# Patient Record
Sex: FEMALE | Race: BLACK OR AFRICAN AMERICAN | Employment: OTHER | URBAN - METROPOLITAN AREA
[De-identification: names, ages, dates, MRNs, and addresses within clinical notes are randomized per-mention and may not be internally consistent; named-entity substitution may affect disease eponyms.]

---

## 2019-01-14 ENCOUNTER — HOSPITAL ENCOUNTER (EMERGENCY)
Age: 39
Discharge: HOME | End: 2019-01-14
Attending: EMERGENCY MEDICINE
Payer: COMMERCIAL

## 2019-01-14 ENCOUNTER — APPOINTMENT (OUTPATIENT)
Dept: GENERAL RADIOLOGY | Age: 39
End: 2019-01-14
Payer: COMMERCIAL

## 2019-01-14 VITALS
DIASTOLIC BLOOD PRESSURE: 96 MMHG | SYSTOLIC BLOOD PRESSURE: 150 MMHG | RESPIRATION RATE: 17 BRPM | WEIGHT: 230 LBS | OXYGEN SATURATION: 100 % | HEART RATE: 64 BPM | TEMPERATURE: 97.7 F | BODY MASS INDEX: 37.12 KG/M2

## 2019-01-14 DIAGNOSIS — R07.9 CHEST PAIN WITH LOW RISK FOR CARDIAC ETIOLOGY: Primary | ICD-10-CM

## 2019-01-14 DIAGNOSIS — I10 ESSENTIAL HYPERTENSION: ICD-10-CM

## 2019-01-14 LAB
A/G RATIO: 1.6 (ref 1.1–2.2)
ALBUMIN SERPL-MCNC: 4.3 G/DL (ref 3.4–5)
ALP BLD-CCNC: 65 U/L (ref 40–129)
ALT SERPL-CCNC: 9 U/L (ref 10–40)
ANION GAP SERPL CALCULATED.3IONS-SCNC: 8 MMOL/L (ref 3–16)
AST SERPL-CCNC: 16 U/L (ref 15–37)
BASOPHILS ABSOLUTE: 0 K/UL (ref 0–0.2)
BASOPHILS RELATIVE PERCENT: 0.6 %
BILIRUB SERPL-MCNC: 0.4 MG/DL (ref 0–1)
BUN BLDV-MCNC: 7 MG/DL (ref 7–20)
CALCIUM SERPL-MCNC: 9.2 MG/DL (ref 8.3–10.6)
CHLORIDE BLD-SCNC: 105 MMOL/L (ref 99–110)
CO2: 27 MMOL/L (ref 21–32)
CREAT SERPL-MCNC: 0.6 MG/DL (ref 0.6–1.1)
EKG ATRIAL RATE: 69 BPM
EKG DIAGNOSIS: NORMAL
EKG P AXIS: 29 DEGREES
EKG P-R INTERVAL: 146 MS
EKG Q-T INTERVAL: 424 MS
EKG QRS DURATION: 80 MS
EKG QTC CALCULATION (BAZETT): 454 MS
EKG R AXIS: 48 DEGREES
EKG T AXIS: 18 DEGREES
EKG VENTRICULAR RATE: 69 BPM
EOSINOPHILS ABSOLUTE: 0.1 K/UL (ref 0–0.6)
EOSINOPHILS RELATIVE PERCENT: 2.9 %
GFR AFRICAN AMERICAN: >60
GFR NON-AFRICAN AMERICAN: >60
GLOBULIN: 2.7 G/DL
GLUCOSE BLD-MCNC: 97 MG/DL (ref 70–99)
HCT VFR BLD CALC: 36.7 % (ref 36–48)
HEMOGLOBIN: 11.9 G/DL (ref 12–16)
LIPASE: 13 U/L (ref 13–60)
LYMPHOCYTES ABSOLUTE: 1.5 K/UL (ref 1–5.1)
LYMPHOCYTES RELATIVE PERCENT: 30.6 %
MCH RBC QN AUTO: 27.3 PG (ref 26–34)
MCHC RBC AUTO-ENTMCNC: 32.5 G/DL (ref 31–36)
MCV RBC AUTO: 84.2 FL (ref 80–100)
MONOCYTES ABSOLUTE: 0.2 K/UL (ref 0–1.3)
MONOCYTES RELATIVE PERCENT: 5 %
NEUTROPHILS ABSOLUTE: 2.9 K/UL (ref 1.7–7.7)
NEUTROPHILS RELATIVE PERCENT: 60.9 %
PDW BLD-RTO: 13.7 % (ref 12.4–15.4)
PLATELET # BLD: 154 K/UL (ref 135–450)
PMV BLD AUTO: 9.4 FL (ref 5–10.5)
POTASSIUM SERPL-SCNC: 3.9 MMOL/L (ref 3.5–5.1)
RBC # BLD: 4.36 M/UL (ref 4–5.2)
SODIUM BLD-SCNC: 140 MMOL/L (ref 136–145)
TOTAL PROTEIN: 7 G/DL (ref 6.4–8.2)
TROPONIN: <0.01 NG/ML
TROPONIN: <0.01 NG/ML
WBC # BLD: 4.8 K/UL (ref 4–11)

## 2019-01-14 PROCEDURE — 93010 ELECTROCARDIOGRAM REPORT: CPT | Performed by: INTERNAL MEDICINE

## 2019-01-14 PROCEDURE — 99285 EMERGENCY DEPT VISIT HI MDM: CPT

## 2019-01-14 PROCEDURE — 6370000000 HC RX 637 (ALT 250 FOR IP): Performed by: EMERGENCY MEDICINE

## 2019-01-14 PROCEDURE — 93005 ELECTROCARDIOGRAM TRACING: CPT | Performed by: PHYSICIAN ASSISTANT

## 2019-01-14 PROCEDURE — 96375 TX/PRO/DX INJ NEW DRUG ADDON: CPT

## 2019-01-14 PROCEDURE — 85025 COMPLETE CBC W/AUTO DIFF WBC: CPT

## 2019-01-14 PROCEDURE — 80053 COMPREHEN METABOLIC PANEL: CPT

## 2019-01-14 PROCEDURE — 84484 ASSAY OF TROPONIN QUANT: CPT

## 2019-01-14 PROCEDURE — 96374 THER/PROPH/DIAG INJ IV PUSH: CPT

## 2019-01-14 PROCEDURE — 71046 X-RAY EXAM CHEST 2 VIEWS: CPT

## 2019-01-14 PROCEDURE — 6360000002 HC RX W HCPCS: Performed by: EMERGENCY MEDICINE

## 2019-01-14 PROCEDURE — 83690 ASSAY OF LIPASE: CPT

## 2019-01-14 RX ORDER — ONDANSETRON 2 MG/ML
4 INJECTION INTRAMUSCULAR; INTRAVENOUS ONCE
Status: DISCONTINUED | OUTPATIENT
Start: 2019-01-14 | End: 2019-01-14 | Stop reason: HOSPADM

## 2019-01-14 RX ORDER — KETOROLAC TROMETHAMINE 30 MG/ML
15 INJECTION, SOLUTION INTRAMUSCULAR; INTRAVENOUS ONCE
Status: COMPLETED | OUTPATIENT
Start: 2019-01-14 | End: 2019-01-14

## 2019-01-14 RX ORDER — ASPIRIN 81 MG/1
324 TABLET, CHEWABLE ORAL ONCE
Status: COMPLETED | OUTPATIENT
Start: 2019-01-14 | End: 2019-01-14

## 2019-01-14 RX ORDER — MORPHINE SULFATE 2 MG/ML
2 INJECTION, SOLUTION INTRAMUSCULAR; INTRAVENOUS ONCE
Status: COMPLETED | OUTPATIENT
Start: 2019-01-14 | End: 2019-01-14

## 2019-01-14 RX ORDER — ONDANSETRON 2 MG/ML
4 INJECTION INTRAMUSCULAR; INTRAVENOUS ONCE
Status: COMPLETED | OUTPATIENT
Start: 2019-01-14 | End: 2019-01-14

## 2019-01-14 RX ORDER — IBUPROFEN 400 MG/1
400 TABLET ORAL EVERY 6 HOURS PRN
Qty: 30 TABLET | Refills: 0 | Status: SHIPPED | OUTPATIENT
Start: 2019-01-14 | End: 2020-03-09

## 2019-01-14 RX ADMIN — ONDANSETRON 4 MG: 2 INJECTION INTRAMUSCULAR; INTRAVENOUS at 11:00

## 2019-01-14 RX ADMIN — HYDROMORPHONE HYDROCHLORIDE 0.5 MG: 1 INJECTION, SOLUTION INTRAMUSCULAR; INTRAVENOUS; SUBCUTANEOUS at 09:44

## 2019-01-14 RX ADMIN — KETOROLAC TROMETHAMINE 15 MG: 30 INJECTION, SOLUTION INTRAMUSCULAR at 09:00

## 2019-01-14 RX ADMIN — ASPIRIN 81 MG 324 MG: 81 TABLET ORAL at 08:59

## 2019-01-14 RX ADMIN — MORPHINE SULFATE 2 MG: 2 INJECTION, SOLUTION INTRAMUSCULAR; INTRAVENOUS at 08:59

## 2019-01-14 ASSESSMENT — PAIN DESCRIPTION - PROGRESSION
CLINICAL_PROGRESSION: GRADUALLY IMPROVING
CLINICAL_PROGRESSION: NOT CHANGED

## 2019-01-14 ASSESSMENT — PAIN SCALES - GENERAL
PAINLEVEL_OUTOF10: 6
PAINLEVEL_OUTOF10: 3
PAINLEVEL_OUTOF10: 6

## 2019-01-14 ASSESSMENT — PAIN DESCRIPTION - PAIN TYPE: TYPE: ACUTE PAIN

## 2019-01-14 ASSESSMENT — HEART SCORE: ECG: 0

## 2019-01-22 ENCOUNTER — HOSPITAL ENCOUNTER (EMERGENCY)
Age: 39
Discharge: HOME | End: 2019-01-22
Attending: EMERGENCY MEDICINE
Payer: COMMERCIAL

## 2019-01-22 ENCOUNTER — APPOINTMENT (OUTPATIENT)
Dept: GENERAL RADIOLOGY | Age: 39
End: 2019-01-22
Payer: COMMERCIAL

## 2019-01-22 VITALS
TEMPERATURE: 98.8 F | WEIGHT: 220 LBS | OXYGEN SATURATION: 100 % | HEART RATE: 79 BPM | RESPIRATION RATE: 17 BRPM | HEIGHT: 65 IN | DIASTOLIC BLOOD PRESSURE: 115 MMHG | SYSTOLIC BLOOD PRESSURE: 160 MMHG | BODY MASS INDEX: 36.65 KG/M2

## 2019-01-22 DIAGNOSIS — R07.9 CHEST PAIN, UNSPECIFIED TYPE: Primary | ICD-10-CM

## 2019-01-22 LAB
A/G RATIO: 1.7 (ref 1.1–2.2)
ALBUMIN SERPL-MCNC: 4.3 G/DL (ref 3.4–5)
ALP BLD-CCNC: 64 U/L (ref 40–129)
ALT SERPL-CCNC: 10 U/L (ref 10–40)
ANION GAP SERPL CALCULATED.3IONS-SCNC: 10 MMOL/L (ref 3–16)
APTT: 33.6 SEC (ref 26–36)
AST SERPL-CCNC: 14 U/L (ref 15–37)
BASOPHILS ABSOLUTE: 0 K/UL (ref 0–0.2)
BASOPHILS RELATIVE PERCENT: 0.4 %
BILIRUB SERPL-MCNC: 0.3 MG/DL (ref 0–1)
BUN BLDV-MCNC: 8 MG/DL (ref 7–20)
CALCIUM SERPL-MCNC: 9.4 MG/DL (ref 8.3–10.6)
CHLORIDE BLD-SCNC: 106 MMOL/L (ref 99–110)
CO2: 24 MMOL/L (ref 21–32)
CREAT SERPL-MCNC: 0.7 MG/DL (ref 0.6–1.1)
D DIMER: 218 NG/ML DDU (ref 0–229)
EKG ATRIAL RATE: 70 BPM
EKG DIAGNOSIS: NORMAL
EKG P AXIS: 37 DEGREES
EKG P-R INTERVAL: 140 MS
EKG Q-T INTERVAL: 388 MS
EKG QRS DURATION: 70 MS
EKG QTC CALCULATION (BAZETT): 419 MS
EKG R AXIS: 53 DEGREES
EKG T AXIS: 3 DEGREES
EKG VENTRICULAR RATE: 70 BPM
EOSINOPHILS ABSOLUTE: 0.2 K/UL (ref 0–0.6)
EOSINOPHILS RELATIVE PERCENT: 2.8 %
GFR AFRICAN AMERICAN: >60
GFR NON-AFRICAN AMERICAN: >60
GLOBULIN: 2.6 G/DL
GLUCOSE BLD-MCNC: 96 MG/DL (ref 70–99)
HCT VFR BLD CALC: 37.2 % (ref 36–48)
HEMOGLOBIN: 12.2 G/DL (ref 12–16)
INR BLD: 1.05 (ref 0.86–1.14)
LIPASE: 19 U/L (ref 13–60)
LYMPHOCYTES ABSOLUTE: 2 K/UL (ref 1–5.1)
LYMPHOCYTES RELATIVE PERCENT: 34.3 %
MCH RBC QN AUTO: 27.8 PG (ref 26–34)
MCHC RBC AUTO-ENTMCNC: 32.9 G/DL (ref 31–36)
MCV RBC AUTO: 84.5 FL (ref 80–100)
MONOCYTES ABSOLUTE: 0.3 K/UL (ref 0–1.3)
MONOCYTES RELATIVE PERCENT: 5.5 %
NEUTROPHILS ABSOLUTE: 3.4 K/UL (ref 1.7–7.7)
NEUTROPHILS RELATIVE PERCENT: 57 %
PDW BLD-RTO: 13.8 % (ref 12.4–15.4)
PLATELET # BLD: 177 K/UL (ref 135–450)
PMV BLD AUTO: 9.8 FL (ref 5–10.5)
POTASSIUM SERPL-SCNC: 3.6 MMOL/L (ref 3.5–5.1)
PROTHROMBIN TIME: 12 SEC (ref 9.8–13)
RBC # BLD: 4.4 M/UL (ref 4–5.2)
SODIUM BLD-SCNC: 140 MMOL/L (ref 136–145)
TOTAL PROTEIN: 6.9 G/DL (ref 6.4–8.2)
TROPONIN: <0.01 NG/ML
WBC # BLD: 5.9 K/UL (ref 4–11)

## 2019-01-22 PROCEDURE — 93005 ELECTROCARDIOGRAM TRACING: CPT | Performed by: EMERGENCY MEDICINE

## 2019-01-22 PROCEDURE — 71045 X-RAY EXAM CHEST 1 VIEW: CPT

## 2019-01-22 PROCEDURE — 80053 COMPREHEN METABOLIC PANEL: CPT

## 2019-01-22 PROCEDURE — 93010 ELECTROCARDIOGRAM REPORT: CPT | Performed by: INTERNAL MEDICINE

## 2019-01-22 PROCEDURE — 85379 FIBRIN DEGRADATION QUANT: CPT

## 2019-01-22 PROCEDURE — 84484 ASSAY OF TROPONIN QUANT: CPT

## 2019-01-22 PROCEDURE — 99285 EMERGENCY DEPT VISIT HI MDM: CPT

## 2019-01-22 PROCEDURE — 85610 PROTHROMBIN TIME: CPT

## 2019-01-22 PROCEDURE — 83690 ASSAY OF LIPASE: CPT

## 2019-01-22 PROCEDURE — 85730 THROMBOPLASTIN TIME PARTIAL: CPT

## 2019-01-22 PROCEDURE — 85025 COMPLETE CBC W/AUTO DIFF WBC: CPT

## 2019-01-22 ASSESSMENT — PAIN SCALES - GENERAL: PAINLEVEL_OUTOF10: 7

## 2019-01-22 ASSESSMENT — ENCOUNTER SYMPTOMS
DIARRHEA: 0
NAUSEA: 0
RHINORRHEA: 0
SHORTNESS OF BREATH: 0
CONSTIPATION: 0
VOMITING: 0
ABDOMINAL PAIN: 0
BLOOD IN STOOL: 0
SORE THROAT: 0

## 2019-02-12 ENCOUNTER — HOSPITAL ENCOUNTER (EMERGENCY)
Age: 39
Discharge: HOME | End: 2019-02-12
Payer: COMMERCIAL

## 2019-02-12 ENCOUNTER — APPOINTMENT (OUTPATIENT)
Dept: GENERAL RADIOLOGY | Age: 39
End: 2019-02-12
Payer: COMMERCIAL

## 2019-02-12 VITALS
DIASTOLIC BLOOD PRESSURE: 73 MMHG | HEIGHT: 66 IN | WEIGHT: 232.81 LBS | RESPIRATION RATE: 17 BRPM | HEART RATE: 73 BPM | BODY MASS INDEX: 37.41 KG/M2 | OXYGEN SATURATION: 100 % | SYSTOLIC BLOOD PRESSURE: 108 MMHG | TEMPERATURE: 97.5 F

## 2019-02-12 DIAGNOSIS — R07.9 CHEST PAIN, UNSPECIFIED TYPE: Primary | ICD-10-CM

## 2019-02-12 LAB
A/G RATIO: 1.4 (ref 1.1–2.2)
ALBUMIN SERPL-MCNC: 4.1 G/DL (ref 3.4–5)
ALP BLD-CCNC: 70 U/L (ref 40–129)
ALT SERPL-CCNC: 15 U/L (ref 10–40)
ANION GAP SERPL CALCULATED.3IONS-SCNC: 10 MMOL/L (ref 3–16)
AST SERPL-CCNC: 22 U/L (ref 15–37)
BASOPHILS ABSOLUTE: 0 K/UL (ref 0–0.2)
BASOPHILS RELATIVE PERCENT: 0.7 %
BILIRUB SERPL-MCNC: <0.2 MG/DL (ref 0–1)
BUN BLDV-MCNC: 9 MG/DL (ref 7–20)
CALCIUM SERPL-MCNC: 9.4 MG/DL (ref 8.3–10.6)
CHLORIDE BLD-SCNC: 106 MMOL/L (ref 99–110)
CO2: 24 MMOL/L (ref 21–32)
CREAT SERPL-MCNC: 0.7 MG/DL (ref 0.6–1.1)
EKG ATRIAL RATE: 76 BPM
EKG DIAGNOSIS: NORMAL
EKG P AXIS: 58 DEGREES
EKG P-R INTERVAL: 138 MS
EKG Q-T INTERVAL: 388 MS
EKG QRS DURATION: 66 MS
EKG QTC CALCULATION (BAZETT): 436 MS
EKG R AXIS: 46 DEGREES
EKG T AXIS: 4 DEGREES
EKG VENTRICULAR RATE: 76 BPM
EOSINOPHILS ABSOLUTE: 0.2 K/UL (ref 0–0.6)
EOSINOPHILS RELATIVE PERCENT: 4.4 %
GFR AFRICAN AMERICAN: >60
GFR NON-AFRICAN AMERICAN: >60
GLOBULIN: 2.9 G/DL
GLUCOSE BLD-MCNC: 100 MG/DL (ref 70–99)
HCT VFR BLD CALC: 36.3 % (ref 36–48)
HEMOGLOBIN: 12 G/DL (ref 12–16)
LYMPHOCYTES ABSOLUTE: 1.8 K/UL (ref 1–5.1)
LYMPHOCYTES RELATIVE PERCENT: 36.2 %
MCH RBC QN AUTO: 27.9 PG (ref 26–34)
MCHC RBC AUTO-ENTMCNC: 33.1 G/DL (ref 31–36)
MCV RBC AUTO: 84.5 FL (ref 80–100)
MONOCYTES ABSOLUTE: 0.3 K/UL (ref 0–1.3)
MONOCYTES RELATIVE PERCENT: 6.9 %
NEUTROPHILS ABSOLUTE: 2.6 K/UL (ref 1.7–7.7)
NEUTROPHILS RELATIVE PERCENT: 51.8 %
PDW BLD-RTO: 13.5 % (ref 12.4–15.4)
PLATELET # BLD: 181 K/UL (ref 135–450)
PMV BLD AUTO: 9.3 FL (ref 5–10.5)
POTASSIUM SERPL-SCNC: 4.1 MMOL/L (ref 3.5–5.1)
RBC # BLD: 4.29 M/UL (ref 4–5.2)
SODIUM BLD-SCNC: 140 MMOL/L (ref 136–145)
TOTAL PROTEIN: 7 G/DL (ref 6.4–8.2)
TROPONIN: <0.01 NG/ML
TROPONIN: <0.01 NG/ML
WBC # BLD: 5 K/UL (ref 4–11)

## 2019-02-12 PROCEDURE — 71046 X-RAY EXAM CHEST 2 VIEWS: CPT

## 2019-02-12 PROCEDURE — 6360000002 HC RX W HCPCS: Performed by: PHYSICIAN ASSISTANT

## 2019-02-12 PROCEDURE — 6370000000 HC RX 637 (ALT 250 FOR IP): Performed by: PHYSICIAN ASSISTANT

## 2019-02-12 PROCEDURE — 93010 ELECTROCARDIOGRAM REPORT: CPT | Performed by: INTERNAL MEDICINE

## 2019-02-12 PROCEDURE — 80053 COMPREHEN METABOLIC PANEL: CPT

## 2019-02-12 PROCEDURE — 84484 ASSAY OF TROPONIN QUANT: CPT

## 2019-02-12 PROCEDURE — 85025 COMPLETE CBC W/AUTO DIFF WBC: CPT

## 2019-02-12 PROCEDURE — 99285 EMERGENCY DEPT VISIT HI MDM: CPT

## 2019-02-12 PROCEDURE — 96374 THER/PROPH/DIAG INJ IV PUSH: CPT

## 2019-02-12 PROCEDURE — 93005 ELECTROCARDIOGRAM TRACING: CPT | Performed by: PHYSICIAN ASSISTANT

## 2019-02-12 RX ORDER — ASPIRIN 81 MG/1
324 TABLET, CHEWABLE ORAL ONCE
Status: DISCONTINUED | OUTPATIENT
Start: 2019-02-12 | End: 2019-02-12

## 2019-02-12 RX ORDER — NAPROXEN 500 MG/1
500 TABLET ORAL 2 TIMES DAILY WITH MEALS
Qty: 20 TABLET | Refills: 0 | Status: SHIPPED | OUTPATIENT
Start: 2019-02-12 | End: 2020-01-01

## 2019-02-12 RX ORDER — ASPIRIN 81 MG/1
243 TABLET, CHEWABLE ORAL ONCE
Status: COMPLETED | OUTPATIENT
Start: 2019-02-12 | End: 2019-02-12

## 2019-02-12 RX ORDER — BUSPIRONE HYDROCHLORIDE 15 MG/1
15 TABLET ORAL
COMMUNITY
Start: 2014-02-25 | End: 2022-02-09 | Stop reason: ALTCHOICE

## 2019-02-12 RX ORDER — HYDROCODONE BITARTRATE AND ACETAMINOPHEN 5; 325 MG/1; MG/1
1 TABLET ORAL ONCE
Status: COMPLETED | OUTPATIENT
Start: 2019-02-12 | End: 2019-02-12

## 2019-02-12 RX ORDER — KETOROLAC TROMETHAMINE 30 MG/ML
30 INJECTION, SOLUTION INTRAMUSCULAR; INTRAVENOUS ONCE
Status: COMPLETED | OUTPATIENT
Start: 2019-02-12 | End: 2019-02-12

## 2019-02-12 RX ADMIN — ASPIRIN 81 MG 243 MG: 81 TABLET ORAL at 10:23

## 2019-02-12 RX ADMIN — HYDROCODONE BITARTRATE AND ACETAMINOPHEN 1 TABLET: 5; 325 TABLET ORAL at 12:13

## 2019-02-12 RX ADMIN — KETOROLAC TROMETHAMINE 30 MG: 30 INJECTION, SOLUTION INTRAMUSCULAR at 12:09

## 2019-02-12 ASSESSMENT — PAIN SCALES - GENERAL
PAINLEVEL_OUTOF10: 4
PAINLEVEL_OUTOF10: 7
PAINLEVEL_OUTOF10: 7
PAINLEVEL_OUTOF10: 4
PAINLEVEL_OUTOF10: 8
PAINLEVEL_OUTOF10: 7

## 2019-02-12 ASSESSMENT — PAIN DESCRIPTION - FREQUENCY: FREQUENCY: CONTINUOUS

## 2019-02-12 ASSESSMENT — PAIN DESCRIPTION - DESCRIPTORS: DESCRIPTORS: SHARP;SQUEEZING

## 2019-02-12 ASSESSMENT — ENCOUNTER SYMPTOMS
ABDOMINAL PAIN: 0
BACK PAIN: 0
SORE THROAT: 0
APNEA: 0
SHORTNESS OF BREATH: 0
CHOKING: 0
EYE REDNESS: 0
VOMITING: 0
NAUSEA: 0
FACIAL SWELLING: 0
EYE DISCHARGE: 0

## 2019-02-12 ASSESSMENT — PAIN DESCRIPTION - PAIN TYPE
TYPE: ACUTE PAIN
TYPE: ACUTE PAIN

## 2019-02-12 ASSESSMENT — PAIN DESCRIPTION - ORIENTATION: ORIENTATION: LEFT

## 2019-02-12 ASSESSMENT — PAIN DESCRIPTION - DIRECTION: RADIATING_TOWARDS: LEFT ARM

## 2019-02-12 ASSESSMENT — PAIN DESCRIPTION - LOCATION
LOCATION: CHEST
LOCATION: CHEST

## 2019-02-12 ASSESSMENT — PAIN DESCRIPTION - PROGRESSION: CLINICAL_PROGRESSION: NOT CHANGED

## 2019-02-12 ASSESSMENT — PAIN DESCRIPTION - ONSET: ONSET: ON-GOING

## 2019-06-02 ENCOUNTER — HOSPITAL ENCOUNTER (EMERGENCY)
Age: 39
Discharge: HOME | End: 2019-06-02
Attending: EMERGENCY MEDICINE
Payer: COMMERCIAL

## 2019-06-02 ENCOUNTER — APPOINTMENT (OUTPATIENT)
Dept: GENERAL RADIOLOGY | Age: 39
End: 2019-06-02
Payer: COMMERCIAL

## 2019-06-02 ENCOUNTER — APPOINTMENT (OUTPATIENT)
Dept: CT IMAGING | Age: 39
End: 2019-06-02
Payer: COMMERCIAL

## 2019-06-02 VITALS
OXYGEN SATURATION: 100 % | BODY MASS INDEX: 36.87 KG/M2 | TEMPERATURE: 98.2 F | HEART RATE: 62 BPM | RESPIRATION RATE: 12 BRPM | WEIGHT: 225 LBS | SYSTOLIC BLOOD PRESSURE: 169 MMHG | DIASTOLIC BLOOD PRESSURE: 101 MMHG

## 2019-06-02 DIAGNOSIS — R55 SYNCOPE AND COLLAPSE: Primary | ICD-10-CM

## 2019-06-02 DIAGNOSIS — S16.1XXA STRAIN OF NECK MUSCLE, INITIAL ENCOUNTER: ICD-10-CM

## 2019-06-02 LAB
A/G RATIO: 1.7 (ref 1.1–2.2)
ALBUMIN SERPL-MCNC: 4.2 G/DL (ref 3.4–5)
ALP BLD-CCNC: 63 U/L (ref 40–129)
ALT SERPL-CCNC: 8 U/L (ref 10–40)
ANION GAP SERPL CALCULATED.3IONS-SCNC: 9 MMOL/L (ref 3–16)
AST SERPL-CCNC: 11 U/L (ref 15–37)
BILIRUB SERPL-MCNC: <0.2 MG/DL (ref 0–1)
BUN BLDV-MCNC: 10 MG/DL (ref 7–20)
CALCIUM SERPL-MCNC: 9.5 MG/DL (ref 8.3–10.6)
CHLORIDE BLD-SCNC: 107 MMOL/L (ref 99–110)
CO2: 23 MMOL/L (ref 21–32)
CREAT SERPL-MCNC: 0.8 MG/DL (ref 0.6–1.1)
GFR AFRICAN AMERICAN: >60
GFR NON-AFRICAN AMERICAN: >60
GLOBULIN: 2.5 G/DL
GLUCOSE BLD-MCNC: 97 MG/DL (ref 70–99)
HCT VFR BLD CALC: 38.7 % (ref 36–48)
HEMOGLOBIN: 12.6 G/DL (ref 12–16)
MCH RBC QN AUTO: 27.7 PG (ref 26–34)
MCHC RBC AUTO-ENTMCNC: 32.6 G/DL (ref 31–36)
MCV RBC AUTO: 84.7 FL (ref 80–100)
PDW BLD-RTO: 13.9 % (ref 12.4–15.4)
PLATELET # BLD: 164 K/UL (ref 135–450)
PMV BLD AUTO: 9.9 FL (ref 5–10.5)
POTASSIUM SERPL-SCNC: 4.1 MMOL/L (ref 3.5–5.1)
RBC # BLD: 4.57 M/UL (ref 4–5.2)
SODIUM BLD-SCNC: 139 MMOL/L (ref 136–145)
TOTAL PROTEIN: 6.7 G/DL (ref 6.4–8.2)
TROPONIN: <0.01 NG/ML
WBC # BLD: 4.1 K/UL (ref 4–11)

## 2019-06-02 PROCEDURE — 6370000000 HC RX 637 (ALT 250 FOR IP): Performed by: PHYSICIAN ASSISTANT

## 2019-06-02 PROCEDURE — 80053 COMPREHEN METABOLIC PANEL: CPT

## 2019-06-02 PROCEDURE — 93005 ELECTROCARDIOGRAM TRACING: CPT | Performed by: EMERGENCY MEDICINE

## 2019-06-02 PROCEDURE — 6360000004 HC RX CONTRAST MEDICATION: Performed by: EMERGENCY MEDICINE

## 2019-06-02 PROCEDURE — 96374 THER/PROPH/DIAG INJ IV PUSH: CPT

## 2019-06-02 PROCEDURE — 84484 ASSAY OF TROPONIN QUANT: CPT

## 2019-06-02 PROCEDURE — 6360000002 HC RX W HCPCS: Performed by: PHYSICIAN ASSISTANT

## 2019-06-02 PROCEDURE — 85027 COMPLETE CBC AUTOMATED: CPT

## 2019-06-02 PROCEDURE — 70450 CT HEAD/BRAIN W/O DYE: CPT

## 2019-06-02 PROCEDURE — 71275 CT ANGIOGRAPHY CHEST: CPT

## 2019-06-02 PROCEDURE — 96375 TX/PRO/DX INJ NEW DRUG ADDON: CPT

## 2019-06-02 PROCEDURE — 72125 CT NECK SPINE W/O DYE: CPT

## 2019-06-02 PROCEDURE — 99284 EMERGENCY DEPT VISIT MOD MDM: CPT

## 2019-06-02 PROCEDURE — 71046 X-RAY EXAM CHEST 2 VIEWS: CPT

## 2019-06-02 RX ORDER — LIDOCAINE 4 G/G
1 PATCH TOPICAL DAILY
Status: DISCONTINUED | OUTPATIENT
Start: 2019-06-02 | End: 2019-06-02 | Stop reason: HOSPADM

## 2019-06-02 RX ORDER — ORPHENADRINE CITRATE 30 MG/ML
30 INJECTION INTRAMUSCULAR; INTRAVENOUS ONCE
Status: COMPLETED | OUTPATIENT
Start: 2019-06-02 | End: 2019-06-02

## 2019-06-02 RX ORDER — LOSARTAN POTASSIUM 25 MG/1
100 TABLET ORAL ONCE
Status: COMPLETED | OUTPATIENT
Start: 2019-06-02 | End: 2019-06-02

## 2019-06-02 RX ORDER — CYCLOBENZAPRINE HCL 10 MG
10 TABLET ORAL 3 TIMES DAILY PRN
Qty: 15 TABLET | Refills: 0 | Status: SHIPPED | OUTPATIENT
Start: 2019-06-02 | End: 2019-06-12

## 2019-06-02 RX ORDER — KETOROLAC TROMETHAMINE 30 MG/ML
30 INJECTION, SOLUTION INTRAMUSCULAR; INTRAVENOUS ONCE
Status: COMPLETED | OUTPATIENT
Start: 2019-06-02 | End: 2019-06-02

## 2019-06-02 RX ORDER — LIDOCAINE 50 MG/G
1 PATCH TOPICAL DAILY
Qty: 30 PATCH | Refills: 0 | Status: SHIPPED | OUTPATIENT
Start: 2019-06-02 | End: 2022-02-09 | Stop reason: ALTCHOICE

## 2019-06-02 RX ORDER — HYDROCODONE BITARTRATE AND ACETAMINOPHEN 5; 325 MG/1; MG/1
2 TABLET ORAL ONCE
Status: COMPLETED | OUTPATIENT
Start: 2019-06-02 | End: 2019-06-02

## 2019-06-02 RX ADMIN — IOPAMIDOL 100 ML: 755 INJECTION, SOLUTION INTRAVENOUS at 11:24

## 2019-06-02 RX ADMIN — KETOROLAC TROMETHAMINE 30 MG: 30 INJECTION, SOLUTION INTRAMUSCULAR at 10:40

## 2019-06-02 RX ADMIN — ORPHENADRINE CITRATE 30 MG: 60 INJECTION INTRAMUSCULAR; INTRAVENOUS at 10:40

## 2019-06-02 RX ADMIN — LOSARTAN POTASSIUM 100 MG: 25 TABLET ORAL at 10:40

## 2019-06-02 RX ADMIN — HYDROCODONE BITARTRATE AND ACETAMINOPHEN 2 TABLET: 5; 325 TABLET ORAL at 10:40

## 2019-06-02 ASSESSMENT — ENCOUNTER SYMPTOMS
ABDOMINAL PAIN: 0
VOMITING: 0
DIARRHEA: 0
RHINORRHEA: 0
NAUSEA: 0
BACK PAIN: 0
SHORTNESS OF BREATH: 0
COUGH: 0

## 2019-06-02 ASSESSMENT — PAIN SCALES - GENERAL
PAINLEVEL_OUTOF10: 8
PAINLEVEL_OUTOF10: 8

## 2019-06-02 ASSESSMENT — PAIN DESCRIPTION - PAIN TYPE: TYPE: ACUTE PAIN

## 2019-06-02 ASSESSMENT — PAIN DESCRIPTION - LOCATION: LOCATION: NECK

## 2019-06-02 NOTE — LETTER
[facility]  [address] [facility], [address]             June 2, 2019    Patient: Stanley Brower   YOB: 1980   Date of Visit: 6/2/2019       To Whom It May Concern:    Vesna Barros was seen and treated in our emergency department on 6/2/2019. She may return to work on 6/4/2019.       Sincerely,         [facility]

## 2019-06-02 NOTE — ED NOTES
Patient into ER from home with , where yesterday she reportedly had chest pain and sharp shooting pains down neck and then passed out. Today continues with dull chest pain, and very severe neck pain from where she fell yesterday. Patient has hx of high blood pressure, patients bp elevated and reports she did not take morning medications today. IV placed, EKG completed, and blood collected and sent to lab. Will continue to monitor.       Deny Greene RN  06/02/19 2054

## 2019-06-02 NOTE — ED PROVIDER NOTES
I independently examined and evaluated Steve Paniagua. In brief their history revealed patient with neck pain, chest pain after syncope yesterday. Patient states that she was walking in her house when she started feeling dizziness. She states she she passed out at that time. She laid down in the bed after she woke up and states that when she got up out of bed she passed out again. She's been having worsening neck pain since that time. He is also complaining of some back pain that seems to radiate to her chest today. She has a history of fibromyalgia and chronic neck pain. She denies numbness tingling weakness down her arms or legs. She denies shortness of breath, abdominal pain, nausea, vomiting. She is hypertensive here but she forgot to take her blood pressure medicine this morning. . Their exam revealed pulses equal bilaterally in both upper extremities. No tenderness to palpation of the chest wall. Full range of motion of the neck. 5 out of 5 strength bilateral upper extremities. Heart regular rate and rhythm. . All diagnostic, treatment, and disposition decisions were made by myself in conjunction with the mid-level provider. Before disposition, questions were sought and answered with the significant other. They have voiced understanding and agree with the plan. Patient's CBC, chemistry and troponin are unremarkable. We did obtain a CT of the cervical spine as well as a CTA of the chest. This showed no acute evidence of abnormalities, specifically no evidence of dissection or aneurysm or abnormalities of the cervical spine. Patient's chest pain has been constant since 3 AM. She is more complaining of acute on chronic neck pain. She did not take her blood pressure medicines this morning. Counseled on taking her medicines as directed. 12 lead EKG:  Normal Sinus Rhythm 73  Axis is   Normal  QTc is  normal  There is no specific ST-T wave changes appreciated.   There is no clear evidence of acute ischemia or infarction. It was compared against an EKG from 2/12/19.'    For all further details of the patient's emergency department visit, please see the mid-level practitioner's documentation.         Berry Tom MD  06/02/19 4783

## 2019-06-02 NOTE — ED PROVIDER NOTES
leg pain or swelling. No estrogen therapies. She denies any fever or chills. No vomiting or diarrhea. No abdominal pain. No numbness, tingling, or weakness. No other complaints at this time     Nursing Notes were all reviewed and agreed with or any disagreements were addressed  in the HPI. REVIEW OF SYSTEMS    (2-9 systems for level 4, 10 or more for level 5)     Review of Systems   Constitutional: Negative for activity change, appetite change, chills and fever. HENT: Negative for congestion and rhinorrhea. Respiratory: Negative for cough and shortness of breath. Cardiovascular: Positive for chest pain. Gastrointestinal: Negative for abdominal pain, diarrhea, nausea and vomiting. Genitourinary: Negative for difficulty urinating, dysuria and hematuria. Musculoskeletal: Positive for neck pain. Negative for back pain. Neurological: Positive for syncope. Negative for weakness and numbness. Positives and Pertinent negatives as per HPI. Except as noted abovein the ROS, all other systems were reviewed and negative.        PAST MEDICAL HISTORY     Past Medical History:   Diagnosis Date    Anemia     during pregnancy    HLD (hyperlipidemia)     Hypertension          SURGICAL HISTORY     Past Surgical History:   Procedure Laterality Date    FOOT SURGERY      LEFT    HYSTERECTOMY      MOUTH SURGERY      WRIST SURGERY  4/14/11    ORIF LEFT  WRIST         CURRENTMEDICATIONS       Discharge Medication List as of 6/2/2019 12:24 PM      CONTINUE these medications which have NOT CHANGED    Details   busPIRone (BUSPAR) 15 MG tablet Take 15 mg by mouthHistorical Med      naproxen (NAPROSYN) 500 MG tablet Take 1 tablet by mouth 2 times daily (with meals), Disp-20 tablet, R-0Print      ibuprofen (IBU) 400 MG tablet Take 1 tablet by mouth every 6 hours as needed for Pain, Disp-30 tablet, R-0Print      losartan (COZAAR) 100 MG tablet Take 100 mg by mouth dailyHistorical Med      METOPROLOL SUCCINATE ER normal.   Left Ear: External ear normal.   Nose: Nose normal.   Eyes: Right eye exhibits no discharge. Left eye exhibits no discharge. Neck: Normal range of motion. Neck supple. No tracheal deviation present. Cardiovascular: Normal rate, regular rhythm, normal heart sounds and intact distal pulses. No murmur heard. Pulmonary/Chest: Effort normal and breath sounds normal. No stridor. No respiratory distress. She has no wheezes. Abdominal: Soft. Bowel sounds are normal. She exhibits no distension. There is no tenderness. There is no guarding. Musculoskeletal: Normal range of motion. There is tenderness to palpation to the paraspinous muscles of the right cervical spine, there is minimal midline tenderness. Full range of motion of neck. There is no other midline spinal tenderness   Neurological: She is alert and oriented to person, place, and time. Motor strength of bilateral upper extremities is 5/5 throughout. Normal sensation light touch. Radial pulses are 2+. Neurovascularly intact   Skin: Skin is warm and dry. She is not diaphoretic. Psychiatric: She has a normal mood and affect. Her behavior is normal.   Nursing note and vitals reviewed. DIAGNOSTIC RESULTS   LABS:    Labs Reviewed   COMPREHENSIVE METABOLIC PANEL - Abnormal; Notable for the following components:       Result Value    ALT 8 (*)     AST 11 (*)     All other components within normal limits    Narrative:     Performed at:  [facility] [address], [place], [address]   Phone [phone]   CBC    Narrative:     Performed at:  [facility] [address], [place], [address]   Phone [phone]   TROPONIN    Narrative:     Performed at:  [facility] [address], [place], [address]   Phone [phone]       All other labs were within normal range or not returned as of this dictation. EKG:  All EKG's are interpreted by the Emergency Department Physician who either signs orCo-signs this chart in the absence of a cardiologist.  Please see their note for interpretation of EKG. RADIOLOGY:   Non-plain film images such as CT, Ultrasound and MRI are read by the radiologist. Plain radiographic images are visualized andpreliminarily interpreted by the  ED Provider with the below findings:        Interpretation [facility] Radiologist below, if available at the time of this note:    XR CHEST STANDARD (2 VW)   Final Result   No acute process. CTA CHEST W WO CONTRAST   Final Result   Negative CT of the chest.         CT CERVICAL SPINE WO CONTRAST   Final Result   No acute abnormality of the cervical spine. CT HEAD WO CONTRAST   Final Result   No acute intracranial abnormality. No results found. PROCEDURES   Unless otherwise noted below, none     Procedures    CRITICAL CARE TIME   N/A    CONSULTS:  None      EMERGENCY DEPARTMENT COURSE and DIFFERENTIALDIAGNOSIS/MDM:   Vitals:    Vitals:    06/02/19 1045 06/02/19 1145 06/02/19 1200 06/02/19 1215   BP: (!) 186/117 (!) 179/107 (!) 158/106 (!) 169/101   Pulse: 76 58 59 62   Resp: 13 11 15 12   Temp:       TempSrc:       SpO2: 99% 100% 100% 100%   Weight:           Patient was given thefollowing medications:  Medications   lidocaine 4 % external patch 1 patch (1 patch Transdermal Patch Applied 6/2/19 1040)   ketorolac (TORADOL) injection 30 mg (30 mg Intravenous Given 6/2/19 1040)   orphenadrine (NORFLEX) injection 30 mg (30 mg Intravenous Given 6/2/19 1040)   HYDROcodone-acetaminophen (NORCO) 5-325 MG per tablet 2 tablet (2 tablets Oral Given 6/2/19 1040)   losartan (COZAAR) tablet 100 mg (100 mg Oral Given 6/2/19 1040)   iopamidol (ISOVUE-370) 76 % injection 100 mL (100 mLs Intravenous Given 6/2/19 1124)     The patient presents to the emergency department today for evaluation for neck pain.   The patient states that she has a history of chronic neck scheduled for Wednesday, he is to have this obtained, she states that this is for her chronic neck pain. She is to return to the ED for any new or worsening symptoms. Patient was understanding and is agreeable plan. Stable for discharge    she has had constant chest pain since yesterday, she is relatively low risk, I do not feel that she needs any further emergent cardiac workup at this time and she has had symptoms greater than 12 hours. My suspicion is low at this time for ACS, pneumonia, pleural effusion, pneumothorax, myocarditis, pericarditis, cardiac tamponade, life-threatening arrhythmia, TAA, acute failure, respiratory distress, acute dissection or other emergent etiology at this time. FINAL IMPRESSION      1. Syncope and collapse    2.  Strain of neck muscle, initial encounter          DISPOSITION/PLAN   DISPOSITION Decision To Discharge 06/02/2019 12:33:05 PM      PATIENT REFERREDTO:  Your primary care physician    Schedule an appointment as soon as possible for a visit in 2 days      [facility]  [address] [facility]  [phone]    As needed, If symptoms worsen      DISCHARGE MEDICATIONS:  Discharge Medication List as of 6/2/2019 12:24 PM      START taking these medications    Details   lidocaine (LIDODERM) 5 % Place 1 patch onto the skin daily 12 hours on, 12 hours off., Disp-30 patch, R-0Print      cyclobenzaprine (FLEXERIL) 10 MG tablet Take 1 tablet by mouth 3 times daily as needed for Muscle spasms, Disp-15 tablet, R-0Print             DISCONTINUED MEDICATIONS:  Discharge Medication List as of 6/2/2019 12:24 PM                 (Please note that portions ofthis note were completed with a voice recognition program.  Efforts were made to edit the dictations but occasionally words are mis-transcribed.)    Dea Olivarez PA-C (electronically signed)            Dea Olivarez PA-C  06/02/19 [address] [place] [address]

## 2019-06-03 LAB
EKG ATRIAL RATE: 73 BPM
EKG DIAGNOSIS: NORMAL
EKG P AXIS: 34 DEGREES
EKG P-R INTERVAL: 140 MS
EKG Q-T INTERVAL: 374 MS
EKG QRS DURATION: 68 MS
EKG QTC CALCULATION (BAZETT): 412 MS
EKG R AXIS: 35 DEGREES
EKG T AXIS: -6 DEGREES
EKG VENTRICULAR RATE: 73 BPM

## 2019-06-03 PROCEDURE — 93010 ELECTROCARDIOGRAM REPORT: CPT | Performed by: INTERNAL MEDICINE

## 2020-01-01 ENCOUNTER — APPOINTMENT (OUTPATIENT)
Dept: GENERAL RADIOLOGY | Age: 40
End: 2020-01-01
Payer: COMMERCIAL

## 2020-01-01 ENCOUNTER — HOSPITAL ENCOUNTER (EMERGENCY)
Age: 40
Discharge: HOME | End: 2020-01-01
Attending: EMERGENCY MEDICINE
Payer: COMMERCIAL

## 2020-01-01 VITALS
BODY MASS INDEX: 36.16 KG/M2 | HEIGHT: 66 IN | OXYGEN SATURATION: 98 % | TEMPERATURE: 98.2 F | WEIGHT: 225 LBS | HEART RATE: 76 BPM | RESPIRATION RATE: 8 BRPM | SYSTOLIC BLOOD PRESSURE: 139 MMHG | DIASTOLIC BLOOD PRESSURE: 84 MMHG

## 2020-01-01 LAB
ANION GAP SERPL CALCULATED.3IONS-SCNC: 9 MMOL/L (ref 3–16)
BASOPHILS ABSOLUTE: 0 K/UL (ref 0–0.2)
BASOPHILS RELATIVE PERCENT: 0.5 %
BUN BLDV-MCNC: 7 MG/DL (ref 7–20)
CALCIUM SERPL-MCNC: 9.1 MG/DL (ref 8.3–10.6)
CHLORIDE BLD-SCNC: 105 MMOL/L (ref 99–110)
CO2: 25 MMOL/L (ref 21–32)
CREAT SERPL-MCNC: 0.7 MG/DL (ref 0.6–1.1)
D DIMER: 211 NG/ML DDU (ref 0–229)
EOSINOPHILS ABSOLUTE: 0.2 K/UL (ref 0–0.6)
EOSINOPHILS RELATIVE PERCENT: 4.2 %
GFR AFRICAN AMERICAN: >60
GFR NON-AFRICAN AMERICAN: >60
GLUCOSE BLD-MCNC: 97 MG/DL (ref 70–99)
HCG QUALITATIVE: NEGATIVE
HCT VFR BLD CALC: 38.7 % (ref 36–48)
HEMOGLOBIN: 12.5 G/DL (ref 12–16)
LYMPHOCYTES ABSOLUTE: 1.1 K/UL (ref 1–5.1)
LYMPHOCYTES RELATIVE PERCENT: 24.9 %
MCH RBC QN AUTO: 27.5 PG (ref 26–34)
MCHC RBC AUTO-ENTMCNC: 32.3 G/DL (ref 31–36)
MCV RBC AUTO: 85.3 FL (ref 80–100)
MONOCYTES ABSOLUTE: 0.3 K/UL (ref 0–1.3)
MONOCYTES RELATIVE PERCENT: 6.6 %
NEUTROPHILS ABSOLUTE: 2.8 K/UL (ref 1.7–7.7)
NEUTROPHILS RELATIVE PERCENT: 63.8 %
PDW BLD-RTO: 13.8 % (ref 12.4–15.4)
PLATELET # BLD: 159 K/UL (ref 135–450)
PMV BLD AUTO: 9.8 FL (ref 5–10.5)
POTASSIUM SERPL-SCNC: 4 MMOL/L (ref 3.5–5.1)
RBC # BLD: 4.54 M/UL (ref 4–5.2)
SODIUM BLD-SCNC: 139 MMOL/L (ref 136–145)
TROPONIN: <0.01 NG/ML
TROPONIN: <0.01 NG/ML
WBC # BLD: 4.4 K/UL (ref 4–11)

## 2020-01-01 PROCEDURE — 80048 BASIC METABOLIC PNL TOTAL CA: CPT

## 2020-01-01 PROCEDURE — 99285 EMERGENCY DEPT VISIT HI MDM: CPT

## 2020-01-01 PROCEDURE — 85025 COMPLETE CBC W/AUTO DIFF WBC: CPT

## 2020-01-01 PROCEDURE — 93005 ELECTROCARDIOGRAM TRACING: CPT | Performed by: EMERGENCY MEDICINE

## 2020-01-01 PROCEDURE — 6360000002 HC RX W HCPCS: Performed by: EMERGENCY MEDICINE

## 2020-01-01 PROCEDURE — 84484 ASSAY OF TROPONIN QUANT: CPT

## 2020-01-01 PROCEDURE — 85379 FIBRIN DEGRADATION QUANT: CPT

## 2020-01-01 PROCEDURE — 96375 TX/PRO/DX INJ NEW DRUG ADDON: CPT

## 2020-01-01 PROCEDURE — 2500000003 HC RX 250 WO HCPCS: Performed by: EMERGENCY MEDICINE

## 2020-01-01 PROCEDURE — 84703 CHORIONIC GONADOTROPIN ASSAY: CPT

## 2020-01-01 PROCEDURE — 71046 X-RAY EXAM CHEST 2 VIEWS: CPT

## 2020-01-01 PROCEDURE — 96374 THER/PROPH/DIAG INJ IV PUSH: CPT

## 2020-01-01 RX ORDER — KETOROLAC TROMETHAMINE 30 MG/ML
15 INJECTION, SOLUTION INTRAMUSCULAR; INTRAVENOUS ONCE
Status: COMPLETED | OUTPATIENT
Start: 2020-01-01 | End: 2020-01-01

## 2020-01-01 RX ORDER — LABETALOL HYDROCHLORIDE 5 MG/ML
10 INJECTION, SOLUTION INTRAVENOUS ONCE
Status: COMPLETED | OUTPATIENT
Start: 2020-01-01 | End: 2020-01-01

## 2020-01-01 RX ORDER — DULOXETIN HYDROCHLORIDE 30 MG/1
30 CAPSULE, DELAYED RELEASE ORAL 2 TIMES DAILY
COMMUNITY
End: 2022-02-09 | Stop reason: ALTCHOICE

## 2020-01-01 RX ORDER — NAPROXEN 500 MG/1
500 TABLET ORAL 2 TIMES DAILY WITH MEALS
Qty: 20 TABLET | Refills: 0 | Status: SHIPPED | OUTPATIENT
Start: 2020-01-01 | End: 2020-03-09

## 2020-01-01 RX ORDER — AMLODIPINE BESYLATE 10 MG/1
10 TABLET ORAL DAILY
COMMUNITY
Start: 2019-01-25 | End: 2022-06-23 | Stop reason: SDUPTHER

## 2020-01-01 RX ORDER — QUETIAPINE FUMARATE 100 MG/1
100 TABLET, FILM COATED ORAL DAILY
COMMUNITY
Start: 2019-12-11 | End: 2022-02-09 | Stop reason: ALTCHOICE

## 2020-01-01 RX ORDER — LOSARTAN POTASSIUM AND HYDROCHLOROTHIAZIDE 12.5; 1 MG/1; MG/1
100 TABLET ORAL DAILY
COMMUNITY
Start: 2019-12-11 | End: 2022-02-09 | Stop reason: ALTCHOICE

## 2020-01-01 RX ADMIN — KETOROLAC TROMETHAMINE 15 MG: 30 INJECTION, SOLUTION INTRAMUSCULAR at 16:26

## 2020-01-01 RX ADMIN — LABETALOL HYDROCHLORIDE 10 MG: 5 INJECTION INTRAVENOUS at 14:21

## 2020-01-01 ASSESSMENT — PAIN SCALES - GENERAL
PAINLEVEL_OUTOF10: 4
PAINLEVEL_OUTOF10: 6
PAINLEVEL_OUTOF10: 8

## 2020-01-01 ASSESSMENT — PAIN DESCRIPTION - LOCATION
LOCATION: CHEST

## 2020-01-01 ASSESSMENT — PAIN DESCRIPTION - DESCRIPTORS
DESCRIPTORS: ACHING

## 2020-01-01 ASSESSMENT — PAIN DESCRIPTION - PAIN TYPE
TYPE: ACUTE PAIN

## 2020-01-01 NOTE — ED PROVIDER NOTES
[address] ENCOUNTER      Pt Name: Giovanni Pope  MRN: [id number]  [place] 1980  Date of evaluation: 1/1/2020  Provider: Yevgeniy Roca MD    CHIEF COMPLAINT       Chief Complaint   Patient presents with    Chest Pain     c/o chest pain worsening since monday, swelling to right shoulder that comes and goes x a few months, producutive cough started last night. HISTORY OF PRESENT ILLNESS   (Location/Symptom, Timing/Onset, Context/Setting, Quality, Duration, Modifying Factors, Severity)  Note limiting factors. Giovanni Pope is a 46 y.o. female with past medical history of hypertension here today with chest pain. Patient states that for the last 24 to 48 hours she has had a substernal chest pain. Notes it is somewhat pressure-like and occasionally sharp. Occasionally worse with taking a deep breath. Denies cough. Mild shortness of breath. No headache. No lower extremity swelling, redness or pain. No fevers or chills. She states she is wearing her clonidine patch and usually takes her other blood pressure medication at night and has been compliant with that. She notes she had a negative stress test at [facility] in late February    HPI    Nursing Notes were reviewed. REVIEW OF SYSTEMS    (2-9 systems for level 4, 10 or more for level 5)     Review of Systems    Please see HPI for pertinent positive and negative review of system findings. A full 10 system ROS was performed and otherwise negative.         PAST MEDICAL HISTORY     Past Medical History:   Diagnosis Date    Anemia     during pregnancy    HLD (hyperlipidemia)     Hypertension          SURGICAL HISTORY       Past Surgical History:   Procedure Laterality Date    FOOT SURGERY      LEFT    HYSTERECTOMY      MOUTH SURGERY      WRIST SURGERY  4/14/11    ORIF LEFT  WRIST         CURRENT MEDICATIONS       Previous Medications    AMLODIPINE (NORVASC) 10 MG

## 2020-01-01 NOTE — ED NOTES
Chest pain started Monday. Working with PCP trying to decrease BP with medication. Pt is on a continuous pulse oximetry and telemetry monitoring. Pt continued on cycling blood pressure. Fall risk precautions in place, call light in reach, bed side table within reach, will continue to monitor.            Sinan Hernandez RN  01/01/20 2796

## 2020-01-01 NOTE — ED NOTES
Pt requesting pain medication for her chest pain. Dr gonzalez made aware and will evaulate pt.       Ghulam Caballero RN  01/01/20 9931

## 2020-01-02 LAB
EKG ATRIAL RATE: 79 BPM
EKG ATRIAL RATE: 81 BPM
EKG DIAGNOSIS: NORMAL
EKG DIAGNOSIS: NORMAL
EKG P AXIS: 25 DEGREES
EKG P AXIS: 39 DEGREES
EKG P-R INTERVAL: 136 MS
EKG P-R INTERVAL: 158 MS
EKG Q-T INTERVAL: 382 MS
EKG Q-T INTERVAL: 404 MS
EKG QRS DURATION: 68 MS
EKG QRS DURATION: 78 MS
EKG QTC CALCULATION (BAZETT): 443 MS
EKG QTC CALCULATION (BAZETT): 463 MS
EKG R AXIS: 30 DEGREES
EKG R AXIS: 58 DEGREES
EKG T AXIS: 1 DEGREES
EKG T AXIS: 14 DEGREES
EKG VENTRICULAR RATE: 79 BPM
EKG VENTRICULAR RATE: 81 BPM

## 2020-01-02 PROCEDURE — 93010 ELECTROCARDIOGRAM REPORT: CPT | Performed by: INTERNAL MEDICINE

## 2020-03-09 ENCOUNTER — APPOINTMENT (OUTPATIENT)
Dept: GENERAL RADIOLOGY | Age: 40
End: 2020-03-09
Payer: COMMERCIAL

## 2020-03-09 ENCOUNTER — APPOINTMENT (OUTPATIENT)
Dept: CT IMAGING | Age: 40
End: 2020-03-09
Payer: COMMERCIAL

## 2020-03-09 ENCOUNTER — HOSPITAL ENCOUNTER (EMERGENCY)
Age: 40
Discharge: HOME | End: 2020-03-09
Payer: COMMERCIAL

## 2020-03-09 VITALS
RESPIRATION RATE: 16 BRPM | SYSTOLIC BLOOD PRESSURE: 167 MMHG | HEART RATE: 84 BPM | OXYGEN SATURATION: 100 % | DIASTOLIC BLOOD PRESSURE: 109 MMHG | TEMPERATURE: 97.9 F

## 2020-03-09 PROCEDURE — 72100 X-RAY EXAM L-S SPINE 2/3 VWS: CPT

## 2020-03-09 PROCEDURE — 99284 EMERGENCY DEPT VISIT MOD MDM: CPT

## 2020-03-09 PROCEDURE — 73110 X-RAY EXAM OF WRIST: CPT

## 2020-03-09 PROCEDURE — 73610 X-RAY EXAM OF ANKLE: CPT

## 2020-03-09 PROCEDURE — 72125 CT NECK SPINE W/O DYE: CPT

## 2020-03-09 PROCEDURE — 73630 X-RAY EXAM OF FOOT: CPT

## 2020-03-09 PROCEDURE — 73030 X-RAY EXAM OF SHOULDER: CPT

## 2020-03-09 PROCEDURE — 70450 CT HEAD/BRAIN W/O DYE: CPT

## 2020-03-09 RX ORDER — CYCLOBENZAPRINE HCL 10 MG
10 TABLET ORAL 3 TIMES DAILY PRN
Qty: 21 TABLET | Refills: 0 | Status: SHIPPED | OUTPATIENT
Start: 2020-03-09 | End: 2020-03-16

## 2020-03-09 RX ORDER — IBUPROFEN 800 MG/1
800 TABLET ORAL EVERY 8 HOURS PRN
Qty: 30 TABLET | Refills: 0 | Status: SHIPPED | OUTPATIENT
Start: 2020-03-09 | End: 2022-02-09

## 2020-03-09 ASSESSMENT — PAIN DESCRIPTION - PAIN TYPE: TYPE: ACUTE PAIN

## 2020-03-09 ASSESSMENT — ENCOUNTER SYMPTOMS
FACIAL SWELLING: 0
VOMITING: 0
COLOR CHANGE: 0
SHORTNESS OF BREATH: 0
NAUSEA: 0
BACK PAIN: 1

## 2020-03-09 ASSESSMENT — PAIN SCALES - GENERAL
PAINLEVEL_OUTOF10: 7
PAINLEVEL_OUTOF10: 5

## 2020-03-09 ASSESSMENT — PAIN DESCRIPTION - DESCRIPTORS: DESCRIPTORS: DISCOMFORT

## 2020-03-09 ASSESSMENT — PAIN DESCRIPTION - LOCATION: LOCATION: GENERALIZED

## 2020-03-09 ASSESSMENT — PAIN DESCRIPTION - ORIENTATION: ORIENTATION: RIGHT

## 2020-03-09 NOTE — ED PROVIDER NOTES
breath sounds. Abdominal:      Palpations: Abdomen is not rigid. Musculoskeletal: Normal range of motion. Comments: Pain on right side of body. No deformities. No skin changes. No midline spine tenderness. Skin:     General: Skin is warm and dry. Capillary Refill: Capillary refill takes less than 2 seconds. Findings: No abrasion, bruising or laceration. Neurological:      General: No focal deficit present. Mental Status: She is alert and oriented to person, place, and time. Cranial Nerves: Cranial nerves are intact. Sensory: Sensation is intact. Motor: Motor function is intact. Psychiatric:         Mood and Affect: Mood normal.         MEDICAL DECISION MAKING    Vitals:    Vitals:    03/09/20 1733   BP: (!) 167/109   Pulse: 84   Resp: 16   Temp: 97.9 °F (36.6 °C)   TempSrc: Temporal   SpO2: 100%       LABS:Labs Reviewed - No data to display     Remainder of labs reviewed and werenegative at this time or not returned at the time of this note. RADIOLOGY:   Non-plain film images such as CT, Ultrasound and MRI are read by the radiologist. HUSSEIN Prater CNP have directly visualized the radiologic plain film image(s) with the below findings:        Interpretation per the Radiologist below, if available at the time of this note:    XR WRIST RIGHT (MIN 3 VIEWS)   Final Result   No acute osseous abnormality. XR SHOULDER RIGHT (MIN 2 VIEWS)   Preliminary Result   No acute osseous abnormality of the right shoulder         XR ANKLE RIGHT (MIN 3 VIEWS)   Preliminary Result   1. No acute osseous abnormality of the right ankle   2. No acute osseous abnormality of the right foot         XR FOOT RIGHT (MIN 3 VIEWS)   Preliminary Result   1. No acute osseous abnormality of the right ankle   2. No acute osseous abnormality of the right foot         XR LUMBAR SPINE (2-3 VIEWS)   Final Result   Unremarkable examination of the lumbar spine.          CT HEAD WO June 2, 2019 HISTORY: ORDERING SYSTEM PROVIDED HISTORY: fall down steps TECHNOLOGIST PROVIDED HISTORY: Reason for exam:->fall down steps Has a \"code stroke\" or \"stroke alert\" been called? ->No Is the patient pregnant?->No Reason for Exam: Fall (fell down stairs 2 hours ago. c/o neck, head, right shoulder, right wrist pain. ) Acuity: Acute Type of Exam: Initial Relevant Medical/Surgical History: pt unable to remove tongue ring FINDINGS: BRAIN/VENTRICLES: There is no acute intracranial hemorrhage, mass effect or midline shift. No abnormal extra-axial fluid collection. The gray-white differentiation is maintained without evidence of an acute infarct. There is no evidence of hydrocephalus. Mild cortical atrophy is present, frontal lobe predominant. ORBITS: The visualized portion of the orbits demonstrate no acute abnormality. SINUSES: The visualized paranasal sinuses and mastoid air cells demonstrate no acute abnormality. SOFT TISSUES/SKULL:  No acute abnormality of the visualized skull or soft tissues. No acute intracranial abnormality. Ct Cervical Spine Wo Contrast    Result Date: 3/9/2020  EXAMINATION: CT OF THE CERVICAL SPINE WITHOUT CONTRAST 3/9/2020 5:47 pm TECHNIQUE: CT of the cervical spine was performed without the administration of intravenous contrast. Multiplanar reformatted images are provided for review. Dose modulation, iterative reconstruction, and/or weight based adjustment of the mA/kV was utilized to reduce the radiation dose to as low as reasonably achievable. COMPARISON: 06/02/2019 HISTORY: ORDERING SYSTEM PROVIDED HISTORY: fall down steps TECHNOLOGIST PROVIDED HISTORY: Reason for exam:->fall down steps Is the patient pregnant?->No Reason for Exam: Fall (fell down stairs 2 hours ago. c/o neck, head, right shoulder, right wrist pain. ) Acuity: Acute Type of Exam: Initial FINDINGS: BONES/ALIGNMENT: There is no acute fracture or traumatic malalignment.  DEGENERATIVE CHANGES: No significant [id number]  [phone]  Go to   As needed      DISCHARGE MEDICATIONS:  New Prescriptions    CYCLOBENZAPRINE (FLEXERIL) 10 MG TABLET    Take 1 tablet by mouth 3 times daily as needed for Muscle spasms    IBUPROFEN (ADVIL;MOTRIN) 800 MG TABLET    Take 1 tablet by mouth every 8 hours as needed for Pain       DISCONTINUED MEDICATIONS:  Discontinued Medications    IBUPROFEN (IBU) 400 MG TABLET    Take 1 tablet by mouth every 6 hours as needed for Pain    NAPROXEN (NAPROSYN) 500 MG TABLET    Take 1 tablet by mouth 2 times daily (with meals) for 10 days              (Please note the MDM and HPI sections of this note were completed with a voice recognition program.  Efforts were made to edit the dictations but occasionally words are mis-transcribed.)    Electronically signed, HUSSEIN Baca CNP,           HUSSEIN Baca CNP  03/09/20 3217

## 2020-12-02 ENCOUNTER — HOSPITAL ENCOUNTER (EMERGENCY)
Age: 40
Discharge: LEFT BEFORE DISCHARGE | End: 2020-12-02
Payer: COMMERCIAL

## 2020-12-02 ENCOUNTER — APPOINTMENT (OUTPATIENT)
Dept: GENERAL RADIOLOGY | Age: 40
End: 2020-12-02
Payer: COMMERCIAL

## 2020-12-02 VITALS
TEMPERATURE: 98.1 F | BODY MASS INDEX: 38.32 KG/M2 | RESPIRATION RATE: 20 BRPM | DIASTOLIC BLOOD PRESSURE: 118 MMHG | HEIGHT: 65 IN | OXYGEN SATURATION: 98 % | WEIGHT: 230 LBS | HEART RATE: 90 BPM | SYSTOLIC BLOOD PRESSURE: 187 MMHG

## 2020-12-02 LAB
ANION GAP SERPL CALCULATED.3IONS-SCNC: 8 MMOL/L (ref 3–16)
BASOPHILS ABSOLUTE: 0 K/UL (ref 0–0.2)
BASOPHILS RELATIVE PERCENT: 0.5 %
BUN BLDV-MCNC: 9 MG/DL (ref 7–20)
CALCIUM SERPL-MCNC: 9.5 MG/DL (ref 8.3–10.6)
CHLORIDE BLD-SCNC: 106 MMOL/L (ref 99–110)
CO2: 25 MMOL/L (ref 21–32)
CREAT SERPL-MCNC: 0.7 MG/DL (ref 0.6–1.1)
EKG ATRIAL RATE: 78 BPM
EKG DIAGNOSIS: NORMAL
EKG P AXIS: 22 DEGREES
EKG P-R INTERVAL: 146 MS
EKG Q-T INTERVAL: 386 MS
EKG QRS DURATION: 68 MS
EKG QTC CALCULATION (BAZETT): 440 MS
EKG R AXIS: 18 DEGREES
EKG T AXIS: 0 DEGREES
EKG VENTRICULAR RATE: 78 BPM
EOSINOPHILS ABSOLUTE: 0.1 K/UL (ref 0–0.6)
EOSINOPHILS RELATIVE PERCENT: 2.6 %
GFR AFRICAN AMERICAN: >60
GFR NON-AFRICAN AMERICAN: >60
GLUCOSE BLD-MCNC: 94 MG/DL (ref 70–99)
HCG QUALITATIVE: NEGATIVE
HCT VFR BLD CALC: 38.2 % (ref 36–48)
HEMOGLOBIN: 12.4 G/DL (ref 12–16)
LYMPHOCYTES ABSOLUTE: 1.8 K/UL (ref 1–5.1)
LYMPHOCYTES RELATIVE PERCENT: 35.8 %
MCH RBC QN AUTO: 27.4 PG (ref 26–34)
MCHC RBC AUTO-ENTMCNC: 32.4 G/DL (ref 31–36)
MCV RBC AUTO: 84.6 FL (ref 80–100)
MONOCYTES ABSOLUTE: 0.3 K/UL (ref 0–1.3)
MONOCYTES RELATIVE PERCENT: 5.7 %
NEUTROPHILS ABSOLUTE: 2.8 K/UL (ref 1.7–7.7)
NEUTROPHILS RELATIVE PERCENT: 55.4 %
PDW BLD-RTO: 14.2 % (ref 12.4–15.4)
PLATELET # BLD: 207 K/UL (ref 135–450)
PMV BLD AUTO: 8.4 FL (ref 5–10.5)
POTASSIUM SERPL-SCNC: 4.4 MMOL/L (ref 3.5–5.1)
RBC # BLD: 4.51 M/UL (ref 4–5.2)
SODIUM BLD-SCNC: 139 MMOL/L (ref 136–145)
TROPONIN: <0.01 NG/ML
WBC # BLD: 5.1 K/UL (ref 4–11)

## 2020-12-02 PROCEDURE — 4500000002 HC ER NO CHARGE

## 2020-12-02 PROCEDURE — 71046 X-RAY EXAM CHEST 2 VIEWS: CPT

## 2020-12-02 PROCEDURE — 84484 ASSAY OF TROPONIN QUANT: CPT

## 2020-12-02 PROCEDURE — 80048 BASIC METABOLIC PNL TOTAL CA: CPT

## 2020-12-02 PROCEDURE — 85025 COMPLETE CBC W/AUTO DIFF WBC: CPT

## 2020-12-02 PROCEDURE — 84703 CHORIONIC GONADOTROPIN ASSAY: CPT

## 2020-12-02 PROCEDURE — 93010 ELECTROCARDIOGRAM REPORT: CPT | Performed by: INTERNAL MEDICINE

## 2020-12-02 PROCEDURE — 93005 ELECTROCARDIOGRAM TRACING: CPT

## 2020-12-02 ASSESSMENT — PAIN SCALES - GENERAL: PAINLEVEL_OUTOF10: 8

## 2020-12-02 ASSESSMENT — PAIN DESCRIPTION - PAIN TYPE: TYPE: ACUTE PAIN

## 2020-12-02 ASSESSMENT — PAIN DESCRIPTION - DESCRIPTORS: DESCRIPTORS: SHARP

## 2020-12-02 ASSESSMENT — PAIN DESCRIPTION - LOCATION: LOCATION: CHEST

## 2021-09-21 ENCOUNTER — HOSPITAL ENCOUNTER (OUTPATIENT)
Dept: PHYSICAL THERAPY | Age: 41
Setting detail: THERAPIES SERIES
Discharge: HOME | End: 2021-09-21
Payer: COMMERCIAL

## 2021-09-21 PROCEDURE — 97530 THERAPEUTIC ACTIVITIES: CPT

## 2021-09-21 PROCEDURE — 97162 PT EVAL MOD COMPLEX 30 MIN: CPT

## 2021-09-21 NOTE — FLOWSHEET NOTE
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  · Treatment Diagnosis: functional limitations secondary to brain bleed  Insurance/Certification information:  PT Insurance Information: [employer] - 30 visits per year; preauth required  Physician Information:  Referring Practitioner: Dimas Mcburney, MD  Plan of care signed (Y/N): faxed 21    Date of Patient follow up with Physician:     Functional scale[de-identified] Optimal:  raw score = 81; dysfunction = 80%    Progress Report: [x]  Yes  []  No     Date Range for reporting period:  Beginning   Ending    Progress report due (10 Rx/or 30 days whichever is less): visit #10 or 72/10/35    Recertification due (POC duration/ or 90 days whichever is less): visit #12    Visit # Insurance Allowable Auth required?  Date Range    Awaiting [employer] approva [x]  Yes  []  No         Units approved Units used Date Range          Latex Allergy:  [x]NO      []YES  Preferred Language for Healthcare:   [x]English       []other:      Pain level:  5/10 low back and L LE     SUBJECTIVE:  See eval    OBJECTIVE: See eval      RESTRICTIONS/PRECAUTIONS: h/o aneurysm    Exercises/Interventions: ** CALLED MD OFFICE ON  REQUESTING SCRIPT FOR OT AND SPEECH    Therapeutic Exercises [phone]) Resistance / level Sets/sec Reps Notes   LE strengthening as tolerated                                                               Therapeutic Activities (61404)       Transfer training              Gait training with maria alejandra-walker or platform on RW                     Neuromuscular Re-ed (71441)                                                 Manual Intervention (80817)       Stretching as needed                                              Modalities: none    Pt. Education: Evaluation completed this date. Discussed plan of care with pt including diagnosis, course of treatment, risks and benefits of treatment. Pt agreed with POC. -patient educated on diagnosis, prognosis and expectations for rehab  -all patient questions were answered    HEP instruction: Will provide as treatment begins    Therapeutic Exercise and NMR EXR  [] (50093) Provided verbal/tactile cueing for activities related to strengthening, flexibility, endurance, ROM for improvements in  [] LE / Lumbar: LE, proximal hip, and core control with self care, mobility, lifting, ambulation. [] UE / Cervical: cervical, postural, scapular, scapulothoracic and UE control with self care, reaching, carrying, lifting, house/yardwork, driving, computer work.  [] (32264) Provided verbal/tactile cueing for activities related to improving balance, coordination, kinesthetic sense, posture, motor skill, proprioception to assist with   [] LE / lumbar: LE, proximal hip, and core control in self care, mobility, lifting, ambulation and eccentric single leg control. [] UE / cervical: cervical, scapular, scapulothoracic and UE control with self care, reaching, carrying, lifting, house/yardwork, driving, computer work.   [] (39840) Therapist is in constant attendance of 2 or more patients providing skilled therapy interventions, but not providing any significant amount of measurable one-on-one time to either patient, for improvements in  [] LE / lumbar: LE, proximal hip, and core control in self care, mobility, lifting, ambulation and eccentric single leg control. [] UE / cervical: cervical, scapular, scapulothoracic and UE control with self care, reaching, carrying, lifting, house/yardwork, driving, computer work.      NMR and Therapeutic Activities:    [x] (21422 or 91837) Provided verbal/tactile cueing for activities Post.)  [] (08295) Provided manual therapy to mobilize LE, proximal hip and/or LS spine soft tissue/joints for the purpose of modulating pain, promoting relaxation,  increasing ROM, reducing/eliminating soft tissue swelling/inflammation/restriction, improving soft tissue extensibility and allowing for proper ROM for normal function with   [] LE / lumbar: self care, mobility, lifting and ambulation. [] UE / Cervical: self care, reaching, carrying, lifting, house/yardwork, driving, computer work. Modalities:  [] (45303) Vasopneumatic compression: Utilized vasopneumatic compression to decrease edema / swelling for the purpose of improving mobility and quad tone / recruitment which will allow for increased overall function including but not limited to self-care, transfers, ambulation, and ascending / descending stairs. Charges:  Timed Code Treatment Minutes: 30   Total Treatment Minutes: 45     [] EVAL - LOW (16533)   [x] EVAL - MOD (87305)  [] EVAL - HIGH (99998)  [] RE-EVAL (16412)  [] OW(32655) x       [] Ionto  [] NMR (77529) x       [] Vaso  [] Manual (62992) x       [] Ultrasound  [x] TA x  2      [] Mech Traction (38053)  [] Aquatic Therapy x     [] ES (un) (19895):   [] Home Management Training x  [] ES(attended) (40608)   [] Dry Needling 1-2 muscles (89100):  [] Dry Needling 3+ muscles (685036  [] Group:      [] Other:     GOALS:   Patient stated goal: To return to as normal activity as possible.       Therapist goals for Patient:   Short Term Goals: To be achieved in: 2 weeks  1. Independent in HEP and progression per patient tolerance, in order to prevent re-injury. 2. Patient will have a decrease in pain to facilitate improvement in movement, function, and ADLs as indicated by Functional Deficits.     Long Term Goals: To be achieved in: 6 weeks  1. Pt will demonstrate the ability to perform sit to/from stand with supervision. 2. Pt will ambulate 50 ft with maria alejandra-walker and min assist  3. Patient will demonstrate an increase in postural awareness and control to allow for proper functional mobility as indicated by patients Functional Deficits. 4. Patient will return to functional activities including transfers without increased symptoms or restriction. Overall Progression Towards Functional goals/ Treatment Progress Update:  [] Patient is progressing as expected towards functional goals listed. [] Progression is slowed due to complexities/Impairments listed. [] Progression has been slowed due to co-morbidities. [x] Plan just implemented, too soon to assess goals progression <30days   [] Goals require adjustment due to lack of progress  [] Patient is not progressing as expected and requires additional follow up with physician  [] Other    Persisting Functional Limitations/Impairments:  []Sleeping []Sitting               [x]Standing []Transfers        [x]Walking []Kneeling               []Stairs [x]Squatting / bending   [x]ADLs [x]Reaching  []Lifting  [x]Housework  []Driving [x]Job related tasks  [x]Sports/Recreation []Other:        ASSESSMENT:  See eval  Treatment/Activity Tolerance:  [x] Patient able to complete tx [] Patient limited by fatigue  [] Patient limited by pain  [] Patient limited by other medical complications  [] Other:     Prognosis: [x] Good [] Fair  [] Poor    Patient Requires Follow-up: [x] Yes  [] No    Plan for next treatment session: Begin per training diary and progress as tolerated. PLAN: See eval. PT 2x / week for 6 weeks. [] Continue per plan of care [] Alter current plan (see comments)  [x] Plan of care initiated [] Hold pending MD visit [] Discharge    Electronically signed by: Lucy Fernandez, PT, DPT    Note: If patient does not return for scheduled/ recommended follow up visits, this note will serve as a discharge from care along with most recent update on progress.

## 2021-09-21 NOTE — PROGRESS NOTES
[facility]  [address], [address]  Phone: [phone]   Fax:     [phone]                                                       Physical Therapy Certification    Dear Referring Practitioner: Erica Stein MD,    We had the pleasure of evaluating the following patient for physical therapy services at [address] [place] [address]. A summary of our findings can be found in the initial assessment below. This includes our plan of care. If you have any questions or concerns regarding these findings, please do not hesitate to contact me at the office phone number checked above. Thank you for the referral. PT WILL BENEFIT FROM OCCUPATIONAL AND SPEECH EVALUATIONS AS WELL AS PT; PLEASE SEND SCRIPT IF OKAY TO PROCEED WITH THOSE THERAPIES. THANK YOU! Physician Signature:_______________________________Date:__________________  By signing above (or electronic signature), therapists plan is approved by physician      Patient: Abhishek Swartz   : 1980   MRN: [id number]  Referring Physician: Referring Practitioner: Erica Stein MD      Evaluation Date: 2021      Medical Diagnosis Information:  Diagnosis: I60.7 (ICD-10-CM) - Nontraumatic subarachnoid hemorrhage from unspecified intracranial artery   Treatment Diagnosis: functional limitations secondary to brain bleed                                         Insurance information: PT Insurance Information: [facility] - 30 visits per year; preauth required    Precautions/ Contra-indications:h/o brain bleed  Latex Allergy:  [x]NO      []YES  Preferred Language for Healthcare:   [x]English       []Other:    C-SSRS Triggered by Intake questionnaire (Past 2 wk assessment ):   [x] No, Questionnaire did not trigger screening.   [] Yes, Patient intake triggered C-SSRS Screening     [] Completed, no further action required.    [] Completed, PCP notified via Epic    SUBJECTIVE: Patient reports in Feb 2021 she was at work and passed out; had a nontraumatic subarachnoid hemorrhage from unspecified intracranial artery - Pt had surgery and aneurysm was clipped. L sided weakness in both arm and leg. Reports she spent about 3 weeks in inpatient rehab at [facility] and had home health PT until 2 weeks ago. Pt states she has some pain in her left leg and low back. States she can get up from chair and transfer independently (however required assist in clinic).  has been assisting with gait; pt states she has not been using any type of assistive device. Pt reports she has difficulty with speech (slurring at times) and her L eye is impaired - double visit at times and difficult to see out of. Pt wearing soft AFO on R foot. 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  assisting with cooking, cleaning.      OBJECTIVE:     Palpation: No significant tenderness to palpation    Functional Mobility/Transfers: Pt requires min assist for sit to/from stand; mod assist with SPT    Inspection: significant flexor tone in LUE, flaccidity LLE    Posture: fair    Bandages/Dressings/Incisions: N/A    Gait: (include devices/WB status): Pt able to take approx 4 steps during evaluation with platform walker for LUE and with mod assist.     Reflexes Normal Abnormal Comments   C5-6 Biceps      C5-6 Brachioradialis      C7-8 Triceps []Constipation (N31.56)   Metabolic conditions   []Morbid obesity (E66.01)  []Diabetes type 1(E10.65) or 2 (E11.65)   []Neuropathy (G60.9)     Cardio/Pulmonary conditions   []Asthma (J45)  []Coughing   []COPD (J44.9)  []CHF  []A-fib   Psychological Disorders  []Anxiety (F41.9)  []Depression (F32.9)   []Other:   Developmental Disorders  []Autism (F84.0)  []CP (G80)  []Down Syndrome (Q90.9)  []Developmental delay     Neurological conditions  []Prior Stroke (I69.30)  []Parkinson's (G20)  []Encephalopathy (G93.40)  []MS (G35)  []Post-polio (G14)  []SCI  []TBI  []ALS Other conditions  []Fibromyalgia (M79.7)  []Vertigo  []Syncope  []Kidney Failure  []Cancer      []currently undergoing                treatment  []Pregnancy  []Incontinence   Prior surgeries  []involved limb  []previous spinal surgery  [] section birth  []hysterectomy  []bowel / bladder surgery  []other relevant surgeries   []Other:              Barriers to/and or personal factors that will affect rehab potential:              [x]Age  []Sex   []Smoker              [x]Motivation/Lack of Motivation                        []Co-Morbidities              []Cognitive Function, education/learning barriers              []Environmental, home barriers              []profession/work barriers  []past PT/medical experience  []other:  Justification: Pt young and motivated to return to as normal activity as possible. Falls Risk Assessment (30 days):   [x] Falls Risk assessed and no intervention required.   [] Falls Risk assessed and Patient requires intervention due to being higher risk   TUG score (>12s at risk):     [] Falls education provided, including         ASSESSMENT: Pt s/p aneurysm with L sided involvment; inability to transfer and walk independently   Functional Impairments:     [x]Noted L shoulder joint hypomobility   []Noted  joint hypermobility   [x]Decreased functional ROM   [x]Abnormal reflexes/sensation/myotomal/dermatomal deficits   [x]Decreased strength and neuromuscular control    [x]Decreased functional strength   []other:      Functional Activity Limitations (from functional questionnaire and intake)   [x]Reduced ability to tolerate prolonged functional positions   [x]Reduced ability or difficulty with changes of positions or transfers between positions   [x]Reduced ability to maintain good posture and demonstrate good body mechanics with sitting, bending, and lifting   [] Reduced ability or tolerance with driving and/or computer work   [x]Reduced ability to perform lifting, reaching, carrying tasks   []Reduced ability to concentrate   []Reduced ability to sleep    [x]Reduced ability to tolerate any impact through LUE   [x]Reduced ability to ambulate prolonged functional periods/distances   []other:    Participation Restrictions   [x]Reduced participation in self care activities   [x]Reduced participation in home management activities   [x]Reduced participation in work activities   [x]Reduced participation in social activities. [x]Reduced participation in sport/recreational activities.     Classification/Subgrouping:   [x]signs/symptoms consistent with CVA/aneurysm with L sided involvement    Prognosis/Rehab Potential:      []Excellent   [x]Good    []Fair   []Poor    Tolerance of evaluation/treatment:    []Excellent   [x]Good    []Fair   []Poor    Physical Therapy Evaluation Complexity Justification  [x] A history of present problem with:  [] no personal factors and/or comorbidities that impact the plan of care;  [x]1-2 personal factors and/or comorbidities that impact the plan of care  []3 personal factors and/or comorbidities that impact the plan of care  [x] An examination of body systems using standardized tests and measures addressing any of the following: body structures and functions (impairments), activity limitations, and/or participation restrictions;:  [x] a total of 1-2 or more elements   [] a total of 3 or more elements   [] a total of 4 or more elements   [x] A clinical presentation with:  [] stable and/or uncomplicated characteristics   [x] evolving clinical presentation with changing characteristics  [] unstable and unpredictable characteristics;   [x] Clinical decision making of [] low, [x] moderate, [] high complexity using standardized patient assessment instrument and/or measurable assessment of functional outcome. [] EVAL (LOW) 26322 (typically 20 minutes face-to-face)  [x] EVAL (MOD) 24290 (typically 30 minutes face-to-face)  [] EVAL (HIGH) 87927 (typically 45 minutes face-to-face)  [] RE-EVAL     PLAN:  Will see pt 2 times a week for strengthening, transfer and gait training. Pt will benefit from both OT and Speech therapy as well as PT - will request script to initiate both. Frequency/Duration:  2 days per week for 12 Weeks:  Interventions:  [x]  Therapeutic exercise including: strength training, ROM, including postural re-education. [x]  NMR activation and proprioception, including postural re-education. [x]  Manual therapy as indicated to include: Dry Needling/IASTM, STM, PROM, Gr I-IV mobilizations, manipulation. [x] Modalities as needed that may include: thermal agents, E-stim, Biofeedback, US, iontophoresis as indicated  [x] Patient education on joint protection, postural re-education, activity modification, progression of HEP. HEP instruction: Will provide on next visit as therapy is initiated; pt to continue with home health exercises currently. GOALS:  Patient stated goal: To return to as normal activity as possible. Therapist goals for Patient:   Short Term Goals: To be achieved in: 2 weeks  1. Independent in HEP and progression per patient tolerance, in order to prevent re-injury. 2. Patient will have a decrease in pain to facilitate improvement in movement, function, and ADLs as indicated by Functional Deficits. Long Term Goals: To be achieved in: 6 weeks  1.  Pt will

## 2021-10-05 ENCOUNTER — HOSPITAL ENCOUNTER (OUTPATIENT)
Dept: PHYSICAL THERAPY | Age: 41
Setting detail: THERAPIES SERIES
Discharge: HOME | End: 2021-10-05
Payer: COMMERCIAL

## 2021-10-05 PROCEDURE — 97116 GAIT TRAINING THERAPY: CPT

## 2021-10-05 PROCEDURE — 97112 NEUROMUSCULAR REEDUCATION: CPT

## 2021-10-05 PROCEDURE — 97110 THERAPEUTIC EXERCISES: CPT

## 2021-10-05 NOTE — FLOWSHEET NOTE
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  · Treatment Diagnosis: functional limitations secondary to brain bleed  Insurance/Certification information:  PT Insurance Information: [employer] - 30 visits per year; preauth required  Physician Information:  Referring Practitioner: Teagan Alegre MD  Plan of care signed (Y/N): faxed 21    Date of Patient follow up with Physician:     Functional scale[de-identified] Optimal:  raw score = 81; dysfunction = 80%    Progress Report: [x]  Yes  []  No     Date Range for reporting period:  Beginning   Ending    Progress report due (10 Rx/or 30 days whichever is less): visit #10 or     Recertification due (POC duration/ or 90 days whichever is less): visit #12    Visit # Insurance Allowable Auth required? Date Range    96 units [x]  Yes  []  No         Units approved Units used Date Range   96       Latex Allergy:  [x]NO      []YES  Preferred Language for Healthcare:   [x]English       []other:      Pain level:  3/10 L LE     SUBJECTIVE:  10/5:  Pt reports some soreness in L knee this date. Overall feeling okay    OBJECTIVE: 10/5:  To clinic in wheelchair with soft AFO on LLE;       RESTRICTIONS/PRECAUTIONS: h/o aneurysm    Exercises/Interventions: ** CALLED MD OFFICE ON  and 10/5  REQUESTING SCRIPT FOR OT AND SPEECH    Therapeutic Exercises ([id number]) Resistance / level Sets/sec Reps Notes   Nu step  3  X 6 min velcro strap used to secure L hand                                                           Therapeutic Activities (67137)       Transfer training:  SPT from wheelchair to nu step and from nu step back to wheelchair with min assist      SPT from wheelchair to mat table (toward weak side) with min assist, from mat table to wheelchair (toward strong side) with CGA to min assist.   X 30 min             II bars:  Sit to stand with min assist; ambulated length of bars x 1 with mod assist to advance LLE and block L knee. Sit to/from supine with min assist for LLE              Neuromuscular Re-ed (30416)       Seated without AFO:  Hip flexion  LAQ  Hip abd  bridging      Ball squeeze   X 10 max A  X 10 max A  X 10 min A  X 10 min A                                       Manual Intervention (80518)       Stretching as needed                                              Modalities: none    Pt. Education: 10/5:  Verbal cues for proper transfer technique and safety  -patient educated on diagnosis, prognosis and expectations for rehab  -all patient questions were answered    HEP instruction: Discussed safety on transfers. Therapeutic Exercise and NMR EXR  [x] (89777) Provided verbal/tactile cueing for activities related to strengthening, flexibility, endurance, ROM for improvements in  [x] LE / Lumbar: LE, proximal hip, and core control with self care, mobility, lifting, ambulation. [] UE / Cervical: cervical, postural, scapular, scapulothoracic and UE control with self care, reaching, carrying, lifting, house/yardwork, driving, computer work.  [] (28086) Provided verbal/tactile cueing for activities related to improving balance, coordination, kinesthetic sense, posture, motor skill, proprioception to assist with   [] LE / lumbar: LE, proximal hip, and core control in self care, mobility, lifting, ambulation and eccentric single leg control.    [] UE / cervical: cervical, scapular, scapulothoracic and UE control with self care, reaching, carrying, lifting, house/yardwork, driving, computer work.   [] (48635) Therapist is in constant attendance of 2 or more patients providing skilled therapy interventions, but not providing any significant amount of measurable one-on-one time to either patient, for improvements in  [] LE / lumbar: LE, proximal hip, and core control in self care, mobility, lifting, ambulation and eccentric single leg control. [] UE / cervical: cervical, scapular, scapulothoracic and UE control with self care, reaching, carrying, lifting, house/yardwork, driving, computer work. NMR and Therapeutic Activities:    [x] (26569 or 20186) Provided verbal/tactile cueing for activities related to improving balance, coordination, kinesthetic sense, posture, motor skill, proprioception and motor activation to allow for proper function of   [x] LE: / Lumbar core, proximal hip and LE with self care and ADLs  [x] UE / Cervical: cervical, postural, scapular, scapulothoracic and UE control with self care, carrying, lifting, driving, computer work.   [] (25155) Gait Re-education- Provided training and instruction to the patient for proper LE, core and proximal hip recruitment and positioning and eccentric body weight control with ambulation re-education including up and down stairs     Home Management Training / Self Care:  [x] (64178) Provided self-care/home management training related to activities of daily living and compensatory training, and/or use of adaptive equipment for improvement with: ADLs and compensatory training, meal preparation, safety procedures and instruction in use of adaptive equipment, including bathing, grooming, dressing, personal hygiene, basic household cleaning and chores.      Home Exercise Program:    [x] (61343) Reviewed/Progressed HEP activities related to strengthening, flexibility, endurance, ROM of   [] LE / Lumbar: core, proximal hip and LE for functional self-care, mobility, lifting and ambulation/stair navigation   [] UE / Cervical: cervical, postural, scapular, scapulothoracic and UE control with self care, reaching, carrying, lifting, house/yardwork, driving, computer work  [] (15460)Reviewed/Progressed HEP activities related to improving balance, coordination, kinesthetic sense, posture, motor skill, proprioception of   [] LE: core, proximal hip and LE for self care, mobility, lifting, and ambulation/stair navigation    [] UE / Cervical: cervical, postural,  scapular, scapulothoracic and UE control with self care, reaching, carrying, lifting, house/yardwork, driving, computer work    Manual Treatments:  PROM / STM / Oscillations-Mobs:  G-I, II, III, IV (PA's, Inf., Post.)  [] (69556) Provided manual therapy to mobilize LE, proximal hip and/or LS spine soft tissue/joints for the purpose of modulating pain, promoting relaxation,  increasing ROM, reducing/eliminating soft tissue swelling/inflammation/restriction, improving soft tissue extensibility and allowing for proper ROM for normal function with   [] LE / lumbar: self care, mobility, lifting and ambulation. [] UE / Cervical: self care, reaching, carrying, lifting, house/yardwork, driving, computer work. Modalities:  [] (94900) Vasopneumatic compression: Utilized vasopneumatic compression to decrease edema / swelling for the purpose of improving mobility and quad tone / recruitment which will allow for increased overall function including but not limited to self-care, transfers, ambulation, and ascending / descending stairs. Charges:  Timed Code Treatment Minutes: 46   Total Treatment Minutes: 46     [] EVAL - LOW (31537)   [] EVAL - MOD (48354)  [] EVAL - HIGH (16291)  [] RE-EVAL (88303)  [x] PS(01176) x       [] Ionto  [x] NMR (44701) x       [] Vaso  [] Manual (95990) x       [] Ultrasound  [] TA x  1      [] Mech Traction (42236)  [] Aquatic Therapy x     [] ES (un) (34928):   [] Home Management Training x  [] ES(attended) (30742)   [] Dry Needling 1-2 muscles (85132):  [] Dry Needling 3+ muscles (202610  [] Group:      [x] Other: gait    GOALS:   Patient stated goal: To return to as normal activity as possible.    Therapist goals for Patient:   Short Term Goals: To be achieved in: 2 weeks  1. Independent in HEP and progression per patient tolerance, in order to prevent re-injury. 2. Patient will have a decrease in pain to facilitate improvement in movement, function, and ADLs as indicated by Functional Deficits.     Long Term Goals: To be achieved in: 6 weeks  1. Pt will demonstrate the ability to perform sit to/from stand with supervision. 2. Pt will ambulate 50 ft with maria alejandra-walker and min assist  3. Patient will demonstrate an increase in postural awareness and control to allow for proper functional mobility as indicated by patients Functional Deficits. 4. Patient will return to functional activities including transfers without increased symptoms or restriction. Overall Progression Towards Functional goals/ Treatment Progress Update:  [] Patient is progressing as expected towards functional goals listed. [] Progression is slowed due to complexities/Impairments listed. [] Progression has been slowed due to co-morbidities. [x] Plan just implemented, too soon to assess goals progression <30days   [] Goals require adjustment due to lack of progress  [] Patient is not progressing as expected and requires additional follow up with physician  [] Other    Persisting Functional Limitations/Impairments:  []Sleeping []Sitting               [x]Standing []Transfers        [x]Walking []Kneeling               []Stairs [x]Squatting / bending   [x]ADLs [x]Reaching  []Lifting  [x]Housework  []Driving [x]Job related tasks  [x]Sports/Recreation []Other:        ASSESSMENT:  Pt tolerated session well this date; fatigued after short gait in II bars.     Treatment/Activity Tolerance:  [x] Patient able to complete tx [] Patient limited by fatigue  [] Patient limited by pain  [] Patient limited by other medical complications  [] Other:     Prognosis: [x] Good [] Fair  [] Poor    Patient Requires Follow-up: [x] Yes  [] No    Plan for next treatment session:  per training diary and progress as tolerated. Still awaiting OT and Speech referrals - called Dr office again this date (10/5)    PLAN: See eval. PT 2x / week for 6 weeks. [x] Continue per plan of care [] Alter current plan (see comments)  [] Plan of care initiated [] Hold pending MD visit [] Discharge    Electronically signed by: Doretha Cardenas, PT, DPT    Note: If patient does not return for scheduled/ recommended follow up visits, this note will serve as a discharge from care along with most recent update on progress.

## 2021-10-07 ENCOUNTER — HOSPITAL ENCOUNTER (OUTPATIENT)
Dept: PHYSICAL THERAPY | Age: 41
Setting detail: THERAPIES SERIES
Discharge: HOME | End: 2021-10-07
Payer: COMMERCIAL

## 2021-10-07 PROCEDURE — 97110 THERAPEUTIC EXERCISES: CPT

## 2021-10-07 PROCEDURE — 97530 THERAPEUTIC ACTIVITIES: CPT

## 2021-10-07 NOTE — FLOWSHEET NOTE
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  · Treatment Diagnosis: functional limitations secondary to brain bleed  Insurance/Certification information:  [facility] Information[facility] visits per year; preauth required  Physician Information:  Referring Practitioner: Teagan Alegre MD  Plan of care signed (Y/N): faxed 21    Date of Patient follow up with Physician:     Functional scale[de-identified] Optimal:  raw score = 81; dysfunction = 80%    Progress Report: [x]  Yes  []  No     Date Range for reporting period:  Beginning   Ending    Progress report due (10 Rx/or 30 days whichever is less): visit #10 or 05    Recertification due (POC duration/ or 90 days whichever is less): visit #12    Visit # Insurance Allowable Auth required? Date Range   3/24 96 units [x]  Yes  []  No         Units approved Units used Date Range   96 8      Latex Allergy:  [x]NO      []YES  Preferred Language for Healthcare:   [x]English       []other:      Pain level:  3/10 L LE     SUBJECTIVE:  Pt reports that she is doing well, unable to get shoe and AFO on the L LE this morning so wearing a crock on that foot today. OBJECTIVE: 10/5:  To clinic in wheelchair with soft AFO on LLE;       RESTRICTIONS/PRECAUTIONS: h/o aneurysm    Exercises/Interventions: ** CALLED MD OFFICE ON  and 10/5  REQUESTING SCRIPT FOR OT AND SPEECH    Therapeutic Exercises (80825) Resistance / level Sets/sec Reps Notes   Nu step  3  X 6 min velcro strap used to secure L hand, blue t-band around knees to avoid abduction and ER of the L LE Therapeutic Activities (78290)       Transfer training:  SPT from wheelchair to nu step and from nu step back to wheelchair with min assist      SPT from wheelchair to mat table (toward weak side) with min assist, from mat table to wheelchair (toward strong side) with CGA to min assist.   X 25 min             II bars:  Sit to stand with min assist; ambulated length of bars x 1 with mod assist to advance LLE and block L knee. Sit to/from supine with min assist for LLE              STS mod mat table with maria alejandra-walker    Once standing added weight shifting M/L    One trial added L LE stepping (minimal success without AFO) Min A x4 reps    2 x 10      x3 reps                          Neuromuscular Re-ed (03124)       Seated without AFO:  Hip flexion  LAQ  Hip abd  bridging      Ball squeeze   X 10 max A  X 10 max A  X 10 min A                                         Manual Intervention (33143)       Stretching as needed                                              Modalities: none    Pt. Education: 10/5:  Verbal cues for proper transfer technique and safety  -patient educated on diagnosis, prognosis and expectations for rehab  -all patient questions were answered    HEP instruction: Discussed safety on transfers. Therapeutic Exercise and NMR EXR  [x] (83693) Provided verbal/tactile cueing for activities related to strengthening, flexibility, endurance, ROM for improvements in  [x] LE / Lumbar: LE, proximal hip, and core control with self care, mobility, lifting, ambulation.   [] UE / Cervical: cervical, postural, scapular, scapulothoracic and UE control with self care, reaching, carrying, lifting, house/yardwork, driving, computer work.  [] (29590) Provided verbal/tactile cueing for activities related to improving balance, coordination, kinesthetic sense, posture, motor skill, proprioception to assist with   [] LE / lumbar: LE, proximal hip, and core control in self care, mobility, lifting, ambulation and eccentric single leg control. [] UE / cervical: cervical, scapular, scapulothoracic and UE control with self care, reaching, carrying, lifting, house/yardwork, driving, computer work.   [] (02564) Therapist is in constant attendance of 2 or more patients providing skilled therapy interventions, but not providing any significant amount of measurable one-on-one time to either patient, for improvements in  [] LE / lumbar: LE, proximal hip, and core control in self care, mobility, lifting, ambulation and eccentric single leg control. [] UE / cervical: cervical, scapular, scapulothoracic and UE control with self care, reaching, carrying, lifting, house/yardwork, driving, computer work. NMR and Therapeutic Activities:    [x] (32454 or 53537) Provided verbal/tactile cueing for activities related to improving balance, coordination, kinesthetic sense, posture, motor skill, proprioception and motor activation to allow for proper function of   [x] LE: / Lumbar core, proximal hip and LE with self care and ADLs  [x] UE / Cervical: cervical, postural, scapular, scapulothoracic and UE control with self care, carrying, lifting, driving, computer work.   [] (11289) Gait Re-education- Provided training and instruction to the patient for proper LE, core and proximal hip recruitment and positioning and eccentric body weight control with ambulation re-education including up and down stairs     Home Management Training / Self Care:  [x] (90672) Provided self-care/home management training related to activities of daily living and compensatory training, and/or use of adaptive equipment for improvement with: ADLs and compensatory training, meal preparation, safety procedures and instruction in use of adaptive equipment, including bathing, grooming, dressing, personal hygiene, basic household cleaning and chores.      Home Exercise Program:    [x] (86511) Reviewed/Progressed HEP activities related to strengthening, flexibility, Traction (21710)  [] Aquatic Therapy x     [] ES (un) (47445):   [] Home Management Training x  [] ES(attended) (82789)   [] Dry Needling 1-2 muscles (85978):  [] Dry Needling 3+ muscles (911529  [] Group:      [x] Other: gait    GOALS:   Patient stated goal: To return to as normal activity as possible.       Therapist goals for Patient:   Short Term Goals: To be achieved in: 2 weeks  1. Independent in HEP and progression per patient tolerance, in order to prevent re-injury. 2. Patient will have a decrease in pain to facilitate improvement in movement, function, and ADLs as indicated by Functional Deficits.     Long Term Goals: To be achieved in: 6 weeks  1. Pt will demonstrate the ability to perform sit to/from stand with supervision. 2. Pt will ambulate 50 ft with maria alejandra-walker and min assist  3. Patient will demonstrate an increase in postural awareness and control to allow for proper functional mobility as indicated by patients Functional Deficits. 4. Patient will return to functional activities including transfers without increased symptoms or restriction. Overall Progression Towards Functional goals/ Treatment Progress Update:  [] Patient is progressing as expected towards functional goals listed. [] Progression is slowed due to complexities/Impairments listed. [] Progression has been slowed due to co-morbidities. [x] Plan just implemented, too soon to assess goals progression <30days   [] Goals require adjustment due to lack of progress  [] Patient is not progressing as expected and requires additional follow up with physician  [] Other    Persisting Functional Limitations/Impairments:  []Sleeping []Sitting               [x]Standing []Transfers        [x]Walking []Kneeling               []Stairs [x]Squatting / bending   [x]ADLs [x]Reaching  []Lifting  [x]Housework  []Driving [x]Job related tasks  [x]Sports/Recreation []Other:        ASSESSMENT:  Session limited d/t pt not having AFO this date.

## 2021-10-14 ENCOUNTER — HOSPITAL ENCOUNTER (OUTPATIENT)
Dept: PHYSICAL THERAPY | Age: 41
Setting detail: THERAPIES SERIES
Discharge: HOME | End: 2021-10-14
Payer: COMMERCIAL

## 2021-10-14 PROCEDURE — 97112 NEUROMUSCULAR REEDUCATION: CPT

## 2021-10-14 PROCEDURE — 97110 THERAPEUTIC EXERCISES: CPT

## 2021-10-14 NOTE — FLOWSHEET NOTE
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  · Treatment Diagnosis: functional limitations secondary to brain bleed  Insurance/Certification information:  PT Insurance Information: [employer] - 30 visits per year; preauth required  Physician Information:  Referring Practitioner: Sofía Mejia MD  Plan of care signed (Y/N): faxed 21    Date of Patient follow up with Physician:     Functional scale[de-identified] Optimal:  raw score = 81; dysfunction = 80%    Progress Report: [x]  Yes  []  No     Date Range for reporting period:  Beginning   Ending    Progress report due (10 Rx/or 30 days whichever is less): visit #10 or     Recertification due (POC duration/ or 90 days whichever is less): visit #12    Visit # Insurance Allowable Auth required? Date Range    96 units [x]  Yes  []  No         Units approved Units used Date Range   96 11      Latex Allergy:  [x]NO      []YES  Preferred Language for Healthcare:   [x]English       []other:      Pain level:  3/10 L LE     SUBJECTIVE:  10/14L:  Pt reports that she is doing well, unable to get shoe and AFO on the L LE this morning so wearing a crock on that foot today. OBJECTIVE: 10/14:  To clinic in wheelchair with soft AFO on LLE; received script for OT and speech this date and pt is now scheduled for all 3 therapies      RESTRICTIONS/PRECAUTIONS: h/o aneurysm    Exercises/Interventions: \    Therapeutic Exercises (01804) Resistance / level Sets/sec Reps Notes   Nu step  3  X 4 min velcro strap used to secure L hand, blue t-band around knees to avoid abduction and ER of the L LE Therapeutic Activities (10541)       Transfer training:  SPT from wheelchair to nu step and from nu step back to wheelchair with min assist         X 1 each direction             II bars:    Standing lateral weight shifts x 20 with min assist    Sit to/from  stand with min assist x 4; ambulated length of bars x 3 with mod assist to advance LLE and block L knee. STS mod mat table with maria alejandra-walker    Once standing added weight shifting M/L    One trial added L LE stepping (minimal success without AFO)                          Neuromuscular Re-ed (66680)       Seated without AFO:  Hip flexion  LAQ  Hip abd  bridging         X 10 max A  X 10 max A                                           Manual Intervention (32979)       Stretching as needed                                              Modalities: none    Pt. Education: 10/14:  Verbal cues for proper transfer technique and safety  -patient educated on diagnosis, prognosis and expectations for rehab  -all patient questions were answered    HEP instruction: 10/14:  Discussed safety on transfers. Therapeutic Exercise and NMR EXR  [x] (01627) Provided verbal/tactile cueing for activities related to strengthening, flexibility, endurance, ROM for improvements in  [x] LE / Lumbar: LE, proximal hip, and core control with self care, mobility, lifting, ambulation. [] UE / Cervical: cervical, postural, scapular, scapulothoracic and UE control with self care, reaching, carrying, lifting, house/yardwork, driving, computer work.  [] (30812) Provided verbal/tactile cueing for activities related to improving balance, coordination, kinesthetic sense, posture, motor skill, proprioception to assist with   [] LE / lumbar: LE, proximal hip, and core control in self care, mobility, lifting, ambulation and eccentric single leg control.    [] UE / cervical: cervical, scapular, scapulothoracic and UE control with self care, reaching, carrying, lifting, house/yardwork, driving, computer work.   [] (02968) Therapist is in constant attendance of 2 or more patients providing skilled therapy interventions, but not providing any significant amount of measurable one-on-one time to either patient, for improvements in  [] LE / lumbar: LE, proximal hip, and core control in self care, mobility, lifting, ambulation and eccentric single leg control. [] UE / cervical: cervical, scapular, scapulothoracic and UE control with self care, reaching, carrying, lifting, house/yardwork, driving, computer work. NMR and Therapeutic Activities:    [x] (82010 or 01579) Provided verbal/tactile cueing for activities related to improving balance, coordination, kinesthetic sense, posture, motor skill, proprioception and motor activation to allow for proper function of   [x] LE: / Lumbar core, proximal hip and LE with self care and ADLs  [x] UE / Cervical: cervical, postural, scapular, scapulothoracic and UE control with self care, carrying, lifting, driving, computer work.   [] (28154) Gait Re-education- Provided training and instruction to the patient for proper LE, core and proximal hip recruitment and positioning and eccentric body weight control with ambulation re-education including up and down stairs     Home Management Training / Self Care:  [x] (68060) Provided self-care/home management training related to activities of daily living and compensatory training, and/or use of adaptive equipment for improvement with: ADLs and compensatory training, meal preparation, safety procedures and instruction in use of adaptive equipment, including bathing, grooming, dressing, personal hygiene, basic household cleaning and chores.      Home Exercise Program:    [x] (91218) Reviewed/Progressed HEP activities related to strengthening, flexibility, endurance, ROM of   [] LE / Lumbar: core, proximal hip and LE for functional self-care, mobility, lifting and ambulation/stair navigation   [] muscles (26735):  [] Dry Needling 3+ muscles (715763  [] Group:      [] Other: gait    GOALS:   Patient stated goal: To return to as normal activity as possible.       Therapist goals for Patient:   Short Term Goals: To be achieved in: 2 weeks  1. Independent in HEP and progression per patient tolerance, in order to prevent re-injury. 2. Patient will have a decrease in pain to facilitate improvement in movement, function, and ADLs as indicated by Functional Deficits.     Long Term Goals: To be achieved in: 6 weeks  1. Pt will demonstrate the ability to perform sit to/from stand with supervision. 2. Pt will ambulate 50 ft with maria alejandra-walker and min assist  3. Patient will demonstrate an increase in postural awareness and control to allow for proper functional mobility as indicated by patients Functional Deficits. 4. Patient will return to functional activities including transfers without increased symptoms or restriction. Overall Progression Towards Functional goals/ Treatment Progress Update:  [] Patient is progressing as expected towards functional goals listed. [] Progression is slowed due to complexities/Impairments listed. [] Progression has been slowed due to co-morbidities. [x] Plan just implemented, too soon to assess goals progression <30days   [] Goals require adjustment due to lack of progress  [] Patient is not progressing as expected and requires additional follow up with physician  [] Other    Persisting Functional Limitations/Impairments:  []Sleeping []Sitting               [x]Standing []Transfers        [x]Walking []Kneeling               []Stairs [x]Squatting / bending   [x]ADLs [x]Reaching  []Lifting  [x]Housework  []Driving [x]Job related tasks  [x]Sports/Recreation []Other:        ASSESSMENT:  Pt tolerated gait well this date; improved performance in II bars.    Treatment/Activity Tolerance:  [x] Patient able to complete tx [] Patient limited by fatigue  [] Patient limited by pain  [] Patient limited by other medical complications  [] Other:     Prognosis: [x] Good [] Fair  [] Poor    Patient Requires Follow-up: [x] Yes  [] No    Plan for next treatment session:  per training diary and progress as tolerated. PLAN: See eval. PT 2x / week for 6 weeks. [x] Continue per plan of care [] Alter current plan (see comments)  [] Plan of care initiated [] Hold pending MD visit [] Discharge    Electronically signed by: Tejas Nava PT, DPT    Note: If patient does not return for scheduled/ recommended follow up visits, this note will serve as a discharge from care along with most recent update on progress.

## 2021-10-18 ENCOUNTER — HOSPITAL ENCOUNTER (OUTPATIENT)
Dept: PHYSICAL THERAPY | Age: 41
Setting detail: THERAPIES SERIES
Discharge: HOME | End: 2021-10-18
Payer: COMMERCIAL

## 2021-10-18 ENCOUNTER — HOSPITAL ENCOUNTER (OUTPATIENT)
Dept: OCCUPATIONAL THERAPY | Age: 41
Setting detail: THERAPIES SERIES
Discharge: HOME | End: 2021-10-18
Payer: COMMERCIAL

## 2021-10-18 ENCOUNTER — HOSPITAL ENCOUNTER (OUTPATIENT)
Dept: SPEECH THERAPY | Age: 41
Setting detail: THERAPIES SERIES
Discharge: HOME | End: 2021-10-18
Payer: COMMERCIAL

## 2021-10-18 PROCEDURE — 97110 THERAPEUTIC EXERCISES: CPT

## 2021-10-18 PROCEDURE — 97166 OT EVAL MOD COMPLEX 45 MIN: CPT

## 2021-10-18 PROCEDURE — 92610 EVALUATE SWALLOWING FUNCTION: CPT

## 2021-10-18 PROCEDURE — 97112 NEUROMUSCULAR REEDUCATION: CPT

## 2021-10-18 PROCEDURE — 92523 SPEECH SOUND LANG COMPREHEN: CPT

## 2021-10-18 NOTE — PLAN OF CARE
[address], [address] [facility] [address]  Phone: [phone]   Fax: [phone]                                                     Occupational Therapy Certification    Dear Dr. Lizzie Emerson MD,    We had the pleasure of evaluating the following patient for occupational therapy services at [facility]. A summary of our findings can be found in the initial assessment below. This includes our plan of care. If you have any questions or concerns regarding these findings, please do not hesitate to contact me at the office phone number checked above. Thank you for the referral.       Referring Practitioner Signature:_______________________________Date:__________________  By signing above (or electronic signature), therapists plan is approved by the referring practicioner      Patient: Latia Griffin   : 1980   MRN: [id number]  Referring Practitioner:  Dr. Lizzie Emerson MD    Evaluation Date: 10/18/2021      Medical Diagnosis Information: Nontraumatic subarachnoid hemorrhage from unspecified intracranial artery (I60.7)                                               Insurance information:  [facility], [place] after IE, DN not billable     Precautions/ Contra-indications:  hx brain bleed, fall risk  Latex Allergy:  [x]NO      []YES  Preferred Language for Healthcare:   [x]English       []Other:    C-SSRS Triggered by Intake questionnaire (Past 2 wk assessment ):   [x] No, Questionnaire did not trigger screening.   [] Yes, Patient intake triggered C-SSRS Screening     [] Completed, no further action required.    [] Completed, PCP notified via [place]  Reason for Seeking OT Services and Patient Goals: 21 PT note:   \"Patient reports in 2021 she was at work and passed out; had a nontraumatic subarachnoid hemorrhage from unspecified intracranial artery - Pt had surgery and aneurysm was clipped. L sided weakness in both arm and leg. Reports she spent about 3 weeks in inpatient rehab at [facility] and had home health PT until 2 weeks ago. Pt states she has some pain in her left leg and low back. States she can get up from chair and transfer independently (however required assist in clinic).  has been assisting with gait; pt states she has not been using any type of assistive device. Pt reports she has difficulty with speech (slurring at times) and her L eye is impaired - double vision at times and difficult to see out of. Pt wearing soft AFO on R foot. \"    Personal Interests and Values:  Shopping, spending time with family, cooking/baking      Occupational History: Pt was previously independent with ADLs and IADLs, including work and driving. Pt states she is currently having  assist with bathing and dressing, and all IADLs. 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 Pt on short-term disability. Home Environment: Pt lives in a hotel with her . Pt has children ages 24, 23, 12 y.o. twins, 13, 15, and 6. Pt reports hotel is accessible and on ground level with no ABDULAZIZ.        SUBJECTIVE: Patient stated complaint: L side hemiplegia, weakness, decreased balance    Pain Scale: 0/10- no current pain, but frequent pain in low back and L leg when sitting too long in w/c  Easing factors: movement  Provocative factors: sitting    Type: []Constant   [x]Intermittent  []Radiating []Localized []other:       OBJECTIVE:   Hand dominance: right     Palpation: no tenderness to palpation    Posture: rounded shoulders    Edema:     Date Right Left   10/18/21 MCP circumference- 19.6 cm  Wrist circumference- 17.3 cm MCP circumference- 22.4 cm  Wrist circumference- 18.7 cm            ROM WFL: []Yes [x]No (if checked, see below)    RUE WFL, no AROM LUE     PROM AROM    L R L R   Shoulder Flexion  ~85*      Shoulder Abduction        Shoulder External Rotation        Shoulder Internal Rotation        Elbow Flexion  WFL      Elbow Extension  WFL      Pronation        Supination  WFL      Wrist Flexion  WFL      Wrist Extension  WFL      Radial Deviation        Ulnar Deviation       CMC Abduction       5th Digit MCP Extension-Flexion       4th Digit MCP Extension-Flexion       3rd Digit MCP Extension-Flexion       2nd Digit MCP Extension-Flexion       1st Digit MCP Extension-Flexion       5th Digit PIP Extension- Flexion       4th Digit PIP Extension-Flexion       3rd Digit PIP Extension-Flexion       2nd Digit PIP Extension-Flexion       1st Digit IP Extension-Flexion       5th Digit DIP Extension-Flexion       4th Digit DIP Extension-Flexion       3rd Digit DIP Extension-Flexion       2nd Digit DIP Extension-Flexion       Composite Flexion (Tip of 3rd Digit to Distal Palmar Crease (cm)) Held in flexion; able to stretch into extension to 2 cm lag        Joint mobility:    [x]Normal    []Hypo   []Hyper    Splinting Needs: pt reports resting hand splint from previous OT; will bring to session     Strength WFL: []Yes [x]No (if checked, see below)     Grossly 0-1/5 LUE    Strength (0-5) Left Right    Shoulder Flex     Shoulder Abd     Shoulder ER     Shoulder IR     Biceps     Triceps     Pronation      Supination      Wrist Flex     Wrist Ext     Wrist Radial Deviation         Orthopaedic Special Tests Positive  Negative  NT Comments    Shoulder        Empty Can              Elbow        Cozen's       Tinel's               Hand/Wrist       Finkelstein's       Tinel's       Phalen's       Froment's       [place] Sign       [place] Deformity       [place] Deformity       Heber[place]'s Nodes (DIP)       Gallo's Nodes (PIP)       Mallet Finger       Grind Test Peggy Gruber)                      Hand Assessment Left  Right  Comments    9 Hole Peg Test (s)       Strength (lbs) Not able to assess (G35)  []Post-polio (G14)  []SCI  []TBI  []ALS Other conditions  [x]Fibromyalgia (M79.7)  []Vertigo  []Syncope  []Kidney Failure  []Cancer      []currently undergoing                treatment  []Pregnancy  []Incontinence   Prior surgeries  []involved limb  []previous spinal surgery  [] section birth  []hysterectomy  []bowel / bladder surgery  []other relevant surgeries   []Other:              Barriers to/and or personal factors that will affect rehab potential:              []Age  []Sex    []Smoker              []Motivation/Lack of Motivation                        [x]Co-Morbidities              []Cognitive Function, education/learning barriers              [x]Environmental, home barriers- pt living in hotel currently              []profession/work barriers  [x]past PT/medical experience  []other:  Justification: Pt would benefit from skilled outpatient OT services to address L side hemiplegia, weakness, decreased balance. Falls Risk Assessment (30 days):   [] Falls risk assessed and no intervention required. [x] Falls risk assessed (via clinical reasoning) and patient requires intervention due to being higher risk for falls  [x] Falls education provided, including safety awareness, assist from , use of AD      ASSESSMENT: The pt has chief complaints of L side hemiplegia, weakness, decreased balance. These symptoms as well as client factor and performance skill deficits create barriers to the patient engaging in desired occupational pursuits. Therefore, the patient is in need of skilled occupational therapy services to mitigate functional deficits in order to allow the patient to return to baseline, prevent further decline, and/or compensate for decreased functional abilities.       Functional Impairments and Activity Limitations:    [x]Reduced participation in functional mobility   [x]Reduced cognition   [x]Reduced participation in high level IADLs   [x]Reduced participation ADLs   [x]Reduced endurance  [x]Reduced fine motor control   [x]Reduced ROM   [x]Reduced sensation   [x]Reduced coordination  [x]Reduced strength   [x]Reduced balance   [x]Reduced posture   [x]Reduced safety awareness   [x]Reduced functional vision      Participation Restrictions   Fall risk   Prognosis/Rehab Potential:      []Excellent   [x]Good    [x]Fair   []Poor    Tolerance of evaluation/treatment:    []Excellent   [x]Good    []Fair   []Poor    Occupational Therapy Evaluation Complexity Justification   A Occupational Profile/Medical and Therapy History  [] no personal factors and/or comorbidities that impact the plan of care; brief history relating to presenting problem  [x]1-2 personal factors and/or comorbidities that impact the plan of care; additional review of physical, cognitive, or psychosocial performance  []3 personal factors and/or comorbidities that impact the plan of care; extensive review of physical cognitive, psychosocial performance    Patient Assessment:  [] a total of 1-3 performance deficits relating to physical, cognitive, psychosocial limitations/restrictions  [x] a total of 3-5 performance deficits relating to physical, cognitive, psychosocial limitations/restrictions  [] a total of 5 or more performance deficits relating to physical, cognitive, psychosocial limitations/restrictions    A clinical presentation with:  [] low complexity, limited amount of treatment options no assessment modification, no co-morbidities  [x] moderate analytical complexity, detailed assessments, minimal to moderate modification of assessments, may have co-morbidities  [] high analytic complexity, comprehensive assessments, multiple treatment options, significant modifications of assessment, co-morbidities affecting performance    Clinical decision making of [] low, [x] moderate, [] high complexity using standardized patient assessment instrument and/or measurable assessment of functional outcome.     [] EVAL (LOW) [id number] (typically 15 minutes face-to-face)  [x] EVAL (MOD) 10450 (typically 30 minutes face-to-face)  [] EVAL (HIGH) 36211 (typically 45 minutes face-to-face)  [] RE-EVAL 37057    PLAN:  Frequency/Duration:  2 days per week for 12 Weeks:  INTERVENTIONS:  [x] Strength training, ROM, balance training, functional mobility training  [x] Neuromuscular re-education and positioning  [x] Modalities as needed that may include: E-stim, Ultrasound, Fluidotherapy, Iontophoresis as indicated, Paraffin, Hot Packs, Cold Packs  [x] Patient/caregiver education on joint protection, postural re-education, activity modification, progression of HEP. [x] Patient/caregiver education regarding home management and safety as well as ADL and IADL performance  [x] Cognitive re-orientation and training  [x] Manual therapy including soft tissue mobilization, manual lymph drainage, and joint manipululations  [x] Adaptive Equipment Education and Training  [x] Splinting including custom or pre-fabricated    HEP instruction: Written and verbal HEP instructions provided and reviewed:   Scapular retraction    Use of resting hand splint   Will expand next session    GOALS:  Patient stated goal: L hand use  [] Progressing: [] Met: [] Not Met: [] Adjusted     Therapist goals for Patient:   Short Term Goals: To be achieved in: 30 days  1. Pt will improve functional ROM to use LUE as ANDREAS during transfers for increased safety and independence by 11/19/21. [] Progressing: [] Met: [] Not Met: [] Adjusted  2. Pt will improve functional LUE use to complete UB dressing mod I for increased independence in ADLs by 11/19/21. [] Progressing: [] Met: [] Not Met: [] Adjusted  3. Pt will improve functional LUE use to complete LB dressing min A for increased independence in ADLs by 11/19/21. [] Progressing: [] Met: [] Not Met: [] Adjusted  4.  Pt will improve oculomotor and cognition skills to complete Trailmaking Part B assessment within 180 seconds with no errors by 11/19/21. [] Progressing: [] Met: [] Not Met: [] Adjusted     Long Term Goals: To be achieved by discharge  1. Pt will report a QuickDASH Symptom Severity Scale score of 65% or less indicating increased safety and functional independence in desired occupational pursuits by discharge. [] Progressing: [] Met: [] Not Met: [] Adjusted  2.  Plan to assess STREAM at next session and add goal    Electronically signed by:  Lidia Slade, OTR/L [id number]

## 2021-10-18 NOTE — PLAN OF CARE
[facility], [address] [place] [address], [address] [place] [address] Drive  Phone: [phone]   Fax: [phone]                                                       Speech Therapy Certification    Speech-Language and Dysphagia Evaluation    Dear Dr. Jacklyn Josue MD,    We had the pleasure of evaluating the following patient for speech therapy services at [facility]. A summary of our findings can be found in the initial assessment below. This includes our plan of care. If you have any questions or concerns regarding these findings, please do not hesitate to contact me at the office phone number checked above. Thank you for the referral.       Physician Signature:_______________________________Date:__________________  By signing above (or electronic signature), therapist's plan is approved by physician      Patient: Loraine Chan   : 1980   MRN: [id number]  Referring Physician: Dr. Jacklyn Josue MD      Evaluation Date: 10/18/2021      Medical Diagnosis Information:  I60.7 (ICD-10-CM) - Nontraumatic subarachnoid hemorrhage from unspecified intracranial artery    Treatment Diagnosis: Mild dysarthria; Mild cognitive-linguistic deficits; Mild oropharyngeal dysphagia                                   Insurance information: [facility]; prior authorization required    Precautions/ Contra-indications: hx brain bleed  Latex Allergy:  [x]NO      []YES  Preferred Language for Healthcare:   [x]English       []other:    SUBJECTIVE:  ONSET: 2021    Recent Chest xray/Chest CT: OSH    Recent MRI Brain/Heat CT: OSH    Fiberoptic Endoscopic Evaluation of Swallow (2021): \"FEES indicated mild pharyngeal dysphagia characterized by slightly decreased UES opening, resulting in mild residue at the esophageal inlet.  Pt did not demonstrate penetration or aspiration across the exam, with sequential swallows of thin liquid via straw pt did have residue spill from UES into interarytenoid space, however a swallow was initiated before the residue could drop into the open laryngeal vestibule. Swallow consistently triggers with liquids and solids at the laryngeal surface of the epiglottis. Moderate oral dysphagia characterized by bolus holding and impaired anterior posterior transit, efficiency of swallow impaired. Given reduced bolus manipulation recommend IDDSI minced and moist diet. Recommend thin liquids via straw to circumvent difficulty with A-P transit and improve efficiency of swallow of liquids. Moderate interarytenoid edema present, vocal fold closure grossly within functional limits. Recommend the patient is upright in bed for meals, single swallows, and allow patient ample time for solid food. Alternate liquids and solids as needed. Pt will benefit from chin tuck against resistance exercises to address reduced UES opening and tongue press against resistance exercises to address lingual strength and AP transit. Pt has made excellent progress from a dysphagia perspective and will benefit from continued speech intervention to address the deficits listed above. 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 Pt seen at [facility] and then at [facility] rehab from 3/28-4/17/2021. Per [facility] speech pathology discharge note 4/17/2021: \"Pt has made strong progress during stay. Pt progressed from NPO to PO diet of IDDSI 5 (minced and moist) with thin liquids. At time of discharge, patient scored 19/24 on the SLUMS, with visual portions omitted. Pt demonstrates overall improvement in immediate and delayed recall, sustained attention, and working memory. Pt continues to demonstrate intrusions during recall, difficulty switching cognitive sets, and left neglect.  Pt would benefit from home health speech therapy to address dysphagia and cognitive-linguistic skills. \" Pt arrived with , alert and receptive to intervention. Current Level of Function: Pt reports ongoing slurring and \"jumbled words\", some difficulty with auditory processing. Pt also with fatigue during meals, continued left sided weakness. Occasional coughing and choking with liquids. Pt previously independent and now receives assistance for ADLs from . She has not returned to work or driving since [facility]. Living Status: Pt lives in at home with  and six children  Occupation/School: Pt previously worked full time at [employer]  Medication Management: :  []Primary   [x]Secondary []No  Finance Management: []Primary   [x]Secondary []No  [facility]  Vision: Wears glasses at all time     Relevant Medical History:  [x] Patient history, allergies, meds reviewed. Medical chart reviewed. See intake form. Review Of Systems (ROS):  [x]Performed Review of systems (Integumentary, CardioPulmonary, Neurological) by intake and observation. Intake form has been scanned into medical record. Patient has been instructed to contact their primary care physician regarding ROS issues if not already being addressed at this time. Co-morbidities/Complexities (which will affect course of rehabilitation):  []None           Cardiovascular conditions   []Hypertension (I10)  []Hyperlipidemia (E78.5)  []Angina pectoris (I20)  []Atherosclerosis (I70)   Gastrointestinal conditions   []GERD (K21.9)   Pulmonary conditions   []Asthma (J45)  []Coughing   []COPD (J44.9)     Psychological Disorders  []Anxiety (F41.9)  []Depression (F32.9)   []Other: Neurological Disorders  []Dementia (F03.90)  []Parkinson's Disease (G20)  [x]Other: hx brain bleed []Other:             Functional Outcome: The Communicative Participation Item Bank is a self-report instrument completed by the pt and caregiver to assess communication in real-life situations. Participants rate on a scale of 3 (not at all) to 0 (very much). The pt/caregiver reported the followin. Does your condition interfere with talking with people you know? 0  2. Does your condition interfere with communicating when you need to say something quickly? 0  3. Does your condition interfere with talking with people you do NOT know? 3  4. Does your condition interfere with communicating when you are out in your community? 2  5. Does your condition interfere with asking questions in a conversation? 2  6. Does your condition interfere with communicating in a small group of people? 0  7. Does your condition interfere with having along conversation with someone you know about a book, movie, show or sports event? 0  8. Does your condition interfere with giving someone DETAILED information? 0  9. Does your condition interfere with getting your turn in a fast-moving conversation? 2  10. Does your condition interfere with trying to persuade a friend or family member to see a different point of view? 0    TOTAL:       PAIN:  Pain Scale: 0/10  Numbness/Tingling: N/A  Type: []Constant   []Intermittent  []Radiating []Localized:       OBJECTIVE:        Barriers to/and or personal factors that will affect rehab potential:              []Age  []Sex    []Smoker              []Motivation/Lack of Motivation                        []Co-Morbidities              [x]Cognitive Function, education/learning barriers              []Environmental, home barriers              []profession/work barriers  []past ST/medical experience  []other:  Justification:     Falls Risk Assessment (30 days): See PT eval   [] Falls Risk assessed and no intervention required.   [] Falls Risk assessed and Patient requires intervention due to being higher risk   TUG score (>12s at risk):     [] Falls education provided, including         ASSESSMENT:   The pt presents with mild dysarthria, mild cognitive-linguistic deficits, and mild problem. Pt rated swallowing difficulties as follows:    1. My swallowing problem has caused me to lose weight: 0  2. My swallowing problem interferes with my ability to go out for meals: 1  3. Swallowing liquids takes extra effort: 4  4. Swallowing solids takes extra effort: 3  5. Swallowing pills takes extra effort: 3  6. Swallowing is painful: 0  7. The pleasure of eating is affected by my swallowin  8. When I swallow, food sticks to my throat: 0  9. I cough when I eat: 2  10.  Swallowing is stressful: 2  TOTAL SCORE: 19    Patient Positioning: Upright in chair    Respiratory Status:   [x]Room Air   []O2 via nasal cannula   []Other:    Current Diet:   Current Liquid:     [x]Regular    []Dysphagia III/Soft & Bite-Sized     []Dysphagia II/Minced & Moist    []Dysphagia I/Pureed  []NPO   [x]Thin  []Nectar thick/Mildly thick   []Honey thick/Mildly thick   []NPO  []Mendoza Water  []Other:         PO trials:   IDDSI 0 (thin):   - Cup: suspect premature bolus loss into pharynx and suspect delayed swallow onset, no overt clinical s/s aspiration  - Straw: suspect premature bolus loss into pharynx and suspect delayed swallow onset, no overt clinical s/s aspiration  IDDSI 2 (mildly thick): Not clinically indicated   IDDSI 3 (moderately thick): Not clinically indicated   IDDSI 4 (puree): adequate prep and transit, suspect delayed swallow onset but no overt clinical s/s aspiration  IDDSI 5 (minced and moist): mildly prolonged oral mastication and delayed transit, suspect delayed swallow onset but no overt clinical s/s aspiration   IDDSI 6 (soft and bite sized):  prolonged oral mastication and delayed transit, suspect delayed swallow onset but no overt clinical s/s aspiration; pt independently took breaks d/t fatigue   IDDSI 7 (regular): prolonged oral mastication and delayed transit, suspect delayed swallow onset but no overt clinical s/s aspiration; pt independently took breaks d/t fatigue     Oral Phase: []WFL [x]Mild [] Moderate  []Severe  []To be assessed   [x]Impaired Mastication:   []Spillage Left:   []Spillage Right:  []Pocketing Left:   []Pocketing Right:   [x]Decreased Anterior to Posterior Transit:   [x]Suspected Premature Bolus Loss:   []Lingual/Palatal Residue:    Pharyngeal Phase: []WFL [x]Mild   [] Moderate  []Severe  []To be assessed   [x]Suspected Delayed Swallow:   []Decreased Laryngeal Elevation:   []Absent Swallow:  []Wet Vocal Quality:   []Throat Clearing-Immediate:   []Throat Clearing-Delayed:   []Cough-Immediate:   []Cough-Delayed:  []Change in Vital Signs       Recommended Diet and Intervention:  Diet Solids Recommendation:   Liquid Consistency Recommendation:   Recommended form of Meds:   [x]Regular    []Dysphagia III/Soft & Bite-Sized     []Dysphagia II/Minced & Moist    []Dysphagia I/Pureed  []NPO   [x]Thin  []Nectar thick/Mildly thick   []Honey thick/Mildly thick   []NPO  []Mendoza Water  []Other:   [x]Whole in water  []Meds in puree  []Meds crushed in puree  []Via alternative means of nutrition  []Other:       Dysphagia Therapeutic Intervention:  []  Bolus control Exercises  [x]  Oral Motor Exercises  []  Allen Ignacio Water Protocol  []  Thermal Stimulation  []  Oral Care    []  Vital Stim/NMES  []  Laryngeal Exercises  [x]  Patient/Family Education  []  Pharyngeal Exercises  [x]  Therapeutic PO trials with SLP  [x]  Diet tolerance monitoring  [x]  Other: Repeat Instrumental Swallow Study as clinically indicated       Compensatory Swallowing Strategies:  [x]  Alternate Solids/Liquids  []  No straws   [] Lingual sweeps  []  Check for pocketing of food L []  Assist feed   [x] Eat/feed Slowly  []  Check for pocketing of food R []  Chin tuck   [x] Remain upright 30-45 min  []  Effortful Swallows   []  External pacing  [] Total feed  []  Liquids by spoon only  [x]  Small bites/sips  [x]  Upright as possible with all PO []  Swallow 2 times per bite/sip      COMPREHENSION  Auditory Comprehension: []WFL [x]Mild   [] Moderate  []Severe  []To be assessed   []Yes/No Questions   []Basic questions   []Complex Questions   []One Step Commands   []Two Step Commands   []Multi-Step Commands   [x]Complex/Abstract Commands    []Common Pictures/Objects   []L/R Discrimination  []Conversation  [x]Other: numerical recognition    Reading Comprehension: []WFL [x]Mild   [x] Moderate  []Severe  []To be assessed   [x]Scanning/Tracking Impairment: impacted by visual deficits and neglect   []Words Impairment:  []Phrase Impairment:   []Paragraph Impairment:       EXPRESSION  Primary Mode of Expression: Verbal  Verbal Expression: [x]WFL []Mild   [] Moderate  []Severe  []To be assessed   []Initiation   []Repetition   []Automatic Speech   []Confrontation   []Convergent   []Divergent   []Responsive   []Conversation   []Other:      Written Expression: []WFL []Mild   [] Moderate  []Severe  [x]To be assessed  Dominant Hand: []Left   [x] Right   []Legibility Impairment:    []Dictation Impairment:    []Trace Impairment:         []Copy Impairment:        []Self-formulation Impairment:         Pragmatics/Social Functioning: []WFL [x]Mild   [] Moderate  []Severe  []To be assessed   [x]Eye Contact    [x]Topic Maintenance    []Turn Taking   []Affect      []Monotone  []Emotionally labile   []Other:        MOTOR SPEECH  Motor Speech: []WFL [x]Mild   [] Moderate  []Severe  []To be assessed   []Apraxia   [x]Dysarthria: reduced articulatory precision for plosives and during SMR   [x]Intelligibility: 90% at conversational level       VOICE  Voice: [x]WFL []Mild   [] Moderate  []Severe  []To be assessed      COGNITION  Overall Orientation : []WFL [x]Mild   [] Moderate  []Severe  []To be assessed   []Oriented x4   []Disoriented to time    []Disoriented to situation   []Disoriented to place     []Disoriented to self [x]Other: disoriented to FROILAN only    Attention: []WFL [x]Mild   [x] Moderate  []Severe  []To be assessed   [x]Selective Attention   [x]Sustained Attention   [x]Alternating Attention   []Divided Attention  []Other:    Memory: []WFL []Mild   [] Moderate  []Severe  [x]To be assessed   []Daily Routines   []Long-term Memory    []People Encountered   []Prospective Memory    []Immediate/Working Memory   []Short-term Memory  []Other:    Problem Solving: []WFL []Mild   [] Moderate  []Severe  [x]To be assessed   []Complex Functional Tasks   []Managing Finances    []Managing Medications   []Simple Functional Tasks   []Verbal Reasoning Skills  []Other:    Numeric Reasoning: []WFL []Mild   [] Moderate  []Severe  [x]To be assessed   []Calculations   []Money Management   []Time  []Other    Abstract Reasoning: []WFL []Mild   [] Moderate  []Severe  [x]To be assessed   []Convergent Thinking   []Divergent Thinking    Safety/Judgement: []WFL [x]Mild   [] Moderate  []Severe  []To be assessed   []Complex Functional Tasks   [x]Novel Situations   []Routine Tasks   [x]Unable to self-monitor and self-correct Consistently    [x]Insight   []Impulsive   []Flexibility of Thought  []Other:       ADDITIONAL ASSESSMENT:  Western Aphasia Battery Revised:  Subtest: Score:   Spontaneous Speech: Content 9/10   Spontaneous Speech: Fluency 10/10   Auditory Verbal Comprehension: Y/N Questions 60/60   Auditory Word Recognition 57/60   Sequential Commands 80/80   Repetition 100/100   Object Naming 60/60   Word Fluency 16/20   Sentence Completion 10/10   Responsive Speech 10/10   Reading TBD   Writing TBD   Apraxia (Optional)  DNT         Treatment Diagnosis:  Speech-Language  []Expressive Aphasia  []Mild   [] Moderate  []Severe    [x]Receptive Aphasia  [x]Mild   [] Moderate  []Severe    []Word Finding Difficulty []Mild   [] Moderate  []Severe    [x]Dysarthria   [x]Mild   [] Moderate  []Severe    []Apraxia of Speech  []Mild   [] Moderate  []Severe   []Dysfluency   []Mild   [] Moderate  []Severe   []Other:   []Mild   [] Moderate  []Severe    Voice  []Dysphonia   []Mild   [] Moderate  []Severe []Aphonia   []Mild   [] Moderate  []Severe    []Hypophonia   []Mild   [] Moderate  []Severe    []Other:   []Mild   [] Moderate  []Severe    Cognitive-Linguistic  [x]Cognitive-Linguistic Deficits [x]Mild   [] Moderate  []Severe  Dysphagia  [x]Oral Dysphagia  [x]Mild   [] Moderate  []Severe    [x]Pharyngeal Dysphagia [x]Mild   [] Moderate  []Severe    []Other:   []Mild   [] Moderate  []Severe        Prognosis/Rehab Potential:      []Excellent   [x]Good    []Fair   []Poor    Tolerance of evaluation/treatment:    []Excellent   [x]Good    []Fair   []Poor       PLAN:    Frequency/Duration: 2 days per week for 6 Weeks:  Therapeutic Interventions:  [x]  Speech-Language Evaluation/Treatment  [x]  Cognitive-Linguistic Skills Development  []  Voice Evaluation and Treatment  []  Sakshi Christianson Voice Treatment (LSVT)  [x]  Dysphagia Evaluation/Treatment  [x]  Modified Barium Swallow Study  []  Dysphagia Treatment via Neuromuscular Electrical Stimulation (NMES)  []  Evaluation, Modifications, and Training in Voice Prosthetic  []  Evaluation for Speech-Generating Augmentative and Alternative Communication Device   []  Therapeutic Services for the use of Speech-Generating Device  []  Other:      HEP instruction: Written HEP instructions provided and reviewed. Pt and  verbalized understanding and agreement. GOALS:  Patient stated goal: \"Stop slurring\"  [] Progressing: [] Met: [] Not Met: [] Adjusted    Therapist goals for Patient:   Short Term Goals: To be achieved in: 5 weeks  1. Patient will use speech strategies to facilitate increased intelligibility at detailed conversational level in >90% as judged by SLP and pt/family. [] Progressing: [] Met: [] Not Met: [] Adjusted  2. Patient will complete abstract reasoning related to functional tasks with 80% accuracy to target self-monitoring. [] Progressing: [] Met: [] Not Met: [] Adjusted  3.  Patient will complete complex attention tasks (sustained, selective, alternating) with 80% accuracy given minimal cues to improve functional task completion. [] Progressing: [] Met: [] Not Met: [] Adjusted  4. Patient will participate in further cognitive-linguistic evaluation with additional goals as indicated. [] Progressing: [] Met: [] Not Met: [] Adjusted  5. Patient will tolerate recommended diet with no overt s/s of aspiration or noticeable fatigue. [] Progressing: [] Met: [] Not Met: [] Adjusted  6. Patient will complete oral motor and bolus control exercises with 80% accuracy independently to improve oral phase of swallow. [] Progressing: [] Met: [] Not Met: [] Adjusted       Long Term Goals: To be achieved in: 6 weeks  1. Patient will demonstrate improved cognitive-linguistic function to improve safety and independence in ADLs. [] Progressing: [] Met: [] Not Met: [] Adjusted  2. Patient will utilize speech intelligibility strategies to increase intelligibility to greater than 90% in conversation to a familiar/unfamiliar listener. [] Progressing: [] Met: [] Not Met: [] Adjusted  3. Patient will tolerate least restrictive diet with no overt clinical s/s of aspiration/penetration. [] Progressing: [] Met: [] Not Met: [] Adjusted       Electronically signed by:   Rochelle Shen MA CCC-SLP  Speech-Language Pathologist  SP. [id number]    Note: If patient does not return for scheduled/ recommended follow up visits, this note will serve as a discharge from care along with most recent update on progress.

## 2021-10-18 NOTE — FLOWSHEET NOTE
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  · Treatment Diagnosis: functional limitations secondary to brain bleed  Insurance/Certification information:  PT Insurance Information: [employer] - 30 visits per year; preauth required  Physician Information:  Referring Practitioner: Patricia Llamas MD  Plan of care signed (Y/N): faxed 21    Date of Patient follow up with Physician:     Functional scale[de-identified] Optimal:  raw score = 81; dysfunction = 80%    Progress Report: []  Yes  [x]  No     Date Range for reporting period:  Beginning   Ending    Progress report due (10 Rx/or 30 days whichever is less): visit #10 or     Recertification due (POC duration/ or 90 days whichever is less): visit #12    Visit # Insurance Allowable Auth required? Date Range    96 units [x]  Yes  []  No           Latex Allergy:  [x]NO      []YES  Preferred Language for Healthcare:   [x]English       []other:      Pain level:  0/10 L LE     SUBJECTIVE:    Doing well today, denies pain. Pt and  report one of the most difficult things for them still is getting in and out of bathtub. Do have a transfer tub bench. Pt reports walking in home, about 15-25 feet at a time, uses a QC, states that she has someone with her but are only as needed and walks frequently throughout the day. Didn't bring shoe in today, must have taken it out of the car over the weekend. Only using w/c in community. 10/14L:  Pt reports that she is doing well, unable to get shoe and AFO on the L LE this morning so wearing a crock on that foot today. OBJECTIVE: 10/14:  To clinic in wheelchair with soft AFO on LLE; received script for OT and speech this date and pt is now scheduled for all 3 therapies      RESTRICTIONS/PRECAUTIONS: h/o aneurysm    Exercises/Interventions:   Therapeutic Exercises (30235) Resistance / level Sets/sec Reps Notes   Nu step  3   velcro strap used to secure L hand, blue t-band around knees to avoid abduction and ER of the L LE   Seated without AFO:  Hip flexion  LAQ  Hip abd  bridging                                                              Therapeutic Activities (76419)       Transfer training:  SPT from wheelchair to nu step and from nu step back to wheelchair with min assist               Stand pivot w/SBQC w/c to mat table #6   x1 ea way Max A   II bars:    Standing lateral weight shifts x 20 with min assist    Sit to/from  stand with min assist x 4; ambulated length of bars x 3 with mod assist to advance LLE and block L knee.                        STS mod mat table with maria alejandra-walker    Once standing added weight shifting M/L    One trial added L LE stepping (minimal success without AFO)                          Neuromuscular Re-ed (61478)              L biceps tendon pressure to improve elbow extension and hand/wrist placment  3 mins     Sit to stand transitions mat table #6  -Man & VC's to improve LLE weight-bearing, midline pelvis and trunk ext through glute max activation   x5 -max manual cues required to place LLE correctly and maintain   EOM:  Scooting:  -Out to edge  -L laterally  -R laterally       x3  3 ft  3 ft     -able to scoot out w/pelvis in midline p instruction  -max cues for correct technique and encouragement  -good form, required mod A for LLE positioning                                      Gait Training:  Foot in front then behind w/QC        Amb w/QC     x2 R        4', 2'   -pt placed R foot in front but unable to shift weight onto it despite cues; max encouragement and instruct required    10/18: significant difficulty w/poor pattern, not beneficial so stopped                 Manual Intervention ([phone])       Stretching as needed                                              Modalities: none    Pt. Education: 10/14:  Verbal cues for proper transfer technique and safety  -patient educated on diagnosis, prognosis and expectations for rehab  -all patient questions were answered    HEP instruction: 10/14:  Discussed safety on transfers. 10/18: Instructed pt to perform standing lateral weight-shifts hourly in front of chair, with a family member on L side as needed. Therapeutic Exercise and NMR EXR  [x] (46200) Provided verbal/tactile cueing for activities related to strengthening, flexibility, endurance, ROM for improvements in  [x] LE / Lumbar: LE, proximal hip, and core control with self care, mobility, lifting, ambulation. [] UE / Cervical: cervical, postural, scapular, scapulothoracic and UE control with self care, reaching, carrying, lifting, house/yardwork, driving, computer work.  [] (88234) Provided verbal/tactile cueing for activities related to improving balance, coordination, kinesthetic sense, posture, motor skill, proprioception to assist with   [] LE / lumbar: LE, proximal hip, and core control in self care, mobility, lifting, ambulation and eccentric single leg control. [] UE / cervical: cervical, scapular, scapulothoracic and UE control with self care, reaching, carrying, lifting, house/yardwork, driving, computer work.   [] (91446) Therapist is in constant attendance of 2 or more patients providing skilled therapy interventions, but not providing any significant amount of measurable one-on-one time to either patient, for improvements in  [] LE / lumbar: LE, proximal hip, and core control in self care, mobility, lifting, ambulation and eccentric single leg control. [] UE / cervical: cervical, scapular, scapulothoracic and UE control with self care, reaching, carrying, lifting, house/yardwork, driving, computer work.      NMR and Therapeutic Activities:    [x] (57323 or 12338) Provided verbal/tactile cueing for activities related to improving balance, coordination, kinesthetic sense, posture, motor skill, proprioception and motor activation to allow for proper function of   [x] LE: / Lumbar core, proximal hip and LE with self care and ADLs  [x] UE / Cervical: cervical, postural, scapular, scapulothoracic and UE control with self care, carrying, lifting, driving, computer work.   [] (06121) Gait Re-education- Provided training and instruction to the patient for proper LE, core and proximal hip recruitment and positioning and eccentric body weight control with ambulation re-education including up and down stairs     Home Management Training / Self Care:  [x] (35733) Provided self-care/home management training related to activities of daily living and compensatory training, and/or use of adaptive equipment for improvement with: ADLs and compensatory training, meal preparation, safety procedures and instruction in use of adaptive equipment, including bathing, grooming, dressing, personal hygiene, basic household cleaning and chores.      Home Exercise Program:    [x] (26981) Reviewed/Progressed HEP activities related to strengthening, flexibility, endurance, ROM of   [] LE / Lumbar: core, proximal hip and LE for functional self-care, mobility, lifting and ambulation/stair navigation   [] UE / Cervical: cervical, postural, scapular, scapulothoracic and UE control with self care, reaching, carrying, lifting, house/yardwork, driving, computer work  [] (34169)Reviewed/Progressed HEP activities related to improving balance, coordination, kinesthetic sense, posture, motor skill, proprioception of   [] LE: core, proximal hip and LE for self care, mobility, lifting, and ambulation/stair navigation    [] UE / Cervical: cervical, postural,  scapular, scapulothoracic and UE control with self care, reaching, carrying, lifting, house/yardwork, driving, computer work    Manual Treatments:  PROM / STM / Oscillations-Mobs:  G-I, II, III, IV (PA's, Inf., Post.)  [] (05225) Provided manual therapy to mobilize LE, proximal hip and/or LS spine soft tissue/joints for the purpose of modulating pain, promoting relaxation,  increasing ROM, reducing/eliminating soft tissue swelling/inflammation/restriction, improving soft tissue extensibility and allowing for proper ROM for normal function with   [] LE / lumbar: self care, mobility, lifting and ambulation. [] UE / Cervical: self care, reaching, carrying, lifting, house/yardwork, driving, computer work. Modalities:  [] (67437) Vasopneumatic compression: Utilized vasopneumatic compression to decrease edema / swelling for the purpose of improving mobility and quad tone / recruitment which will allow for increased overall function including but not limited to self-care, transfers, ambulation, and ascending / descending stairs. Charges:  Timed Code Treatment Minutes: 45   Total Treatment Minutes: 45     Units approved Units used Date Range   96 11        [] EVAL - LOW (45589)   [] EVAL - MOD (18340)  [] EVAL - HIGH (57353)  [] RE-EVAL (58606)  [] WG(29083) x       [] Ionto  [x] NMR (40549) x 3      [] Vaso  [] Manual (58219) x       [] Ultrasound  [] TA x       [] Mech Traction (85700)  [] Aquatic Therapy x     [] ES (un) (72476):   [] Home Management Training x  [] ES(attended) (63893)   [] Dry Needling 1-2 muscles (93681):  [] Dry Needling 3+ muscles (773875  [] Group:      [] Other: gait    GOALS:   Patient stated goal: To return to as normal activity as possible.       Therapist goals for Patient:   Short Term Goals: To be achieved in: 2 weeks  1. Independent in HEP and progression per patient tolerance, in order to prevent re-injury. 2. Patient will have a decrease in pain to facilitate improvement in movement, function, and ADLs as indicated by Functional Deficits.     Long Term Goals: To be achieved in: 6 weeks  1.  Pt educated to do so, primarily led with R hip but reduced rotation to scoot pelvis out evenly after instruction. Progress weight-shifts onto LLE to facilitate correct technique with transfers and RLE swing phase of gait cycle. Treatment/Activity Tolerance:  [x] Patient able to complete tx  [] Patient limited by fatigue  [] Patient limited by pain  [] Patient limited by other medical complications  [] Other:     Prognosis: [x] Good [] Fair  [] Poor    Patient Requires Follow-up: [x] Yes  [] No    Plan for next treatment session:  per training diary and progress as tolerated. PLAN: See eval. PT 2x / week for 6 weeks. [x] Continue per plan of care [] Alter current plan (see comments)  [] Plan of care initiated [] Hold pending MD visit [] Discharge    Electronically signed by: Jasmin Anthony, PT, DPT    Note: If patient does not return for scheduled/ recommended follow up visits, this note will serve as a discharge from care along with most recent update on progress.

## 2021-10-19 NOTE — FLOWSHEET NOTE
Resistance / level Sets/sec Reps Notes                                                                                Neuromuscular Re-ed / Therapeutic Activities                                                 Manual Intervention                                                     Modalities:     Patient education:  Eval, POC, HEP- pt verbalized understanding       Home Exercise Program:  Written and verbal HEP instructions provided and reviewed:  · Scapular retraction   · Use of resting hand splint  · Will expand next session    Therapeutic Exercise and NMR:  [x] (51523) Provided verbal/tactile cueing for activities related to strengthening, flexibility, endurance, ROM  for improvements in scapular, scapulothoracic and UE control with self care, reaching, carrying, lifting, house/yardwork, driving/computer work.    [] (10630) Provided verbal/tactile cueing for activities related to improving balance, coordination, kinesthetic sense, posture, motor skill, proprioception  to assist with  scapular, scapulothoracic and UE control with self care, reaching, carrying, lifting, house/yardwork, driving/computer work.   [] Comments:    Therapeutic Activities:    [x] (92766 or 62931) Provided verbal/tactile cueing for activities related to improving balance, coordination, kinesthetic sense, posture, motor skill, proprioception and motor activation to allow for proper function of scapular, scapulothoracic and UE control with self care, carrying, lifting, driving/computer work  [] Comments:    Home Exercise Program:    [x] (13624) Reviewed/Progressed HEP activities related to strengthening, flexibility, endurance, ROM of scapular, scapulothoracic and UE control with self care, reaching, carrying, lifting, house/yardwork, driving/computer work  [] (92126) Reviewed/Progressed HEP activities related to improving balance, coordination, kinesthetic sense, posture, motor skill, proprioception of scapular, scapulothoracic and UE control with self care, reaching, carrying, lifting, house/yardwork, driving/computer work    [] Comments:    Manual Treatments:  PROM / STM / Oscillations-Mobs:  G-I, II, III, IV (PA's, Inf., Post.)  [] (52826) Provided manual therapy to mobilize soft tissue/joints of cervical/CT, scapular GHJ and UE for the purpose of modulating pain, promoting relaxation,  increasing ROM, reducing/eliminating soft tissue swelling/inflammation/restriction, improving soft tissue extensibility and allowing for proper ROM for normal function with self care, reaching, carrying, lifting, house/yardwork, driving/computer work  [] Comments:    ADL Training:  [] (38201) Provided self-care/home management training related to activities of daily living and compensatory training, and/or use of adaptive equipment   [] Comments:     Splinting:  [] Fabrication of:   [] (22671) Orthotic/Prosthetic Management, subsequent encounter  [] (09689) Orthotic management and training (fitting and assessment)  [] Comments:      Charges:  Timed Code Treatment Minutes: 50   Total Treatment Minutes: 50     [] EVAL (LOW) 25547   [] OT Re-eval (58556)  [x] EVAL (MOD) 09654   [] EVAL (HIGH) 33694       [x] Dolly (08072) x  1   [] TLSVT(88772)  [x] NMR (83560) x   1  [] Estim (attended) (05603)   [] Manual ([address] [facility]) x     [] US (61536)  [] TA (56119) x     [] Paraffin (59657)  [] ADL  ([phone] [id number]) x    [] Splint/L code:    [] Estim (unattended) (47483)  [] Fluidotherapy (97254)  [] Other:    GOALS:   Patient stated goal: L hand use  []? ? Progressing: []?? Met: []?? Not Met: []?? Adjusted     Therapist goals for Patient:   Short Term Goals: To be achieved in: 30 days  1. Pt will improve functional ROM to use LUE as ANDREAS during transfers for increased safety and independence by 11/19/21. []?? Progressing: []?? Met: []?? Not Met: []?? Adjusted  2. Pt will improve functional LUE use to complete UB dressing mod I for increased independence in ADLs by 11/19/21. []?? Progressing: []?? Met: []?? Not Met: []?? Adjusted  3. Pt will improve functional LUE use to complete LB dressing min A for increased independence in ADLs by 11/19/21. []?? Progressing: []?? Met: []?? Not Met: []?? Adjusted  4. Pt will improve oculomotor and cognition skills to complete Trailmaking Part B assessment within 180 seconds with no errors by 11/19/21. []?? Progressing: []?? Met: []?? Not Met: []?? Adjusted     Long Term Goals: To be achieved by discharge  1. Pt will report a QuickDASH Symptom Severity Scale score of 65% or less indicating increased safety and functional independence in desired occupational pursuits by discharge.  []?? Progressing: []?? Met: []?? Not Met: []?? Adjusted  2. Plan to assess STREAM at next session and add goal    Progression Towards Functional goals:  [] Patient is progressing as expected towards functional goals listed. [] Progression is slowed due to complexities listed. [] Progression has been slowed due to co-morbidities. [x] Plan just implemented, too soon to assess goals progression  [] All goals are met  [] Other:     ASSESSMENT:  See eval    Treatment/Activity Tolerance:  [x] Patient tolerated treatment well [] Patient limited by fatique  [] Patient limited by pain  [] Patient limited by other medical complications  [] Other:     Prognosis: [x] Good [] Fair  [] Poor    Patient Requires Follow-up: [x] Yes  [] No    PLAN: See eval  [] Continue per plan of care [] Alter current plan (see comments)  [x] Plan of care initiated [] Hold pending MD visit [] Discharge    Electronically signed by: Ilsa Particia OTR/L [id number]      Note: If patient does not return for scheduled/ recommended follow up visits, this note will serve as a discharge from care along with most recent update on progress.

## 2021-10-21 ENCOUNTER — HOSPITAL ENCOUNTER (OUTPATIENT)
Dept: OCCUPATIONAL THERAPY | Age: 41
Setting detail: THERAPIES SERIES
Discharge: HOME | End: 2021-10-21
Payer: COMMERCIAL

## 2021-10-21 ENCOUNTER — HOSPITAL ENCOUNTER (OUTPATIENT)
Dept: PHYSICAL THERAPY | Age: 41
Setting detail: THERAPIES SERIES
Discharge: HOME | End: 2021-10-21
Payer: COMMERCIAL

## 2021-10-21 PROCEDURE — 97112 NEUROMUSCULAR REEDUCATION: CPT

## 2021-10-21 PROCEDURE — 97110 THERAPEUTIC EXERCISES: CPT

## 2021-10-21 PROCEDURE — 97116 GAIT TRAINING THERAPY: CPT

## 2021-10-21 NOTE — FLOWSHEET NOTE
[facility] Occupational Therapy  [address] [place] [address], Raj Martin Grid20/20  Phone: [phone]   Fax: [phone]      Occupational Therapy Daily Treatment Note  Date:  10/21/2021    Patient: Geri Wharton   : 1980   MRN: [id number]  Referring Physician:    Dr. 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 Date Range    [x]  Yes  []  No pending     Latex Allergy:  [x]No      []Yes  Pacemaker:  [x] No       [] Yes     Preferred Language for Healthcare:   [x]English       []other:    Pain level:   2/10 left arm patient reports some tightness in arm    SUBJECTIVE:  See eval    Functional Disability Index:     Quick DASH: total score- 46, disability score- 79.55%     Will assess STREAM at next session- unable to assess on this date due to equipment not available    OBJECTIVE: See eval      RESTRICTIONS/PRECAUTIONS:  hx brain bleed, fall risk    Exercises/Interventions:  Patient treatment focusing on left ue function with decreasing sensitivity to use as base of support and functional assist with ADL along with increasing midline awareness. Patient transferred wheelchair to mat with mod / min assist of one and use of quad cane with increased time due to impaired proprioception left lower extremity.   Patient then worked on initiating antispasticity positioning and increasing forward weight shifts to floor with both hands in short sit. Patient is reluctant to reach forward with both hands due to impaired proprioception and fear of falling. With contact guard patient able to reach toward floor with both hands with minimal decrease in tone. Patient then worked on use of left ue as base of support holding on to vertical dowel and pulling theraband away from pole with right hand needing min assist posterior humerus. Patient then worked on changing base of support with sit to stand on pole needing mod to min assist at ue and at knee for safety. Patient then completed reaches to right and back to midline holding pole in left hand and pulling band with right hand with improved balance reactions and hip and knee stability on right patient needing min assist to maintain base of support with left hand. Patient then worked on holding a horizontal dowel mahesh with both hands and reaching forward to target with therapist providing min cues posterior humerus on left to facilitate shoulder flexion and elbow extension. Patient able to reach for target and transition to stand moving over base of support with improved hip and knee extension times three and decreased pain. Patient then worked with therapist on aarom reaching to target with min assist posterior humerus to decrease sensitivity in left ue and improve midline with good reasults. Patient ended treatment with functional activity of reaching to target of table and working on placing left hand on table top without trunk compensatory movement patterns with improved rom and decreased spasticity.   Patient will be a good candidate for mirror therapy to decrease sensitivity and improve volitional movement       Therapeutic Exercises  Resistance / level Sets/sec Reps Notes                                                                                Neuromuscular Re-ed / Therapeutic Activities Manual Intervention                                                     Modalities:     Patient education:  Eval, POC, HEP- pt verbalized understanding       Home Exercise Program:  Written and verbal HEP instructions provided and reviewed:  · Scapular retraction   · Use of resting hand splint  · Will expand next session    Therapeutic Exercise and NMR:  [x] (50263) Provided verbal/tactile cueing for activities related to strengthening, flexibility, endurance, ROM  for improvements in scapular, scapulothoracic and UE control with self care, reaching, carrying, lifting, house/yardwork, driving/computer work.    [] (27822) Provided verbal/tactile cueing for activities related to improving balance, coordination, kinesthetic sense, posture, motor skill, proprioception  to assist with  scapular, scapulothoracic and UE control with self care, reaching, carrying, lifting, house/yardwork, driving/computer work.   [] Comments:    Therapeutic Activities:    [x] (95383 or 39520) Provided verbal/tactile cueing for activities related to improving balance, coordination, kinesthetic sense, posture, motor skill, proprioception and motor activation to allow for proper function of scapular, scapulothoracic and UE control with self care, carrying, lifting, driving/computer work  [] Comments:    Home Exercise Program:    [x] (01208) Reviewed/Progressed HEP activities related to strengthening, flexibility, endurance, ROM of scapular, scapulothoracic and UE control with self care, reaching, carrying, lifting, house/yardwork, driving/computer work  [] (08216) Reviewed/Progressed HEP activities related to improving balance, coordination, kinesthetic sense, posture, motor skill, proprioception of scapular, scapulothoracic and UE control with self care, reaching, carrying, lifting, house/yardwork, driving/computer work    [] Comments:    Manual Treatments:  PROM / STM / Oscillations-Mobs:  G-I, II, III, IV (Nestor, Inf., Post.)  [] (18473) Provided manual therapy to mobilize soft tissue/joints of cervical/CT, scapular GHJ and UE for the purpose of modulating pain, promoting relaxation,  increasing ROM, reducing/eliminating soft tissue swelling/inflammation/restriction, improving soft tissue extensibility and allowing for proper ROM for normal function with self care, reaching, carrying, lifting, house/yardwork, driving/computer work  [] Comments:    ADL Training:  [] (46897) Provided self-care/home management training related to activities of daily living and compensatory training, and/or use of adaptive equipment   [] Comments:     Splinting:  [] Fabrication of:   [] (12760) Orthotic/Prosthetic Management, subsequent encounter  [] (42216) Orthotic management and training (fitting and assessment)  [] Comments:      Charges:  Timed Code Treatment Minutes: 50   Total Treatment Minutes: 50     [] EVAL (LOW) 22110   [] OT Re-eval (59004)  [] EVAL (MOD) 23632   [] EVAL (HIGH) 51583       [x] Dolly (40675) x  1   [] VBESF(23632)  [x] NMR (59387) x  2 [] Estim (attended) (14143)   [] Manual ([id number]) x     [] US (91265)  [] TA (44471) x     [] Paraffin (46087)  [] ADL  ([phone]) x    [] Splint/L code:    [] Estim (unattended) ([id number])  [] Fluidotherapy (56230)  [] Other:    GOALS:   Patient stated goal: L hand use  []? ? Progressing: []?? Met: []?? Not Met: []?? Adjusted     Therapist goals for Patient:   Short Term Goals: To be achieved in: 30 days  1. Pt will improve functional ROM to use LUE as ANDREAS during transfers for increased safety and independence by 11/19/21. []?? Progressing: []?? Met: []?? Not Met: []?? Adjusted  2. Pt will improve functional LUE use to complete UB dressing mod I for increased independence in ADLs by 11/19/21. []?? Progressing: []?? Met: []?? Not Met: []?? Adjusted  3. Pt will improve functional LUE use to complete LB dressing min A for increased independence in ADLs by 11/19/21. []?? Progressing: []?? Met: []?? Not Met: []?? Adjusted  4. Pt will improve oculomotor and cognition skills to complete Trailmaking Part B assessment within 180 seconds with no errors by 11/19/21. []?? Progressing: []?? Met: []?? Not Met: []?? Adjusted     Long Term Goals: To be achieved by discharge  1. Pt will report a QuickDASH Symptom Severity Scale score of 65% or less indicating increased safety and functional independence in desired occupational pursuits by discharge.  []?? Progressing: []?? Met: []?? Not Met: []?? Adjusted  2. Plan to assess STREAM at next session and add goal    Progression Towards Functional goals:  [] Patient is progressing as expected towards functional goals listed. [] Progression is slowed due to complexities listed. [] Progression has been slowed due to co-morbidities. [x] Plan just implemented, too soon to assess goals progression  [] All goals are met  [] Other:     ASSESSMENT:  See eval    Treatment/Activity Tolerance:  [x] Patient tolerated treatment well [] Patient limited by fatique  [] Patient limited by pain  [] Patient limited by other medical complications  [] Other:     Prognosis: [x] Good [] Fair  [] Poor    Patient Requires Follow-up: [x] Yes  [] No    PLAN: See eval  [x] Continue per plan of care [] Alter current plan (see comments)  [] Plan of care initiated [] Hold pending MD visit [] Discharge    Electronically signed by:          Note: If patient does not return for scheduled/ recommended follow up visits, this note will serve as a discharge from care along with most recent update on progress.

## 2021-10-21 NOTE — FLOWSHEET NOTE
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  · Treatment Diagnosis: functional limitations secondary to brain bleed  Insurance/Certification information:  PT Insurance Information: [employer] - 30 visits per year; preauth required  Physician Information:  Referring Practitioner: Lawrence Jennings MD  Plan of care signed (Y/N): faxed 21    Date of Patient follow up with Physician:     Functional scale[de-identified] Optimal:  raw score = 81; dysfunction = 80%    Progress Report: []  Yes  [x]  No     Date Range for reporting period:  Beginning   Ending    Progress report due (10 Rx/or 30 days whichever is less): visit #10 or     Recertification due (POC duration/ or 90 days whichever is less): visit #12    Visit # Insurance Allowable Auth required? Date Range    96 units w/OT  48 units each discipline [x]  Yes  []  No   12/10/21          Latex Allergy:  [x]NO      []YES  Preferred Language for Healthcare:   [x]English       []other:      Pain level:  2/10 L elbow to shoulder    SUBJECTIVE:    Doing good this am, having some L arm pain. Beginning OT & speech was good. Was a little tired after last session but wasn't too bad. Denies LOB or falls since last session. Things are about the same, scooting into the bathtub was pretty good. 10/14L:  Pt reports that she is doing well, unable to get shoe and AFO on the L LE this morning so wearing a crock on that foot today. OBJECTIVE: 10/14:  To clinic in wheelchair with soft AFO on LLE; received script for OT and speech this date and pt is now scheduled for all 3 therapies      RESTRICTIONS/PRECAUTIONS: h/o aneurysm    Exercises/Interventions:   Therapeutic Exercises (37909) Resistance / level Sets/sec Reps Notes   Nu step  3   velcro strap used to secure L hand, blue t-band around knees to avoid abduction and ER of the L LE   Seated without AFO:  Hip flexion  LAQ  Hip abd  bridging             Seated in w/c w/B feet placed on mat #6  -hip ext       x10     -frequent VCs to encourage L hip extensor activation                                             Therapeutic Activities (95556)       Sit to stand transitions mat table #6  -Man & VC's to improve LLE weight-bearing, midline pelvis and trunk ext through glute max activation   x3 -instructed pt to use B outstretched UEs to improve 'nose over toes'   Stand pivot w/SBQC w/c to mat table #6   x1 ea way Max A   Donned L shoe, shoe horn required Total A 4 mins                   STS mod mat table with maria alejandra-walker    Once standing added weight shifting M/L    One trial added L LE stepping (minimal success without AFO)                          Neuromuscular Re-ed (44688)              L biceps tendon pressure to improve elbow extension and hand/wrist placment              EOM:  Scooting:  -Out to edge  -L laterally  -R laterally            -able to scoot out w/pelvis in midline p instruction  -max cues for correct technique and encouragement  -good form, required mod A for LLE positioning                                      Gait Training:  Foot in front then behind w/QC      Amb w/QC     x5 R        15'   -good step length, transitioned into gait    -man facilitation to post L knee for swing phase; to R lateral pelvis for appropriate weight shifting; to L patella for safe knee ext during stance phase   Weight shifts  -lateral   -foot onto box, lean onto foot, then return     4 B  10 R   -max man cues for correct posture and shifting  -max cues for improved weight-shifting & VCs to reduce speed & improve control          Manual Intervention (94459)       Stretches:  Seated hamstring stretch    3x30\" L                                            Modalities: none    Pt. Education: 10/14:  Verbal cues for proper transfer technique and safety  -patient educated on diagnosis, prognosis and expectations for rehab  -all patient questions were answered    HEP instruction: 10/14:  Discussed safety on transfers. 10/18: Instructed pt to perform standing lateral weight-shifts hourly in front of chair, with a family member on L side as needed. Therapeutic Exercise and NMR EXR  [x] (10538) Provided verbal/tactile cueing for activities related to strengthening, flexibility, endurance, ROM for improvements in  [x] LE / Lumbar: LE, proximal hip, and core control with self care, mobility, lifting, ambulation. [] UE / Cervical: cervical, postural, scapular, scapulothoracic and UE control with self care, reaching, carrying, lifting, house/yardwork, driving, computer work.  [] (89832) Provided verbal/tactile cueing for activities related to improving balance, coordination, kinesthetic sense, posture, motor skill, proprioception to assist with   [] LE / lumbar: LE, proximal hip, and core control in self care, mobility, lifting, ambulation and eccentric single leg control. [] UE / cervical: cervical, scapular, scapulothoracic and UE control with self care, reaching, carrying, lifting, house/yardwork, driving, computer work.   [] (21990) Therapist is in constant attendance of 2 or more patients providing skilled therapy interventions, but not providing any significant amount of measurable one-on-one time to either patient, for improvements in  [] LE / lumbar: LE, proximal hip, and core control in self care, mobility, lifting, ambulation and eccentric single leg control. [] UE / cervical: cervical, scapular, scapulothoracic and UE control with self care, reaching, carrying, lifting, house/yardwork, driving, computer work.      NMR and Therapeutic Activities:    [x] (93827 or 18090) Provided verbal/tactile cueing for activities related to improving balance, coordination, kinesthetic sense, posture, motor skill, proprioception and motor activation to allow for proper function of   [x] LE: / Lumbar core, proximal hip and LE with self care and ADLs  [x] UE / Cervical: cervical, postural, scapular, scapulothoracic and UE control with self care, carrying, lifting, driving, computer work.   [] (05025) Gait Re-education- Provided training and instruction to the patient for proper LE, core and proximal hip recruitment and positioning and eccentric body weight control with ambulation re-education including up and down stairs     Home Management Training / Self Care:  [x] (68396) Provided self-care/home management training related to activities of daily living and compensatory training, and/or use of adaptive equipment for improvement with: ADLs and compensatory training, meal preparation, safety procedures and instruction in use of adaptive equipment, including bathing, grooming, dressing, personal hygiene, basic household cleaning and chores.      Home Exercise Program:    [x] (04604) Reviewed/Progressed HEP activities related to strengthening, flexibility, endurance, ROM of   [] LE / Lumbar: core, proximal hip and LE for functional self-care, mobility, lifting and ambulation/stair navigation   [] UE / Cervical: cervical, postural, scapular, scapulothoracic and UE control with self care, reaching, carrying, lifting, house/yardwork, driving, computer work  [] (34889)Reviewed/Progressed HEP activities related to improving balance, coordination, kinesthetic sense, posture, motor skill, proprioception of   [] LE: core, proximal hip and LE for self care, mobility, lifting, and ambulation/stair navigation    [] UE / Cervical: cervical, postural,  scapular, scapulothoracic and UE control with self care, reaching, carrying, lifting, house/yardwork, driving, computer work    Manual Treatments:  PROM / STM / Oscillations-Mobs:  G-I, II, III, IV (PA's, Inf., Post.)  [] (94679) Provided manual therapy to mobilize LE, proximal hip and/or LS spine soft tissue/joints for the purpose of modulating pain, promoting relaxation,  increasing ROM, reducing/eliminating soft tissue swelling/inflammation/restriction, improving soft tissue extensibility and allowing for proper ROM for normal function with   [] LE / lumbar: self care, mobility, lifting and ambulation. [] UE / Cervical: self care, reaching, carrying, lifting, house/yardwork, driving, computer work. Modalities:  [] (56913) Vasopneumatic compression: Utilized vasopneumatic compression to decrease edema / swelling for the purpose of improving mobility and quad tone / recruitment which will allow for increased overall function including but not limited to self-care, transfers, ambulation, and ascending / descending stairs. Charges:  Timed Code Treatment Minutes: 45   Total Treatment Minutes: 45     Units approved Units used Date Range    48 14 12/10/21   **updated 10/21**    [] EVAL - LOW (41169)   [] EVAL - MOD (38635)  [] EVAL - HIGH (36191)  [] RE-EVAL (63339)  [] QQ(27378) x       [] Ionto  [x] NMR (84011) x 2      [] Vaso  [] Manual (39612) x       [] Ultrasound  [] TA x       [] Mech Traction (73397)  [] Aquatic Therapy x      [] ES (un) (58829):   [] Home Management Training x  [] ES(attended) (96137)   [] Dry Needling 1-2 muscles (10304):  [] Dry Needling 3+ muscles (728093  [] Group:      [x] Other: gait x 1    GOALS:   Patient stated goal: To return to as normal activity as possible.       Therapist goals for Patient:   Short Term Goals: To be achieved in: 2 weeks  1. Independent in HEP and progression per patient tolerance, in order to prevent re-injury. 2. Patient will have a decrease in pain to facilitate improvement in movement, function, and ADLs as indicated by Functional Deficits.     Long Term Goals: To be achieved in: 6 weeks  1.  Pt will demonstrate the ability to perform sit to/from stand with supervision. 2. Pt will ambulate 50 ft with maria alejandra-walker and min assist  3. Patient will demonstrate an increase in postural awareness and control to allow for proper functional mobility as indicated by patients Functional Deficits. 4. Patient will return to functional activities including transfers without increased symptoms or restriction. Overall Progression Towards Functional goals/ Treatment Progress Update:  [] Patient is progressing as expected towards functional goals listed. [] Progression is slowed due to complexities/Impairments listed. [] Progression has been slowed due to co-morbidities. [x] Plan just implemented, too soon to assess goals progression <30days   [] Goals require adjustment due to lack of progress  [] Patient is not progressing as expected and requires additional follow up with physician  [] Other    Persisting Functional Limitations/Impairments:  []Sleeping []Sitting              [x]Standing []Transfers        [x]Walking []Kneeling              []Stairs [x]Squatting / bending   [x]ADLs [x]Reaching  []Lifting  [x]Housework  []Driving [x]Job related tasks  [x]Sports/Recreation []Other:        ASSESSMENT:      Patient demo'd improved weight shifting and WB'ing of LLE while standing, improving activation of L quad and glute max. Pt able to activate L hip flexion during stance phase but was insufficient and unable to couple with knee flexion to initiate swing phase, light man cues to posterior knee was sufficient. L toe drag was present but not overly limiting, at TB to L ankle to improve foot drop next session. Continue to facilitate LLE muscle activation with improved weight shifting and acceptance of LLE.     Treatment/Activity Tolerance:  [x] Patient able to complete tx  [] Patient limited by fatigue  [] Patient limited by pain  [] Patient limited by other medical complications  [] Other:     Prognosis: [x] Good [] Fair  [] Poor    Patient Requires Follow-up: [x] Yes  [] No    Plan for next treatment session:  per training diary and progress as tolerated. PLAN: See eval. PT 2x / week for 6 weeks. [x] Continue per plan of care [] Alter current plan (see comments)  [] Plan of care initiated [] Hold pending MD visit [] Discharge    Electronically signed by: Felizardo Felty, PT, DPT    Note: If patient does not return for scheduled/ recommended follow up visits, this note will serve as a discharge from care along with most recent update on progress.

## 2021-10-25 ENCOUNTER — HOSPITAL ENCOUNTER (OUTPATIENT)
Dept: PHYSICAL THERAPY | Age: 41
Setting detail: THERAPIES SERIES
Discharge: HOME | End: 2021-10-25
Payer: COMMERCIAL

## 2021-10-25 NOTE — FLOWSHEET NOTE
Physical Therapy  Cancellation/No-show Note  Patient Name:  Geri Wharton  :  1980   Date:  10/25/2021  Cancelled visits to date: 1  No-shows to date: 0    Patient status for today's appointment patient:  [x]  Cancelled 10/25  []  Rescheduled appointment  []  No-show     Reason given by patient:  [x]  Patient ill  []  Conflicting appointment  []  No transportation    []  Conflict with work  []  No reason given  []  Other:     Comments:      Phone call information:   []  Phone call made today to patient at _ time at number provided:      []  Patient answered, conversation as follows:    []  Patient did not answer, message left as follows:  []  Phone call not made today  [x]  Phone call not needed - pt contacted us to cancel and provided reason for cancellation.      Electronically signed by:  Fortunato Harrell, PT, DPT

## 2021-10-28 ENCOUNTER — HOSPITAL ENCOUNTER (OUTPATIENT)
Dept: PHYSICAL THERAPY | Age: 41
Setting detail: THERAPIES SERIES
Discharge: HOME | End: 2021-10-28
Payer: COMMERCIAL

## 2021-10-28 NOTE — FLOWSHEET NOTE
Physical Therapy  Cancellation/No-show Note  Patient Name:  Lilliana Barney  :  1980   Date:  10/28/2021  Cancelled visits to date: 2  No-shows to date: 0    Patient status for today's appointment patient:  [x]  Cancelled 10/25, 10/28  []  Rescheduled appointment  []  No-show     Reason given by patient:  []  Patient ill  []  Conflicting appointment  []  No transportation    []  Conflict with work  []  No reason given  [x]  Other:  has to work at [employer]    Comments:      Phone call information:   []  Phone call made today to patient at _ time at number provided:      []  Patient answered, conversation as follows:    []  Patient did not answer, message left as follows:  []  Phone call not made today  [x]  Phone call not needed - pt contacted us to cancel and provided reason for cancellation.      Electronically signed by:  Wilner Negron, PT, DPT

## 2021-11-01 ENCOUNTER — HOSPITAL ENCOUNTER (OUTPATIENT)
Dept: PHYSICAL THERAPY | Age: 41
Setting detail: THERAPIES SERIES
Discharge: HOME | End: 2021-11-01
Payer: COMMERCIAL

## 2021-11-01 PROCEDURE — 97116 GAIT TRAINING THERAPY: CPT

## 2021-11-01 PROCEDURE — 97112 NEUROMUSCULAR REEDUCATION: CPT

## 2021-11-01 NOTE — FLOWSHEET NOTE
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  · Treatment Diagnosis: functional limitations secondary to brain bleed  Insurance/Certification information:  PT Insurance Information: [employer] - 30 visits per year; preauth required  Physician Information:  Referring Practitioner: Lamont Lester MD  Plan of care signed (Y/N): faxed 21    Date of Patient follow up with Physician:     Functional scale[de-identified] Optimal:  raw score = 81; dysfunction = 80%    Progress Report: []  Yes  [x]  No     Date Range for reporting period:  Beginning   Ending    Progress report due (10 Rx/or 30 days whichever is less): visit #10 or     Recertification due (POC duration/ or 90 days whichever is less): visit #12    Visit # Insurance Allowable Auth required? Date Range    96 units w/OT  48 units each discipline [x]  Yes  []  No   12/10/21          Latex Allergy:  [x]NO      []YES  Preferred Language for Healthcare:   [x]English       []other:      Pain level:  4/10 soreness L side    SUBJECTIVE:      Had a fall earlier today, was transferring from w/c to eye doctor chair and fell onto her L side. Is having more soreness but is otherwise okay. Nothing else new going on.        10/14L:  Pt reports that she is doing well, unable to get shoe and AFO on the L LE this morning so wearing a crock on that foot today. OBJECTIVE: 10/14:  To clinic in wheelchair with soft AFO on LLE; received script for OT and speech this date and pt is now scheduled for all 3 therapies      RESTRICTIONS/PRECAUTIONS: h/o aneurysm    Exercises/Interventions:   Therapeutic Exercises (06744) Resistance / level Sets/sec Reps Notes   Nu step  3   velcro strap used to secure L hand, blue t-band around knees to avoid abduction and ER of the L LE   Seated without AFO:  Hip flexion  LAQ  Hip abd  bridging             Seated in w/c w/B feet placed on mat #6  -hip ext            -frequent VCs to encourage L hip extensor activation                                             Therapeutic Activities (06887)       Sit to stand transitions from stnd seat  -Man & VC's to reach forward w/BUEs   x3 -instructed pt to use B outstretched UEs to improve 'nose over toes'   Stand pivot w/SBQC w/c to mat table #6    Max A   Donned L shoe, shoe horn required Total A                    STS mod mat table with maria alejandra-walker    Once standing added weight shifting M/L    One trial added L LE stepping (minimal success without AFO)                          Neuromuscular Re-ed (69217)              L biceps tendon pressure to improve elbow extension and hand/wrist placment              EOM:  Scooting:  -Out to edge  -L laterally  -R laterally            -able to scoot out w/pelvis in midline p instruction  -max cues for correct technique and encouragement  -good form, required mod A for LLE positioning                                      Gait Training:  Foot in front then behind w/maria alejandra walker      Amb w/QC        Amb pushing weight lymph cart;  Blue TB for L ankle DF assist   Mod A              Min/mod A    x4 R      50'x2   -man facilitation to L knee for impr mechanics;   Difficulty w/RLE proprioception & coordination    -man facilitation to post L knee for swing phase; to R lateral pelvis for appropriate weight shifting; to L patella for safe knee ext during stance phase    -OT assisted L hand and UE positioning on cart, PT: man facilitation to  L innominate to counter balance R Trendelenburg and for L knee & hip mechanics   Weight shifts  -lateral   -foot onto box, lean onto foot, then return        -max man cues for correct posture and shifting  -max cues for improved weight-shifting & VCs to reduce speed & improve control   Amb bwds w/cart Min/mod A  5'                                Manual Intervention (06112)       Stretches:  Seated hamstring stretch                                                Modalities: none    Pt. Education: 10/14:  Verbal cues for proper transfer technique and safety  -patient educated on diagnosis, prognosis and expectations for rehab  -all patient questions were answered    HEP instruction: 10/14:  Discussed safety on transfers. 10/18: Instructed pt to perform standing lateral weight-shifts hourly in front of chair, with a family member on L side as needed. Therapeutic Exercise and NMR EXR  [x] (44617) Provided verbal/tactile cueing for activities related to strengthening, flexibility, endurance, ROM for improvements in  [x] LE / Lumbar: LE, proximal hip, and core control with self care, mobility, lifting, ambulation. [] UE / Cervical: cervical, postural, scapular, scapulothoracic and UE control with self care, reaching, carrying, lifting, house/yardwork, driving, computer work.  [] (28828) Provided verbal/tactile cueing for activities related to improving balance, coordination, kinesthetic sense, posture, motor skill, proprioception to assist with   [] LE / lumbar: LE, proximal hip, and core control in self care, mobility, lifting, ambulation and eccentric single leg control. [] UE / cervical: cervical, scapular, scapulothoracic and UE control with self care, reaching, carrying, lifting, house/yardwork, driving, computer work.   [] (54332) Therapist is in constant attendance of 2 or more patients providing skilled therapy interventions, but not providing any significant amount of measurable one-on-one time to either patient, for improvements in  [] LE / lumbar: LE, proximal hip, and core control in self care, mobility, lifting, ambulation and eccentric single leg control.    [] UE / cervical: cervical, scapular, scapulothoracic and UE control with self care, reaching, carrying, lifting, house/yardwork, driving, computer work. NMR and Therapeutic Activities:    [x] (54092 or 65109) Provided verbal/tactile cueing for activities related to improving balance, coordination, kinesthetic sense, posture, motor skill, proprioception and motor activation to allow for proper function of   [x] LE: / Lumbar core, proximal hip and LE with self care and ADLs  [x] UE / Cervical: cervical, postural, scapular, scapulothoracic and UE control with self care, carrying, lifting, driving, computer work.   [] (95270) Gait Re-education- Provided training and instruction to the patient for proper LE, core and proximal hip recruitment and positioning and eccentric body weight control with ambulation re-education including up and down stairs     Home Management Training / Self Care:  [x] (59194) Provided self-care/home management training related to activities of daily living and compensatory training, and/or use of adaptive equipment for improvement with: ADLs and compensatory training, meal preparation, safety procedures and instruction in use of adaptive equipment, including bathing, grooming, dressing, personal hygiene, basic household cleaning and chores.      Home Exercise Program:    [x] (22652) Reviewed/Progressed HEP activities related to strengthening, flexibility, endurance, ROM of   [] LE / Lumbar: core, proximal hip and LE for functional self-care, mobility, lifting and ambulation/stair navigation   [] UE / Cervical: cervical, postural, scapular, scapulothoracic and UE control with self care, reaching, carrying, lifting, house/yardwork, driving, computer work  [] (13330)Reviewed/Progressed HEP activities related to improving balance, coordination, kinesthetic sense, posture, motor skill, proprioception of   [] LE: core, proximal hip and LE for self care, mobility, lifting, and ambulation/stair navigation    [] UE / Cervical: cervical, postural,  scapular, scapulothoracic and UE control with self care, reaching, carrying, lifting, house/yardwork, driving, computer work    Manual Treatments:  PROM / STM / Oscillations-Mobs:  G-I, II, III, IV (PA's, Inf., Post.)  [] (52011) Provided manual therapy to mobilize LE, proximal hip and/or LS spine soft tissue/joints for the purpose of modulating pain, promoting relaxation,  increasing ROM, reducing/eliminating soft tissue swelling/inflammation/restriction, improving soft tissue extensibility and allowing for proper ROM for normal function with   [] LE / lumbar: self care, mobility, lifting and ambulation. [] UE / Cervical: self care, reaching, carrying, lifting, house/yardwork, driving, computer work. Modalities:  [] (45474) Vasopneumatic compression: Utilized vasopneumatic compression to decrease edema / swelling for the purpose of improving mobility and quad tone / recruitment which will allow for increased overall function including but not limited to self-care, transfers, ambulation, and ascending / descending stairs. Charges:  Timed Code Treatment Minutes: 45   Total Treatment Minutes: 45     Units approved Units used Date Range    48 17 12/10/21   **updated 10/21**    [] EVAL - LOW (48061)   [] EVAL - MOD (20683)  [] EVAL - HIGH (64414)  [] RE-EVAL (13995)  [] XE(59126) x       [] Ionto  [x] NMR (52553) x 1      [] Vaso  [] Manual (42429) x       [] Ultrasound  [] TA x       [] Mech Traction (45963)  [] Aquatic Therapy x      [] ES (un) (06155):   [] Home Management Training x  [] ES(attended) (48921)   [] Dry Needling 1-2 muscles (91376):  [] Dry Needling 3+ muscles (170239  [] Group:      [x] Other: gait x 2    GOALS:   Patient stated goal: To return to as normal activity as possible.       Therapist goals for Patient:   Short Term Goals: To be achieved in: 2 weeks  1. Independent in HEP and progression per patient tolerance, in order to prevent re-injury.    2. Patient will have a decrease in pain to facilitate improvement in movement, function, and ADLs as indicated by Functional Deficits.     Long Term Goals: To be achieved in: 6 weeks  1. Pt will demonstrate the ability to perform sit to/from stand with supervision. 2. Pt will ambulate 50 ft with maria alejandra-walker and min assist  3. Patient will demonstrate an increase in postural awareness and control to allow for proper functional mobility as indicated by patients Functional Deficits. 4. Patient will return to functional activities including transfers without increased symptoms or restriction. Overall Progression Towards Functional goals/ Treatment Progress Update:  [] Patient is progressing as expected towards functional goals listed. [] Progression is slowed due to complexities/Impairments listed. [] Progression has been slowed due to co-morbidities. [x] Plan just implemented, too soon to assess goals progression <30days   [] Goals require adjustment due to lack of progress  [] Patient is not progressing as expected and requires additional follow up with physician  [] Other    Persisting Functional Limitations/Impairments:  []Sleeping []Sitting              [x]Standing []Transfers        [x]Walking []Kneeling              []Stairs [x]Squatting / bending   [x]ADLs [x]Reaching  []Lifting  [x]Housework  []Driving [x]Job related tasks  [x]Sports/Recreation []Other:        ASSESSMENT:      Patient demo'd good, but inconsistent, L swing phase & step length during gait training w/cart. Assist from OT was important to maintain good L hand and UE positioning and trunk posture while PT assisted LLE movements. Man facilitation to L innominate to counter R Trendelenburg further improved L swing phase and mechanics, blue TB for ankle DF assist minimized toe drag but was still present.   Pt will benefit from future treatment sessions with OT co-tx or session overlap to improve neuromuscular function with transfers and gait.  Reassessment next session. Treatment/Activity Tolerance:  [x] Patient able to complete tx  [] Patient limited by fatigue  [] Patient limited by pain  [] Patient limited by other medical complications  [] Other:     Prognosis: [x] Good [] Fair  [] Poor    Patient Requires Follow-up: [x] Yes  [] No    Plan for next treatment session:  per training diary and progress as tolerated. PLAN: See eval. PT 2x / week for 6 weeks. [x] Continue per plan of care [] Alter current plan (see comments)  [] Plan of care initiated [] Hold pending MD visit [] Discharge    Electronically signed by: Merle Moy, PT, DPT    Note: If patient does not return for scheduled/ recommended follow up visits, this note will serve as a discharge from care along with most recent update on progress.

## 2021-11-04 ENCOUNTER — HOSPITAL ENCOUNTER (OUTPATIENT)
Dept: PHYSICAL THERAPY | Age: 41
Setting detail: THERAPIES SERIES
Discharge: HOME | End: 2021-11-04
Payer: COMMERCIAL

## 2021-11-04 PROCEDURE — 97112 NEUROMUSCULAR REEDUCATION: CPT

## 2021-11-04 PROCEDURE — 97116 GAIT TRAINING THERAPY: CPT

## 2021-11-04 PROCEDURE — 97530 THERAPEUTIC ACTIVITIES: CPT

## 2021-11-04 NOTE — FLOWSHEET NOTE
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  · Treatment Diagnosis: functional limitations secondary to brain bleed  Insurance/Certification information:  PT Insurance Information: [employer] - 30 visits per year; preauth required  Physician Information:  Referring Practitioner: Meli Torrez MD  Plan of care signed (Y/N): faxed 21    Date of Patient follow up with Physician:     Functional scale: Optimal:  raw score = 81; dysfunction = 80%    Progress Report: []  Yes  [x]  No     Date Range for reporting period:  Beginning   Ending    Progress report due (10 Rx/or 30 days whichever is less): visit #10 or /    Recertification due (POC duration/ or 90 days whichever is less): visit #12    Visit # Insurance Allowable Auth required? Date Range    96 units w/OT  48 units each discipline [x]  Yes  []  No   12/10/21          Latex Allergy:  [x]NO      []YES  Preferred Language for Healthcare:   [x]English       []other:      Pain level:  1/10 soreness L side    SUBJECTIVE:    Pt reports she is still sore from [facility]. L arm is hurting, but if she doesn't use it it stays at about a 1/10. Does get up and goes to the bathroom at home, other wise spending most of the day in the wheelchair or bed. Does have trouble rolling and getting in and out of bed. OBJECTIVE:   : pt not wearing AFO today     10/14:  To clinic in wheelchair with soft AFO on LLE; received script for OT and speech this date and pt is now scheduled for all 3 therapies      RESTRICTIONS/PRECAUTIONS: h/o aneurysm    Exercises/Interventions:   Therapeutic Exercises (80164) Resistance / level Sets/sec Reps Notes   Nu step / step one  3 / 1  X 4 min No L UE today due to soreness, VCs for pulling L knee into midline    Seated without AFO:  Hip flexion  LAQ  Hip abd  bridging             Seated in w/c w/B feet placed on mat #6  -hip ext            -frequent VCs to encourage L hip extensor activation                                             Therapeutic Activities (86211)       Sit to stand transitions from stnd seat  -Man & VC's to reach forward w/BUEs    -instructed pt to use B outstretched UEs to improve 'nose over toes'   Stand pivot w/SBQC w/c to mat table #6    Max A   Donned L shoe, shoe horn required Total A                    STS mod mat table with maria alejandra-walker    Once standing added weight shifting M/L    One trial added L LE stepping (minimal success without AFO)     Transfer onto step one    Transfer off step one to Hemicane    Min A x 1    CGA x 1   Pt has difficulty with problem solving and motor planning   VCs for watching where L foot is positioned    Transfer from WC to mat  Transfer from mat to WC    min A x 1  Min A x 1  Stand pivot to R  Sit pivot to R - attempted L but too scared                                       Neuromuscular Re-ed (73534)              L biceps tendon pressure to improve elbow extension and hand/wrist placment              EOM:  Scooting:  -Out to edge  -L laterally  -R laterally            -able to scoot out w/pelvis in midline p instruction  -max cues for correct technique and encouragement  -good form, required mod A for LLE positioning   Rolling in bed R and L    X 1 each Min A for placement and sequencing    bridge   x10 CGA, VCs for hip adduction    Hip add in hooklying   x10 L TCs for target   Heel slides AAROM to AROM   x10 L AAROM to begin, but slowly gained more motion and power, responds well to cues for power          Gait Training:  Foot in front then behind w/maria alejandra walker      Amb w/QC        Amb pushing weight lymph cart;  purple TB for DF and knee flexion assist Max x 2 10 feet  -man facilitation to L knee for impr mechanics; Difficulty w/RLE proprioception & coordination    -man facilitation to post L knee for swing phase; to R lateral pelvis for appropriate weight shifting; to L patella for safe knee ext during stance phase    facilitation with tband at L LE, Max A for placing L foot as pt puling forward, VCs for sequencing, block to L knee and to cart to avoid pt pulling back    Weight shifts  -lateral   -foot onto box, lean onto foot, then return        -max man cues for correct posture and shifting  -max cues for improved weight-shifting & VCs to reduce speed & improve control   Amb bwds w/cart                                Manual Intervention (83083)       Stretches:  Seated hamstring stretch                                                Modalities: none    Pt. Education: 10/14:  Verbal cues for proper transfer technique and safety  -patient educated on diagnosis, prognosis and expectations for rehab  -all patient questions were answered    HEP instruction: 10/14:  Discussed safety on transfers. 10/18: Instructed pt to perform standing lateral weight-shifts hourly in front of chair, with a family member on L side as needed. Therapeutic Exercise and NMR EXR  [] (14100) Provided verbal/tactile cueing for activities related to strengthening, flexibility, endurance, ROM for improvements in  [] LE / Lumbar: LE, proximal hip, and core control with self care, mobility, lifting, ambulation. [] UE / Cervical: cervical, postural, scapular, scapulothoracic and UE control with self care, reaching, carrying, lifting, house/yardwork, driving, computer work.   [x] (18657) Provided verbal/tactile cueing for activities related to improving balance, coordination, kinesthetic sense, posture, motor skill, proprioception to assist with   [x] LE / lumbar: LE, proximal hip, and core control in self care, mobility, lifting, ambulation and eccentric single leg control. [x] UE / cervical: cervical, scapular, scapulothoracic and UE control with self care, reaching, carrying, lifting, house/yardwork, driving, computer work.   [] (67229) Therapist is in constant attendance of 2 or more patients providing skilled therapy interventions, but not providing any significant amount of measurable one-on-one time to either patient, for improvements in  [] LE / lumbar: LE, proximal hip, and core control in self care, mobility, lifting, ambulation and eccentric single leg control. [] UE / cervical: cervical, scapular, scapulothoracic and UE control with self care, reaching, carrying, lifting, house/yardwork, driving, computer work. NMR and Therapeutic Activities:    [x] (52292 or 50823) Provided verbal/tactile cueing for activities related to improving balance, coordination, kinesthetic sense, posture, motor skill, proprioception and motor activation to allow for proper function of   [x] LE: / Lumbar core, proximal hip and LE with self care and ADLs  [x] UE / Cervical: cervical, postural, scapular, scapulothoracic and UE control with self care, carrying, lifting, driving, computer work. [x] (90528) Gait Re-education- Provided training and instruction to the patient for proper LE, core and proximal hip recruitment and positioning and eccentric body weight control with ambulation re-education including up and down stairs     Home Management Training / Self Care:  [] (91247) Provided self-care/home management training related to activities of daily living and compensatory training, and/or use of adaptive equipment for improvement with: ADLs and compensatory training, meal preparation, safety procedures and instruction in use of adaptive equipment, including bathing, grooming, dressing, personal hygiene, basic household cleaning and chores.      Home Exercise Program:    [] (27160) Reviewed/Progressed HEP activities related to strengthening, flexibility, endurance, ROM of   [] LE / Lumbar: core, proximal hip and LE for functional self-care, mobility, lifting and ambulation/stair navigation   [] UE / Cervical: cervical, postural, scapular, scapulothoracic and UE control with self care, reaching, carrying, lifting, house/yardwork, driving, computer work  [] (59850)Reviewed/Progressed HEP activities related to improving balance, coordination, kinesthetic sense, posture, motor skill, proprioception of   [] LE: core, proximal hip and LE for self care, mobility, lifting, and ambulation/stair navigation    [] UE / Cervical: cervical, postural,  scapular, scapulothoracic and UE control with self care, reaching, carrying, lifting, house/yardwork, driving, computer work    Manual Treatments:  PROM / STM / Oscillations-Mobs:  G-I, II, III, IV (PA's, Inf., Post.)  [] (13023) Provided manual therapy to mobilize LE, proximal hip and/or LS spine soft tissue/joints for the purpose of modulating pain, promoting relaxation,  increasing ROM, reducing/eliminating soft tissue swelling/inflammation/restriction, improving soft tissue extensibility and allowing for proper ROM for normal function with   [] LE / lumbar: self care, mobility, lifting and ambulation. [] UE / Cervical: self care, reaching, carrying, lifting, house/yardwork, driving, computer work. Modalities:  [] (46180) Vasopneumatic compression: Utilized vasopneumatic compression to decrease edema / swelling for the purpose of improving mobility and quad tone / recruitment which will allow for increased overall function including but not limited to self-care, transfers, ambulation, and ascending / descending stairs.        Charges:  Timed Code Treatment Minutes: 42   Total Treatment Minutes: 42     Units approved Units used Date Range    48 20 12/10/21   **updated 10/21**    [] EVAL - LOW (01900)   [] EVAL - MOD (37766)  [] EVAL - HIGH (98316)  [] RE-EVAL (85393)  [] BX(05016) x       [] Ionto  [x] NMR (14331) x 1      [] Vaso  [] Manual (27555) x       [] Ultrasound  [x] TA x  1     [] Mech Traction (61872)  [] Aquatic Therapy x      [] ES (un) (05861):   [] Home Management Training x  [] ES(attended) (20944)   [] Dry Needling 1-2 muscles (68631):  [] Dry Needling 3+ muscles (683852  [] Group:      [x] Other: gait x 1    GOALS:   Patient stated goal: To return to as normal activity as possible.       Therapist goals for Patient:   Short Term Goals: To be achieved in: 2 weeks  1. Independent in HEP and progression per patient tolerance, in order to prevent re-injury. 2. Patient will have a decrease in pain to facilitate improvement in movement, function, and ADLs as indicated by Functional Deficits.     Long Term Goals: To be achieved in: 6 weeks  1. Pt will demonstrate the ability to perform sit to/from stand with supervision. 2. Pt will ambulate 50 ft with maria alejandra-walker and min assist  3. Patient will demonstrate an increase in postural awareness and control to allow for proper functional mobility as indicated by patients Functional Deficits. 4. Patient will return to functional activities including transfers without increased symptoms or restriction. Overall Progression Towards Functional goals/ Treatment Progress Update:  [] Patient is progressing as expected towards functional goals listed. [] Progression is slowed due to complexities/Impairments listed. [] Progression has been slowed due to co-morbidities.   [x] Plan just implemented, too soon to assess goals progression <30days   [] Goals require adjustment due to lack of progress  [] Patient is not progressing as expected and requires additional follow up with physician  [] Other    Persisting Functional Limitations/Impairments:  []Sleeping []Sitting              [x]Standing []Transfers        [x]Walking []Kneeling              []Stairs [x]Squatting / bending   [x]ADLs [x]Reaching  []Lifting  [x]Housework  []Driving [x]Job related tasks  [x]Sports/Recreation []Other:        ASSESSMENT:  Pt demoing fear with all transfers, joe towards the L. Reviewed bed mobility and transfer to improve independence. Limited gait today due to fear and poor motor planning. Utilized theraband at L LE to facilitate L DF and L knee flexion. Did respond well to cues for power when moving L LE. Pt will benefit from future treatment sessions with OT co-tx or session overlap to improve neuromuscular function with transfers and gait. Reassessment next session. Treatment/Activity Tolerance:  [x] Patient able to complete tx  [] Patient limited by fatigue  [] Patient limited by pain  [] Patient limited by other medical complications  [] Other:     Prognosis: [x] Good [] Fair  [] Poor    Patient Requires Follow-up: [x] Yes  [] No    Plan for next treatment session:  per training diary and progress as tolerated. PLAN: See eval. PT 2x / week for 6 weeks. [x] Continue per plan of care [] Alter current plan (see comments)  [] Plan of care initiated [] Hold pending MD visit [] Discharge    Electronically signed by: Chip Rascon PT, DPT    Note: If patient does not return for scheduled/ recommended follow up visits, this note will serve as a discharge from care along with most recent update on progress.

## 2021-11-08 ENCOUNTER — HOSPITAL ENCOUNTER (OUTPATIENT)
Dept: OCCUPATIONAL THERAPY | Age: 41
Setting detail: THERAPIES SERIES
Discharge: HOME | End: 2021-11-08
Payer: COMMERCIAL

## 2021-11-08 ENCOUNTER — HOSPITAL ENCOUNTER (OUTPATIENT)
Dept: SPEECH THERAPY | Age: 41
Setting detail: THERAPIES SERIES
Discharge: HOME | End: 2021-11-08
Payer: COMMERCIAL

## 2021-11-08 ENCOUNTER — HOSPITAL ENCOUNTER (OUTPATIENT)
Dept: PHYSICAL THERAPY | Age: 41
Setting detail: THERAPIES SERIES
Discharge: HOME | End: 2021-11-08
Payer: COMMERCIAL

## 2021-11-08 PROCEDURE — 97112 NEUROMUSCULAR REEDUCATION: CPT

## 2021-11-08 PROCEDURE — 97110 THERAPEUTIC EXERCISES: CPT

## 2021-11-08 PROCEDURE — 97130 THER IVNTJ EA ADDL 15 MIN: CPT

## 2021-11-08 PROCEDURE — 97530 THERAPEUTIC ACTIVITIES: CPT

## 2021-11-08 PROCEDURE — 92507 TX SP LANG VOICE COMM INDIV: CPT

## 2021-11-08 PROCEDURE — 97116 GAIT TRAINING THERAPY: CPT

## 2021-11-08 NOTE — FLOWSHEET NOTE
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  · Treatment Diagnosis: functional limitations secondary to brain bleed  Insurance/Certification information:  PT Insurance Information: [employer] - 30 visits per year; preauth required  Physician Information:  Referring Practitioner: Dominick Morin MD  Plan of care signed (Y/N): faxed 21    Date of Patient follow up with Physician:     Functional scale: Optimal:  raw score = 81; dysfunction = 80%    Progress Report: []  Yes  [x]  No     Date Range for reporting period:  Beginning   Ending    Progress report due (10 Rx/or 30 days whichever is less): visit #10 or     Recertification due (POC duration/ or 90 days whichever is less): visit #12    Visit # Insurance Allowable Auth required? Date Range    96 units w/OT  48 units each discipline [x]  Yes  []  No   12/10/21          Latex Allergy:  [x]NO      []YES  Preferred Language for Healthcare:   [x]English       []other:      Pain level:  1/10 soreness L side    SUBJECTIVE:    Only having a bit of pain in L shoulder and L knee. Denies any falls or new difficulties since last session. OBJECTIVE:   : pt not wearing AFO today     10/14:  To clinic in wheelchair with soft AFO on LLE; received script for OT and speech this date and pt is now scheduled for all 3 therapies      RESTRICTIONS/PRECAUTIONS: h/o aneurysm    Exercises/Interventions:   Therapeutic Exercises (94141) Resistance / level Sets/sec Reps Notes   Nu step / step one  3 / 1  X 4 min No L UE today due to soreness, VCs for pulling L knee into midline    Seated without AFO:  Hip flexion  LAQ  Hip abd  bridging             Seated in w/c w/B feet placed on mat #6  -hip ext            -frequent VCs to encourage L hip extensor activation                                             Therapeutic Activities (96684)       Sit to stand transitions from mat #6  -Man & VC's to reach forward w/BUEs   x5 -instructed pt to use B outstretched UEs to improve 'nose over toes'   Stand pivot w/SBQC w/c to mat table #6    Max A   Donned L shoe, shoe horn required Total A                    STS mod mat table with maria alejandra-walker    Once standing added weight shifting M/L    One trial added L LE stepping (minimal success without AFO)     Transfer onto step one    Transfer off step one to [place]    Pt has difficulty with problem solving and motor planning   VCs for watching where L foot is positioned    Transfer from [facility] to [facility]  Transfer from [facility] to [facility]   Stand pivot to R  Sit pivot to R - attempted L but too scared                                       Neuromuscular Re-ed (08134)              L biceps tendon pressure to improve elbow extension and hand/wrist placment              EOM:  Scooting:  -Out to edge  -L laterally  -R laterally            -able to scoot out w/pelvis in midline p instruction  -max cues for correct technique and encouragement  -good form, required mod A for LLE positioning   Rolling in bed R and L    Min A for placement and sequencing    bridge   CGA, VCs for hip adduction    Hip add in hooklying   TCs for target   Heel slides AAROM to AROM   AAROM to begin, but slowly gained more motion and power, responds well to cues for power          Gait Training:  Foot in front then behind w/maria alejandra walker      Amb w/QC        Amb pushing weight lymph cart;  purple TB for DF and knee flexion assist    Amb w/hemiwalker Min AMod AMax x 2         Mod Ax10 R50'x2        25'        -purple TB for L ankle DF & knee flex assist        facilitation with tband at L LE, Max A for placing L foot as pt puling forward, VCs for sequencing, block to L knee and to cart to avoid pt pulling back     -purple TB for L ankle DF & knee flex assist   Weight shifts  -lateral   -foot onto box, lean onto foot, then return        -max man cues for correct posture and shifting  -max cues for improved weight-shifting & VCs to reduce speed & improve control   Amb bwds w/cart                                Manual Intervention (93262)       Stretches:  Seated hamstring stretch                                                Modalities: none    Pt. Education:   11/9:  Pt requested order for hemiwalker, feels like it would improve her fear and ability to walk. Discussed increased support it provides and less LE & core muscle activation that is required to use hemiwalker, potentially becoming a step backwards and increasing her dependency on AD. Did discuss importance of continued walking and activity at home and if pt is not currently walking at home due to amount of work/effort required to amb w/SBQC, to obtain a hemiwalker so pt is more likely to return to typical home activities. Discussed nearby medical supply stores and to contact PCP and have order sent to them.  and pt reported understanding. 10/14:  Verbal cues for proper transfer technique and safety  -patient educated on diagnosis, prognosis and expectations for rehab  -all patient questions were answered    HEP instruction: 10/14:  Discussed safety on transfers. 10/18: Instructed pt to perform standing lateral weight-shifts hourly in front of chair, with a family member on L side as needed. Therapeutic Exercise and NMR EXR  [] (83170) Provided verbal/tactile cueing for activities related to strengthening, flexibility, endurance, ROM for improvements in  [] LE / Lumbar: LE, proximal hip, and core control with self care, mobility, lifting, ambulation.   [] UE / Cervical: cervical, postural, scapular, scapulothoracic and UE control with self care, reaching, carrying, lifting, house/yardwork, driving, computer work. [x] (17496) Provided verbal/tactile cueing for activities related to improving balance, coordination, kinesthetic sense, posture, motor skill, proprioception to assist with   [x] LE / lumbar: LE, proximal hip, and core control in self care, mobility, lifting, ambulation and eccentric single leg control. [x] UE / cervical: cervical, scapular, scapulothoracic and UE control with self care, reaching, carrying, lifting, house/yardwork, driving, computer work.   [] (96638) Therapist is in constant attendance of 2 or more patients providing skilled therapy interventions, but not providing any significant amount of measurable one-on-one time to either patient, for improvements in  [] LE / lumbar: LE, proximal hip, and core control in self care, mobility, lifting, ambulation and eccentric single leg control. [] UE / cervical: cervical, scapular, scapulothoracic and UE control with self care, reaching, carrying, lifting, house/yardwork, driving, computer work. NMR and Therapeutic Activities:    [x] (76331 or 38294) Provided verbal/tactile cueing for activities related to improving balance, coordination, kinesthetic sense, posture, motor skill, proprioception and motor activation to allow for proper function of   [x] LE: / Lumbar core, proximal hip and LE with self care and ADLs  [x] UE / Cervical: cervical, postural, scapular, scapulothoracic and UE control with self care, carrying, lifting, driving, computer work.    [x] (48607) Gait Re-education- Provided training and instruction to the patient for proper LE, core and proximal hip recruitment and positioning and eccentric body weight control with ambulation re-education including up and down stairs     Home Management Training / Self Care:  [] (76602) Provided self-care/home management training related to activities of daily living and compensatory training, and/or use of adaptive equipment for improvement with: ADLs and compensatory training, meal preparation, safety procedures and instruction in use of adaptive equipment, including bathing, grooming, dressing, personal hygiene, basic household cleaning and chores. Home Exercise Program:    [] (34206) Reviewed/Progressed HEP activities related to strengthening, flexibility, endurance, ROM of   [] LE / Lumbar: core, proximal hip and LE for functional self-care, mobility, lifting and ambulation/stair navigation   [] UE / Cervical: cervical, postural, scapular, scapulothoracic and UE control with self care, reaching, carrying, lifting, house/yardwork, driving, computer work  [] (15424)Reviewed/Progressed HEP activities related to improving balance, coordination, kinesthetic sense, posture, motor skill, proprioception of   [] LE: core, proximal hip and LE for self care, mobility, lifting, and ambulation/stair navigation    [] UE / Cervical: cervical, postural,  scapular, scapulothoracic and UE control with self care, reaching, carrying, lifting, house/yardwork, driving, computer work    Manual Treatments:  PROM / STM / Oscillations-Mobs:  G-I, II, III, IV (PA's, Inf., Post.)  [] (40798) Provided manual therapy to mobilize LE, proximal hip and/or LS spine soft tissue/joints for the purpose of modulating pain, promoting relaxation,  increasing ROM, reducing/eliminating soft tissue swelling/inflammation/restriction, improving soft tissue extensibility and allowing for proper ROM for normal function with   [] LE / lumbar: self care, mobility, lifting and ambulation. [] UE / Cervical: self care, reaching, carrying, lifting, house/yardwork, driving, computer work.      Modalities:  [] (68528) Vasopneumatic compression: Utilized vasopneumatic compression to decrease edema / swelling for the purpose of improving mobility and quad tone / recruitment which will allow for increased overall function including but not limited to self-care, transfers, ambulation, and ascending / descending stairs. Charges:  Timed Code Treatment Minutes: 42   Total Treatment Minutes: 42     Units approved Units used Date Range    48 20 12/10/21   **updated 10/21**    [] EVAL - LOW (73552)   [] EVAL - MOD (58975)  [] EVAL - HIGH (93172)  [] RE-EVAL (95486)  [] OI(25949) x       [] Ionto  [x] NMR (19916) x 1      [] Vaso  [] Manual (36420) x       [] Ultrasound  [x] TA x  1     [] Mech Traction (93019)  [] Aquatic Therapy x      [] ES (un) (59365):   [] Home Management Training x  [] ES(attended) (21039)   [] Dry Needling 1-2 muscles (02000):  [] Dry Needling 3+ muscles (602306  [] Group:      [x] Other: gait x 1    GOALS:   Patient stated goal: To return to as normal activity as possible.       Therapist goals for Patient:   Short Term Goals: To be achieved in: 2 weeks  1. Independent in HEP and progression per patient tolerance, in order to prevent re-injury. 2. Patient will have a decrease in pain to facilitate improvement in movement, function, and ADLs as indicated by Functional Deficits.     Long Term Goals: To be achieved in: 6 weeks  1. Pt will demonstrate the ability to perform sit to/from stand with supervision. 2. Pt will ambulate 50 ft with maria alejandra-walker and min assist  3. Patient will demonstrate an increase in postural awareness and control to allow for proper functional mobility as indicated by patients Functional Deficits. 4. Patient will return to functional activities including transfers without increased symptoms or restriction. Overall Progression Towards Functional goals/ Treatment Progress Update:  [] Patient is progressing as expected towards functional goals listed. [] Progression is slowed due to complexities/Impairments listed. [] Progression has been slowed due to co-morbidities.   [x] Plan just implemented, too soon to assess goals progression <30days   [] Goals require adjustment due to lack of progress  [] Patient is not progressing as expected and requires additional

## 2021-11-08 NOTE — FLOWSHEET NOTE
[facility]  [address] [place] [address]  Phone: [phone]   Fax: [phone]      Occupational Therapy Daily Treatment Note  Date:  2021    Patient: Nishi Antonio   : 1980   MRN: [id number]  Referring Physician:    Dr. Estrella Wade MD              Medical Diagnosis Information: Nontraumatic subarachnoid hemorrhage from unspecified intracranial artery (I60.7)                                                   Insurance information:   [facility]- 30 visits/yr, PA after IE, DN not billable   Date of Injury: 2021- passed out at work  Date of Surgery: n/a    Progress Report: []  Yes  [x]  No     Date Range for reporting period:  Beginning: 10/18/21  Ending: end of POC    Progress report due (10 Rx/or 30 days whichever is less): visit #10 or     Recertification due (POC duration/ or 90 days whichever is less): visit #12 or 21    Visit # Insurance Allowable Auth required? Date Range   3/12 2/12 [x]  Yes  []  No pending     Latex Allergy:  [x]No      []Yes  Pacemaker:  [x] No       [] Yes     Preferred Language for Healthcare:   [x]English       []other:    Pain level:   2/10 left arm patient reports some tightness in arm    SUBJECTIVE:  Patient expressed difficulty with getting in/out of bed. Functional Disability Index:     Quick DASH: total score- 46, disability score- 79.55%     Will assess STREAM at next session- unable to assess on this date due to equipment not available    OBJECTIVE: See eval      RESTRICTIONS/PRECAUTIONS:  hx brain bleed, fall risk    Exercises/Interventions: Treatment focus on increase use of LUE during functional tasks increasing ROM and strength. Session initiated with patient transferring from w/c to EOM with Min A required and moderate verbal cues for set up.  Patient then transferred into supine positioning on mat table with Mod A, cueing/education provided to patient re: weight shifting onto L elbow and bringing BLE's up together. Patient then performed rotation of hips to R side while reaching for various targets with RUE on left side to decrease spasticity and improve posture, patient tolerated well requiring moderate verbal cues throughout for good understanding. Patient also performed 4 hip bridges with proprioceptive input applied at hips holding for average of ~4 seconds. Patient then transitioned to short sit holding small beach ball with both hands and reaching forward to target with therapist providing min cues posterior humerus on left to facilitate shoulder flexion and elbow extension. Patient able to reach for target and transition to stand moving over base of support. Patient then worked on holding red container with both hands due to slipping of LUE from beach ball, patient with increased ability to extend/push red container forward and follow through with standing, activity progressed to patient taking ~4 steps with LE's with OT providing stability at L knee/hip and Min A for maintaining balance. Patient does present with anxiety/fear when reaching forward and taking steps, encouragement cues provided and patient with decreased anxiety noted. Session ended with patient working on supination<>pronation of LUE with vision occluded and having RUE perform same activity, patient with slight improvement noted as activity progressed.        Therapeutic Exercises  Resistance / level Sets/sec Reps Notes                                                                                Neuromuscular Re-ed / Therapeutic Activities                                                 Manual Intervention                                                     Modalities:     Patient education:  Eval, POC, HEP- pt verbalized understanding       Home Exercise Program:  Written and verbal HEP instructions provided and reviewed:  · Scapular retraction   · Use of resting hand splint  · Will expand next session    Therapeutic Exercise and NMR:  [x] (51572) Provided verbal/tactile cueing for activities related to strengthening, flexibility, endurance, ROM  for improvements in scapular, scapulothoracic and UE control with self care, reaching, carrying, lifting, house/yardwork, driving/computer work.    [] (71663) Provided verbal/tactile cueing for activities related to improving balance, coordination, kinesthetic sense, posture, motor skill, proprioception  to assist with  scapular, scapulothoracic and UE control with self care, reaching, carrying, lifting, house/yardwork, driving/computer work.   [] Comments:    Therapeutic Activities:    [x] (47342 or 22171) Provided verbal/tactile cueing for activities related to improving balance, coordination, kinesthetic sense, posture, motor skill, proprioception and motor activation to allow for proper function of scapular, scapulothoracic and UE control with self care, carrying, lifting, driving/computer work  [] Comments:    Home Exercise Program:    [x] (01136) Reviewed/Progressed HEP activities related to strengthening, flexibility, endurance, ROM of scapular, scapulothoracic and UE control with self care, reaching, carrying, lifting, house/yardwork, driving/computer work  [] (78675) Reviewed/Progressed HEP activities related to improving balance, coordination, kinesthetic sense, posture, motor skill, proprioception of scapular, scapulothoracic and UE control with self care, reaching, carrying, lifting, house/yardwork, driving/computer work    [] Comments:    Manual Treatments:  PROM / STM / Oscillations-Mobs:  G-I, II, III, IV (PA's, Inf., Post.)  [] (83485) Provided manual therapy to mobilize soft tissue/joints of cervical/CT, scapular GHJ and UE for the purpose of modulating pain, promoting relaxation,  increasing ROM, reducing/eliminating soft tissue swelling/inflammation/restriction, improving soft tissue extensibility and allowing for proper ROM for normal function with self care, reaching, carrying, lifting, house/yardwork, driving/computer work  [] Comments:    ADL Training:  [] (16667) Provided self-care/home management training related to activities of daily living and compensatory training, and/or use of adaptive equipment   [] Comments:     Splinting:  [] Fabrication of:   [] (16260) Orthotic/Prosthetic Management, subsequent encounter  [] (76795) Orthotic management and training (fitting and assessment)  [] Comments:      Charges:  Timed Code Treatment Minutes: 55   Total Treatment Minutes: 55     [] EVAL (LOW) 44529   [] OT Re-eval (92196)  [] EVAL (MOD) 34688   [] EVAL (HIGH) 72791       [x] Dolly (97312) x  1   [] FCGBE(89955)  [x] NMR (92553) x  2 [] Estim (attended) (82289)   [] Manual ([id number]) x     [] US (20108)  [x] TA ([id number]) x  1  [] Paraffin (39549)  [] ADL  ([phone]) x    [] Splint/L code:    [] Estim (unattended) ([id number])  [] Fluidotherapy (32070)  [] Other:    GOALS:   Patient stated goal: L hand use  []? ? Progressing: []?? Met: []?? Not Met: []?? Adjusted     Therapist goals for Patient:   Short Term Goals: To be achieved in: 30 days  1. Pt will improve functional ROM to use LUE as ANDREAS during transfers for increased safety and independence by 11/19/21. [x]? ? Progressing: []?? Met: []?? Not Met: []?? Adjusted  2. Pt will improve functional LUE use to complete UB dressing mod I for increased independence in ADLs by 11/19/21. [x]? ? Progressing: []?? Met: []?? Not Met: []?? Adjusted  3. Pt will improve functional LUE use to complete LB dressing min A for increased independence in ADLs by 11/19/21. [x]? ? Progressing: []?? Met: []?? Not Met: []?? Adjusted  4. Pt will improve oculomotor and cognition skills to complete [employer] assessment within 180 seconds with no errors by 11/19/21. [x]? ? Progressing: []?? Met: []?? Not Met: []?? Adjusted     Long Term Goals: To be achieved by discharge  1.  Pt will report a QuickDASH Symptom Severity

## 2021-11-08 NOTE — FLOWSHEET NOTE
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 Dmoinick Morin MD   Plan of care signed (Y/N): No     Date of Patient follow up with Physician: DEWEY    Functional outcome measure: The Communicative Participation Item Bank was completed on 10/18/2021 Total:      Progress Note: []  Yes  [x]  No  Next due by: Visit #10     RESTRICTIONS/PRECAUTIONS: hx of brain bleed   Latex Allergy:  [x]NO      []YES    Preferred Language for Healthcare:   [x]English       []other    Visit # Insurance Allowable Date Range (if applicable)    48 units  10/       Pain level:  0/10     SUBJECTIVE:  Pt was alert and receptive to tx. She arrived to session with . Reported that she been becoming easily frustrated by her memory and her speech. 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      Exercises/Interventions:     Speech Therapy (74237) Accuracy Cues Notes Comprehension        Yes/No questions      Complex questions      1-2 step directions      Multi-step directions      Common objects/pictures      L/R discrimination      Conversation      Other:      Expression       Initiation      Repetition      Automatic speech      Confrontational      Convergent      Divergent      Responsive      Conversation      Other:              Motor Speech Therapy (74867) Accuracy Cues Notes   Apraxia       Dysarthria Pt was approx 70% intelligible to unfamiliar listener at conversational level  Min/mod cues for use of SLOB strategies to improve intelligibility  Reviewed SLOB strategy(slow, loud, over-articulate, breath support)    Intelligibility      Other:          Cognitive Function (52735 & 92310) Accuracy Cues Notes   Orientation        Attention           Sustained         Selective         Alternating         Divided      Memory          Immediate/Working         Short term         Long term         Prospective      Problem Solving       Visuospatial       Executive Function       Other:              Dysphagia Therapy (91172) Strategies Tolerance Notes   Diet texture:      Regular      Dysphagia III/Soft & Bite-Sized      Dysphagia II/Minced & Moist      Dysphagia I/Puree      Liquid Consistency: Thin      Nectar thick/Mildly thick      Honey thick/Moderately thick      Ice chips       Reps Cue level    Oral Phase Exercises:             Exercises: Pt instructed and trained in laryngeal/pharyngeal strengthening exercises including:      []Shari maneuver      []Effortful swallows      []Supraglottic swallows      []Mendelsohn maneuver      []Modified Shaker (sitting up chin to chest)            Bolus Control Exercises:       []Lateralizations      []A-P propulsions       []Diagonal A-P propulsions      []L/R A-P propulsion      []Neuromuscular electrical stimulation (VitalStim)     Pt.  Education:  -Pt educated on diagnosis, prognosis and expectations for rehab  -All pt alternating) with 80% accuracy given minimal cues to improve functional task completion. []? Progressing: []? Met: []? Not Met: []? Adjusted  4. Patient will participate in further cognitive-linguistic evaluation with additional goals as indicated. []? Progressing: [x]? Met: []? Not Met: []? Adjusted  5. Patient will tolerate recommended diet with no overt s/s of aspiration or noticeable fatigue. []? Progressing: []? Met: []? Not Met: []? Adjusted  6. Patient will complete oral motor and bolus control exercises with 80% accuracy independently to improve oral phase of swallow. []? Progressing: []? Met: []? Not Met: []? Adjusted     Long Term Goals: To be achieved in: 6 weeks  1. Patient will demonstrate improved cognitive-linguistic function to improve safety and independence in ADLs.  []? Progressing: []? Met: []? Not Met: []? Adjusted  2. Patient will utilize speech intelligibility strategies to increase intelligibility to greater than 90% in conversation to a familiar/unfamiliar listener. []? Progressing: []? Met: []? Not Met: []? Adjusted  3. Patient will tolerate least restrictive diet with no overt clinical s/s of aspiration/penetration. []? Progressing: []? Met: []? Not Met: []? Adjusted         Progression Towards Functional goals:  [] Patient is progressing as expected towards functional goals listed. [] Progression is slowed due to complexities listed. [] Progression has been slowed due to co-morbidities.   [x] Plan just implemented, too soon to assess goals progression  [] Other:     Persisting Functional Limitations/Impairments:  []Attention []Word Finding       []Memory [x]Speech Intelligibility      [x]Executive Function []Diet Tolerance     []Problem Solving []Coughing/Choking with PO  []Expressive Language []Medication/Finance Management  []Receptive Language []Other:      ASSESSMENT:  See eval  Treatment/Activity Tolerance:  [x] Patient able to complete tx [] Patient limited by fatigue  [] Patient limited by pain  [] Patient limited by other medical complications  [] Other:     Prognosis: [x] Good [] Fair  [] Poor    Patient Requires Follow-up: [x] Yes  [] No    PLAN: See eval. ST 2x / week for 6 weeks. [] Continue per plan of care [] Alter current plan (see comments)  [x] Plan of care initiated [] Hold pending MD visit [] Discharge    Electronically signed by:   Naseem Wharton MA [facility]  Speech Language Pathologist      Note: If patient does not return for scheduled/ recommended follow up visits, this note will serve as a discharge from care along with most recent update on progress.

## 2021-11-11 ENCOUNTER — HOSPITAL ENCOUNTER (OUTPATIENT)
Dept: SPEECH THERAPY | Age: 41
Setting detail: THERAPIES SERIES
Discharge: HOME | End: 2021-11-11
Payer: COMMERCIAL

## 2021-11-11 ENCOUNTER — HOSPITAL ENCOUNTER (OUTPATIENT)
Dept: PHYSICAL THERAPY | Age: 41
Setting detail: THERAPIES SERIES
Discharge: HOME | End: 2021-11-11
Payer: COMMERCIAL

## 2021-11-11 PROCEDURE — 97112 NEUROMUSCULAR REEDUCATION: CPT

## 2021-11-11 PROCEDURE — 97116 GAIT TRAINING THERAPY: CPT

## 2021-11-11 PROCEDURE — 97530 THERAPEUTIC ACTIVITIES: CPT

## 2021-11-11 PROCEDURE — 92526 ORAL FUNCTION THERAPY: CPT

## 2021-11-11 PROCEDURE — 92507 TX SP LANG VOICE COMM INDIV: CPT

## 2021-11-11 NOTE — FLOWSHEET NOTE
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  · Treatment Diagnosis: functional limitations secondary to brain bleed  Insurance/Certification information:  PT Insurance Information: [employer] - 30 visits per year; preauth required  Physician Information:  Referring Practitioner: Izabel Langston MD  Plan of care signed (Y/N): faxed 21    Date of Patient follow up with Physician:     Functional scale: Optimal:  raw score = 81; dysfunction = 80%    Progress Report: []  Yes  [x]  No     Date Range for reporting period:  Beginning   Ending    Progress report due (10 Rx/or 30 days whichever is less): visit #10 or     Recertification due (POC duration/ or 90 days whichever is less): visit #12    Visit # Insurance Allowable Auth required? Date Range   10/24 96 units w/OT  48 units each discipline [x]  Yes  []  No   12/10/21          Latex Allergy:  [x]NO      []YES  Preferred Language for Healthcare:   [x]English       []other:      Pain level:  10 soreness L side    SUBJECTIVE:    More frustrated today that she's not progressing quickly. --Discussed improvements in gait pattern and transfer status since beginning PT and to continue to work at home on balance transfers and gait. OBJECTIVE:   : pt not wearing AFO today     10/14:  To clinic in wheelchair with soft AFO on LLE; received script for OT and speech this date and pt is now scheduled for all 3 therapies      RESTRICTIONS/PRECAUTIONS: h/o aneurysm    Exercises/Interventions:   Therapeutic Exercises (76551) Resistance / level Sets/sec Reps Notes   Nu step / step one  3 / 1   No L UE today due to soreness, VCs for pulling L knee into midline    Seated without AFO:  Hip flexion  LAQ  Hip abd  bridging             Seated in w/c w/B feet placed on mat #6  -hip ext            -frequent VCs to encourage L hip extensor activation                                             Therapeutic Activities (05540)       Sit to stand transitions from mat #6  -Man & VC's to reach forward w/BUEs   x5 -instructed pt to use B outstretched UEs to improve 'nose over toes'   Stand pivot w/SBQC w/c to mat table #6    Max A   Donned L shoe, shoe horn required Total A                    STS mod mat table with maria alejandra-walker    Once standing added weight shifting M/L    One trial added L LE stepping (minimal success without AFO)     Transfer onto step one    [facility]    Pt has difficulty with problem solving and motor planning   VCs for watching where L foot is positioned    Transfer from [facility] to [facility]  Transfer from [facility] to [facility]   Stand pivot to R  Sit pivot to R - attempted L but too scared                                       Neuromuscular Re-ed (68341)              L biceps tendon pressure to improve elbow extension and hand/wrist placment              EOM:  Scooting:  -Out to edge  -L laterally  -R laterally            -able to scoot out w/pelvis in midline p instruction  -max cues for correct technique and encouragement  -good form, required mod A for LLE positioning   Rolling in bed R and L    Min A for placement and sequencing    bridge   CGA, VCs for hip adduction    Hip add in hooklying   TCs for target   Heel slides AAROM to AROM   AAROM to begin, but slowly gained more motion and power, responds well to cues for power          Gait Training:  Foot in front then behind w/maria alejandra walker      Amb w/QC        Amb pushing weight lymph cart;  purple TB for DF and knee flexion assist    Amb w/hemiwalker Mod AMod AMax x 2         Mod Ax2 R60', 20'          11/11:  Onto blue wedge      -purple TB for L ankle DF & knee flex assist; was able to increase B step length w/2nd gait        facilitation with tband at L LE, Max A for placing L foot as pt puling forward, VCs for sequencing, block to L knee and to cart to avoid pt pulling back     -purple TB for L ankle DF & knee flex assist   Weight shifts  -lateral   -foot onto box, lean onto foot, then return        -max man cues for correct posture and shifting  -max cues for improved weight-shifting & VCs to reduce speed & improve control   Amb bwds w/cart                                Manual Intervention (18891)       Stretches:  Seated hamstring stretch                                                Modalities: none    Pt. Education:   11/9:  Pt requested order for hemiwalker, feels like it would improve her fear and ability to walk. Discussed increased support it provides and less LE & core muscle activation that is required to use hemiwalker, potentially becoming a step backwards and increasing her dependency on AD. Did discuss importance of continued walking and activity at home and if pt is not currently walking at home due to amount of work/effort required to amb w/SBQC, to obtain a hemiwalker so pt is more likely to return to typical home activities. Discussed nearby medical supply stores and to contact PCP and have order sent to them.  and pt reported understanding. 10/14:  Verbal cues for proper transfer technique and safety  -patient educated on diagnosis, prognosis and expectations for rehab  -all patient questions were answered    HEP instruction: 10/14:  Discussed safety on transfers. 10/18: Instructed pt to perform standing lateral weight-shifts hourly in front of chair, with a family member on L side as needed.     Therapeutic Exercise and NMR EXR  [] (90944) Provided verbal/tactile cueing for activities related to strengthening, flexibility, endurance, ROM for improvements in  [] LE / Lumbar: LE, proximal hip, and core control with self care, mobility, lifting, ambulation. [] UE / Cervical: cervical, postural, scapular, scapulothoracic and UE control with self care, reaching, carrying, lifting, house/yardwork, driving, computer work. [x] (00262) Provided verbal/tactile cueing for activities related to improving balance, coordination, kinesthetic sense, posture, motor skill, proprioception to assist with   [x] LE / lumbar: LE, proximal hip, and core control in self care, mobility, lifting, ambulation and eccentric single leg control. [x] UE / cervical: cervical, scapular, scapulothoracic and UE control with self care, reaching, carrying, lifting, house/yardwork, driving, computer work.   [] (68634) Therapist is in constant attendance of 2 or more patients providing skilled therapy interventions, but not providing any significant amount of measurable one-on-one time to either patient, for improvements in  [] LE / lumbar: LE, proximal hip, and core control in self care, mobility, lifting, ambulation and eccentric single leg control. [] UE / cervical: cervical, scapular, scapulothoracic and UE control with self care, reaching, carrying, lifting, house/yardwork, driving, computer work. NMR and Therapeutic Activities:    [x] (25272 or 62581) Provided verbal/tactile cueing for activities related to improving balance, coordination, kinesthetic sense, posture, motor skill, proprioception and motor activation to allow for proper function of   [x] LE: / Lumbar core, proximal hip and LE with self care and ADLs  [x] UE / Cervical: cervical, postural, scapular, scapulothoracic and UE control with self care, carrying, lifting, driving, computer work.    [x] (52413) Gait Re-education- Provided training and instruction to the patient for proper LE, core and proximal hip recruitment and positioning and eccentric body weight control with ambulation re-education including up and down stairs     Home Management Training / Self Care:  [] (30154) Provided self-care/home management training related to activities of daily living and compensatory training, and/or use of adaptive equipment for improvement with: ADLs and compensatory training, meal preparation, safety procedures and instruction in use of adaptive equipment, including bathing, grooming, dressing, personal hygiene, basic household cleaning and chores. Home Exercise Program:    [] (95897) Reviewed/Progressed HEP activities related to strengthening, flexibility, endurance, ROM of   [] LE / Lumbar: core, proximal hip and LE for functional self-care, mobility, lifting and ambulation/stair navigation   [] UE / Cervical: cervical, postural, scapular, scapulothoracic and UE control with self care, reaching, carrying, lifting, house/yardwork, driving, computer work  [] (63970)Reviewed/Progressed HEP activities related to improving balance, coordination, kinesthetic sense, posture, motor skill, proprioception of   [] LE: core, proximal hip and LE for self care, mobility, lifting, and ambulation/stair navigation    [] UE / Cervical: cervical, postural,  scapular, scapulothoracic and UE control with self care, reaching, carrying, lifting, house/yardwork, driving, computer work    Manual Treatments:  PROM / STM / Oscillations-Mobs:  G-I, II, III, IV (PA's, Inf., Post.)  [] (66501) Provided manual therapy to mobilize LE, proximal hip and/or LS spine soft tissue/joints for the purpose of modulating pain, promoting relaxation,  increasing ROM, reducing/eliminating soft tissue swelling/inflammation/restriction, improving soft tissue extensibility and allowing for proper ROM for normal function with   [] LE / lumbar: self care, mobility, lifting and ambulation. [] UE / Cervical: self care, reaching, carrying, lifting, house/yardwork, driving, computer work.      Modalities:  [] (21135) Vasopneumatic compression: Utilized vasopneumatic compression to decrease edema / swelling for the purpose of improving mobility and quad tone / recruitment which will allow for increased overall function including but not limited to self-care, transfers, ambulation, and ascending / descending stairs. Charges:  Timed Code Treatment Minutes: 45   Total Treatment Minutes: 45     Units approved Units used Date Range    48 23 12/10/21   **updated 11/11**    [] EVAL - LOW (11488)   [] EVAL - MOD (63737)  [] EVAL - HIGH (38294)  [] RE-EVAL (37993)  [] LI(50645) x       [] Ionto  [x] NMR (60708) x 1      [] Vaso  [] Manual (79609) x       [] Ultrasound  [x] TA x  1     [] Mech Traction (42566)  [] Aquatic Therapy x      [] ES (un) (49807):   [] Home Management Training x  [] ES(attended) (99572)   [] Dry Needling 1-2 muscles (06676):  [] Dry Needling 3+ muscles (554942  [] Group:      [x] Other: gait x 1    GOALS:   Patient stated goal: To return to as normal activity as possible.       Therapist goals for Patient:   Short Term Goals: To be achieved in: 2 weeks  1. Independent in HEP and progression per patient tolerance, in order to prevent re-injury. 2. Patient will have a decrease in pain to facilitate improvement in movement, function, and ADLs as indicated by Functional Deficits.     Long Term Goals: To be achieved in: 6 weeks  1. Pt will demonstrate the ability to perform sit to/from stand with supervision. 2. Pt will ambulate 50 ft with maria alejandra-walker and min assist  3. Patient will demonstrate an increase in postural awareness and control to allow for proper functional mobility as indicated by patients Functional Deficits. 4. Patient will return to functional activities including transfers without increased symptoms or restriction. Overall Progression Towards Functional goals/ Treatment Progress Update:  [] Patient is progressing as expected towards functional goals listed. [] Progression is slowed due to complexities/Impairments listed. [] Progression has been slowed due to co-morbidities.   [x] Plan just implemented, too soon to assess goals progression <30days   [] Goals require adjustment due to lack of progress  [] Patient is not progressing as expected and requires additional follow up with physician  [] Other    Persisting Functional Limitations/Impairments:  []Sleeping []Sitting              [x]Standing []Transfers        [x]Walking []Kneeling              []Stairs [x]Squatting / bending   [x]ADLs [x]Reaching  []Lifting  [x]Housework  []Driving [x]Job related tasks  [x]Sports/Recreation []Other:        ASSESSMENT:     Initiation of L swing phase continues to improve, but anterior translation of hips & weight during R stance phase is limited, increasing difficulty during gait. Pt does become distracted from task easily and regress back to old habits, however when pt is focused and understands task at hand, is able to activate B core and hip musculature. **Perform functional tests next session**    Treatment/Activity Tolerance:  [x] Patient able to complete tx  [] Patient limited by fatigue  [] Patient limited by pain  [] Patient limited by other medical complications  [] Other:     Prognosis: [x] Good [] Fair  [] Poor    Patient Requires Follow-up: [x] Yes  [] No    Plan for next treatment session:  per training diary and progress as tolerated. PLAN: See eval. PT 2x / week for 6 weeks. [x] Continue per plan of care [] Alter current plan (see comments)  [] Plan of care initiated [] Hold pending MD visit [] Discharge    Electronically signed by: Daniella Bustamante PT, DPT    Note: If patient does not return for scheduled/ recommended follow up visits, this note will serve as a discharge from care along with most recent update on progress.

## 2021-11-11 NOTE — FLOWSHEET NOTE
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 Julio C Lipscomb MD   Plan of care signed (Y/N): N (sent 10/18)    Date of Patient follow up with Physician: DEWEY    Functional outcome measure: The Communicative Participation Item Bank Total:      Progress Note: []  Yes  [x]  No  Next due by: Visit #10 or 2021    RESTRICTIONS/PRECAUTIONS: hx of brain bleed   Latex Allergy:  [x]NO      []YES    Preferred Language for Healthcare:   [x]English       []other    Visit # Insurance Allowable Date Range (if applicable)    48 units  10/   Correct from previous session    Pain level:  0/10     SUBJECTIVE:  Pt was alert and receptive,  present during session. Pt tearful at beginning of session, reports feeling frustrated but motivated for therapy.     OBJECTIVE: See POC    Exercises/Interventions:   Speech Therapy (62320) Accuracy Cues Notes   Comprehension        Yes/No questions      Complex questions      1-2 step directions      Multi-step directions      Common objects/pictures      L/R discrimination      Conversation      Other:      Expression       Initiation      Repetition      Automatic speech      Confrontational      Convergent      Divergent      Responsive      Conversation      Other:          Motor Speech Therapy (89228) Accuracy Cues Notes   Apraxia       Dysarthria SLOB training with one, two, and three-syllable words with 85% accuracy Increased to 100% given min verbal cues for overarticulation  Written handout provided   Intelligibility      Other:          Cognitive Function (45935 & 58282) Accuracy Cues Notes   Orientation        Attention           Sustained Education and training for visual attention strategies (visual and tactile cues for finding borders/edges)        Selective Visual selective attention task with 75% accuracy (12/16) Mod-max visual cues for scanning Left-sided neglect, extended time to complete      Alternating         Divided      Memory  Education and training for WRAP memory strategies        Immediate/Working Pt generated simple associations for 3 related words with 100% accuracy  Min verbal cues Discussion of functional implementation of memory strategies in home environment      Short term         Long term         Prospective      Problem Solving       Visuospatial       Executive Function       Other:          Dysphagia Therapy (35808) Strategies Tolerance Notes   Diet texture:      Regular      Dysphagia III/Soft & Bite-Sized      Dysphagia II/Minced & Moist      Dysphagia I/Puree      Liquid Consistency:       Thin      Nectar thick/Mildly thick      Honey thick/Moderately thick      Ice chips       Reps Cue level    Oral Phase Exercises:       Bolus Control Exercises      [x]Lateralizations 10 reps Mod fading cues Mildly reduced coordination   [x]A-P propulsions 10 reps Mod fading cues    []Diagonal A-P propulsions      []L/R A-P propulsion      Buccal and Labial Exercises Labial closure with resistance (straw and pudding) x 20 Min fading cues    Lingual Resistance Exercises Elevation x 10  Lateral x 10 each side Mod cues Reduced resistance from pt   Exercises: Pt instructed and trained in laryngeal/pharyngeal strengthening exercises including:      []Shari maneuver      []Effortful swallows      []Supraglottic swallows      []Mendelsohn maneuver      []Modified Shaker (sitting up chin to chest)      []Neuromuscular electrical stimulation (VitalStim) to target self-monitoring. []? Progressing: []? Met: []? Not Met: []? Adjusted  3. Patient will complete complex attention tasks (sustained, selective, alternating) with 80% accuracy given minimal cues to improve functional task completion. []? Progressing: []? Met: []? Not Met: []? Adjusted  4. Patient will participate in further cognitive-linguistic evaluation with additional goals as indicated. []? Progressing: [x]? Met: []? Not Met: []? Adjusted  5. Patient will tolerate recommended diet with no overt s/s of aspiration or noticeable fatigue. []? Progressing: []? Met: []? Not Met: []? Adjusted  6. Patient will complete oral motor and bolus control exercises with 80% accuracy independently to improve oral phase of swallow. []? Progressing: []? Met: []? Not Met: []? Adjusted     Long Term Goals: To be achieved in: 6 weeks  1. Patient will demonstrate improved cognitive-linguistic function to improve safety and independence in ADLs.  []? Progressing: []? Met: []? Not Met: []? Adjusted  2. Patient will utilize speech intelligibility strategies to increase intelligibility to greater than 90% in conversation to a familiar/unfamiliar listener. []? Progressing: []? Met: []? Not Met: []? Adjusted  3. Patient will tolerate least restrictive diet with no overt clinical s/s of aspiration/penetration. []? Progressing: []? Met: []? Not Met: []? Adjusted         Progression Towards Functional goals:  [] Patient is progressing as expected towards functional goals listed. [] Progression is slowed due to complexities listed. [] Progression has been slowed due to co-morbidities.   [x] Plan just implemented, too soon to assess goals progression  [] Other:     Persisting Functional Limitations/Impairments:  [x]Attention []Word Finding       [x]Memory [x]Speech Intelligibility      [x]Executive Function [x]Diet Tolerance     []Problem Solving []Coughing/Choking with PO  []Expressive Language []Medication/Finance Management  [x]Receptive Language []Other:      ASSESSMENT:  See eval  Treatment/Activity Tolerance:  [x] Patient able to complete tx  [] Patient limited by fatigue  [] Patient limited by pain  [] Patient limited by other medical complications  [] Other:     Prognosis: [x] Good [] Fair  [] Poor    Patient Requires Follow-up: [x] Yes  [] No    PLAN: See eval. ST 2x / week for 6 weeks. [x] Continue per plan of care [] Alter current plan (see comments)  [] Plan of care initiated [] Hold pending MD visit [] Discharge    Electronically signed by:   Christopher Mustafa MA CCC-SLP  Speech-Language Pathologist  SP. [id number]       Note: If patient does not return for scheduled/ recommended follow up visits, this note will serve as a discharge from care along with most recent update on progress.

## 2021-11-15 ENCOUNTER — HOSPITAL ENCOUNTER (OUTPATIENT)
Dept: PHYSICAL THERAPY | Age: 41
Setting detail: THERAPIES SERIES
Discharge: HOME | End: 2021-11-15
Payer: COMMERCIAL

## 2021-11-15 ENCOUNTER — HOSPITAL ENCOUNTER (OUTPATIENT)
Dept: SPEECH THERAPY | Age: 41
Setting detail: THERAPIES SERIES
Discharge: HOME | End: 2021-11-15
Payer: COMMERCIAL

## 2021-11-15 ENCOUNTER — HOSPITAL ENCOUNTER (OUTPATIENT)
Dept: OCCUPATIONAL THERAPY | Age: 41
Setting detail: THERAPIES SERIES
Discharge: HOME | End: 2021-11-15
Payer: COMMERCIAL

## 2021-11-15 PROCEDURE — 97116 GAIT TRAINING THERAPY: CPT

## 2021-11-15 PROCEDURE — 97530 THERAPEUTIC ACTIVITIES: CPT

## 2021-11-15 PROCEDURE — 97112 NEUROMUSCULAR REEDUCATION: CPT

## 2021-11-15 PROCEDURE — 97140 MANUAL THERAPY 1/> REGIONS: CPT

## 2021-11-15 NOTE — FLOWSHEET NOTE
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  · Treatment Diagnosis: functional limitations secondary to brain bleed  Insurance/Certification information:  PT Insurance Information: [employer] - 30 visits per year; preauth required  Physician Information:  Referring Practitioner: Issa Gonsalez MD  Plan of care signed (Y/N): faxed 21    Date of Patient follow up with Physician:     Functional scale: Optimal:  raw score = 81; dysfunction = 80%    Progress Report: []  Yes  [x]  No     Date Range for reporting period:  Beginning   Ending 11/15    Progress report due (10 Rx/or 30 days whichever is less): visit #10 or //43    Recertification due (POC duration/ or 90 days whichever is less): visit #12    Visit # Insurance Allowable Auth required? Date Range    96 units w/OT  48 units each discipline [x]  Yes  []  No   12/10/21          Latex Allergy:  [x]NO      []YES  Preferred Language for Healthcare:   [x]English       []other:      Pain level:  2-3/10 L wrist    SUBJECTIVE:     Is only having some pain in L wrist today, had OT an hour ago and they helped to improve her pain. OBJECTIVE:    11/15:    30 sec sit to stand transitions: 5  Gait Pattern:  W/SBQC, L toe drag, flexed L knee throughout stance phase, weight shifted onto RLE, frequently hops over LLE;  Able to initiate L hip flex, toe drag minimizes swing phase, DF assist does help but not consistently alleviate toe drag. Forward pitched posture;  Frequently lifts and moves R foot and cane. : pt not wearing AFO today     10/14:  To clinic in wheelchair with soft AFO on LLE; received script for OT and speech this date and pt is now scheduled for all 3 therapies      RESTRICTIONS/PRECAUTIONS: h/o aneurysm    Exercises/Interventions:   Therapeutic Exercises (52666) Resistance / level Sets/sec Reps Notes   Nu step / step one  3 / 1.0  X5 min LEs only w/man facilitation to lateral L knee joint for improved hip alignment    Seated without AFO:  Hip flexion  LAQ  Hip abd  bridging             Seated in w/c w/B feet placed on mat #6  -hip ext            -frequent VCs to encourage L hip extensor activation                                             Therapeutic Activities (74283)       Sit to stand transitions from mat #6  -Man & VC's to reach forward w/BUEs    -instructed pt to use B outstretched UEs to improve 'nose over toes'   Stand pivot w/SBQC w/c to mat table #6    Max A   Donned L shoe, shoe horn required Total A                    STS mod mat table with maria alejandra-walker    Once standing added weight shifting M/L    One trial added L LE stepping (minimal success without AFO)     Transfer onto step one    Transfer off step one to [place]    Pt has difficulty with problem solving and motor planning   VCs for watching where L foot is positioned    Transfer from [facility] to [facility]  Transfer from [facility] to [facility]   Stand pivot to R  Sit pivot to R - attempted L but too scared                                       Neuromuscular Re-ed (37472)              L biceps tendon pressure to improve elbow extension and hand/wrist placment              EOM:  Scooting:  -Out to edge  -L laterally  -R laterally            -able to scoot out w/pelvis in midline p instruction  -max cues for correct technique and encouragement  -good form, required mod A for LLE positioning   Rolling in bed R and L    Min A for placement and sequencing    bridge   CGA, VCs for hip adduction    Hip add in hooklying   TCs for target   Heel slides AAROM to AROM   AAROM to begin, but slowly gained more motion and power, responds well to cues for power Gait Training:  Foot in front then behind w/maria alejandra walker      Amb w/QC  Amb w/QC        Amb pushing weight lymph cart;  purple TB for DF and knee flexion assist    Amb w/hemiwalker Mod AMod/max AMod AMax x 2         Mod A30'  50'          11/11:  Onto blue wedge      -LLE frequently crossed midline  -purple TB for L ankle DF & knee flex assist; was able to increase B step length w/2nd gait        facilitation with tband at L LE, Max A for placing L foot as pt puling forward, VCs for sequencing, block to L knee and to cart to avoid pt pulling back     -purple TB for L ankle DF & knee flex assist   Weight shifts  -lateral   -foot onto box, lean onto foot, then return        -max man cues for correct posture and shifting  -max cues for improved weight-shifting & VCs to reduce speed & improve control   Amb bwds w/cart                                Manual Intervention (17406)       Stretches:  Seated hamstring stretch                                                Modalities: none    Pt. Education:   11/9:  Pt requested order for hemiwalker, feels like it would improve her fear and ability to walk. Discussed increased support it provides and less LE & core muscle activation that is required to use hemiwalker, potentially becoming a step backwards and increasing her dependency on AD. Did discuss importance of continued walking and activity at home and if pt is not currently walking at home due to amount of work/effort required to amb w/SBQC, to obtain a hemiwalker so pt is more likely to return to typical home activities. Discussed nearby medical supply stores and to contact PCP and have order sent to them.  and pt reported understanding. 10/14:  Verbal cues for proper transfer technique and safety  -patient educated on diagnosis, prognosis and expectations for rehab  -all patient questions were answered    HEP instruction: 10/14:  Discussed safety on transfers. 10/18:   Instructed pt to perform standing lateral weight-shifts hourly in front of chair, with a family member on L side as needed. Therapeutic Exercise and NMR EXR  [] (44550) Provided verbal/tactile cueing for activities related to strengthening, flexibility, endurance, ROM for improvements in  [] LE / Lumbar: LE, proximal hip, and core control with self care, mobility, lifting, ambulation. [] UE / Cervical: cervical, postural, scapular, scapulothoracic and UE control with self care, reaching, carrying, lifting, house/yardwork, driving, computer work. [x] (43312) Provided verbal/tactile cueing for activities related to improving balance, coordination, kinesthetic sense, posture, motor skill, proprioception to assist with   [x] LE / lumbar: LE, proximal hip, and core control in self care, mobility, lifting, ambulation and eccentric single leg control. [x] UE / cervical: cervical, scapular, scapulothoracic and UE control with self care, reaching, carrying, lifting, house/yardwork, driving, computer work.   [] (70468) Therapist is in constant attendance of 2 or more patients providing skilled therapy interventions, but not providing any significant amount of measurable one-on-one time to either patient, for improvements in  [] LE / lumbar: LE, proximal hip, and core control in self care, mobility, lifting, ambulation and eccentric single leg control. [] UE / cervical: cervical, scapular, scapulothoracic and UE control with self care, reaching, carrying, lifting, house/yardwork, driving, computer work.      NMR and Therapeutic Activities:    [x] (46865 or 83498) Provided verbal/tactile cueing for activities related to improving balance, coordination, kinesthetic sense, posture, motor skill, proprioception and motor activation to allow for proper function of   [x] LE: / Lumbar core, proximal hip and LE with self care and ADLs  [x] UE / Cervical: cervical, postural, scapular, scapulothoracic and UE control with self care, carrying, lifting, driving, computer work. [x] (93923) Gait Re-education- Provided training and instruction to the patient for proper LE, core and proximal hip recruitment and positioning and eccentric body weight control with ambulation re-education including up and down stairs     Home Management Training / Self Care:  [] (68319) Provided self-care/home management training related to activities of daily living and compensatory training, and/or use of adaptive equipment for improvement with: ADLs and compensatory training, meal preparation, safety procedures and instruction in use of adaptive equipment, including bathing, grooming, dressing, personal hygiene, basic household cleaning and chores.      Home Exercise Program:    [] (83218) Reviewed/Progressed HEP activities related to strengthening, flexibility, endurance, ROM of   [] LE / Lumbar: core, proximal hip and LE for functional self-care, mobility, lifting and ambulation/stair navigation   [] UE / Cervical: cervical, postural, scapular, scapulothoracic and UE control with self care, reaching, carrying, lifting, house/yardwork, driving, computer work  [] (87374)Reviewed/Progressed HEP activities related to improving balance, coordination, kinesthetic sense, posture, motor skill, proprioception of   [] LE: core, proximal hip and LE for self care, mobility, lifting, and ambulation/stair navigation    [] UE / Cervical: cervical, postural,  scapular, scapulothoracic and UE control with self care, reaching, carrying, lifting, house/yardwork, driving, computer work    Manual Treatments:  PROM / STM / Oscillations-Mobs:  G-I, II, III, IV (PA's, Inf., Post.)  [] (23198) Provided manual therapy to mobilize LE, proximal hip and/or LS spine soft tissue/joints for the purpose of modulating pain, promoting relaxation,  increasing ROM, reducing/eliminating soft tissue swelling/inflammation/restriction, improving soft tissue extensibility and allowing for proper ROM for normal function with onto RLE  4. Patient will return to functional activities including transfers without increased symptoms or restriction.  - Not Met    Overall Progression Towards Functional goals/ Treatment Progress Update:  [] Patient is progressing as expected towards functional goals listed. [] Progression is slowed due to complexities/Impairments listed. [x] Progression has been slowed due to co-morbidities. [] Plan just implemented, too soon to assess goals progression <30days   [] Goals require adjustment due to lack of progress  [] Patient is not progressing as expected and requires additional follow up with physician  [] Other    Persisting Functional Limitations/Impairments:  []Sleeping []Sitting              [x]Standing []Transfers        [x]Walking []Kneeling              []Stairs [x]Squatting / bending   [x]ADLs [x]Reaching  []Lifting  [x]Housework  []Driving [x]Job related tasks  [x]Sports/Recreation []Other:        ASSESSMENT:    Patient is progressing functionality, however imbalance & impaired proprioception & coordination of LLE remain. Pt is fearfu and apprehensive to shifting weight onto LLE during gait and functional tasks, limiting additional gains at this time. Pt may benefit from AFO that would fit into her shoe as she is consistently demonstrating L hip flex to initiate swing phase and  plans to look into current AFO for flexibility, maybe exterior can be removed to fit into shoe. Pt can continue to benefit from PT services to improve LLE proprioception & coordination, L kinetic chain to return to PLOF. Treatment/Activity Tolerance:  [x] Patient able to complete tx  [] Patient limited by fatigue  [] Patient limited by pain  [] Patient limited by other medical complications  [] Other:     Prognosis: [x] Good [] Fair  [] Poor    Patient Requires Follow-up: [x] Yes  [] No    Plan for next treatment session:  per training diary and progress as tolerated.       PLAN: See eval. PT 2x / week for 6 weeks. [x] Continue per plan of care [] Alter current plan (see comments)  [] Plan of care initiated [] Hold pending MD visit [] Discharge    Electronically signed by: Krissy Newman PT, DPT    Note: If patient does not return for scheduled/ recommended follow up visits, this note will serve as a discharge from care along with most recent update on progress.

## 2021-11-15 NOTE — FLOWSHEET NOTE
[address] [facility]  [address] [place] [address], [address] [place] [address] Drive  Phone: [phone]   Fax: [phone]      Occupational Therapy Daily Treatment Note  Date:  11/15/2021    Patient: Kris Doshi   : 1980   MRN: [id number]  Referring Physician:    Dr. 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 Date Range    [x]  Yes  []  No pending     Latex Allergy:  [x]No      []Yes  Pacemaker:  [x] No       [] Yes     Preferred Language for Healthcare:   [x]English       []other:    Pain level:   2/10 left arm patient reports some tightness in arm    SUBJECTIVE:  Patient expressed difficulty with getting in/out of bed, on/off commode, and in/out of car. Functional Disability Index:     Quick DASH: total score- 46, disability score- 79.55%     Will assess STREAM at next session- unable to assess on this date due to equipment not available    OBJECTIVE: See eval      RESTRICTIONS/PRECAUTIONS:  hx brain bleed, fall risk    Exercises/Interventions: Treatment focused on increase use of LUE during functional tasks increasing ROM and strength. Session initiated with pt completing stand pivot tansfer from w/c>edge of mat with CGA.  Pt then received MLD on LUE to decrease swelling followed by manual stretch on LUE to decrease spacticity and Exercises  Resistance / level Sets/sec Reps Notes                                                                                Neuromuscular Re-ed / Therapeutic Activities                                                 Manual Intervention                                                     Modalities:     Patient education:  Eval, POC, HEP- pt verbalized understanding       Home Exercise Program:  Written and verbal HEP instructions provided and reviewed:  · Scapular retraction   · Use of resting hand splint  · Will expand next session  · Mirror therapy at home    Therapeutic Exercise and NMR:  [x] (94269) Provided verbal/tactile cueing for activities related to strengthening, flexibility, endurance, ROM  for improvements in scapular, scapulothoracic and UE control with self care, reaching, carrying, lifting, house/yardwork, driving/computer work.    [] (08766) Provided verbal/tactile cueing for activities related to improving balance, coordination, kinesthetic sense, posture, motor skill, proprioception  to assist with  scapular, scapulothoracic and UE control with self care, reaching, carrying, lifting, house/yardwork, driving/computer work.   [] Comments:    Therapeutic Activities:    [x] (92827 or 72828) Provided verbal/tactile cueing for activities related to improving balance, coordination, kinesthetic sense, posture, motor skill, proprioception and motor activation to allow for proper function of scapular, scapulothoracic and UE control with self care, carrying, lifting, driving/computer work  [] Comments:    Home Exercise Program:    [x] (20423) Reviewed/Progressed HEP activities related to strengthening, flexibility, endurance, ROM of scapular, scapulothoracic and UE control with self care, reaching, carrying, lifting, house/yardwork, driving/computer work  [] (18369) Reviewed/Progressed HEP activities related to improving balance, coordination, kinesthetic sense, posture, motor skill, proprioception of scapular, scapulothoracic and UE control with self care, reaching, carrying, lifting, house/yardwork, driving/computer work    [] Comments:    Manual Treatments:  PROM / STM / Oscillations-Mobs:  G-I, II, III, IV (PA's, Inf., Post.)  [x] (11466) Provided manual therapy to mobilize soft tissue/joints of cervical/CT, scapular GHJ and UE for the purpose of modulating pain, promoting relaxation,  increasing ROM, reducing/eliminating soft tissue swelling/inflammation/restriction, improving soft tissue extensibility and allowing for proper ROM for normal function with self care, reaching, carrying, lifting, house/yardwork, driving/computer work  [] Comments:    ADL Training:  [] (41982) Provided self-care/home management training related to activities of daily living and compensatory training, and/or use of adaptive equipment   [] Comments:     Splinting:  [] Fabrication of:   [] (02427) Orthotic/Prosthetic Management, subsequent encounter  [] (28935) Orthotic management and training (fitting and assessment)  [] Comments:      Charges:  Timed Code Treatment Minutes: 45   Total Treatment Minutes: 45     [] EVAL (LOW) 00592   [] OT Re-eval (70388)  [] EVAL (MOD) 81610   [] EVAL (HIGH) 67359       [] Dolly (86725) x     [] EDGHW(55428)  [x] NMR (61303) x  2 [] Estim (attended) (66234)   [x] Manual ([id number]) x1     [] US (23801)  [] TA (52554) x    [] Paraffin (19679)  [] ADL  ([phone]) x    [] Splint/L code:    [] Estim (unattended) ([id number])  [] Fluidotherapy (66866)  [] Other:    GOALS:   Patient stated goal: L hand use  [] Progressing: [] Met: [] Not Met: [] Adjusted     Therapist goals for Patient:   Short Term Goals: To be achieved in: 30 days  1. Pt will improve functional ROM to use LUE as ANDREAS during transfers for increased safety and independence by 11/19/21. [x] Progressing: [] Met: [] Not Met: [] Adjusted  2.   Pt will improve functional LUE use to complete UB dressing mod I for increased independence in ADLs by 11/19/21. [x] Progressing: [] Met: [] Not Met: [] Adjusted  3. Pt will improve functional LUE use to complete LB dressing min A for increased independence in ADLs by 11/19/21. [x] Progressing: [] Met: [] Not Met: [] Adjusted  4. Pt will improve oculomotor and cognition skills to complete Trailmaking Part B assessment within 180 seconds with no errors by 11/19/21. [x] Progressing: [] Met: [] Not Met: [] Adjusted     Long Term Goals: To be achieved by discharge  1. Pt will report a QuickDASH Symptom Severity Scale score of 65% or less indicating increased safety and functional independence in desired occupational pursuits by discharge. [x] Progressing: [] Met: [] Not Met: [] Adjusted  2. Plan to assess STREAM at next session and add goal    Progression Towards Functional goals:  [] Patient is progressing as expected towards functional goals listed. [x] Progression is slowed due to complexities listed. [] Progression has been slowed due to co-morbidities. [] Plan just implemented, too soon to assess goals progression  [] All goals are met  [] Other:     ASSESSMENT:  See eval    Treatment/Activity Tolerance:  [x] Patient tolerated treatment well [] Patient limited by fatique  [] Patient limited by pain  [] Patient limited by other medical complications  [] Other:     Prognosis: [x] Good [] Fair  [] Poor    Patient Requires Follow-up: [x] Yes  [] No    PLAN: See eval  [x] Continue per plan of care [] Alter current plan (see comments)  [] Plan of care initiated [] Hold pending MD visit [] Discharge    Electronically signed by: Teresa Jiang, OTR/L [id number]        Occupational Therapist was present, directed the patient's care, made skilled judgement, and was responsible for assessment and treatment of the patient. Note: If patient does not return for scheduled/ recommended follow up visits, this note will serve as a discharge from care along with most recent update on progress.

## 2021-11-18 ENCOUNTER — HOSPITAL ENCOUNTER (OUTPATIENT)
Dept: PHYSICAL THERAPY | Age: 41
Setting detail: THERAPIES SERIES
Discharge: HOME | End: 2021-11-18
Payer: COMMERCIAL

## 2021-11-18 PROCEDURE — 97112 NEUROMUSCULAR REEDUCATION: CPT

## 2021-11-18 PROCEDURE — 97530 THERAPEUTIC ACTIVITIES: CPT

## 2021-11-18 PROCEDURE — 97116 GAIT TRAINING THERAPY: CPT

## 2021-11-18 NOTE — FLOWSHEET NOTE
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  · Treatment Diagnosis: functional limitations secondary to brain bleed  Insurance/Certification information:  PT Insurance Information: [employer] - 30 visits per year; preauth required  Physician Information:  Referring Practitioner: Emre Rojas MD  Plan of care signed (Y/N): faxed 21    Date of Patient follow up with Physician:     Functional scale: Optimal:  raw score = 81; dysfunction = 80%    Progress Report: []  Yes  [x]  No     Date Range for reporting period:  Beginning   Ending 11/15    Progress report due (10 Rx/or 30 days whichever is less): visit #10 or     Recertification due (POC duration/ or 90 days whichever is less): visit #12    Visit # Insurance Allowable Auth required? Date Range    96 units w/OT  48 units each discipline [x]  Yes  []  No   12/10/21          Latex Allergy:  [x]NO      []YES  Preferred Language for Healthcare:   [x]English       []other:      Pain level:  4/10 L knee    SUBJECTIVE:   Doing okay, has been noticing L knee pain more. Brought in her L AFO, was unable to remove padding if so, wouldn't fit inside of shoe. Reports the AFO is meant for in bed use only to reduce ankle PF contractures. **Discussed obtaining an AFO from [address], would require order from PCP, which they are okay with. Plan to request order and request that orthotist be present for a treatment session to observe patient's gait and recommend different styles of AFO. Pt and  agreed.       OBJECTIVE:    11/15:    30 sec sit to stand transitions: 5  Gait Pattern:  W/SBQC, L toe drag, flexed L knee throughout stance phase, weight shifted onto RLE, frequently hops over LLE;  Able to initiate L hip flex, toe drag minimizes swing phase, DF assist does help but not consistently alleviate toe drag. Forward pitched posture;  Frequently lifts and moves R foot and cane. : pt not wearing AFO today     10/14:  To clinic in wheelchair with soft AFO on LLE; received script for OT and speech this date and pt is now scheduled for all 3 therapies      RESTRICTIONS/PRECAUTIONS: h/o aneurysm    Exercises/Interventions:   Therapeutic Exercises (21294) Resistance / level Sets/sec Reps Notes   Nu step / step one  3 / 1.0  X5 min LEs only w/man facilitation to lateral L knee joint for improved hip alignment    Seated without AFO:  Hip flexion  LAQ  Hip abd  bridging             Seated in w/c w/B feet placed on mat #6  -hip ext            -frequent VCs to encourage L hip extensor activation                                             Therapeutic Activities (81540)       Sit to stand transitions from mat #6  -Man & VC's to reach forward w/BUEs    -instructed pt to use B outstretched UEs to improve 'nose over toes'   Stand pivot w/SBQC w/c to mat table #6    Max A   Donned L shoe, shoe horn required Total A                    STS mod mat table with maria alejandra-walker    Once standing added weight shifting M/L    One trial added L LE stepping (minimal success without AFO)     Transfer onto step one    Transfer off step one to [place]    Pt has difficulty with problem solving and motor planning   VCs for watching where L foot is positioned    Transfer from [facility] to [facility]  Transfer from [facility] to [facility]   Stand pivot to R  Sit pivot to R - attempted L but too scared           Tall kneeling EOM  Tall kneelin steps L, 2 steps R  3 mins   x2 ea -man cues to sacrum & sternum to improve posture  -man facilitation to sacrum & medial lower LLE to advance                        Neuromuscular Re-ed (57284)              L biceps tendon pressure to improve elbow extension and hand/wrist placment              EOM:  Scooting:  -Out to edge  -L laterally  -R laterally            -able to scoot out w/pelvis in midline p instruction  -max cues for correct technique and encouragement  -good form, required mod A for LLE positioning   Rolling in bed R and L    Min A for placement and sequencing    bridge   CGA, VCs for hip adduction    Hip add in hooklying   TCs for target   Heel slides AAROM to AROM   AAROM to begin, but slowly gained more motion and power, responds well to cues for power          Gait Training:  Foot in front then behind at elevated mat table          Amb w/QC  Amb w/QC        Amb pushing weight lymph cart;  purple TB for DF and knee flexion assist    Amb w/hemiwalker Min AMod/max AMod AMax x 2         Mod Ax15 R15', 85'            11/18:  performed x8 safely w/pt and was added to HEP;  PT (then ) inhibited L knee recurvatum w/man cues to posterior knee. -LLE frequently crossed midline;  Occasional LOB          facilitation with tband at L LE, Max A for placing L foot as pt puling forward, VCs for sequencing, block to L knee and to cart to avoid pt pulling back     -purple TB for L ankle DF & knee flex assist   Weight shifts  -lateral   -foot onto box, lean onto foot, then return        -max man cues for correct posture and shifting  -max cues for improved weight-shifting & VCs to reduce speed & improve control   Amb bwds w/cart                                Manual Intervention (73593)       Stretches:  Seated hamstring stretch                                                Modalities: none    Pt. Education:   11/9:  Pt requested order for hemiwalker, feels like it would improve her fear and ability to walk. Discussed increased support it provides and less LE & core muscle activation that is required to use hemiwalker, potentially becoming a step backwards and increasing her dependency on AD.   Did discuss importance leg control. [] UE / cervical: cervical, scapular, scapulothoracic and UE control with self care, reaching, carrying, lifting, house/yardwork, driving, computer work. NMR and Therapeutic Activities:    [x] (03890 or 55077) Provided verbal/tactile cueing for activities related to improving balance, coordination, kinesthetic sense, posture, motor skill, proprioception and motor activation to allow for proper function of   [x] LE: / Lumbar core, proximal hip and LE with self care and ADLs  [x] UE / Cervical: cervical, postural, scapular, scapulothoracic and UE control with self care, carrying, lifting, driving, computer work. [x] (11845) Gait Re-education- Provided training and instruction to the patient for proper LE, core and proximal hip recruitment and positioning and eccentric body weight control with ambulation re-education including up and down stairs     Home Management Training / Self Care:  [] (84133) Provided self-care/home management training related to activities of daily living and compensatory training, and/or use of adaptive equipment for improvement with: ADLs and compensatory training, meal preparation, safety procedures and instruction in use of adaptive equipment, including bathing, grooming, dressing, personal hygiene, basic household cleaning and chores.      Home Exercise Program:    [] (53926) Reviewed/Progressed HEP activities related to strengthening, flexibility, endurance, ROM of   [] LE / Lumbar: core, proximal hip and LE for functional self-care, mobility, lifting and ambulation/stair navigation   [] UE / Cervical: cervical, postural, scapular, scapulothoracic and UE control with self care, reaching, carrying, lifting, house/yardwork, driving, computer work  [] (26152)Reviewed/Progressed HEP activities related to improving balance, coordination, kinesthetic sense, posture, motor skill, proprioception of   [] LE: core, proximal hip and LE for self care, mobility, lifting, and in order to prevent re-injury.  - Goal Met  2. Patient will have a decrease in pain to facilitate improvement in movement, function, and ADLs as indicated by Functional Deficits. - Goal Met     Long Term Goals: To be achieved in: 6 weeks  1. Pt will demonstrate the ability to perform sit to/from stand with supervision.  -30 sec STS: 5 reps  2.  -Met 11/15/21  **11/15/21**: 2a.  Pt will amb 150' w/SBQC & min A.   - Not Met, amb   3. Patient will demonstrate an increase in postural awareness and control to allow for proper functional mobility as indicated by patients Functional Deficits. - Weight remains shifted onto RLE  4. Patient will return to functional activities including transfers without increased symptoms or restriction.  - Not Met    Overall Progression Towards Functional goals/ Treatment Progress Update:  [] Patient is progressing as expected towards functional goals listed. [] Progression is slowed due to complexities/Impairments listed. [x] Progression has been slowed due to co-morbidities. [] Plan just implemented, too soon to assess goals progression <30days   [] Goals require adjustment due to lack of progress  [] Patient is not progressing as expected and requires additional follow up with physician  [] Other    Persisting Functional Limitations/Impairments:  []Sleeping []Sitting              [x]Standing []Transfers        [x]Walking []Kneeling              []Stairs [x]Squatting / bending   [x]ADLs [x]Reaching  []Lifting  [x]Housework  []Driving [x]Job related tasks  [x]Sports/Recreation []Other:        ASSESSMENT:    Patient's L hip flex continues to progress, allowing for increased step height and length, foot is flat at initial contact and knee is flexed which rapidly assumes recurvatum position. Confidence in QC is improving as R step length is consistently increasing. Continue to challenge patient's LLE deficits to progress proprioception and coordination while improving gait.   Plan to contact PCP's office for order for L AFO be sent to [facility] in [place]. Treatment/Activity Tolerance:  [x] Patient able to complete tx  [] Patient limited by fatigue  [] Patient limited by pain  [] Patient limited by other medical complications  [] Other:     Prognosis: [x] Good [] Fair  [] Poor    Patient Requires Follow-up: [x] Yes  [] No    Plan for next treatment session:  per training diary and progress as tolerated. PLAN: See eval. PT 2x / week for 6 weeks. [x] Continue per plan of care [] Alter current plan (see comments)  [] Plan of care initiated [] Hold pending MD visit [] Discharge    Electronically signed by: Beata Garza, PT, DPT    Note: If patient does not return for scheduled/ recommended follow up visits, this note will serve as a discharge from care along with most recent update on progress.

## 2021-11-22 ENCOUNTER — APPOINTMENT (OUTPATIENT)
Dept: PHYSICAL THERAPY | Age: 41
End: 2021-11-22
Payer: COMMERCIAL

## 2021-11-23 ENCOUNTER — HOSPITAL ENCOUNTER (OUTPATIENT)
Dept: OCCUPATIONAL THERAPY | Age: 41
Setting detail: THERAPIES SERIES
Discharge: HOME | End: 2021-11-23
Payer: COMMERCIAL

## 2021-11-23 ENCOUNTER — HOSPITAL ENCOUNTER (OUTPATIENT)
Dept: SPEECH THERAPY | Age: 41
Setting detail: THERAPIES SERIES
Discharge: HOME | End: 2021-11-23
Payer: COMMERCIAL

## 2021-11-23 ENCOUNTER — HOSPITAL ENCOUNTER (OUTPATIENT)
Dept: PHYSICAL THERAPY | Age: 41
Setting detail: THERAPIES SERIES
Discharge: HOME | End: 2021-11-23
Payer: COMMERCIAL

## 2021-11-23 PROCEDURE — 97110 THERAPEUTIC EXERCISES: CPT

## 2021-11-23 PROCEDURE — 92526 ORAL FUNCTION THERAPY: CPT

## 2021-11-23 PROCEDURE — 97112 NEUROMUSCULAR REEDUCATION: CPT

## 2021-11-23 PROCEDURE — 92507 TX SP LANG VOICE COMM INDIV: CPT

## 2021-11-23 PROCEDURE — 97116 GAIT TRAINING THERAPY: CPT

## 2021-11-23 PROCEDURE — 97168 OT RE-EVAL EST PLAN CARE: CPT

## 2021-11-23 NOTE — FLOWSHEET NOTE
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 Jacklyn Josue MD   Plan of care signed (Y/N): N (sent 10/18, )    Date of Patient follow up with Physician: DEWEY    Functional outcome measure: The Communicative Participation Item Bank Total:      Progress Note: []  Yes  [x]  No  Next due by: Visit #10 or 2021    RESTRICTIONS/PRECAUTIONS: hx of brain bleed   Latex Allergy:  [x]NO      []YES    Preferred Language for Healthcare:   [x]English       []other    Visit # Insurance Allowable Date Range (if applicable)    48 units  10/     Pain level:  0/10     SUBJECTIVE: Pt was alert and receptive, reports ongoing difficulty with attending to left side during functional tasks.      OBJECTIVE: See POC    Exercises/Interventions:   Speech Therapy (93676) Accuracy Cues Notes   Comprehension        Yes/No questions      Complex questions Abstract reasoning for inclusion and exclusion; given words verbally, pt answered temporal and positional questions:  - 4 words with 80% accuracy  - 5 words with 80% accuracy Min verbal cues to reinforce comprehension strategies Reinforced strategies of self-talk and repetition   1-2 step directions      Multi-step directions      Common objects/pictures      L/R discrimination      Conversation      Other:      Expression       Initiation      Repetition      Automatic speech      Confrontational      Convergent      Divergent      Responsive      Conversation Other:          Motor Speech Therapy (30713) Accuracy Cues Notes   Apraxia       Dysarthria SLOB training at sentence level with 100% accuracy given min cues Cues for slow speech/regular rate and overarticulation Additional velar sound practice provided   Intelligibility      Other:          Cognitive Function (92275 & 65003) Accuracy Cues Notes   Orientation        Attention           Sustained         Selective Visual selective attention task involving large visual field with 92% accuracy (22/24) Min cues for reinforcement of visual strategies Visual strategies of highlighting edge and scanning across      Alternating Visual alternating attention task (letter and number sequence) with 88% accuracy (21/24) Min cues for reinforcement of visual strategies Visual and attention strategies of scanning across and self-talk for working memory in alternating      Divided      Memory          Immediate/Working         Short term         Long term         Prospective      Problem Solving       Visuospatial       Executive Function       Other:          Dysphagia Therapy (55011) Strategies Tolerance Notes   Diet texture:      Regular      Dysphagia III/Soft & Bite-Sized      Dysphagia II/Minced & Moist      Dysphagia I/Puree      Liquid Consistency:       Thin Therapeutic bolus size modification and effortful swallows x 10 No overt s/s aspiration Pt reports completing 3-4 per day, encouraged to complete 10-20 per day   Nectar thick/Mildly thick      Honey thick/Moderately thick      Ice chips       Reps Cue level    Oral Phase Exercises:       Bolus Control Exercises      []Lateralizations      []A-P propulsions      [x]Diagonal A-P propulsions Reps x 10 Mod cues Mildly delayed transit   [x]L/R A-P propulsion Reps x 10 Mod cues Mildly reduced coordination   Buccal and Labial Exercises      Lingual Resistance Exercises Elevation x 20   Min cues Mildly improved superior resistance   Exercises: Pt instructed and trained in laryngeal/pharyngeal strengthening exercises including:      []Shari maneuver      []Effortful swallows      []Supraglottic swallows      []Mendelsohn maneuver      []Modified Shaker (sitting up chin to chest)      []Neuromuscular electrical stimulation (VitalStim)     Pt. Education:  -Pt educated on diagnosis, prognosis and expectations for rehab  -All pt questions were answered  -Pt verbalized understanding and agreement    HEP instruction:  -Pt provided with written and illustrated instructions for HEP: intelligibility strategies, oral motor exercises, safe swallowing strategies, memory strategies    Speech/Voice Therapy:    [x] (02268) Treatment of speech, language, voice, communication, and/or auditory processing disorder; individual    Cognitive Function:  [] (15054) Therapeutic interventions that focus on cognitive function (eg, attention, memory, reasoning, executive function, problem solving, and/or pragmatic functioning) and compensatory strategies to manage the performance of an activity (eg, managing time or schedules, initiating, organizing, and sequencing tasks), direct (one-on-one) patient contact; initial 15 minutes (Report 61918 only once per day)  [] ([phone]) each additional 15 minutes     Dysphagia Therapy:    [x] (86893) Treatment of swallowing dysfunction and/or oral function for feeding         Charges:  Timed Code Treatment Minutes: 0   Total Treatment Minutes: 45     [x] Speech-Language Treatment (48297)        [] Voice Treatment (92544)                                         [] Cognitive-Linguistic Skills Development Initial 15 minutes (24677)  [] Cognitive-Linguistic Skills Development Additional 15 minutes ([phone])   [x] Dysphagia Treatment/NMES (VitalStim) (47393)  [] Other:     GOALS: GOALS:  Patient stated goal: \"Stop slurring\"  []? Progressing: []? Met: []? Not Met: []? Adjusted     Therapist goals for Patient:   Short Term Goals: To be achieved in: 5 weeks  1.  Patient will use speech strategies to facilitate increased intelligibility at detailed conversational level in >90% as judged by SLP and pt/family. []? Progressing: []? Met: []? Not Met: []? Adjusted  2. Patient will complete abstract reasoning related to functional tasks with 80% accuracy to target self-monitoring. []? Progressing: []? Met: []? Not Met: []? Adjusted  3. Patient will complete complex attention tasks (sustained, selective, alternating) with 80% accuracy given minimal cues to improve functional task completion. []? Progressing: []? Met: []? Not Met: []? Adjusted  4. Patient will participate in further cognitive-linguistic evaluation with additional goals as indicated. []? Progressing: [x]? Met: []? Not Met: []? Adjusted  5. Patient will tolerate recommended diet with no overt s/s of aspiration or noticeable fatigue. []? Progressing: []? Met: []? Not Met: []? Adjusted  6. Patient will complete oral motor and bolus control exercises with 80% accuracy independently to improve oral phase of swallow. []? Progressing: []? Met: []? Not Met: []? Adjusted     Long Term Goals: To be achieved in: 6 weeks  1. Patient will demonstrate improved cognitive-linguistic function to improve safety and independence in ADLs.  []? Progressing: []? Met: []? Not Met: []? Adjusted  2. Patient will utilize speech intelligibility strategies to increase intelligibility to greater than 90% in conversation to a familiar/unfamiliar listener. []? Progressing: []? Met: []? Not Met: []? Adjusted  3. Patient will tolerate least restrictive diet with no overt clinical s/s of aspiration/penetration. []? Progressing: []? Met: []? Not Met: []? Adjusted         Progression Towards Functional goals:  [] Patient is progressing as expected towards functional goals listed. [] Progression is slowed due to complexities listed. [] Progression has been slowed due to co-morbidities.   [x] Plan just implemented, too soon to assess goals progression  [] Other:     Persisting Functional Limitations/Impairments:  [x]Attention []Word Finding       [x]Memory [x]Speech Intelligibility      [x]Executive Function [x]Diet Tolerance     []Problem Solving []Coughing/Choking with PO  []Expressive Language []Medication/Finance Management  [x]Receptive Language []Other:      ASSESSMENT:  See eval  Treatment/Activity Tolerance:  [x] Patient able to complete tx  [] Patient limited by fatigue  [] Patient limited by pain  [] Patient limited by other medical complications  [] Other:     Prognosis: [x] Good [] Fair  [] Poor    Patient Requires Follow-up: [x] Yes  [] No    PLAN: See eval. ST 2x / week for 6 weeks. [x] Continue per plan of care [] Alter current plan (see comments)  [] Plan of care initiated [] Hold pending MD visit [] Discharge    Electronically signed by:   Keith Valle MA CCC-SLP  Speech-Language Pathologist  SP. [id number]       Note: If patient does not return for scheduled/ recommended follow up visits, this note will serve as a discharge from care along with most recent update on progress.

## 2021-11-23 NOTE — FLOWSHEET NOTE
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  · Treatment Diagnosis: functional limitations secondary to brain bleed  Insurance/Certification information:  PT Insurance Information: [employer] - 30 visits per year; preauth required  Physician Information:  Referring Practitioner: Elina Hernandez MD  Plan of care signed (Y/N): faxed 21    Date of Patient follow up with Physician:     Functional scale: Optimal:  raw score = 81; dysfunction = 80%    Progress Report: []  Yes  [x]  No     Date Range for reporting period:  Beginning   Ending 11/15    Progress report due (10 Rx/or 30 days whichever is less): visit #10 or     Recertification due (POC duration/ or 90 days whichever is less): visit #12    Visit # Insurance Allowable Auth required? Date Range    96 units w/OT  48 units each discipline [x]  Yes  []  No   12/10/21          Latex Allergy:  [x]NO      []YES  Preferred Language for Healthcare:   [x]English       []other:      Pain level:  4/10 L knee    SUBJECTIVE:          OBJECTIVE:    11/15:    30 sec sit to stand transitions: 5  Gait Pattern:  W/SBQC, L toe drag, flexed L knee throughout stance phase, weight shifted onto RLE, frequently hops over LLE;  Able to initiate L hip flex, toe drag minimizes swing phase, DF assist does help but not consistently alleviate toe drag. Forward pitched posture;  Frequently lifts and moves R foot and cane. : pt not wearing AFO today     10/14:  To clinic in wheelchair with soft AFO on LLE; received script for OT and speech this date and pt is now scheduled for all 3 therapies      RESTRICTIONS/PRECAUTIONS: h/o aneurysm    Exercises/Interventions:   Therapeutic Exercises (48169) Resistance / level Sets/sec Reps Notes   Nu step / step one  LEs only w/man facilitation to lateral L knee joint for improved hip alignment    Seated without AFO:  Hip flexion  LAQ  Hip abd  bridging             Seated in w/c w/B feet placed on mat #6  -hip ext            -frequent VCs to encourage L hip extensor activation   Standing R hip w/QC Max A  x10                                       Therapeutic Activities (47220)       Sit to stand transitions from mat #6  -Man & VC's to reach forward w/BUEs    -instructed pt to use B outstretched UEs to improve 'nose over toes'   Stand pivot w/SBQC w/c to mat table #6    Max A   Donned L shoe, shoe horn required Total A                    STS mod mat table with maria alejandra-walker    Once standing added weight shifting M/L    One trial added L LE stepping (minimal success without AFO)     Transfer onto step one    Transfer off step one to [place]    Pt has difficulty with problem solving and motor planning   VCs for watching where L foot is positioned    Transfer from [facility] to [facility]  Transfer from [facility] to [facility]   Stand pivot to R  Sit pivot to R - attempted L but too scared           Tall kneeling EOM  Tall kneeling: , 2 steps R  3 mins   x1  -man cues to sacrum & sternum to improve posture  -man facilitation to sacrum & medial lower LLE to advance                        Neuromuscular Re-ed (38566)       Static stand w/R foot on 4\" box w/mirror for visual feedback of correct posture & weight-bearing through LLE  5 mins x2    L biceps tendon pressure to improve elbow extension and hand/wrist placment              EOM:  Scooting:  -Out to edge  -L laterally  -R laterally            -able to scoot out w/pelvis in midline p instruction  -max cues for correct technique and encouragement  -good form, required mod A for LLE positioning   Rolling in bed R and L    Min A for placement and sequencing    bridge   CGA, VCs for hip adduction    Hip add in hooklying   TCs for target   Heel slides AAROM to AROM   AAROM to begin, but slowly gained more motion and power, responds well to cues for power          Gait Training:  Foot in front then behind at elevated mat table          Amb w/QC  Amb w/QC        Amb pushing weight lymph cart;  purple TB for DF and knee flexion assist    Amb w/hemiwalker Min AMod AMod AMax x 2         Mod A35'            11/18:  performed x8 safely w/pt and was added to HEP;  PT (then ) inhibited L knee recurvatum w/man cues to posterior knee. -LLE frequently crossed midline;  Occasional LOB          facilitation with tband at L LE, Max A for placing L foot as pt puling forward, VCs for sequencing, block to L knee and to cart to avoid pt pulling back     -purple TB for L ankle DF & knee flex assist   Weight shifts  -lateral   -foot onto box, lean onto foot, then return        -max man cues for correct posture and shifting  -max cues for improved weight-shifting & VCs to reduce speed & improve control   Amb bwds w/cart                                Manual Intervention (16279)       Stretches:  Seated hamstring stretch                                                Modalities: none    Pt. Education:   11/9:  Pt requested order for hemiwalker, feels like it would improve her fear and ability to walk. Discussed increased support it provides and less LE & core muscle activation that is required to use hemiwalker, potentially becoming a step backwards and increasing her dependency on AD. Did discuss importance of continued walking and activity at home and if pt is not currently walking at home due to amount of work/effort required to amb w/SBQC, to obtain a hemiwalker so pt is more likely to return to typical home activities. Discussed nearby medical supply stores and to contact PCP and have order sent to them.  and pt reported understanding.       10/14:  Verbal cues for proper transfer technique and safety  -patient educated on diagnosis, prognosis and expectations for rehab  -all patient questions were answered    HEP instruction: 10/14:  Discussed safety on transfers. 10/18: Instructed pt to perform standing lateral weight-shifts hourly in front of chair, with a family member on L side as needed. Therapeutic Exercise and NMR EXR  [] (41018) Provided verbal/tactile cueing for activities related to strengthening, flexibility, endurance, ROM for improvements in  [] LE / Lumbar: LE, proximal hip, and core control with self care, mobility, lifting, ambulation. [] UE / Cervical: cervical, postural, scapular, scapulothoracic and UE control with self care, reaching, carrying, lifting, house/yardwork, driving, computer work. [x] (72315) Provided verbal/tactile cueing for activities related to improving balance, coordination, kinesthetic sense, posture, motor skill, proprioception to assist with   [x] LE / lumbar: LE, proximal hip, and core control in self care, mobility, lifting, ambulation and eccentric single leg control. [x] UE / cervical: cervical, scapular, scapulothoracic and UE control with self care, reaching, carrying, lifting, house/yardwork, driving, computer work.   [] (27173) Therapist is in constant attendance of 2 or more patients providing skilled therapy interventions, but not providing any significant amount of measurable one-on-one time to either patient, for improvements in  [] LE / lumbar: LE, proximal hip, and core control in self care, mobility, lifting, ambulation and eccentric single leg control. [] UE / cervical: cervical, scapular, scapulothoracic and UE control with self care, reaching, carrying, lifting, house/yardwork, driving, computer work.      NMR and Therapeutic Activities:    [x] (51554 or 47439) Provided verbal/tactile cueing for activities related to improving balance, coordination, kinesthetic sense, posture, motor skill, proprioception and motor activation to allow for proper function of   [x] LE: / Lumbar core, proximal hip and LE with self care and ADLs  [x] UE / Cervical: cervical, postural, scapular, scapulothoracic and UE control with self care, carrying, lifting, driving, computer work. [x] (36249) Gait Re-education- Provided training and instruction to the patient for proper LE, core and proximal hip recruitment and positioning and eccentric body weight control with ambulation re-education including up and down stairs     Home Management Training / Self Care:  [] (10274) Provided self-care/home management training related to activities of daily living and compensatory training, and/or use of adaptive equipment for improvement with: ADLs and compensatory training, meal preparation, safety procedures and instruction in use of adaptive equipment, including bathing, grooming, dressing, personal hygiene, basic household cleaning and chores.      Home Exercise Program:    [] (95238) Reviewed/Progressed HEP activities related to strengthening, flexibility, endurance, ROM of   [] LE / Lumbar: core, proximal hip and LE for functional self-care, mobility, lifting and ambulation/stair navigation   [] UE / Cervical: cervical, postural, scapular, scapulothoracic and UE control with self care, reaching, carrying, lifting, house/yardwork, driving, computer work  [] (15374)Reviewed/Progressed HEP activities related to improving balance, coordination, kinesthetic sense, posture, motor skill, proprioception of   [] LE: core, proximal hip and LE for self care, mobility, lifting, and ambulation/stair navigation    [] UE / Cervical: cervical, postural,  scapular, scapulothoracic and UE control with self care, reaching, carrying, lifting, house/yardwork, driving, computer work    Manual Treatments:  PROM / STM / Oscillations-Mobs:  G-I, II, III, IV (PA's, Inf., Post.)  [] (11303) Provided manual therapy to mobilize LE, proximal hip and/or LS spine soft tissue/joints for the purpose of modulating pain, promoting relaxation,  increasing ROM, reducing/eliminating soft tissue swelling/inflammation/restriction, improving soft tissue extensibility and allowing for proper ROM for normal function with   [] LE / lumbar: self care, mobility, lifting and ambulation. [] UE / Cervical: self care, reaching, carrying, lifting, house/yardwork, driving, computer work. Modalities:  [] (50276) Vasopneumatic compression: Utilized vasopneumatic compression to decrease edema / swelling for the purpose of improving mobility and quad tone / recruitment which will allow for increased overall function including but not limited to self-care, transfers, ambulation, and ascending / descending stairs. Charges:  Timed Code Treatment Minutes: 45   Total Treatment Minutes: 45     Units approved Units used Date Range    48 32 12/10/21   **updated 11/23**    [] EVAL - LOW (09646)   [] EVAL - MOD (84312)  [] EVAL - HIGH (65398)  [] RE-EVAL (20716)  [] BV(45613) x       [] Ionto  [x] NMR (24441) x  2    [] Vaso  [] Manual (70693) x       [] Ultrasound  [] TA x       [] Mech Traction (34067)  [] Aquatic Therapy x      [] ES (un) (08749):   [] Home Management Training x  [] ES(attended) (88950)   [] Dry Needling 1-2 muscles (03116):  [] Dry Needling 3+ muscles (970200  [] Group:      [x] Other: gait x 1    GOALS:   Patient stated goal: To return to as normal activity as possible.       Therapist goals for Patient:   Short Term Goals: To be achieved in: 2 weeks  1. Independent in HEP and progression per patient tolerance, in order to prevent re-injury.  - Goal Met  2. Patient will have a decrease in pain to facilitate improvement in movement, function, and ADLs as indicated by Functional Deficits. - Goal Met     Long Term Goals: To be achieved in: 6 weeks  1.  Pt will demonstrate the ability to perform sit to/from stand with supervision.  -30 sec STS: 5 reps  2.  -Met 11/15/21  **11/15/21**: 2a.  Pt will amb 150' w/SBQC & min A.   - Not Met, amb   3. Patient will demonstrate an increase in postural awareness and control to allow for proper functional mobility as indicated by patients Functional Deficits. - Weight remains shifted onto RLE  4. Patient will return to functional activities including transfers without increased symptoms or restriction.  - Not Met    Overall Progression Towards Functional goals/ Treatment Progress Update:  [] Patient is progressing as expected towards functional goals listed. [] Progression is slowed due to complexities/Impairments listed. [x] Progression has been slowed due to co-morbidities. [] Plan just implemented, too soon to assess goals progression <30days   [] Goals require adjustment due to lack of progress  [] Patient is not progressing as expected and requires additional follow up with physician  [] Other    Persisting Functional Limitations/Impairments:  []Sleeping []Sitting              [x]Standing []Transfers        [x]Walking []Kneeling              []Stairs [x]Squatting / bending   [x]ADLs [x]Reaching  []Lifting  [x]Housework  []Driving [x]Job related tasks  [x]Sports/Recreation []Other:        ASSESSMENT:    Patient's gait pattern continues to progress, is able to demo L foot clearance consistently for about 15' before fatigue/anxiety increases and L knee mansi. Improved weight bearing through LLE w/good joint mechanics noted as well while R foot was on box. Continue to progress LLE function and stability. Treatment/Activity Tolerance:  [x] Patient able to complete tx  [] Patient limited by fatigue  [] Patient limited by pain  [] Patient limited by other medical complications  [] Other:     Prognosis: [x] Good [] Fair  [] Poor    Patient Requires Follow-up: [x] Yes  [] No    Plan for next treatment session:  per training diary and progress as tolerated. PLAN: See eval. PT 2x / week for 6 weeks.    [x] Continue per plan of care [] Alter current plan (see comments)  [] Plan of care initiated [] Hold pending MD visit [] Discharge    Electronically signed by: Lidia Salvador PT, DPT    Note: If patient does not return for scheduled/ recommended follow up visits, this note will serve as a discharge from care along with most recent update on progress.

## 2021-11-23 NOTE — PROGRESS NOTES
[address] [facility] Occupational Therapy  [address] [facility], [address]  Phone: [phone]   Fax: [phone]      Occupational Therapy Daily Treatment Note  Date:  2021    Patient: Smita Huerta   : 1980   MRN: [id number]  Referring Physician:    Dr. 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 Date Range    [x]  Yes  []  No pending     Latex Allergy:  [x]No      []Yes  Pacemaker:  [x] No       [] Yes     Preferred Language for Healthcare:   [x]English       []other:    Pain level:   2/10 left arm patient reports some tightness in arm    SUBJECTIVE:  Patient expressed difficulty with getting in/out of bed, on/off commode, and in/out of car. Patient reported one fall needing a trip to ED but no new injury reported    Functional Disability Index:     Quick DASH: total score- 46, disability score- 79.55%     Will assess STREAM at next session- unable to assess on this date due to equipment not available    OBJECTIVE: See eval      RESTRICTIONS/PRECAUTIONS:  hx brain bleed, fall risk    Exercises/Interventions: Treatment focused on increase use of LUE during functional tasks increasing ROM and strength.  Session initiated with pt completing stand pivot tansfer from w/c>edge of mat with CGA  Patient initiated treatment using left hand as base of support on table at side while reaching with right hand crossing midline to reach for squig and then placing squig on mirror. Patient needed mod to min assist and rests every 2-3 weight shifts and reaches needing to sit down to rest.  Patient completed 3 sets receiving stretch to hand in between during rests. Patient then worked on holding yoga block with both hands and carrying to chair with mod assist of therapist for two steps and then needed to sit down due to dizziness. BP taken and wnl. Therapeutic Exercises  Resistance / level Sets/sec Reps Notes                                                                                Neuromuscular Re-ed / Therapeutic Activities                                                 Manual Intervention                                                     Modalities:     Patient education:  Eval, POC, HEP- pt verbalized understanding       Home Exercise Program:  Written and verbal HEP instructions provided and reviewed:  · Scapular retraction   · Use of resting hand splint  · Will expand next session  · Mirror therapy at home    Therapeutic Exercise and NMR:  [x] (70456) Provided verbal/tactile cueing for activities related to strengthening, flexibility, endurance, ROM  for improvements in scapular, scapulothoracic and UE control with self care, reaching, carrying, lifting, house/yardwork, driving/computer work.    [] (53831) Provided verbal/tactile cueing for activities related to improving balance, coordination, kinesthetic sense, posture, motor skill, proprioception  to assist with  scapular, scapulothoracic and UE control with self care, reaching, carrying, lifting, house/yardwork, driving/computer work.   [] Comments:    Therapeutic Activities:    [x] (73578 or 76663) Provided verbal/tactile cueing for activities related to improving balance, coordination, kinesthetic sense, posture, motor skill, proprioception and motor activation to allow for proper function of scapular, scapulothoracic and UE control with self care, carrying, lifting, driving/computer work  [] Comments:    Home Exercise Program:    [x] (81967) Reviewed/Progressed HEP activities related to strengthening, flexibility, endurance, ROM of scapular, scapulothoracic and UE control with self care, reaching, carrying, lifting, house/yardwork, driving/computer work  [] (66279) Reviewed/Progressed HEP activities related to improving balance, coordination, kinesthetic sense, posture, motor skill, proprioception of scapular, scapulothoracic and UE control with self care, reaching, carrying, lifting, house/yardwork, driving/computer work    [] Comments:    Manual Treatments:  PROM / STM / Oscillations-Mobs:  G-I, II, III, IV (PA's, Inf., Post.)  [x] (85491) Provided manual therapy to mobilize soft tissue/joints of cervical/CT, scapular GHJ and UE for the purpose of modulating pain, promoting relaxation,  increasing ROM, reducing/eliminating soft tissue swelling/inflammation/restriction, improving soft tissue extensibility and allowing for proper ROM for normal function with self care, reaching, carrying, lifting, house/yardwork, driving/computer work  [] Comments:    ADL Training:  [] (12318) Provided self-care/home management training related to activities of daily living and compensatory training, and/or use of adaptive equipment   [] Comments:     Splinting:  [] Fabrication of:   [] (65453) Orthotic/Prosthetic Management, subsequent encounter  [] (90014) Orthotic management and training (fitting and assessment)  [] Comments:      Charges:  Timed Code Treatment Minutes: 40   Total Treatment Minutes: 40     [] EVAL (LOW) 45748   [] OT Re-eval (61565)  [] EVAL (MOD) 60370   [] EVAL (HIGH) 03026       [] Dolly (45790) x     [] ERDWT(52340)  [x] NMR (22133) x  2 [] Estim (attended) (83059)   [] Manual ([id number]) x  [] US (63914)  [x] TA (45204) x  1  [] Paraffin (86518)  [] ADL  (70547) x    [] Splint/L code:    [] Estim (unattended) 33 [phone] 31)  [] Kahlil Hernandez (63844)  [] Other:    GOALS:   Patient stated goal: L hand use  [] Progressing: [] Met: [] Not Met: [] Adjusted     Therapist goals for Patient:   Short Term Goals: To be achieved in: 30 days  1. Pt will improve functional ROM to use LUE as ANDREAS during transfers for increased safety and independence by 11/19/21. [x] Progressing: [] Met: [] Not Met: [] Adjusted  2. Pt will improve functional LUE use to complete UB dressing mod I for increased independence in ADLs by 11/19/21. [x] Progressing: [] Met: [] Not Met: [] Adjusted  3. Pt will improve functional LUE use to complete LB dressing min A for increased independence in ADLs by 11/19/21. [x] Progressing: [] Met: [] Not Met: [] Adjusted  4. Pt will improve oculomotor and cognition skills to complete Trailmaking Part B assessment within 180 seconds with no errors by 11/19/21. [x] Progressing: [] Met: [] Not Met: [] Adjusted     Long Term Goals: To be achieved by discharge  1. Pt will report a QuickDASH Symptom Severity Scale score of 65% or less indicating increased safety and functional independence in desired occupational pursuits by discharge. [x] Progressing: [] Met: [] Not Met: [] Adjusted  2. Plan to assess STREAM at next session and add goal    Progression Towards Functional goals:  [] Patient is progressing as expected towards functional goals listed. [x] Progression is slowed due to complexities listed. [] Progression has been slowed due to co-morbidities. [] Plan just implemented, too soon to assess goals progression  [] All goals are met  [] Other:     ASSESSMENT:  See eval    Treatment/Activity Tolerance:  [] Patient tolerated treatment well [] Patient limited by fatique  [x] Patient limited by pain  [x] Patient limited by other medical complications falls and fear of falling, significant proprioceptive impairment.  [] Other:     Prognosis: [x] Good [] Fair  [] Poor    Patient Requires Follow-up: [x] Yes  [] No    PLAN: See eval  [x] Continue per plan of care [] Alter current plan (see comments)  [] Plan of care initiated [] Hold pending MD visit [] Discharge    Electronically signed by:

## 2021-11-26 ENCOUNTER — HOSPITAL ENCOUNTER (OUTPATIENT)
Dept: PHYSICAL THERAPY | Age: 41
Setting detail: THERAPIES SERIES
Discharge: HOME | End: 2021-11-26
Payer: COMMERCIAL

## 2021-11-26 ENCOUNTER — HOSPITAL ENCOUNTER (OUTPATIENT)
Dept: SPEECH THERAPY | Age: 41
Setting detail: THERAPIES SERIES
Discharge: HOME | End: 2021-11-26
Payer: COMMERCIAL

## 2021-11-26 PROCEDURE — 97116 GAIT TRAINING THERAPY: CPT

## 2021-11-26 PROCEDURE — 92526 ORAL FUNCTION THERAPY: CPT

## 2021-11-26 PROCEDURE — 92507 TX SP LANG VOICE COMM INDIV: CPT

## 2021-11-26 PROCEDURE — 97112 NEUROMUSCULAR REEDUCATION: CPT

## 2021-11-26 NOTE — PROGRESS NOTES
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 Amada Hung MD   Plan of care signed (Y/N): N (sent 10/18, )    Date of Patient follow up with Physician: DEWEY    Functional outcome measure: The Communicative Participation Item Bank Total:  (2021)     Progress Note: [x]  Yes  []  No  Next due by: Visit #10 or 2021    RESTRICTIONS/PRECAUTIONS: hx of brain bleed   Latex Allergy:  [x]NO      []YES    Preferred Language for Healthcare:   [x]English       []other    Visit # Insurance Allowable Date Range (if applicable)    48 units  10/     Pain level:  0/10     SUBJECTIVE: Pt was alert and receptive, good participation as usual. Pt became teary during pt-rated outcomes, reports feeling more frustrated with her difficulties when compared to her functioning pre-stroke.     OBJECTIVE:   - The Communicative Participation Item Bank Total:   - EAT-10: 15/30    Exercises/Interventions:   Speech Therapy (54239) Accuracy Cues Notes   Comprehension        Yes/No questions      Complex questions      1-2 step directions      Multi-step directions      Common objects/pictures      L/R discrimination      Conversation      Other:      Expression       Initiation      Repetition      Automatic speech      Confrontational      Convergent      Divergent      Responsive      Conversation      Other:          Motor Speech Therapy (48511) Accuracy Cues Notes   Apraxia Dysarthria SLOB training at spontaneous complex sentence level with 100% accuracy given min cues Cues for slow speech/regular rate and overarticulation Pt independently selected strategy for slowing down, rated speech as a 4/10   Intelligibility      Other:          Cognitive Function (88441 & 33610) Accuracy Cues Notes   Orientation        Attention           Sustained         Selective           Alternating Visual alternating attention task involving cognitive integration with 78% accuracy given min cues Min cues for reinforcement of visual strategies Visual strategies of highlighting edge and scanning across        Divided      Memory          Immediate/Working         Short term         Long term         Prospective      Problem Solving       Visuospatial       Executive Function       Other:          Dysphagia Therapy (21193) Strategies Tolerance Notes   Diet texture:      Regular Therapeutic bolus size modification and intake pacing, effortful swallows x 10 No overt s/s aspiration Cued lingual sweep to left for oral and labial clearance   Dysphagia III/Soft & Bite-Sized      Dysphagia II/Minced & Moist      Dysphagia I/Puree      Liquid Consistency:       Thin Therapeutic bolus size modification and effortful swallows x 10 No overt s/s aspiration Mod cues for safe swallowing strategies   Nectar thick/Mildly thick      Honey thick/Moderately thick      Ice chips       Reps Cue level    Oral Phase Exercises:       Bolus Control Exercises      []Lateralizations      []A-P propulsions      []Diagonal A-P propulsions      []L/R A-P propulsion      Buccal and Labial Exercises      Lingual Resistance Exercises Elevation x 10  Lateralization x 10 Min cues  reports improving resistance during home practice   Exercises: Pt instructed and trained in laryngeal/pharyngeal strengthening exercises including:      []Shari maneuver      [x]Effortful swallows Completed with varying textures x 20, see above  Ongoing encouragement of 10-20 reps per day   []Supraglottic swallows      []Mendelsohn maneuver      []Modified Shaker (sitting up chin to chest)      []Neuromuscular electrical stimulation (VitalStim)     Pt. Education:  -Pt educated on diagnosis, prognosis and expectations for rehab  -All pt questions were answered  -Pt verbalized understanding and agreement  -Progress since initiation of POC    HEP instruction:  -Pt provided with written and illustrated instructions for HEP: intelligibility strategies, oral motor exercises, safe swallowing strategies, memory strategies    Speech/Voice Therapy:    [x] (34047) Treatment of speech, language, voice, communication, and/or auditory processing disorder; individual    Cognitive Function:  [] (47794) Therapeutic interventions that focus on cognitive function (eg, attention, memory, reasoning, executive function, problem solving, and/or pragmatic functioning) and compensatory strategies to manage the performance of an activity (eg, managing time or schedules, initiating, organizing, and sequencing tasks), direct (one-on-one) patient contact; initial 15 minutes (Report 47710 only once per day)  [] ([phone]) each additional 15 minutes     Dysphagia Therapy:    [x] (09372) Treatment of swallowing dysfunction and/or oral function for feeding         Charges:  Timed Code Treatment Minutes: 0   Total Treatment Minutes: 45     [x] Speech-Language Treatment (70751)        [] Voice Treatment (83980)                                         [] Cognitive-Linguistic Skills Development Initial 15 minutes (77419)  [] Cognitive-Linguistic Skills Development Additional 15 minutes ([phone])   [x] Dysphagia Treatment/NMES (VitalStim) (95118)  [] Other:     GOALS: GOALS:  Patient stated goal: \"Stop slurring\"  [x]? Progressing: []? Met: []? Not Met: []? Adjusted     Therapist goals for Patient:   Short Term Goals: To be achieved in: 5 weeks  1.  Patient will use speech strategies to facilitate increased intelligibility at detailed conversational level in >90% as judged by SLP and pt/family. [x]? Progressing: []? Met: []? Not Met: []? Adjusted  2. Patient will complete abstract reasoning related to functional tasks with 80% accuracy to target self-monitoring. [x]? Progressing: []? Met: []? Not Met: []? Adjusted  3. Patient will complete complex attention tasks (sustained, selective, alternating) with 80% accuracy given minimal cues to improve functional task completion. [x]? Progressing: []? Met: []? Not Met: []? Adjusted  4. Patient will participate in further cognitive-linguistic evaluation with additional goals as indicated. []? Progressing: [x]? Met: []? Not Met: []? Adjusted  5. Patient will tolerate recommended diet with no overt s/s of aspiration or noticeable fatigue. []? Progressing: [x]? Met: []? Not Met: []? Adjusted  6. Patient will complete oral motor and bolus control exercises with 80% accuracy independently to improve oral phase of swallow. [x]? Progressing: []? Met: []? Not Met: []? Adjusted     Long Term Goals: To be achieved in: 6 weeks  1. Patient will demonstrate improved cognitive-linguistic function to improve safety and independence in ADLs. [x]? Progressing: []? Met: []? Not Met: []? Adjusted  2. Patient will utilize speech intelligibility strategies to increase intelligibility to greater than 90% in conversation to a familiar/unfamiliar listener. [x]? Progressing: []? Met: []? Not Met: []? Adjusted  3. Patient will tolerate least restrictive diet with no overt clinical s/s of aspiration/penetration. [x]? Progressing: []? Met: []? Not Met: []? Adjusted         Progression Towards Functional goals:  [x] Patient is progressing as expected towards functional goals listed. [] Progression is slowed due to complexities listed. [] Progression has been slowed due to co-morbidities.   [] Plan just implemented, too soon to assess goals progression  [] Other:     Persisting Functional Limitations/Impairments:  [x]Attention []Word Finding       [x]Memory [x]Speech Intelligibility      [x]Executive Function [x]Diet Tolerance     []Problem Solving []Coughing/Choking with PO  []Expressive Language []Medication/Finance Management  [x]Receptive Language []Other:      ASSESSMENT: The patient is making good progress towards all speech, language, cognitive, and dysphagia goals. Pt has met swallowing goal for tolerance of least-restrictive diet, no overt clinical s/s aspiration across a variety of textures over several session. Pt continues to report globus sensation during swallow and increased effort with swallowing via EAT-10, recommend continuation of dysphagia exercises. Pt also reports via CPIB difficulty with speaking quickly and with several communication partners. Discussed change in CPIB score from initial evaluation, pt reports noticing her speech difficulty more frequently as she communicates more in her community. Pt accurately implements intelligibility strategies at the sentence level and benefits from support to extend strategies to conversational level. Pt also continues to benefit from skilled intervention for complex reasoning for functional ADL completion and for attention for left visual neglect. Continue POC as above. Treatment/Activity Tolerance:  [x] Patient able to complete tx  [] Patient limited by fatigue  [] Patient limited by pain  [] Patient limited by other medical complications  [] Other:     Prognosis: [x] Good [] Fair  [] Poor    Patient Requires Follow-up: [x] Yes  [] No    PLAN: See josey. ST 2x / week for 6 weeks.    [x] Continue per plan of care [] Alter current plan (see comments)  [] Plan of care initiated [] Hold pending MD visit [] Discharge    Electronically signed by:   Tonye Paget, MA CCC-SLP  Speech-Language Pathologist  SP. [id number]       Note: If patient does not return for scheduled/ recommended follow up visits, this note will serve as a discharge from care along with most recent update on progress.

## 2021-11-26 NOTE — FLOWSHEET NOTE
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  · Treatment Diagnosis: functional limitations secondary to brain bleed  Insurance/Certification information:  PT Insurance Information: [employer] - 30 visits per year; preauth required  Physician Information:  Referring Practitioner: Elina Hernandez MD  Plan of care signed (Y/N): faxed 21    Date of Patient follow up with Physician:     Functional scale: Optimal:  raw score = 81; dysfunction = 80%    Progress Report: []  Yes  [x]  No     Date Range for reporting period:  Beginning   Ending 11/15    Progress report due (10 Rx/or 30 days whichever is less): visit #10 or     Recertification due (POC duration/ or 90 days whichever is less): visit #12    Visit # Insurance Allowable Auth required? Date Range    96 units w/OT  48 units each discipline [x]  Yes  []  No   12/10/21          Latex Allergy:  [x]NO      []YES  Preferred Language for Healthcare:   [x]English       []other:      Pain level:  3/10 L knee    SUBJECTIVE:     L knee continues to give her fits. Takes tylenol that helps, denies a headache or any new difficulties since last session. **suggested pt obtain light, soft knee brace, sleeve style to add some support. L knee joint frequently hyperextends with weight-bearing most likely causing pain.         OBJECTIVE:    11/15:    30 sec sit to stand transitions: 5  Gait Pattern:  W/SBQC, L toe drag, flexed L knee throughout stance phase, weight shifted onto RLE, frequently hops over LLE;  Able to initiate L hip flex, toe drag minimizes swing phase, DF assist does help but not consistently alleviate toe drag. Forward pitched posture;  Frequently lifts and moves R foot and cane. 11/4: pt not wearing AFO today     10/14: To clinic in wheelchair with soft AFO on LLE; received script for OT and speech this date and pt is now scheduled for all 3 therapies      RESTRICTIONS/PRECAUTIONS: h/o aneurysm    Exercises/Interventions:   Therapeutic Exercises (23892) Resistance / level Sets/sec Reps Notes   Nu step / step one  LEs only w/man facilitation to lateral L knee joint for improved hip alignment    Seated without AFO:  Hip flexion  LAQ  Hip abd  bridging             Seated in w/c w/B feet placed on mat #6  -hip ext            -frequent VCs to encourage L hip extensor activation   Standing R hip w/QC  Standing stool pushes Max A  Max A  x10  x10 -man cues to facilitate improved LLE mechanics  -man cues to facilitate improved LLE mechanics                                      Therapeutic Activities (77532)       Sit to stand transitions from mat #6  -Man & VC's to reach forward w/BUEs    -instructed pt to use B outstretched UEs to improve 'nose over toes'   Stand pivot w/SBQC w/c to mat table #6    Max A   Donned L shoe, shoe horn required Total A                    STS mod mat table with maria alejandra-walker    Once standing added weight shifting M/L    One trial added L LE stepping (minimal success without AFO)     Transfer onto step one    Transfer off step one to [place]    Pt has difficulty with problem solving and motor planning   VCs for watching where L foot is positioned    Transfer from [facility] to [facility]  Transfer from [facility] to [facility]   Stand pivot to R  Sit pivot to R - attempted L but too scared           Tall kneeling EOM  Tall kneeling: , 2 steps R  -man cues to sacrum & sternum to improve posture  -man facilitation to sacrum & medial lower LLE to advance                        Neuromuscular Re-ed (02049)       Static stand w/R foot on AurEx   5 mins x2 -man cues to facilitate improved LLE mechanics.    L biceps tendon pressure to improve elbow extension and hand/wrist placment              EOM:  Scooting:  -Out to edge  -L laterally  -R laterally            -able to scoot out w/pelvis in midline p instruction  -max cues for correct technique and encouragement  -good form, required mod A for LLE positioning   Rolling in bed R and L    Min A for placement and sequencing    bridge   CGA, VCs for hip adduction    Hip add in hooklying   TCs for target   Heel slides AAROM to AROM   AAROM to begin, but slowly gained more motion and power, responds well to cues for power          Gait Training:  Foot in front then behind at elevated mat table          Amb w/QC  Amb w/QC        Amb pushing weight lymph cart;  purple TB for DF and knee flexion assist    Amb w/hemiwalker Min AMod AMod AMax x 2         Mod A50'x2            11/18:  performed x8 safely w/pt and was added to HEP;  PT (then ) inhibited L knee recurvatum w/man cues to posterior knee.        -man blocking to minimize L genu recurvatum          facilitation with tband at L LE, Max A for placing L foot as pt puling forward, VCs for sequencing, block to L knee and to cart to avoid pt pulling back     -purple TB for L ankle DF & knee flex assist   Weight shifts  -lateral   -foot onto box, lean onto foot, then return        -max man cues for correct posture and shifting  -max cues for improved weight-shifting & VCs to reduce speed & improve control   Amb bwds w/cart                                Manual Intervention (97172)       Stretches:  Seated hamstring stretch                                                Modalities: none    Pt. Education:   11/9:  Pt requested order for hemiwalker, feels like it would improve her fear and ability to walk. Discussed increased support it provides and less LE & core muscle activation that is required to use hemiwalker, potentially becoming a step backwards and increasing her dependency on AD.   Did discuss importance of continued walking and activity at home and if pt is not currently walking at home due to amount of work/effort required to amb w/SBQC, to obtain a hemiwalker so pt is more likely to return to typical home activities. Discussed nearby medical supply stores and to contact PCP and have order sent to them.  and pt reported understanding. 10/14:  Verbal cues for proper transfer technique and safety  -patient educated on diagnosis, prognosis and expectations for rehab  -all patient questions were answered    HEP instruction: 10/14:  Discussed safety on transfers. 10/18: Instructed pt to perform standing lateral weight-shifts hourly in front of chair, with a family member on L side as needed. Therapeutic Exercise and NMR EXR  [] (79561) Provided verbal/tactile cueing for activities related to strengthening, flexibility, endurance, ROM for improvements in  [] LE / Lumbar: LE, proximal hip, and core control with self care, mobility, lifting, ambulation. [] UE / Cervical: cervical, postural, scapular, scapulothoracic and UE control with self care, reaching, carrying, lifting, house/yardwork, driving, computer work. [x] (82947) Provided verbal/tactile cueing for activities related to improving balance, coordination, kinesthetic sense, posture, motor skill, proprioception to assist with   [x] LE / lumbar: LE, proximal hip, and core control in self care, mobility, lifting, ambulation and eccentric single leg control. [x] UE / cervical: cervical, scapular, scapulothoracic and UE control with self care, reaching, carrying, lifting, house/yardwork, driving, computer work.   [] (56711) Therapist is in constant attendance of 2 or more patients providing skilled therapy interventions, but not providing any significant amount of measurable one-on-one time to either patient, for improvements in  [] LE / lumbar: LE, proximal hip, and core control in self care, mobility, lifting, ambulation and eccentric single leg control. [] UE / cervical: cervical, scapular, scapulothoracic and UE control with self care, reaching, carrying, lifting, house/yardwork, driving, computer work. NMR and Therapeutic Activities:    [x] (86222 or 42317) Provided verbal/tactile cueing for activities related to improving balance, coordination, kinesthetic sense, posture, motor skill, proprioception and motor activation to allow for proper function of   [x] LE: / Lumbar core, proximal hip and LE with self care and ADLs  [x] UE / Cervical: cervical, postural, scapular, scapulothoracic and UE control with self care, carrying, lifting, driving, computer work. [x] (24916) Gait Re-education- Provided training and instruction to the patient for proper LE, core and proximal hip recruitment and positioning and eccentric body weight control with ambulation re-education including up and down stairs     Home Management Training / Self Care:  [] (72800) Provided self-care/home management training related to activities of daily living and compensatory training, and/or use of adaptive equipment for improvement with: ADLs and compensatory training, meal preparation, safety procedures and instruction in use of adaptive equipment, including bathing, grooming, dressing, personal hygiene, basic household cleaning and chores.      Home Exercise Program:    [] (59573) Reviewed/Progressed HEP activities related to strengthening, flexibility, endurance, ROM of   [] LE / Lumbar: core, proximal hip and LE for functional self-care, mobility, lifting and ambulation/stair navigation   [] UE / Cervical: cervical, postural, scapular, scapulothoracic and UE control with self care, reaching, carrying, lifting, house/yardwork, driving, computer work  [] (23447)Reviewed/Progressed HEP activities related to improving balance, coordination, kinesthetic sense, posture, motor skill, proprioception of   [] LE: core, proximal hip and LE for self care, mobility, lifting, and ambulation/stair navigation    [] UE / Cervical: cervical, postural,  scapular, scapulothoracic and UE control with self care, reaching, carrying, lifting, house/yardwork, driving, computer work    Manual Treatments:  PROM / STM / Oscillations-Mobs:  G-I, II, III, IV (PA's, Inf., Post.)  [] (98368) Provided manual therapy to mobilize LE, proximal hip and/or LS spine soft tissue/joints for the purpose of modulating pain, promoting relaxation,  increasing ROM, reducing/eliminating soft tissue swelling/inflammation/restriction, improving soft tissue extensibility and allowing for proper ROM for normal function with   [] LE / lumbar: self care, mobility, lifting and ambulation. [] UE / Cervical: self care, reaching, carrying, lifting, house/yardwork, driving, computer work. Modalities:  [] (21540) Vasopneumatic compression: Utilized vasopneumatic compression to decrease edema / swelling for the purpose of improving mobility and quad tone / recruitment which will allow for increased overall function including but not limited to self-care, transfers, ambulation, and ascending / descending stairs. Charges:  Timed Code Treatment Minutes: 42   Total Treatment Minutes: 42     Units approved Units used Date Range    48 35 12/10/21   **updated 11/23**    [] EVAL - LOW (66049)   [] EVAL - MOD (72015)  [] EVAL - HIGH (31051)  [] RE-EVAL (44263)  [] NS(95332) x       [] Ionto  [x] NMR (87637) x  2    [] Vaso  [] Manual (76788) x       [] Ultrasound  [] TA x       [] Mech Traction (39759)  [] Aquatic Therapy x      [] ES (un) (64997):   [] Home Management Training x  [] ES(attended) (40597)   [] Dry Needling 1-2 muscles (81392):  [] Dry Needling 3+ muscles (240464  [] Group:      [x] Other: gait x 1    GOALS:   Patient stated goal: To return to as normal activity as possible.       Therapist goals for Patient:   Short Term Goals: To be achieved in: 2 weeks  1.  Independent in HEP and progression per patient tolerance, in order to prevent re-injury.  - Goal Met  2. Patient will have a decrease in pain to facilitate improvement in movement, function, and ADLs as indicated by Functional Deficits. - Goal Met     Long Term Goals: To be achieved in: 6 weeks  1. Pt will demonstrate the ability to perform sit to/from stand with supervision.  -30 sec STS: 5 reps  2.  -Met 11/15/21  **11/15/21**: 2a.  Pt will amb 150' w/SBQC & min A.   - Not Met, amb   3. Patient will demonstrate an increase in postural awareness and control to allow for proper functional mobility as indicated by patients Functional Deficits. - Weight remains shifted onto RLE  4. Patient will return to functional activities including transfers without increased symptoms or restriction.  - Not Met    Overall Progression Towards Functional goals/ Treatment Progress Update:  [] Patient is progressing as expected towards functional goals listed. [] Progression is slowed due to complexities/Impairments listed. [x] Progression has been slowed due to co-morbidities. [] Plan just implemented, too soon to assess goals progression <30days   [] Goals require adjustment due to lack of progress  [] Patient is not progressing as expected and requires additional follow up with physician  [] Other    Persisting Functional Limitations/Impairments:  []Sleeping []Sitting              [x]Standing []Transfers        [x]Walking []Kneeling              []Stairs [x]Squatting / bending   [x]ADLs [x]Reaching  []Lifting  [x]Housework  []Driving [x]Job related tasks  [x]Sports/Recreation []Other:        ASSESSMENT:    L genu recurvatum continues to easily occur with weight-bearing, persistent knee pain is occurring. Pt and family to consider light-weight, sleeve style knee brace to assist in managing pain. Continue to facilitate LLE weight acceptance and improved mechanics during gait cycle.        Treatment/Activity Tolerance:  [x] Patient able to complete tx  [] Patient limited by fatigue  [] Patient limited by pain  [] Patient limited by other medical complications  [] Other:     Prognosis: [x] Good [] Fair  [] Poor    Patient Requires Follow-up: [x] Yes  [] No    Plan for next treatment session:  per training diary and progress as tolerated. PLAN: See eval. PT 2x / week for 6 weeks. [x] Continue per plan of care [] Alter current plan (see comments)  [] Plan of care initiated [] Hold pending MD visit [] Discharge    Electronically signed by: Elizabeth Suero PT, DPT    Note: If patient does not return for scheduled/ recommended follow up visits, this note will serve as a discharge from care along with most recent update on progress.

## 2021-11-29 ENCOUNTER — HOSPITAL ENCOUNTER (OUTPATIENT)
Dept: PHYSICAL THERAPY | Age: 41
Setting detail: THERAPIES SERIES
Discharge: HOME | End: 2021-11-29
Payer: COMMERCIAL

## 2021-11-29 ENCOUNTER — HOSPITAL ENCOUNTER (OUTPATIENT)
Dept: SPEECH THERAPY | Age: 41
Setting detail: THERAPIES SERIES
Discharge: HOME | End: 2021-11-29
Payer: COMMERCIAL

## 2021-11-29 ENCOUNTER — HOSPITAL ENCOUNTER (OUTPATIENT)
Dept: OCCUPATIONAL THERAPY | Age: 41
Setting detail: THERAPIES SERIES
Discharge: HOME | End: 2021-11-29
Payer: COMMERCIAL

## 2021-11-29 PROCEDURE — 97112 NEUROMUSCULAR REEDUCATION: CPT

## 2021-11-29 PROCEDURE — 92526 ORAL FUNCTION THERAPY: CPT

## 2021-11-29 PROCEDURE — 97530 THERAPEUTIC ACTIVITIES: CPT

## 2021-11-29 PROCEDURE — 97116 GAIT TRAINING THERAPY: CPT

## 2021-11-29 PROCEDURE — 92507 TX SP LANG VOICE COMM INDIV: CPT

## 2021-11-29 NOTE — PROGRESS NOTES
Distal Palmar Crease (cm)) Held in flexion; able to stretch into extension to 2 cm lag    11/29: Held in flexion; able to stretch into extension to 4 cm lag           RESTRICTIONS/PRECAUTIONS:  hx brain bleed, fall risk    Exercises/Interventions: Treatment focused on increase use of LUE during functional tasks increasing ROM and strength. Session initiated with assessment of pt progress with subjective and objective measures noted above, and progress with goals noted below. Functional assessment completed involving bed mobility and movement in supine, seated, and standing. Stand pivot tansfer from w/c>edge of mat with CGA. Supine><sit transfers min A. Mild dizziness following transition to seated after supine; resolved within 2 minutes seated rest break. Focused on use of LUE as ANDREAS throughout session at mat and walker, as well as trunk lengthening during seated side reaches and forward/back step progression with CGA-min A for balance. Emphasis also on weight bearing through LLE to improve R step length. Therapeutic Exercises  Resistance / level Sets/sec Reps Notes                                                                                Neuromuscular Re-ed / Therapeutic Activities                                                 Manual Intervention                                                     Modalities:     Patient education:  Eval, POC, HEP- pt verbalized understanding       Home Exercise Program:  Written and verbal HEP instructions provided and reviewed:  · Scapular retraction   · Use of resting hand splint  · Mirror therapy at home  · 11/29/21: use of L hand as ANDREAS when seated and during transfers    Therapeutic Exercise and NMR:  [x] (31016) Provided verbal/tactile cueing for activities related to strengthening, flexibility, endurance, ROM  for improvements in scapular, scapulothoracic and UE control with self care, reaching, carrying, lifting, house/yardwork, driving/computer work.     [] (68321) Provided verbal/tactile cueing for activities related to improving balance, coordination, kinesthetic sense, posture, motor skill, proprioception  to assist with  scapular, scapulothoracic and UE control with self care, reaching, carrying, lifting, house/yardwork, driving/computer work.   [] Comments:    Therapeutic Activities:    [x] (78242 or 39961) Provided verbal/tactile cueing for activities related to improving balance, coordination, kinesthetic sense, posture, motor skill, proprioception and motor activation to allow for proper function of scapular, scapulothoracic and UE control with self care, carrying, lifting, driving/computer work  [] Comments:    Home Exercise Program:    [x] (29259) Reviewed/Progressed HEP activities related to strengthening, flexibility, endurance, ROM of scapular, scapulothoracic and UE control with self care, reaching, carrying, lifting, house/yardwork, driving/computer work  [] (66177) Reviewed/Progressed HEP activities related to improving balance, coordination, kinesthetic sense, posture, motor skill, proprioception of scapular, scapulothoracic and UE control with self care, reaching, carrying, lifting, house/yardwork, driving/computer work    [] Comments:    Manual Treatments:  PROM / STM / Oscillations-Mobs:  G-I, II, III, IV (PA's, Inf., Post.)  [x] (66498) Provided manual therapy to mobilize soft tissue/joints of cervical/CT, scapular GHJ and UE for the purpose of modulating pain, promoting relaxation,  increasing ROM, reducing/eliminating soft tissue swelling/inflammation/restriction, improving soft tissue extensibility and allowing for proper ROM for normal function with self care, reaching, carrying, lifting, house/yardwork, driving/computer work  [] Comments:    ADL Training:  [] (65143) Provided self-care/home management training related to activities of daily living and compensatory training, and/or use of adaptive equipment   [] Comments:     Splinting:  [] Fabrication of:   [] (41639) Orthotic/Prosthetic Management, subsequent encounter  [] (17963) Orthotic management and training (fitting and assessment)  [] Comments:      Charges:  Timed Code Treatment Minutes: 47   Total Treatment Minutes: 47     [] EVAL (LOW) 96906   [] OT Re-eval (27410)  [] EVAL (MOD) 46223   [] EVAL (HIGH) 16318       [] Dolly (73888) x     [] CUMQT(80781)  [x] NMR (83889) x  2 [] Estim (attended) (61574)   [] Manual ([id number]) x  [] US (74651)  [x] TA (15231) x  1  [] Paraffin (09151)  [] ADL  (72907) x    [] Splint/L code:    [] Estim (unattended) ([id number])  [] Fluidotherapy (30430)  [] Other:    GOALS:   Patient stated goal: L hand use  [] Progressing: [] Met: [] Not Met: [] Adjusted     Therapist goals for Patient:   Short Term Goals: To be achieved in: 30 days  1. Pt will improve functional ROM to use LUE as ANDREAS during transfers for increased safety and independence by 11/19/21. [x] Progressing: [] Met: [] Not Met: [] Adjusted  2. Pt will improve functional LUE use to complete UB dressing mod I for increased independence in ADLs by 11/19/21. [x] Progressing: [] Met: [] Not Met: [] Adjusted  3. Pt will improve functional LUE use to complete LB dressing min A for increased independence in ADLs by 11/19/21. [x] Progressing: [] Met: [] Not Met: [] Adjusted  4. Pt will improve oculomotor and cognition skills to complete Trailmaking Part B assessment within 180 seconds with no errors by 11/19/21. [x] Progressing: [] Met: [] Not Met: [] Adjusted     Long Term Goals: To be achieved by discharge  1. Pt will report a QuickDASH Symptom Severity Scale score of 65% or less indicating increased safety and functional independence in desired occupational pursuits by discharge. [x] Progressing: [] Met: [] Not Met: [] Adjusted  2.  Pt will increase STREAM total score from 21/70 to 31/70 indicating increased safety and functional independence in desired occupational pursuits by discharge. (added 11/29/21)  [x] Progressing: [] Met: [] Not Met: [] Adjusted    Progression Towards Functional goals:  [] Patient is progressing as expected towards functional goals listed. [x] Progression is slowed due to complexities listed. -falls and fear of falling, significant proprioceptive impairment  [] Progression has been slowed due to co-morbidities. [] Plan just implemented, too soon to assess goals progression  [] All goals are met  [] Other:     ASSESSMENT:  Pt has made improvements in PROM of LUE with decreased tone, but continues to have limited participation in functional mobility due to falls/fear of falling and proprioceptive impairment. Treatment/Activity Tolerance:  [] Patient tolerated treatment well [] Patient limited by fatique  [x] Patient limited by pain  [x] Patient limited by other medical complications- falls and fear of falling, significant proprioceptive impairment.  [] Other:     Prognosis: [x] Good [] Fair  [] Poor    Patient Requires Follow-up: [x] Yes  [] No    PLAN: See eval  [x] Continue per plan of care [] Alter current plan (see comments)  [] Plan of care initiated [] Hold pending MD visit [] Discharge    Electronically signed by:       TERRELL Garcia/MONIQUE [id number]

## 2021-11-29 NOTE — FLOWSHEET NOTE
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 Issa Gonsalez MD   Plan of care signed (Y/N): N (sent 10/18, )    Date of Patient follow up with Physician: DEWEY    Functional outcome measure: The Communicative Participation Item Bank Total:  (2021)     Progress Note: []  Yes  [x]  No  Next due by: 2021    RESTRICTIONS/PRECAUTIONS: hx of brain bleed   Latex Allergy:  [x]NO      []YES    Preferred Language for Healthcare:   [x]English       []other    Visit # Insurance Allowable Date Range (if applicable)    48 units  10/     Pain level: 0/10     SUBJECTIVE: Pt alert and receptive,  present during session. Continued to encourage consistent home practice of oral motor and swallowing exercises. OBJECTIVE: See eval/Progress Note    Exercises/Interventions:      Motor Speech Therapy (27137) Accuracy Cues Notes   Apraxia       Dysarthria SLOB training during short (2 min) conversational level:  - SLP rated intelligibility: 70%  - pt-rated intelligibility: 45% Fading visual and verbal cues for SLOB strategy Pt independently selected strategy for slowing rate   Intelligibility      Other:          Cognitive Function (90369 & 64567) Accuracy Cues Notes   Orientation        Attention           Sustained         Selective           Alternating Alternating attention involving sorting into four categories (even, odd, black, red) with 75% accuracy (39/52) Increased to 100% given min verbal cues to attend to visual stimuli (category labels) Visual cues placed on left side    Reduced recall of face card values despite repetition      Divided      Memory          Immediate/Working         Short term         Long term         Prospective      Problem Solving       Visuospatial       Executive Function  Deductive reasoning using calendar comprehension with 70% accuracy (25/36) Increased to 100% given min verbal cues to attend to visual left Visual strategies reinforced: highlight border, scanning across page, self-talk   Other:          Dysphagia Therapy (45676) Strategies Tolerance Notes   Diet texture:      Regular      Dysphagia III/Soft & Bite-Sized      Dysphagia II/Minced & Moist      Dysphagia I/Puree      Liquid Consistency: Thin      Nectar thick/Mildly thick      Honey thick/Moderately thick      Ice chips       Reps Cue level    Oral Phase Exercises:       Bolus Control Exercises      []Lateralizations      []A-P propulsions      [x]Diagonal A-P propulsions Reps x 10 Min visual cues    [x]L/R A-P propulsion Reps x 10 Min visual cues    Buccal and Labial Exercises      Lingual Resistance Exercises Education provided to  re: completion at home Min cues    Exercises: Pt instructed and trained in laryngeal/pharyngeal strengthening exercises including:      []Shari maneuver      [x]Effortful swallows Completed with thin liquid boluses x 10 and x 10 puree boluses Verbal and visual reinforcement for effort and endurance    []Supraglottic swallows      []Mendelsohn maneuver      []Modified Shaker (sitting up chin to chest)      []Neuromuscular electrical stimulation (VitalStim)     Pt.  Education:  -Pt educated on diagnosis, prognosis and expectations for rehab  -All pt questions were answered  -Pt verbalized understanding and agreement    HEP instruction:  -Pt provided with written and illustrated instructions for HEP: intelligibility strategies, oral motor exercises, safe swallowing complete oral motor and bolus control exercises with 80% accuracy independently to improve oral phase of swallow. []? Progressing: []? Met: []? Not Met: []? Adjusted     Long Term Goals: To be achieved in: 6 weeks  1. Patient will demonstrate improved cognitive-linguistic function to improve safety and independence in ADLs.  []? Progressing: []? Met: []? Not Met: []? Adjusted  2. Patient will utilize speech intelligibility strategies to increase intelligibility to greater than 90% in conversation to a familiar/unfamiliar listener. []? Progressing: []? Met: []? Not Met: []? Adjusted  3. Patient will tolerate least restrictive diet with no overt clinical s/s of aspiration/penetration. []? Progressing: []? Met: []? Not Met: []? Adjusted         MET GOALS:  Patient will participate in further cognitive-linguistic evaluation with additional goals as indicated. (MET 11/8)  Patient will tolerate recommended diet with no overt s/s of aspiration or noticeable fatigue. (MET 11/26)    Progression Towards Functional goals:  [x] Patient is progressing as expected towards functional goals listed. [] Progression is slowed due to complexities listed. [] Progression has been slowed due to co-morbidities. [] Plan just implemented, too soon to assess goals progression  [] Other:     Persisting Functional Limitations/Impairments:  [x]Attention []Word Finding       [x]Memory [x]Speech Intelligibility      [x]Executive Function [x]Diet Tolerance     []Problem Solving []Coughing/Choking with PO  []Expressive Language []Medication/Finance Management  [x]Receptive Language []Other:      ASSESSMENT: See eval    Treatment/Activity Tolerance:  [x] Patient able to complete tx  [] Patient limited by fatigue  [] Patient limited by pain  [] Patient limited by other medical complications  [] Other:     Prognosis: [x] Good [] Fair  [] Poor    Patient Requires Follow-up: [x] Yes  [] No    PLAN: See eval. ST 2x / week for 6 weeks.    [x] Continue per plan of care [] Alter current plan (see comments)  [] Plan of care initiated [] Hold pending MD visit [] Discharge    Electronically signed by:   Mariaelena Duran MA CCC-SLP  Speech-Language Pathologist  SP. [id number]       Note: If patient does not return for scheduled/ recommended follow up visits, this note will serve as a discharge from care along with most recent update on progress.

## 2021-11-29 NOTE — FLOWSHEET NOTE
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  · Treatment Diagnosis: functional limitations secondary to brain bleed  Insurance/Certification information:  PT Insurance Information: [employer] - 30 visits per year; preauth required  Physician Information:  Referring Practitioner: Felicia Regalado MD  Plan of care signed (Y/N): faxed 21    Date of Patient follow up with Physician:     Functional scale: Optimal:  raw score = 81; dysfunction = 80%    Progress Report: []  Yes  [x]  No     Date Range for reporting period:  Beginning   Ending 11/15    Progress report due (10 Rx/or 30 days whichever is less): visit #10 or     Recertification due (POC duration/ or 90 days whichever is less): visit #12    Visit # Insurance Allowable Auth required? Date Range   15/24 96 units w/OT  48 units each discipline [x]  Yes  []  No   12/10/21          Latex Allergy:  [x]NO      []YES  Preferred Language for Healthcare:   [x]English       []other:      Pain level:  1-2/10 L knee, 3/10 L wrist    SUBJECTIVE:     Having a little more L knee and wrist pain. Just finished with OT.        OBJECTIVE:    11/15:    30 sec sit to stand transitions: 5  Gait Pattern:  W/SBQC, L toe drag, flexed L knee throughout stance phase, weight shifted onto RLE, frequently hops over LLE;  Able to initiate L hip flex, toe drag minimizes swing phase, DF assist does help but not consistently alleviate toe drag. Forward pitched posture;  Frequently lifts and moves R foot and cane. : pt not wearing AFO today     10/14:  To clinic in wheelchair with soft AFO on LLE; received script for OT and speech this date and pt is now scheduled for all 3 therapies      RESTRICTIONS/PRECAUTIONS: h/o aneurysm    Exercises/Interventions:   Therapeutic Exercises (02322) Resistance / level Sets/sec Reps Notes   Nu step / step one  LEs only w/man facilitation to lateral L knee joint for improved hip alignment    Seated without AFO:  Hip flexion  LAQ  Hip abd  bridging             Seated in w/c w/B feet placed on mat #6  -hip ext            -frequent VCs to encourage L hip extensor activation   Standing R hip w/QC  Standing stool pushes Max A  Max A  x10  x10 -man cues to facilitate improved LLE mechanics  -man cues to facilitate improved LLE mechanics                                      Therapeutic Activities (67838)       Sit to stand transitions from mat #6  -Man & VC's to reach forward w/BUEs    -instructed pt to use B outstretched UEs to improve 'nose over toes'   Stand pivot w/SBQC w/c to mat table #6    Max A   Donned L shoe, shoe horn required Total A      Fwd step ups w/HR & QC 4\" 1 5 B Mod A & assist to maintain L knee stability   Navigated 4\" steps step to pattern w/HR & QC 4\" x1  Max A & man assist to maintain LLE stability with each step   STS mod mat table with maria alejandra-walker    Once standing added weight shifting M/L    One trial added L LE stepping (minimal success without AFO)     Transfer onto step one    Transfer off step one to [place]    Pt has difficulty with problem solving and motor planning   VCs for watching where L foot is positioned    Transfer from [facility] to [facility]  Transfer from [facility] to [facility]   Stand pivot to R  Sit pivot to R - attempted L but too scared           Tall kneeling EOM  Tall kneeling: , 2 steps R  -man cues to sacrum & sternum to improve posture  -man facilitation to sacrum & medial lower LLE to advance                        Neuromuscular Re-ed (19574)       Static stand w/R foot on AurEx   5 mins x2 -man cues to facilitate improved LLE mechanics.    L biceps tendon pressure to improve elbow extension and hand/wrist placment              EOM:  Scooting:  -Out to edge  -L laterally  -R laterally            -able to scoot out w/pelvis in midline p instruction  -max cues for correct technique and encouragement  -good form, required mod A for LLE positioning   Rolling in bed R and L    Min A for placement and sequencing    bridge   CGA, VCs for hip adduction    Hip add in hooklying   TCs for target   Heel slides AAROM to AROM   AAROM to begin, but slowly gained more motion and power, responds well to cues for power          Gait Training:  Foot in front then behind at elevated mat table          Amb w/QC  Amb w/QC        Amb pushing weight lymph cart;  purple TB for DF and knee flexion assist    Amb w/hemiwalker Min AMod AMax A, min AMax x 2         Mod A50'x2  30'x2          11/18:  performed x8 safely w/pt and was added to HEP;  PT (then ) inhibited L knee recurvatum w/man cues to posterior knee.        -man blocking to minimize L genu recurvatum  - providing primary assist        facilitation with tband at L LE, Max A for placing L foot as pt puling forward, VCs for sequencing, block to L knee and to cart to avoid pt pulling back     -purple TB for L ankle DF & knee flex assist   Weight shifts  -lateral   -foot onto box, lean onto foot, then return        -max man cues for correct posture and shifting  -max cues for improved weight-shifting & VCs to reduce speed & improve control   Amb bwds w/cart                                Manual Intervention (00160)       Stretches:  Seated hamstring stretch                                                Modalities: none    Pt. Education:   11/9:  Pt requested order for hemiwalker, feels like it would improve her fear and ability to walk. Discussed increased support it provides and less LE & core muscle activation that is required to use hemiwalker, potentially becoming a step backwards and increasing her dependency on AD.   Did discuss importance of continued walking and activity at home and if pt is not currently walking at home due to amount of work/effort required to amb w/SBQC, to obtain a hemiwalker so pt is more likely to return to typical home activities. Discussed nearby medical supply stores and to contact PCP and have order sent to them.  and pt reported understanding. 10/14:  Verbal cues for proper transfer technique and safety  -patient educated on diagnosis, prognosis and expectations for rehab  -all patient questions were answered    HEP instruction: 10/14:  Discussed safety on transfers. 10/18: Instructed pt to perform standing lateral weight-shifts hourly in front of chair, with a family member on L side as needed. Therapeutic Exercise and NMR EXR  [] (09128) Provided verbal/tactile cueing for activities related to strengthening, flexibility, endurance, ROM for improvements in  [] LE / Lumbar: LE, proximal hip, and core control with self care, mobility, lifting, ambulation. [] UE / Cervical: cervical, postural, scapular, scapulothoracic and UE control with self care, reaching, carrying, lifting, house/yardwork, driving, computer work. [x] (50015) Provided verbal/tactile cueing for activities related to improving balance, coordination, kinesthetic sense, posture, motor skill, proprioception to assist with   [x] LE / lumbar: LE, proximal hip, and core control in self care, mobility, lifting, ambulation and eccentric single leg control.    [x] UE / cervical: cervical, scapular, scapulothoracic and UE control with self care, reaching, carrying, lifting, house/yardwork, driving, computer work.   [] (41638) Therapist is in constant attendance of 2 or more patients providing skilled therapy interventions, but not providing any significant amount of measurable one-on-one time to either patient, for improvements in  [] LE / lumbar: LE, proximal hip, and core control in self care, mobility, lifting, ambulation and eccentric single leg control. [] UE / cervical: cervical, scapular, scapulothoracic and UE control with self care, reaching, carrying, lifting, house/yardwork, driving, computer work. NMR and Therapeutic Activities:    [x] (80600 or 23443) Provided verbal/tactile cueing for activities related to improving balance, coordination, kinesthetic sense, posture, motor skill, proprioception and motor activation to allow for proper function of   [x] LE: / Lumbar core, proximal hip and LE with self care and ADLs  [x] UE / Cervical: cervical, postural, scapular, scapulothoracic and UE control with self care, carrying, lifting, driving, computer work. [x] (94335) Gait Re-education- Provided training and instruction to the patient for proper LE, core and proximal hip recruitment and positioning and eccentric body weight control with ambulation re-education including up and down stairs     Home Management Training / Self Care:  [] (75849) Provided self-care/home management training related to activities of daily living and compensatory training, and/or use of adaptive equipment for improvement with: ADLs and compensatory training, meal preparation, safety procedures and instruction in use of adaptive equipment, including bathing, grooming, dressing, personal hygiene, basic household cleaning and chores.      Home Exercise Program:    [] (94936) Reviewed/Progressed HEP activities related to strengthening, flexibility, endurance, ROM of   [] LE / Lumbar: core, proximal hip and LE for functional self-care, mobility, lifting and ambulation/stair navigation   [] UE / Cervical: cervical, postural, scapular, scapulothoracic and UE control with self care, reaching, carrying, lifting, house/yardwork, driving, computer work  [] (69306)Reviewed/Progressed HEP activities related to improving balance, coordination, kinesthetic sense, posture, motor skill, proprioception of   [] LE: core, proximal hip and LE for self care, mobility, lifting, and ambulation/stair navigation    [] UE / Cervical: cervical, postural,  scapular, scapulothoracic and UE control with self care, reaching, carrying, lifting, house/yardwork, driving, computer work    Manual Treatments:  PROM / STM / Oscillations-Mobs:  G-I, II, III, IV (PA's, Inf., Post.)  [] (49949) Provided manual therapy to mobilize LE, proximal hip and/or LS spine soft tissue/joints for the purpose of modulating pain, promoting relaxation,  increasing ROM, reducing/eliminating soft tissue swelling/inflammation/restriction, improving soft tissue extensibility and allowing for proper ROM for normal function with   [] LE / lumbar: self care, mobility, lifting and ambulation. [] UE / Cervical: self care, reaching, carrying, lifting, house/yardwork, driving, computer work. Modalities:  [] (24771) Vasopneumatic compression: Utilized vasopneumatic compression to decrease edema / swelling for the purpose of improving mobility and quad tone / recruitment which will allow for increased overall function including but not limited to self-care, transfers, ambulation, and ascending / descending stairs. Charges:  Timed Code Treatment Minutes: 42   Total Treatment Minutes: 42     Units approved Units used Date Range    48 38 12/10/21   **updated 11/29**    [] EVAL - LOW (72402)   [] EVAL - MOD (11459)  [] EVAL - HIGH (88871)  [] RE-EVAL (21196)  [] WI(32485) x       [] Ionto  [x] NMR (22493) x  1    [] Vaso  [] Manual (86728) x       [] Ultrasound  [] TA x       [] Mech Traction (38113)  [] Aquatic Therapy x      [] ES (un) (12433):   [] Home Management Training x  [] ES(attended) (66719)   [] Dry Needling 1-2 muscles (29562):  [] Dry Needling 3+ muscles (339017  [] Group:      [x] Other: gait x 2    GOALS:   Patient stated goal: To return to as normal activity as possible.       Therapist goals for Patient:   Short Term Goals: To be achieved in: 2 weeks  1.  Independent in HEP and progression per patient tolerance, in order to prevent re-injury.  - Goal Met  2. Patient will have a decrease in pain to facilitate improvement in movement, function, and ADLs as indicated by Functional Deficits. - Goal Met     Long Term Goals: To be achieved in: 6 weeks  1. Pt will demonstrate the ability to perform sit to/from stand with supervision.  -30 sec STS: 5 reps  2.  -Met 11/15/21  **11/15/21**: 2a.  Pt will amb 150' w/SBQC & min A.   - Not Met, amb   3. Patient will demonstrate an increase in postural awareness and control to allow for proper functional mobility as indicated by patients Functional Deficits. - Weight remains shifted onto RLE  4. Patient will return to functional activities including transfers without increased symptoms or restriction.  - Not Met    Overall Progression Towards Functional goals/ Treatment Progress Update:  [] Patient is progressing as expected towards functional goals listed. [] Progression is slowed due to complexities/Impairments listed. [x] Progression has been slowed due to co-morbidities. [] Plan just implemented, too soon to assess goals progression <30days   [] Goals require adjustment due to lack of progress  [] Patient is not progressing as expected and requires additional follow up with physician  [] Other    Persisting Functional Limitations/Impairments:  []Sleeping []Sitting              [x]Standing []Transfers        [x]Walking []Kneeling              []Stairs [x]Squatting / bending   [x]ADLs [x]Reaching  []Lifting  [x]Housework  []Driving [x]Job related tasks  [x]Sports/Recreation []Other:        ASSESSMENT:    Patient demo'd increased difficulty with ambulation this date, possibly due to increased anxiety of amb with  and performing stairs, first time she has done that since her aneurysm burst.  Pt is demo'ing increased use of LLE despite flexed knee and PF ankle during static stance.   Continue to improve confidence with family assistance to increase activity and ambulation at home. Treatment/Activity Tolerance:  [x] Patient able to complete tx  [] Patient limited by fatigue  [] Patient limited by pain  [] Patient limited by other medical complications  [] Other:     Prognosis: [x] Good [] Fair  [] Poor    Patient Requires Follow-up: [x] Yes  [] No    Plan for next treatment session:  per training diary and progress as tolerated. PLAN: See eval. PT 2x / week for 6 weeks. [x] Continue per plan of care [] Alter current plan (see comments)  [] Plan of care initiated [] Hold pending MD visit [] Discharge    Electronically signed by: Daniella Bustamante, PT, DPT    Note: If patient does not return for scheduled/ recommended follow up visits, this note will serve as a discharge from care along with most recent update on progress.

## 2021-12-02 ENCOUNTER — HOSPITAL ENCOUNTER (OUTPATIENT)
Dept: PHYSICAL THERAPY | Age: 41
Setting detail: THERAPIES SERIES
Discharge: HOME | End: 2021-12-02
Payer: COMMERCIAL

## 2021-12-02 PROCEDURE — 97112 NEUROMUSCULAR REEDUCATION: CPT

## 2021-12-02 PROCEDURE — 97116 GAIT TRAINING THERAPY: CPT

## 2021-12-02 PROCEDURE — 97530 THERAPEUTIC ACTIVITIES: CPT

## 2021-12-02 NOTE — FLOWSHEET NOTE
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  · Treatment Diagnosis: functional limitations secondary to brain bleed  Insurance/Certification information:  PT Insurance Information: [employer] - 30 visits per year; preauth required  Physician Information:  Referring Practitioner: Dominick Morin MD  Plan of care signed (Y/N): faxed 21     Date of Patient follow up with Physician:     Functional scale: Optimal:  raw score = 81; dysfunction = 80%    Progress Report: []  Yes  [x]  No     Date Range for reporting period:  Beginning   Ending 11/15    Progress report due (10 Rx/or 30 days whichever is less): visit #10 or //    Recertification due (POC duration/ or 90 days whichever is less): visit #12    Visit # Insurance Allowable Auth required? Date Range    96 units w/OT  48 units each discipline [x]  Yes  []  No   12/10/21          Latex Allergy:  [x]NO      []YES  Preferred Language for Healthcare:   [x]English       []other:      Pain level:  1-2/10 L knee, 3/10 L wrist    SUBJECTIVE:     States she feels ok since last visit, going to get fitted for an AFO next week at [facility]. Most problems in left knee and wrist in terms of pain       OBJECTIVE:    11/15:    30 sec sit to stand transitions: 5  Gait Pattern:  W/SBQC, L toe drag, flexed L knee throughout stance phase, weight shifted onto RLE, frequently hops over LLE;  Able to initiate L hip flex, toe drag minimizes swing phase, DF assist does help but not consistently alleviate toe drag. Forward pitched posture;  Frequently lifts and moves R foot and cane. : pt not wearing AFO today     10/14:  To clinic in wheelchair with soft AFO on LLE; received script for OT and speech this date and pt is now scheduled for all 3 therapies      RESTRICTIONS/PRECAUTIONS: h/o aneurysm    Exercises/Interventions:   Therapeutic Exercises (68750) Resistance / level Sets/sec Reps Notes   Nu step / step one  LEs only w/man facilitation to lateral L knee joint for improved hip alignment    Seated without AFO:  Hip flexion  LAQ  Hip abd  bridging             Seated in w/c w/B feet placed on mat #6  -hip ext            -frequent VCs to encourage L hip extensor activation   Standing R hip w/QC  Standing stool pushes   Max A    X 10 -man cues to facilitate improved LLE mechanics  -man cues to facilitate improved LLE mechanics                                      Therapeutic Activities (34814)       Sit to stand transitions from mat #6  -Man & VC's to reach forward w/BUEs   2 X 5 -instructed pt to use B outstretched UEs to improve 'nose over toes'   Stand pivot w/SBQC w/c to mat table #6    Max A   Donned L shoe, shoe horn required Total A      Fwd step ups w/HR & QC 4\" 1 5 B Mod/max A & assist to maintain L knee stability   Navigated 4\" steps step to pattern w/HR & QC 4\" X 1  Max A & man assist to maintain LLE stability with each step   STS mod mat table with maria alejandra-walker    Once standing added weight shifting M/L    One trial added L LE stepping (minimal success without AFO)     Transfer onto step one    Transfer off step one to [facility]    Pt has difficulty with problem solving and motor planning   VCs for watching where L foot is positioned    Transfer from [facility] to [facility]  Transfer from [facility] to [facility]   Stand pivot to R  Sit pivot to R - attempted L but too scared           Tall kneeling EOM  Tall kneeling: , 2 steps R  -man cues to sacrum & sternum to improve posture  -man facilitation to sacrum & medial lower LLE to advance                        Neuromuscular Re-ed (09385)       Static stand w/R foot on AurEx   5 mins X 1 -man cues to facilitate improved LLE mechanics. L biceps tendon pressure to improve elbow extension and hand/wrist placment       Forward/backward stepping with R foot   X 15  // bars, left hand on bars, cues to assist with left knee stability    EOM:  Scooting:  -Out to edge  -L laterally  -R laterally            -able to scoot out w/pelvis in midline p instruction  -max cues for correct technique and encouragement  -good form, required mod A for LLE positioning   Rolling in bed R and L    Min A for placement and sequencing    bridge   CGA, VCs for hip adduction    Hip add in hooklying   TCs for target   Heel slides AAROM to AROM   AAROM to begin, but slowly gained more motion and power, responds well to cues for power          Gait Training:  Foot in front then behind at elevated mat table          Amb w/QC  Amb w/QC        Amb pushing weight lymph cart;  purple TB for DF and knee flexion assist    Amb w/hemiwalker Min AMod AMax A, min AMax x 2         Mod A50'x2  30'x2          11/18:  performed x8 safely w/pt and was added to HEP;  PT (then ) inhibited L knee recurvatum w/man cues to posterior knee.        -man blocking to minimize L genu recurvatum  - providing primary assist        facilitation with tband at L LE, Max A for placing L foot as pt puling forward, VCs for sequencing, block to L knee and to cart to avoid pt pulling back     -purple TB for L ankle DF & knee flex assist   Weight shifts  -lateral   -foot onto box, lean onto foot, then return        -max man cues for correct posture and shifting  -max cues for improved weight-shifting & VCs to reduce speed & improve control   Amb bwds w/cart                                Manual Intervention (18351)       Stretches:  Seated hamstring stretch                                                Modalities: none    Pt. Education:   11/9:  Pt requested order for hemiwalker, feels like it would improve her fear and ability to walk.   Discussed increased support it provides and less LE & core muscle activation that is required to use hemiwalker, potentially becoming a step backwards and increasing her dependency on AD. Did discuss importance of continued walking and activity at home and if pt is not currently walking at home due to amount of work/effort required to amb w/SBQC, to obtain a hemiwalker so pt is more likely to return to typical home activities. Discussed nearby medical supply stores and to contact PCP and have order sent to them.  and pt reported understanding. 10/14:  Verbal cues for proper transfer technique and safety  -patient educated on diagnosis, prognosis and expectations for rehab  -all patient questions were answered    HEP instruction: 10/14:  Discussed safety on transfers. 10/18: Instructed pt to perform standing lateral weight-shifts hourly in front of chair, with a family member on L side as needed. Therapeutic Exercise and NMR EXR  [] (12380) Provided verbal/tactile cueing for activities related to strengthening, flexibility, endurance, ROM for improvements in  [] LE / Lumbar: LE, proximal hip, and core control with self care, mobility, lifting, ambulation. [] UE / Cervical: cervical, postural, scapular, scapulothoracic and UE control with self care, reaching, carrying, lifting, house/yardwork, driving, computer work. [x] (26181) Provided verbal/tactile cueing for activities related to improving balance, coordination, kinesthetic sense, posture, motor skill, proprioception to assist with   [x] LE / lumbar: LE, proximal hip, and core control in self care, mobility, lifting, ambulation and eccentric single leg control.    [x] UE / cervical: cervical, scapular, scapulothoracic and UE control with self care, reaching, carrying, lifting, house/yardwork, driving, computer work.   [] (18792) Therapist is in constant attendance of 2 or more patients providing skilled therapy interventions, but not providing any significant amount of measurable one-on-one time to either patient, for improvements in  [] LE / lumbar: LE, proximal hip, and core control in self care, mobility, lifting, ambulation and eccentric single leg control. [] UE / cervical: cervical, scapular, scapulothoracic and UE control with self care, reaching, carrying, lifting, house/yardwork, driving, computer work. NMR and Therapeutic Activities:    [x] (20103 or 97746) Provided verbal/tactile cueing for activities related to improving balance, coordination, kinesthetic sense, posture, motor skill, proprioception and motor activation to allow for proper function of   [x] LE: / Lumbar core, proximal hip and LE with self care and ADLs  [x] UE / Cervical: cervical, postural, scapular, scapulothoracic and UE control with self care, carrying, lifting, driving, computer work. [x] (29482) Gait Re-education- Provided training and instruction to the patient for proper LE, core and proximal hip recruitment and positioning and eccentric body weight control with ambulation re-education including up and down stairs     Home Management Training / Self Care:  [] (39190) Provided self-care/home management training related to activities of daily living and compensatory training, and/or use of adaptive equipment for improvement with: ADLs and compensatory training, meal preparation, safety procedures and instruction in use of adaptive equipment, including bathing, grooming, dressing, personal hygiene, basic household cleaning and chores.      Home Exercise Program:    [] (13913) Reviewed/Progressed HEP activities related to strengthening, flexibility, endurance, ROM of   [] LE / Lumbar: core, proximal hip and LE for functional self-care, mobility, lifting and ambulation/stair navigation   [] UE / Cervical: cervical, postural, scapular, scapulothoracic and UE control with self care, reaching, carrying, lifting, house/yardwork, driving, computer work  [] (23972)Reviewed/Progressed HEP activities related to improving balance, coordination, kinesthetic sense, posture, motor skill, proprioception of   [] LE: core, proximal hip and LE for self care, mobility, lifting, and ambulation/stair navigation    [] UE / Cervical: cervical, postural,  scapular, scapulothoracic and UE control with self care, reaching, carrying, lifting, house/yardwork, driving, computer work    Manual Treatments:  PROM / STM / Oscillations-Mobs:  G-I, II, III, IV (PA's, Inf., Post.)  [] (10288) Provided manual therapy to mobilize LE, proximal hip and/or LS spine soft tissue/joints for the purpose of modulating pain, promoting relaxation,  increasing ROM, reducing/eliminating soft tissue swelling/inflammation/restriction, improving soft tissue extensibility and allowing for proper ROM for normal function with   [] LE / lumbar: self care, mobility, lifting and ambulation. [] UE / Cervical: self care, reaching, carrying, lifting, house/yardwork, driving, computer work. Modalities:  [] (86524) Vasopneumatic compression: Utilized vasopneumatic compression to decrease edema / swelling for the purpose of improving mobility and quad tone / recruitment which will allow for increased overall function including but not limited to self-care, transfers, ambulation, and ascending / descending stairs.        Charges:  Timed Code Treatment Minutes: 49   Total Treatment Minutes: 49     Units approved Units used Date Range    48 41 12/10/21   **updated 12/2**    [] EVAL - LOW (70021)   [] EVAL - MOD (01050)  [] EVAL - HIGH (15834)  [] RE-EVAL (48798)  [] BM(20025) x       [] Ionto  [x] NMR (41934) x  1    [] Vaso  [] Manual (60326) x       [] Ultrasound  [x] TA x 1       [] Mech Traction (07349)  [] Aquatic Therapy x      [] ES (un) (73109):   [] Home Management Training x  [] ES(attended) (62539)   [] Dry Needling 1-2 muscles (96716):  [] Dry Needling 3+ muscles (895445  [] Group:      [x] Other: gait x 1    GOALS:   Patient stated goal: To return to as normal activity as possible.       Therapist goals for Patient:   Short Term Goals: To be achieved in: 2 weeks  1. Independent in HEP and progression per patient tolerance, in order to prevent re-injury.  - Goal Met  2. Patient will have a decrease in pain to facilitate improvement in movement, function, and ADLs as indicated by Functional Deficits. - Goal Met     Long Term Goals: To be achieved in: 6 weeks  1. Pt will demonstrate the ability to perform sit to/from stand with supervision.  -30 sec STS: 5 reps  2.  -Met 11/15/21  **11/15/21**: 2a.  Pt will amb 150' w/SBQC & min A.   - Not Met, amb   3. Patient will demonstrate an increase in postural awareness and control to allow for proper functional mobility as indicated by patients Functional Deficits. - Weight remains shifted onto RLE  4. Patient will return to functional activities including transfers without increased symptoms or restriction.  - Not Met    Overall Progression Towards Functional goals/ Treatment Progress Update:  [] Patient is progressing as expected towards functional goals listed. [] Progression is slowed due to complexities/Impairments listed. [x] Progression has been slowed due to co-morbidities. [] Plan just implemented, too soon to assess goals progression <30days   [] Goals require adjustment due to lack of progress  [] Patient is not progressing as expected and requires additional follow up with physician  [] Other    Persisting Functional Limitations/Impairments:  []Sleeping []Sitting              [x]Standing []Transfers        [x]Walking []Kneeling              []Stairs [x]Squatting / bending   [x]ADLs [x]Reaching  []Lifting  [x]Housework  []Driving [x]Job related tasks  [x]Sports/Recreation []Other:        ASSESSMENT:    Patient lucio'd increased difficulty with ambulation this date, was apprehensive with steps, but still motivated and able to perform with mod/max A.   She appears to have improved use of her LLE with WB activities with less right sided lateral lean noted in static standing and reduced recurvatum. Demonstrates deficiencies in swing-through and DF strength, which will be improved once AFO is obtained. Discussed static standing at counter at home with  and appropriate weight-bearing  If fearful of ambulation, but encouraged continuing to improve her confidence to increase ambulation at home as well    Treatment/Activity Tolerance:  [x] Patient able to complete tx  [] Patient limited by fatigue  [] Patient limited by pain  [] Patient limited by other medical complications  [] Other:     Prognosis: [x] Good [] Fair  [] Poor    Patient Requires Follow-up: [x] Yes  [] No    Plan for next treatment session:  per training diary and progress as tolerated. PLAN: See eval. PT 2x / week for 6 weeks. [x] Continue per plan of care [] Alter current plan (see comments)  [] Plan of care initiated [] Hold pending MD visit [] Discharge    Electronically signed by: Aquiles Leung PT, PT    Note: If patient does not return for scheduled/ recommended follow up visits, this note will serve as a discharge from care along with most recent update on progress.

## 2021-12-06 ENCOUNTER — HOSPITAL ENCOUNTER (OUTPATIENT)
Dept: SPEECH THERAPY | Age: 41
Setting detail: THERAPIES SERIES
Discharge: HOME | End: 2021-12-06
Payer: COMMERCIAL

## 2021-12-06 ENCOUNTER — HOSPITAL ENCOUNTER (OUTPATIENT)
Dept: PHYSICAL THERAPY | Age: 41
Setting detail: THERAPIES SERIES
Discharge: HOME | End: 2021-12-06
Payer: COMMERCIAL

## 2021-12-06 ENCOUNTER — HOSPITAL ENCOUNTER (OUTPATIENT)
Age: 41
Discharge: HOME | End: 2021-12-06
Payer: COMMERCIAL

## 2021-12-06 ENCOUNTER — HOSPITAL ENCOUNTER (OUTPATIENT)
Dept: OCCUPATIONAL THERAPY | Age: 41
Setting detail: THERAPIES SERIES
Discharge: HOME | End: 2021-12-06
Payer: COMMERCIAL

## 2021-12-06 LAB — RHEUMATOID FACTOR: <10 IU/ML

## 2021-12-06 PROCEDURE — 86038 ANTINUCLEAR ANTIBODIES: CPT

## 2021-12-06 PROCEDURE — 97110 THERAPEUTIC EXERCISES: CPT

## 2021-12-06 PROCEDURE — 86777 TOXOPLASMA ANTIBODY: CPT

## 2021-12-06 PROCEDURE — 86255 FLUORESCENT ANTIBODY SCREEN: CPT

## 2021-12-06 PROCEDURE — 97530 THERAPEUTIC ACTIVITIES: CPT

## 2021-12-06 PROCEDURE — 92507 TX SP LANG VOICE COMM INDIV: CPT

## 2021-12-06 PROCEDURE — 86812 HLA TYPING A B OR C: CPT

## 2021-12-06 PROCEDURE — 86780 TREPONEMA PALLIDUM: CPT

## 2021-12-06 PROCEDURE — 97116 GAIT TRAINING THERAPY: CPT

## 2021-12-06 PROCEDURE — 85549 MURAMIDASE: CPT

## 2021-12-06 PROCEDURE — 36415 COLL VENOUS BLD VENIPUNCTURE: CPT

## 2021-12-06 PROCEDURE — 86431 RHEUMATOID FACTOR QUANT: CPT

## 2021-12-06 PROCEDURE — 82164 ANGIOTENSIN I ENZYME TEST: CPT

## 2021-12-06 NOTE — PLAN OF CARE
[facility]  Phone: [phone]   Fax: [phone]    Physical Therapy Re-Certification Plan of Care    Dear Hunter Anna MD,    We had the pleasure of treating the following patient for physical therapy services at [facility]. A summary of our findings can be found in the updated assessment below. This includes our plan of care. If you have any questions or concerns regarding these findings, please do not hesitate to contact me at the office phone number checked above. Thank you for the referral.     Physician Signature:________________________________Date:__________________  By signing above (or electronic signature), therapist's plan is approved by physician      Functional Outcome:  30 sec STS w/use of RUE and 22\" mat table    Gait Pattern w/SBQC & mod A w/several LOB:  Limited use and WB'ing through LLE, L genu recurvatum during L stance phase, L knee frequently mansi just following initial contact or when not appropriately shifting weight onto LLE during stance phase;  R knee frequently flexes and pt moves QC around rapidly. Pt able to utilize steppage gait w/upright trunk posture for L swing phase, however has difficulty transitioning weight anteriorly over LLE, frequently reducing R step length. Overall Response to Treatment:   []Patient is responding well to treatment and improvement is noted with regards  to goals   []Patient should continue to improve in reasonable time if they continue HEP   []Patient has plateaued and is no longer responding to skilled PT intervention    []Patient is getting worse and would benefit from return to referring MD   []Patient unable to adhere to initial POC   [x]Other:  Patient initially unable to demonstrate carry over from last session with improved gait pattern and had several LOB within initial 10 ft of gait distance.   However, after sit to stand transfers, anterior & posterior weight shifts and education, pt was able to improve gait pattern to similar to previous sessions, did fatigue quicker and required increased man cues to pelvis for stability. Patient is scheduled for L AFO assessment at [facility]  which would provide L ankle & knee stability to improve confidence during stance phase of gait cycle. Patient can continue to benefit from PT services to further progress LLE functionality to improve transfer status and gait cycle. Date range of Visits:  through 21  Total Visits: 17    Recommendation:    [x]Request additional PT visits at 2x / wk for 8 weeks. 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  · Treatment Diagnosis: functional limitations secondary to brain bleed  Insurance/Certification information:  PT Insurance Information: [facility] - 30 visits per year; preauth required  Physician Information:  Referring Practitioner: Shaka Leblanc MD  Plan of care signed (Y/N): faxed 21     Date of Patient follow up with Physician:     Functional scale: Optimal:  raw score = 81; dysfunction = 80%    Progress Report: [x]  Yes  []  No     Date Range for reporting period:  Beginning   PN: 11/15  Ending:      Progress report due (10 Rx/or 30 days whichever is less): visit #10 or     Recertification due (POC duration/ or 90 days whichever is less): visit #12    Visit # Insurance Allowable Auth required?  Date Range    96 units w/OT  48 units each discipline [x]  Yes  []  No   12/10/21          Latex Allergy:  [x]NO      []YES  Preferred Language for Healthcare:   [x]English       []other:      Pain level:  3/10 L knee, 4/10 L shoulder    SUBJECTIVE:     L shoulder is more bothersome today, has OT after session today. L knee pain is beginning to increase more as well. Has been walking more with  at home and is going okay. OBJECTIVE:    12/6:  30 sec STS w/use of RUE and 22\" mat table. Gait Pattern w/SBQC & mod A w/several LOB:  Limited use and WB'ing through LLE, L genu recurvatum during L stance phase, L knee frequently mansi just following initial contact or when not appropriately shifting weight onto LLE during stance phase;  R knee frequently flexes and pt moves QC around rapidly. Pt able to utilize steppage gait w/upright trunk posture for L swing phase, however has difficulty transitioning weight anteriorly over LLE, frequently reducing R step length. 11/15:    30 sec sit to stand transitions: 5  Gait Pattern:  W/SBQC, L toe drag, flexed L knee throughout stance phase, weight shifted onto RLE, frequently hops over LLE;  Able to initiate L hip flex, toe drag minimizes swing phase, DF assist does help but not consistently alleviate toe drag. Forward pitched posture;  Frequently lifts and moves R foot and cane. 11/4: pt not wearing AFO today     10/14:  To clinic in wheelchair with soft AFO on LLE; received script for OT and speech this date and pt is now scheduled for all 3 therapies      RESTRICTIONS/PRECAUTIONS: h/o aneurysm    Exercises/Interventions:   Therapeutic Exercises (21206) Resistance / level Sets/sec Reps Notes   Nu step / step one  LEs only w/man facilitation to lateral L knee joint for improved hip alignment    Seated without AFO:  Hip flexion  LAQ  Hip abd  bridging             Seated in w/c w/B feet placed on mat #6  -hip ext            -frequent VCs to encourage L hip extensor activation   Standing R hip w/QC  Standing stool pushes   Max A     -man cues to facilitate improved LLE mechanics  -man cues to facilitate improved LLE mechanics Therapeutic Activities (50864)       Sit to stand transitions from mat #6  -Man & VC's to reach forward w/BUEs   7 -30\" STS   Stand pivot w/SBQC w/c to mat table #6    Max A   Donned L shoe, shoe horn required Total A      Fwd step ups w/HR & QC 4\" Mod/max A & assist to maintain L knee stability   Navigated 4\" steps step to pattern w/HR & QC 4\" Max A & man assist to maintain LLE stability with each step   STS mod mat table with maria alejandra-walker    Once standing added weight shifting M/L    One trial added L LE stepping (minimal success without AFO)     Transfer onto step one    Transfer off step one to [place]    Pt has difficulty with problem solving and motor planning   VCs for watching where L foot is positioned    Transfer from [facility] to [facility]  Transfer from [facility] to [facility]   Stand pivot to R  Sit pivot to R - attempted L but too scared           Tall kneeling EOM  Tall kneeling: , 2 steps R  -man cues to sacrum & sternum to improve posture  -man facilitation to sacrum & medial lower LLE to advance                        Neuromuscular Re-ed (50699)       Static stand w/R foot on AirEx   5 mins X 1 -man cues to facilitate improved LLE mechanics.    L biceps tendon pressure to improve elbow extension and hand/wrist placment       Forward/backward stepping with R foot    // bars, left hand on bars, cues to assist with left knee stability    EOM:  Scooting:  -Out to edge  -L laterally  -R laterally            -able to scoot out w/pelvis in midline p instruction  -max cues for correct technique and encouragement  -good form, required mod A for LLE positioning   Rolling in bed R and L    Min A for placement and sequencing    bridge   CGA, VCs for hip adduction    Hip add in hooklying   TCs for target   Heel slides AAROM to AROM   AAROM to begin, but slowly gained more motion and power, responds well to cues for power          Gait Training:  Foot in front then behind at elevated mat table          Amb w/QC  Amb w/QC        Amb pushing weight lymph cart;  purple TB for DF and knee flexion assist    Amb w/hemiwalker Min AMod AMax A, min AMax x 2         Mod A50', 70'            11/18:  performed x8 safely w/pt and was added to HEP;  PT (then ) inhibited L knee recurvatum w/man cues to posterior knee.        -man blocking to minimize L genu recurvatum  - providing primary assist        facilitation with tband at L LE, Max A for placing L foot as pt puling forward, VCs for sequencing, block to L knee and to cart to avoid pt pulling back     -purple TB for L ankle DF & knee flex assist   Weight shifts  -lateral   -foot onto box, lean onto foot, then return  -staggered w/R foot on AirEx         Mod A         4 mins      -max man cues for correct posture and shifting  -max cues for improved weight-shifting & VCs to reduce speed & improve control  -man cues to facilitate shifting weight onto LLE   Amb bwds w/cart                                Manual Intervention (56795)       Stretches:  Seated hamstring stretch                                                Modalities: none    Pt. Education:   11/9:  Pt requested order for hemiwalker, feels like it would improve her fear and ability to walk. Discussed increased support it provides and less LE & core muscle activation that is required to use hemiwalker, potentially becoming a step backwards and increasing her dependency on AD. Did discuss importance of continued walking and activity at home and if pt is not currently walking at home due to amount of work/effort required to amb w/SBQC, to obtain a hemiwalker so pt is more likely to return to typical home activities. Discussed nearby medical supply stores and to contact PCP and have order sent to them.  and pt reported understanding.       10/14:  Verbal cues for proper transfer technique and safety  -patient educated on diagnosis, prognosis and expectations for rehab  -all patient questions were answered    HEP instruction: 10/14:  Discussed safety on transfers. 10/18: Instructed pt to perform standing lateral weight-shifts hourly in front of chair, with a family member on L side as needed. Therapeutic Exercise and NMR EXR  [] (87667) Provided verbal/tactile cueing for activities related to strengthening, flexibility, endurance, ROM for improvements in  [] LE / Lumbar: LE, proximal hip, and core control with self care, mobility, lifting, ambulation. [] UE / Cervical: cervical, postural, scapular, scapulothoracic and UE control with self care, reaching, carrying, lifting, house/yardwork, driving, computer work. [x] (49693) Provided verbal/tactile cueing for activities related to improving balance, coordination, kinesthetic sense, posture, motor skill, proprioception to assist with   [x] LE / lumbar: LE, proximal hip, and core control in self care, mobility, lifting, ambulation and eccentric single leg control. [x] UE / cervical: cervical, scapular, scapulothoracic and UE control with self care, reaching, carrying, lifting, house/yardwork, driving, computer work.   [] (36588) Therapist is in constant attendance of 2 or more patients providing skilled therapy interventions, but not providing any significant amount of measurable one-on-one time to either patient, for improvements in  [] LE / lumbar: LE, proximal hip, and core control in self care, mobility, lifting, ambulation and eccentric single leg control. [] UE / cervical: cervical, scapular, scapulothoracic and UE control with self care, reaching, carrying, lifting, house/yardwork, driving, computer work.      NMR and Therapeutic Activities:    [x] (13817 or 44186) Provided verbal/tactile cueing for activities related to improving balance, coordination, kinesthetic sense, posture, motor skill, proprioception and motor activation to allow for proper function of   [x] LE: / Lumbar core, proximal hip and LE with self care and ADLs  [x] UE / Cervical: cervical, postural, scapular, scapulothoracic and UE control with self care, carrying, lifting, driving, computer work. [x] (47745) Gait Re-education- Provided training and instruction to the patient for proper LE, core and proximal hip recruitment and positioning and eccentric body weight control with ambulation re-education including up and down stairs     Home Management Training / Self Care:  [] (13163) Provided self-care/home management training related to activities of daily living and compensatory training, and/or use of adaptive equipment for improvement with: ADLs and compensatory training, meal preparation, safety procedures and instruction in use of adaptive equipment, including bathing, grooming, dressing, personal hygiene, basic household cleaning and chores.      Home Exercise Program:    [] (36038) Reviewed/Progressed HEP activities related to strengthening, flexibility, endurance, ROM of   [] LE / Lumbar: core, proximal hip and LE for functional self-care, mobility, lifting and ambulation/stair navigation   [] UE / Cervical: cervical, postural, scapular, scapulothoracic and UE control with self care, reaching, carrying, lifting, house/yardwork, driving, computer work  [] (82493)Reviewed/Progressed HEP activities related to improving balance, coordination, kinesthetic sense, posture, motor skill, proprioception of   [] LE: core, proximal hip and LE for self care, mobility, lifting, and ambulation/stair navigation    [] UE / Cervical: cervical, postural,  scapular, scapulothoracic and UE control with self care, reaching, carrying, lifting, house/yardwork, driving, computer work    Manual Treatments:  PROM / STM / Oscillations-Mobs:  G-I, II, III, IV (PA's, Inf., Post.)  [] (28736) Provided manual therapy to mobilize LE, proximal hip and/or LS spine soft tissue/joints for the purpose of modulating pain, promoting relaxation,  increasing ROM, reducing/eliminating soft tissue swelling/inflammation/restriction, improving soft tissue extensibility and allowing for proper ROM for normal function with   [] LE / lumbar: self care, mobility, lifting and ambulation. [] UE / Cervical: self care, reaching, carrying, lifting, house/yardwork, driving, computer work. Modalities:  [] (85739) Vasopneumatic compression: Utilized vasopneumatic compression to decrease edema / swelling for the purpose of improving mobility and quad tone / recruitment which will allow for increased overall function including but not limited to self-care, transfers, ambulation, and ascending / descending stairs. Charges:  Timed Code Treatment Minutes: 45   Total Treatment Minutes: 45     Units approved Units used Date Range    48 44 12/10/21   **updated 12/6**    [] EVAL - LOW (86385)   [] EVAL - MOD (65617)  [] EVAL - HIGH (76538)  [] RE-EVAL (22182)  [x] HJ(32439) x  1     [] Ionto  [] NMR (18813) x      [] Vaso  [] Manual (25386) x       [] Ultrasound  [x] TA x 1       [] Mech Traction (51126)  [] Aquatic Therapy x      [] ES (un) (41584):   [] Home Management Training x  [] ES(attended) (85750)   [] Dry Needling 1-2 muscles (81491):  [] Dry Needling 3+ muscles (225419  [] Group:      [x] Other: gait x 1    GOALS:   Patient stated goal: To return to as normal activity as possible.       Therapist goals for Patient:   Short Term Goals: To be achieved in: 2 weeks  1. Independent in HEP and progression per patient tolerance, in order to prevent re-injury.  - Goal Met  2. Patient will have a decrease in pain to facilitate improvement in movement, function, and ADLs as indicated by Functional Deficits. - Goal Met     Long Term Goals: To be achieved in: 6 weeks  1. Pt will demonstrate the ability to perform sit to/from stand with supervision.  -30 sec STS: 7 reps from 22 in high chair;  2.  -Met 11/15/21  **11/15/21**: 2a.  Pt will amb 150' w/SBQC & min A.   - Not Met, amb 70'w/mod A, 50' w/mod A & several LOB  3.  Patient will demonstrate an increase in postural awareness and control to allow for proper functional mobility as indicated by patients Functional Deficits. - Weight remains shifted onto RLE, limiting transfers, static standing and gait  4. Patient will return to functional activities including transfers without increased symptoms or restriction.  - Not Met    Overall Progression Towards Functional goals/ Treatment Progress Update:  [] Patient is progressing as expected towards functional goals listed. [x] Progression is slowed due to complexities/Impairments listed. [] Progression has been slowed due to co-morbidities. [] Plan just implemented, too soon to assess goals progression <30days   [] Goals require adjustment due to lack of progress  [] Patient is not progressing as expected and requires additional follow up with physician  [] Other    Persisting Functional Limitations/Impairments:  []Sleeping []Sitting              [x]Standing []Transfers        [x]Walking []Kneeling              []Stairs [x]Squatting / bending   [x]ADLs [x]Reaching  []Lifting  [x]Housework  []Driving [x]Job related tasks  [x]Sports/Recreation []Other:        ASSESSMENT:    Patient lucio'd increased difficulty with ambulation this date, was apprehensive with steps, but still motivated and able to perform with mod/max A. She appears to have improved use of her LLE with WB activities with less right sided lateral lean noted in static standing and reduced recurvatum. Demonstrates deficiencies in swing-through and DF strength, which will be improved once AFO is obtained.   Discussed static standing at counter at home with  and appropriate weight-bearing  If fearful of ambulation, but encouraged continuing to improve her confidence to increase ambulation at home as well    Treatment/Activity Tolerance:  [x] Patient able to complete tx  [] Patient limited by fatigue  [] Patient limited by pain  [] Patient limited by other medical complications  [] Other:

## 2021-12-06 NOTE — PROGRESS NOTES
Occupational Therapy      Occupational Therapy  Cancellation/No-show Note  Patient Name:  Izzy Nicole   :  1980   Date:  2021  Cancelled visits to date: 2  No-shows to date: 0    Patient status for today's appointment patient:  [x]  Cancelled  []  Rescheduled appointment  []  No-show     Reason given by patient:  []  Patient ill  []  Conflicting appointment  []  No transportation    []  Conflict with work  []  No reason given  [x]  Other:     Comments:   Patient very fatigued after Physical therapy    Phone call information:   []  Phone call made today to patient at _ time at number provided:      []  Patient answered, conversation as follows:    []  Patient did not answer, message left as follows:  []  Phone call not made today    Electronically signed by:  Nayely Gamez

## 2021-12-06 NOTE — FLOWSHEET NOTE
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 Lamont Lester MD   Plan of care signed (Y/N): N (sent 10/18, )    Date of Patient follow up with Physician: DEWEY    Functional outcome measure: The Communicative Participation Item Bank Total:  (2021)     Progress Note: []  Yes  [x]  No  Next due by: 2021    RESTRICTIONS/PRECAUTIONS: hx of brain bleed   Latex Allergy:  [x]NO      []YES    Preferred Language for Healthcare:   [x]English       []other    Visit # Insurance Allowable Date Range (if applicable)    48 units  10/     Pain level: 0/10     SUBJECTIVE: Pt alert and receptive, arrived 15 minutes late d/t delay from hospital appointment. Pt reported preference to focus on speech today, no choking episodes over the weekend. OBJECTIVE: See eval/Progress Note    Exercises/Interventions:      Motor Speech Therapy (85455) Accuracy Cues Notes   Apraxia       Dysarthria SLOB training during short (2 min) conversational level:  - SLP rated intelligibility: 70%  - pt-rated intelligibility: 50% Visual cue of finger-tracking for pacing, mod reinforcement Auditory feedback used for self-rating   Intelligibility      Other:          Cognitive Function (02536 & 78448) Accuracy Cues Notes   Orientation        Attention           Sustained         Selective Locating items across field of vision:  - objects in a line from left to right with 77% accuracy (10/13)  - shapes in a large visual field, varying sizes and orientations, with 88% accuracy Increased to 100% given min verbal cues to attend to left Metacognitive strategy training: outline border, scanning left to right      Alternating         Divided      Memory          Immediate/Working         Short term         Long term         Prospective      Problem Solving       Visuospatial       Executive Function       Other:          Dysphagia Therapy (54111) Strategies Tolerance Notes   Diet texture:      Regular      Dysphagia III/Soft & Bite-Sized      Dysphagia II/Minced & Moist      Dysphagia I/Puree      Liquid Consistency: Thin      Nectar thick/Mildly thick      Honey thick/Moderately thick      Ice chips       Reps Cue level    Oral Phase Exercises:       Bolus Control Exercises      []Lateralizations      []A-P propulsions      []Diagonal A-P propulsions      []L/R A-P propulsion      Buccal and Labial Exercises      Lingual Resistance Exercises      Exercises: Pt instructed and trained in laryngeal/pharyngeal strengthening exercises including:      []Shari maneuver      []Effortful swallows      []Supraglottic swallows      []Mendelsohn maneuver      []Modified Shaker (sitting up chin to chest)      []Neuromuscular electrical stimulation (VitalStim)     Pt.  Education:  -Pt educated on diagnosis, prognosis and expectations for rehab  -All pt questions were answered  -Pt verbalized understanding and agreement    HEP instruction:  -Pt provided with written and illustrated instructions for HEP: intelligibility strategies, oral motor exercises, safe swallowing strategies, memory strategies    Speech/Voice Therapy:    [x] (55096) Treatment of speech, language, voice, communication, and/or auditory processing disorder; individual    Cognitive Function:  [] (86319) Therapeutic interventions that focus on cognitive function (eg, attention, memory, reasoning, executive function, problem solving, and/or pragmatic functioning) and compensatory strategies to manage the performance of an activity (eg, managing time or schedules, initiating, organizing, and sequencing tasks), direct (one-on-one) patient contact; initial 15 minutes (Report 48941 only once per day)  [] ([phone]) each additional 15 minutes     Dysphagia Therapy:    [] (60410) Treatment of swallowing dysfunction and/or oral function for feeding         Charges:  Timed Code Treatment Minutes: 0   Total Treatment Minutes: 30     [x] Speech-Language Treatment (94148)        [] Voice Treatment (89040)                                         [] Cognitive-Linguistic Skills Development Initial 15 minutes (74224)  [] Cognitive-Linguistic Skills Development Additional 15 minutes (40899)   [] Dysphagia Treatment/NMES (VitalStim) (63358)  [] Other:     GOALS: GOALS:  Patient stated goal: \"Stop slurring\"  []? Progressing: []? Met: []? Not Met: []? Adjusted     Therapist goals for Patient:   Short Term Goals: To be achieved in: 5 weeks  1. Patient will use speech strategies to facilitate increased intelligibility at detailed conversational level in >90% as judged by SLP and pt/family. []? Progressing: []? Met: []? Not Met: []? Adjusted  2. Patient will complete abstract reasoning related to functional tasks with 80% accuracy to target self-monitoring. []? Progressing: []? Met: []? Not Met: []? Adjusted  3. Patient will complete complex attention tasks (sustained, selective, alternating) with 80% accuracy given minimal cues to improve functional task completion. []? Progressing: []? Met: []? Not Met: []? Adjusted  4. Patient will complete oral motor and bolus control exercises with 80% accuracy independently to improve oral phase of swallow. []? Progressing: []? Met: []? Not Met: []? Adjusted     Long Term Goals: To be achieved in: 6 weeks  1. Patient will demonstrate improved cognitive-linguistic function to improve safety and independence in ADLs.  []? Progressing: []? Met: []? Not Met: []? Adjusted  2. Patient will utilize speech intelligibility strategies to increase intelligibility to greater than 90% in conversation to a familiar/unfamiliar listener. []? Progressing: []? Met: []? Not Met: []? Adjusted  3. Patient will tolerate least restrictive diet with no overt clinical s/s of aspiration/penetration. []? Progressing: []? Met: []? Not Met: []? Adjusted         MET GOALS:  Patient will participate in further cognitive-linguistic evaluation with additional goals as indicated. (MET 11/8)  Patient will tolerate recommended diet with no overt s/s of aspiration or noticeable fatigue. (MET 11/26)    Progression Towards Functional goals:  [x] Patient is progressing as expected towards functional goals listed. [] Progression is slowed due to complexities listed. [] Progression has been slowed due to co-morbidities. [] Plan just implemented, too soon to assess goals progression  [] Other:     Persisting Functional Limitations/Impairments:  [x]Attention []Word Finding       [x]Memory [x]Speech Intelligibility      [x]Executive Function [x]Diet Tolerance     []Problem Solving []Coughing/Choking with PO  []Expressive Language []Medication/Finance Management  [x]Receptive Language []Other:      ASSESSMENT: See eval    Treatment/Activity Tolerance:  [x] Patient able to complete tx  [] Patient limited by fatigue  [] Patient limited by pain  [] Patient limited by other medical complications  [] Other:     Prognosis: [x] Good [] Fair  [] Poor    Patient Requires Follow-up: [x] Yes  [] No    PLAN: See eval. ST 2x / week for 6 weeks.    [x] Continue per plan of care [] Alter current plan (see comments)  [] Plan of care initiated [] Hold pending MD visit [] Discharge    Electronically signed by:   Tonye Paget, MA CCC-SLP  Speech-Language Pathologist  SP. [id number]       Note: If patient does not return for scheduled/ recommended follow up visits, this note will serve as a discharge from care along with most recent update on progress.

## 2021-12-07 LAB
ANTI-NUCLEAR ANTIBODY (ANA): NEGATIVE
TOTAL SYPHILLIS IGG/IGM: NORMAL

## 2021-12-08 LAB
ANGIOTENSIN CONVERTING ENZYME: 32 U/L (ref 9–67)
TOXOPLASMA GONDII AB IGG: <3 IU/ML

## 2021-12-09 ENCOUNTER — HOSPITAL ENCOUNTER (OUTPATIENT)
Dept: OCCUPATIONAL THERAPY | Age: 41
Setting detail: THERAPIES SERIES
Discharge: HOME | End: 2021-12-09
Payer: COMMERCIAL

## 2021-12-09 ENCOUNTER — HOSPITAL ENCOUNTER (OUTPATIENT)
Dept: SPEECH THERAPY | Age: 41
Setting detail: THERAPIES SERIES
Discharge: HOME | End: 2021-12-09
Payer: COMMERCIAL

## 2021-12-09 ENCOUNTER — HOSPITAL ENCOUNTER (OUTPATIENT)
Dept: PHYSICAL THERAPY | Age: 41
Setting detail: THERAPIES SERIES
Discharge: HOME | End: 2021-12-09
Payer: COMMERCIAL

## 2021-12-09 LAB
HLA B27: NEGATIVE
LYSOZYME: 0.9 UG/ML
TREPONEMA PALLIDUM ANTIBODIES: NON REACTIVE

## 2021-12-09 PROCEDURE — 97112 NEUROMUSCULAR REEDUCATION: CPT

## 2021-12-09 PROCEDURE — 97032 APPL MODALITY 1+ESTIM EA 15: CPT

## 2021-12-09 PROCEDURE — 97110 THERAPEUTIC EXERCISES: CPT

## 2021-12-09 PROCEDURE — 92526 ORAL FUNCTION THERAPY: CPT

## 2021-12-09 PROCEDURE — 92507 TX SP LANG VOICE COMM INDIV: CPT

## 2021-12-09 PROCEDURE — 97116 GAIT TRAINING THERAPY: CPT

## 2021-12-09 NOTE — FLOWSHEET NOTE
[address] [facility] Occupational Therapy  [address] [place] [address], [address] [place] [address]  Phone: [phone]   Fax: [phone]      Occupational Therapy Daily Treatment Note  Date:  2021    Patient: Rowland Lanes   : 1980   MRN: [id number]  Referring Physician:    Dr. Hunter Anna MD              Medical Diagnosis Information: Nontraumatic subarachnoid hemorrhage from unspecified intracranial artery (I60.7)                                                   Insurance information:   [facility], [place] after [facility], DN not billable   Date of Injury: 2021- passed out at work  Date of Surgery: n/a    Progress Report: [x]  Yes  []  No     Date Range for reporting period:  Beginning: 10/18/21  Ending: end of POC    Progress report due (10 Rx/or 30 days whichever is less): visit #12 or     Recertification due (POC duration/ or 90 days whichever is less): visit #12 or 21    Visit # Insurance Allowable Auth required?  Date Range    96 units with PT  (48 units each) [x]  Yes  []  No Through 12/10/21  *date extension requested 21     Latex Allergy:  [x]No      []Yes  Pacemaker:  [x] No       [] Yes     Preferred Language for Healthcare:   [x]English       []other:    Pain level:   2/10 left arm patient reports some tightness in arm    SUBJECTIVE:  Pt in good spirits     Functional Disability Index:     Quick DASH: total score- 46, disability score- 79.55%     Stroke Rehabilitation Assessment of Movement (STREAM): total-  (supine- 7/15, sitting- , standing- )    OBJECTIVE:      PROM AROM     L R L R   Shoulder Flexion  ~85*  : 0-125*         Elbow Flexion  WFL         Elbow Extension  WFL         Pronation            Supination  WFL         Wrist Flexion  WFL         Wrist Extension  WFL         Radial Deviation            Composite Flexion (Tip of 3rd Digit to Distal Palmar Crease (cm)) Held in flexion; able to stretch into extension to 2 cm lag    11/29: Held in flexion; able to stretch into extension to 4 cm lag           RESTRICTIONS/PRECAUTIONS:  hx brain bleed, fall risk    Exercises/Interventions:   Treatment focused on increasing ROM, strength, and functional use of LUE. Session initiated with pt completing stand>pivot transfer from w/c>EOB with CGA. Pt then completed sit>supine transfer with min(A) provided at LLE. Pt then completed the following activities while supine:  1) manual stretch provided at LUE and to L hand to decrease stiffness  2) with use of dowel mahesh pt completed shoulder flexion/extension stretch with BUE and tactile at posterior humerus to r each full range- 10 second hold in each position  3) chest press with use of dowel mahesh demo good AROM into elbow extension, however, required increased time and min(A) to come down into elbow extension Patient strongly encouraged to work on place/ hold with use of dowel mahesh at home. 4)  pateint rec'd e stim to forearm extensors for 10 minutes and worked on increased volitional control and decreased pain. 5.  Patient then ambulated forward from mat holding dowel mahesh with min assist of therapist.  Patient then stood about 5 minutes at elelvated mat and played connect four standing about 5 minutes. Attempted to take backward steps back to chair and patient unable to complete.       Therapeutic Exercises  Resistance / level Sets/sec Reps Notes                                                                                Neuromuscular Re-ed / Therapeutic Activities                                                 Manual Intervention                                                     Modalities:     Patient education:  Eval, POC, HEP- pt verbalized understanding       Home Exercise Program:  Written and verbal HEP instructions provided and reviewed:  · Scapular retraction   · Use of resting hand splint  · Mirror therapy at home  · 11/29/21: use of L hand as ANDREAS when for normal function with self care, reaching, carrying, lifting, house/yardwork, driving/computer work  [] Comments:    ADL Training:  [] (68384) Provided self-care/home management training related to activities of daily living and compensatory training, and/or use of adaptive equipment   [] Comments:     Splinting:  [] Fabrication of:   [] (28344) Orthotic/Prosthetic Management, subsequent encounter  [] (76484) Orthotic management and training (fitting and assessment)  [] Comments:      Charges:  Timed Code Treatment Minutes: 45   Total Treatment Minutes: 45     [] EVAL (LOW) 59669   [] OT Re-eval (58454)  [] EVAL (MOD) 84445   [] EVAL (HIGH) [phone]       [] Dolly (11106) x     [] WFJRU(21121)  [x] NMR (82587) x  2 [] Estim (attended) (14204)   [] Manual ([id number]) x  [] US (74986)  [x] TA ([address] [place] [address]) x  1  [] Paraffin (93795)  [] ADL  (88 [phone]) x    [] Splint/L code:    [] Estim (unattended) (22 [id number])  [] Fluidotherapy (90061)  [] Other:    GOALS:   Patient stated goal: L hand use  [] Progressing: [] Met: [] Not Met: [] Adjusted     Therapist goals for Patient:   Short Term Goals: To be achieved in: 30 days  1. Pt will improve functional ROM to use LUE as ANDREAS during transfers for increased safety and independence by 11/19/21. [x] Progressing: [] Met: [] Not Met: [] Adjusted  2. Pt will improve functional LUE use to complete UB dressing mod I for increased independence in ADLs by 11/19/21. [x] Progressing: [] Met: [] Not Met: [] Adjusted  3. Pt will improve functional LUE use to complete LB dressing min A for increased independence in ADLs by 11/19/21. [x] Progressing: [] Met: [] Not Met: [] Adjusted  4. Pt will improve oculomotor and cognition skills to complete Trailmaking Part B assessment within 180 seconds with no errors by 11/19/21. [x] Progressing: [] Met: [] Not Met: [] Adjusted     Long Term Goals: To be achieved by discharge  1.  Pt will report a QuickDASH Symptom Severity Scale score of 65% or less indicating increased safety and functional independence in desired occupational pursuits by discharge. [x] Progressing: [] Met: [] Not Met: [] Adjusted  2. Pt will increase STREAM total score from 21/70 to 31/70 indicating increased safety and functional independence in desired occupational pursuits by discharge. (added 11/29/21)  [x] Progressing: [] Met: [] Not Met: [] Adjusted    Progression Towards Functional goals:  [] Patient is progressing as expected towards functional goals listed. [x] Progression is slowed due to complexities listed. -falls and fear of falling, significant proprioceptive impairment  [] Progression has been slowed due to co-morbidities. [] Plan just implemented, too soon to assess goals progression  [] All goals are met  [] Other:     ASSESSMENT:  Pt has made improvements in PROM of LUE with decreased tone, but continues to have limited participation in functional mobility due to falls/fear of falling and proprioceptive impairment. Treatment/Activity Tolerance:  [] Patient tolerated treatment well [] Patient limited by fatique  [] Patient limited by pain  [x] Patient limited by other medical complications- falls and fear of falling, significant proprioceptive impairment.  [] Other:     Prognosis: [x] Good [] Fair  [] Poor    Patient Requires Follow-up: [x] Yes  [] No    PLAN: See eval  [x] Continue per plan of care [] Alter current plan (see comments)  [] Plan of care initiated [] Hold pending MD visit [] Discharge    Electronically signed by:

## 2021-12-09 NOTE — FLOWSHEET NOTE
[address]    Speech Therapy Daily Treatment Note  Date:  2021    Patient Name:  Melo Vasquez    :  1980  MRN: [id number]  Medical/Treatment Diagnosis Information:  I60.7 (ICD-10-CM) - Nontraumatic subarachnoid hemorrhage from unspecified intracranial artery    Treatment Diagnosis: Mild dysarthria; Mild cognitive-linguistic deficits; Mild oropharyngeal dysphagia            Insurance/Certification information: [facility]; 10/18-21 48 units; date extension 2022   Physician Information:  Dr. Izabel Langston MD   Plan of care signed (Y/N): Y (2021)    Date of Patient follow up with Physician: DEWEY    Functional outcome measure: The Communicative Participation Item Bank Total:  (2021)     Progress Note: []  Yes  [x]  No  Next due by: 2021    RESTRICTIONS/PRECAUTIONS: hx of brain bleed   Latex Allergy:  [x]NO      []YES    Preferred Language for Healthcare:   [x]English       []other    Visit # Insurance Allowable Date Range (if applicable)   56/25 48 units  10/  Ext. 2022       Pain level: 0/10     SUBJECTIVE: Pt alert and receptive,  present during session. Reports coughing on juice yesterday when taking successive sips. OBJECTIVE: See eval/Progress Note    Exercises/Interventions:      Motor Speech Therapy (04288) Accuracy Cues Notes   Apraxia       Dysarthria SLOB training during descriptive discourse:  - SLP rated intelligibility: 80%  - pt-rated intelligibility: 60% Visual cue of finger-tracking for pacing, min reinforcement Auditory feedback used for self-rating, pt consistently reports improved intelligibility following feedback   Intelligibility      Other:          Cognitive Function (57884 & 99219) Accuracy Cues Notes   Orientation        Attention           Sustained         Selective Locating items across field of vision:  - functional calendar attention with 70% accuracy  - []Neuromuscular electrical stimulation (VitalStim)     Pt. Education:  -Pt educated on diagnosis, prognosis and expectations for rehab  -All pt questions were answered  -Pt verbalized understanding and agreement    HEP instruction:  -Pt provided with written and illustrated instructions for HEP: intelligibility strategies, oral motor exercises, safe swallowing strategies, memory strategies    Speech/Voice Therapy:    [x] (44685) Treatment of speech, language, voice, communication, and/or auditory processing disorder; individual    Cognitive Function:  [] (12934) Therapeutic interventions that focus on cognitive function (eg, attention, memory, reasoning, executive function, problem solving, and/or pragmatic functioning) and compensatory strategies to manage the performance of an activity (eg, managing time or schedules, initiating, organizing, and sequencing tasks), direct (one-on-one) patient contact; initial 15 minutes (Report 20557 only once per day)  [] (54 [phone]) each additional 15 minutes     Dysphagia Therapy:    [x] (68855) Treatment of swallowing dysfunction and/or oral function for feeding         Charges:  Timed Code Treatment Minutes: 0   Total Treatment Minutes: 45     [x] Speech-Language Treatment (47082)        [] Voice Treatment (79603)                                         [] Cognitive-Linguistic Skills Development Initial 15 minutes (02323)  [] Cognitive-Linguistic Skills Development Additional 15 minutes (10 [phone])   [x] Dysphagia Treatment/NMES (VitalStim) (07694)  [] Other:     GOALS: GOALS:  Patient stated goal: \"Stop slurring\"  []? Progressing: []? Met: []? Not Met: []? Adjusted     Therapist goals for Patient:   Short Term Goals: To be achieved in: 5 weeks  1. Patient will use speech strategies to facilitate increased intelligibility at detailed conversational level in >90% as judged by SLP and pt/family. []? Progressing: []? Met: []? Not Met: []? Adjusted  2.  Patient will complete abstract

## 2021-12-09 NOTE — PLAN OF CARE
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  · Treatment Diagnosis: functional limitations secondary to brain bleed  Insurance/Certification information:  PT Insurance Information: [employer] - 30 visits per year; preauth required  Physician Information:  Referring Practitioner: Aguilar Morrison MD  Plan of care signed (Y/N): faxed 21     Date of Patient follow up with Physician:     Functional scale: Optimal:  raw score = 81; dysfunction = 80%    Progress Report: []  Yes  [x]  No     Date Range for reporting period:  Beginning   PN: 11/15  POC:   Ending:      Progress report due (10 Rx/or 30 days whichever is less): visit #9 or 68    Recertification due (POC duration/ or 90 days whichever is less): visit #16  3/6/22    Visit # Insurance Allowable Auth required? Date Range    96 units w/OT  48 units each discipline  128 units w/OT  64 ea discipline [x]  Yes  []  No   12/10/21    12/13 to 22          Latex Allergy:  [x]NO      []YES  Preferred Language for Healthcare:   [x]English       []other:      Pain level:  2-3/10 L knee, 2-3/10 L shoulder    SUBJECTIVE:     Still having pain in L knee and shoulder & lateral arm. Continues to walk more at home, feels it's going better. OBJECTIVE:    :  30 sec STS w/use of RUE and 22\" mat table.   Gait Pattern w/SBQC & mod A w/several LOB:  Limited use and WB'ing through LLE, L genu recurvatum during L stance phase, L knee frequently mansi just following initial contact or when not appropriately shifting weight onto LLE during stance phase;  R knee frequently flexes and pt moves QC around rapidly. Pt able to utilize steppage gait w/upright trunk posture for L swing phase, however has difficulty transitioning weight anteriorly over LLE, frequently reducing R step length. 11/15:    30 sec sit to stand transitions: 5  Gait Pattern:  W/SBQC, L toe drag, flexed L knee throughout stance phase, weight shifted onto RLE, frequently hops over LLE;  Able to initiate L hip flex, toe drag minimizes swing phase, DF assist does help but not consistently alleviate toe drag. Forward pitched posture;  Frequently lifts and moves R foot and cane. 11/4: pt not wearing AFO today     10/14:  To clinic in wheelchair with soft AFO on LLE; received script for OT and speech this date and pt is now scheduled for all 3 therapies      RESTRICTIONS/PRECAUTIONS: h/o aneurysm    Exercises/Interventions:   Therapeutic Exercises (14509) Resistance / level Sets/sec Reps Notes   Nu step / step one  LEs only w/man facilitation to lateral L knee joint for improved hip alignment    Seated without AFO:  Hip flexion  LAQ  Hip abd  bridging             Seated in w/c w/B feet placed on mat #6  -hip ext            -frequent VCs to encourage L hip extensor activation   Standing R hip w/QC  Standing stool pushes Max A  Max A     -man cues to facilitate improved LLE mechanics  -man cues to facilitate improved LLE mechanics                                      Therapeutic Activities (99005)       Sit to stand transitions from mat w/c  -Man & VC's to reach forward w/BUEs   3 12/9: incr difficulty shifting weight onto LLE for muscle recruitment   Stand pivot w/SBQC w/c to mat table #6    Max A   Donned L shoe, shoe horn required Total A      Fwd step ups w/HR & QC 4\" Mod/max A & assist to maintain L knee stability   Navigated 4\" steps step to pattern w/HR & QC 4\" Max A & man assist to maintain LLE stability with each step   STS mod mat table with maria alejandra-walker    Once standing added weight shifting M/L    One trial added L LE stepping (minimal success without AFO)     Transfer onto step one    Transfer off step one to [place]    Pt has difficulty with problem solving and motor planning   VCs for watching where L foot is positioned    [facility] from [facility] to [facility]  Transfer from mat to  min A    x 1Amb 10' w/QC & mod A  Sit pivot to R - attempted L but too scared           Tall kneeling EOM  Tall kneeling: , 2 steps R  -man cues to sacrum & sternum to improve posture  -man facilitation to sacrum & medial lower LLE to advance                        Neuromuscular Re-ed (88093)       Static stand w/R foot on AirEx     -man cues to facilitate improved LLE mechanics.    L biceps tendon pressure to improve elbow extension and hand/wrist placment       Forward/backward stepping with R foot    // bars, left hand on bars, cues to assist with left knee stability    EOM:  Scooting:  -Out to edge  -L laterally  -R laterally            -able to scoot out w/pelvis in midline p instruction  -max cues for correct technique and encouragement  -good form, required mod A for LLE positioning   Rolling in bed R and L    Min A for placement and sequencing    bridge   CGA, VCs for hip adduction    Hip add in hooklying   TCs for target   Heel slides AAROM to AROM   AAROM to begin, but slowly gained more motion and power, responds well to cues for power          Gait Training:  Foot in front then behind at elevated mat table          Amb w/QC  Amb w/QC        Amb pushing weight lymph cart;  purple TB for DF and knee flexion assist    Amb w/hemiwalker Min AMod AMax A, min AMax x 2         Mod A10'            11/18:  performed x8 safely w/pt and was added to HEP;  PT (then ) inhibited L knee recurvatum w/man cues to posterior knee.        -manual blocking to minimize L genu recurvatum  - providing primary assist        facilitation with tband at L LE, Max A for placing L foot as pt puling forward, VCs for sequencing, block to L knee and to cart to avoid pt pulling back     -purple TB for L ankle DF & knee flex assist   Weight shifts  -lateral   -foot onto box, lean onto foot, then return  -staggered w/R foot on AirEx  -foot maintained on box w/RUE reaches         Mod A         4' L,   60\" R      -max man cues for correct posture and shifting  -max cues for improved weight-shifting & VCs to reduce speed & improve control  -man cues to facilitate shifting weight onto LLE    12/9: pt unable to adequately accept weight through LLE, activity stopped due to safety concerns   Amb bwds w/cart    Treadmill  -L foot on side of belt, RLE on belt    -lateral weight shifts with belt stopped & RUE support   0.3 mph   2.5', 2'    5 mins        12/9: incr difficulty reducing pt compensations to avoid use of LLE, once compensations were removed, pt rapidly fatigued and became anxious, stating she needed to stop, felt faint, was going to get sick, and finally profanities, not directed at anyone. L sided neglect did worsen as anxiety incr, however  did arrive on her R side which may have contributed to focus on R side                        Manual Intervention ([id number])       Stretches:  Seated hamstring stretch                                                Modalities:     Pt. Education:    12/9:  Once seated after weight shifting on TM, discussed importance of utilizing LLE in that manor frequently, daily at home in order to improve muscle activation appropriately. Pt nodded in understanding but remained, understandably, upset. 11/9:  Pt requested order for hemiwalker, feels like it would improve her fear and ability to walk. Discussed increased support it provides and less LE & core muscle activation that is required to use hemiwalker, potentially becoming a step backwards and increasing her dependency on AD.   Did discuss importance of continued walking and activity at home and if pt is not currently walking at home due to amount of work/effort required to amb w/SBQC, to obtain a hemiwalker so pt is more likely to return to typical home activities. Discussed nearby medical supply stores and to contact PCP and have order sent to them.  and pt reported understanding. 10/14:  Verbal cues for proper transfer technique and safety  -patient educated on diagnosis, prognosis and expectations for rehab  -all patient questions were answered    HEP instruction: 10/14:  Discussed safety on transfers. 10/18: Instructed pt to perform standing lateral weight-shifts hourly in front of chair, with a family member on L side as needed. Therapeutic Exercise and NMR EXR  [] (91010) Provided verbal/tactile cueing for activities related to strengthening, flexibility, endurance, ROM for improvements in  [] LE / Lumbar: LE, proximal hip, and core control with self care, mobility, lifting, ambulation. [] UE / Cervical: cervical, postural, scapular, scapulothoracic and UE control with self care, reaching, carrying, lifting, house/yardwork, driving, computer work. [x] (28179) Provided verbal/tactile cueing for activities related to improving balance, coordination, kinesthetic sense, posture, motor skill, proprioception to assist with   [x] LE / lumbar: LE, proximal hip, and core control in self care, mobility, lifting, ambulation and eccentric single leg control. [x] UE / cervical: cervical, scapular, scapulothoracic and UE control with self care, reaching, carrying, lifting, house/yardwork, driving, computer work.   [] (48110) Therapist is in constant attendance of 2 or more patients providing skilled therapy interventions, but not providing any significant amount of measurable one-on-one time to either patient, for improvements in  [] LE / lumbar: LE, proximal hip, and core control in self care, mobility, lifting, ambulation and eccentric single leg control.    [] UE / cervical: cervical, scapular, scapulothoracic and UE control with self care, reaching, carrying, lifting, house/yardwork, driving, computer work. NMR and Therapeutic Activities:    [x] (61536 or 61158) Provided verbal/tactile cueing for activities related to improving balance, coordination, kinesthetic sense, posture, motor skill, proprioception and motor activation to allow for proper function of   [x] LE: / Lumbar core, proximal hip and LE with self care and ADLs  [x] UE / Cervical: cervical, postural, scapular, scapulothoracic and UE control with self care, carrying, lifting, driving, computer work. [x] (05760) Gait Re-education- Provided training and instruction to the patient for proper LE, core and proximal hip recruitment and positioning and eccentric body weight control with ambulation re-education including up and down stairs     Home Management Training / Self Care:  [] (05933) Provided self-care/home management training related to activities of daily living and compensatory training, and/or use of adaptive equipment for improvement with: ADLs and compensatory training, meal preparation, safety procedures and instruction in use of adaptive equipment, including bathing, grooming, dressing, personal hygiene, basic household cleaning and chores.      Home Exercise Program:    [] (86053) Reviewed/Progressed HEP activities related to strengthening, flexibility, endurance, ROM of   [] LE / Lumbar: core, proximal hip and LE for functional self-care, mobility, lifting and ambulation/stair navigation   [] UE / Cervical: cervical, postural, scapular, scapulothoracic and UE control with self care, reaching, carrying, lifting, house/yardwork, driving, computer work  [] (02712)Reviewed/Progressed HEP activities related to improving balance, coordination, kinesthetic sense, posture, motor skill, proprioception of   [] LE: core, proximal hip and LE for self care, mobility, lifting, and ambulation/stair navigation    [] UE / Cervical: cervical, postural,  scapular, scapulothoracic and UE control with self care, reaching, carrying, lifting, house/yardwork, driving, computer work    Manual Treatments:  PROM / STM / Oscillations-Mobs:  G-I, II, III, IV (PA's, Inf., Post.)  [] (75520) Provided manual therapy to mobilize LE, proximal hip and/or LS spine soft tissue/joints for the purpose of modulating pain, promoting relaxation,  increasing ROM, reducing/eliminating soft tissue swelling/inflammation/restriction, improving soft tissue extensibility and allowing for proper ROM for normal function with   [] LE / lumbar: self care, mobility, lifting and ambulation. [] UE / Cervical: self care, reaching, carrying, lifting, house/yardwork, driving, computer work. Modalities:  [] (59543) Vasopneumatic compression: Utilized vasopneumatic compression to decrease edema / swelling for the purpose of improving mobility and quad tone / recruitment which will allow for increased overall function including but not limited to self-care, transfers, ambulation, and ascending / descending stairs. Charges:  Timed Code Treatment Minutes: 42   Total Treatment Minutes: 42     Units approved Units used Date Range    48   64 -PT 47  0 12/10/21  12/13 - 2/18/22   **updated 12/9**    [] EVAL - LOW (31309)   [] EVAL - MOD (81331)  [] EVAL - HIGH (65234)  [] RE-EVAL (83593)  [] UC(89186) x       [] Ionto  [x] NMR (66899) x 1     [] Vaso  [] Manual (28982) x       [] Ultrasound  [] TA x        [] Mech Traction (39881)  [] Aquatic Therapy x      [] ES (un) (90234):   [] Home Management Training x  [] ES(attended) (41768)   [] Dry Needling 1-2 muscles (44801):  [] Dry Needling 3+ muscles (975667  [] Group:      [x] Other: gait x 2    GOALS:   Patient stated goal: To return to as normal activity as possible.       Therapist goals for Patient:   Short Term Goals: To be achieved in: 2 weeks  1. Independent in HEP and progression per patient tolerance, in order to prevent re-injury.  - Goal Met  2.  Patient will have a decrease in pain to facilitate improvement in movement, function, and ADLs as indicated by Functional Deficits. - Goal Met     Long Term Goals: To be achieved in: 6 weeks  1. Pt will demonstrate the ability to perform sit to/from stand with supervision.  -30 sec STS: 7 reps from 22 in high chair;  2.  -Met 11/15/21  **11/15/21**: 2a.  Pt will amb 150' w/SBQC & min A.   - Not Met, amb 70'w/mod A, 50' w/mod A & several LOB  3. Patient will demonstrate an increase in postural awareness and control to allow for proper functional mobility as indicated by patients Functional Deficits. - Weight remains shifted onto RLE, limiting transfers, static standing and gait  4. Patient will return to functional activities including transfers without increased symptoms or restriction.  - Not Met    Overall Progression Towards Functional goals/ Treatment Progress Update:  [] Patient is progressing as expected towards functional goals listed. [x] Progression is slowed due to complexities/Impairments listed. [] Progression has been slowed due to co-morbidities. [] Plan just implemented, too soon to assess goals progression <30days   [] Goals require adjustment due to lack of progress  [] Patient is not progressing as expected and requires additional follow up with physician  [] Other    Persisting Functional Limitations/Impairments:  []Sleeping []Sitting              [x]Standing []Transfers        [x]Walking []Kneeling              []Stairs [x]Squatting / bending   [x]ADLs [x]Reaching  []Lifting  [x]Housework  []Driving [x]Job related tasks  [x]Sports/Recreation []Other:        ASSESSMENT:     Increased L sided neglect noted while performing trunk rotations in //bars. Performed TM to improve LLE activation which mildly improved and midline stance and L lateral weight-shifts after TM that improved LLE muscle activation, however pt's fatigue levels rapidly progressed causing pt to become anxious and repeatedly request to end task.   After seated break, pt did improve LLE weight-bearing & muscle activation during w/c to mat table transfer. Continue to progress LLE proprioception & coordination to improve safety with transfers and gait. Treatment/Activity Tolerance:  [x] Patient able to complete tx  [] Patient limited by fatigue  [] Patient limited by pain  [] Patient limited by other medical complications  [] Other:     Prognosis: [x] Good [] Fair  [] Poor    Patient Requires Follow-up: [x] Yes  [] No    Plan for next treatment session:  per training diary and progress as tolerated. PLAN: See eval. PT 2x / week for 6 weeks. -requesting additional visits at 2x/week x8 weeks. [x] Continue per plan of care [] Alter current plan (see comments)  [] Plan of care initiated [] Hold pending MD visit [] Discharge    Electronically signed by: Rick Aguilera PT, PT    Note: If patient does not return for scheduled/ recommended follow up visits, this note will serve as a discharge from care along with most recent update on progress.

## 2021-12-13 ENCOUNTER — HOSPITAL ENCOUNTER (OUTPATIENT)
Dept: SPEECH THERAPY | Age: 41
Setting detail: THERAPIES SERIES
Discharge: HOME | End: 2021-12-13
Payer: COMMERCIAL

## 2021-12-13 ENCOUNTER — HOSPITAL ENCOUNTER (OUTPATIENT)
Dept: PHYSICAL THERAPY | Age: 41
Setting detail: THERAPIES SERIES
Discharge: HOME | End: 2021-12-13
Payer: COMMERCIAL

## 2021-12-13 ENCOUNTER — HOSPITAL ENCOUNTER (OUTPATIENT)
Dept: OCCUPATIONAL THERAPY | Age: 41
Setting detail: THERAPIES SERIES
Discharge: HOME | End: 2021-12-13
Payer: COMMERCIAL

## 2021-12-13 PROCEDURE — 97116 GAIT TRAINING THERAPY: CPT

## 2021-12-13 PROCEDURE — 92507 TX SP LANG VOICE COMM INDIV: CPT

## 2021-12-13 PROCEDURE — 97112 NEUROMUSCULAR REEDUCATION: CPT

## 2021-12-13 PROCEDURE — 97110 THERAPEUTIC EXERCISES: CPT

## 2021-12-13 NOTE — FLOWSHEET NOTE
letters with 90% accuracy  - functional medication management with 70% accuracy Min visual supports and cues to implement strategies Metacognitive strategy training: outline border, scanning left to right, double-check work      Selective         Alternating         Divided      Memory          Immediate/Working         Short term         Long term         Prospective      Problem Solving       Visuospatial       Executive Function  Complex deductive reasoning involving planning and organization with 80% accuracy (8/10) Mod verbal cues for inferring    Min cues for strategy implementation Metacognitive strategy training: highlight border, scanning across page, self-talk, crossing off completed items   Other:          Dysphagia Therapy (89743) Strategies Tolerance Notes   Diet texture:      Regular      Dysphagia III/Soft & Bite-Sized      Dysphagia II/Minced & Moist      Dysphagia I/Puree      Liquid Consistency: Thin      Nectar thick/Mildly thick      Honey thick/Moderately thick      Ice chips       Reps Cue level    Oral Phase Exercises:       Bolus Control Exercises      []Lateralizations      []A-P propulsions      []Diagonal A-P propulsions      []L/R A-P propulsion      Buccal and Labial Exercises      Lingual Resistance Exercises      Exercises: Pt instructed and trained in laryngeal/pharyngeal strengthening exercises including:      []Shari maneuver      [x]Effortful swallows      []Supraglottic swallows      []Mendelsohn maneuver      []Modified Shaker (sitting up chin to chest)      []Neuromuscular electrical stimulation (VitalStim)     Pt.  Education:  -Pt educated on diagnosis, prognosis and expectations for rehab  -All pt questions were answered  -Pt verbalized understanding and agreement    HEP instruction:  -Pt provided with written and illustrated instructions for HEP: intelligibility strategies, oral motor exercises, safe swallowing strategies, memory strategies    Speech/Voice Therapy: [x] (22077) Treatment of speech, language, voice, communication, and/or auditory processing disorder; individual    Cognitive Function:  [] (22790) Therapeutic interventions that focus on cognitive function (eg, attention, memory, reasoning, executive function, problem solving, and/or pragmatic functioning) and compensatory strategies to manage the performance of an activity (eg, managing time or schedules, initiating, organizing, and sequencing tasks), direct (one-on-one) patient contact; initial 15 minutes (Report 82828 only once per day)  [] ([phone]) each additional 15 minutes     Dysphagia Therapy:    [] (15041) Treatment of swallowing dysfunction and/or oral function for feeding         Charges:  Timed Code Treatment Minutes: 0   Total Treatment Minutes: 45     [x] Speech-Language Treatment (40304)        [] Voice Treatment (09083)                                         [] Cognitive-Linguistic Skills Development Initial 15 minutes (76567)  [] Cognitive-Linguistic Skills Development Additional 15 minutes (88179)   [] Dysphagia Treatment/NMES (VitalStim) (65398)  [] Other:     GOALS: GOALS:  Patient stated goal: \"Stop slurring\"  []? Progressing: []? Met: []? Not Met: []? Adjusted     Therapist goals for Patient:   Short Term Goals: To be achieved in: 5 weeks  1. Patient will use speech strategies to facilitate increased intelligibility at detailed conversational level in >90% as judged by SLP and pt/family. []? Progressing: []? Met: []? Not Met: []? Adjusted  2. Patient will complete abstract reasoning related to functional tasks with 80% accuracy to target self-monitoring. []? Progressing: []? Met: []? Not Met: []? Adjusted  3. Patient will complete complex attention tasks (sustained, selective, alternating) with 80% accuracy given minimal cues to improve functional task completion. []? Progressing: []? Met: []? Not Met: []? Adjusted  4.  Patient will complete oral motor and bolus control exercises with 80% accuracy independently to improve oral phase of swallow. []? Progressing: []? Met: []? Not Met: []? Adjusted     Long Term Goals: To be achieved in: 6 weeks  1. Patient will demonstrate improved cognitive-linguistic function to improve safety and independence in ADLs.  []? Progressing: []? Met: []? Not Met: []? Adjusted  2. Patient will utilize speech intelligibility strategies to increase intelligibility to greater than 90% in conversation to a familiar/unfamiliar listener. []? Progressing: []? Met: []? Not Met: []? Adjusted  3. Patient will tolerate least restrictive diet with no overt clinical s/s of aspiration/penetration. []? Progressing: []? Met: []? Not Met: []? Adjusted         MET GOALS:  Patient will participate in further cognitive-linguistic evaluation with additional goals as indicated. (MET 11/8)  Patient will tolerate recommended diet with no overt s/s of aspiration or noticeable fatigue. (MET 11/26)    Progression Towards Functional goals:  [x] Patient is progressing as expected towards functional goals listed. [] Progression is slowed due to complexities listed. [] Progression has been slowed due to co-morbidities. [] Plan just implemented, too soon to assess goals progression  [] Other:     Persisting Functional Limitations/Impairments:  [x]Attention []Word Finding       [x]Memory [x]Speech Intelligibility      [x]Executive Function [x]Diet Tolerance     []Problem Solving []Coughing/Choking with PO  []Expressive Language []Medication/Finance Management  [x]Receptive Language []Other:      ASSESSMENT: See eval    Treatment/Activity Tolerance:  [x] Patient able to complete tx  [] Patient limited by fatigue  [] Patient limited by pain  [] Patient limited by other medical complications  [] Other:     Prognosis: [x] Good [] Fair  [] Poor    Patient Requires Follow-up: [x] Yes  [] No    PLAN: See eval. ST 2x / week for 6 weeks.    [x] Continue per plan of care [] Alter current plan (see comments)  [] Plan of care initiated [] Hold pending MD visit [] Discharge    Electronically signed by:   Nathan Roth MA CCC-SLP  Speech-Language Pathologist  SP. [id number]       Note: If patient does not return for scheduled/ recommended follow up visits, this note will serve as a discharge from care along with most recent update on progress.

## 2021-12-13 NOTE — FLOWSHEET NOTE
[address] [facility] Occupational Therapy  [address] [place] [address], [address]  Phone: [phone]   Fax: [phone]      Occupational Therapy Daily Treatment Note  Date:  2021    Patient: Teo Hunter   : 1980   MRN: [id number]  Referring Physician:    Dr. 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 Date Range    96 units with PT  (48 units each) [x]  Yes  []  No Through 22     Latex Allergy:  [x]No      []Yes  Pacemaker:  [x] No       [] Yes     Preferred Language for Healthcare:   [x]English       []other:    Pain level:   2/10 left arm patient reports some tightness in arm    SUBJECTIVE:  Pt reports minor fatigue following PT and speech, but ready to work.      Functional Disability Index:     Quick DASH: total score- 46, disability score- 79.55%     Stroke Rehabilitation Assessment of Movement (STREAM): total-  (supine- 7/15, sitting- , standing- )    OBJECTIVE:      PROM AROM     L R L R   Shoulder Flexion  ~85*  : 0-125*         Elbow Flexion  WFL         Elbow Extension  WFL         Pronation            Supination  WFL         Wrist Flexion  WFL         Wrist Extension  WFL         Radial Deviation            Composite Flexion (Tip of 3rd Digit to Distal Palmar Crease (cm)) Held in flexion; able to stretch into extension to 2 cm lag    11/29: Held in flexion; able to stretch into extension to 4 cm lag           RESTRICTIONS/PRECAUTIONS:  hx brain bleed, fall risk    Exercises/Interventions:   Treatment focused on increasing ROM, strength, and functional use of LUE. Session initiated with pt completing stand>pivot transfer from w/c>EOB with CGA and verbal cues for sequencing. Emphasis on attempt to use LUE as ANDREAS during transfer with slight weight bearing noted. Pt participated in AAROM into shoulder, elbow, wrist, and hand flexion/extension with improved finger extension noted on this date. With open hand, pt completed weight bearing into weighted ball placed at side of mat x10, followed by scapular retraction between each rep. Grasped bolster horizontally to complete assisted chest press>mid row with min A to mobilize L scapula. Noted impaired proprioception with pt requiring max cues to match R elbow extension to L elbow extension with assist to recognize difference in joint position. Returned to weight bearing on ball to push stool forward at a diagonal for chest opening. Completed transfer training with forward weight shift into partial stand with emphasis on L hand weight bearing into mat. Re-attempted pivot transfer to w/c first in squat to allow for L reach to wheelchair arm rest and use as ANDREAS with pt unable to advance LEs in squat position; readjusted to stand pivot with bilateral hand held assist and verbal cues for feet placement with pt using both hands to assist in lowering to w/c on arm rests after L placement. Reinforced importance of LUE weight bearing at home with pt verbalizing understanding.      Therapeutic Exercises  Resistance / level Sets/sec Reps Notes                                                                                Neuromuscular Re-ed / Therapeutic Activities                                                 Manual Intervention Modalities:     Patient education:  Eval, POC, HEP- pt verbalized understanding       Home Exercise Program:  Written and verbal HEP instructions provided and reviewed:  · Scapular retraction   · Use of resting hand splint  · Mirror therapy at home  · 11/29/21: use of L hand as ANDREAS when seated and during transfers    Therapeutic Exercise and NMR:  [x] (94284) Provided verbal/tactile cueing for activities related to strengthening, flexibility, endurance, ROM  for improvements in scapular, scapulothoracic and UE control with self care, reaching, carrying, lifting, house/yardwork, driving/computer work.    [] (95140) Provided verbal/tactile cueing for activities related to improving balance, coordination, kinesthetic sense, posture, motor skill, proprioception  to assist with  scapular, scapulothoracic and UE control with self care, reaching, carrying, lifting, house/yardwork, driving/computer work.   [] Comments:    Therapeutic Activities:    [x] (03085 or 40482) Provided verbal/tactile cueing for activities related to improving balance, coordination, kinesthetic sense, posture, motor skill, proprioception and motor activation to allow for proper function of scapular, scapulothoracic and UE control with self care, carrying, lifting, driving/computer work  [] Comments:    Home Exercise Program:    [x] (04786) Reviewed/Progressed HEP activities related to strengthening, flexibility, endurance, ROM of scapular, scapulothoracic and UE control with self care, reaching, carrying, lifting, house/yardwork, driving/computer work  [] (60482) Reviewed/Progressed HEP activities related to improving balance, coordination, kinesthetic sense, posture, motor skill, proprioception of scapular, scapulothoracic and UE control with self care, reaching, carrying, lifting, house/yardwork, driving/computer work    [] Comments:    Manual Treatments:  PROM / STM / Oscillations-Mobs:  G-I, II, III, IV (PA's, Inf., Post.)  [x] (24513) Provided manual therapy to mobilize soft tissue/joints of cervical/CT, scapular GHJ and UE for the purpose of modulating pain, promoting relaxation,  increasing ROM, reducing/eliminating soft tissue swelling/inflammation/restriction, improving soft tissue extensibility and allowing for proper ROM for normal function with self care, reaching, carrying, lifting, house/yardwork, driving/computer work  [] Comments:    ADL Training:  [] (32357) Provided self-care/home management training related to activities of daily living and compensatory training, and/or use of adaptive equipment   [] Comments:     Splinting:  [] Fabrication of:   [] (28732) Orthotic/Prosthetic Management, subsequent encounter  [] (21214) Orthotic management and training (fitting and assessment)  [] Comments:      Charges:  Timed Code Treatment Minutes: 45   Total Treatment Minutes: 45     [] EVAL (LOW) 67707   [] OT Re-eval (72549)  [] EVAL (MOD) 44495   [] EVAL (HIGH) 05475       [x] Dolly (99623) x   1  [] DAJHN(45951)  [x] NMR (12432) x  2 [] Estim (attended) (79968)   [] Manual ([id number]) x  [] US (53251)  [] TA () x    [] Paraffin (94992)  [] ADL  ([phone]) x    [] Splint/L code:    [] Estim (unattended) [phone]  [] Fluidotherapy (09183)  [] Other:    GOALS:   Patient stated goal: L hand use  [] Progressing: [] Met: [] Not Met: [] Adjusted     Therapist goals for Patient:   Short Term Goals: To be achieved in: 30 days  1. Pt will improve functional ROM to use LUE as ANDREAS during transfers for increased safety and independence by 11/19/21. [x] Progressing: [] Met: [] Not Met: [] Adjusted  2. Pt will improve functional LUE use to complete UB dressing mod I for increased independence in ADLs by 11/19/21. [x] Progressing: [] Met: [] Not Met: [] Adjusted  3. Pt will improve functional LUE use to complete LB dressing min A for increased independence in ADLs by 11/19/21. [x] Progressing: [] Met: [] Not Met: [] Adjusted  4.  Pt will improve oculomotor and cognition skills to complete Trailmaking Part B assessment within 180 seconds with no errors by 11/19/21. [x] Progressing: [] Met: [] Not Met: [] Adjusted     Long Term Goals: To be achieved by discharge  1. Pt will report a QuickDASH Symptom Severity Scale score of 65% or less indicating increased safety and functional independence in desired occupational pursuits by discharge. [x] Progressing: [] Met: [] Not Met: [] Adjusted  2. Pt will increase STREAM total score from 21/70 to 31/70 indicating increased safety and functional independence in desired occupational pursuits by discharge. (added 11/29/21)  [x] Progressing: [] Met: [] Not Met: [] Adjusted    Progression Towards Functional goals:  [] Patient is progressing as expected towards functional goals listed. [x] Progression is slowed due to complexities listed. -falls and fear of falling, significant proprioceptive impairment  [] Progression has been slowed due to co-morbidities. [] Plan just implemented, too soon to assess goals progression  [] All goals are met  [] Other:     ASSESSMENT:  Pt has made improvements in PROM of LUE with decreased tone, but continues to have limited participation in functional mobility due to falls/fear of falling and proprioceptive impairment. Treatment/Activity Tolerance:  [] Patient tolerated treatment well [] Patient limited by fatique  [] Patient limited by pain  [x] Patient limited by other medical complications- falls and fear of falling, significant proprioceptive impairment.  [] Other:     Prognosis: [x] Good [] Fair  [] Poor    Patient Requires Follow-up: [x] Yes  [] No    PLAN: See eval  [x] Continue per plan of care [] Alter current plan (see comments)  [] Plan of care initiated [] Hold pending MD visit [] Discharge    Electronically signed by:        Tony Ochoa, OTR/L [id number]

## 2021-12-13 NOTE — FLOWSHEET NOTE
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  · Treatment Diagnosis: functional limitations secondary to brain bleed  Insurance/Certification information:  PT Insurance Information: [employer] - 30 visits per year; preauth required  Physician Information:  Referring Practitioner: Meli Torrez MD  Plan of care signed (Y/N): faxed 21     Date of Patient follow up with Physician:     Functional scale: Optimal:  raw score = 81; dysfunction = 80%    Progress Report: []  Yes  [x]  No     Date Range for reporting period:  Beginning   PN: 11/15  POC:   Ending:      Progress report due (10 Rx/or 30 days whichever is less): visit #9 or 0/3/55    Recertification due (POC duration/ or 90 days whichever is less): visit #16  3/6/22    Visit # Insurance Allowable Auth required? Date Range            128 units w/OT  64 ea discipline [x]  Yes  []  No   12/10/21    12/13 to 22          Latex Allergy:  [x]NO      []YES  Preferred Language for Healthcare:   [x]English       []other:      Pain level:  2-3/10 L knee, 2-3/10 L shoulder    SUBJECTIVE:     Still having pain in L knee and shoulder & lateral arm. Continues to walk more at home, feels it's going better. OBJECTIVE:    :  30 sec STS w/use of RUE and 22\" mat table. Gait Pattern w/SBQC & mod A w/several LOB:  Limited use and WB'ing through LLE, L genu recurvatum during L stance phase, L knee frequently mansi just following initial contact or when not appropriately shifting weight onto LLE during stance phase;  R knee frequently flexes and pt moves QC around rapidly.   Pt able to utilize steppage gait w/upright trunk posture for L swing phase, however has difficulty transitioning weight anteriorly over LLE, frequently reducing R step length. 11/15:    30 sec sit to stand transitions: 5  Gait Pattern:  W/SBQC, L toe drag, flexed L knee throughout stance phase, weight shifted onto RLE, frequently hops over LLE;  Able to initiate L hip flex, toe drag minimizes swing phase, DF assist does help but not consistently alleviate toe drag. Forward pitched posture;  Frequently lifts and moves R foot and cane. 11/4: pt not wearing AFO today     10/14:  To clinic in wheelchair with soft AFO on LLE; received script for OT and speech this date and pt is now scheduled for all 3 therapies      RESTRICTIONS/PRECAUTIONS: h/o aneurysm    Exercises/Interventions:   Therapeutic Exercises (96265) Resistance / level Sets/sec Reps Notes   Nu step / step one  LEs only w/man facilitation to lateral L knee joint for improved hip alignment    Seated without AFO:  Hip flexion  LAQ  Hip abd  bridging             Seated in w/c w/B feet placed on mat #6  -hip ext            -frequent VCs to encourage L hip extensor activation   Standing R hip w/QC  Standing stool pushes  Max A  Max A    x6 -man cues to facilitate improved LLE mechanics  -man cues to improve posture and LLE WB'ing                                      Therapeutic Activities (69443)       Sit to stand transitions from mat #4  -Man & VC's to recruit LLE mm fibers Min A/ CGA  3 12/9: incr difficulty shifting weight onto LLE for muscle recruitment   Stand pivot w/SBQC w/c to mat table #6    Max A   Donned L shoe, shoe horn required Total A      Fwd step ups w/HR & QC 4\" Mod/max A & assist to maintain L knee stability   Navigated 4\" steps step to pattern w/HR & QC 4\" Max A & man assist to maintain LLE stability with each step   STS mod mat table with maria alejandra-walker    Once standing added weight shifting M/L    One trial added L LE stepping (minimal success without AFO)     Transfer onto step one    Transfer off step one to [place]    Pt has difficulty with problem solving and motor planning   VCs for watching where L foot is positioned    Transfer from [facility] to [facility]  Transfer from mat to WC min A     Sit pivot to R - attempted L but too scared           Tall kneeling EOM  Tall kneeling: , 2 steps R  -man cues to sacrum & sternum to improve posture  -man facilitation to sacrum & medial lower LLE to advance                        Neuromuscular Re-ed (87432)       Static stand for equal BLE WB'ing w/mirror for visual feedback of correct posture & weight-bearing through LLE    Static stand w/R foot on box w/mirror for visual feedback of correct posture & weight-bearing through LLE    Static stand w/large blue bolster between feet to maintain neutral hip positioning w/mirror for visual feedback of correct posture & weight-bearing through LLE         4\" 3 mins        6 mins        4 mins Mod A        Mod A        Mod A -man cues for weight shift reduced to encourage pt to self correct      -man cues to facilitate posture, VC's only to facilitate weight-shift onto LLE      -man cues to facilitate posture, VC's only to facilitate weight-shift onto LLE   L biceps tendon pressure to improve elbow extension and hand/wrist placment       Forward/backward stepping with R foot    // bars, left hand on bars, cues to assist with left knee stability    EOM:  Scooting:  -Out to edge  -L laterally  -R laterally            -able to scoot out w/pelvis in midline p instruction  -max cues for correct technique and encouragement  -good form, required mod A for LLE positioning   Rolling in bed R and L    Min A for placement and sequencing    bridge   CGA, VCs for hip adduction    Hip add in hooklying   TCs for target   Heel slides AAROM to AROM   AAROM to begin, but slowly gained more motion and power, responds well to cues for power          Gait Training:  Foot in front then behind at elevated mat table          Amb w/QC  Amb w/QC        Amb pushing weight lymph cart;  purple TB for DF and knee flexion assist    Amb w/hemiwalker Min AMod AMax A, min AMax x 2         Mod A50'            11/18:  performed x8 safely w/pt and was added to HEP;  PT (then ) inhibited L knee recurvatum w/man cues to posterior knee.        -manual blocking to minimize L genu recurvatum **applied to gastroc 12/14, improved anterior translation of weight over tibia        facilitation with tband at L LE, Max A for placing L foot as pt puling forward, VCs for sequencing, block to L knee and to cart to avoid pt pulling back     -purple TB for L ankle DF & knee flex assist   Weight shifts  -lateral   -foot onto box, lean onto foot, then return  -staggered w/R foot on AirEx  -foot maintained on box w/RUE reaches         Mod A               -max man cues for correct posture and shifting  -max cues for improved weight-shifting & VCs to reduce speed & improve control  -man cues to facilitate shifting weight onto LLE    12/9: pt unable to adequately accept weight through LLE, activity stopped due to safety concerns   Amb bwds w/cart    Treadmill  -L foot on side of belt, RLE on belt    -lateral weight shifts with belt stopped & RUE support   0.3 mph           12/9: incr difficulty reducing pt compensations to avoid use of LLE, once compensations were removed, pt rapidly fatigued and became anxious, stating she needed to stop, felt faint, was going to get sick, and finally profanities, not directed at anyone.   L sided neglect did worsen as anxiety incr, however  did arrive on her R side which may have contributed to focus on R side                        Manual Intervention ([id number] [facility])       Stretches:  Seated hamstring stretch                                                Modalities:     Pt. Education:    12/9:  Once seated after weight shifting on TM, discussed importance of utilizing LLE in that manor frequently, daily at home in order to with self care, reaching, carrying, lifting, house/yardwork, driving, computer work.   [] (38859) Therapist is in constant attendance of 2 or more patients providing skilled therapy interventions, but not providing any significant amount of measurable one-on-one time to either patient, for improvements in  [] LE / lumbar: LE, proximal hip, and core control in self care, mobility, lifting, ambulation and eccentric single leg control. [] UE / cervical: cervical, scapular, scapulothoracic and UE control with self care, reaching, carrying, lifting, house/yardwork, driving, computer work. NMR and Therapeutic Activities:    [x] (94158 or 99206) Provided verbal/tactile cueing for activities related to improving balance, coordination, kinesthetic sense, posture, motor skill, proprioception and motor activation to allow for proper function of   [x] LE: / Lumbar core, proximal hip and LE with self care and ADLs  [x] UE / Cervical: cervical, postural, scapular, scapulothoracic and UE control with self care, carrying, lifting, driving, computer work. [x] (49017) Gait Re-education- Provided training and instruction to the patient for proper LE, core and proximal hip recruitment and positioning and eccentric body weight control with ambulation re-education including up and down stairs     Home Management Training / Self Care:  [] (08366) Provided self-care/home management training related to activities of daily living and compensatory training, and/or use of adaptive equipment for improvement with: ADLs and compensatory training, meal preparation, safety procedures and instruction in use of adaptive equipment, including bathing, grooming, dressing, personal hygiene, basic household cleaning and chores.      Home Exercise Program:    [] (70555) Reviewed/Progressed HEP activities related to strengthening, flexibility, endurance, ROM of   [] LE / Lumbar: core, proximal hip and LE for functional self-care, mobility, lifting and ambulation/stair navigation   [] UE / Cervical: cervical, postural, scapular, scapulothoracic and UE control with self care, reaching, carrying, lifting, house/yardwork, driving, computer work  [] (79010)Reviewed/Progressed HEP activities related to improving balance, coordination, kinesthetic sense, posture, motor skill, proprioception of   [] LE: core, proximal hip and LE for self care, mobility, lifting, and ambulation/stair navigation    [] UE / Cervical: cervical, postural,  scapular, scapulothoracic and UE control with self care, reaching, carrying, lifting, house/yardwork, driving, computer work    Manual Treatments:  PROM / STM / Oscillations-Mobs:  G-I, II, III, IV (PA's, Inf., Post.)  [] (56643) Provided manual therapy to mobilize LE, proximal hip and/or LS spine soft tissue/joints for the purpose of modulating pain, promoting relaxation,  increasing ROM, reducing/eliminating soft tissue swelling/inflammation/restriction, improving soft tissue extensibility and allowing for proper ROM for normal function with   [] LE / lumbar: self care, mobility, lifting and ambulation. [] UE / Cervical: self care, reaching, carrying, lifting, house/yardwork, driving, computer work. Modalities:  [] (43962) Vasopneumatic compression: Utilized vasopneumatic compression to decrease edema / swelling for the purpose of improving mobility and quad tone / recruitment which will allow for increased overall function including but not limited to self-care, transfers, ambulation, and ascending / descending stairs.        Charges:  Timed Code Treatment Minutes: 46   Total Treatment Minutes: 46     Units approved Units used Date Range    48   64 -PT   3 12/10/21  12/13 - 2/18/22   **updated 12/13**    [] EVAL - LOW (25474)   [] EVAL - MOD (08047)  [] EVAL - HIGH (68537)  [] RE-EVAL (72610)  [] AD(48354) x       [] Ionto  [x] NMR (05026) x 2     [] Vaso  [] Manual (99479) x       [] Ultrasound  [] TA x        [] Mech Traction (40706)  [] Aquatic Therapy x      [] ES (un) (92343):   [] Home Management Training x  [] ES(attended) (39089)   [] Dry Needling 1-2 muscles (07059):  [] Dry Needling 3+ muscles (142295  [] Group:      [x] Other: gait x 1    GOALS:   Patient stated goal: To return to as normal activity as possible.       Therapist goals for Patient:   Short Term Goals: To be achieved in: 2 weeks  1. Independent in HEP and progression per patient tolerance, in order to prevent re-injury.  - Goal Met  2. Patient will have a decrease in pain to facilitate improvement in movement, function, and ADLs as indicated by Functional Deficits. - Goal Met     Long Term Goals: To be achieved in: 6 weeks  1. Pt will demonstrate the ability to perform sit to/from stand with supervision.  -30 sec STS: 7 reps from 22 in high chair;  2.  -Met 11/15/21  **11/15/21**: 2a.  Pt will amb 150' w/SBQC & min A.   - Not Met, amb 70'w/mod A, 50' w/mod A & several LOB  3. Patient will demonstrate an increase in postural awareness and control to allow for proper functional mobility as indicated by patients Functional Deficits. - Weight remains shifted onto RLE, limiting transfers, static standing and gait  4. Patient will return to functional activities including transfers without increased symptoms or restriction.  - Not Met    Overall Progression Towards Functional goals/ Treatment Progress Update:  [] Patient is progressing as expected towards functional goals listed. [x] Progression is slowed due to complexities/Impairments listed. [] Progression has been slowed due to co-morbidities.   [] Plan just implemented, too soon to assess goals progression <30days   [] Goals require adjustment due to lack of progress  [] Patient is not progressing as expected and requires additional follow up with physician  [] Other    Persisting Functional Limitations/Impairments:  []Sleeping []Sitting              [x]Standing []Transfers        [x]Walking []Kneeling              []Stairs [x]Squatting / bending   [x]ADLs [x]Reaching  []Lifting  [x]Housework  []Driving [x]Job related tasks  [x]Sports/Recreation []Other:        ASSESSMENT:     Improved weight shifting onto LLE when using mirror and tremors observed in L glute max, radiating into calf. Difficulty remains reducing L genu recurvatum during stance phase of gait, restricting anterior translation of weight over LLE. Continue to progress LLE WB'ing to facilitate transfers and gait. Treatment/Activity Tolerance:  [x] Patient able to complete tx  [] Patient limited by fatigue  [] Patient limited by pain  [] Patient limited by other medical complications  [] Other:     Prognosis: [x] Good [] Fair  [] Poor    Patient Requires Follow-up: [x] Yes  [] No    Plan for next treatment session:  per training diary and progress as tolerated. PLAN: See eval. PT 2x / week for 6 weeks. -requesting additional visits at 2x/week x8 weeks. [x] Continue per plan of care [] Alter current plan (see comments)  [] Plan of care initiated [] Hold pending MD visit [] Discharge    Electronically signed by: J Carlos Flynn, PT, PT    Note: If patient does not return for scheduled/ recommended follow up visits, this note will serve as a discharge from care along with most recent update on progress.

## 2021-12-16 ENCOUNTER — HOSPITAL ENCOUNTER (OUTPATIENT)
Dept: OCCUPATIONAL THERAPY | Age: 41
Setting detail: THERAPIES SERIES
Discharge: HOME | End: 2021-12-16
Payer: COMMERCIAL

## 2021-12-16 ENCOUNTER — HOSPITAL ENCOUNTER (OUTPATIENT)
Dept: PHYSICAL THERAPY | Age: 41
Setting detail: THERAPIES SERIES
Discharge: HOME | End: 2021-12-16
Payer: COMMERCIAL

## 2021-12-16 ENCOUNTER — HOSPITAL ENCOUNTER (OUTPATIENT)
Dept: SPEECH THERAPY | Age: 41
Setting detail: THERAPIES SERIES
Discharge: HOME | End: 2021-12-16
Payer: COMMERCIAL

## 2021-12-16 PROCEDURE — 97112 NEUROMUSCULAR REEDUCATION: CPT

## 2021-12-16 PROCEDURE — 97110 THERAPEUTIC EXERCISES: CPT

## 2021-12-16 PROCEDURE — 92507 TX SP LANG VOICE COMM INDIV: CPT

## 2021-12-16 PROCEDURE — 97116 GAIT TRAINING THERAPY: CPT

## 2021-12-16 NOTE — FLOWSHEET NOTE
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  · Treatment Diagnosis: functional limitations secondary to brain bleed  Insurance/Certification information:  PT Insurance Information: [employer] - 30 visits per year; preauth required  Physician Information:  Referring Practitioner: Jacklyn Josue MD  Plan of care signed (Y/N): faxed 21     Date of Patient follow up with Physician:     Functional scale: Optimal:  raw score = 81; dysfunction = 80%    Progress Report: []  Yes  [x]  No     Date Range for reporting period:  Beginning   PN: 11/15  POC:   Ending:      Progress report due (10 Rx/or 30 days whichever is less): visit #9 or 41    Recertification due (POC duration/ or 90 days whichever is less): visit #16  3/6/22    Visit # Insurance Allowable Auth required? Date Range            128 units w/OT  64 ea discipline [x]  Yes  []  No   12/10/21    12/13 to 22          Latex Allergy:  [x]NO      []YES  Preferred Language for Healthcare:   [x]English       []other:      Pain level:  3/10 L quad, 2/10 L wrist    SUBJECTIVE:      reports pt has been using LLE better and was even able to lift her leg up into bed herself. Feels that walking is improving as well. OBJECTIVE:    :  30 sec STS w/use of RUE and 22\" mat table. Gait Pattern w/SBQC & mod A w/several LOB:  Limited use and WB'ing through LLE, L genu recurvatum during L stance phase, L knee frequently mansi just following initial contact or when not appropriately shifting weight onto LLE during stance phase;  R knee frequently flexes and pt moves QC around rapidly.   Pt able to utilize steppage gait w/upright trunk posture for L swing phase, however has difficulty transitioning weight anteriorly over LLE, frequently reducing R step length. 11/15:    30 sec sit to stand transitions: 5  Gait Pattern:  W/SBQC, L toe drag, flexed L knee throughout stance phase, weight shifted onto RLE, frequently hops over LLE;  Able to initiate L hip flex, toe drag minimizes swing phase, DF assist does help but not consistently alleviate toe drag. Forward pitched posture;  Frequently lifts and moves R foot and cane. 11/4: pt not wearing AFO today     10/14:  To clinic in wheelchair with soft AFO on LLE; received script for OT and speech this date and pt is now scheduled for all 3 therapies      RESTRICTIONS/PRECAUTIONS: h/o aneurysm    Exercises/Interventions:   Therapeutic Exercises (76317) Resistance / level Sets/sec Reps Notes   Nu step / step one  LEs only w/man facilitation to lateral L knee joint for improved hip alignment    Seated without AFO:  Hip flexion  LAQ  Hip abd  bridging             Seated in w/c w/B feet placed on mat #6  -hip ext            -frequent VCs to encourage L hip extensor activation   Standing R hip w/QC  Standing stool pushes  Max A  Max A     -man cues to facilitate improved LLE mechanics  -man cues to improve posture and LLE WB'ing                                      Therapeutic Activities (47380)       Sit to stand transitions from mat #4  -Man & VC's to recruit LLE mm fibers Min A/ CGA   12/9: incr difficulty shifting weight onto LLE for muscle recruitment   Sit to stand transitions from mat table, R foot on AirEx, R hand on L knee, bolster between feet   x5, x3 12/16:  max cues for correct technique & form   Stand pivot w/SBQC w/c to mat table #6    Max A   Donned L shoe, shoe horn required Total A      Fwd step ups w/HR & QC 4\" Mod/max A & assist to maintain L knee stability   Navigated 4\" steps step to pattern w/HR & QC 4\" Max A & man assist to maintain LLE stability with each step   STS mod mat table with maria alejandra-walker    Once standing added weight shifting M/L    One trial added L LE stepping (minimal success without AFO)     Transfer onto step one    Transfer off step one to Hemicane    Pt has difficulty with problem solving and motor planning   VCs for watching where L foot is positioned    Transfer from [facility] to mat  Transfer from mat to WC min A     Sit pivot to R - attempted L but too scared           Tall kneeling EOM  Tall kneeling: , 2 steps R  -man cues to sacrum & sternum to improve posture  -man facilitation to sacrum & medial lower LLE to advance                        Neuromuscular Re-ed (17789)       Static stand for equal BLE WB'ing w/mirror for visual feedback of correct posture & weight-bearing through LLE    Static stand w/R foot on box w/mirror for visual feedback of correct posture & weight-bearing through LLE    Static stand w/large blue bolster between feet to maintain neutral hip positioning w/mirror for visual feedback of correct posture & weight-bearing through LLE         4\"        RLE on Airex                 5 mins                 Min /Mod A -man cues for weight shift reduced to encourage pt to self correct      -man cues to facilitate posture, VC's only to facilitate weight-shift onto LLE      -man cues to facilitate posture, VC's only to facilitate weight-shift onto LLE   L biceps tendon pressure to improve elbow extension and hand/wrist placment       Forward/backward stepping with R foot    // bars, left hand on bars, cues to assist with left knee stability    EOM:  Scooting:  -Out to edge  -L laterally  -R laterally            -able to scoot out w/pelvis in midline p instruction  -max cues for correct technique and encouragement  -good form, required mod A for LLE positioning   Rolling in bed R and L    Min A for placement and sequencing    bridge   CGA, VCs for hip adduction    Hip add in hooklying   TCs for target   Heel slides AAROM to AROM   AAROM to begin, but slowly gained more motion and power, responds well to cues for power          Gait Training:  Foot in front then behind at elevated mat table          Amb w/QC  Amb w/QC        Amb pushing weight lymph cart;  purple TB for DF and knee flexion assist    Amb w/hemiwalker Min AMin/Mod AMax A, min AMax x 2         Mod A85', 60'            11/18:  performed x8 safely w/pt and was added to HEP;  PT (then ) inhibited L knee recurvatum w/man cues to posterior knee.        -manual blocking to minimize L genu recurvatum applied to gastroc, improved anterior translation of weight over tibia w/cues to gastroc        facilitation with tband at L LE, Max A for placing L foot as pt puling forward, VCs for sequencing, block to L knee and to cart to avoid pt pulling back     -purple TB for L ankle DF & knee flex assist   Weight shifts  -lateral   -foot onto box, lean onto foot, then return  -staggered w/R foot on AirEx  -foot maintained on box w/RUE reaches         Mod A               -max man cues for correct posture and shifting  -max cues for improved weight-shifting & VCs to reduce speed & improve control  -man cues to facilitate shifting weight onto LLE    12/9: pt unable to adequately accept weight through LLE, activity stopped due to safety concerns   Amb bwds w/cart    Treadmill  -L foot on side of belt, RLE on belt    -lateral weight shifts with belt stopped & RUE support   0.3 mph           12/9: incr difficulty reducing pt compensations to avoid use of LLE, once compensations were removed, pt rapidly fatigued and became anxious, stating she needed to stop, felt faint, was going to get sick, and finally profanities, not directed at anyone.   L sided neglect did worsen as anxiety incr, however  did arrive on her R side which may have contributed to focus on R side                        Manual Intervention ([phone])       Stretches:  Seated hamstring stretch Modalities:     Pt. Education:    12/9:  Once seated after weight shifting on TM, discussed importance of utilizing LLE in that manor frequently, daily at home in order to improve muscle activation appropriately. Pt nodded in understanding but remained, understandably, upset. 11/9:  Pt requested order for hemiwalker, feels like it would improve her fear and ability to walk. Discussed increased support it provides and less LE & core muscle activation that is required to use hemiwalker, potentially becoming a step backwards and increasing her dependency on AD. Did discuss importance of continued walking and activity at home and if pt is not currently walking at home due to amount of work/effort required to amb w/SBQC, to obtain a hemiwalker so pt is more likely to return to typical home activities. Discussed nearby medical supply stores and to contact PCP and have order sent to them.  and pt reported understanding. 10/14:  Verbal cues for proper transfer technique and safety  -patient educated on diagnosis, prognosis and expectations for rehab  -all patient questions were answered    HEP instruction: 10/14:  Discussed safety on transfers. 10/18: Instructed pt to perform standing lateral weight-shifts hourly in front of chair, with a family member on L side as needed. Therapeutic Exercise and NMR EXR  [] (89187) Provided verbal/tactile cueing for activities related to strengthening, flexibility, endurance, ROM for improvements in  [] LE / Lumbar: LE, proximal hip, and core control with self care, mobility, lifting, ambulation. [] UE / Cervical: cervical, postural, scapular, scapulothoracic and UE control with self care, reaching, carrying, lifting, house/yardwork, driving, computer work.   [x] (91861) Provided verbal/tactile cueing for activities related to improving balance, coordination, kinesthetic sense, posture, motor skill, proprioception to assist with   [x] LE / lumbar: LE, proximal hip, and core control in self care, mobility, lifting, ambulation and eccentric single leg control. [x] UE / cervical: cervical, scapular, scapulothoracic and UE control with self care, reaching, carrying, lifting, house/yardwork, driving, computer work.   [] (31673) Therapist is in constant attendance of 2 or more patients providing skilled therapy interventions, but not providing any significant amount of measurable one-on-one time to either patient, for improvements in  [] LE / lumbar: LE, proximal hip, and core control in self care, mobility, lifting, ambulation and eccentric single leg control. [] UE / cervical: cervical, scapular, scapulothoracic and UE control with self care, reaching, carrying, lifting, house/yardwork, driving, computer work. NMR and Therapeutic Activities:    [x] (48482 or 62790) Provided verbal/tactile cueing for activities related to improving balance, coordination, kinesthetic sense, posture, motor skill, proprioception and motor activation to allow for proper function of   [x] LE: / Lumbar core, proximal hip and LE with self care and ADLs  [x] UE / Cervical: cervical, postural, scapular, scapulothoracic and UE control with self care, carrying, lifting, driving, computer work. [x] (54295) Gait Re-education- Provided training and instruction to the patient for proper LE, core and proximal hip recruitment and positioning and eccentric body weight control with ambulation re-education including up and down stairs     Home Management Training / Self Care:  [] (54728) Provided self-care/home management training related to activities of daily living and compensatory training, and/or use of adaptive equipment for improvement with: ADLs and compensatory training, meal preparation, safety procedures and instruction in use of adaptive equipment, including bathing, grooming, dressing, personal hygiene, basic household cleaning and chores.      Home Exercise Program:    [] (19879) Reviewed/Progressed HEP activities related to strengthening, flexibility, endurance, ROM of   [] LE / Lumbar: core, proximal hip and LE for functional self-care, mobility, lifting and ambulation/stair navigation   [] UE / Cervical: cervical, postural, scapular, scapulothoracic and UE control with self care, reaching, carrying, lifting, house/yardwork, driving, computer work  [] (84549)Reviewed/Progressed HEP activities related to improving balance, coordination, kinesthetic sense, posture, motor skill, proprioception of   [] LE: core, proximal hip and LE for self care, mobility, lifting, and ambulation/stair navigation    [] UE / Cervical: cervical, postural,  scapular, scapulothoracic and UE control with self care, reaching, carrying, lifting, house/yardwork, driving, computer work    Manual Treatments:  PROM / STM / Oscillations-Mobs:  G-I, II, III, IV (PA's, Inf., Post.)  [] (55232) Provided manual therapy to mobilize LE, proximal hip and/or LS spine soft tissue/joints for the purpose of modulating pain, promoting relaxation,  increasing ROM, reducing/eliminating soft tissue swelling/inflammation/restriction, improving soft tissue extensibility and allowing for proper ROM for normal function with   [] LE / lumbar: self care, mobility, lifting and ambulation. [] UE / Cervical: self care, reaching, carrying, lifting, house/yardwork, driving, computer work. Modalities:  [] (82856) Vasopneumatic compression: Utilized vasopneumatic compression to decrease edema / swelling for the purpose of improving mobility and quad tone / recruitment which will allow for increased overall function including but not limited to self-care, transfers, ambulation, and ascending / descending stairs.        Charges:  Timed Code Treatment Minutes: 40   Total Treatment Minutes: 40     Units approved Units used Date Range    48   64 -PT   6 12/10/21  12/13 - 2/18/22   **updated 12/16**    [] STONEY - LOW (55569) [] EVAL - MOD (38213)  [] EVAL - HIGH (83393)  [] RE-EVAL (99617)  [] UR(44545) x       [] Ionto  [x] NMR (55500) x 2     [] Vaso  [] Manual (35906) x       [] Ultrasound  [] TA x        [] Mech Traction (13842)  [] Aquatic Therapy x      [] ES (un) (19694):   [] Home Management Training x  [] ES(attended) (39086)   [] Dry Needling 1-2 muscles (29854):  [] Dry Needling 3+ muscles (284373  [] Group:      [x] Other: gait x 1    GOALS:   Patient stated goal: To return to as normal activity as possible.       Therapist goals for Patient:   Short Term Goals: To be achieved in: 2 weeks  1. Independent in HEP and progression per patient tolerance, in order to prevent re-injury.  - Goal Met  2. Patient will have a decrease in pain to facilitate improvement in movement, function, and ADLs as indicated by Functional Deficits. - Goal Met     Long Term Goals: To be achieved in: 6 weeks  1. Pt will demonstrate the ability to perform sit to/from stand with supervision.  -30 sec STS: 7 reps from 22 in high chair;  2.  -Met 11/15/21  **11/15/21**: 2a.  Pt will amb 150' w/SBQC & min A.   - Not Met, amb 70'w/mod A, 50' w/mod A & several LOB  3. Patient will demonstrate an increase in postural awareness and control to allow for proper functional mobility as indicated by patients Functional Deficits. - Weight remains shifted onto RLE, limiting transfers, static standing and gait  4. Patient will return to functional activities including transfers without increased symptoms or restriction.  - Not Met    Overall Progression Towards Functional goals/ Treatment Progress Update:  [] Patient is progressing as expected towards functional goals listed. [x] Progression is slowed due to complexities/Impairments listed. [] Progression has been slowed due to co-morbidities.   [] Plan just implemented, too soon to assess goals progression <30days   [] Goals require adjustment due to lack of progress  [] Patient is not progressing as expected and requires additional follow up with physician  [] Other    Persisting Functional Limitations/Impairments:  []Sleeping []Sitting              [x]Standing []Transfers        [x]Walking []Kneeling              []Stairs [x]Squatting / bending   [x]ADLs [x]Reaching  []Lifting  [x]Housework  []Driving [x]Job related tasks  [x]Sports/Recreation []Other:        ASSESSMENT:         Treatment/Activity Tolerance:  [x] Patient able to complete tx  [] Patient limited by fatigue  [] Patient limited by pain  [] Patient limited by other medical complications  [] Other:     Prognosis: [x] Good [] Fair  [] Poor    Patient Requires Follow-up: [x] Yes  [] No    Plan for next treatment session:  per training diary and progress as tolerated. PLAN: See josey. PT 2x / week for 6 weeks. -requesting additional visits at 2x/week x8 weeks. [x] Continue per plan of care [] Alter current plan (see comments)  [] Plan of care initiated [] Hold pending MD visit [] Discharge    Electronically signed by: Robyn Reinoso, PT, PT    Note: If patient does not return for scheduled/ recommended follow up visits, this note will serve as a discharge from care along with most recent update on progress.

## 2021-12-16 NOTE — FLOWSHEET NOTE
[facility] Occupational Therapy  [address] [place] [address], [address]  Phone: [phone]   Fax: [phone]      Occupational Therapy Daily Treatment Note  Date:  2021    Patient: Juan Francisco Dykes   : 1980   MRN: [id number]  Referring Physician:    Dr. 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 Date Range   10/12 96 units with PT  (48 units each) [x]  Yes  []  No Through 22     Latex Allergy:  [x]No      []Yes  Pacemaker:  [x] No       [] Yes     Preferred Language for Healthcare:   [x]English       []other:    Pain level:   2/10 left arm patient reports some tightness in arm    SUBJECTIVE:  Pt reports minor fatigue following PT and speech, but ready to work.      Functional Disability Index:     Quick DASH: total score- 46, disability score- 79.55%     Stroke Rehabilitation Assessment of Movement (STREAM): total-  (supine- 7/15, sitting- , standing- )    OBJECTIVE:      PROM AROM     L R L R   Shoulder Flexion  ~85*  : 0-125*         Elbow Flexion  WFL         Elbow Extension  WFL         Pronation            Supination  WFL         Wrist Flexion  WFL         Wrist Extension  WFL         Radial Deviation            Composite Flexion (Tip of 3rd Digit to Distal Palmar Crease (cm)) Held in flexion; able to stretch into extension to 2 cm lag    11/29: Held in flexion; able to stretch into extension to 4 cm lag           RESTRICTIONS/PRECAUTIONS:  hx brain bleed, fall risk    Exercises/Interventions:   Treatment focused on increasing ROM, strength, and functional use of LUE. Session initiated with pt completing stand>pivot transfer from w/c>EOB with CGA  PATIENT INITIATED TREATMENT WITH MODIFICATIONS MADE TO RESTING HAND SPLINT AND EDUCATION IN PATIENT AND  IN DONNING AND DOFFING. PATIENT THEN WORKED ON USE OF LEFT UE AS BASE OF SUPPORT AT LEFT SIDE ON YOGA BLOCK WITH  PROVIDING TARGETS AND THERAPIST FACILITATING BASE OF SUPPORT. PATIENT THEN WORKED ON HOLDING BLOCK WITH BOTH HANDS AND HITTING TARGET AS PROVIDED BY  NOTING THAT PATIENT IS DEMONSTRATING INCREASED VOLITIONAL MOVEMENT PATTERNS THROUGHOUT UE. PATIENT ALSO NOTED TO COMPLETING REACH WITH INTERNAL ROTATION AND ABDUCTION HOLDING BLOCK, NOTED PATIENT PROVIDED WITH FEED BACK AND PATIENT SELF CORRECTED. PATIENT THEN HELD STEERING WHEEL AND WORKED ON FOLLOWING HUSBANDS LEAD TO AMBULATED HOLDING STEERING WHEEL AND STEPPING TOWARD WHEELCHAIR. PATIENT ABLE TO MAKE THREE STEPS WITH STEERING WHEEL AND GOOD CONTROL.  .      Therapeutic Exercises  Resistance / level Sets/sec Reps Notes                                                                                Neuromuscular Re-ed / Therapeutic Activities                                                 Manual Intervention                                                     Modalities:     Patient education:  Eval, POC, HEP- pt verbalized understanding       Home Exercise Program:  Written and verbal HEP instructions provided and reviewed:  · Scapular retraction   · Use of resting hand splint  · Mirror therapy at home  · 11/29/21: use of L hand as ANDREAS when seated and during transfers    Therapeutic Exercise and NMR:  [x] (53072) Provided verbal/tactile cueing for activities related to strengthening, flexibility, endurance, ROM  for improvements in scapular, scapulothoracic and UE control with self care, reaching, carrying, lifting, house/yardwork, driving/computer work.    [] (88583) Provided verbal/tactile cueing for activities related to improving balance, coordination, kinesthetic sense, posture, motor skill, proprioception  to assist with  scapular, scapulothoracic and UE control with self care, reaching, carrying, lifting, house/yardwork, driving/computer work.   [] Comments:    Therapeutic Activities:    [x] (51937 or 48636) Provided verbal/tactile cueing for activities related to improving balance, coordination, kinesthetic sense, posture, motor skill, proprioception and motor activation to allow for proper function of scapular, scapulothoracic and UE control with self care, carrying, lifting, driving/computer work  [] Comments:    Home Exercise Program:    [x] (13330) Reviewed/Progressed HEP activities related to strengthening, flexibility, endurance, ROM of scapular, scapulothoracic and UE control with self care, reaching, carrying, lifting, house/yardwork, driving/computer work  [] (36412) Reviewed/Progressed HEP activities related to improving balance, coordination, kinesthetic sense, posture, motor skill, proprioception of scapular, scapulothoracic and UE control with self care, reaching, carrying, lifting, house/yardwork, driving/computer work    [] Comments:    Manual Treatments:  PROM / STM / Oscillations-Mobs:  G-I, II, III, IV (PA's, Inf., Post.)  [x] (79826) Provided manual therapy to mobilize soft tissue/joints of cervical/CT, scapular GHJ and UE for the purpose of modulating pain, promoting relaxation,  increasing ROM, reducing/eliminating soft tissue swelling/inflammation/restriction, improving soft tissue extensibility and allowing for proper ROM for normal function with self care, reaching, carrying, lifting, house/yardwork, driving/computer work  [] Comments:    ADL Training:  [] (23602) Provided self-care/home management training related to activities of daily living and compensatory training, and/or use of adaptive equipment   [] Comments:     Splinting:  [] Fabrication of:   [] (25295) Orthotic/Prosthetic Management, subsequent encounter  [] (37021) Orthotic management and training (fitting and assessment)  [] Comments:      Charges:  Timed Code Treatment Minutes: 45   Total Treatment Minutes: 45     [] EVAL (LOW) 11115   [] OT Re-eval (16053)  [] EVAL (MOD) 97537   [] EVAL (HIGH) 18021       [x] Dolly (25958) x   1  [] RIDZE(58413)  [x] NMR (72032) x  2 [] Estim (attended) (51781)   [] Manual ([id number]) x  [] US (86440)  [] TA () x    [] Paraffin (87305)  [] ADL  ([phone]) x    [] Splint/L code:    [] Estim (unattended) (22 260814)  [] Fluidotherapy (16707)  [] Other:    GOALS:   Patient stated goal: L hand use  [] Progressing: [] Met: [] Not Met: [] Adjusted     Therapist goals for Patient:   Short Term Goals: To be achieved in: 30 days  1. Pt will improve functional ROM to use LUE as ANDREAS during transfers for increased safety and independence by 11/19/21. [x] Progressing: [] Met: [] Not Met: [] Adjusted  2. Pt will improve functional LUE use to complete UB dressing mod I for increased independence in ADLs by 11/19/21. [x] Progressing: [] Met: [] Not Met: [] Adjusted  3. Pt will improve functional LUE use to complete LB dressing min A for increased independence in ADLs by 11/19/21. [x] Progressing: [] Met: [] Not Met: [] Adjusted  4. Pt will improve oculomotor and cognition skills to complete Trailmaking Part B assessment within 180 seconds with no errors by 11/19/21. [x] Progressing: [] Met: [] Not Met: [] Adjusted     Long Term Goals: To be achieved by discharge  1. Pt will report a QuickDASH Symptom Severity Scale score of 65% or less indicating increased safety and functional independence in desired occupational pursuits by discharge.   [x] Progressing: [] Met: [] Not Met: [] Adjusted  2. Pt will increase STREAM total score from 21/70 to 31/70 indicating increased safety and functional independence in desired occupational pursuits by discharge. (added 11/29/21)  [x] Progressing: [] Met: [] Not Met: [] Adjusted    Progression Towards Functional goals:  [] Patient is progressing as expected towards functional goals listed. [x] Progression is slowed due to complexities listed. -falls and fear of falling, significant proprioceptive impairment  [] Progression has been slowed due to co-morbidities. [] Plan just implemented, too soon to assess goals progression  [] All goals are met  [] Other:     ASSESSMENT:  Pt has made improvements in PROM of LUE with decreased tone, but continues to have limited participation in functional mobility due to falls/fear of falling and proprioceptive impairment. Treatment/Activity Tolerance:  [] Patient tolerated treatment well [] Patient limited by fatique  [] Patient limited by pain  [x] Patient limited by other medical complications- falls and fear of falling, significant proprioceptive impairment.  [] Other:     Prognosis: [x] Good [] Fair  [] Poor    Patient Requires Follow-up: [x] Yes  [] No    PLAN: See eval  [x] Continue per plan of care [] Alter current plan (see comments)  [] Plan of care initiated [] Hold pending MD visit [] Discharge    Electronically signed by:

## 2021-12-16 NOTE — PLAN OF CARE
[facility], [facility]     Speech Therapy Re-Certification Plan of Care    Dear Dr. Felicia Regalado MD,    We had the pleasure of treating the following patient for speech therapy services at [address] [place] [address]. A summary of our findings can be found in the updated assessment below. This includes our plan of care. If you have any questions or concerns regarding these findings, please do not hesitate to contact me at the office phone number checked above. Thank you for the referral.     Physician Signature:________________________________Date:__________________  By signing above (or electronic signature), therapists plan is approved by physician      Functional Outcome: The Communicative Participation Item Bank Total:     Overall Response to Treatment:   [x]Patient is responding well to treatment and improvement is noted with regards to goals   []Patient should continue to improve in reasonable time if they continue HEP   []Patient has plateaued and is no longer responding to skilled ST intervention    []Patient is getting worse and would benefit from return to referring MD   []Patient unable to adhere to initial POC   []Other:    Date range of Visits: 10/18/2021-2021  Total Visits: 12    Recommendation:  [x]Continue ST 2x / wk for 6 weeks.  []Hold ST, pending MD visit      Speech Therapy Daily Treatment Note  Date:  2021    Patient Name:  Elinor Cabrera    :  1980  MRN: [id number]  Medical/Treatment Diagnosis Information:  I60.7 (ICD-10-CM) - Nontraumatic subarachnoid hemorrhage from unspecified intracranial artery    Treatment Diagnosis: Mild dysarthria; Mild cognitive-linguistic deficits; Mild oropharyngeal dysphagia            Insurance/Certification information: [facility]; 10/18-21 48 units; date extension 2022   Physician Information:  Dr. Felicia Regalado MD   Plan of care signed (Y/N): Y (2021)    Date of Patient follow up with Physician: DEWEY    Functional outcome measure: The Communicative Participation Item Bank Total:  (2021)     Progress Note: []  Yes  [x]  No  Next due by: 2021    RESTRICTIONS/PRECAUTIONS: hx of brain bleed   Latex Allergy:  [x]NO      []YES    Preferred Language for Healthcare:   [x]English       []other    Visit # Insurance Allowable Date Range (if applicable)   81/38 48 units  (12 used)  10/  Ext. 2022       Pain level: 0/10     SUBJECTIVE: Pt alert and receptive, reports a coughing episode yesterday. Endorses noticing improvement in speech when using strategies to slow down rate. OBJECTIVE:   The Communicative Participation Item Bank Total:     EAT-10:     Pt was administered the Cognitive Linguistic Quick Test (CLQT) with the following results:  Severity Ratings Table for Ages 18-69 Years  Cognitive Domain Score WFL Mild Moderate Severe Severity Rating   Attention 73 215-180 179-125 124-50 49-0 2   Memory 150 185-155 154-141 140-110 109-0 3   Executive Functions 18 40-24 23-20 19-16 15-0 2   Language 32 37-29 28-25 24-21 20-0 4   Visuospatial Skills 38 105-82 81-52 51-42 41-0 1   Clock Drawin (Mild)    Exercises/Interventions:      Motor Speech Therapy (94582) Accuracy Cues Notes   Apraxia       Dysarthria SLOB training during narrative discourse (e.g., making a sandwich):  - SLP rated intelligibility: 90%  - pt-rated intelligibility: 70% Visual cue of finger-tracking for pacing, fading reinforcement    Intelligibility      Other:          Cognitive Function (37166 & 05516) Accuracy Cues Notes   Orientation        Attention           Sustained         Selective         Alternating         Divided      Memory          Immediate/Working         Short term         Long term         Prospective      Problem Solving       Visuospatial       Executive Function       Other:          Dysphagia Therapy (84519) Strategies Tolerance Notes   Diet texture:      Regular      Dysphagia III/Soft & Bite-Sized      Dysphagia II/Minced & Moist      Dysphagia I/Puree      Liquid Consistency: Thin      Nectar thick/Mildly thick      Honey thick/Moderately thick      Ice chips       Reps Cue level    Oral Phase Exercises:       Bolus Control Exercises      []Lateralizations      []A-P propulsions      []Diagonal A-P propulsions      []L/R A-P propulsion      Buccal and Labial Exercises      Lingual Resistance Exercises      Exercises: Pt instructed and trained in laryngeal/pharyngeal strengthening exercises including:      []Shari maneuver      []Effortful swallows      []Supraglottic swallows      []Mendelsohn maneuver      []Modified Shaker (sitting up chin to chest)      []Neuromuscular electrical stimulation (VitalStim)     Pt.  Education:  -Pt educated on diagnosis, prognosis and expectations for rehab  -All pt questions were answered  -Pt verbalized understanding and agreement  -Progress since initiation of POC    HEP instruction:  -Pt provided with written and illustrated instructions for HEP: intelligibility strategies, oral motor exercises, safe swallowing strategies, memory strategies    Speech/Voice Therapy:    [x] (95875) Treatment of speech, language, voice, communication, and/or auditory processing disorder; individual    Cognitive Function:  [] (66059) Therapeutic interventions that focus on cognitive function (eg, attention, memory, reasoning, executive function, problem solving, and/or pragmatic functioning) and compensatory strategies to manage the performance of an activity (eg, managing time or schedules, initiating, organizing, and sequencing tasks), direct (one-on-one) patient contact; initial 15 minutes (Report 53583 only once per day)  [] ([phone]) each additional 15 minutes     Dysphagia Therapy:    [] (57087) Treatment of swallowing dysfunction and/or oral function for feeding         Charges:  Timed Code Treatment Minutes: 0   Total Treatment Minutes: 45     [x] Speech-Language Treatment (32849)        [] Voice Treatment (67801)                                         [] Cognitive-Linguistic Skills Development Initial 15 minutes (34994)  [] Cognitive-Linguistic Skills Development Additional 15 minutes (73589)   [] Dysphagia Treatment/NMES (VitalStim) (78896)  [] Other:     GOALS:   Patient stated goal: \"Stop slurring\"  [x]? Progressing: []? Met: []? Not Met: []? Adjusted     Therapist goals for Patient:   Short Term Goals: To be achieved in: 5 weeks  1. Patient will use speech strategies to facilitate increased intelligibility at detailed conversational level in >90% as judged by SLP and pt/family. [x]? Progressing: []? Met: []? Not Met: []? Adjusted  2. Patient will complete abstract reasoning related to functional tasks with 80% accuracy to target self-monitoring. [x]? Progressing: []? Met: []? Not Met: []? Adjusted  3. Patient will complete complex attention tasks (sustained, selective, alternating) with 80% accuracy given minimal cues to improve functional task completion. [x]? Progressing: []? Met: []? Not Met: []? Adjusted  4. Patient will complete oral motor and bolus control exercises with 80% accuracy independently to improve oral phase of swallow. []? Progressing: [x]? Met: []? Not Met: []? Adjusted  NEW GOAL: The patient will complete modified barium swallow study to further assess pharyngeal phase and direct plan of care. Long Term Goals: To be achieved in: 6 weeks  1. Patient will demonstrate improved cognitive-linguistic function to improve safety and independence in ADLs. [x]? Progressing: []? Met: []? Not Met: []? Adjusted  2. Patient will utilize speech intelligibility strategies to increase intelligibility to greater than 90% in conversation to a familiar/unfamiliar listener. [x]? Progressing: []? Met: []? Not Met: []? Adjusted  3.  Patient will tolerate least restrictive diet with no overt clinical s/s of aspiration/penetration. [x]? Progressing: []? Met: []? Not Met: []? Adjusted         MET GOALS:  Patient will participate in further cognitive-linguistic evaluation with additional goals as indicated. (MET 11/8)  Patient will tolerate recommended diet with no overt s/s of aspiration or noticeable fatigue. (MET 11/26)    Progression Towards Functional goals:  [x] Patient is progressing as expected towards functional goals listed. [] Progression is slowed due to complexities listed. [] Progression has been slowed due to co-morbidities. [] Plan just implemented, too soon to assess goals progression  [] Other:     Persisting Functional Limitations/Impairments:  [x]Attention []Word Finding       [x]Memory [x]Speech Intelligibility      [x]Executive Function [x]Diet Tolerance     []Problem Solving []Coughing/Choking with PO  []Expressive Language []Medication/Finance Management  [x]Receptive Language []Other:      ASSESSMENT: The patient has made good progress across all speech, cognitive, and dysphagia goals and has met 3/6 short-term goals. Pt has increased implementation of intelligibility strategies and now rates herself as 70% intelligible during discourse as compared to 45% initially. Pt demonstrates improvements across all domains of the CLQT and with particular improvement in attention, memory, and language tasks. However, pt continues to demonstrate moderate-severe deficits in visuospatial skills, benefits from skilled intervention to target executive functioning and left-sided attention. Pt also reports improvement in communicative participation but reports ongoing difficulty communicating with unfamiliar listeners and in long, detailed contexts. Pt has met oral phase swallowing goals but now reports intermittent choking sensation with liquids at home. Recommend completion of modified barium swallow study to further assess pharyngeal phase of swallow, with goals added as indicated.  Recommend continuation of skilled therapy to target the above speech, cognitive, and dysphagia deficits. Treatment/Activity Tolerance:  [x] Patient able to complete tx  [] Patient limited by fatigue  [] Patient limited by pain  [] Patient limited by other medical complications  [] Other:     Prognosis: [x] Good [] Fair  [] Poor    Patient Requires Follow-up: [x] Yes  [] No    PLAN: See eval. ST 2x / week for 6 weeks. [] Continue per plan of care [x] Alter current plan (see comments)  [] Plan of care initiated [] Hold pending MD visit [] Discharge    Electronically signed by:   Fanta Valencia MA CCC-SLP  Speech-Language Pathologist  SP. [id number]       Note: If patient does not return for scheduled/ recommended follow up visits, this note will serve as a discharge from care along with most recent update on progress.

## 2021-12-20 ENCOUNTER — HOSPITAL ENCOUNTER (OUTPATIENT)
Dept: OCCUPATIONAL THERAPY | Age: 41
Setting detail: THERAPIES SERIES
Discharge: HOME | End: 2021-12-20
Payer: COMMERCIAL

## 2021-12-20 ENCOUNTER — HOSPITAL ENCOUNTER (OUTPATIENT)
Dept: PHYSICAL THERAPY | Age: 41
Setting detail: THERAPIES SERIES
Discharge: HOME | End: 2021-12-20
Payer: COMMERCIAL

## 2021-12-20 ENCOUNTER — HOSPITAL ENCOUNTER (OUTPATIENT)
Dept: SPEECH THERAPY | Age: 41
Setting detail: THERAPIES SERIES
Discharge: HOME | End: 2021-12-20
Payer: COMMERCIAL

## 2021-12-20 NOTE — FLOWSHEET NOTE
Occupational Therapy  Cancellation/No-show Note  Patient Name:  Davi Ulloa   :  1980   Date:  2021  Cancelled visits to date: 2  No-shows to date: 0     Patient status for today's appointment patient:  [x]  Cancelled - 21, 21  []  Rescheduled appointment  []  No-show     Reason given by patient:  []  Patient ill  []  Conflicting appointment  [x]  No transportation    []  Conflict with work  []  No reason given  []  Other:     Comments:       Phone call information:   []  Phone call made today to patient at number provided:      []  Patient answered, conversation as follows:    []  Patient did not answer, message left as follows:  [x]  Phone call not made today    Electronically signed by:  Danii Smith OTR/MONIQUE [id number]

## 2021-12-20 NOTE — FLOWSHEET NOTE
Speech Therapy  Cancellation/No-show Note  Patient Name:  Anam Tovar  :  1980   Date:  2021  Cancels to Date: 2  No-shows to Date: 0    Patient status for today's appointment patient:  [x]  Cancelled (11/15, )  []  Rescheduled appointment  []  No-show     Reason given by patient:  []  Patient ill  []  Conflicting appointment  [x]  No transportation: SLP made aware of car troubles and potential delay/cancellation   []  Conflict with work  []  No reason given  []  Other:     Comments:      Phone call information:   [x]  Phone call made today to patient at 1130 time at number provided:      [x]  Patient answered, conversation as follows: confirmed with pt/ cancellation of session.      []  Patient did not answer, message left as follows:  []  Phone call not made today    Electronically signed by:  Jared Boudreaux, SLP

## 2021-12-23 ENCOUNTER — HOSPITAL ENCOUNTER (OUTPATIENT)
Dept: PHYSICAL THERAPY | Age: 41
Setting detail: THERAPIES SERIES
Discharge: HOME | End: 2021-12-23
Payer: COMMERCIAL

## 2021-12-23 ENCOUNTER — HOSPITAL ENCOUNTER (OUTPATIENT)
Dept: OCCUPATIONAL THERAPY | Age: 41
Setting detail: THERAPIES SERIES
Discharge: HOME | End: 2021-12-23
Payer: COMMERCIAL

## 2021-12-23 ENCOUNTER — HOSPITAL ENCOUNTER (OUTPATIENT)
Dept: SPEECH THERAPY | Age: 41
Setting detail: THERAPIES SERIES
Discharge: HOME | End: 2021-12-23
Payer: COMMERCIAL

## 2021-12-23 PROCEDURE — 97116 GAIT TRAINING THERAPY: CPT

## 2021-12-23 PROCEDURE — 92507 TX SP LANG VOICE COMM INDIV: CPT

## 2021-12-23 PROCEDURE — 97112 NEUROMUSCULAR REEDUCATION: CPT

## 2021-12-23 PROCEDURE — 97110 THERAPEUTIC EXERCISES: CPT

## 2021-12-23 PROCEDURE — 97032 APPL MODALITY 1+ESTIM EA 15: CPT

## 2021-12-23 NOTE — FLOWSHEET NOTE
in flexion; able to stretch into extension to 4 cm lag           RESTRICTIONS/PRECAUTIONS:  hx brain bleed, fall risk    Exercises/Interventions: Treatment focused on decreasing left neglect, integration of left side of body into mobility, improving proprioception in left ue. Patient completed sit to stand from wheelchair holding steering wheel with min assist of therapist.  She then pushed against wheel with both ues to facilitate scapular stability and midline and ambulated about 6 feet to mat and pivoted to mat with min assist. From short sit pateint continued to push the steering wheel forward and brought it back with facilitation  Posterior humerus. Patient then set up with reciprocal e stim  To extrinsic hand flexors and extensors worked on grasp/ release, changing base of support along dowel mahesh with scooting during e stim both directions left and right. Patient then held on wheel and pushed wheel while ambulating about 10 feet holding steering wheel.         Therapeutic Exercises  Resistance / level Sets/sec Reps Notes                                                                                Neuromuscular Re-ed / Therapeutic Activities                                                 Manual Intervention                                                     Modalities:     Patient education:  Eval, POC, HEP- pt verbalized understanding       Home Exercise Program:  Written and verbal HEP instructions provided and reviewed:  · Scapular retraction   · Use of resting hand splint  · Mirror therapy at home  · 11/29/21: use of L hand as ANDREAS when seated and during transfers    Therapeutic Exercise and NMR:  [x] (79263) Provided verbal/tactile cueing for activities related to strengthening, flexibility, endurance, ROM  for improvements in scapular, scapulothoracic and UE control with self care, reaching, carrying, lifting, house/yardwork, driving/computer work.    [] (35765) Provided verbal/tactile cueing for activities related to improving balance, coordination, kinesthetic sense, posture, motor skill, proprioception  to assist with  scapular, scapulothoracic and UE control with self care, reaching, carrying, lifting, house/yardwork, driving/computer work.   [] Comments:    Therapeutic Activities:    [x] (86353 or 01523) Provided verbal/tactile cueing for activities related to improving balance, coordination, kinesthetic sense, posture, motor skill, proprioception and motor activation to allow for proper function of scapular, scapulothoracic and UE control with self care, carrying, lifting, driving/computer work  [] Comments:    Home Exercise Program:    [x] (86696) Reviewed/Progressed HEP activities related to strengthening, flexibility, endurance, ROM of scapular, scapulothoracic and UE control with self care, reaching, carrying, lifting, house/yardwork, driving/computer work  [] (59141) Reviewed/Progressed HEP activities related to improving balance, coordination, kinesthetic sense, posture, motor skill, proprioception of scapular, scapulothoracic and UE control with self care, reaching, carrying, lifting, house/yardwork, driving/computer work    [] Comments:    Manual Treatments:  PROM / STM / Oscillations-Mobs:  G-I, II, III, IV (PA's, Inf., Post.)  [x] (20474) Provided manual therapy to mobilize soft tissue/joints of cervical/CT, scapular GHJ and UE for the purpose of modulating pain, promoting relaxation,  increasing ROM, reducing/eliminating soft tissue swelling/inflammation/restriction, improving soft tissue extensibility and allowing for proper ROM for normal function with self care, reaching, carrying, lifting, house/yardwork, driving/computer work  [] Comments:    ADL Training:  [] (56494) Provided self-care/home management training related to activities of daily living and compensatory training, and/or use of adaptive equipment   [] Comments:     Splinting:  [] Fabrication of:   [] (09591) Orthotic/Prosthetic Management, subsequent encounter  [] (94281) Orthotic management and training (fitting and assessment)  [] Comments:      Charges:  Timed Code Treatment Minutes: 54   Total Treatment Minutes: 54     [] EVAL (LOW) 24456   [] OT Re-eval (98958)  [] EVAL (MOD) 63944   [] EVAL (HIGH) 59323       [x] Dolly (76354) x  2  [] WHFKQ(99489)  [x] NMR (43296) x  1 [x] Estim (attended) (11856)   [] Manual ([id number]) x  [] US (31328)  [] TA (71325) x    [] Paraffin (27378)  [] ADL  (33429) x    [] Splint/L code:    [] Estim (unattended) 33 [phone] 31)  [] Fluidotherapy (59673)  [] Other:    GOALS:   Patient stated goal: L hand use  [] Progressing: [] Met: [] Not Met: [] Adjusted     Therapist goals for Patient:   Short Term Goals: To be achieved in: 30 days  1. Pt will improve functional ROM to use LUE as ANDREAS during transfers for increased safety and independence by 11/19/21. [x] Progressing: [] Met: [] Not Met: [] Adjusted  2. Pt will improve functional LUE use to complete UB dressing mod I for increased independence in ADLs by 11/19/21. [x] Progressing: [] Met: [] Not Met: [] Adjusted  3. Pt will improve functional LUE use to complete LB dressing min A for increased independence in ADLs by 11/19/21. [x] Progressing: [] Met: [] Not Met: [] Adjusted  4. Pt will improve oculomotor and cognition skills to complete Trailmaking Part B assessment within 180 seconds with no errors by 11/19/21. [x] Progressing: [] Met: [] Not Met: [] Adjusted     Long Term Goals: To be achieved by discharge  1. Pt will report a QuickDASH Symptom Severity Scale score of 65% or less indicating increased safety and functional independence in desired occupational pursuits by discharge. [x] Progressing: [] Met: [] Not Met: [] Adjusted  2.  Pt will increase STREAM total score from 21/70 to 31/70 indicating increased safety and functional independence in desired occupational pursuits by discharge. (added 11/29/21)  [x] Progressing: [] Met: [] Not Met: [] Adjusted    Progression Towards Functional goals:  [] Patient is progressing as expected towards functional goals listed. [x] Progression is slowed due to complexities listed. -falls and fear of falling, significant proprioceptive impairment  [] Progression has been slowed due to co-morbidities. [] Plan just implemented, too soon to assess goals progression  [] All goals are met  [] Other:     ASSESSMENT:  Pt has made improvements in PROM of LUE with decreased tone, but continues to have limited participation in functional mobility due to falls/fear of falling and proprioceptive impairment. Treatment/Activity Tolerance:  [] Patient tolerated treatment well [] Patient limited by fatique  [] Patient limited by pain  [x] Patient limited by other medical complications- falls and fear of falling, significant proprioceptive impairment.  [] Other:     Prognosis: [x] Good [] Fair  [] Poor    Patient Requires Follow-up: [x] Yes  [] No    PLAN: See eval  [x] Continue per plan of care [] Alter current plan (see comments)  [] Plan of care initiated [] Hold pending MD visit [] Discharge    Electronically signed by:

## 2021-12-23 NOTE — FLOWSHEET NOTE
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  · Treatment Diagnosis: functional limitations secondary to brain bleed  Insurance/Certification information:  PT Insurance Information: [employer] - 30 visits per year; preauth required  Physician Information:  Referring Practitioner: Benita Bonilla MD  Plan of care signed (Y/N): faxed 21     Date of Patient follow up with Physician:     Functional scale: Optimal:  raw score = 81; dysfunction = 80%    Progress Report: []  Yes  [x]  No     Date Range for reporting period:  Beginning   PN: 11/15  POC:   Ending:      Progress report due (10 Rx/or 30 days whichever is less): visit #9 or 1/3/90    Recertification due (POC duration/ or 90 days whichever is less): visit #16  3/6/22    Visit # Insurance Allowable Auth required? Date Range            128 units w/OT  64 ea discipline [x]  Yes  []  No   12/10/21    12/13 to 22          Latex Allergy:  [x]NO      []YES  Preferred Language for Healthcare:   [x]English       []other:      Pain level:  3/10 L quad    SUBJECTIVE:    Not walking that much at home, states she's missing tips on the bottom of her QC so it's uneven. **informed pt and  of availability of new tips on  [employer] or wherever else they prefer to shop and to obtain the correct size for her cane. OBJECTIVE:    :  30 sec STS w/use of RUE and 22\" mat table.   Gait Pattern w/SBQC & mod A w/several LOB:  Limited use and WB'ing through LLE, L genu recurvatum during L stance phase, L knee frequently mansi just following initial contact or when not appropriately shifting weight onto LLE during stance phase;  R knee frequently flexes and pt moves QC around rapidly. Pt able to utilize steppage gait w/upright trunk posture for L swing phase, however has difficulty transitioning weight anteriorly over LLE, frequently reducing R step length. 11/15:    30 sec sit to stand transitions: 5  Gait Pattern:  W/SBQC, L toe drag, flexed L knee throughout stance phase, weight shifted onto RLE, frequently hops over LLE;  Able to initiate L hip flex, toe drag minimizes swing phase, DF assist does help but not consistently alleviate toe drag. Forward pitched posture;  Frequently lifts and moves R foot and cane. 11/4: pt not wearing AFO today     10/14:  To clinic in wheelchair with soft AFO on LLE; received script for OT and speech this date and pt is now scheduled for all 3 therapies      RESTRICTIONS/PRECAUTIONS: h/o aneurysm    Exercises/Interventions:   Therapeutic Exercises (81086) Resistance / level Sets/sec Reps Notes   Nu step / step one  LEs only w/man facilitation to lateral L knee joint for improved hip alignment    Seated without AFO:  Hip flexion  LAQ  Hip abd  bridging             Seated in w/c w/B feet placed on mat #6  -hip ext            -frequent VCs to encourage L hip extensor activation   Standing R hip w/QC  Standing stool pushes  Max A  Max A     -man cues to facilitate improved LLE mechanics  -man cues to improve posture and LLE WB'ing   Standing over gray bolster, hip ext Mod A  10 B                                Therapeutic Activities (18760)       Sit to stand transitions   -Man & VC's to recruit LLE mm fibers Min A  5    Sit to stand transitions from mat table, R foot on AirEx, R hand on L knee, bolster between feet    12/16:  max cues for correct technique & form   Stand pivot w/SBQC w/c to mat table #6    Max A   Donned L shoe, shoe horn required Total A      Fwd step ups w/HR  4\" 1 5 B Mod/max A & assist to maintain L knee stability   Navigated 4\" steps step to pattern w/HR & QC 4\" Max A & man assist to maintain LLE stability with each step   STS mod mat table with maria alejandra-walker    Once standing added weight shifting M/L    One trial added L LE stepping (minimal success without AFO)     Transfer onto step one    Transfer off step one to [place]    Pt has difficulty with problem solving and motor planning   VCs for watching where L foot is positioned    Transfer from [facility] to mat  Transfer from mat to WC min A     Sit pivot to R - attempted L but too scared           Tall kneeling EOM  Tall kneeling: , 2 steps R  -man cues to sacrum & sternum to improve posture  -man facilitation to sacrum & medial lower LLE to advance                        Neuromuscular Re-ed (10617)       Static stand for equal BLE WB'ing w/mirror for visual feedback of correct posture & weight-bearing through LLE    Static stand w/R foot on box w/mirror for visual feedback of correct posture & weight-bearing through LLE    Static stand w/large blue bolster between feet to maintain neutral hip positioning w/mirror for visual feedback of correct posture & weight-bearing through LLE         4\"        RLE on Airex                                  Min /Mod A -man cues for weight shift reduced to encourage pt to self correct      -man cues to facilitate posture, VC's only to facilitate weight-shift onto LLE      -man cues to facilitate posture, VC's only to facilitate weight-shift onto LLE   L biceps tendon pressure to improve elbow extension and hand/wrist placment       Forward/backward stepping with R foot    // bars, left hand on bars, cues to assist with left knee stability    EOM:  Scooting:  -Out to edge  -L laterally  -R laterally            -able to scoot out w/pelvis in midline p instruction  -max cues for correct technique and encouragement  -good form, required mod A for LLE positioning   Rolling in bed R and L    Min A for placement and sequencing    bridge   CGA, VCs for hip adduction    Hip add in hooklying   TCs for target   Heel slides AAROM to AROM   AAROM to begin, but slowly gained more motion and power, responds well to cues for power          Gait Training:  Foot in front then behind at elevated mat table          Amb w/QC        Amb pushing weight lymph cart;  purple TB for DF and knee flexion assist      Amb w/hemiwalker Min [place]/Mod AMax x 2         Mod A40'x2, 100'            11/18:  performed x8 safely w/pt and was added to HEP;  PT (then ) inhibited L knee recurvatum w/man cues to posterior knee.        -manual blocking to minimize L genu recurvatum applied to gastroc        facilitation with tband at L LE, Max A for placing L foot as pt puling forward, VCs for sequencing, block to L knee and to cart to avoid pt pulling back     -purple TB for L ankle DF & knee flex assist   Weight shifts  -lateral   -foot onto box, lean onto foot, then return  -staggered w/R foot on AirEx  -foot maintained on box w/RUE reaches         Mod A               -max man cues for correct posture and shifting  -max cues for improved weight-shifting & VCs to reduce speed & improve control  -man cues to facilitate shifting weight onto LLE    12/9: pt unable to adequately accept weight through LLE, activity stopped due to safety concerns   Amb bwds w/cart    Treadmill  -L foot on side of belt, RLE on belt    -lateral weight shifts with belt stopped & RUE support   0.3 mph           12/9: incr difficulty reducing pt compensations to avoid use of LLE, once compensations were removed, pt rapidly fatigued and became anxious, stating she needed to stop, felt faint, was going to get sick, and finally profanities, not directed at anyone.   L sided neglect did worsen as anxiety incr, however  did arrive on her R side which may have contributed to focus on R side                        Manual Intervention ([id number])       Stretches:  Seated hamstring stretch  Seated knee flexion stretch  Seated calf stretch    x30\" L  2x20\" proprioception to assist with   [x] LE / lumbar: LE, proximal hip, and core control in self care, mobility, lifting, ambulation and eccentric single leg control. [x] UE / cervical: cervical, scapular, scapulothoracic and UE control with self care, reaching, carrying, lifting, house/yardwork, driving, computer work.   [] (08647) Therapist is in constant attendance of 2 or more patients providing skilled therapy interventions, but not providing any significant amount of measurable one-on-one time to either patient, for improvements in  [] LE / lumbar: LE, proximal hip, and core control in self care, mobility, lifting, ambulation and eccentric single leg control. [] UE / cervical: cervical, scapular, scapulothoracic and UE control with self care, reaching, carrying, lifting, house/yardwork, driving, computer work. NMR and Therapeutic Activities:    [x] (14110 or 04205) Provided verbal/tactile cueing for activities related to improving balance, coordination, kinesthetic sense, posture, motor skill, proprioception and motor activation to allow for proper function of   [x] LE: / Lumbar core, proximal hip and LE with self care and ADLs  [x] UE / Cervical: cervical, postural, scapular, scapulothoracic and UE control with self care, carrying, lifting, driving, computer work.    [x] (40747) Gait Re-education- Provided training and instruction to the patient for proper LE, core and proximal hip recruitment and positioning and eccentric body weight control with ambulation re-education including up and down stairs     Home Management Training / Self Care:  [] (76317) Provided self-care/home management training related to activities of daily living and compensatory training, and/or use of adaptive equipment for improvement with: ADLs and compensatory training, meal preparation, safety procedures and instruction in use of adaptive equipment, including bathing, grooming, dressing, personal hygiene, basic household cleaning and chores. Home Exercise Program:    [] (50029) Reviewed/Progressed HEP activities related to strengthening, flexibility, endurance, ROM of   [] LE / Lumbar: core, proximal hip and LE for functional self-care, mobility, lifting and ambulation/stair navigation   [] UE / Cervical: cervical, postural, scapular, scapulothoracic and UE control with self care, reaching, carrying, lifting, house/yardwork, driving, computer work  [] (22980)Reviewed/Progressed HEP activities related to improving balance, coordination, kinesthetic sense, posture, motor skill, proprioception of   [] LE: core, proximal hip and LE for self care, mobility, lifting, and ambulation/stair navigation    [] UE / Cervical: cervical, postural,  scapular, scapulothoracic and UE control with self care, reaching, carrying, lifting, house/yardwork, driving, computer work    Manual Treatments:  PROM / STM / Oscillations-Mobs:  G-I, II, III, IV (PA's, Inf., Post.)  [] (94473) Provided manual therapy to mobilize LE, proximal hip and/or LS spine soft tissue/joints for the purpose of modulating pain, promoting relaxation,  increasing ROM, reducing/eliminating soft tissue swelling/inflammation/restriction, improving soft tissue extensibility and allowing for proper ROM for normal function with   [] LE / lumbar: self care, mobility, lifting and ambulation. [] UE / Cervical: self care, reaching, carrying, lifting, house/yardwork, driving, computer work. Modalities:  [] (58826) Vasopneumatic compression: Utilized vasopneumatic compression to decrease edema / swelling for the purpose of improving mobility and quad tone / recruitment which will allow for increased overall function including but not limited to self-care, transfers, ambulation, and ascending / descending stairs.        Charges:  Timed Code Treatment Minutes: 45   Total Treatment Minutes: 45     Units approved Units used Date Range    48   64 -PT   9 12/10/21  12/13 - 2/18/22   **updated 12/23**    [] EVAL - LOW (09016)   [] EVAL - MOD (37765)  [] EVAL - HIGH (44816)  [] RE-EVAL (32618)  [] XD(55129) x       [] Ionto  [x] NMR (41715) x 1     [] Vaso  [] Manual (19733) x       [] Ultrasound  [] TA x        [] Mech Traction (15761)  [] Aquatic Therapy x      [] ES (un) (14850):   [] Home Management Training x  [] ES(attended) (65856)   [] Dry Needling 1-2 muscles (54588):  [] Dry Needling 3+ muscles (868763  [] Group:      [x] Other: gait x 2    GOALS:   Patient stated goal: To return to as normal activity as possible.       Therapist goals for Patient:   Short Term Goals: To be achieved in: 2 weeks  1. Independent in HEP and progression per patient tolerance, in order to prevent re-injury.  - Goal Met  2. Patient will have a decrease in pain to facilitate improvement in movement, function, and ADLs as indicated by Functional Deficits. - Goal Met     Long Term Goals: To be achieved in: 6 weeks  1. Pt will demonstrate the ability to perform sit to/from stand with supervision.  -30 sec STS: 7 reps from 22 in high chair;  2.  -Met 11/15/21  **11/15/21**: 2a.  Pt will amb 150' w/SBQC & min A.   - Not Met, amb 70'w/mod A, 50' w/mod A & several LOB  3. Patient will demonstrate an increase in postural awareness and control to allow for proper functional mobility as indicated by patients Functional Deficits. - Weight remains shifted onto RLE, limiting transfers, static standing and gait  4. Patient will return to functional activities including transfers without increased symptoms or restriction.  - Not Met    Overall Progression Towards Functional goals/ Treatment Progress Update:  [] Patient is progressing as expected towards functional goals listed. [x] Progression is slowed due to complexities/Impairments listed. [] Progression has been slowed due to co-morbidities.   [] Plan just implemented, too soon to assess goals progression <30days   [] Goals require adjustment due to lack of progress  [] Patient is not progressing as expected and requires additional follow up with physician  [] Other    Persisting Functional Limitations/Impairments:  []Sleeping []Sitting              [x]Standing []Transfers        [x]Walking []Kneeling              []Stairs [x]Squatting / bending   [x]ADLs [x]Reaching  []Lifting  [x]Housework  []Driving [x]Job related tasks  [x]Sports/Recreation []Other:        ASSESSMENT:     L step length is improving as pt continues to progress L hip strength to initiate swing phase, however remains inconsistent and pt continues to require cues to weight shift onto LLE, especially just prior R toe off. L genu recurvatum remains, is improved at times but not consistent. Pt able to demo sufficient hip & knee flex to remove L foot from step during step ups with min A, required multiple attempts and min A however. Continue to progress equal LE WB'ing to further progress balance and gait. Treatment/Activity Tolerance:  [x] Patient able to complete tx  [] Patient limited by fatigue  [] Patient limited by pain  [] Patient limited by other medical complications  [] Other:     Prognosis: [x] Good [] Fair  [] Poor    Patient Requires Follow-up: [x] Yes  [] No    Plan for next treatment session:  per training diary and progress as tolerated. PLAN: See eval. PT 2x / week for 6 weeks. -requesting additional visits at 2x/week x8 weeks. [x] Continue per plan of care [] Alter current plan (see comments)  [] Plan of care initiated [] Hold pending MD visit [] Discharge    Electronically signed by: Lidia Salvador PT, PT    Note: If patient does not return for scheduled/ recommended follow up visits, this note will serve as a discharge from care along with most recent update on progress.

## 2021-12-23 NOTE — FLOWSHEET NOTE
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 Mary Horvath MD   Plan of care signed (Y/N): Y (2021)    Date of Patient follow up with Physician: DEWEY    Functional outcome measure: The Communicative Participation Item Bank Total:  (2021)     Progress Note: []  Yes  [x]  No  Next due by: 2021    RESTRICTIONS/PRECAUTIONS: hx of brain bleed   Latex Allergy:  [x]NO      []YES    Preferred Language for Healthcare:   [x]English       []other    Visit # Insurance Allowable Date Range (if applicable)     +  48 units  (13 used)  10/  Ext. 2022       Pain level: 0/10     SUBJECTIVE: Pt alert and receptive, feels her speech is improving but still with c/o intermittent coughing during meals. MD office called again and request sent via fax for MBSS order. OBJECTIVE: See POC/Progress Note    Exercises/Interventions:      Motor Speech Therapy (17475) Accuracy Cues Notes   Apraxia       Dysarthria SLOB training during complex descriptive discourse:  - SLP rated intelligibility: 90%  - pt-rated intelligibility: 60% Visual cue of finger-tracking for pacing, fading reinforcement Auditory feedback used for self-rating, pt continues to report improved intelligibility following feedback   Intelligibility      Other:          Cognitive Function (80576 & 56865) Accuracy Cues Notes   Orientation        Attention           Sustained         Selective Alternating         Divided Divided/competing attention using cards for counting with 74% accuracy  Min visual cues to direct attention Metacognitive strategy training: self-talk, minimize visual distractions, double-check work   Memory          Immediate/Working         Short term         Long term         Prospective      Problem Solving       Visuospatial       Executive Function  Medication management with error recognition with 60% accuracy Min verbal cues to initiate scan from left Metacognitive strategy training: focus on one thing at a time, complete tasks in order, scan left to right    Pt with reduced awareness of errors   Other:          Pt. Education:  -Pt educated on diagnosis, prognosis and expectations for rehab  -All pt questions were answered  -Pt verbalized understanding and agreement    HEP instruction:  -Pt provided with written and illustrated instructions for HEP: intelligibility strategies, oral motor exercises, safe swallowing strategies, memory strategies    Speech/Voice Therapy:    [x] (73159) Treatment of speech, language, voice, communication, and/or auditory processing disorder; individual    Cognitive Function:  [] (20909) Therapeutic interventions that focus on cognitive function (eg, attention, memory, reasoning, executive function, problem solving, and/or pragmatic functioning) and compensatory strategies to manage the performance of an activity (eg, managing time or schedules, initiating, organizing, and sequencing tasks), direct (one-on-one) patient contact; initial 15 minutes (Report  only once per day)  [] ([phone]) each additional 15 minutes     Dysphagia Therapy:    [] (61164) Treatment of swallowing dysfunction and/or oral function for feeding         Charges:  Timed Code Treatment Minutes: 0   Total Treatment Minutes: 45     [x] Speech-Language Treatment (45050)        [] Voice Treatment (38076)                                         [] Cognitive-Linguistic Skills Development Initial 15 minutes (50458)  [] Cognitive-Linguistic Skills Development Additional 15 minutes (98453)   [] Dysphagia Treatment/NMES ([employer]) (56807)  [] Other:     GOALS:   Patient stated goal: \"Stop slurring\"  []? Progressing: []? Met: []? Not Met: []? Adjusted     Therapist goals for Patient:   Short Term Goals: To be achieved in: 5 weeks  1. Patient will use speech strategies to facilitate increased intelligibility at detailed conversational level in >90% as judged by SLP and pt/family. []? Progressing: []? Met: []? Not Met: []? Adjusted  2. Patient will complete abstract reasoning related to functional tasks with 80% accuracy to target self-monitoring. []? Progressing: []? Met: []? Not Met: []? Adjusted  3. Patient will complete complex attention tasks (sustained, selective, alternating) with 80% accuracy given minimal cues to improve functional task completion. []? Progressing: []? Met: []? Not Met: []? Adjusted  4. The patient will complete modified barium swallow study to further assess pharyngeal phase and direct plan of care. []? Progressing: []? Met: []? Not Met: []? Adjusted    Long Term Goals: To be achieved in: 6 weeks  1. Patient will demonstrate improved cognitive-linguistic function to improve safety and independence in ADLs.  []? Progressing: []? Met: []? Not Met: []? Adjusted  2. Patient will utilize speech intelligibility strategies to increase intelligibility to greater than 90% in conversation to a familiar/unfamiliar listener. []? Progressing: []? Met: []? Not Met: []? Adjusted  3. Patient will tolerate least restrictive diet with no overt clinical s/s of aspiration/penetration. []? Progressing: []? Met: []? Not Met: []? Adjusted         MET GOALS:  Patient will participate in further cognitive-linguistic evaluation with additional goals as indicated. (MET 11/8)  Patient will tolerate recommended diet with no overt s/s of aspiration or noticeable fatigue.  (MET 11/26)  Patient will complete oral motor and bolus control exercises with 80% accuracy independently to improve oral phase of swallow. (12/16/2021)    Progression Towards Functional goals:  [x] Patient is progressing as expected towards functional goals listed. [] Progression is slowed due to complexities listed. [] Progression has been slowed due to co-morbidities. [] Plan just implemented, too soon to assess goals progression  [] Other:     Persisting Functional Limitations/Impairments:  [x]Attention []Word Finding       [x]Memory [x]Speech Intelligibility      [x]Executive Function [x]Diet Tolerance     []Problem Solving []Coughing/Choking with PO  []Expressive Language []Medication/Finance Management  [x]Receptive Language []Other:      ASSESSMENT: See POC/Progress Note    Treatment/Activity Tolerance:  [x] Patient able to complete tx  [] Patient limited by fatigue  [] Patient limited by pain  [] Patient limited by other medical complications  [] Other:     Prognosis: [x] Good [] Fair  [] Poor    Patient Requires Follow-up: [x] Yes  [] No    PLAN: See josey. ST 2x / week for 6 weeks. [x] Continue per plan of care [] Alter current plan (see comments)  [] Plan of care initiated [] Hold pending MD visit [] Discharge    Electronically signed by:   Lila Morgan MA CCC-SLP  Speech-Language Pathologist  SP. [id number]       Note: If patient does not return for scheduled/ recommended follow up visits, this note will serve as a discharge from care along with most recent update on progress.

## 2021-12-27 ENCOUNTER — HOSPITAL ENCOUNTER (OUTPATIENT)
Dept: OCCUPATIONAL THERAPY | Age: 41
Setting detail: THERAPIES SERIES
End: 2021-12-27
Payer: COMMERCIAL

## 2021-12-30 ENCOUNTER — HOSPITAL ENCOUNTER (OUTPATIENT)
Dept: PHYSICAL THERAPY | Age: 41
Setting detail: THERAPIES SERIES
Discharge: HOME | End: 2021-12-30
Payer: COMMERCIAL

## 2021-12-30 ENCOUNTER — HOSPITAL ENCOUNTER (OUTPATIENT)
Dept: OCCUPATIONAL THERAPY | Age: 41
Setting detail: THERAPIES SERIES
Discharge: HOME | End: 2021-12-30
Payer: COMMERCIAL

## 2021-12-30 PROCEDURE — 97116 GAIT TRAINING THERAPY: CPT

## 2021-12-30 PROCEDURE — 97110 THERAPEUTIC EXERCISES: CPT

## 2021-12-30 PROCEDURE — 97535 SELF CARE MNGMENT TRAINING: CPT

## 2021-12-30 PROCEDURE — 97112 NEUROMUSCULAR REEDUCATION: CPT

## 2021-12-30 NOTE — FLOWSHEET NOTE
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  · Treatment Diagnosis: functional limitations secondary to brain bleed  Insurance/Certification information:  PT Insurance Information: [employer] - 30 visits per year; preauth required  Physician Information:  Referring Practitioner: Lamont Lester MD  Plan of care signed (Y/N): faxed 21     Date of Patient follow up with Physician:     Functional scale: Optimal:  raw score = 81; dysfunction = 80%    Progress Report: []  Yes  [x]  No     Date Range for reporting period:  Beginning   PN: 11/15  POC:   Ending:      Progress report due (10 Rx/or 30 days whichever is less): visit #9 or 86    Recertification due (POC duration/ or 90 days whichever is less): visit #16  3/6/22    Visit # Insurance Allowable Auth required? Date Range            128 units w/OT  64 ea discipline [x]  Yes  []  No   12/10/21    12/13 to 22          Latex Allergy:  [x]NO      []YES  Preferred Language for Healthcare:   [x]English       []other:      Pain level:  3-4/10 L quad    SUBJECTIVE:    Doing okay today, still having L quad pain, seems to hurt more when using her leg, getting in and out bed by herself. Received L AFO yesterday, hasn't really walked in it, wearing 2-3 hours at a time. OBJECTIVE:    :  30 sec STS w/use of RUE and 22\" mat table.   Gait Pattern w/SBQC & mod A w/several LOB:  Limited use and WB'ing through LLE, L genu recurvatum during L stance phase, L knee frequently mansi just following initial contact or when not appropriately shifting weight onto LLE during stance phase;  R knee frequently flexes and pt moves QC around rapidly. Pt able to utilize steppage gait w/upright trunk posture for L swing phase, however has difficulty transitioning weight anteriorly over LLE, frequently reducing R step length. 11/15:    30 sec sit to stand transitions: 5  Gait Pattern:  W/SBQC, L toe drag, flexed L knee throughout stance phase, weight shifted onto RLE, frequently hops over LLE;  Able to initiate L hip flex, toe drag minimizes swing phase, DF assist does help but not consistently alleviate toe drag. Forward pitched posture;  Frequently lifts and moves R foot and cane. 11/4: pt not wearing AFO today     10/14:  To clinic in wheelchair with soft AFO on LLE; received script for OT and speech this date and pt is now scheduled for all 3 therapies      RESTRICTIONS/PRECAUTIONS: h/o aneurysm    Exercises/Interventions:   Therapeutic Exercises (73420) Resistance / level Sets/sec Reps Notes   Nu step / step one  LEs only w/man facilitation to lateral L knee joint for improved hip alignment           Seated in w/c w/B feet placed on mat #6  -hip ext            -frequent VCs to encourage L hip extensor activation   Standing R hip w/QC  Standing stool pushes  Max A  Max A     -man cues to facilitate improved LLE mechanics  -man cues to improve posture and LLE WB'ing   Standing over gray bolster, hip ext Mod A                                  Therapeutic Activities (60522)       Sit to stand transitions   -Man & VC's to recruit LLE mm fibers Min A      Sit to stand transitions from mat table, R foot on AirEx, R hand on L knee, bolster between feet    12/16:  max cues for correct technique & form   Stand pivot w/SBQC w/c to mat table #6    Max A   Donned L shoe, shoe horn required Total A      Fwd step ups w/HR  4\" Mod/max A & assist to maintain L knee stability   Navigated 4\" steps step to pattern w/HR & QC 4\" Max A & man assist to maintain LLE stability with each step   STS mod mat table with maria alejandra-walker    Once standing added weight shifting M/L    One trial added L LE stepping (minimal success without AFO)     Transfer onto step one    Transfer off step one to [place]    Pt has difficulty with problem solving and motor planning   VCs for watching where L foot is positioned    Transfer from [facility] to [facility]  Transfer from [facility] to  min A     Sit pivot to R - attempted L but too scared           Tall kneeling EOM  Tall kneeling: , 2 steps R  -man cues to sacrum & sternum to improve posture  -man facilitation to sacrum & medial lower LLE to advance                        Neuromuscular Re-ed (52863)       Static stand for equal BLE WB'ing w/mirror for visual feedback of correct posture & weight-bearing through LLE    Static stand w/R foot on box w/mirror for visual feedback of correct posture & weight-bearing through LLE    Static stand w/large blue bolster between feet to maintain neutral hip positioning w/mirror for visual feedback of correct posture & weight-bearing through LLE         4\"        RLE on Airex                                  Min /Mod A -man cues for weight shift reduced to encourage pt to self correct      -man cues to facilitate posture, VC's only to facilitate weight-shift onto LLE      -man cues to facilitate posture, VC's only to facilitate weight-shift onto LLE   L biceps tendon pressure to improve elbow extension and hand/wrist placment       Forward/backward stepping with R foot    // bars, left hand on bars, cues to assist with left knee stability    EOM:  Scooting:  -Out to edge  -L laterally  -R laterally            -able to scoot out w/pelvis in midline p instruction  -max cues for correct technique and encouragement  -good form, required mod A for LLE positioning   Rolling in bed R and L    Min A for placement and sequencing    bridge   CGA, VCs for hip adduction    Hip add in hooklying   TCs for target   Heel slides AAROM to AROM   AAROM to begin, but slowly gained more motion and power, responds well to cues for power   Seated:  LAQ     x8 L   12/30: initially performed well then had difficulty activating quad and was unable to extend knee without external assist   Gait Training:  Foot in front then behind at elevated mat table          Amb w/QC        Amb pushing weight lymph cart;  purple TB for DF and knee flexion assist      Amb w/hemiwalker Min [place]/Mod AMax x 2         Mod A40'x2, 100'            11/18:  performed x8 safely w/pt and was added to HEP;  PT (then ) inhibited L knee recurvatum w/man cues to posterior knee.        -manual blocking to minimize L genu recurvatum applied to gastroc        facilitation with tband at L LE, Max A for placing L foot as pt puling forward, VCs for sequencing, block to L knee and to cart to avoid pt pulling back     -purple TB for L ankle DF & knee flex assist   Weight shifts  -lateral w/L AFO  -foot onto box, lean onto foot, then return  -staggered w/R foot on AirEx  -foot maintained on box w/RUE reaches         Mod A   2 mins            -man cues to facilitate weight shift  -max cues for improved weight-shifting & VCs to reduce speed & improve control  -man cues to facilitate shifting weight onto LLE    12/9: pt unable to adequately accept weight through LLE, activity stopped due to safety concerns   Amb bwds w/cart    Treadmill  -L foot on side of belt, RLE on belt    -lateral weight shifts with belt stopped & RUE support   0.3 mph           12/9: incr difficulty reducing pt compensations to avoid use of LLE, once compensations were removed, pt rapidly fatigued and became anxious, stating she needed to stop, felt faint, was going to get sick, and finally profanities, not directed at anyone.   L sided neglect did worsen as anxiety incr, however  did arrive on her R side which may have contributed to focus on R side                        Manual Intervention ([phone])       Stretches:  Seated hamstring stretch  Seated knee flexion stretch  Seated calf stretch  Seated piriformis stretch      3x30\" L                                            Modalities:     Pt. Education:    12/9:  Once seated after weight shifting on TM, discussed importance of utilizing LLE in that manor frequently, daily at home in order to improve muscle activation appropriately. Pt nodded in understanding but remained, understandably, upset. 11/9:  Pt requested order for hemiwalker, feels like it would improve her fear and ability to walk. Discussed increased support it provides and less LE & core muscle activation that is required to use hemiwalker, potentially becoming a step backwards and increasing her dependency on AD. Did discuss importance of continued walking and activity at home and if pt is not currently walking at home due to amount of work/effort required to amb w/SBQC, to obtain a hemiwalker so pt is more likely to return to typical home activities. Discussed nearby medical supply stores and to contact PCP and have order sent to them.  and pt reported understanding. 10/14:  Verbal cues for proper transfer technique and safety  -patient educated on diagnosis, prognosis and expectations for rehab  -all patient questions were answered    HEP instruction: 10/14:  Discussed safety on transfers. 10/18: Instructed pt to perform standing lateral weight-shifts hourly in front of chair, with a family member on L side as needed. Therapeutic Exercise and NMR EXR  [] (20549) Provided verbal/tactile cueing for activities related to strengthening, flexibility, endurance, ROM for improvements in  [] LE / Lumbar: LE, proximal hip, and core control with self care, mobility, lifting, ambulation. [] UE / Cervical: cervical, postural, scapular, scapulothoracic and UE control with self care, reaching, carrying, lifting, house/yardwork, driving, computer work.   [x] (41255) Provided verbal/tactile cueing for activities related to improving balance, coordination, kinesthetic sense, posture, motor skill, proprioception to assist with   [x] LE / lumbar: LE, proximal hip, and core control in self care, mobility, lifting, ambulation and eccentric single leg control. [x] UE / cervical: cervical, scapular, scapulothoracic and UE control with self care, reaching, carrying, lifting, house/yardwork, driving, computer work.   [] (11539) Therapist is in constant attendance of 2 or more patients providing skilled therapy interventions, but not providing any significant amount of measurable one-on-one time to either patient, for improvements in  [] LE / lumbar: LE, proximal hip, and core control in self care, mobility, lifting, ambulation and eccentric single leg control. [] UE / cervical: cervical, scapular, scapulothoracic and UE control with self care, reaching, carrying, lifting, house/yardwork, driving, computer work. NMR and Therapeutic Activities:    [x] (98975 or 70060) Provided verbal/tactile cueing for activities related to improving balance, coordination, kinesthetic sense, posture, motor skill, proprioception and motor activation to allow for proper function of   [x] LE: / Lumbar core, proximal hip and LE with self care and ADLs  [x] UE / Cervical: cervical, postural, scapular, scapulothoracic and UE control with self care, carrying, lifting, driving, computer work.    [x] (27577) Gait Re-education- Provided training and instruction to the patient for proper LE, core and proximal hip recruitment and positioning and eccentric body weight control with ambulation re-education including up and down stairs     Home Management Training / Self Care:  [] (82427) Provided self-care/home management training related to activities of daily living and compensatory training, and/or use of adaptive equipment for improvement with: ADLs and compensatory training, meal preparation, safety procedures and instruction in use of adaptive equipment, including bathing, grooming, dressing, personal hygiene, basic household cleaning and chores. Home Exercise Program:    [] (41872) Reviewed/Progressed HEP activities related to strengthening, flexibility, endurance, ROM of   [] LE / Lumbar: core, proximal hip and LE for functional self-care, mobility, lifting and ambulation/stair navigation   [] UE / Cervical: cervical, postural, scapular, scapulothoracic and UE control with self care, reaching, carrying, lifting, house/yardwork, driving, computer work  [] (70203)Reviewed/Progressed HEP activities related to improving balance, coordination, kinesthetic sense, posture, motor skill, proprioception of   [] LE: core, proximal hip and LE for self care, mobility, lifting, and ambulation/stair navigation    [] UE / Cervical: cervical, postural,  scapular, scapulothoracic and UE control with self care, reaching, carrying, lifting, house/yardwork, driving, computer work    Manual Treatments:  PROM / STM / Oscillations-Mobs:  G-I, II, III, IV (PA's, Inf., Post.)  [] (15267) Provided manual therapy to mobilize LE, proximal hip and/or LS spine soft tissue/joints for the purpose of modulating pain, promoting relaxation,  increasing ROM, reducing/eliminating soft tissue swelling/inflammation/restriction, improving soft tissue extensibility and allowing for proper ROM for normal function with   [] LE / lumbar: self care, mobility, lifting and ambulation. [] UE / Cervical: self care, reaching, carrying, lifting, house/yardwork, driving, computer work. Modalities:  [] (26669) Vasopneumatic compression: Utilized vasopneumatic compression to decrease edema / swelling for the purpose of improving mobility and quad tone / recruitment which will allow for increased overall function including but not limited to self-care, transfers, ambulation, and ascending / descending stairs.        Charges:  Timed Code Treatment Minutes: 45   Total Treatment Minutes: 45     Units approved Units used Date Range    48   64 -PT   12 12/10/21  12/13 - 2/18/22   **updated 12/30**    [] EVAL - LOW (31936)   [] EVAL - MOD (00177)  [] EVAL - HIGH (98977)  [] RE-EVAL (83667)  [] OC(69107) x       [] Ionto  [x] NMR (81174) x 1     [] Vaso  [] Manual (73185) x       [] Ultrasound  [] TA x        [] Mech Traction (36549)  [] Aquatic Therapy x      [] ES (un) (62322):   [] Home Management Training x  [] ES(attended) (06187)   [] Dry Needling 1-2 muscles (52005):  [] Dry Needling 3+ muscles (453377  [] Group:      [x] Other: gait x 2    GOALS:   Patient stated goal: To return to as normal activity as possible.       Therapist goals for Patient:   Short Term Goals: To be achieved in: 2 weeks  1. Independent in HEP and progression per patient tolerance, in order to prevent re-injury.  - Goal Met  2. Patient will have a decrease in pain to facilitate improvement in movement, function, and ADLs as indicated by Functional Deficits. - Goal Met     Long Term Goals: To be achieved in: 6 weeks  1. Pt will demonstrate the ability to perform sit to/from stand with supervision.  -30 sec STS: 7 reps from 22 in high chair;  2.  -Met 11/15/21  **11/15/21**: 2a.  Pt will amb 150' w/SBQC & min A.   - Not Met, amb 70'w/mod A, 50' w/mod A & several LOB  3. Patient will demonstrate an increase in postural awareness and control to allow for proper functional mobility as indicated by patients Functional Deficits. - Weight remains shifted onto RLE, limiting transfers, static standing and gait  4. Patient will return to functional activities including transfers without increased symptoms or restriction.  - Not Met    Overall Progression Towards Functional goals/ Treatment Progress Update:  [] Patient is progressing as expected towards functional goals listed. [x] Progression is slowed due to complexities/Impairments listed. [] Progression has been slowed due to co-morbidities.   [] Plan just implemented, too soon to assess goals progression <30days   [] Goals require adjustment due to lack of progress  [] Patient is not progressing as expected and requires additional follow up with physician  [] Other    Persisting Functional Limitations/Impairments:  []Sleeping []Sitting              [x]Standing []Transfers        [x]Walking []Kneeling              []Stairs [x]Squatting / bending   [x]ADLs [x]Reaching  []Lifting  [x]Housework  []Driving [x]Job related tasks  [x]Sports/Recreation []Other:        ASSESSMENT:       Gait pattern significantly improved w/introduction of L AFO, patient demo'd improved confidence in herself while amb with  as well. L genu recurvatum occurs at times but was much better managed with AFO vs without. Pt does still demo minimal anterior translation of L femur over tibia, limiting RLE step length, however should progress as confidence further improves. Treatment/Activity Tolerance:  [x] Patient able to complete tx  [] Patient limited by fatigue  [] Patient limited by pain  [] Patient limited by other medical complications  [] Other:     Prognosis: [x] Good [] Fair  [] Poor    Patient Requires Follow-up: [x] Yes  [] No    Plan for next treatment session:  per training diary and progress as tolerated. PLAN: See eval. PT 2x / week for 6 weeks. -requesting additional visits at 2x/week x8 weeks. [x] Continue per plan of care [] Alter current plan (see comments)  [] Plan of care initiated [] Hold pending MD visit [] Discharge    Electronically signed by: Grant Dean, PT, PT    Note: If patient does not return for scheduled/ recommended follow up visits, this note will serve as a discharge from care along with most recent update on progress.

## 2021-12-30 NOTE — PLAN OF CARE
Occupational Therapy Re-Certification Plan of Care    Dear Dr. Fabien Vinson  ,    We had the pleasure of treating the following patient for physical therapy services at [address] [place] [address]. A summary of our findings can be found in the updated assessment below. This includes our plan of care. If you have any questions or concerns regarding these findings, please do not hesitate to contact me at the office phone number checked above. Thank you for the referral.     Physician Signature:________________________________Date:__________________  By signing above (or electronic signature), therapists plan is approved by physician      Functional Outcome: quick dash    Overall Response to Treatment:   [x]Patient is responding well to treatment and improvement is noted with regards  to goals   []Patient should continue to improve in reasonable time if they continue HEP   []Patient has plateaued and is no longer responding to skilled OT intervention    []Patient is getting worse and would benefit from return to referring MD   []Patient unable to adhere to initial POC   []Other:      Date range of Visits:  10/18/2021 to 2021  Total Visits:      Recommendation:    [x]Continue OT 2*x / wk for 6 weeks. []Hold OT, pending MD visit   [facility] Occupational Therapy  [address] [place] [address].     [place], [address] [place] [address] Drive  Phone: [phone]   Fax: [phone]      Occupational Therapy Daily Treatment Note  Date:  2021    Patient: Latia Griffin   : 1980   MRN: [id number]  Referring Physician:    Dr. Lizzie Emerson MD              Medical Diagnosis Information: Nontraumatic subarachnoid hemorrhage from unspecified intracranial artery (I60.7)                                                   Insurance information:   [employer]- 30 visits/yr, [place] after IE, DN not billable   Date of Injury: 2021- passed out at work  Date of Surgery: n/a    Progress Report: [x]  Yes  []  No     Date Range for reporting period:  Beginning: 10/18/21  Ending: end of POC    Progress report due (10 Rx/or 30 days whichever is less): visit #12 or 00/09/74    Recertification due (POC duration/ or 90 days whichever is less): visit #12 or 1/18/21    Visit # Insurance Allowable Auth required? Date Range   12/12 96 units with PT  (48 units each) [x]  Yes  []  No Through 1/31/22   2 [x]No      []Yes  Pacemaker:  [x] No       [] Yes     Preferred Language for Healthcare:   [x]English       []other:    Pain level:   0/10    SUBJECTIVE:  Pt reports minor fatigue following PT and speech, but ready to work. Functional Disability Index:     Quick DASH: total score- 46, disability score- 79.55%     Stroke Rehabilitation Assessment of Movement (STREAM): total- 21/70 (supine- 7/15, sitting- 6/31, standing- 8/24)    OBJECTIVE:      PROM AROM      L R L R PROM on 12/30/2021   Shoulder Flexion  ~85*  11/29: 0-125*       0-140   Elbow Flexion  WFL          Elbow Extension  WFL          Pronation             Supination  WFL          Wrist Flexion  WFL          Wrist Extension  WFL          Radial Deviation             Composite Flexion (Tip of 3rd Digit to Distal Palmar Crease (cm)) Held in flexion; able to stretch into extension to 2 cm lag    11/29: Held in flexion; able to stretch into extension to 4 cm lag            RESTRICTIONS/PRECAUTIONS:  hx brain bleed, fall risk    Exercises/Interventions: Treatment focused on decreasing left neglect, integration of left side of body into mobility, improving proprioception in left ue. Patient ambulated from hand clinic to treatment ledesma about 100 feet holding steering wheel in left hand with improved midline noted and improved gait speed with use of quad cane. Patient then stood facing mat and used left UE as base of support standing at elelvated mat.   Patient worked on reaching objects on the left and then placing on target to right weight bearing through left ue with min assist of therapist.  Patient then worked on holding tennis racquet with left hand and stabilizing it on the table top and then worked on shoulder stability to move racquet to target of ball in left visual field and then pushing ball across horizontal plane with both hands and contact guard of therapist to maintain balance. Patient then worked on holding target with both hands and hitting target of ball being tossed to her from right and then swinging through to hit ball with both hands with min assist of therapist times 6 reps. Patient then worked on holding steering wheel with both hands and ambulating to chair with min assist of therapist to hold object with both hands and ambulate without device about 50 feet.       Therapeutic Exercises  Resistance / level Sets/sec Reps Notes                                                                                Neuromuscular Re-ed / Therapeutic Activities                                                 Manual Intervention                                                     Modalities:     Patient education:  Eval, POC, HEP- pt verbalized understanding       Home Exercise Program:  Written and verbal HEP instructions provided and reviewed:  · Scapular retraction   · Use of resting hand splint  · Mirror therapy at home  · 11/29/21: use of L hand as ANDREAS when seated and during transfers    Therapeutic Exercise and NMR:  [x] (93941) Provided verbal/tactile cueing for activities related to strengthening, flexibility, endurance, ROM  for improvements in scapular, scapulothoracic and UE control with self care, reaching, carrying, lifting, house/yardwork, driving/computer work.    [] (81174) Provided verbal/tactile cueing for activities related to improving balance, coordination, kinesthetic sense, posture, motor skill, proprioception  to assist with  scapular, scapulothoracic and UE control with self care, reaching, carrying, lifting, house/yardwork, driving/computer work.   [] Comments:    Therapeutic Activities:    [x] (09250 or 19334) Provided verbal/tactile cueing for activities related to improving balance, coordination, kinesthetic sense, posture, motor skill, proprioception and motor activation to allow for proper function of scapular, scapulothoracic and UE control with self care, carrying, lifting, driving/computer work  [] Comments:    Home Exercise Program:    [x] (63000) Reviewed/Progressed HEP activities related to strengthening, flexibility, endurance, ROM of scapular, scapulothoracic and UE control with self care, reaching, carrying, lifting, house/yardwork, driving/computer work  [] (63719) Reviewed/Progressed HEP activities related to improving balance, coordination, kinesthetic sense, posture, motor skill, proprioception of scapular, scapulothoracic and UE control with self care, reaching, carrying, lifting, house/yardwork, driving/computer work    [] Comments:    Manual Treatments:  PROM / STM / Oscillations-Mobs:  G-I, II, III, IV (PA's, Inf., Post.)  [x] (06936) Provided manual therapy to mobilize soft tissue/joints of cervical/CT, scapular GHJ and UE for the purpose of modulating pain, promoting relaxation,  increasing ROM, reducing/eliminating soft tissue swelling/inflammation/restriction, improving soft tissue extensibility and allowing for proper ROM for normal function with self care, reaching, carrying, lifting, house/yardwork, driving/computer work  [] Comments:    ADL Training:  [] (19618) Provided self-care/home management training related to activities of daily living and compensatory training, and/or use of adaptive equipment   [] Comments:     Splinting:  [] Fabrication of:   [] (60934) Orthotic/Prosthetic Management, subsequent encounter  [] (15538) Orthotic management and training (fitting and assessment)  [] Comments:      Charges:  Timed Code Treatment Minutes: 45   Total Treatment Minutes: 45     [] STONEY (LOW) 69835   [] OT Re-eval (05666)  [] EVAL (MOD) 45474   [] EVAL (HIGH) 20684       [x] Dolly (03104) x  2  [] NNUSZ(17330)  [x] NMR (71409) x  1 [x] Estim (attended) (50075)   [] Manual ([id number]) x  [] US (42709)  [] TA (30908) x    [] Paraffin (31799)  [] ADL  (05844) x    [] Splint/L code:    [] Estim (unattended) ([id number])  [] Fluidotherapy (55208)  [] Other:    GOALS:   Patient stated goal: L hand use  [] Progressing: [] Met: [] Not Met: [] Adjusted     Therapist goals for Patient:   Short Term Goals: To be achieved in: 30 days  1. Pt will improve functional ROM to use LUE as ANDREAS during transfers for increased safety and independence by 11/19/21. [x] Progressing: [] Met: [] Not Met: [] Adjusted  2. Pt will improve functional LUE use to complete UB dressing mod I for increased independence in ADLs by 11/19/21. [x] Progressing: [] Met: [] Not Met: [] Adjusted  3. Pt will improve functional LUE use to complete LB dressing min A for increased independence in ADLs by 11/19/21. [] Progressing: [x] Met: [] Not Met: [] Adjusted  4. Pt will improve oculomotor and cognition skills to complete Trailmaking Part B assessment within 180 seconds with no errors by 11/19/21. [x] Progressing: [] Met: [] Not Met: [] Adjusted     Long Term Goals: To be achieved by discharge  1. Pt will report a QuickDASH Symptom Severity Scale score of 65% or less indicating increased safety and functional independence in desired occupational pursuits by discharge. [x] Progressing: [] Met: [] Not Met: [] Adjusted  2. Pt will increase STREAM total score from 21/70 to 31/70 indicating increased safety and functional independence in desired occupational pursuits by discharge. (added 11/29/21)  [x] Progressing: [] Met: [] Not Met: [] Adjusted    Progression Towards Functional goals:  [] Patient is progressing as expected towards functional goals listed.     [x] Progression is slowed due to complexities listed. -falls and fear of falling, significant proprioceptive impairment, left neglect but improving significantly  [] Progression has been slowed due to co-morbidities. [] Plan just implemented, too soon to assess goals progression  [] All goals are met  [] Other:     ASSESSMENT:  Pt has made improvements in PROM of LUE with decreased tone, but continues to have limited participation in functional mobility due to falls/fear of falling and proprioceptive impairment. Treatment/Activity Tolerance:  [] Patient tolerated treatment well [] Patient limited by fatique  [] Patient limited by pain  [x] Patient limited by other medical complications- falls and fear of falling, significant proprioceptive impairment.  [] Other:     Prognosis: [x] Good [] Fair  [] Poor    Patient Requires Follow-up: [x] Yes  [] No    PLAN: See eval  [x] Continue per plan of care [] Alter current plan (see comments)  [] Plan of care initiated [] Hold pending MD visit [] Discharge    Electronically signed by:

## 2022-01-03 ENCOUNTER — HOSPITAL ENCOUNTER (OUTPATIENT)
Dept: SPEECH THERAPY | Age: 42
Setting detail: THERAPIES SERIES
Discharge: HOME | End: 2022-01-03
Payer: COMMERCIAL

## 2022-01-03 ENCOUNTER — HOSPITAL ENCOUNTER (OUTPATIENT)
Dept: OCCUPATIONAL THERAPY | Age: 42
Setting detail: THERAPIES SERIES
Discharge: HOME | End: 2022-01-03
Payer: COMMERCIAL

## 2022-01-03 ENCOUNTER — HOSPITAL ENCOUNTER (OUTPATIENT)
Dept: PHYSICAL THERAPY | Age: 42
Setting detail: THERAPIES SERIES
Discharge: HOME | End: 2022-01-03
Payer: COMMERCIAL

## 2022-01-03 NOTE — FLOWSHEET NOTE
Physical Therapy  Cancellation/No-show Note  Patient Name:  Digna Sigala  :  1980   Date:  1/3/2022  Cancelled visits to date: 3  No-shows to date: 1    Patient status for today's appointment patient:  [x]  Cancelled 10/25, 10/28, 1/3  []  Rescheduled appointment  []  No-show      Reason given by patient:  [x]  Patient ill  []  Conflicting appointment  []  No transportation    []  Conflict with work  []  No reason given  []  Other:     Comments:      Phone call information:   []  Phone call made today to patient at 11:05 time at number provided:      []  Patient answered, conversation as follows:  Pt waiting on [employer], trying to make it to SLP and OT sessions, doesn't want to cancel them now. Other therapists made aware. []  Patient did not answer, message left as follows:  []  Phone call not made today  [x]  Phone call not needed - pt contacted us to cancel and provided reason for cancellation.      Electronically signed by:  Sabine Quiroz, PT, DPT

## 2022-01-03 NOTE — FLOWSHEET NOTE
Occupational Therapy  Cancellation/No-show Note  Patient Name:  Randell Ventura  :  1980   Date:  1/3/2022  Cancelled visits to date: 3  No-shows to date: 1    Patient status for today's appointment patient:  [x]  Cancelled 10/25, 10/28, 1/3  []  Rescheduled appointment  []  No-show      Reason given by patient:  [x]  Patient ill  []  Conflicting appointment  []  No transportation    []  Conflict with work  []  No reason given  []  Other:     Comments:      Phone call information:   []  Phone call made today to patient at 11:05 time at number provided:      []  Patient answered, conversation as follows:  Pt waiting on [employer], trying to make it to SLP and OT sessions, doesn't want to cancel them now. Other therapists made aware. []  Patient did not answer, message left as follows:  []  Phone call not made today  [x]  Phone call not needed - pt contacted us to cancel and provided reason for cancellation.      Electronically signed by:  Bob Brenner, OT, DPT

## 2022-01-03 NOTE — FLOWSHEET NOTE
Speech Therapy  Cancellation/No-show Note  Patient Name:  Randell Ventura  :  1980   Date:  1/3/2022  Cancels to Date: 3  No-shows to Date: 0    Patient status for today's appointment patient:  [x]  Cancelled (11/15, , 1/3)  []  Rescheduled appointment  []  No-show     Reason given by patient:  [x]  Patient ill  []  Conflicting appointment  []  No transportation  []  Conflict with work  []  No reason given  []  Other:     Comments:      Phone call information:   []  Phone call made today to patient at 1130 time at number provided:      []  Patient answered, conversation as follows:   []  Patient did not answer, message left as follows:  [x]  Phone call not made today, pt called clinic to cancel    Electronically signed by:  Nikkie Baker, SLP

## 2022-01-06 ENCOUNTER — HOSPITAL ENCOUNTER (OUTPATIENT)
Dept: SPEECH THERAPY | Age: 42
Setting detail: THERAPIES SERIES
Discharge: HOME | End: 2022-01-06
Payer: COMMERCIAL

## 2022-01-06 ENCOUNTER — HOSPITAL ENCOUNTER (OUTPATIENT)
Dept: PHYSICAL THERAPY | Age: 42
Setting detail: THERAPIES SERIES
Discharge: HOME | End: 2022-01-06
Payer: COMMERCIAL

## 2022-01-06 ENCOUNTER — HOSPITAL ENCOUNTER (OUTPATIENT)
Dept: OCCUPATIONAL THERAPY | Age: 42
Setting detail: THERAPIES SERIES
Discharge: HOME | End: 2022-01-06
Payer: COMMERCIAL

## 2022-01-06 PROCEDURE — 97110 THERAPEUTIC EXERCISES: CPT

## 2022-01-06 PROCEDURE — 92507 TX SP LANG VOICE COMM INDIV: CPT

## 2022-01-06 PROCEDURE — 97530 THERAPEUTIC ACTIVITIES: CPT

## 2022-01-06 PROCEDURE — 97116 GAIT TRAINING THERAPY: CPT

## 2022-01-06 PROCEDURE — 97112 NEUROMUSCULAR REEDUCATION: CPT

## 2022-01-06 NOTE — FLOWSHEET NOTE
Occupational Therapy Daily Treatment Note  Date:  2022    Patient: Kaylin Tripathi   : 1980   MRN: [id number]  Referring Physician:    Dr. Bon Leung MD              Medical Diagnosis Information: Nontraumatic subarachnoid hemorrhage from unspecified intracranial artery (I60.7)                                                   Insurance information:   [employer]- 30 visits/yr, [place] after IE, DN not billable   Date of Injury: 2021- passed out at work  Date of Surgery: n/a    Progress Report: [x]  Yes  []  No     Date Range for reporting period:  Beginning: 10/18/21  Ending: end of POC    Progress report due (10 Rx/or 30 days whichever is less): visit #12 or     Recertification due (POC duration/ or 90 days whichever is less): visit #12 or 21    Visit # Insurance Allowable Auth required? Date Range    96 units with PT  (48 units each) [x]  Yes  []  No Through 22   2 [x]No      []Yes  Pacemaker:  [x] No       [] Yes     Preferred Language for Healthcare:   [x]English       []other:    Pain level:   0/10    SUBJECTIVE:  Pt reports minor fatigue following PT and speech, but ready to work.      Functional Disability Index:     Quick DASH: total score- 46, disability score- 79.55%     Stroke Rehabilitation Assessment of Movement (STREAM): total-  (supine- 7/15, sitting- , standing- )    OBJECTIVE:      PROM AROM      L R L R PROM on 2021   Shoulder Flexion  ~85*  : 0-125*       0-140   Elbow Flexion  WFL          Elbow Extension  WFL          Pronation             Supination  WFL          Wrist Flexion  WFL          Wrist Extension  WFL          Radial Deviation             Composite Flexion (Tip of 3rd Digit to Distal Palmar Crease (cm)) Held in flexion; able to stretch into extension to 2 cm lag    : Held in flexion; able to stretch into extension to 4 cm lag            RESTRICTIONS/PRECAUTIONS:  hx brain bleed, fall risk    Exercises/Interventions: Treatment focused on decreasing left neglect, integration of left side of body into mobility, improving proprioception in left ue. Patient transferred with contact guard wheelchair to mat. Patient warmed up pushing cart forward and back with both ues followed by pushing cart out transitioning to stand and then pushing cart out and transitioning to sit times 5. Patient then worked at ladder with both hands on the ladder worked on transitions to stand and changing both ues position on ladder to change base of support times 5 reps. Patient then worked in stand on pushing ladder forward, pulling back , turning with ladder both directions to increase proximal scapular stability and increase proprioceptive awareness of left Ue needing facilitation posterior humerus and gentle mobilization of hand during treatment.       Therapeutic Exercises  Resistance / level Sets/sec Reps Notes                                                                                Neuromuscular Re-ed / Therapeutic Activities                                                 Manual Intervention                                                     Modalities:     Patient education:  Eval, POC, HEP- pt verbalized understanding       Home Exercise Program:  Written and verbal HEP instructions provided and reviewed:  · Scapular retraction   · Use of resting hand splint  · Mirror therapy at home  · 11/29/21: use of L hand as ANDREAS when seated and during transfers    Therapeutic Exercise and NMR:  [x] (52569) Provided verbal/tactile cueing for activities related to strengthening, flexibility, endurance, ROM  for improvements in scapular, scapulothoracic and UE control with self care, reaching, carrying, lifting, house/yardwork, driving/computer work.    [] (92987) Provided verbal/tactile cueing for activities related to improving balance, coordination, kinesthetic sense, posture, motor skill, proprioception  to assist with  scapular, scapulothoracic and UE control with self care, reaching, carrying, lifting, house/yardwork, driving/computer work.   [] Comments:    Therapeutic Activities:    [x] (46211 or 64405) Provided verbal/tactile cueing for activities related to improving balance, coordination, kinesthetic sense, posture, motor skill, proprioception and motor activation to allow for proper function of scapular, scapulothoracic and UE control with self care, carrying, lifting, driving/computer work  [] Comments:    Home Exercise Program:    [x] (86883) Reviewed/Progressed HEP activities related to strengthening, flexibility, endurance, ROM of scapular, scapulothoracic and UE control with self care, reaching, carrying, lifting, house/yardwork, driving/computer work  [] (98615) Reviewed/Progressed HEP activities related to improving balance, coordination, kinesthetic sense, posture, motor skill, proprioception of scapular, scapulothoracic and UE control with self care, reaching, carrying, lifting, house/yardwork, driving/computer work    [] Comments:    Manual Treatments:  PROM / STM / Oscillations-Mobs:  G-I, II, III, IV (PA's, Inf., Post.)  [x] (05775) Provided manual therapy to mobilize soft tissue/joints of cervical/CT, scapular GHJ and UE for the purpose of modulating pain, promoting relaxation,  increasing ROM, reducing/eliminating soft tissue swelling/inflammation/restriction, improving soft tissue extensibility and allowing for proper ROM for normal function with self care, reaching, carrying, lifting, house/yardwork, driving/computer work  [] Comments:    ADL Training:  [] (02941) Provided self-care/home management training related to activities of daily living and compensatory training, and/or use of adaptive equipment   [] Comments:     Splinting:  [] Fabrication of:   [] (68078) Orthotic/Prosthetic Management, subsequent encounter  [] (06745) Orthotic management and training (fitting and assessment)  [] Comments:      Charges:  Timed Code Treatment Minutes: 45   Total Treatment Minutes: 45     [] EVAL (LOW) 41180   [] OT Re-eval (19191)  [] EVAL (MOD) 50225   [] EVAL (HIGH) 12675       [x] Dolly (38921) x  2  [] YHZZX(78202)  [x] NMR (68043) x  1 [x] Estim (attended) (54378)   [] Manual (49133) x  [] US (09056)  [] TA (35830) x    [] Paraffin (62483)  [] ADL  (33346) x    [] Splint/L code:    [] Estim (unattended) 33 93 31)  [] Fluidotherapy (87957)  [] Other:    GOALS:   Patient stated goal: L hand use  [] Progressing: [] Met: [] Not Met: [] Adjusted     Therapist goals for Patient:   Short Term Goals: To be achieved in: 30 days  1. Pt will improve functional ROM to use LUE as ANDREAS during transfers for increased safety and independence by 11/19/21. [x] Progressing: [] Met: [] Not Met: [] Adjusted  2. Pt will improve functional LUE use to complete UB dressing mod I for increased independence in ADLs by 11/19/21. [x] Progressing: [] Met: [] Not Met: [] Adjusted  3. Pt will improve functional LUE use to complete LB dressing min A for increased independence in ADLs by 11/19/21. [] Progressing: [x] Met: [] Not Met: [] Adjusted  4. Pt will improve oculomotor and cognition skills to complete Trailmaking Part B assessment within 180 seconds with no errors by 11/19/21. [x] Progressing: [] Met: [] Not Met: [] Adjusted     Long Term Goals: To be achieved by discharge  1. Pt will report a QuickDASH Symptom Severity Scale score of 65% or less indicating increased safety and functional independence in desired occupational pursuits by discharge. [x] Progressing: [] Met: [] Not Met: [] Adjusted  2.  Pt will increase STREAM total score from 21/70 to 31/70 indicating increased safety and functional independence in desired occupational pursuits by discharge. (added 11/29/21)  [x] Progressing: [] Met: [] Not Met: [] Adjusted    Progression Towards Functional goals:  [] Patient is progressing as expected towards functional goals listed. [x] Progression is slowed due to complexities listed. -falls and fear of falling, significant proprioceptive impairment, left neglect but improving significantly  [] Progression has been slowed due to co-morbidities. [] Plan just implemented, too soon to assess goals progression  [] All goals are met  [] Other:     ASSESSMENT:  Pt has made improvements in PROM of LUE with decreased tone, but continues to have limited participation in functional mobility due to falls/fear of falling and proprioceptive impairment. Treatment/Activity Tolerance:  [] Patient tolerated treatment well [] Patient limited by fatique  [] Patient limited by pain  [x] Patient limited by other medical complications- falls and fear of falling, significant proprioceptive impairment.  [] Other:     Prognosis: [x] Good [] Fair  [] Poor    Patient Requires Follow-up: [x] Yes  [] No    PLAN: See eval  [x] Continue per plan of care [] Alter current plan (see comments)  [] Plan of care initiated [] Hold pending MD visit [] Discharge    Electronically signed by:

## 2022-01-06 NOTE — FLOWSHEET NOTE
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  · Treatment Diagnosis: functional limitations secondary to brain bleed  Insurance/Certification information:  PT Insurance Information: [employer] - 30 visits per year; preauth required  Physician Information:  Referring Practitioner: Mirta Jules MD  Plan of care signed (Y/N): faxed 21     Date of Patient follow up with Physician:     Functional scale: Optimal:  raw score = 81; dysfunction = 80%    Progress Report: [x]  Yes  []  No     Date Range for reporting period:  Beginning   PN: 11/15  POC:   PN:   Ending:      Progress report due (10 Rx/or 30 days whichever is less): visit #9 or     Recertification due (POC duration/ or 90 days whichever is less): visit #16  3/6/22    Visit # Insurance Allowable Auth required? Date Range            128 units w/OT  64 ea discipline [x]  Yes  []  No   12/10/21    12/13 to 22          Latex Allergy:  [x]NO      []YES  Preferred Language for Healthcare:   [x]English       []other:      Pain level:  3/10 L quad    SUBJECTIVE:    Things are about the same, has been walking more at home, feels she's getting more comfortable walking with family since getting the AFO. No new difficulties at this time. Ran over with time OT, started about 10 mins late. OBJECTIVE:    :  30 sec STS w/use of RUE and 22\" mat table:  7  6MWT w/SBQC & L AFO:  125'  (165' total before seated rest)  Min/mod A required w/3 LOB    :  30 sec STS w/use of RUE and 22\" mat table.   Gait Pattern w/SBQC & mod A w/several LOB:  Limited use and WB'ing through LLE, L genu recurvatum during L stance phase, L knee frequently mansi just following initial contact or when not appropriately shifting weight onto LLE during stance phase;  R knee frequently flexes and pt moves QC around rapidly. Pt able to utilize steppage gait w/upright trunk posture for L swing phase, however has difficulty transitioning weight anteriorly over LLE, frequently reducing R step length. 11/15:    30 sec sit to stand transitions: 5  Gait Pattern:  W/SBQC, L toe drag, flexed L knee throughout stance phase, weight shifted onto RLE, frequently hops over LLE;  Able to initiate L hip flex, toe drag minimizes swing phase, DF assist does help but not consistently alleviate toe drag. Forward pitched posture;  Frequently lifts and moves R foot and cane. 11/4: pt not wearing AFO today     10/14:  To clinic in wheelchair with soft AFO on LLE; received script for OT and speech this date and pt is now scheduled for all 3 therapies      RESTRICTIONS/PRECAUTIONS: h/o aneurysm    Exercises/Interventions:   Therapeutic Exercises (49865) Resistance / level Sets/sec Reps Notes   Nu step / step one  LEs only w/man facilitation to lateral L knee joint for improved hip alignment           Seated in w/c w/B feet placed on mat #6  -hip ext            -frequent VCs to encourage L hip extensor activation   Standing R hip w/QC  Standing stool pushes  Max A  Max A     -man cues to facilitate improved LLE mechanics  -man cues to improve posture and LLE WB'ing   Standing over gray bolster, hip ext Mod A                                  Therapeutic Activities (57743)       Sit to stand transitions   -Man & VC's to recruit LLE mm fibers Min A      Sit to stand transitions from mat table, R foot on AirEx, R hand on L knee, bolster between feet    12/16:  max cues for correct technique & form   Stand pivot w/SBQC w/c to mat table #6    Max A   Donned L shoe, shoe horn required Total A      Fwd step ups w/HR  4\" Mod/max A & assist to maintain L knee stability   Navigated 4\" steps step to pattern w/HR & QC 4\" Max A & man assist to maintain LLE stability with each step   STS mod mat table with maria alejandra-walker    Once standing added weight shifting M/L    One trial added L LE stepping (minimal success without AFO)     R foot taps onto 4\" step    Trunk rotation w/R foot on step   10    2 B1/6: manual stability to maintain pelvic alignment     1/6: man cues to facilitate pelvic rotation                Tall kneeling EOM  Tall kneeling: , 2 steps R  -man cues to sacrum & sternum to improve posture  -man facilitation to sacrum & medial lower LLE to advance                        Neuromuscular Re-ed (92165)       Static stand for equal BLE WB'ing w/mirror for visual feedback of correct posture & weight-bearing through LLE    Static stand w/R foot on box w/mirror for visual feedback of correct posture & weight-bearing through LLE    Static stand w/large blue bolster between feet to maintain neutral hip positioning w/mirror for visual feedback of correct posture & weight-bearing through LLE         4\"        RLE on Airex                                  Min /Mod A -man cues for weight shift reduced to encourage pt to self correct      -man cues to facilitate posture, VC's only to facilitate weight-shift onto LLE      -man cues to facilitate posture, VC's only to facilitate weight-shift onto LLE   L biceps tendon pressure to improve elbow extension and hand/wrist placment       Forward/backward stepping with R foot    // bars, left hand on bars, cues to assist with left knee stability    EOM:  Scooting:  -Out to edge  -L laterally  -R laterally            -able to scoot out w/pelvis in midline p instruction  -max cues for correct technique and encouragement  -good form, required mod A for LLE positioning   Rolling in bed R and L    Min A for placement and sequencing    bridge   CGA, VCs for hip adduction    Hip add in hooklying   TCs for target   Heel slides AAROM to AROM AAROM to begin, but slowly gained more motion and power, responds well to cues for power   Seated:  LAQ        12/30: initially performed well then had difficulty activating quad and was unable to extend knee without external assist   Gait Training:  Foot in front then behind at elevated mat table          Amb w/QC & L AFO        Amb pushing weight lymph cart;  purple TB for DF and knee flexion assist      Amb w/hemiwalker Min [place]/Mod AMax x 2         Mod A165'            11/18:  performed x8 safely w/pt and was added to HEP;  PT (then ) inhibited L knee recurvatum w/man cues to posterior knee.        -man cues to pelvis to encourage neutral alignment and facilitate L pelvic rotation        facilitation with tband at L LE, Max A for placing L foot as pt puling forward, VCs for sequencing, block to L knee and to cart to avoid pt pulling back     -purple TB for L ankle DF & knee flex assist   Weight shifts  -lateral w/L AFO  -foot onto box, lean onto foot, then return  -staggered w/R foot on AirEx  -foot maintained on box w/RUE reaches         Mod A               -man cues to facilitate weight shift  -max cues for improved weight-shifting & VCs to reduce speed & improve control  -man cues to facilitate shifting weight onto LLE    12/9: pt unable to adequately accept weight through LLE, activity stopped due to safety concerns   Amb bwds w/cart    Treadmill  -L foot on side of belt, RLE on belt    -lateral weight shifts with belt stopped & RUE support   0.3 mph           12/9: incr difficulty reducing pt compensations to avoid use of LLE, once compensations were removed, pt rapidly fatigued and became anxious, stating she needed to stop, felt faint, was going to get sick, and finally profanities, not directed at anyone.   L sided neglect did worsen as anxiety incr, however  did arrive on her R side which may have contributed to focus on R side                        Manual Intervention [id number]) Stretches:  Seated hamstring stretch  Seated knee flexion stretch  Seated calf stretch  Seated piriformis stretch      3x30\" L                                            Modalities:     Pt. Education:    12/9:  Once seated after weight shifting on TM, discussed importance of utilizing LLE in that manor frequently, daily at home in order to improve muscle activation appropriately. Pt nodded in understanding but remained, understandably, upset. 11/9:  Pt requested order for hemiwalker, feels like it would improve her fear and ability to walk. Discussed increased support it provides and less LE & core muscle activation that is required to use hemiwalker, potentially becoming a step backwards and increasing her dependency on AD. Did discuss importance of continued walking and activity at home and if pt is not currently walking at home due to amount of work/effort required to amb w/SBQC, to obtain a hemiwalker so pt is more likely to return to typical home activities. Discussed nearby medical supply stores and to contact PCP and have order sent to them.  and pt reported understanding. 10/14:  Verbal cues for proper transfer technique and safety  -patient educated on diagnosis, prognosis and expectations for rehab  -all patient questions were answered    HEP instruction: 10/14:  Discussed safety on transfers. 10/18: Instructed pt to perform standing lateral weight-shifts hourly in front of chair, with a family member on L side as needed. Therapeutic Exercise and NMR EXR  [] (51224) Provided verbal/tactile cueing for activities related to strengthening, flexibility, endurance, ROM for improvements in  [] LE / Lumbar: LE, proximal hip, and core control with self care, mobility, lifting, ambulation. [] UE / Cervical: cervical, postural, scapular, scapulothoracic and UE control with self care, reaching, carrying, lifting, house/yardwork, driving, computer work.   [x] (35712) Provided verbal/tactile cueing for activities related to improving balance, coordination, kinesthetic sense, posture, motor skill, proprioception to assist with   [x] LE / lumbar: LE, proximal hip, and core control in self care, mobility, lifting, ambulation and eccentric single leg control. [x] UE / cervical: cervical, scapular, scapulothoracic and UE control with self care, reaching, carrying, lifting, house/yardwork, driving, computer work.   [] (78622) Therapist is in constant attendance of 2 or more patients providing skilled therapy interventions, but not providing any significant amount of measurable one-on-one time to either patient, for improvements in  [] LE / lumbar: LE, proximal hip, and core control in self care, mobility, lifting, ambulation and eccentric single leg control. [] UE / cervical: cervical, scapular, scapulothoracic and UE control with self care, reaching, carrying, lifting, house/yardwork, driving, computer work. NMR and Therapeutic Activities:    [x] (92326 or 06329) Provided verbal/tactile cueing for activities related to improving balance, coordination, kinesthetic sense, posture, motor skill, proprioception and motor activation to allow for proper function of   [x] LE: / Lumbar core, proximal hip and LE with self care and ADLs  [x] UE / Cervical: cervical, postural, scapular, scapulothoracic and UE control with self care, carrying, lifting, driving, computer work.    [x] (84745) Gait Re-education- Provided training and instruction to the patient for proper LE, core and proximal hip recruitment and positioning and eccentric body weight control with ambulation re-education including up and down stairs     Home Management Training / Self Care:  [] (57687) Provided self-care/home management training related to activities of daily living and compensatory training, and/or use of adaptive equipment for improvement with: ADLs and compensatory training, meal preparation, safety procedures and instruction in use of adaptive equipment, including bathing, grooming, dressing, personal hygiene, basic household cleaning and chores. Home Exercise Program:    [] (43591) Reviewed/Progressed HEP activities related to strengthening, flexibility, endurance, ROM of   [] LE / Lumbar: core, proximal hip and LE for functional self-care, mobility, lifting and ambulation/stair navigation   [] UE / Cervical: cervical, postural, scapular, scapulothoracic and UE control with self care, reaching, carrying, lifting, house/yardwork, driving, computer work  [] (07477)Reviewed/Progressed HEP activities related to improving balance, coordination, kinesthetic sense, posture, motor skill, proprioception of   [] LE: core, proximal hip and LE for self care, mobility, lifting, and ambulation/stair navigation    [] UE / Cervical: cervical, postural,  scapular, scapulothoracic and UE control with self care, reaching, carrying, lifting, house/yardwork, driving, computer work    Manual Treatments:  PROM / STM / Oscillations-Mobs:  G-I, II, III, IV (PA's, Inf., Post.)  [] (54940) Provided manual therapy to mobilize LE, proximal hip and/or LS spine soft tissue/joints for the purpose of modulating pain, promoting relaxation,  increasing ROM, reducing/eliminating soft tissue swelling/inflammation/restriction, improving soft tissue extensibility and allowing for proper ROM for normal function with   [] LE / lumbar: self care, mobility, lifting and ambulation. [] UE / Cervical: self care, reaching, carrying, lifting, house/yardwork, driving, computer work. Modalities:  [] (96071) Vasopneumatic compression: Utilized vasopneumatic compression to decrease edema / swelling for the purpose of improving mobility and quad tone / recruitment which will allow for increased overall function including but not limited to self-care, transfers, ambulation, and ascending / descending stairs.        Charges:  Timed Code Treatment Minutes: 33   Total Treatment Minutes: 33     Units approved Units used Date Range    48   64 -PT   14 12/10/21  12/13 - 2/18/22   **updated 1/6**    [] EVAL - LOW (60661)   [] EVAL - MOD (90497)  [] EVAL - HIGH (71402)  [] RE-EVAL (84587)  [] SK(85830) x       [] Ionto  [] NMR (08256) x      [] Vaso  [] Manual (97114) x       [] Ultrasound  [x] TA x 1       [] Mech Traction (26314)  [] Aquatic Therapy x      [] ES (un) (70051):   [] Home Management Training x  [] ES(attended) (99434)   [] Dry Needling 1-2 muscles (63056):  [] Dry Needling 3+ muscles (821267  [] Group:      [x] Other: gait x 1    GOALS:   Patient stated goal: To return to as normal activity as possible.       Therapist goals for Patient:   Short Term Goals: To be achieved in: 2 weeks  1. Independent in HEP and progression per patient tolerance, in order to prevent re-injury.  - Goal Met  2. Patient will have a decrease in pain to facilitate improvement in movement, function, and ADLs as indicated by Functional Deficits. - Goal Met     Long Term Goals: To be achieved in: 6 weeks  1. Pt will demonstrate the ability to perform sit to/from stand with supervision.  -30 sec STS: 7 reps from 22 in high chair;  2.  -Met 11/15/21  **11/15/21**: 2a.  Pt will amb 150' w/SBQC & min A.   - Almost Met, amb 165'w/ min/mod A, multiple LOB  3. Patient will demonstrate an increase in postural awareness and control to allow for proper functional mobility as indicated by patients Functional Deficits. - Weight remains shifted onto RLE, limiting transfers, standing and gait  4. Patient will return to functional activities including transfers without increased symptoms or restriction.  - Not Met    Overall Progression Towards Functional goals/ Treatment Progress Update:  [] Patient is progressing as expected towards functional goals listed. [x] Progression is slowed due to complexities/Impairments listed. [] Progression has been slowed due to co-morbidities.   [] Plan just implemented, too soon to assess goals progression <30days   [] Goals require adjustment due to lack of progress  [] Patient is not progressing as expected and requires additional follow up with physician  [] Other    Persisting Functional Limitations/Impairments:  []Sleeping []Sitting              [x]Standing []Transfers        [x]Walking []Kneeling              []Stairs [x]Squatting / bending   [x]ADLs [x]Reaching  []Lifting  [x]Housework  []Driving [x]Job related tasks  [x]Sports/Recreation []Other:        ASSESSMENT:        Patient beginning to amb w/L rotated lumbar spine/pelvis allowing for increased L hip adduction during swing phase and ER hip during stance phase. Able to improve w/man cues to pelvis to facilitate R rotation. L AFO continues to reduce frequency & severity of genu recurvatum but still occurs. Continue to progress LLE weight-bearing and trial Circuit training to address stamina deficits. Treatment/Activity Tolerance:  [x] Patient able to complete tx  [] Patient limited by fatigue  [] Patient limited by pain  [] Patient limited by other medical complications  [] Other:     Prognosis: [x] Good [] Fair  [] Poor    Patient Requires Follow-up: [x] Yes  [] No    Plan for next treatment session:  per training diary and progress as tolerated. PLAN: See eval. PT 2x / week for 6 weeks. -requesting additional visits at 2x/week x8 weeks. [x] Continue per plan of care [] Alter current plan (see comments)  [] Plan of care initiated [] Hold pending MD visit [] Discharge    Electronically signed by: Beth Arredondo PT, PT    Note: If patient does not return for scheduled/ recommended follow up visits, this note will serve as a discharge from care along with most recent update on progress.

## 2022-01-06 NOTE — FLOWSHEET NOTE
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 Yan York MD   Plan of care signed (Y/N): Y (2021)    Date of Patient follow up with Physician: DEWEY    Functional outcome measure: The Communicative Participation Item Bank Total:  (2021)     Progress Note: []  Yes  [x]  No  Next due by: 2022    RESTRICTIONS/PRECAUTIONS: hx of brain bleed   Latex Allergy:  [x]NO      []YES    Preferred Language for Healthcare:   [x]English       []other    Visit # Insurance Allowable Date Range (if applicable)     +  48 units  (14 used)  10/  Ext. 2022       Pain level: 0/10     SUBJECTIVE: Pt alert and receptive, continues to report intermittently getting \"choked\" on liquids, will hold establishment of additional dysphagia exercises pending completion of MBSS. OBJECTIVE: See POC/Progress Note    Exercises/Interventions:      Motor Speech Therapy (89887) Accuracy Cues Notes   Apraxia       Dysarthria SLOB reinforcement during spontaneous speech:  - narrative discourse with 60% pt rating and 90% SLP rating  -descriptive discourse with 80% pt rating and 100% pt rating Initiation cue for pacing, faded to independent Pt reports greatest difficulty controlling speech rate and articulation when emotional   Intelligibility      Other:          Cognitive Function (78496 & 47222) Accuracy Cues Notes   Orientation        Attention           Sustained Selective         Alternating         Divided Complex functional visual attention using map for route planning with 83% accuracy  Min-mod verbal cues to implement strategies Metacognitive strategy training: minimize visual distractions, plan ahead/scanning, double-check work   Memory          Immediate/Working         Short term         Long term         Prospective      Problem Solving       Visuospatial       Executive Function  Complex abstract reasoning for pattern sequencing with 67% accuracy Min visual cues for scanning and directing attention    Increased to 100% given mod cues for cognitive flexibility Metacognitive strategy training: focus on one thing at a time, complete tasks in order, self-talk   Other:          Pt. Education:  -Pt educated on diagnosis, prognosis and expectations for rehab  -All pt questions were answered  -Pt verbalized understanding and agreement    HEP instruction:  -Pt provided with written and illustrated instructions for HEP: intelligibility strategies, oral motor exercises, safe swallowing strategies, memory strategies    Speech/Voice Therapy:    [x] (54335) Treatment of speech, language, voice, communication, and/or auditory processing disorder; individual    Cognitive Function:  [] (40020) Therapeutic interventions that focus on cognitive function (eg, attention, memory, reasoning, executive function, problem solving, and/or pragmatic functioning) and compensatory strategies to manage the performance of an activity (eg, managing time or schedules, initiating, organizing, and sequencing tasks), direct (one-on-one) patient contact; initial 15 minutes (Report  only once per day)  [] ([phone]) each additional 15 minutes     Dysphagia Therapy:    [] (96658) Treatment of swallowing dysfunction and/or oral function for feeding         Charges:  Timed Code Treatment Minutes: 0   Total Treatment Minutes: 45     [x] Speech-Language Treatment (88354)        [] Voice Treatment (25562) [] Cognitive-Linguistic Skills Development Initial 15 minutes (28119)  [] Cognitive-Linguistic Skills Development Additional 15 minutes (27946)   [] Dysphagia Treatment/NMES (VitalStim) (84593)  [] Other:     GOALS:   Patient stated goal: \"Stop slurring\"  []? Progressing: []? Met: []? Not Met: []? Adjusted     Therapist goals for Patient:   Short Term Goals: To be achieved in: 5 weeks  1. Patient will use speech strategies to facilitate increased intelligibility at detailed conversational level in >90% as judged by SLP and pt/family. []? Progressing: []? Met: []? Not Met: []? Adjusted  2. Patient will complete abstract reasoning related to functional tasks with 80% accuracy to target self-monitoring. []? Progressing: []? Met: []? Not Met: []? Adjusted  3. Patient will complete complex attention tasks (sustained, selective, alternating) with 80% accuracy given minimal cues to improve functional task completion. []? Progressing: []? Met: []? Not Met: []? Adjusted  4. The patient will complete modified barium swallow study to further assess pharyngeal phase and direct plan of care. []? Progressing: []? Met: []? Not Met: []? Adjusted    Long Term Goals: To be achieved in: 6 weeks  1. Patient will demonstrate improved cognitive-linguistic function to improve safety and independence in ADLs.  []? Progressing: []? Met: []? Not Met: []? Adjusted  2. Patient will utilize speech intelligibility strategies to increase intelligibility to greater than 90% in conversation to a familiar/unfamiliar listener. []? Progressing: []? Met: []? Not Met: []? Adjusted  3. Patient will tolerate least restrictive diet with no overt clinical s/s of aspiration/penetration. []? Progressing: []? Met: []? Not Met: []? Adjusted         MET GOALS:  Patient will participate in further cognitive-linguistic evaluation with additional goals as indicated.  (MET 11/8)  Patient will tolerate recommended diet with

## 2022-01-11 ENCOUNTER — HOSPITAL ENCOUNTER (OUTPATIENT)
Dept: PHYSICAL THERAPY | Age: 42
Setting detail: THERAPIES SERIES
Discharge: HOME | End: 2022-01-11
Payer: COMMERCIAL

## 2022-01-11 ENCOUNTER — HOSPITAL ENCOUNTER (OUTPATIENT)
Dept: SPEECH THERAPY | Age: 42
Setting detail: THERAPIES SERIES
Discharge: HOME | End: 2022-01-11
Payer: COMMERCIAL

## 2022-01-11 ENCOUNTER — HOSPITAL ENCOUNTER (OUTPATIENT)
Dept: OCCUPATIONAL THERAPY | Age: 42
Setting detail: THERAPIES SERIES
Discharge: HOME | End: 2022-01-11
Payer: COMMERCIAL

## 2022-01-11 PROCEDURE — 97110 THERAPEUTIC EXERCISES: CPT

## 2022-01-11 PROCEDURE — 97116 GAIT TRAINING THERAPY: CPT

## 2022-01-11 PROCEDURE — 97112 NEUROMUSCULAR REEDUCATION: CPT

## 2022-01-11 PROCEDURE — 92507 TX SP LANG VOICE COMM INDIV: CPT

## 2022-01-11 PROCEDURE — 97530 THERAPEUTIC ACTIVITIES: CPT

## 2022-01-11 NOTE — FLOWSHEET NOTE
[address] 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  · Treatment Diagnosis: functional limitations secondary to brain bleed  Insurance/Certification information:  PT Insurance Information: [employer] - 30 visits per year; preauth required  Physician Information:  Referring Practitioner: Mirta Jules MD  Plan of care signed (Y/N): faxed 21     Date of Patient follow up with Physician:     Functional scale: Optimal:  raw score = 81; dysfunction = 80%    Progress Report: [x]  Yes  []  No     Date Range for reporting period:  Beginning   PN: 11/15  POC:   PN:   Ending:      Progress report due (10 Rx/or 30 days whichever is less): visit #9 or     Recertification due (POC duration/ or 90 days whichever is less): visit #16  3/6/22    Visit # Insurance Allowable Auth required? Date Range            128 units w/OT  64 ea discipline [x]  Yes  []  No   12/10/21    12/13 to 22          Latex Allergy:  [x]NO      []YES  Preferred Language for Healthcare:   [x]English       []other:      Pain level:  /10 L quad    SUBJECTIVE:   Pt reports        OBJECTIVE:    :  30 sec STS w/use of RUE and 22\" mat table:  7  6MWT w/SBQC & L AFO:  125'  (165' total before seated rest)  Min/mod A required w/3 LOB    :  30 sec STS w/use of RUE and 22\" mat table. Gait Pattern w/SBQC & mod A w/several LOB:  Limited use and WB'ing through LLE, L genu recurvatum during L stance phase, L knee frequently mansi just following initial contact or when not appropriately shifting weight onto LLE during stance phase;  R knee frequently flexes and pt moves QC around rapidly.   Pt able to utilize steppage gait w/upright trunk posture for L swing phase, however has difficulty transitioning weight anteriorly over LLE, frequently reducing R step length. 11/15:    30 sec sit to stand transitions: 5  Gait Pattern:  W/SBQC, L toe drag, flexed L knee throughout stance phase, weight shifted onto RLE, frequently hops over LLE;  Able to initiate L hip flex, toe drag minimizes swing phase, DF assist does help but not consistently alleviate toe drag. Forward pitched posture;  Frequently lifts and moves R foot and cane. 11/4: pt not wearing AFO today     10/14:  To clinic in wheelchair with soft AFO on LLE; received script for OT and speech this date and pt is now scheduled for all 3 therapies      RESTRICTIONS/PRECAUTIONS: h/o aneurysm    Exercises/Interventions:   Therapeutic Exercises (75234) Resistance / level Sets/sec Reps Notes   Nu step / step one  4 levelX4.5 minLEs only w/man facilitation to lateral L knee joint for improved hip alignment           Seated in w/c w/B feet placed on mat #6  -hip ext            -frequent VCs to encourage L hip extensor activation   Standing R hip w/QC  Standing stool pushes  Max A  Max A     -man cues to facilitate improved LLE mechanics  -man cues to improve posture and LLE WB'ing   Standing over gray bolster, hip ext Mod A                                  Therapeutic Activities (23520)       Sit to stand transitions   -Man & VC's to recruit LLE mm fibers    Sit to stand transitions from mat table, R foot on AirEx, R hand on L knee, bolster between feet 12/16:  max cues for correct technique & form   Stand pivot w/SBQC w/c to mat table #6 Max A   Donned L shoe, shoe horn required    Fwd step ups w/HR  Mod/max A & assist to maintain L knee stability   Navigated 4\" steps step to pattern w/HR & QC Max A & man assist to maintain LLE stability with each step   STS mod mat table with maria alejandra-walker    Once standing added weight shifting M/L    One trial added L LE stepping (minimal success without AFO)     R foot taps onto 4\" step    Trunk rotation w/R foot on step       1/6: manual stability to maintain pelvic alignment     1/6: man cues to facilitate pelvic rotation   Gait to chair, half turn and sit - VCs for spatial awareness and     x 41/11: VCs for turning all the way prior to sitting x 3, last sit was correct placement and positioning    Standing with R foot in front (stagger stance) with thor rot   X 10 Added 1/11   Tall kneeling EOM  Tall kneeling: , 2 steps R  -man cues to sacrum & sternum to improve posture  -man facilitation to sacrum & medial lower LLE to advance                        Neuromuscular Re-ed (82306)       Static stand for equal BLE WB'ing w/mirror for visual feedback of correct posture & weight-bearing through LLE    Static stand w/R foot on box w/mirror for visual feedback of correct posture & weight-bearing through LLE    Static stand w/large blue bolster between feet to maintain neutral hip positioning w/mirror for visual feedback of correct posture & weight-bearing through LLE         4\"        RLE on Airex                                  Min /Mod A -man cues for weight shift reduced to encourage pt to self correct      -man cues to facilitate posture, VC's only to facilitate weight-shift onto LLE      -man cues to facilitate posture, VC's only to facilitate weight-shift onto LLE   L biceps tendon pressure to improve elbow extension and hand/wrist placment       Forward/backward stepping with R foot    // bars, left hand on bars, cues to assist with left knee stability    EOM:  Scooting:  -Out to edge  -L laterally  -R laterally            -able to scoot out w/pelvis in midline p instruction  -max cues for correct technique and encouragement  -good form, required mod A for LLE positioning   Rolling in bed R and L    Min A for placement and sequencing    bridge   CGA, VCs for hip adduction    Hip add in hooklying   TCs for target   Heel slides AAROM to did arrive on her R side which may have contributed to focus on R side                        Manual Intervention ([phone])       Stretches:  Seated hamstring stretch  Seated knee flexion stretch  Seated calf stretch  Seated piriformis stretch                                                  Modalities:     Pt. Education:    12/9:  Once seated after weight shifting on TM, discussed importance of utilizing LLE in that manor frequently, daily at home in order to improve muscle activation appropriately. Pt nodded in understanding but remained, understandably, upset. 11/9:  Pt requested order for hemiwalker, feels like it would improve her fear and ability to walk. Discussed increased support it provides and less LE & core muscle activation that is required to use hemiwalker, potentially becoming a step backwards and increasing her dependency on AD. Did discuss importance of continued walking and activity at home and if pt is not currently walking at home due to amount of work/effort required to amb w/SBQC, to obtain a hemiwalker so pt is more likely to return to typical home activities. Discussed nearby medical supply stores and to contact PCP and have order sent to them.  and pt reported understanding. 10/14:  Verbal cues for proper transfer technique and safety  -patient educated on diagnosis, prognosis and expectations for rehab  -all patient questions were answered    HEP instruction: 10/14:  Discussed safety on transfers. 10/18: Instructed pt to perform standing lateral weight-shifts hourly in front of chair, with a family member on L side as needed. Therapeutic Exercise and NMR EXR  [] (62398) Provided verbal/tactile cueing for activities related to strengthening, flexibility, endurance, ROM for improvements in  [] LE / Lumbar: LE, proximal hip, and core control with self care, mobility, lifting, ambulation.   [] UE / Cervical: cervical, postural, scapular, scapulothoracic and UE control with self care, reaching, carrying, lifting, house/yardwork, driving, computer work.  [] (21350) Provided verbal/tactile cueing for activities related to improving balance, coordination, kinesthetic sense, posture, motor skill, proprioception to assist with   [] LE / lumbar: LE, proximal hip, and core control in self care, mobility, lifting, ambulation and eccentric single leg control. [] UE / cervical: cervical, scapular, scapulothoracic and UE control with self care, reaching, carrying, lifting, house/yardwork, driving, computer work.   [] (88778) Therapist is in constant attendance of 2 or more patients providing skilled therapy interventions, but not providing any significant amount of measurable one-on-one time to either patient, for improvements in  [] LE / lumbar: LE, proximal hip, and core control in self care, mobility, lifting, ambulation and eccentric single leg control. [] UE / cervical: cervical, scapular, scapulothoracic and UE control with self care, reaching, carrying, lifting, house/yardwork, driving, computer work. NMR and Therapeutic Activities:    [x] (35647 or 73932) Provided verbal/tactile cueing for activities related to improving balance, coordination, kinesthetic sense, posture, motor skill, proprioception and motor activation to allow for proper function of   [x] LE: / Lumbar core, proximal hip and LE with self care and ADLs  [x] UE / Cervical: cervical, postural, scapular, scapulothoracic and UE control with self care, carrying, lifting, driving, computer work.    [x] (33222) Gait Re-education- Provided training and instruction to the patient for proper LE, core and proximal hip recruitment and positioning and eccentric body weight control with ambulation re-education including up and down stairs     Home Management Training / Self Care:  [] (78645) Provided self-care/home management training related to activities of daily living and compensatory training, and/or use of adaptive ambulation, and ascending / descending stairs. Charges:  Timed Code Treatment Minutes: 33   Total Treatment Minutes: 33     Units approved Units used Date Range    48   64 -PT   17 12/10/21  12/13 - 2/18/22   **updated 1/11**    [] EVAL - LOW (57314)   [] EVAL - MOD (71384)  [] EVAL - HIGH (27595)  [] RE-EVAL (36488)  [] EC(09388) x       [] Ionto  [] NMR (36641) x      [] Vaso  [] Manual (74496) x       [] Ultrasound  [x] TA x 1       [] Mech Traction (84753)  [] Aquatic Therapy x      [] ES (un) (93072):   [] Home Management Training x  [] ES(attended) (15769)   [] Dry Needling 1-2 muscles (61129):  [] Dry Needling 3+ muscles (916273  [] Group:      [x] Other: gait x 2    GOALS:   Patient stated goal: To return to as normal activity as possible.       Therapist goals for Patient:   Short Term Goals: To be achieved in: 2 weeks  1. Independent in HEP and progression per patient tolerance, in order to prevent re-injury.  - Goal Met  2. Patient will have a decrease in pain to facilitate improvement in movement, function, and ADLs as indicated by Functional Deficits. - Goal Met     Long Term Goals: To be achieved in: 6 weeks  1. Pt will demonstrate the ability to perform sit to/from stand with supervision.  -30 sec STS: 7 reps from 22 in high chair;  2.  -Met 11/15/21  **11/15/21**: 2a.  Pt will amb 150' w/SBQC & min A.   - Almost Met, amb 165'w/ min/mod A, multiple LOB  3. Patient will demonstrate an increase in postural awareness and control to allow for proper functional mobility as indicated by patients Functional Deficits. - Weight remains shifted onto RLE, limiting transfers, standing and gait  4. Patient will return to functional activities including transfers without increased symptoms or restriction.  - Not Met    Overall Progression Towards Functional goals/ Treatment Progress Update:  [] Patient is progressing as expected towards functional goals listed.     [x] Progression is slowed due to complexities/Impairments listed. [] Progression has been slowed due to co-morbidities. [] Plan just implemented, too soon to assess goals progression <30days   [] Goals require adjustment due to lack of progress  [] Patient is not progressing as expected and requires additional follow up with physician  [] Other    Persisting Functional Limitations/Impairments:  []Sleeping []Sitting              [x]Standing []Transfers        [x]Walking []Kneeling              []Stairs [x]Squatting / bending   [x]ADLs [x]Reaching  []Lifting  [x]Housework  []Driving [x]Job related tasks  [x]Sports/Recreation []Other:        ASSESSMENT:  Focused session on spatial awareness and motor planning. Difficulty with completing turn fully prior to sitting. Also cued for half step on L and full step on R to achieve correct and equal step length. Also has difficulty with amb through small areas/weaving through cones. L AFO continues to reduce frequency & severity of genu recurvatum but still occurs. Continue to progress LLE weight-bearing and trial Circuit training to address stamina deficits. Treatment/Activity Tolerance:  [x] Patient able to complete tx  [] Patient limited by fatigue  [] Patient limited by pain  [] Patient limited by other medical complications  [] Other:     Prognosis: [x] Good [] Fair  [] Poor    Patient Requires Follow-up: [x] Yes  [] No    Plan for next treatment session:  per training diary and progress as tolerated. PLAN: See josey. PT 2x / week for 6 weeks. -requesting additional visits at 2x/week x8 weeks. [x] Continue per plan of care [] Alter current plan (see comments)  [] Plan of care initiated [] Hold pending MD visit [] Discharge    Electronically signed by: Gia Garcia, PT, DPT    Note: If patient does not return for scheduled/ recommended follow up visits, this note will serve as a discharge from care along with most recent update on progress.

## 2022-01-11 NOTE — FLOWSHEET NOTE
Occupational Therapy Daily Treatment Note  Date:  2022    Patient: Lucho Abreu   : 1980   MRN: [id number]  Referring Physician:    Dr. 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 Date Range    96 units with PT  (48 units each) [x]  Yes  []  No Through 22   2 [x]No      []Yes  Pacemaker:  [x] No       [] Yes     Preferred Language for Healthcare:   [x]English       []other:    Pain level:   0/10    SUBJECTIVE:  Pt reports minor fatigue following PT and speech, but ready to work.      Functional Disability Index:     Quick DASH: total score- 46, disability score- 79.55%     Stroke Rehabilitation Assessment of Movement (STREAM): total-  (supine- 7/15, sitting- , standing- )    OBJECTIVE:      PROM AROM      L R L R PROM on 2021   Shoulder Flexion  ~85*  : 0-125*       0-140   Elbow Flexion  WFL          Elbow Extension  WFL          Pronation             Supination  WFL          Wrist Flexion  WFL          Wrist Extension  WFL          Radial Deviation             Composite Flexion (Tip of 3rd Digit to Distal Palmar Crease (cm)) Held in flexion; able to stretch into extension to 2 cm lag    : Held in flexion; able to stretch into extension to 4 cm lag            RESTRICTIONS/PRECAUTIONS:  hx brain bleed, fall risk    Exercises/Interventions: Treatment focused on decreasing left neglect, integration of left side of body into mobility, improving proprioception in left ue. Patient ambulated with significantly improved proximal stability , midline orientation about 100 feet with cane with significant improved ability to navigate busy clinic. Patient then sat on edge of mat. Patient instructed to transition from short sit to sidelying at edge of mat going toward left side. Patient resistant and needed max encouragement. Therapist assisted patient with position of left ue in relationship to trunk to protect the gleno/ humeral joint. Patient provided with contact to improve confidence moving toward left side. Patient then worked on staying on left side and reaching for targets with right hand to encourage weight bearing through left side with encouragement provided since patient reluctant to roll toward the left side. Patient then rolled onto back and legs placed on hip support. Patient then completed hip extension over bolster with hip bridge. Reaching toward feet to encourage scapular depression with min facilitation posterior humerus. Patient then worked on functional movement patterns with therapist providing support posterior humerus. First of reaching across midline to right shoulder times 5 and then from this position extending elbow and reaching to nose and then back to shoulder then progressing to eccentric control and increased stability to hold motions at mid range extending elbow to reach toward ceiling with therapist providing facilitation through support posterior humerus and tapping on tricep.   Patient then rolled to left and completed sidelying to sit with contact and then sit to stand with stand by and ambulated to wheelchair through busy clinic with cues for posture only and light contact for safety and balance    Therapeutic Exercises  Resistance / level Sets/sec Reps Notes Neuromuscular Re-ed / Therapeutic Activities                                                 Manual Intervention                                                     Modalities:     Patient education:  Eval, POC, HEP- pt verbalized understanding       Home Exercise Program:  Written and verbal HEP instructions provided and reviewed:  · Scapular retraction   · Use of resting hand splint  · Mirror therapy at home  · 11/29/21: use of L hand as ANDREAS when seated and during transfers    Therapeutic Exercise and NMR:  [x] (66331) Provided verbal/tactile cueing for activities related to strengthening, flexibility, endurance, ROM  for improvements in scapular, scapulothoracic and UE control with self care, reaching, carrying, lifting, house/yardwork, driving/computer work.    [] (90216) Provided verbal/tactile cueing for activities related to improving balance, coordination, kinesthetic sense, posture, motor skill, proprioception  to assist with  scapular, scapulothoracic and UE control with self care, reaching, carrying, lifting, house/yardwork, driving/computer work.   [] Comments:    Therapeutic Activities:    [x] (69957 or 04092) Provided verbal/tactile cueing for activities related to improving balance, coordination, kinesthetic sense, posture, motor skill, proprioception and motor activation to allow for proper function of scapular, scapulothoracic and UE control with self care, carrying, lifting, driving/computer work  [] Comments:    Home Exercise Program:    [x] (25345) Reviewed/Progressed HEP activities related to strengthening, flexibility, endurance, ROM of scapular, scapulothoracic and UE control with self care, reaching, carrying, lifting, house/yardwork, driving/computer work  [] (71736) Reviewed/Progressed HEP activities related to improving balance, coordination, kinesthetic sense, posture, motor skill, proprioception of scapular, scapulothoracic and UE control with self care, reaching, carrying, lifting, house/yardwork, driving/computer work    [] Comments:    Manual Treatments:  PROM / STM / Oscillations-Mobs:  G-I, II, III, IV (PA's, Inf., Post.)  [x] (08387) Provided manual therapy to mobilize soft tissue/joints of cervical/CT, scapular GHJ and UE for the purpose of modulating pain, promoting relaxation,  increasing ROM, reducing/eliminating soft tissue swelling/inflammation/restriction, improving soft tissue extensibility and allowing for proper ROM for normal function with self care, reaching, carrying, lifting, house/yardwork, driving/computer work  [] Comments:    ADL Training:  [] (47350) Provided self-care/home management training related to activities of daily living and compensatory training, and/or use of adaptive equipment   [] Comments:     Splinting:  [] Fabrication of:   [] (44573) Orthotic/Prosthetic Management, subsequent encounter  [] (15682) Orthotic management and training (fitting and assessment)  [] Comments:      Charges:  Timed Code Treatment Minutes: 45   Total Treatment Minutes: 45     [] EVAL (LOW) 67566   [] OT Re-eval (96925)  [] EVAL (MOD) 47579   [] EVAL (HIGH) 32982       [x] Dolly (30425) x  2  [] SNACQ(14178)  [x] NMR (86891) x  1 [x] Estim (attended) (73337)   [] Manual ([id number]) x  [] US (08564)  [] TA () x    [] Paraffin (19679)  [] ADL  ([phone]) x    [] Splint/L code:    [] Estim (unattended) (22 [id number])  [] Fluidotherapy (09009)  [] Other:    GOALS:   Patient stated goal: L hand use  [] Progressing: [] Met: [] Not Met: [] Adjusted     Therapist goals for Patient:   Short Term Goals: To be achieved in: 30 days  1. Pt will improve functional ROM to use LUE as ANDREAS during transfers for increased safety and independence by 11/19/21. [x] Progressing: [] Met: [] Not Met: [] Adjusted  2. Pt will improve functional LUE use to complete UB dressing mod I for increased independence in ADLs by 11/19/21.     [x] Progressing: [] Met: [] Not Met: [] Adjusted  3. Pt will improve functional LUE use to complete LB dressing min A for increased independence in ADLs by 11/19/21. [] Progressing: [x] Met: [] Not Met: [] Adjusted  4. Pt will improve oculomotor and cognition skills to complete Trailmaking Part B assessment within 180 seconds with no errors by 11/19/21. [x] Progressing: [] Met: [] Not Met: [] Adjusted     Long Term Goals: To be achieved by discharge  1. Pt will report a QuickDASH Symptom Severity Scale score of 65% or less indicating increased safety and functional independence in desired occupational pursuits by discharge. [x] Progressing: [] Met: [] Not Met: [] Adjusted  2. Pt will increase STREAM total score from 21/70 to 31/70 indicating increased safety and functional independence in desired occupational pursuits by discharge. (added 11/29/21)  [x] Progressing: [] Met: [] Not Met: [] Adjusted    Progression Towards Functional goals:  [] Patient is progressing as expected towards functional goals listed. [x] Progression is slowed due to complexities listed. -falls and fear of falling, significant proprioceptive impairment, left neglect but improving significantly  [] Progression has been slowed due to co-morbidities. [] Plan just implemented, too soon to assess goals progression  [] All goals are met  [] Other:     ASSESSMENT:  Pt has made improvements in PROM of LUE with decreased tone, but continues to have limited participation in functional mobility due to falls/fear of falling and proprioceptive impairment. Treatment/Activity Tolerance:  [] Patient tolerated treatment well [] Patient limited by fatique  [] Patient limited by pain  [x] Patient limited by other medical complications- falls and fear of falling, significant proprioceptive impairment.  [] Other:     Prognosis: [x] Good [] Fair  [] Poor    Patient Requires Follow-up: [x] Yes  [] No    PLAN: See eval  [x] Continue per plan of care [] Alter current plan (see comments)  [] Plan of care initiated [] Hold pending MD visit [] Discharge    Electronically signed by:

## 2022-01-11 NOTE — FLOWSHEET NOTE
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 Kimmie Morales MD   Plan of care signed (Y/N): Y (2021)    Date of Patient follow up with Physician: DEWEY    Functional outcome measure: The Communicative Participation Item Bank Total:  (2021)     Progress Note: []  Yes  [x]  No  Next due by: 2022    RESTRICTIONS/PRECAUTIONS: hx of brain bleed   Latex Allergy:  [x]NO      []YES    Preferred Language for Healthcare:   [x]English       []other    Visit # Insurance Allowable Date Range (if applicable)   88/48  + 948 48 units  (15 used)  10/  Ext. 2022       Pain level: 0/10     SUBJECTIVE: Pt alert and receptive, missed initial appointment d/t time conflict with eye appointment but able to come at later time.  present during session. OBJECTIVE: See POC/Progress Note    Exercises/Interventions:      Motor Speech Therapy (86247) Accuracy Cues Notes   Apraxia       Dysarthria SLOB reinforcement during spontaneous speech:  - functional narrative discourse x 2 with 70% pt rating and 90% SLP rating Min verbal cue x 2 for reduced rate Increased rate and reduced articulation when recounting an exciting event    Pt benefited from auditory feedback for self-monitoring   Intelligibility      Other:          Cognitive Function (62299 & 36968) Accuracy Cues Notes   Orientation        Attention           Sustained         Selective         Alternating Divided Complex functional visual attention using calendar organization:  - 86% accuracy with visual prompts given one at a time  - 60% when visual prompts in one field Min-mod verbal cues to implement strategies Metacognitive strategy training: minimize visual distractions, plan ahead/scanning, double-check work   Memory          Immediate/Working         Short term         Long term         Prospective      Problem Solving       Visuospatial       Executive Function  Abstract reasoning:  - simple numerical sequencing with 90% accuracy  - deductive reasoning (characteristics) with 81% accuracy Min visual cues for scanning and directing attention   Metacognitive strategy training: focus on one thing at a time, complete tasks in order, self-talk, review of errors   Other:          Pt. Education:  -Pt educated on diagnosis, prognosis and expectations for rehab  -All pt questions were answered  -Pt verbalized understanding and agreement    HEP instruction:  -Pt provided with written and illustrated instructions for HEP: intelligibility strategies, oral motor exercises, safe swallowing strategies, memory strategies    Speech/Voice Therapy:    [x] (84631) Treatment of speech, language, voice, communication, and/or auditory processing disorder; individual    Cognitive Function:  [] (55148) Therapeutic interventions that focus on cognitive function (eg, attention, memory, reasoning, executive function, problem solving, and/or pragmatic functioning) and compensatory strategies to manage the performance of an activity (eg, managing time or schedules, initiating, organizing, and sequencing tasks), direct (one-on-one) patient contact; initial 15 minutes (Report  only once per day)  [] ([phone]) each additional 15 minutes     Dysphagia Therapy:    [] (07484) Treatment of swallowing dysfunction and/or oral function for feeding         Charges:  Timed Code Treatment Minutes: 0   Total Treatment Minutes: 45     [x] Speech-Language Treatment (80250)        [] Voice Treatment (67504)                                         [] Cognitive-Linguistic Skills Development Initial 15 minutes (73018)  [] Cognitive-Linguistic Skills Development Additional 15 minutes (93091)   [] Dysphagia Treatment/NMES (VitalStim) (54754)  [] Other:     GOALS:   Patient stated goal: \"Stop slurring\"  []? Progressing: []? Met: []? Not Met: []? Adjusted     Therapist goals for Patient:   Short Term Goals: To be achieved in: 5 weeks  1. Patient will use speech strategies to facilitate increased intelligibility at detailed conversational level in >90% as judged by SLP and pt/family. []? Progressing: []? Met: []? Not Met: []? Adjusted  2. Patient will complete abstract reasoning related to functional tasks with 80% accuracy to target self-monitoring. []? Progressing: []? Met: []? Not Met: []? Adjusted  3. Patient will complete complex attention tasks (sustained, selective, alternating) with 80% accuracy given minimal cues to improve functional task completion. []? Progressing: []? Met: []? Not Met: []? Adjusted  4. The patient will complete modified barium swallow study to further assess pharyngeal phase and direct plan of care. []? Progressing: []? Met: []? Not Met: []? Adjusted    Long Term Goals: To be achieved in: 6 weeks  1. Patient will demonstrate improved cognitive-linguistic function to improve safety and independence in ADLs.  []? Progressing: []? Met: []? Not Met: []? Adjusted  2. Patient will utilize speech intelligibility strategies to increase intelligibility to greater than 90% in conversation to a familiar/unfamiliar listener. []? Progressing: []? Met: []? Not Met: []? Adjusted  3. Patient will tolerate least restrictive diet with no overt clinical s/s of aspiration/penetration. []? Progressing: []? Met: []? Not Met: []?  Adjusted         MET GOALS:  Patient will participate in further cognitive-linguistic evaluation with additional goals as indicated. (MET 11/8)  Patient will tolerate recommended diet with no overt s/s of aspiration or noticeable fatigue. (MET 11/26)  Patient will complete oral motor and bolus control exercises with 80% accuracy independently to improve oral phase of swallow. (12/16/2021)    Progression Towards Functional goals:  [x] Patient is progressing as expected towards functional goals listed. [] Progression is slowed due to complexities listed. [] Progression has been slowed due to co-morbidities. [] Plan just implemented, too soon to assess goals progression  [] Other:     Persisting Functional Limitations/Impairments:  [x]Attention []Word Finding       [x]Memory [x]Speech Intelligibility      [x]Executive Function [x]Diet Tolerance     []Problem Solving []Coughing/Choking with PO  []Expressive Language []Medication/Finance Management  [x]Receptive Language []Other:      ASSESSMENT: See POC/Progress Note    Treatment/Activity Tolerance:  [x] Patient able to complete tx  [] Patient limited by fatigue  [] Patient limited by pain  [] Patient limited by other medical complications  [] Other:     Prognosis: [x] Good [] Fair  [] Poor    Patient Requires Follow-up: [x] Yes  [] No    PLAN: See eval. ST 2x / week for 6 weeks. [x] Continue per plan of care [] Alter current plan (see comments)  [] Plan of care initiated [] Hold pending MD visit [] Discharge    Electronically signed by:   Sheldon Ojeda MA CCC-SLP  Speech-Language Pathologist  SP. [id number]       Note: If patient does not return for scheduled/ recommended follow up visits, this note will serve as a discharge from care along with most recent update on progress.

## 2022-01-13 ENCOUNTER — HOSPITAL ENCOUNTER (OUTPATIENT)
Dept: PHYSICAL THERAPY | Age: 42
Setting detail: THERAPIES SERIES
Discharge: HOME | End: 2022-01-13
Payer: COMMERCIAL

## 2022-01-13 ENCOUNTER — HOSPITAL ENCOUNTER (OUTPATIENT)
Dept: OCCUPATIONAL THERAPY | Age: 42
Setting detail: THERAPIES SERIES
Discharge: HOME | End: 2022-01-13
Payer: COMMERCIAL

## 2022-01-13 PROCEDURE — 97116 GAIT TRAINING THERAPY: CPT

## 2022-01-13 PROCEDURE — 97110 THERAPEUTIC EXERCISES: CPT

## 2022-01-13 PROCEDURE — 97112 NEUROMUSCULAR REEDUCATION: CPT

## 2022-01-13 PROCEDURE — 97530 THERAPEUTIC ACTIVITIES: CPT

## 2022-01-13 NOTE — FLOWSHEET NOTE
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  · Treatment Diagnosis: functional limitations secondary to brain bleed  Insurance/Certification information:  PT Insurance Information: [employer] - 30 visits per year; preauth required  Physician Information:  Referring Practitioner: John Sarmiento MD  Plan of care signed (Y/N): faxed 21     Date of Patient follow up with Physician:     Functional scale: Optimal:  raw score = 81; dysfunction = 80%    Progress Report: []  Yes  [x]  No     Date Range for reporting period:  Beginning   PN: 11/15  POC:   PN:   Ending:      Progress report due (10 Rx/or 30 days whichever is less): visit #9 or     Recertification due (POC duration/ or 90 days whichever is less): visit #16  3/6/22    Visit # Insurance Allowable Auth required? Date Range            128 units w/OT  64 ea discipline [x]  Yes  []  No   12/10/21    12/13 to 22          Latex Allergy:  [x]NO      []YES  Preferred Language for Healthcare:   [x]English       []other:      Pain level:  3/10 L quad & LUE    SUBJECTIVE:    Walking about once a day, goes to kitchen or bathroom. Just finished with OT, is having more LUE pain because of the work she was doing. OBJECTIVE:    :  30 sec STS w/use of RUE and 22\" mat table:  7  6MWT w/SBQC & L AFO:  125'  (165' total before seated rest)  Min/mod A required w/3 LOB    :  30 sec STS w/use of RUE and 22\" mat table.   Gait Pattern w/SBQC & mod A w/several LOB:  Limited use and WB'ing through LLE, L genu recurvatum during L stance phase, L knee frequently mansi just following initial contact or when not appropriately shifting weight onto LLE during stance phase;  R knee frequently flexes and pt moves QC around rapidly. Pt able to utilize steppage gait w/upright trunk posture for L swing phase, however has difficulty transitioning weight anteriorly over LLE, frequently reducing R step length. 11/15:    30 sec sit to stand transitions: 5  Gait Pattern:  W/SBQC, L toe drag, flexed L knee throughout stance phase, weight shifted onto RLE, frequently hops over LLE;  Able to initiate L hip flex, toe drag minimizes swing phase, DF assist does help but not consistently alleviate toe drag. Forward pitched posture;  Frequently lifts and moves R foot and cane. 11/4: pt not wearing AFO today     10/14:  To clinic in wheelchair with soft AFO on LLE; received script for OT and speech this date and pt is now scheduled for all 3 therapies      RESTRICTIONS/PRECAUTIONS: h/o aneurysm    Exercises/Interventions:   Therapeutic Exercises (15185) Resistance / level Sets/sec Reps Notes   Nu step / step one  4 levelLEs only w/man facilitation to lateral L knee joint for improved hip alignment           Seated in w/c w/B feet placed on mat #6  -hip ext            -frequent VCs to encourage L hip extensor activation   Standing R hip w/QC  Standing stool pushes  Max A  Max A     -man cues to facilitate improved LLE mechanics  -man cues to improve posture and LLE WB'ing   Standing over gray bolster, hip ext Mod A                                  Therapeutic Activities (53538)       Sit to stand transitions   -Man & VC's to recruit LLE mm fibers    Sit to stand transitions from mat table, R foot on AirEx, R hand on L knee, bolster between feet 12/16:  max cues for correct technique & form   Stand pivot w/SBQC w/c to mat table #6 Max A   Donned L shoe, shoe horn required    Fwd step ups w/HR  Mod/max A & assist to maintain L knee stability   Navigated 4\" steps step to pattern w/HR & QC Max A & man assist to maintain LLE stability with each step   STS mod mat table with maria alejandra-walker    Once standing added weight shifting M/L    One trial added L LE stepping (minimal success without AFO)     R foot taps onto 4\" step    Trunk rotation w/R foot on step       1/6: manual stability to maintain pelvic alignment     1/6: man cues to facilitate pelvic rotation   Gait to chair, half turn and sit - VCs for spatial awareness and    1/11: VCs for turning all the way prior to sitting x 3, last sit was correct placement and positioning    Standing with R foot in front (stagger stance) with thor rot   Added 1/11   Tall kneeling EOM  Tall kneeling: , 2 steps R  -man cues to sacrum & sternum to improve posture  -man facilitation to sacrum & medial lower LLE to advance          Circuit Training (<2 min rest between each):  Sit to stands  Amb w/QC  Seated stepper  Amb w/QC  Sit to stands  Get-n-Post w/QC, RLE  Get-n-Post w/o QC, RLE         1.0               4 mins     x10  40'  4 mins  40'  x10    x5 Increase sets of each as tolerated      LEs only          Neuromuscular Re-ed (28011)       Static stand for equal BLE WB'ing w/mirror for visual feedback of correct posture & weight-bearing through LLE    Static stand w/R foot on box w/mirror for visual feedback of correct posture & weight-bearing through LLE    Static stand w/large blue bolster between feet to maintain neutral hip positioning w/mirror for visual feedback of correct posture & weight-bearing through LLE         4\"        RLE on Airex                                  Min /Mod A -man cues for weight shift reduced to encourage pt to self correct      -man cues to facilitate posture, VC's only to facilitate weight-shift onto LLE      -man cues to facilitate posture, VC's only to facilitate weight-shift onto LLE   L biceps tendon pressure to improve elbow extension and hand/wrist placment       Forward/backward stepping with R foot    // bars, left hand on bars, cues to assist with left knee stability EOM:  Scooting:  -Out to edge  -L laterally  -R laterally            -able to scoot out w/pelvis in midline p instruction  -max cues for correct technique and encouragement  -good form, required mod A for LLE positioning   Rolling in bed R and L    Min A for placement and sequencing    bridge   CGA, VCs for hip adduction    Hip add in hooklying   TCs for target   Heel slides AAROM to AROM   AAROM to begin, but slowly gained more motion and power, responds well to cues for power   Seated:  LAQ        12/30: initially performed well then had difficulty activating quad and was unable to extend knee without external assist   Gait Training:  Foot in front then behind at elevated mat table          Amb w/QC & L AFO                Amb pushing weight lymph cart;  purple TB for DF and knee flexion assist      Amb w/hemiwalker Min [place] ACGAMax x 2         Mod  feet            11/18:  performed x8 safely w/pt and was added to HEP;  PT (then ) inhibited L knee recurvatum w/man cues to posterior knee.         - VCs for shorter L step and longer R step       facilitation with tband at L LE, Max A for placing L foot as pt puling forward, VCs for sequencing, block to L knee and to cart to avoid pt pulling back     -purple TB for L ankle DF & knee flex assist   Weight shifts  -lateral w/L AFO  -foot onto box, lean onto foot, then return  -staggered w/R foot on AirEx  -foot maintained on box w/RUE reaches         Mod A               -man cues to facilitate weight shift  -max cues for improved weight-shifting & VCs to reduce speed & improve control  -man cues to facilitate shifting weight onto LLE    12/9: pt unable to adequately accept weight through LLE, activity stopped due to safety concerns   Amb bwds w/cart    Treadmill  -L foot on side of belt, RLE on belt    -lateral weight shifts with belt stopped & RUE support   0.3 mph           12/9: incr difficulty reducing pt compensations to avoid use of LLE, once compensations were removed, pt rapidly fatigued and became anxious, stating she needed to stop, felt faint, was going to get sick, and finally profanities, not directed at anyone. L sided neglect did worsen as anxiety incr, however  did arrive on her R side which may have contributed to focus on R side                        Manual Intervention ([phone])       Stretches:  Seated hamstring stretch  Seated knee flexion stretch  Seated calf stretch  Seated piriformis stretch                                                  Modalities:     Pt. Education:    12/9:  Once seated after weight shifting on TM, discussed importance of utilizing LLE in that manor frequently, daily at home in order to improve muscle activation appropriately. Pt nodded in understanding but remained, understandably, upset. 11/9:  Pt requested order for hemiwalker, feels like it would improve her fear and ability to walk. Discussed increased support it provides and less LE & core muscle activation that is required to use hemiwalker, potentially becoming a step backwards and increasing her dependency on AD. Did discuss importance of continued walking and activity at home and if pt is not currently walking at home due to amount of work/effort required to amb w/SBQC, to obtain a hemiwalker so pt is more likely to return to typical home activities. Discussed nearby medical supply stores and to contact PCP and have order sent to them.  and pt reported understanding. 10/14:  Verbal cues for proper transfer technique and safety  -patient educated on diagnosis, prognosis and expectations for rehab  -all patient questions were answered    HEP instruction: 10/14:  Discussed safety on transfers. 10/18: Instructed pt to perform standing lateral weight-shifts hourly in front of chair, with a family member on L side as needed.     Therapeutic Exercise and NMR EXR  [] (10107) Provided verbal/tactile cueing for activities related to ambulation re-education including up and down stairs     Home Management Training / Self Care:  [] (61706) Provided self-care/home management training related to activities of daily living and compensatory training, and/or use of adaptive equipment for improvement with: ADLs and compensatory training, meal preparation, safety procedures and instruction in use of adaptive equipment, including bathing, grooming, dressing, personal hygiene, basic household cleaning and chores. Home Exercise Program:    [] (86612) Reviewed/Progressed HEP activities related to strengthening, flexibility, endurance, ROM of   [] LE / Lumbar: core, proximal hip and LE for functional self-care, mobility, lifting and ambulation/stair navigation   [] UE / Cervical: cervical, postural, scapular, scapulothoracic and UE control with self care, reaching, carrying, lifting, house/yardwork, driving, computer work  [] (96158)Reviewed/Progressed HEP activities related to improving balance, coordination, kinesthetic sense, posture, motor skill, proprioception of   [] LE: core, proximal hip and LE for self care, mobility, lifting, and ambulation/stair navigation    [] UE / Cervical: cervical, postural,  scapular, scapulothoracic and UE control with self care, reaching, carrying, lifting, house/yardwork, driving, computer work    Manual Treatments:  PROM / STM / Oscillations-Mobs:  G-I, II, III, IV (PA's, Inf., Post.)  [] (92335) Provided manual therapy to mobilize LE, proximal hip and/or LS spine soft tissue/joints for the purpose of modulating pain, promoting relaxation,  increasing ROM, reducing/eliminating soft tissue swelling/inflammation/restriction, improving soft tissue extensibility and allowing for proper ROM for normal function with   [] LE / lumbar: self care, mobility, lifting and ambulation. [] UE / Cervical: self care, reaching, carrying, lifting, house/yardwork, driving, computer work.      Modalities:  [] (29359) Vasopneumatic compression: Utilized vasopneumatic compression to decrease edema / swelling for the purpose of improving mobility and quad tone / recruitment which will allow for increased overall function including but not limited to self-care, transfers, ambulation, and ascending / descending stairs. Charges:  Timed Code Treatment Minutes: 43   Total Treatment Minutes: 43     Units approved Units used Date Range    48   64 -PT   20 12/10/21  12/13 - 2/18/22   **updated 1/13**    [] EVAL - LOW (69940)   [] EVAL - MOD (38317)  [] EVAL - HIGH (36360)  [] RE-EVAL (77754)  [] QL(61833) x       [] Ionto  [x] NMR (49948) x  2    [] Vaso  [] Manual (17465) x       [] Ultrasound  [] TA x        [] Mech Traction (99783)  [] Aquatic Therapy x      [] ES (un) (47793):   [] Home Management Training x  [] ES(attended) (97654)   [] Dry Needling 1-2 muscles (08678):  [] Dry Needling 3+ muscles (315847  [] Group:      [x] Other: gait x 1    GOALS:   Patient stated goal: To return to as normal activity as possible.       Therapist goals for Patient:   Short Term Goals: To be achieved in: 2 weeks  1. Independent in HEP and progression per patient tolerance, in order to prevent re-injury.  - Goal Met  2. Patient will have a decrease in pain to facilitate improvement in movement, function, and ADLs as indicated by Functional Deficits. - Goal Met     Long Term Goals: To be achieved in: 6 weeks  1. Pt will demonstrate the ability to perform sit to/from stand with supervision.  -30 sec STS: 7 reps from 22 in high chair;  2.  -Met 11/15/21  **11/15/21**: 2a.  Pt will amb 150' w/SBQC & min A.   - Almost Met, amb 165'w/ min/mod A, multiple LOB  3. Patient will demonstrate an increase in postural awareness and control to allow for proper functional mobility as indicated by patients Functional Deficits. - Weight remains shifted onto RLE, limiting transfers, standing and gait  4.  Patient will return to functional activities including transfers without increased symptoms or restriction.  - Not Met    Overall Progression Towards Functional goals/ Treatment Progress Update:  [] Patient is progressing as expected towards functional goals listed. [x] Progression is slowed due to complexities/Impairments listed. [] Progression has been slowed due to co-morbidities. [] Plan just implemented, too soon to assess goals progression <30days   [] Goals require adjustment due to lack of progress  [] Patient is not progressing as expected and requires additional follow up with physician  [] Other    Persisting Functional Limitations/Impairments:  []Sleeping []Sitting              [x]Standing []Transfers        [x]Walking []Kneeling              []Stairs [x]Squatting / bending   [x]ADLs [x]Reaching  []Lifting  [x]Housework  []Driving [x]Job related tasks  [x]Sports/Recreation []Other:        ASSESSMENT:    Gait deviations are progressing and LLE WB'ing is improving, however L knee mechanics are slow to progress and may flex too early or hyperextend during stance phase. R step length is consistently improving. L sided spatial awareness remains limited and borders with neglect, pt is able to identify objects on L side of room or salcedo, but utilizes R eye for identification. Continue to progress L kinetic chain, progress balance and gait as tolerated. Treatment/Activity Tolerance:  [x] Patient able to complete tx  [] Patient limited by fatigue  [] Patient limited by pain  [] Patient limited by other medical complications  [] Other:     Prognosis: [x] Good [] Fair  [] Poor    Patient Requires Follow-up: [x] Yes  [] No    Plan for next treatment session:  per training diary and progress as tolerated. PLAN: See eval. PT 2x / week for 6 weeks. -requesting additional visits at 2x/week x8 weeks.   [x] Continue per plan of care [] Alter current plan (see comments)  [] Plan of care initiated [] Hold pending MD visit [] Discharge    Electronically signed by: Genny Gabriel Ellie, PT, DPT    Note: If patient does not return for scheduled/ recommended follow up visits, this note will serve as a discharge from care along with most recent update on progress.

## 2022-01-13 NOTE — FLOWSHEET NOTE
Occupational Therapy Daily Treatment Note  Date:  2022    Patient: Pete Woods   : 1980   MRN: [id number]  Referring Physician:    Dr. 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  Date Range    96 units with PT  (48 units each) [x]  Yes  []  No Through 22   2 [x]No      []Yes  Pacemaker:  [x] No       [] Yes     Preferred Language for Healthcare:   [x]English       []other:    Pain level:   3/10 left side especially left thigh    SUBJECTIVE:  Pt in good spirits     Functional Disability Index:     Quick DASH: total score- 46, disability score- 79.55%     Stroke Rehabilitation Assessment of Movement (STREAM): total-  (supine- 7/15, sitting- , standing- )    OBJECTIVE:      PROM AROM      L R L R PROM on 2021   Shoulder Flexion  ~85*  : 0-125*       0-140   Elbow Flexion  WFL          Elbow Extension  WFL          Pronation             Supination  WFL          Wrist Flexion  WFL          Wrist Extension  WFL          Radial Deviation             Composite Flexion (Tip of 3rd Digit to Distal Palmar Crease (cm)) Held in flexion; able to stretch into extension to 2 cm lag    : Held in flexion; able to stretch into extension to 4 cm lag            RESTRICTIONS/PRECAUTIONS:  hx brain bleed, fall risk    Exercises/Interventions: Treatment focused on decreasing left neglect, integration of left side of body into mobility, improving proprioception in left ue. Patient ambulated with min assist about 50 feet from wheelchair to mat initially not being able to weight shift left and losing balance. Patient then worked on standing weight shifts to left prior to ambulation with improved ability to maintain stance on left leg. She needed verbal cues for directionality while ambulating. Patient the sat at edge of mat and worked on warming up with forward weight hip flexion moving over hands as base of support to normalize tone and then progressed to use of ues as forward base of support on ladder and changing base of support with sit to stand transitions with min assist.  Patient then worked in short sit on elevated mat holding cylindrical bolster with left ue in slight abduction on mat while she reached with right ue forward to ladder and then rotating to left to place a ring on the bolster times 16 reps and then completed the same in the opposite direction needing only slight assist to stabilize bolster with left ue. Patient then completed the same activity in stand with bolster being held out to side by therapist and patient stabilizing bolster with left ue while she reached to obtain rings on ladder and then transfer to bolster with only min assist.  Patient encouraged to work on use of left ue as stabilizer holding plastic cup at table while filling the cup with any kind of objects with right hand. Patient and  verbalized understanding.                                      Neuromuscular Re-ed / Therapeutic Activities                                                 Manual Intervention                                                     Modalities:     Patient education:  Eval, POC, HEP- pt verbalized understanding       Home Exercise Program:  Written and verbal HEP instructions provided and reviewed:  · Scapular retraction   · Use of swelling/inflammation/restriction, improving soft tissue extensibility and allowing for proper ROM for normal function with self care, reaching, carrying, lifting, house/yardwork, driving/computer work  [] Comments:    ADL Training:  [] (00577) Provided self-care/home management training related to activities of daily living and compensatory training, and/or use of adaptive equipment   [] Comments:     Splinting:  [] Fabrication of:   [] (12236) Orthotic/Prosthetic Management, subsequent encounter  [] (24993) Orthotic management and training (fitting and assessment)  [] Comments:      Charges:  Timed Code Treatment Minutes: 45   Total Treatment Minutes: 45     [] EVAL (LOW) 82057   [] OT Re-eval (70773)  [] EVAL (MOD) 09187   [] EVAL (HIGH) 19194       [x] Dolly (61360) x  2  [] GQLEX(50472)  [x] NMR (48178) x  1 [x] Estim (attended) (00315)   [] Manual ([id number]) x  [] US (54998)  [] TA () x    [] Paraffin (69332)  [] ADL  ([phone]) x    [] Splint/L code:    [] Estim (unattended) (22 650387)  [] Fluidotherapy (99690)  [] Other:    GOALS:   Patient stated goal: L hand use  [] Progressing: [] Met: [] Not Met: [] Adjusted     Therapist goals for Patient:   Short Term Goals: To be achieved in: 30 days  1. Pt will improve functional ROM to use LUE as ANDREAS during transfers for increased safety and independence by 11/19/21. [x] Progressing: [] Met: [] Not Met: [] Adjusted  2. Pt will improve functional LUE use to complete UB dressing mod I for increased independence in ADLs by 11/19/21. [x] Progressing: [] Met: [] Not Met: [] Adjusted  3. Pt will improve functional LUE use to complete LB dressing min A for increased independence in ADLs by 11/19/21. [] Progressing: [x] Met: [] Not Met: [] Adjusted  4. Pt will improve oculomotor and cognition skills to complete Trailmaking Part B assessment within 180 seconds with no errors by 11/19/21. [x] Progressing: [] Met: [] Not Met: [] Adjusted     Long Term Goals:  To be achieved by discharge  1. Pt will report a QuickDASH Symptom Severity Scale score of 65% or less indicating increased safety and functional independence in desired occupational pursuits by discharge. [x] Progressing: [] Met: [] Not Met: [] Adjusted  2. Pt will increase STREAM total score from 21/70 to 31/70 indicating increased safety and functional independence in desired occupational pursuits by discharge. (added 11/29/21)  [x] Progressing: [] Met: [] Not Met: [] Adjusted    Progression Towards Functional goals:  [] Patient is progressing as expected towards functional goals listed. [x] Progression is slowed due to complexities listed. -falls and fear of falling, significant proprioceptive impairment, left neglect but improving significantly  [] Progression has been slowed due to co-morbidities. [] Plan just implemented, too soon to assess goals progression  [] All goals are met  [] Other:     ASSESSMENT:  Pt has made improvements in PROM of LUE with decreased tone, but continues to have limited participation in functional mobility due to falls/fear of falling and proprioceptive impairment. Treatment/Activity Tolerance:  [] Patient tolerated treatment well [] Patient limited by fatique  [] Patient limited by pain  [x] Patient limited by other medical complications- falls and fear of falling, significant proprioceptive impairment.  [] Other:     Prognosis: [x] Good [] Fair  [] Poor    Patient Requires Follow-up: [x] Yes  [] No    PLAN: See josey  [x] Continue per plan of care [] Alter current plan (see comments)  [] Plan of care initiated [] Hold pending MD visit [] Discharge    Electronically signed by:

## 2022-01-18 ENCOUNTER — HOSPITAL ENCOUNTER (OUTPATIENT)
Dept: SPEECH THERAPY | Age: 42
Setting detail: THERAPIES SERIES
Discharge: HOME | End: 2022-01-18
Payer: COMMERCIAL

## 2022-01-18 ENCOUNTER — HOSPITAL ENCOUNTER (OUTPATIENT)
Dept: OCCUPATIONAL THERAPY | Age: 42
Setting detail: THERAPIES SERIES
Discharge: HOME | End: 2022-01-18
Payer: COMMERCIAL

## 2022-01-18 ENCOUNTER — HOSPITAL ENCOUNTER (OUTPATIENT)
Dept: PHYSICAL THERAPY | Age: 42
Setting detail: THERAPIES SERIES
Discharge: HOME | End: 2022-01-18
Payer: COMMERCIAL

## 2022-01-18 PROCEDURE — 97110 THERAPEUTIC EXERCISES: CPT

## 2022-01-18 PROCEDURE — 97112 NEUROMUSCULAR REEDUCATION: CPT

## 2022-01-18 PROCEDURE — 97116 GAIT TRAINING THERAPY: CPT

## 2022-01-18 PROCEDURE — 92507 TX SP LANG VOICE COMM INDIV: CPT

## 2022-01-18 PROCEDURE — 97168 OT RE-EVAL EST PLAN CARE: CPT

## 2022-01-18 PROCEDURE — 97530 THERAPEUTIC ACTIVITIES: CPT

## 2022-01-18 NOTE — PROGRESS NOTES
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 Yan York MD   Plan of care signed (Y/N): Y (2021)    Date of Patient follow up with Physician: DEWEY    Functional outcome measure: The Communicative Participation Item Bank Total: 10/30 (2021)     Progress Note: [x]  Yes  []  No  Next due by: 2022    RESTRICTIONS/PRECAUTIONS: hx of brain bleed   Latex Allergy:  [x]NO      []YES    Preferred Language for Healthcare:   [x]English       []other    Visit # Insurance Allowable Date Range (if applicable)     + 3/89 48 units  (18 used)  10/  Ext. 2022       Pain level: 0/10     SUBJECTIVE: Pt alert and receptive, presented with son to appointment. OBJECTIVE: The Communicative Participation Item Bank Total: 10/30 (improved from 8 at 12/16)    Exercises/Interventions:      Motor Speech Therapy (50817) Accuracy Cues Notes   Apraxia       Dysarthria SLOB reinforcement during spontaneous speech narrative discourse:  - pt rating 50%  - SLP rating 90%  Feedback cues to assist in self-monitoring: auditory, visual, verbal Goal-Plan-Do-Review: pt selected appropriate SLOB strategy to implement, self-reflected on intelligibility, and selected additional strategies to improve intelligibility   Intelligibility      Other:          Cognitive Function (03862 & 03571) Accuracy Cues Notes   Orientation        Attention           Sustained         Selective Alternating         Divided Complex functional visual attention involving sorting and organization of weekly calendar with 83% accuracy (10/12) Min verbal cues to implement strategies Metacognitive strategy training: minimize visual distractions, plan ahead/scanning, double-check work   Memory          Immediate/Working         Short term         Long term         Prospective      Problem Solving       Visuospatial       Executive Function  Abstract reasoning inclusion and exclusion:  - visual reasoning with 87% accuracy  - verbal reasonin word prompts with 100% accuracy, 5 word prompted with 80% accuracy and min cues Independent    Min verbal cues for working memory given 5 verbal prompts Metacognitive strategy training: focus on one thing at a time, self-talk, double-check work   Other:          Pt. Education:  -Pt educated on diagnosis, prognosis and expectations for rehab  -All pt questions were answered  -Pt verbalized understanding and agreement  -Progress since POC    HEP instruction:  -Pt provided with written and illustrated instructions for HEP: intelligibility strategies, oral motor exercises, safe swallowing strategies, memory strategies    Speech/Voice Therapy:    [x] (88295) Treatment of speech, language, voice, communication, and/or auditory processing disorder; individual    Cognitive Function:  [] (12670) Therapeutic interventions that focus on cognitive function (eg, attention, memory, reasoning, executive function, problem solving, and/or pragmatic functioning) and compensatory strategies to manage the performance of an activity (eg, managing time or schedules, initiating, organizing, and sequencing tasks), direct (one-on-one) patient contact; initial 15 minutes (Report  only once per day)  [] ([phone]) each additional 15 minutes     Dysphagia Therapy:    [] (58768) Treatment of swallowing dysfunction and/or oral function for feeding         Charges:  Timed Code Treatment Minutes: 0 Total Treatment Minutes: 45     [x] Speech-Language Treatment (69558)        [] Voice Treatment (72604)                                         [] Cognitive-Linguistic Skills Development Initial 15 minutes (60327)  [] Cognitive-Linguistic Skills Development Additional 15 minutes (66763)   [] Dysphagia Treatment/NMES (VitalStim) (96676)  [] Other:     GOALS:   Patient stated goal: \"Stop slurring\"  [x]? Progressing: []? Met: []? Not Met: []? Adjusted     Therapist goals for Patient:   Short Term Goals: To be achieved in: 5 weeks  1. Patient will use speech strategies to facilitate increased intelligibility at detailed conversational level in >90% as judged by SLP and pt/family. [x]? Progressing: []? Met: []? Not Met: []? Adjusted  2. Patient will complete abstract reasoning related to functional tasks with 80% accuracy to target self-monitoring. [x]? Progressing: []? Met: []? Not Met: []? Adjusted  3. Patient will complete complex attention tasks (sustained, selective, alternating) with 80% accuracy given minimal cues to improve functional task completion. [x]? Progressing: []? Met: []? Not Met: []? Adjusted  4. The patient will complete modified barium swallow study to further assess pharyngeal phase and direct plan of care. [x]? Progressing: []? Met: []? Not Met: []? Adjusted    Long Term Goals: To be achieved in: 6 weeks  1. Patient will demonstrate improved cognitive-linguistic function to improve safety and independence in ADLs. [x]? Progressing: []? Met: []? Not Met: []? Adjusted  2. Patient will utilize speech intelligibility strategies to increase intelligibility to greater than 90% in conversation to a familiar/unfamiliar listener. [x]? Progressing: []? Met: []? Not Met: []? Adjusted  3. Patient will tolerate least restrictive diet with no overt clinical s/s of aspiration/penetration. [x]? Progressing: []? Met: []? Not Met: []?  Adjusted         MET GOALS:  Patient will participate in further cognitive-linguistic evaluation with additional goals as indicated. (MET 11/8)  Patient will tolerate recommended diet with no overt s/s of aspiration or noticeable fatigue. (MET 11/26)  Patient will complete oral motor and bolus control exercises with 80% accuracy independently to improve oral phase of swallow. (12/16/2021)    Progression Towards Functional goals:  [] Patient is progressing as expected towards functional goals listed. [x] Progression is slowed due to complexities listed: insight, visual deficits  [] Progression has been slowed due to co-morbidities. [] Plan just implemented, too soon to assess goals progression  [] Other:     Persisting Functional Limitations/Impairments:  [x]Attention []Word Finding       [x]Memory [x]Speech Intelligibility      [x]Executive Function [x]Diet Tolerance     []Problem Solving []Coughing/Choking with PO  []Expressive Language []Medication/Finance Management  [x]Receptive Language []Other:      ASSESSMENT: The patient is making good progress towards all speech, language, and cognitive goals. SLP called MD office  re: modified barium swallow study order, fax number provided for imaging order. Pt reports via CPIB improvement in participating in long conversations and in expressing her point of view. Pt accurately implements intelligibility strategies across speech tasks but continues to rate reduced intelligibility despite feedback and encouragement. Pt also continues to benefit from skilled intervention for complex abstract reasoning and attention impacting completion of ADLs; progress is slowed d/t reduced insight and severity of visual deficits. Continue POC as above.      Treatment/Activity Tolerance:  [x] Patient able to complete tx  [] Patient limited by fatigue  [] Patient limited by pain  [] Patient limited by other medical complications  [] Other:     Prognosis: [x] Good [] Fair  [] Poor    Patient Requires Follow-up: [x] Yes  [] No    PLAN: See eval. ST 2x / week for 6 weeks. [x] Continue per plan of care [] Alter current plan (see comments)  [] Plan of care initiated [] Hold pending MD visit [] Discharge    Electronically signed by:   Linh Arguelles MA CCC-SLP  Speech-Language Pathologist  SP. [id number]       Note: If patient does not return for scheduled/ recommended follow up visits, this note will serve as a discharge from care along with most recent update on progress.

## 2022-01-18 NOTE — FLOWSHEET NOTE
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  · Treatment Diagnosis: functional limitations secondary to brain bleed  Insurance/Certification information:  PT Insurance Information: [employer] - 30 visits per year; preauth required  Physician Information:  Referring Practitioner: Natalia Gaming MD  Plan of care signed (Y/N): faxed 21     Date of Patient follow up with Physician:     Functional scale: Optimal:  raw score = 81; dysfunction = 80%    Progress Report: []  Yes  [x]  No     Date Range for reporting period:  Beginning   PN: 11/15  POC:   PN:   Ending:      Progress report due (10 Rx/or 30 days whichever is less): visit #9 or     Recertification due (POC duration/ or 90 days whichever is less): visit #16  3/6/22    Visit # Insurance Allowable Auth required? Date Range            128 units w/OT  64 ea discipline [x]  Yes  []  No   12/10/21    12/13 to 22          Latex Allergy:  [x]NO      []YES  Preferred Language for Healthcare:   [x]English       []other:      Pain level:  3/10 L quad & LUE    SUBJECTIVE:     Doing okay today, brought QC in from home. Still walking about the same at home as last week. OBJECTIVE:    :  30 sec STS w/use of RUE and 22\" mat table:  7  6MWT w/SBQC & L AFO:  125'  (165' total before seated rest)  Min/mod A required w/3 LOB    :  30 sec STS w/use of RUE and 22\" mat table.   Gait Pattern w/SBQC & mod A w/several LOB:  Limited use and WB'ing through LLE, L genu recurvatum during L stance phase, L knee frequently mansi just following initial contact or when not appropriately shifting weight onto LLE during stance phase;  R knee frequently flexes and pt moves QC around rapidly. Pt able to utilize steppage gait w/upright trunk posture for L swing phase, however has difficulty transitioning weight anteriorly over LLE, frequently reducing R step length. 11/15:    30 sec sit to stand transitions: 5  Gait Pattern:  W/SBQC, L toe drag, flexed L knee throughout stance phase, weight shifted onto RLE, frequently hops over LLE;  Able to initiate L hip flex, toe drag minimizes swing phase, DF assist does help but not consistently alleviate toe drag. Forward pitched posture;  Frequently lifts and moves R foot and cane. 11/4: pt not wearing AFO today     10/14:  To clinic in wheelchair with soft AFO on LLE; received script for OT and speech this date and pt is now scheduled for all 3 therapies      RESTRICTIONS/PRECAUTIONS: h/o aneurysm    Exercises/Interventions:   Therapeutic Exercises (34327) Resistance / level Sets/sec Reps Notes   Nu step / step one  4 levelLEs only w/man facilitation to lateral L knee joint for improved hip alignment           Seated in w/c w/B feet placed on mat #6  -hip ext            -frequent VCs to encourage L hip extensor activation   Standing R hip w/QC  Standing stool pushes  Max A  Max A     -man cues to facilitate improved LLE mechanics  -man cues to improve posture and LLE WB'ing   Standing over gray bolster, hip ext Mod A                                  Therapeutic Activities (13121)       Sit to stand transitions   -Man & VC's to recruit LLE mm fibers Min A to [id number] bolster between feet   Sit to stand transitions from mat table, R foot on AirEx, R hand on L knee, bolster between feet 12/16:  max cues for correct technique & form   Stand pivot w/SBQC w/c to mat table #6 Max A   Donned L shoe, shoe horn required    Fwd step ups w/HR  4\"1 10 L, 5 R Mod A for stability, max A for L foot placement on step   Navigated 4\" steps step to pattern w/HR & QC Max A & man assist to maintain LLE stability with each step   STS mod mat table with maria alejandra-walker    Once standing added weight shifting M/L    One trial added L LE stepping (minimal success without AFO)     R foot taps onto 4\" step    Trunk rotation w/R foot on step       1/6: manual stability to maintain pelvic alignment     1/6: man cues to facilitate pelvic rotation   Gait to chair, half turn and sit - VCs for spatial awareness and    1/11: VCs for turning all the way prior to sitting x 3, last sit was correct placement and positioning    Standing with R foot in front (stagger stance) with thor rot   Added 1/11   Tall kneeling EOM  Tall kneeling: , 2 steps R  -man cues to sacrum & sternum to improve posture  -man facilitation to sacrum & medial lower LLE to advance          Circuit Training (<2 min rest between each):  Sit to stands  Amb w/QC  Seated stepper  Amb w/QC  Sit to stands  [employer] w/QC, RLE  [employer] w/o QC, RLE         1.0               4 mins     x10  40'  4 mins  40'  x10    x5 Increase sets of each as tolerated      LEs only          Neuromuscular Re-ed (04612)       Static stand for equal BLE WB'ing w/mirror for visual feedback of correct posture & weight-bearing through LLE    Static stand w/R foot on box w/mirror for visual feedback of correct posture & weight-bearing through LLE    Static stand w/large blue bolster between feet to maintain neutral hip positioning w/mirror for visual feedback of correct posture & weight-bearing through LLE         4\"        RLE on Airex                                  Min /Mod A -man cues for weight shift reduced to encourage pt to self correct      -man cues to facilitate posture, VC's only to facilitate weight-shift onto LLE      -man cues to facilitate posture, VC's only to facilitate weight-shift onto LLE   L biceps tendon pressure to improve elbow extension and hand/wrist placment       Forward/backward stepping with R foot    // bars, left hand on bars, cues to assist with left knee stability    EOM:  Scooting:  -Out to edge  -L laterally  -R laterally            -able to scoot out w/pelvis in midline p instruction  -max cues for correct technique and encouragement  -good form, required mod A for LLE positioning   Rolling in bed R and L    Min A for placement and sequencing    bridge   CGA, VCs for hip adduction    Hip add in hooklying   TCs for target   Heel slides AAROM to AROM   AAROM to begin, but slowly gained more motion and power, responds well to cues for power   Seated:  LAQ        12/30: initially performed well then had difficulty activating quad and was unable to extend knee without external assist   Gait Training:  Foot in front then behind at elevated mat table          Amb w/QC & L AFO                Amb pushing weight lymph cart;  purple TB for DF and knee flexion assist      Amb w/hemiwalker Min ACGA  Min AMax x 2         Mod A100 feet  100'            11/18:  performed x8 safely w/pt and was added to HEP;  PT (then ) inhibited L knee recurvatum w/man cues to posterior knee.         - VCs for shorter L step and longer R step         facilitation with tband at L LE, Max A for placing L foot as pt puling forward, VCs for sequencing, block to L knee and to cart to avoid pt pulling back     -purple TB for L ankle DF & knee flex assist   Weight shifts  -lateral w/L AFO  -foot onto box, lean onto foot, then return  -staggered w/R foot on AirEx  -foot maintained on box w/RUE reaches         Mod A               -man cues to facilitate weight shift  -max cues for improved weight-shifting & VCs to reduce speed & improve control  -man cues to facilitate shifting weight onto LLE    12/9: pt unable to adequately accept weight through LLE, activity stopped due to safety concerns   Amb bwds w/cart    Treadmill  -L foot on side of belt, RLE on belt    -lateral weight shifts with belt stopped & RUE support   0.3 mph           12/9: incr difficulty reducing pt compensations to avoid use of LLE, once compensations were removed, pt rapidly fatigued and became anxious, stating she needed to stop, felt faint, was going to get sick, and finally profanities, not directed at anyone. L sided neglect did worsen as anxiety incr, however  did arrive on her R side which may have contributed to focus on R side                        Manual Intervention ([phone])       Stretches:  Seated hamstring stretch  Seated knee flexion stretch  Seated calf stretch  Seated piriformis stretch      3x30\" L                                            Modalities:     Pt. Education:    12/9:  Once seated after weight shifting on TM, discussed importance of utilizing LLE in that manor frequently, daily at home in order to improve muscle activation appropriately. Pt nodded in understanding but remained, understandably, upset. 11/9:  Pt requested order for hemiwalker, feels like it would improve her fear and ability to walk. Discussed increased support it provides and less LE & core muscle activation that is required to use hemiwalker, potentially becoming a step backwards and increasing her dependency on AD. Did discuss importance of continued walking and activity at home and if pt is not currently walking at home due to amount of work/effort required to amb w/SBQC, to obtain a hemiwalker so pt is more likely to return to typical home activities. Discussed nearby medical supply stores and to contact PCP and have order sent to them.  and pt reported understanding. 10/14:  Verbal cues for proper transfer technique and safety  -patient educated on diagnosis, prognosis and expectations for rehab  -all patient questions were answered    HEP instruction: 10/14:  Discussed safety on transfers. 10/18: Instructed pt to perform standing lateral weight-shifts hourly in front of chair, with a family member on L side as needed.     Therapeutic Exercise and NMR EXR  [] (26762) Provided verbal/tactile cueing for activities related to strengthening, flexibility, endurance, ROM for improvements in  [] LE / Lumbar: LE, proximal hip, and core control with self care, mobility, lifting, ambulation. [] UE / Cervical: cervical, postural, scapular, scapulothoracic and UE control with self care, reaching, carrying, lifting, house/yardwork, driving, computer work.  [] (28541) Provided verbal/tactile cueing for activities related to improving balance, coordination, kinesthetic sense, posture, motor skill, proprioception to assist with   [] LE / lumbar: LE, proximal hip, and core control in self care, mobility, lifting, ambulation and eccentric single leg control. [] UE / cervical: cervical, scapular, scapulothoracic and UE control with self care, reaching, carrying, lifting, house/yardwork, driving, computer work.   [] (03674) Therapist is in constant attendance of 2 or more patients providing skilled therapy interventions, but not providing any significant amount of measurable one-on-one time to either patient, for improvements in  [] LE / lumbar: LE, proximal hip, and core control in self care, mobility, lifting, ambulation and eccentric single leg control. [] UE / cervical: cervical, scapular, scapulothoracic and UE control with self care, reaching, carrying, lifting, house/yardwork, driving, computer work. NMR and Therapeutic Activities:    [x] (59186 or 42602) Provided verbal/tactile cueing for activities related to improving balance, coordination, kinesthetic sense, posture, motor skill, proprioception and motor activation to allow for proper function of   [x] LE: / Lumbar core, proximal hip and LE with self care and ADLs  [x] UE / Cervical: cervical, postural, scapular, scapulothoracic and UE control with self care, carrying, lifting, driving, computer work.    [x] (83608) Gait Re-education- Provided training and instruction to the patient for proper LE, core and proximal hip recruitment and positioning and eccentric body weight control with ambulation re-education including up and down stairs     Home Management Training / Self Care:  [] (30000) Provided self-care/home management training related to activities of daily living and compensatory training, and/or use of adaptive equipment for improvement with: ADLs and compensatory training, meal preparation, safety procedures and instruction in use of adaptive equipment, including bathing, grooming, dressing, personal hygiene, basic household cleaning and chores. Home Exercise Program:    [] (14562) Reviewed/Progressed HEP activities related to strengthening, flexibility, endurance, ROM of   [] LE / Lumbar: core, proximal hip and LE for functional self-care, mobility, lifting and ambulation/stair navigation   [] UE / Cervical: cervical, postural, scapular, scapulothoracic and UE control with self care, reaching, carrying, lifting, house/yardwork, driving, computer work  [] (01259)Reviewed/Progressed HEP activities related to improving balance, coordination, kinesthetic sense, posture, motor skill, proprioception of   [] LE: core, proximal hip and LE for self care, mobility, lifting, and ambulation/stair navigation    [] UE / Cervical: cervical, postural,  scapular, scapulothoracic and UE control with self care, reaching, carrying, lifting, house/yardwork, driving, computer work    Manual Treatments:  PROM / STM / Oscillations-Mobs:  G-I, II, III, IV (PA's, Inf., Post.)  [] (87849) Provided manual therapy to mobilize LE, proximal hip and/or LS spine soft tissue/joints for the purpose of modulating pain, promoting relaxation,  increasing ROM, reducing/eliminating soft tissue swelling/inflammation/restriction, improving soft tissue extensibility and allowing for proper ROM for normal function with   [] LE / lumbar: self care, mobility, lifting and ambulation.     [] UE / Cervical: self care, reaching, carrying, lifting, house/yardwork, driving, computer work.     Modalities:  [] (72086) Vasopneumatic compression: Utilized vasopneumatic compression to decrease edema / swelling for the purpose of improving mobility and quad tone / recruitment which will allow for increased overall function including but not limited to self-care, transfers, ambulation, and ascending / descending stairs. Charges:  Timed Code Treatment Minutes: 45   Total Treatment Minutes: 45     Units approved Units used Date Range    48   64 -PT   23 12/10/21  12/13 - 2/18/22   **updated 1/18**    [] EVAL - LOW (94148)   [] EVAL - MOD (41190)  [] EVAL - HIGH (07504)  [] RE-EVAL (41339)  [] EO(64747) x       [] Ionto  [x] NMR (77412) x  1    [] Vaso  [] Manual (66988) x       [] Ultrasound  [x] TA x  1      [] Mech Traction (10860)  [] Aquatic Therapy x      [] ES (un) (13403):   [] Home Management Training x  [] ES(attended) (09955)   [] Dry Needling 1-2 muscles (59246):  [] Dry Needling 3+ muscles (047610  [] Group:      [x] Other: gait x 1    GOALS:   Patient stated goal: To return to as normal activity as possible.       Therapist goals for Patient:   Short Term Goals: To be achieved in: 2 weeks  1. Independent in HEP and progression per patient tolerance, in order to prevent re-injury.  - Goal Met  2. Patient will have a decrease in pain to facilitate improvement in movement, function, and ADLs as indicated by Functional Deficits. - Goal Met     Long Term Goals: To be achieved in: 6 weeks  1. Pt will demonstrate the ability to perform sit to/from stand with supervision.  -30 sec STS: 7 reps from 22 in high chair;  2.  -Met 11/15/21  **11/15/21**: 2a.  Pt will amb 150' w/SBQC & min A.   - Almost Met, amb 165'w/ min/mod A, multiple LOB  3. Patient will demonstrate an increase in postural awareness and control to allow for proper functional mobility as indicated by patients Functional Deficits. - Weight remains shifted onto RLE, limiting transfers, standing and gait  4.  Patient will return to functional activities including transfers without increased symptoms or restriction.  - Not Met    Overall Progression Towards Functional goals/ Treatment Progress Update:  [] Patient is progressing as expected towards functional goals listed. [x] Progression is slowed due to complexities/Impairments listed. [] Progression has been slowed due to co-morbidities. [] Plan just implemented, too soon to assess goals progression <30days   [] Goals require adjustment due to lack of progress  [] Patient is not progressing as expected and requires additional follow up with physician  [] Other    Persisting Functional Limitations/Impairments:  []Sleeping []Sitting              [x]Standing []Transfers        [x]Walking []Kneeling              []Stairs [x]Squatting / bending   [x]ADLs [x]Reaching  []Lifting  [x]Housework  []Driving [x]Job related tasks  [x]Sports/Recreation []Other:        ASSESSMENT:     Patient initially demo'd improved gait pattern with good usage of LLE, however after practicing steps, pt became increasingly dependent on RUE for LE mobility and had increased difficulty accepting weight on LLE and improving L knee ext. Transfers were performed to normalize LLE function, however avoidance continued and gait pattern at end of session was not as good as at beginning. Overall, patient has been significantly improving and will continue to benefit from PT services to address LLE deficits. Treatment/Activity Tolerance:  [x] Patient able to complete tx  [] Patient limited by fatigue  [] Patient limited by pain  [] Patient limited by other medical complications  [] Other:     Prognosis: [x] Good [] Fair  [] Poor    Patient Requires Follow-up: [x] Yes  [] No    Plan for next treatment session:  per training diary and progress as tolerated. PLAN: See josey. PT 2x / week for 6 weeks. -requesting additional visits at 2x/week x8 weeks.   [x] Continue per plan of care [] Alter current plan (see comments)  [] Plan of care initiated [] Hold pending MD visit [] Discharge    Electronically signed by: Sally Gilbert PT, DPT    Note: If patient does not return for scheduled/ recommended follow up visits, this note will serve as a discharge from care along with most recent update on progress.

## 2022-01-18 NOTE — PLAN OF CARE
Occupational Therapy Re-Certification Plan of Care    Dear Dr. Natalia Gaming  ,    We had the pleasure of treating the following patient for physical therapy services at [address] [place] [address]. A summary of our findings can be found in the updated assessment below. This includes our plan of care. If you have any questions or concerns regarding these findings, please do not hesitate to contact me at the office phone number checked above. Thank you for the referral.     Physician Signature:________________________________Date:__________________  By signing above (or electronic signature), therapists plan is approved by physician      Functional Outcome:  Stream, quick dash    Overall Response to Treatment:   [x]Patient is responding well to treatment and improvement is noted with regards  to goals   []Patient should continue to improve in reasonable time if they continue HEP   []Patient has plateaued and is no longer responding to skilled OT intervention    []Patient is getting worse and would benefit from return to referring MD   []Patient unable to adhere to initial POC   []Other:      Date range of Visits:  10/18/2021 to 2022  Total Visits: 17    Recommendation:    [x]Continue OT 2x / wk for 6weeks.     []Hold OT, pending MD visit        Occupational Therapy Daily Treatment Note  Date:  2022    Patient: Howard Bridges   : 1980   MRN: [id number]  Referring Physician:    Dr. 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 Date Range   12/12 5/12 96 units with PT  (48 units each) [x]  Yes  []  No Through 1/31/22   2 [x]No      []Yes  Pacemaker:  [x] No       [] Yes   /  Preferred Language for Healthcare:   [x]English       []other:    Pain level:   3/10 left side especially left thigh    SUBJECTIVE:  Pt in good spirits     Functional Disability Index:     Quick DASH: total score- 35      Stroke Rehabilitation Assessment of Movement (STREAM)  improved to 29 on this date from 21 1/18/2021    OBJECTIVE:      PROM AROM      L R L R PROM on 12/30/2021   Shoulder Flexion  ~85*  11/29: 0-125*       0-140   Elbow Flexion  WFL          Elbow Extension  WFL          Pronation             Supination  WFL          Wrist Flexion  WFL          Wrist Extension  WFL          Radial Deviation             Composite Flexion (Tip of 3rd Digit to Distal Palmar Crease (cm)) Held in flexion; able to stretch into extension to 2 cm lag    11/29: Held in flexion; able to stretch into extension to 4 cm lag            RESTRICTIONS/PRECAUTIONS:  hx brain bleed, fall risk    Exercises/Interventions: re eval completed see objective measures followed by education of patient in the importance of aarom to improve rom , decrease neglect and decrease spasticity. Patient educated in holding dowel mahesh in horizontal plane and completing step and reach with min assist from therapist for balance , verbal cues to improve scapular stability on right and min facilitation posterior humerus. Son provided target for patient to reach for. Patient instructed to modify activity to complete seated at kitchen table holding dowel and pushing forward toward target placed on table for upper extremity support and improved cues for midline. Patient verbalized understanding. Neuromuscular Re-ed / Therapeutic Activities                                                 Manual Intervention                                                     Modalities:     Patient education:  Eval, POC, HEP- pt verbalized understanding       Home Exercise Program:  Written and verbal HEP instructions provided and reviewed:  · Scapular retraction   · Use of resting hand splint  · Mirror therapy at home  · 11/29/21: use of L hand as ANDREAS when seated and during transfers    Therapeutic Exercise and NMR:  [x] (78881) Provided verbal/tactile cueing for activities related to strengthening, flexibility, endurance, ROM  for improvements in scapular, scapulothoracic and UE control with self care, reaching, carrying, lifting, house/yardwork, driving/computer work.    [] (26438) Provided verbal/tactile cueing for activities related to improving balance, coordination, kinesthetic sense, posture, motor skill, proprioception  to assist with  scapular, scapulothoracic and UE control with self care, reaching, carrying, lifting, house/yardwork, driving/computer work.   [] Comments:    Therapeutic Activities:    [x] (31306 or 22533) Provided verbal/tactile cueing for activities related to improving balance, coordination, kinesthetic sense, posture, motor skill, proprioception and motor activation to allow for proper function of scapular, scapulothoracic and UE control with self care, carrying, lifting, driving/computer work  [] Comments:    Home Exercise Program:    [x] (73577) Reviewed/Progressed HEP activities related to strengthening, flexibility, endurance, ROM of scapular, scapulothoracic and UE control with self care, reaching, carrying, lifting, house/yardwork, driving/computer work  [] (91374) Reviewed/Progressed HEP activities related to improving balance, coordination, kinesthetic sense, posture, motor skill, proprioception of scapular, scapulothoracic and UE control with self care, reaching, carrying, lifting, house/yardwork, driving/computer work    [] Comments:    Manual Treatments:  PROM / STM / Oscillations-Mobs:  G-I, II, III, IV (PA's, Inf., Post.)  [x] (83767) Provided manual therapy to mobilize soft tissue/joints of cervical/CT, scapular GHJ and UE for the purpose of modulating pain, promoting relaxation,  increasing ROM, reducing/eliminating soft tissue swelling/inflammation/restriction, improving soft tissue extensibility and allowing for proper ROM for normal function with self care, reaching, carrying, lifting, house/yardwork, driving/computer work  [] Comments:    ADL Training:  [] (43853) Provided self-care/home management training related to activities of daily living and compensatory training, and/or use of adaptive equipment   [] Comments:     Splinting:  [] Fabrication of:   [] (76748) Orthotic/Prosthetic Management, subsequent encounter  [] (49672) Orthotic management and training (fitting and assessment)  [] Comments:      Charges:  Timed Code Treatment Minutes: 30   Total Treatment Minutes: 45     [] EVAL (LOW) 24901   [x] OT Re-eval (76084)  [] EVAL (MOD) 29253   [] EVAL (HIGH) 18752       [x] Dolly (10041) x  2  [] PFOZX(77022)  [x] NMR (48286) x  1 [x] Estim (attended) (47429)   [] Manual ([id number]) x  [] US (06737)  [] TA (13846) x    [] Paraffin (81035)  [] ADL  (92222) x    [] Splint/L code:    [] Estim (unattended) (22 288538)  [] Fluidotherapy (18480)  [] Other:    GOALS:   Patient stated goal: L hand use  [] Progressing: [] Met: [] Not Met: [] Adjusted     Therapist goals for Patient:   Short Term Goals: To be achieved in: 30 days  1. Pt will improve functional ROM to use LUE as ANDREAS during transfers for increased safety and independence by 11/19/21. [x] Progressing: [] Met: [] Not Met: [] Adjusted  2. Pt will improve functional LUE use to complete UB dressing mod I for increased independence in ADLs by 11/19/21. [x] Progressing: [] Met: [] Not Met: [] Adjusted  3.   Pt will improve functional LUE use to complete LB dressing min A for increased independence in ADLs by 11/19/21. [] Progressing: [x] Met: [] Not Met: [] Adjusted  4. Pt will improve oculomotor and cognition skills to complete Trailmaking Part B assessment within 180 seconds with no errors by 11/19/21. [x] Progressing: [] Met: [] Not Met: [] Adjusted     Long Term Goals: To be achieved by discharge  1. Pt will report a QuickDASH Symptom Severity Scale score of 65% or less indicating increased safety and functional independence in desired occupational pursuits by discharge. [x] Progressing: [] Met: [] Not Met: [] Adjusted  2. Pt will increase STREAM total score from 21/70 to 31/70 indicating increased safety and functional independence in desired occupational pursuits by discharge. (added 11/29/21)  [x] Progressing: [] Met: [] Not Met: [] Adjusted    Progression Towards Functional goals:  [] Patient is progressing as expected towards functional goals listed. [x] Progression is slowed due to complexities listed. -falls and fear of falling, significant proprioceptive impairment, left neglect but improving significantly  [] Progression has been slowed due to co-morbidities. [] Plan just implemented, too soon to assess goals progression  [] All goals are met  [] Other:     ASSESSMENT:  Pt has made improvements in PROM of LUE with decreased tone, but continues to have limited participation in functional mobility due to falls/fear of falling and proprioceptive impairment. Treatment/Activity Tolerance:  [x] Patient tolerated treatment well [] Patient limited by fatique  [] Patient limited by pain  [] Patient limited by other medical complications- falls and fear of falling, significant proprioceptive impairment.  [] Other:     Prognosis: [x] Good [] Fair  [] Poor    Patient Requires Follow-up: [x] Yes  [] No    PLAN: See eval  [x] Continue per plan of care [] Alter current plan (see comments)  [] Plan of care initiated [] Hold pending MD visit [] Discharge    Electronically signed by:

## 2022-01-20 ENCOUNTER — HOSPITAL ENCOUNTER (OUTPATIENT)
Dept: SPEECH THERAPY | Age: 42
Setting detail: THERAPIES SERIES
Discharge: HOME | End: 2022-01-20
Payer: COMMERCIAL

## 2022-01-20 ENCOUNTER — HOSPITAL ENCOUNTER (OUTPATIENT)
Dept: OCCUPATIONAL THERAPY | Age: 42
Setting detail: THERAPIES SERIES
Discharge: HOME | End: 2022-01-20
Payer: COMMERCIAL

## 2022-01-20 ENCOUNTER — HOSPITAL ENCOUNTER (OUTPATIENT)
Dept: PHYSICAL THERAPY | Age: 42
Setting detail: THERAPIES SERIES
Discharge: HOME | End: 2022-01-20
Payer: COMMERCIAL

## 2022-01-20 PROCEDURE — 97112 NEUROMUSCULAR REEDUCATION: CPT

## 2022-01-20 PROCEDURE — 97110 THERAPEUTIC EXERCISES: CPT

## 2022-01-20 PROCEDURE — 92507 TX SP LANG VOICE COMM INDIV: CPT

## 2022-01-20 PROCEDURE — 97116 GAIT TRAINING THERAPY: CPT

## 2022-01-20 NOTE — FLOWSHEET NOTE
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  · Treatment Diagnosis: functional limitations secondary to brain bleed  Insurance/Certification information:  PT Insurance Information: [employer] - 30 visits per year; preauth required  Physician Information:  Referring Practitioner: Gregorio Dorsey MD  Plan of care signed (Y/N): faxed 21     Date of Patient follow up with Physician:     Functional scale: Optimal:  raw score = 81; dysfunction = 80%    Progress Report: []  Yes  [x]  No     Date Range for reporting period:  Beginning   PN: 11/15  POC:   PN:   Ending:      Progress report due (10 Rx/or 30 days whichever is less): visit #9 or 96    Recertification due (POC duration/ or 90 days whichever is less): visit #16  3/6/22    Visit # Insurance Allowable Auth required? Date Range         10/16   128 units w/OT  64 ea discipline [x]  Yes  []  No   12/10/21    12/13 to 22          Latex Allergy:  [x]NO      []YES  Preferred Language for Healthcare:   [x]English       []other:      Pain level:  3-4/10 L quad     SUBJECTIVE:     Doing okay today, just finished with OT. Walked out to car from her home today, had some trouble with curb. OBJECTIVE:    :  30 sec STS w/use of RUE and 22\" mat table:  7  6MWT w/SBQC & L AFO:  125'  (165' total before seated rest)  Min/mod A required w/3 LOB    :  30 sec STS w/use of RUE and 22\" mat table.   Gait Pattern w/SBQC & mod A w/several LOB:  Limited use and WB'ing through LLE, L genu recurvatum during L stance phase, L knee frequently mansi just following initial contact or when not appropriately shifting weight onto LLE during stance phase;  R knee frequently flexes and pt moves QC around rapidly. Pt able to utilize steppage gait w/upright trunk posture for L swing phase, however has difficulty transitioning weight anteriorly over LLE, frequently reducing R step length. 11/15:    30 sec sit to stand transitions: 5  Gait Pattern:  W/SBQC, L toe drag, flexed L knee throughout stance phase, weight shifted onto RLE, frequently hops over LLE;  Able to initiate L hip flex, toe drag minimizes swing phase, DF assist does help but not consistently alleviate toe drag. Forward pitched posture;  Frequently lifts and moves R foot and cane. 11/4: pt not wearing AFO today     10/14:  To clinic in wheelchair with soft AFO on LLE; received script for OT and speech this date and pt is now scheduled for all 3 therapies      RESTRICTIONS/PRECAUTIONS: h/o aneurysm    Exercises/Interventions:   Therapeutic Exercises (91149) Resistance / level Sets/sec Reps Notes   Nu step / step one  4 levelLEs only w/man facilitation to lateral L knee joint for improved hip alignment           Seated in w/c w/B feet placed on mat #6  -hip ext            -frequent VCs to encourage L hip extensor activation   Standing R hip flex w/PT HHA  Standing stool pushes  Min A  Max A  X 10     -man cues to improve posture and LLE WB'ing   Standing over gray bolster, hip ext Mod A                                  Therapeutic Activities (39839)       Sit to stand transitions   -Man & VC's to recruit LLE mm fibers Sup81/20: Cues to not push LEs posteriorly into chair   Sit to stand transitions from mat table, R foot on AirEx, R hand on L knee, bolster between feet 12/16:  max cues for correct technique & form   Sit to stand w/R foot on dynadisc Min A  10    Stand pivot w/SBQC w/c to mat table #6 Max A   Curb step, fwd on and fwd off  -PT HHA  -QC w/   4\"  4\"   1  1   x5 ea  x5 ea    Fwd step ups w/HR 4\"Mod A for stability, max A for L foot placement on step   Navigated 4\" steps step to pattern w/HR & QC Max A & man assist to maintain LLE stability with each step   STS mod mat table with maria alejandra-walker    Once standing added weight shifting M/L    One trial added L LE stepping (minimal success without AFO)     R foot taps onto 4\" step    Trunk rotation w/R foot on step       1/6: manual stability to maintain pelvic alignment     1/6: man cues to facilitate pelvic rotation   Gait to chair, half turn and sit - VCs for spatial awareness and    1/11: VCs for turning all the way prior to sitting x 3, last sit was correct placement and positioning    Standing with R foot in front (stagger stance) with thor rot   Added 1/11   Tall kneeling EOM  Tall kneeling: , 2 steps R  -man cues to sacrum & sternum to improve posture  -man facilitation to sacrum & medial lower LLE to advance          Circuit Training (<2 min rest between each):  Sit to stands  Amb w/QC  Seated stepper  Amb w/QC  Sit to stands  Suso w/QC, RLE  Suso w/o QC, RLE         1.0 Increase sets of each as tolerated      LEs only          Neuromuscular Re-ed (83638)       Static stand for equal BLE WB'ing w/mirror for visual feedback of correct posture & weight-bearing through LLE    Static stand w/R foot on box w/mirror for visual feedback of correct posture & weight-bearing through LLE    Static stand w/large blue bolster between feet to maintain neutral hip positioning w/mirror for visual feedback of correct posture & weight-bearing through LLE         4\"        RLE on Airex                                  Min /Mod A -man cues for weight shift reduced to encourage pt to self correct      -man cues to facilitate posture, VC's only to facilitate weight-shift onto LLE      -man cues to facilitate posture, VC's only to facilitate weight-shift onto LLE   L biceps tendon pressure to improve elbow extension and hand/wrist placment       Forward/backward stepping with R foot    // bars, left hand on bars, cues to assist with left knee stability    EOM:  Scooting:  -Out to edge  -L laterally  -R laterally            -able to scoot out w/pelvis in midline p instruction  -max cues for correct technique and encouragement  -good form, required mod A for LLE positioning   Rolling in bed R and L    Min A for placement and sequencing    bridge   CGA, VCs for hip adduction    Hip add in hooklying   TCs for target   Heel slides AAROM to AROM   AAROM to begin, but slowly gained more motion and power, responds well to cues for power   Seated:  LAQ        12/30: initially performed well then had difficulty activating quad and was unable to extend knee without external assist   Gait Training:  Foot in front then behind at elevated mat table          Amb w/QC & L AFO                Amb pushing weight lymph cart;  purple TB for DF and knee flexion assist      Amb w/o AD, PT held R hand at shoulder height Min ACGA/min A  Max x 2         Mod A125'            30', 75'  11/18:  performed x8 safely w/pt and was added to HEP;  PT (then ) inhibited L knee recurvatum w/man cues to posterior knee.         -  amb with her        facilitation with tband at L LE, Max A for placing L foot as pt puling forward, VCs for sequencing, block to L knee and to cart to avoid pt pulling back     -purple TB for L ankle DF & knee flex assist   Weight shifts  -lateral w/L AFO  -foot onto box, lean onto foot, then return  -staggered w/R foot on AirEx  -foot maintained on box w/RUE reaches         Mod A               -man cues to facilitate weight shift  -max cues for improved weight-shifting & VCs to reduce speed & improve control  -man cues to facilitate shifting weight onto LLE    12/9: pt unable to adequately accept weight through LLE, activity stopped due to safety concerns   Amb bwds w/cart    Treadmill  -L foot on side of belt, RLE on belt    -lateral weight shifts with belt stopped & RUE support   0.3 mph           12/9: rad difficulty reducing pt compensations to avoid use of LLE, once compensations were removed, pt rapidly fatigued and became anxious, stating she needed to stop, felt faint, was going to get sick, and finally profanities, not directed at anyone. L sided neglect did worsen as anxiety incr, however  did arrive on her R side which may have contributed to focus on R side                        Manual Intervention ([phone])       Stretches:  Seated hamstring stretch  Seated knee flexion stretch  Seated calf stretch  Seated piriformis stretch                                                  Modalities:     Pt. Education:    12/9:  Once seated after weight shifting on TM, discussed importance of utilizing LLE in that manor frequently, daily at home in order to improve muscle activation appropriately. Pt nodded in understanding but remained, understandably, upset. 11/9:  Pt requested order for hemiwalker, feels like it would improve her fear and ability to walk. Discussed increased support it provides and less LE & core muscle activation that is required to use hemiwalker, potentially becoming a step backwards and increasing her dependency on AD. Did discuss importance of continued walking and activity at home and if pt is not currently walking at home due to amount of work/effort required to amb w/SBQC, to obtain a hemiwalker so pt is more likely to return to typical home activities. Discussed nearby medical supply stores and to contact PCP and have order sent to them.  and pt reported understanding. 10/14:  Verbal cues for proper transfer technique and safety  -patient educated on diagnosis, prognosis and expectations for rehab  -all patient questions were answered    HEP instruction: 10/14:  Discussed safety on transfers. 10/18: Instructed pt to perform standing lateral weight-shifts hourly in front of chair, with a family member on L side as needed.     Therapeutic Exercise and NMR EXR  [] (98862) Provided verbal/tactile cueing for activities related to strengthening, flexibility, endurance, ROM for improvements in  [] LE / Lumbar: LE, proximal hip, and core control with self care, mobility, lifting, ambulation. [] UE / Cervical: cervical, postural, scapular, scapulothoracic and UE control with self care, reaching, carrying, lifting, house/yardwork, driving, computer work.  [] (61594) Provided verbal/tactile cueing for activities related to improving balance, coordination, kinesthetic sense, posture, motor skill, proprioception to assist with   [] LE / lumbar: LE, proximal hip, and core control in self care, mobility, lifting, ambulation and eccentric single leg control. [] UE / cervical: cervical, scapular, scapulothoracic and UE control with self care, reaching, carrying, lifting, house/yardwork, driving, computer work.   [] (57074) Therapist is in constant attendance of 2 or more patients providing skilled therapy interventions, but not providing any significant amount of measurable one-on-one time to either patient, for improvements in  [] LE / lumbar: LE, proximal hip, and core control in self care, mobility, lifting, ambulation and eccentric single leg control. [] UE / cervical: cervical, scapular, scapulothoracic and UE control with self care, reaching, carrying, lifting, house/yardwork, driving, computer work. NMR and Therapeutic Activities:    [x] (88187 or 25022) Provided verbal/tactile cueing for activities related to improving balance, coordination, kinesthetic sense, posture, motor skill, proprioception and motor activation to allow for proper function of   [x] LE: / Lumbar core, proximal hip and LE with self care and ADLs  [x] UE / Cervical: cervical, postural, scapular, scapulothoracic and UE control with self care, carrying, lifting, driving, computer work.    [x] (10767) Gait Re-education- Provided training and instruction to the patient for proper LE, core and proximal hip recruitment and positioning and eccentric body weight control with ambulation re-education including up and down stairs     Home Management Training / Self Care:  [] (19221) Provided self-care/home management training related to activities of daily living and compensatory training, and/or use of adaptive equipment for improvement with: ADLs and compensatory training, meal preparation, safety procedures and instruction in use of adaptive equipment, including bathing, grooming, dressing, personal hygiene, basic household cleaning and chores. Home Exercise Program:    [] (27937) Reviewed/Progressed HEP activities related to strengthening, flexibility, endurance, ROM of   [] LE / Lumbar: core, proximal hip and LE for functional self-care, mobility, lifting and ambulation/stair navigation   [] UE / Cervical: cervical, postural, scapular, scapulothoracic and UE control with self care, reaching, carrying, lifting, house/yardwork, driving, computer work  [] (10242)Reviewed/Progressed HEP activities related to improving balance, coordination, kinesthetic sense, posture, motor skill, proprioception of   [] LE: core, proximal hip and LE for self care, mobility, lifting, and ambulation/stair navigation    [] UE / Cervical: cervical, postural,  scapular, scapulothoracic and UE control with self care, reaching, carrying, lifting, house/yardwork, driving, computer work    Manual Treatments:  PROM / STM / Oscillations-Mobs:  G-I, II, III, IV (PA's, Inf., Post.)  [] (79837) Provided manual therapy to mobilize LE, proximal hip and/or LS spine soft tissue/joints for the purpose of modulating pain, promoting relaxation,  increasing ROM, reducing/eliminating soft tissue swelling/inflammation/restriction, improving soft tissue extensibility and allowing for proper ROM for normal function with   [] LE / lumbar: self care, mobility, lifting and ambulation.     [] UE / Cervical: self care, reaching, carrying, lifting, house/yardwork, driving, computer work. Modalities:  [] (30400) Vasopneumatic compression: Utilized vasopneumatic compression to decrease edema / swelling for the purpose of improving mobility and quad tone / recruitment which will allow for increased overall function including but not limited to self-care, transfers, ambulation, and ascending / descending stairs. Charges:  Timed Code Treatment Minutes: 45   Total Treatment Minutes: 45     Units approved Units used Date Range    48   64 -PT   26 12/10/21  12/13 - 2/18/22   **updated 1/20**    [] EVAL - LOW (09037)   [] EVAL - MOD (75447)  [] EVAL - HIGH (72818)  [] RE-EVAL (55617)  [] GC(96760) x       [] Ionto  [x] NMR (42885) x  2    [] Vaso  [] Manual (23671) x       [] Ultrasound  [] TA x        [] Mech Traction (15530)  [] Aquatic Therapy x      [] ES (un) (01742):   [] Home Management Training x  [] ES(attended) (85752)   [] Dry Needling 1-2 muscles (04923):  [] Dry Needling 3+ muscles (076247  [] Group:      [x] Other: gait x 1    GOALS:   Patient stated goal: To return to as normal activity as possible.       Therapist goals for Patient:   Short Term Goals: To be achieved in: 2 weeks  1. Independent in HEP and progression per patient tolerance, in order to prevent re-injury.  - Goal Met  2. Patient will have a decrease in pain to facilitate improvement in movement, function, and ADLs as indicated by Functional Deficits. - Goal Met     Long Term Goals: To be achieved in: 6 weeks  1. Pt will demonstrate the ability to perform sit to/from stand with supervision.  -30 sec STS: 7 reps from 22 in high chair;  2.  -Met 11/15/21  **11/15/21**: 2a.  Pt will amb 150' w/SBQC & min A.   - Almost Met, amb 165'w/ min/mod A, multiple LOB  3. Patient will demonstrate an increase in postural awareness and control to allow for proper functional mobility as indicated by patients Functional Deficits. - Weight remains shifted onto RLE, limiting transfers, standing and gait  4. Patient will return to functional activities including transfers without increased symptoms or restriction.  - Not Met    Overall Progression Towards Functional goals/ Treatment Progress Update:  [] Patient is progressing as expected towards functional goals listed. [x] Progression is slowed due to complexities/Impairments listed. [] Progression has been slowed due to co-morbidities. [] Plan just implemented, too soon to assess goals progression <30days   [] Goals require adjustment due to lack of progress  [] Patient is not progressing as expected and requires additional follow up with physician  [] Other    Persisting Functional Limitations/Impairments:  []Sleeping []Sitting              [x]Standing []Transfers        [x]Walking []Kneeling              []Stairs [x]Squatting / bending   [x]ADLs [x]Reaching  []Lifting  [x]Housework  []Driving [x]Job related tasks  [x]Sports/Recreation []Other:        ASSESSMENT:     Pt initially demo'd increased use of RLE & UE during sit to stand transfers, along with dependency on quads vs hip extensors to complete transfers, however was able to improve with R foot on dynadisc and when PT stood directly on the R side of pt, limiting her ability to shift weight to R. During amb w/o AD, pt able to improve trunk positioning and overall posture, however deficits in L knee mechanics exacerbated resulting in L knee pain. Pt navigated curb step safely with and without AD, with assistance. Treatment/Activity Tolerance:  [x] Patient able to complete tx  [] Patient limited by fatigue  [] Patient limited by pain  [] Patient limited by other medical complications  [] Other:     Prognosis: [x] Good [] Fair  [] Poor    Patient Requires Follow-up: [x] Yes  [] No    Plan for next treatment session:  per training diary and progress as tolerated. PLAN: See dyana PT 2x / week for 6 weeks. -requesting additional visits at 2x/week x8 weeks.   [x] Continue per plan of care [] Shirin Godinez current plan (see comments)  [] Plan of care initiated [] Hold pending MD visit [] Discharge    Electronically signed by: Gatito Perez PT, DPT    Note: If patient does not return for scheduled/ recommended follow up visits, this note will serve as a discharge from care along with most recent update on progress.

## 2022-01-20 NOTE — FLOWSHEET NOTE
Occupational Therapy Daily Treatment Note  Date:  2022    Patient: Handy Perez   : 1980   MRN: [id number]  Referring Physician:    Dr. 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  Date Range    96 units with PT  (48 units each) [x]  Yes  []  No Through 22   2 [x]No      []Yes  Pacemaker:  [x] No       [] Yes   /  Preferred Language for Healthcare:   [x]English       []other:    Pain level:   3/10 left side especially left thigh    SUBJECTIVE:  Pt in good spirits     Functional Disability Index:     Quick DASH: total score- 35      Stroke Rehabilitation Assessment of Movement (STREAM)  improved to 29 on this date from 2021    OBJECTIVE:      PROM AROM      L R L R PROM on 2021   Shoulder Flexion  ~85*  : 0-125*       0-140   Elbow Flexion  WFL          Elbow Extension  WFL          Pronation             Supination  WFL          Wrist Flexion  WFL          Wrist Extension  WFL          Radial Deviation             Composite Flexion (Tip of 3rd Digit to Distal Palmar Crease (cm)) Held in flexion; able to stretch into extension to 2 cm lag    : Held in flexion; able to stretch into extension to 4 cm lag            RESTRICTIONS/PRECAUTIONS:  hx brain bleed, fall risk    Exercises/Interventions:  Patient completed quick preparation sit to 1/2 stand times 10 followed by transfer wheelchair to mat with light contact for safety with use of quad cane. Patient then had shoes removed for increased rom and increased weight bearing through foot. Assumed supine with min assist.  Completed bridge and scoot with emphasis on upper and lower trunk separation and increased midline awareness with very slight assist times 10 scoots each direction. Patient then worked on segmental rolling both direction with emphasis on integration of left upper extremity and increased ability to change left ue position as base of support with min assist each direction times 5 reps. Patient then stayed on right side and worked on reaching to target with left ue while therapist provided facilitation from scapula and posterior humerus times 10 with increased amplitude of movement noted on each progression. Patient then sat on edge of mat with min assist, shoes replaced. Worked on using left ue as dynamic base of support pushing chair forward and transitioning to 1/2 stand times 10 and then rotated to left and used left ue as base of support while reaching across midline for targets times 10 reps with improved distal tone noted at end of treatment.                                       Neuromuscular Re-ed / Therapeutic Activities                                                 Manual Intervention                                                     Modalities:     Patient education:  Eval, POC, HEP- pt verbalized understanding       Home Exercise Program:  Written and verbal HEP instructions provided and reviewed:  · Scapular retraction   · Use of resting hand splint  · Mirror therapy at home  · 11/29/21: use of L hand as ANDREAS when seated and during transfers    Therapeutic Exercise and NMR:  [x] (78918) Provided verbal/tactile cueing for activities related to strengthening, flexibility, endurance, ROM  for improvements in scapular, scapulothoracic and UE control with self care, reaching, carrying, lifting, house/yardwork, driving/computer work.    [] (76437) Provided verbal/tactile cueing for activities related to improving balance, coordination, kinesthetic sense, posture, motor skill, proprioception  to assist with  scapular, scapulothoracic and UE control with self care, reaching, carrying, lifting, house/yardwork, driving/computer work.   [] Comments:    Therapeutic Activities:    [x] (09715 or 29803) Provided verbal/tactile cueing for activities related to improving balance, coordination, kinesthetic sense, posture, motor skill, proprioception and motor activation to allow for proper function of scapular, scapulothoracic and UE control with self care, carrying, lifting, driving/computer work  [] Comments:    Home Exercise Program:    [x] (31994) Reviewed/Progressed HEP activities related to strengthening, flexibility, endurance, ROM of scapular, scapulothoracic and UE control with self care, reaching, carrying, lifting, house/yardwork, driving/computer work  [] (06998) Reviewed/Progressed HEP activities related to improving balance, coordination, kinesthetic sense, posture, motor skill, proprioception of scapular, scapulothoracic and UE control with self care, reaching, carrying, lifting, house/yardwork, driving/computer work    [] Comments:    Manual Treatments:  PROM / STM / Oscillations-Mobs:  G-I, II, III, IV (PA's, Inf., Post.)  [x] (24852) Provided manual therapy to mobilize soft tissue/joints of cervical/CT, scapular GHJ and UE for the purpose of modulating pain, promoting relaxation,  increasing ROM, reducing/eliminating soft tissue swelling/inflammation/restriction, improving soft tissue extensibility and allowing for proper ROM for normal function with self care, reaching, carrying, lifting, house/yardwork, driving/computer work  [] Comments:    ADL Training:  [] (76304) Provided self-care/home management training related to activities of daily living and compensatory training, and/or use of adaptive equipment   [] Comments:     Splinting:  [] Fabrication of:   [] (28775) Orthotic/Prosthetic Management, subsequent encounter  [] (30672) Orthotic management and training (fitting and assessment)  [] Comments:      Charges:  Timed Code Treatment Minutes: 45   Total Treatment Minutes: 45     [] EVAL (LOW) 61585   [] OT Re-eval (42726)  [] EVAL (MOD) 65838   [] EVAL (HIGH) 39717       [x] Dolly (76996) x  2  [] RKSAE(00230)  [x] NMR (84862) x  1 [x] Estim (attended) (08016)   [] Manual ([id number]) x  [] US (93896)  [] TA () x    [] Paraffin (95204)  [] ADL  ([phone]) x    [] Splint/L code:    [] Estim (unattended) (22 427178)  [] Fluidotherapy (50841)  [] Other:    GOALS:   Patient stated goal: L hand use  [] Progressing: [] Met: [] Not Met: [] Adjusted     Therapist goals for Patient:   Short Term Goals: To be achieved in: 30 days  1. Pt will improve functional ROM to use LUE as ANDREAS during transfers for increased safety and independence by 11/19/21. [x] Progressing: [] Met: [] Not Met: [] Adjusted  2. Pt will improve functional LUE use to complete UB dressing mod I for increased independence in ADLs by 11/19/21. [x] Progressing: [] Met: [] Not Met: [] Adjusted  3. Pt will improve functional LUE use to complete LB dressing min A for increased independence in ADLs by 11/19/21. [] Progressing: [x] Met: [] Not Met: [] Adjusted  4. Pt will improve oculomotor and cognition skills to complete Trailmaking Part B assessment within 180 seconds with no errors by 11/19/21. [x] Progressing: [] Met: [] Not Met: [] Adjusted     Long Term Goals: To be achieved by discharge  1. Pt will report a QuickDASH Symptom Severity Scale score of 65% or less indicating increased safety and functional independence in desired occupational pursuits by discharge. [x] Progressing: [] Met: [] Not Met: [] Adjusted  2.  Pt will increase STREAM total score from 21/70 to 31/70 indicating increased safety and functional independence in desired occupational pursuits by discharge. (added 11/29/21)  [x] Progressing: [] Met: [] Not Met: [] Adjusted    Progression Towards Functional goals:  [] Patient is progressing as expected towards functional goals listed. [x] Progression is slowed due to complexities listed. -falls and fear of falling, significant proprioceptive impairment, left neglect but improving significantly  [] Progression has been slowed due to co-morbidities. [] Plan just implemented, too soon to assess goals progression  [] All goals are met  [] Other:     ASSESSMENT:  Pt has made improvements in PROM of LUE with decreased tone, but continues to have limited participation in functional mobility due to falls/fear of falling and proprioceptive impairment. Treatment/Activity Tolerance:  [x] Patient tolerated treatment well [] Patient limited by fatique  [] Patient limited by pain  [] Patient limited by other medical complications- falls and fear of falling, significant proprioceptive impairment.  [] Other:     Prognosis: [x] Good [] Fair  [] Poor    Patient Requires Follow-up: [x] Yes  [] No    PLAN: See eval  [x] Continue per plan of care [] Alter current plan (see comments)  [] Plan of care initiated [] Hold pending MD visit [] Discharge    Electronically signed by:

## 2022-01-20 NOTE — FLOWSHEET NOTE
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 Shanta Hart MD   Plan of care signed (Y/N): Y (2021)    Date of Patient follow up with Physician: DEWEY    Functional outcome measure: [employer] Communicative Participation Item Bank Total: 10/30 (2021)     Progress Note: []  Yes  [x]  No  Next due by: 2022    RESTRICTIONS/PRECAUTIONS: hx of brain bleed   Latex Allergy:  [x]NO      []YES    Preferred Language for Healthcare:   [x]English       []other    Visit # Insurance Allowable Date Range (if applicable)   72/43  + 462 48 units  (19 used)  10/  Ext. 2022       Pain level: 0/10     SUBJECTIVE: Pt alert and receptive, son and  present during session. Additional HEP provided for speech intelligibility. OBJECTIVE: See POC/Progress Note    Exercises/Interventions:      Motor Speech Therapy (76611) Accuracy Cues Notes   Apraxia       Dysarthria SLOB reinforcement during spontaneous speech re: self-concept:  - pt rating 60%  -spouse rating 90%  - SLP rating 90%  Verbal feedback for self-monitoring Goal-Plan-Do-Review: pt continues to report needing to slow speech, SLP discussed good overall intelligibility of speech   Intelligibility      Other:          Cognitive Function (53638 & 56560) Accuracy Cues Notes   Orientation        Attention           Sustained         Selective         Alternating         Divided Complex functional visual attention involving sorting and organization of monthly calendar with 79% accuracy (11/14) Min verbal cues to implement strategies, intermittent initiation cues Metacognitive strategy training: minimize visual distractions, plan ahead/scanning left to right, double-check work   Memory          Immediate/Working         Short term         Long term         Prospective      Problem Solving       Visuospatial       Executive Function  Critical thinking for functional problem-solving with 75% accuracy Min verbal cues to implement strategies Metacognitive strategy training: focus on one thing at a time, self-talk, write things down   Other:          Pt. Education:  -Pt educated on diagnosis, prognosis and expectations for rehab  -All pt questions were answered  -Pt verbalized understanding and agreement    HEP instruction:  -Pt provided with written and illustrated instructions for HEP: intelligibility strategies, oral motor exercises, safe swallowing strategies, memory strategies    Speech/Voice Therapy:    [x] (68360) Treatment of speech, language, voice, communication, and/or auditory processing disorder; individual    Cognitive Function:  [] (81544) Therapeutic interventions that focus on cognitive function (eg, attention, memory, reasoning, executive function, problem solving, and/or pragmatic functioning) and compensatory strategies to manage the performance of an activity (eg, managing time or schedules, initiating, organizing, and sequencing tasks), direct (one-on-one) patient contact; initial 15 minutes (Report  only once per day)  [] ([phone]) each additional 15 minutes     Dysphagia Therapy:    [] (18886) Treatment of swallowing dysfunction and/or oral function for feeding         Charges:  Timed Code Treatment Minutes: 0   Total Treatment Minutes: 39     [x] Speech-Language Treatment (02405)        [] Voice Treatment (77647)                                         [] Cognitive-Linguistic Skills Development Initial 15 minutes (35498)  [] Cognitive-Linguistic Skills Development Additional 15 minutes (53 [id number])   [] Dysphagia Treatment/NMES (VitalStim) (75498)  [] Other:     GOALS:   Patient stated goal: \"Stop slurring\"  []? Progressing: []? Met: []? Not Met: []? Adjusted     Therapist goals for Patient:   Short Term Goals: To be achieved in: 5 weeks  1. Patient will use speech strategies to facilitate increased intelligibility at detailed conversational level in >90% as judged by SLP and pt/family. []? Progressing: []? Met: []? Not Met: []? Adjusted  2. Patient will complete abstract reasoning related to functional tasks with 80% accuracy to target self-monitoring. []? Progressing: []? Met: []? Not Met: []? Adjusted  3. Patient will complete complex attention tasks (sustained, selective, alternating) with 80% accuracy given minimal cues to improve functional task completion. []? Progressing: []? Met: []? Not Met: []? Adjusted  4. The patient will complete modified barium swallow study to further assess pharyngeal phase and direct plan of care. []? Progressing: []? Met: []? Not Met: []? Adjusted    Long Term Goals: To be achieved in: 6 weeks  1. Patient will demonstrate improved cognitive-linguistic function to improve safety and independence in ADLs.  []? Progressing: []? Met: []? Not Met: []? Adjusted  2. Patient will utilize speech intelligibility strategies to increase intelligibility to greater than 90% in conversation to a familiar/unfamiliar listener. []? Progressing: []? Met: []? Not Met: []? Adjusted  3. Patient will tolerate least restrictive diet with no overt clinical s/s of aspiration/penetration. []? Progressing: []? Met: []? Not Met: []? Adjusted         MET GOALS:  Patient will participate in further cognitive-linguistic evaluation with additional goals as indicated. (MET 11/8)  Patient will tolerate recommended diet with no overt s/s of aspiration or noticeable fatigue.  (MET 11/26)  Patient will complete oral motor and bolus control exercises with 80% accuracy independently to improve oral phase of swallow. (12/16/2021)    Progression Towards Functional goals:  [] Patient is progressing as expected towards functional goals listed. [x] Progression is slowed due to complexities listed: insight, visual deficits  [] Progression has been slowed due to co-morbidities. [] Plan just implemented, too soon to assess goals progression  [] Other:     Persisting Functional Limitations/Impairments:  [x]Attention []Word Finding       [x]Memory [x]Speech Intelligibility      [x]Executive Function [x]Diet Tolerance     []Problem Solving []Coughing/Choking with PO  []Expressive Language []Medication/Finance Management  [x]Receptive Language []Other:      ASSESSMENT: See POC/Progress Note    Treatment/Activity Tolerance:  [x] Patient able to complete tx  [] Patient limited by fatigue  [] Patient limited by pain  [] Patient limited by other medical complications  [] Other:     Prognosis: [x] Good [] Fair  [] Poor    Patient Requires Follow-up: [x] Yes  [] No    PLAN: See eval. ST 2x / week for 6 weeks. [x] Continue per plan of care [] Alter current plan (see comments)  [] Plan of care initiated [] Hold pending MD visit [] Discharge    Electronically signed by:   Navjot Hastings MA CCC-SLP  Speech-Language Pathologist  SP. [id number]       Note: If patient does not return for scheduled/ recommended follow up visits, this note will serve as a discharge from care along with most recent update on progress.

## 2022-01-25 ENCOUNTER — HOSPITAL ENCOUNTER (OUTPATIENT)
Dept: PHYSICAL THERAPY | Age: 42
Setting detail: THERAPIES SERIES
Discharge: HOME | End: 2022-01-25
Payer: COMMERCIAL

## 2022-01-25 ENCOUNTER — HOSPITAL ENCOUNTER (OUTPATIENT)
Dept: SPEECH THERAPY | Age: 42
Setting detail: THERAPIES SERIES
Discharge: HOME | End: 2022-01-25
Payer: COMMERCIAL

## 2022-01-25 ENCOUNTER — HOSPITAL ENCOUNTER (OUTPATIENT)
Dept: OCCUPATIONAL THERAPY | Age: 42
Setting detail: THERAPIES SERIES
Discharge: HOME | End: 2022-01-25
Payer: COMMERCIAL

## 2022-01-25 PROCEDURE — 97112 NEUROMUSCULAR REEDUCATION: CPT

## 2022-01-25 PROCEDURE — 92507 TX SP LANG VOICE COMM INDIV: CPT

## 2022-01-25 PROCEDURE — 97110 THERAPEUTIC EXERCISES: CPT

## 2022-01-25 PROCEDURE — 97116 GAIT TRAINING THERAPY: CPT

## 2022-01-25 PROCEDURE — 97530 THERAPEUTIC ACTIVITIES: CPT

## 2022-01-25 NOTE — FLOWSHEET NOTE
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  · Treatment Diagnosis: functional limitations secondary to brain bleed  Insurance/Certification information:  PT Insurance Information: [employer] - 30 visits per year; preauth required  Physician Information:  Referring Practitioner: Gregorio Dorsey MD  Plan of care signed (Y/N): faxed 21     Date of Patient follow up with Physician:     Functional scale: Optimal:  raw score = 81; dysfunction = 80%    Progress Report: []  Yes  [x]  No     Date Range for reporting period:  Beginning   PN: 11/15  POC:   PN:   Ending:      Progress report due (10 Rx/or 30 days whichever is less): visit #9 or 93    Recertification due (POC duration/ or 90 days whichever is less): visit #16  3/6/22    Visit # Insurance Allowable Auth required? Date Range            128 units w/OT  64 ea discipline [x]  Yes  []  No   12/10/21    12/13 to 22          Latex Allergy:  [x]NO      []YES  Preferred Language for Healthcare:   [x]English       []other:      Pain level:  3-4/10 L quad  & LUE    SUBJECTIVE:     Doing okay today, nothing new going on. Pain in quad and LUE remain. Walking at home is going okay. OBJECTIVE:    :  30 sec STS w/use of RUE and 22\" mat table:  7  6MWT w/SBQC & L AFO:  125'  (165' total before seated rest)  Min/mod A required w/3 LOB    :  30 sec STS w/use of RUE and 22\" mat table.   Gait Pattern w/SBQC & mod A w/several LOB:  Limited use and WB'ing through LLE, L genu recurvatum during L stance phase, L knee frequently mansi just following initial contact or when not appropriately shifting weight onto LLE during stance phase;  R knee frequently flexes and pt moves QC around rapidly. Pt able to utilize steppage gait w/upright trunk posture for L swing phase, however has difficulty transitioning weight anteriorly over LLE, frequently reducing R step length. 11/15:    30 sec sit to stand transitions: 5  Gait Pattern:  W/SBQC, L toe drag, flexed L knee throughout stance phase, weight shifted onto RLE, frequently hops over LLE;  Able to initiate L hip flex, toe drag minimizes swing phase, DF assist does help but not consistently alleviate toe drag. Forward pitched posture;  Frequently lifts and moves R foot and cane. 11/4: pt not wearing AFO today     10/14:  To clinic in wheelchair with soft AFO on LLE; received script for OT and speech this date and pt is now scheduled for all 3 therapies      RESTRICTIONS/PRECAUTIONS: h/o aneurysm    Exercises/Interventions:   Therapeutic Exercises (32933) Resistance / level Sets/sec Reps Notes   Nu step / step one  Level 6x5 minLEs only w/man facilitation to lateral L knee joint for improved hip alignment           Seated in w/c w/B feet placed on mat #6  -hip ext            -frequent VCs to encourage L hip extensor activation   Standing R hip flex w/PT HHA  Standing stool pushes  Min A  Max A       -man cues to improve posture and LLE WB'ing   Standing over gray bolster, hip ext Mod A                                  Therapeutic Activities (10394)       Sit to stand transitions   -Man & VC's to recruit LLE mm fibers Sup1/20: Cues to not push LEs posteriorly into chair   Sit to stand transitions from mat table, R foot on 1/2 foam roll CGA/min Ax5    Sit to stand w/R foot on dynadisc Min A  x5    Stand pivot w/SBQC w/c to mat table #6 Max A   Curb step, fwd on and fwd off  -PT HHA  -QC w/   4\"  4\"    Fwd step ups w/HR 4\"Mod A for stability, max A for L foot placement on step   Navigated 4\" steps step to pattern w/HR & QC Max A & man assist to maintain LLE stability with each step   STS mod mat table with maria alejandra-walker    Once standing added weight shifting M/L    One trial added L LE stepping (minimal success without AFO)     R foot taps onto 4\" step    Trunk rotation w/R foot on step       1/6: manual stability to maintain pelvic alignment     1/6: man cues to facilitate pelvic rotation   Gait to chair, half turn and sit - VCs for spatial awareness and    1/11: VCs for turning all the way prior to sitting x 3, last sit was correct placement and positioning    Standing with R foot in front (stagger stance) with thor rot   Added 1/11   Tall kneeling EOM  Tall kneeling: , 2 steps R  -man cues to sacrum & sternum to improve posture  -man facilitation to sacrum & medial lower LLE to advance          Circuit Training (<2 min rest between each):  Sit to stands  Amb w/QC  Seated stepper  Amb w/QC  Sit to stands  MyVerse w/QC, RLE  MyVerse w/o QC, RLE         1.0 Increase sets of each as tolerated      LEs only          Neuromuscular Re-ed (01151)       Static stand for equal BLE WB'ing w/mirror for visual feedback of correct posture & weight-bearing through LLE    Static stand w/R foot on box w/mirror for visual feedback of correct posture & weight-bearing through LLE    Static stand w/large blue bolster between feet to maintain neutral hip positioning w/mirror for visual feedback of correct posture & weight-bearing through LLE         4\"        RLE on Airex                                  Min /Mod A -man cues for weight shift reduced to encourage pt to self correct      -man cues to facilitate posture, VC's only to facilitate weight-shift onto LLE      -man cues to facilitate posture, VC's only to facilitate weight-shift onto LLE   Shuttle Balance:  -Partial lunge, LLE on black bard, RLE on red step     x10            EOM:  Scooting:  -Out to edge  -L laterally  -R laterally            -able to scoot out w/pelvis in midline p instruction  -max cues for correct technique and encouragement  -good form, required mod A for LLE positioning   Rolling in bed R and L    Min A for placement and sequencing    bridge   CGA, VCs for hip adduction    Hip add in hooklying   TCs for target   Heel slides AAROM to AROM   AAROM to begin, but slowly gained more motion and power, responds well to cues for power   Seated:  LAQ        12/30: initially performed well then had difficulty activating quad and was unable to extend knee without external assist   Gait Training:  Foot in front then behind at elevated mat table          Amb w/QC & L AFO                Amb pushing weight lymph cart;  purple TB for DF and knee flexion assist      Amb w/o AD, PT held R hand at shoulder height Min ACGA   Max x 2         Mod A160', 20', 10'x3              11/18:  performed x8 safely w/pt and was added to HEP;  PT (then ) inhibited L knee recurvatum w/man cues to posterior knee.                 facilitation with tband at L LE, Max A for placing L foot as pt puling forward, VCs for sequencing, block to L knee and to cart to avoid pt pulling back     -purple TB for L ankle DF & knee flex assist   Weight shifts  -lateral w/L AFO  -foot onto box, lean onto foot, then return  -staggered w/R foot on AirEx  -foot maintained on box w/RUE reaches         Mod A               -man cues to facilitate weight shift  -max cues for improved weight-shifting & VCs to reduce speed & improve control  -man cues to facilitate shifting weight onto LLE    12/9: pt unable to adequately accept weight through LLE, activity stopped due to safety concerns   Amb bwds w/cart    Treadmill  -L foot on side of belt, RLE on belt    -lateral weight shifts with belt stopped & RUE support   0.3 mph           12/9: incr difficulty reducing pt compensations to avoid use of LLE, once compensations were removed, pt rapidly fatigued and became anxious, stating she needed to stop, felt faint, was going to get sick, and finally profanities, not directed at anyone. L sided neglect did worsen as anxiety incr, however  did arrive on her R side which may have contributed to focus on R side                        Manual Intervention ([phone])       Stretches:  Seated hamstring stretch  Seated knee flexion stretch  Seated calf stretch  Seated piriformis stretch                                                  Modalities:     Pt. Education:    12/9:  Once seated after weight shifting on TM, discussed importance of utilizing LLE in that manor frequently, daily at home in order to improve muscle activation appropriately. Pt nodded in understanding but remained, understandably, upset. 11/9:  Pt requested order for hemiwalker, feels like it would improve her fear and ability to walk. Discussed increased support it provides and less LE & core muscle activation that is required to use hemiwalker, potentially becoming a step backwards and increasing her dependency on AD. Did discuss importance of continued walking and activity at home and if pt is not currently walking at home due to amount of work/effort required to amb w/SBQC, to obtain a hemiwalker so pt is more likely to return to typical home activities. Discussed nearby medical supply stores and to contact PCP and have order sent to them.  and pt reported understanding. 10/14:  Verbal cues for proper transfer technique and safety  -patient educated on diagnosis, prognosis and expectations for rehab  -all patient questions were answered    HEP instruction: 10/14:  Discussed safety on transfers. 10/18: Instructed pt to perform standing lateral weight-shifts hourly in front of chair, with a family member on L side as needed.     Therapeutic Exercise and NMR EXR  [] (93941) Provided verbal/tactile cueing for activities related to strengthening, flexibility, endurance, ROM for improvements in  [] LE / Lumbar: LE, proximal hip, and core control with self care, mobility, lifting, ambulation. [] UE / Cervical: cervical, postural, scapular, scapulothoracic and UE control with self care, reaching, carrying, lifting, house/yardwork, driving, computer work.  [] (44134) Provided verbal/tactile cueing for activities related to improving balance, coordination, kinesthetic sense, posture, motor skill, proprioception to assist with   [] LE / lumbar: LE, proximal hip, and core control in self care, mobility, lifting, ambulation and eccentric single leg control. [] UE / cervical: cervical, scapular, scapulothoracic and UE control with self care, reaching, carrying, lifting, house/yardwork, driving, computer work.   [] (48440) Therapist is in constant attendance of 2 or more patients providing skilled therapy interventions, but not providing any significant amount of measurable one-on-one time to either patient, for improvements in  [] LE / lumbar: LE, proximal hip, and core control in self care, mobility, lifting, ambulation and eccentric single leg control. [] UE / cervical: cervical, scapular, scapulothoracic and UE control with self care, reaching, carrying, lifting, house/yardwork, driving, computer work. NMR and Therapeutic Activities:    [x] (78402 or 79238) Provided verbal/tactile cueing for activities related to improving balance, coordination, kinesthetic sense, posture, motor skill, proprioception and motor activation to allow for proper function of   [x] LE: / Lumbar core, proximal hip and LE with self care and ADLs  [x] UE / Cervical: cervical, postural, scapular, scapulothoracic and UE control with self care, carrying, lifting, driving, computer work.    [x] (82851) Gait Re-education- Provided training and instruction to the patient for proper LE, core and proximal hip recruitment and positioning and eccentric body weight control with ambulation re-education including up and down stairs     Home Management Training / Self Care:  [] (69345) Provided self-care/home management training related to activities of daily living and compensatory training, and/or use of adaptive equipment for improvement with: ADLs and compensatory training, meal preparation, safety procedures and instruction in use of adaptive equipment, including bathing, grooming, dressing, personal hygiene, basic household cleaning and chores. Home Exercise Program:    [] (01084) Reviewed/Progressed HEP activities related to strengthening, flexibility, endurance, ROM of   [] LE / Lumbar: core, proximal hip and LE for functional self-care, mobility, lifting and ambulation/stair navigation   [] UE / Cervical: cervical, postural, scapular, scapulothoracic and UE control with self care, reaching, carrying, lifting, house/yardwork, driving, computer work  [] (98239)Reviewed/Progressed HEP activities related to improving balance, coordination, kinesthetic sense, posture, motor skill, proprioception of   [] LE: core, proximal hip and LE for self care, mobility, lifting, and ambulation/stair navigation    [] UE / Cervical: cervical, postural,  scapular, scapulothoracic and UE control with self care, reaching, carrying, lifting, house/yardwork, driving, computer work    Manual Treatments:  PROM / STM / Oscillations-Mobs:  G-I, II, III, IV (PA's, Inf., Post.)  [] (93030) Provided manual therapy to mobilize LE, proximal hip and/or LS spine soft tissue/joints for the purpose of modulating pain, promoting relaxation,  increasing ROM, reducing/eliminating soft tissue swelling/inflammation/restriction, improving soft tissue extensibility and allowing for proper ROM for normal function with   [] LE / lumbar: self care, mobility, lifting and ambulation. [] UE / Cervical: self care, reaching, carrying, lifting, house/yardwork, driving, computer work.      Modalities:  [] (67284) Vasopneumatic compression: Utilized vasopneumatic compression to decrease edema / swelling for the purpose of improving mobility and quad tone / recruitment which will allow for increased overall function including but not limited to self-care, transfers, ambulation, and ascending / descending stairs. Charges:  Timed Code Treatment Minutes: 46   Total Treatment Minutes: 46     Units approved Units used Date Range    48   64 -PT   29 12/10/21  12/13 - 2/18/22   **updated 1/25**    [] EVAL - LOW (05972)   [] EVAL - MOD (27556)  [] EVAL - HIGH (88296)  [] RE-EVAL (10970)  [] UU(69086) x       [] Ionto  [x] NMR (11473) x  1    [] Vaso  [] Manual (95335) x       [] Ultrasound  [x] TA x  1      [] Mech Traction (52791)  [] Aquatic Therapy x      [] ES (un) (77131):   [] Home Management Training x  [] ES(attended) (80356)   [] Dry Needling 1-2 muscles (14537):  [] Dry Needling 3+ muscles (146626  [] Group:      [x] Other: gait x 1    GOALS:   Patient stated goal: To return to as normal activity as possible.       Therapist goals for Patient:   Short Term Goals: To be achieved in: 2 weeks  1. Independent in HEP and progression per patient tolerance, in order to prevent re-injury.  - Goal Met  2. Patient will have a decrease in pain to facilitate improvement in movement, function, and ADLs as indicated by Functional Deficits. - Goal Met     Long Term Goals: To be achieved in: 6 weeks  1. Pt will demonstrate the ability to perform sit to/from stand with supervision.  -30 sec STS: 7 reps from 22 in high chair;  2.  -Met 11/15/21  **11/15/21**: 2a.  Pt will amb 150' w/SBQC & min A.   - Almost Met, amb 165'w/ min/mod A, multiple LOB  3. Patient will demonstrate an increase in postural awareness and control to allow for proper functional mobility as indicated by patients Functional Deficits. - Weight remains shifted onto RLE, limiting transfers, standing and gait  4.  Patient will return to functional activities including transfers without increased symptoms or restriction.  - Not Met    Overall Progression Towards Functional goals/ Treatment Progress Update:  [] Patient is progressing as expected towards functional goals listed. [x] Progression is slowed due to complexities/Impairments listed. [] Progression has been slowed due to co-morbidities. [] Plan just implemented, too soon to assess goals progression <30days   [] Goals require adjustment due to lack of progress  [] Patient is not progressing as expected and requires additional follow up with physician  [] Other    Persisting Functional Limitations/Impairments:  []Sleeping []Sitting              [x]Standing []Transfers        [x]Walking []Kneeling              []Stairs [x]Squatting / bending   [x]ADLs [x]Reaching  []Lifting  [x]Housework  []Driving [x]Job related tasks  [x]Sports/Recreation []Other:        ASSESSMENT:     Pt continues to avoid use of LLE w/all tasks and functional mobility, however when cued, is able to improve use of LLE, during gait, pt does prefer to increase WB'ing through QC and does improve when cued but doesn't maintain. Pt demo'd increased anxiety following partial lunge on Shuttle Balance and was unable to demonstrate similar gait pattern after performance as she did while walking to the device. After short rest, pt able to demo improved gait pattern to that of when she arrived to clinic. Continue facilitation of LLE weight acceptance to further progress safe functional mobility. Treatment/Activity Tolerance:  [x] Patient able to complete tx  [] Patient limited by fatigue  [] Patient limited by pain  [] Patient limited by other medical complications  [] Other:     Prognosis: [x] Good [] Fair  [] Poor    Patient Requires Follow-up: [x] Yes  [] No    Plan for next treatment session:  per training diary and progress as tolerated. PLAN: See dyana PT 2x / week for 6 weeks. -requesting additional visits at 2x/week x8 weeks.   [x] Continue per plan of care [] Alter current plan (see comments)  [] Plan of care initiated [] Hold pending MD visit [] Discharge    Electronically signed by: Jayne Guerrero Ellie, PT, DPT    Note: If patient does not return for scheduled/ recommended follow up visits, this note will serve as a discharge from care along with most recent update on progress.

## 2022-01-25 NOTE — FLOWSHEET NOTE
[address] [facility] Occupational Therapy  Phone: [phone]   Fax: [phone]      Occupational Therapy Daily Treatment Note  Date:  2022    Patient: Raúl Zarco   : 1980   MRN: [id number]  Referring Physician:    Dr. 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 Date Range    +   128 units with PT  (64 units each) [x]  Yes  []  No 21-22       Latex Allergy  [x]No      []Yes  Pacemaker:  [x] No       [] Yes     Preferred Language for Healthcare:   [x]English       []other:    Pain level:   3/10 left side especially left thigh    SUBJECTIVE:  Pt reporting fatigue following PT and requesting to sit for first part of session, but motivated and ready to work.      Functional Disability Index:     Quick DASH: total score- 35      Stroke Rehabilitation Assessment of Movement (STREAM)  improved to 29 on this date from 2021    OBJECTIVE:      PROM AROM      L R L R PROM on 2021   Shoulder Flexion  ~85*  : 0-125*       0-140   Elbow Flexion  WFL          Elbow Extension  WFL          Pronation             Supination  WFL          Wrist Flexion  WFL          Wrist Extension  WFL          Radial Deviation             Composite Flexion (Tip of 3rd Digit to Distal Palmar Crease (cm)) Held in flexion; able to stretch into extension to 2 cm lag    11/29: Held in flexion; able to stretch into extension to 4 cm lag            RESTRICTIONS/PRECAUTIONS:  hx brain bleed, fall risk    Exercises/Interventions:  Treatment focused on improving LUE use as ANDREAS during functional mobility in supine, seated, and standing. Session initiated with trunk lengthening and shoulder warm-up/stretch with bimanual horizontal grasp of dowel mahesh for chest press/mid row with scap retraction, followed by reach to floor and overhead with facilitation for elbow extension and shoulder flexion. Progressed to push/pulls with dowel mahesh vertical in L hand for chest opening and preparation for use as ANDREAS at side. Pt completed 10 partial STS with L hand weight bearing through mat with focus on maintaining midline rather than compensatory R lean. Assumed supine with min A with pause midway through for emphasis on L forearm weightbearing. Completed bridge and scoot with emphasis on upper and lower trunk separation and increased midline awareness. Pt then worked on segmental rolling both direction with emphasis on integration of left upper extremity and increased ability to change left ue position as base of support with min assist each direction x5. Min A for supine to sit, followed by functional mobility ~40 ft with improved R step length.                                         Neuromuscular Re-ed / Therapeutic Activities                                                 Manual Intervention                                                     Modalities:     Patient education:  Eval, POC, HEP- pt verbalized understanding       Home Exercise Program:  Written and verbal HEP instructions provided and reviewed:  · Scapular retraction   · Use of resting hand splint  · Mirror therapy at home  · 11/29/21: use of L hand as ANDREAS when seated and during transfers    Therapeutic Exercise and NMR:  [x] (93516) Provided verbal/tactile cueing for activities related to strengthening, flexibility, endurance, ROM  for improvements in scapular, scapulothoracic and UE control with self care, reaching, carrying, lifting, house/yardwork, driving/computer work.    [] (34292) Provided verbal/tactile cueing for activities related to improving balance, coordination, kinesthetic sense, posture, motor skill, proprioception  to assist with  scapular, scapulothoracic and UE control with self care, reaching, carrying, lifting, house/yardwork, driving/computer work.   [] Comments:    Therapeutic Activities:    [x] (03187 or 19965) Provided verbal/tactile cueing for activities related to improving balance, coordination, kinesthetic sense, posture, motor skill, proprioception and motor activation to allow for proper function of scapular, scapulothoracic and UE control with self care, carrying, lifting, driving/computer work  [] Comments:    Home Exercise Program:    [x] (68131) Reviewed/Progressed HEP activities related to strengthening, flexibility, endurance, ROM of scapular, scapulothoracic and UE control with self care, reaching, carrying, lifting, house/yardwork, driving/computer work  [] (63134) Reviewed/Progressed HEP activities related to improving balance, coordination, kinesthetic sense, posture, motor skill, proprioception of scapular, scapulothoracic and UE control with self care, reaching, carrying, lifting, house/yardwork, driving/computer work    [] Comments:    Manual Treatments:  PROM / STM / Oscillations-Mobs:  G-I, II, III, IV (PA's, Inf., Post.)  [x] (59437) Provided manual therapy to mobilize soft tissue/joints of cervical/CT, scapular GHJ and UE for the purpose of modulating pain, promoting relaxation,  increasing ROM, reducing/eliminating soft tissue swelling/inflammation/restriction, improving soft tissue extensibility and allowing for proper ROM for normal function with self care, reaching, carrying, lifting, house/yardwork, driving/computer work  [] Comments:    ADL Training:  [] (10421) Provided self-care/home management training related to activities of daily living and compensatory training, and/or use of adaptive equipment   [] Comments:     Splinting:  [] Fabrication of:   [] (16279) Orthotic/Prosthetic Management, subsequent encounter  [] (70382) Orthotic management and training (fitting and assessment)  [] Comments:      Charges:  Timed Code Treatment Minutes: 40   Total Treatment Minutes: 40     [] EVAL (LOW) 83237   [] OT Re-eval (59917)  [] EVAL (MOD) 99797   [] EVAL (HIGH) 26642       [x] Dolly (26255) x  1  [] MAJTV(82303)  [x] NMR (70356) x  2 [] Estim (attended) (66686)   [] Manual ([id number]) x  [] US (53195)  [] TA () x    [] Paraffin (02607)  [] ADL  (62 [phone] [id number]) x    [] Splint/L code:    [] Estim (unattended) (22 973879)  [] Fluidotherapy (56565)  [] Other:    GOALS:   Patient stated goal: L hand use  [] Progressing: [] Met: [] Not Met: [] Adjusted     Therapist goals for Patient:   Short Term Goals: To be achieved in: 30 days  1. Pt will improve functional ROM to use LUE as ANDREAS during transfers for increased safety and independence by 11/19/21. [x] Progressing: [] Met: [] Not Met: [] Adjusted  2. Pt will improve functional LUE use to complete UB dressing mod I for increased independence in ADLs by 11/19/21. [x] Progressing: [] Met: [] Not Met: [] Adjusted  3. Pt will improve functional LUE use to complete LB dressing min A for increased independence in ADLs by 11/19/21. [] Progressing: [x] Met: [] Not Met: [] Adjusted  4. Pt will improve oculomotor and cognition skills to complete Trailmaking Part B assessment within 180 seconds with no errors by 11/19/21. [x] Progressing: [] Met: [] Not Met: [] Adjusted     Long Term Goals: To be achieved by discharge  1. Pt will report a QuickDASH Symptom Severity Scale score of 65% or less indicating increased safety and functional independence in desired occupational pursuits by discharge.   [x] Progressing: [] Met: [] Not Met: [] Adjusted  2. Pt will increase STREAM total score from 21/70 to 31/70 indicating increased safety and functional independence in desired occupational pursuits by discharge. (added 11/29/21)  [x] Progressing: [] Met: [] Not Met: [] Adjusted    Progression Towards Functional goals:  [] Patient is progressing as expected towards functional goals listed. [x] Progression is slowed due to complexities listed. -falls and fear of falling, significant proprioceptive impairment, left neglect but improving significantly  [] Progression has been slowed due to co-morbidities. [] Plan just implemented, too soon to assess goals progression  [] All goals are met  [] Other:     ASSESSMENT:  Pt has made improvements in PROM of LUE with decreased tone, but continues to have limited participation in functional mobility due to falls/fear of falling and proprioceptive impairment. Treatment/Activity Tolerance:  [x] Patient tolerated treatment well [] Patient limited by fatique  [] Patient limited by pain  [] Patient limited by other medical complications- falls and fear of falling, significant proprioceptive impairment.  [] Other:     Prognosis: [x] Good [] Fair  [] Poor    Patient Requires Follow-up: [x] Yes  [] No    PLAN: See eval  [x] Continue per plan of care [] Alter current plan (see comments)  [] Plan of care initiated [] Hold pending MD visit [] Discharge    Electronically signed by:           Ady Fitzgerald OTR/MONIQUE [id number]

## 2022-01-25 NOTE — FLOWSHEET NOTE
[address]    Speech Therapy Daily Treatment Note  Date:  2022    Patient Name:  Pranay Clark    :  1980  MRN: [id number]  Medical/Treatment Diagnosis Information:  I60.7 (ICD-10-CM) - Nontraumatic subarachnoid hemorrhage from unspecified intracranial artery    Treatment Diagnosis: Mild dysarthria; Mild cognitive-linguistic deficits; Mild oropharyngeal dysphagia            Insurance/Certification information: [facility]; 10/18-21 48 units; date extension 2022   Physician Information:  Dr. Tamie Kay MD   Plan of care signed (Y/N): Y (2021)    Date of Patient follow up with Physician: DEWEY    Functional outcome measure: [employer] Commun[employer]ative Participation [employer] Total: 10/30 (2021)     Progress Note: []  Yes  [x]  No  Next due by: 2022    RESTRICTIONS/PRECAUTIONS: hx of brain bleed   Latex Allergy:  [x]NO      []YES    Preferred Language for Healthcare:   [x]English       []other    Visit # Insurance Allowable Date Range (if applicable)   60/58  +  48 units  (20 used)  10/  Ext. 2022       Pain level: 0/10     SUBJECTIVE: Pt alert and receptive, called to inform of late arrival (15 minutes) from previous appointment. OBJECTIVE: See POC/Progress Note    Exercises/Interventions:      Motor Speech Therapy (35750) Accuracy Cues Notes   Apraxia       Dysarthria      Intelligibility      Other:          Cognitive Function (87316 & 90107) Accuracy Cues Notes   Orientation        Attention           Sustained         Selective         Alternating         Divided Complex functional visual attention involving planning organization for grocery shopping:  - pt planned list with 85% accuracy   - pt located items off of grocery list with 73% accuracy given min cues  - pt calculated prices with 100% given min cues Min verbal cues to implement visual attention and metacognitive strategies    Improved to 100% accuracy given mod cues for alternating attention Metacognitive strategy training: plan ahead/scanning left to right, double-check work, categorization   Memory          Immediate/Working         Short term         Long term         Prospective      Problem Solving       Visuospatial       Executive Function  Critical thinking involving abstract reasoning for sequencing with 88% accuracy Independent Metacognitive strategy training: focus on one thing at a time, self-talk, double-check work   Other:          Pt. Education:  -Pt educated on diagnosis, prognosis and expectations for rehab  -All pt questions were answered  -Pt verbalized understanding and agreement    HEP instruction:  -Pt provided with written and illustrated instructions for HEP: intelligibility strategies, oral motor exercises, safe swallowing strategies, memory strategies    Speech/Voice Therapy:    [x] (68563) Treatment of speech, language, voice, communication, and/or auditory processing disorder; individual    Cognitive Function:  [] (32267) Therapeutic interventions that focus on cognitive function (eg, attention, memory, reasoning, executive function, problem solving, and/or pragmatic functioning) and compensatory strategies to manage the performance of an activity (eg, managing time or schedules, initiating, organizing, and sequencing tasks), direct (one-on-one) patient contact; initial 15 minutes (Report  only once per day)  [] (14 [phone]) each additional 15 minutes     Dysphagia Therapy:    [] (86243) Treatment of swallowing dysfunction and/or oral function for feeding         Charges:  Timed Code Treatment Minutes: 0   Total Treatment Minutes: 30     [x] Speech-Language Treatment (14672)        [] Voice Treatment (92463)                                         [] Cognitive-Linguistic Skills Development Initial 15 minutes (31314)  [] Cognitive-Linguistic Skills Development Additional 15 minutes (22 476 253)   [] Dysphagia Treatment/NMES ([facility]) (79344)  [] Other:     GOALS:   Patient stated goal: \"Stop slurring\"  []? Progressing: []? Met: []? Not Met: []? Adjusted     Therapist goals for Patient:   Short Term Goals: To be achieved in: 5 weeks  1. Patient will use speech strategies to facilitate increased intelligibility at detailed conversational level in >90% as judged by SLP and pt/family. []? Progressing: []? Met: []? Not Met: []? Adjusted  2. Patient will complete abstract reasoning related to functional tasks with 80% accuracy to target self-monitoring. []? Progressing: []? Met: []? Not Met: []? Adjusted  3. Patient will complete complex attention tasks (sustained, selective, alternating) with 80% accuracy given minimal cues to improve functional task completion. []? Progressing: []? Met: []? Not Met: []? Adjusted  4. The patient will complete modified barium swallow study to further assess pharyngeal phase and direct plan of care. []? Progressing: []? Met: []? Not Met: []? Adjusted    Long Term Goals: To be achieved in: 6 weeks  1. Patient will demonstrate improved cognitive-linguistic function to improve safety and independence in ADLs.  []? Progressing: []? Met: []? Not Met: []? Adjusted  2. Patient will utilize speech intelligibility strategies to increase intelligibility to greater than 90% in conversation to a familiar/unfamiliar listener. []? Progressing: []? Met: []? Not Met: []? Adjusted  3. Patient will tolerate least restrictive diet with no overt clinical s/s of aspiration/penetration. []? Progressing: []? Met: []? Not Met: []? Adjusted         MET GOALS:  Patient will participate in further cognitive-linguistic evaluation with additional goals as indicated. (MET 11/8)  Patient will tolerate recommended diet with no overt s/s of aspiration or noticeable fatigue. (MET 11/26)  Patient will complete oral motor and bolus control exercises with 80% accuracy independently to improve oral phase of swallow.

## 2022-01-27 ENCOUNTER — HOSPITAL ENCOUNTER (OUTPATIENT)
Dept: SPEECH THERAPY | Age: 42
Setting detail: THERAPIES SERIES
Discharge: HOME | End: 2022-01-27
Payer: COMMERCIAL

## 2022-01-27 ENCOUNTER — APPOINTMENT (OUTPATIENT)
Dept: OCCUPATIONAL THERAPY | Age: 42
End: 2022-01-27
Payer: COMMERCIAL

## 2022-01-27 ENCOUNTER — HOSPITAL ENCOUNTER (OUTPATIENT)
Dept: PHYSICAL THERAPY | Age: 42
Setting detail: THERAPIES SERIES
Discharge: HOME | End: 2022-01-27
Payer: COMMERCIAL

## 2022-01-27 NOTE — FLOWSHEET NOTE
Speech Therapy  Cancellation/No-show Note  Patient Name:  Kari Prasad  :  1980   Date:  2022  Cancels to Date: 4  No-shows to Date: 0    Patient status for today's appointment patient:  [x]  Cancelled (11/15, , 1/3, )  []  Rescheduled appointment  []  No-show     Reason given by patient:  []  Patient ill  []  Conflicting appointment  []  No transportation  []  Conflict with work  []  No reason given  [x]  Other:     Comments: no reason given    Phone call information:   []  Phone call made today to patient at 1130 time at number provided:      []  Patient answered, conversation as follows:   []  Patient did not answer, message left as follows:  [x]  Phone call not made today, pt called clinic to cancel    Electronically signed by:  Tru Keller SLP

## 2022-01-27 NOTE — FLOWSHEET NOTE
Physical Therapy  Cancellation/No-show Note  Patient Name:  Ara Fletcher  :  1980   Date:  2022  Cancelled visits to date: 4  No-shows to date: 1    Patient status for today's appointment patient:  [x]  Cancelled 10/25, 10/28, 1/3,   []  Rescheduled appointment  []  No-show      Reason given by patient:  []  Patient ill  []  Conflicting appointment  []  No transportation    []  Conflict with work  [x]  No reason given  []  Other:     Comments:      Phone call information:   []  Phone call made today to patient at 11:05 time at number provided:      []  Patient answered, conversation as follows:  Pt waiting on [employer], trying to make it to SLP and OT sessions, doesn't want to cancel them now. Other therapists made aware. []  Patient did not answer, message left as follows:  []  Phone call not made today  [x]  Phone call not needed - pt contacted us to cancel and provided reason for cancellation.      Electronically signed by:  Larry Gonzales, PT, DPT

## 2022-02-01 ENCOUNTER — APPOINTMENT (OUTPATIENT)
Dept: OCCUPATIONAL THERAPY | Age: 42
End: 2022-02-01
Payer: COMMERCIAL

## 2022-02-01 ENCOUNTER — HOSPITAL ENCOUNTER (OUTPATIENT)
Dept: PHYSICAL THERAPY | Age: 42
Setting detail: THERAPIES SERIES
Discharge: HOME | End: 2022-02-01
Payer: COMMERCIAL

## 2022-02-01 ENCOUNTER — HOSPITAL ENCOUNTER (OUTPATIENT)
Dept: SPEECH THERAPY | Age: 42
Setting detail: THERAPIES SERIES
Discharge: HOME | End: 2022-02-01
Payer: COMMERCIAL

## 2022-02-01 NOTE — FLOWSHEET NOTE
Physical Therapy  Cancellation/No-show Note  Patient Name:  Randell Ventura  :  1980   Date:  2022  Cancelled visits to date: 5  No-shows to date: 1    Patient status for today's appointment patient:  [x]  Cancelled 10/25, 10/28, 1/3, ,   []  Rescheduled appointment  []  No-show      Reason given by patient:  [x]  Patient ill  []  Conflicting appointment  []  No transportation    []  Conflict with work  []  No reason given  []  Other:     Comments:      Phone call information:   []  Phone call made today to patient at 11:05 time at number provided:      []  Patient answered, conversation as follows:  Pt waiting on [employer], trying to make it to SLP and OT sessions, doesn't want to cancel them now. Other therapists made aware. []  Patient did not answer, message left as follows:  []  Phone call not made today  [x]  Phone call not needed - pt contacted us to cancel and provided reason for cancellation.      Electronically signed by:  Charline Lim, PT, DPT

## 2022-02-03 ENCOUNTER — HOSPITAL ENCOUNTER (OUTPATIENT)
Dept: PHYSICAL THERAPY | Age: 42
Setting detail: THERAPIES SERIES
End: 2022-02-03
Payer: COMMERCIAL

## 2022-02-03 ENCOUNTER — HOSPITAL ENCOUNTER (OUTPATIENT)
Dept: SPEECH THERAPY | Age: 42
Setting detail: THERAPIES SERIES
End: 2022-02-03
Payer: COMMERCIAL

## 2022-02-03 ENCOUNTER — APPOINTMENT (OUTPATIENT)
Dept: OCCUPATIONAL THERAPY | Age: 42
End: 2022-02-03
Payer: COMMERCIAL

## 2022-02-08 ENCOUNTER — HOSPITAL ENCOUNTER (OUTPATIENT)
Dept: OCCUPATIONAL THERAPY | Age: 42
Setting detail: THERAPIES SERIES
Discharge: HOME | End: 2022-02-08
Payer: COMMERCIAL

## 2022-02-08 ENCOUNTER — HOSPITAL ENCOUNTER (OUTPATIENT)
Dept: PHYSICAL THERAPY | Age: 42
Setting detail: THERAPIES SERIES
Discharge: HOME | End: 2022-02-08
Payer: COMMERCIAL

## 2022-02-08 ENCOUNTER — HOSPITAL ENCOUNTER (OUTPATIENT)
Dept: SPEECH THERAPY | Age: 42
Setting detail: THERAPIES SERIES
Discharge: HOME | End: 2022-02-08
Payer: COMMERCIAL

## 2022-02-08 PROCEDURE — 92507 TX SP LANG VOICE COMM INDIV: CPT

## 2022-02-08 PROCEDURE — 97110 THERAPEUTIC EXERCISES: CPT

## 2022-02-08 PROCEDURE — 97112 NEUROMUSCULAR REEDUCATION: CPT

## 2022-02-08 PROCEDURE — 97530 THERAPEUTIC ACTIVITIES: CPT

## 2022-02-08 NOTE — FLOWSHEET NOTE
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 Kym Merida MD   Plan of care signed (Y/N): Y (2021, 2022)    Date of Patient follow up with Physician: DEWEY    Functional outcome measure: The Communicative Participation Item Bank Total: 10/30 (2021)     Progress Note: []  Yes  [x]  No  Next due by: 2022    RESTRICTIONS/PRECAUTIONS: hx of brain bleed   Latex Allergy:  [x]NO      []YES    Preferred Language for Healthcare:   [x]English       []other    Visit # Insurance Allowable Date Range (if applicable)     +  48 units  (21 used)  10/  Ext. 2022       Pain level: 0/10     SUBJECTIVE: Pt alert and receptive,  present during session. Pt establishing care with new primary care provider tomorrow, will request imaging order for MBSS. OBJECTIVE: See POC/Progress Note    Exercises/Interventions:      Motor Speech Therapy (98903) Accuracy Cues Notes   Apraxia       Dysarthria      Intelligibility SLOB reinforcement during spontaneous responses to complex questions:  - pt rating of 40%  - SLP rating of 100% Verbal and auditory feedback for self-monitoring Pt continues to report dislike of speech despite auditory feedback and comparison with SLP model   Other:          Cognitive Function (00094 & 84356) Accuracy Cues Notes   Orientation        Attention           Sustained         Selective         Alternating         Divided Complex functional visual attention involving pattern recognition with 67% accuracy given min cues Min verbal and visual cues, increased to 100% accuracy given mod cues  Metacognitive strategy training: plan ahead/scanning left to right, double-check work, self-talk   Memory          Immediate/Working         Short term         Long term         Prospective      Problem Solving       Visuospatial       Executive Function  Critical thinking involving abstract reasoning for sequencing and organization with 66% accuracy given min cues Min verbal and visual cues, increased to 88% given mod cues Metacognitive strategy training: focus on one thing at a time, self-talk, double-check work   Other:          Pt. Education:  -Pt educated on diagnosis, prognosis and expectations for rehab  -All pt questions were answered  -Pt verbalized understanding and agreement    HEP instruction:  -Pt provided with written and illustrated instructions for HEP: intelligibility strategies, oral motor exercises, safe swallowing strategies, memory strategies    Speech/Voice Therapy:    [x] (62715) Treatment of speech, language, voice, communication, and/or auditory processing disorder; individual    Cognitive Function:  [] (61268) Therapeutic interventions that focus on cognitive function (eg, attention, memory, reasoning, executive function, problem solving, and/or pragmatic functioning) and compensatory strategies to manage the performance of an activity (eg, managing time or schedules, initiating, organizing, and sequencing tasks), direct (one-on-one) patient contact; initial 15 minutes (Report  only once per day)  [] ([phone]) each additional 15 minutes     Dysphagia Therapy:    [] (52632) Treatment of swallowing dysfunction and/or oral function for feeding         Charges:  Timed Code Treatment Minutes: 0   Total Treatment Minutes: 39     [x] Speech-Language Treatment (77935)        [] Voice Treatment (41841) [] Cognitive-Linguistic Skills Development Initial 15 minutes (63347)  [] Cognitive-Linguistic Skills Development Additional 15 minutes (16714)   [] Dysphagia Treatment/NMES (VitalStim) (25579)  [] Other:     GOALS:   Patient stated goal: \"Stop slurring\"  []? Progressing: []? Met: []? Not Met: []? Adjusted     Therapist goals for Patient:   Short Term Goals: To be achieved in: 5 weeks  1. Patient will use speech strategies to facilitate increased intelligibility at detailed conversational level in >90% as judged by SLP and pt/family. []? Progressing: []? Met: []? Not Met: []? Adjusted  2. Patient will complete abstract reasoning related to functional tasks with 80% accuracy to target self-monitoring. []? Progressing: []? Met: []? Not Met: []? Adjusted  3. Patient will complete complex attention tasks (sustained, selective, alternating) with 80% accuracy given minimal cues to improve functional task completion. []? Progressing: []? Met: []? Not Met: []? Adjusted  4. The patient will complete modified barium swallow study to further assess pharyngeal phase and direct plan of care. []? Progressing: []? Met: []? Not Met: []? Adjusted    Long Term Goals: To be achieved in: 6 weeks  1. Patient will demonstrate improved cognitive-linguistic function to improve safety and independence in ADLs.  []? Progressing: []? Met: []? Not Met: []? Adjusted  2. Patient will utilize speech intelligibility strategies to increase intelligibility to greater than 90% in conversation to a familiar/unfamiliar listener. []? Progressing: []? Met: []? Not Met: []? Adjusted  3. Patient will tolerate least restrictive diet with no overt clinical s/s of aspiration/penetration. []? Progressing: []? Met: []? Not Met: []? Adjusted         MET GOALS:  Patient will participate in further cognitive-linguistic evaluation with additional goals as indicated.  (MET 11/8)  Patient will tolerate recommended diet with no overt s/s of aspiration or noticeable fatigue. (MET 11/26)  Patient will complete oral motor and bolus control exercises with 80% accuracy independently to improve oral phase of swallow. (12/16/2021)    Progression Towards Functional goals:  [] Patient is progressing as expected towards functional goals listed. [x] Progression is slowed due to complexities listed: insight, visual deficits  [] Progression has been slowed due to co-morbidities. [] Plan just implemented, too soon to assess goals progression  [] Other:     Persisting Functional Limitations/Impairments:  [x]Attention []Word Finding       [x]Memory [x]Speech Intelligibility      [x]Executive Function [x]Diet Tolerance     []Problem Solving []Coughing/Choking with PO  []Expressive Language []Medication/Finance Management  [x]Receptive Language []Other:      ASSESSMENT: See POC/Progress Note    Treatment/Activity Tolerance:  [x] Patient able to complete tx  [] Patient limited by fatigue  [] Patient limited by pain  [] Patient limited by other medical complications  [] Other:     Prognosis: [x] Good [] Fair  [] Poor    Patient Requires Follow-up: [x] Yes  [] No    PLAN: See dyana ST 2x / week for 6 weeks. [x] Continue per plan of care [] Alter current plan (see comments)  [] Plan of care initiated [] Hold pending MD visit [] Discharge    Electronically signed by:   Nasir Kramer MA CCC-SLP  Speech-Language Pathologist  SP. [id number]       Note: If patient does not return for scheduled/ recommended follow up visits, this note will serve as a discharge from care along with most recent update on progress.

## 2022-02-08 NOTE — FLOWSHEET NOTE
[address] [facility] Occupational Therapy  Phone: [phone]   Fax: [phone]      Occupational Therapy Daily Treatment Note  Date:  2022    Patient: Sree Castañeda   : 1980   MRN: [id number]  Referring Physician:    Dr. 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 Date Range    +   128 units with PT  (64 units each)  30 visits/yr [x]  Yes  []  No 21-22       Latex Allergy  [x]No      []Yes  Pacemaker:  [x] No       [] Yes     Preferred Language for Healthcare:   [x]English       []other:    Pain level:   3/10 left side especially left thigh    SUBJECTIVE:  Pt reporting recent loss of grandmother. 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       Functional Disability Index:     Quick DASH: total score- 35      Stroke Rehabilitation Assessment of Movement (STREAM)  improved to 29 on this date from 2021    OBJECTIVE:      PROM AROM      L R L R PROM on 21   Shoulder Flexion  ~85*  : 0-125*       0-140   Elbow Flexion  WFL          Elbow Extension  WFL          Pronation             Supination  WFL          Wrist Flexion  WFL          Wrist Extension  WFL          Radial Deviation             Composite Flexion (Tip of 3rd Digit to Distal Palmar Crease (cm)) Held in flexion; able to stretch into extension to 2 cm lag    11/29: Held in flexion; able to stretch into extension to 4 cm lag            RESTRICTIONS/PRECAUTIONS:  hx brain bleed, fall risk    Exercises/Interventions:  Treatment focused on improving LUE use as ANDREAS. Session initiated with facilitation for use of LUE as ANDREAS in parallel bars while in stance with Biodex balance system. Min A and cues to maintain L knee extension and contact of L foot on platform. Max A to step backwards from platform onto floor to sit in chair due to LE fatigue. Pt instructed to lean into L forearm rather than R for proprioceptive input. Pt completed 10 partial STS with L hand weight bearing through arm rest with focus on maintaining midline rather than compensatory R lean. Gentle IASTM to decrease palmar tightness with pt resting on pt's knee with fingers in extension following. Min A to step onto Biodex balance platform to participate in 4 minutes catch game with 20 points acquired. R hand placed on hip with L hand placed on parallel bar for use as ANDREAS. Pt with improved foot placement and weight shift to L noted. Mod A to step back to floor from platform with BUEs on parallel bars. Pt pushed wheelchair to ambulate to mat with L pull due to decreased LUE engagement. AAROM into shoulder flexion with ROM WFL and MAS 1. Session concluded with functional mobility ~100 ft from mat to speech room- 1st half without AD and weight bearing through L forearm into therapist's arm with min A and 2nd half with cane in R hand and CGA; verbal cues for L weight shift for R step length.                                         Neuromuscular Re-ed / Therapeutic Activities                                                 Manual Intervention                                                     Modalities:     Patient education:  Eval, POC, HEP- pt verbalized understanding       Home Exercise Program:  Written and verbal HEP instructions provided and reviewed:  · Scapular retraction   · Use of resting hand splint  · Mirror therapy at home  · 11/29/21: use of L hand as ANDREAS when seated and during transfers    Therapeutic Exercise and NMR:  [x] (92109) Provided verbal/tactile cueing for activities related to strengthening, flexibility, endurance, ROM  for improvements in scapular, scapulothoracic and UE control with self care, reaching, carrying, lifting, house/yardwork, driving/computer work.    [] (79894) Provided verbal/tactile cueing for activities related to improving balance, coordination, kinesthetic sense, posture, motor skill, proprioception  to assist with  scapular, scapulothoracic and UE control with self care, reaching, carrying, lifting, house/yardwork, driving/computer work.   [] Comments:    Therapeutic Activities:    [x] (81434 or 51795) Provided verbal/tactile cueing for activities related to improving balance, coordination, kinesthetic sense, posture, motor skill, proprioception and motor activation to allow for proper function of scapular, scapulothoracic and UE control with self care, carrying, lifting, driving/computer work  [] Comments:    Home Exercise Program:    [x] (97924) Reviewed/Progressed HEP activities related to strengthening, flexibility, endurance, ROM of scapular, scapulothoracic and UE control with self care, reaching, carrying, lifting, house/yardwork, driving/computer work  [] (79557) Reviewed/Progressed HEP activities related to improving balance, coordination, kinesthetic sense, posture, motor skill, proprioception of scapular, scapulothoracic and UE control with self care, reaching, carrying, lifting, house/yardwork, driving/computer work    [] Comments:    Manual Treatments:  PROM / STM / Oscillations-Mobs:  G-I, II, III, IV (PA's, Inf., Post.)  [x] (95540) Provided manual therapy to mobilize soft tissue/joints of cervical/CT, scapular GHJ and UE for the purpose of modulating pain, promoting relaxation,  increasing ROM, reducing/eliminating soft tissue swelling/inflammation/restriction, improving soft tissue extensibility and allowing for proper ROM for normal function with self care, reaching, carrying, lifting, house/yardwork, driving/computer work  [] Comments:    ADL Training:  [] (47864) Provided self-care/home management training related to activities of daily living and compensatory training, and/or use of adaptive equipment   [] Comments:     Splinting:  [] Fabrication of:   [] (77528) Orthotic/Prosthetic Management, subsequent encounter  [] (97995) Orthotic management and training (fitting and assessment)  [] Comments:      Charges:  Timed Code Treatment Minutes: 50   Total Treatment Minutes: 50     [] EVAL (LOW) 20861   [] OT Re-eval (68736)  [] EVAL (MOD) 05560   [] EVAL (HIGH) 45687       [x] Dolly (69867) x  1  [] CPKES(19181)  [x] NMR (51015) x  2 [] Estim (attended) (37633)   [] Manual ([id number]) x  [] US (30130)  [] TA () x    [] Paraffin (99482)  [] ADL  ([phone]) x    [] Splint/L code:    [] Estim (unattended) [phone]  [] Fluidotherapy (36496)  [] Other:    GOALS:   Patient stated goal: L hand use  [] Progressing: [] Met: [] Not Met: [] Adjusted     Therapist goals for Patient:   Short Term Goals: To be achieved in: 30 days  1. Pt will improve functional ROM to use LUE as ANDREAS during transfers for increased safety and independence by 11/19/21. [x] Progressing: [] Met: [] Not Met: [] Adjusted  2. Pt will improve functional LUE use to complete UB dressing mod I for increased independence in ADLs by 11/19/21. [x] Progressing: [] Met: [] Not Met: [] Adjusted  3. Pt will improve functional LUE use to complete LB dressing min A for increased independence in ADLs by 11/19/21. [] Progressing: [x] Met: [] Not Met: [] Adjusted  4. Pt will improve oculomotor and cognition skills to complete Trailmaking Part B assessment within 180 seconds with no errors by 11/19/21.    [x] Progressing: [] Met: [] Not Met: [] Adjusted     Long Term Goals: To be achieved by discharge  1. Pt will report a QuickDASH Symptom Severity Scale score of 65% or less indicating increased safety and functional independence in desired occupational pursuits by discharge. [x] Progressing: [] Met: [] Not Met: [] Adjusted  2. Pt will increase STREAM total score from 21/70 to 31/70 indicating increased safety and functional independence in desired occupational pursuits by discharge. (added 11/29/21)  [x] Progressing: [] Met: [] Not Met: [] Adjusted    Progression Towards Functional goals:  [] Patient is progressing as expected towards functional goals listed. [x] Progression is slowed due to complexities listed. -falls and fear of falling, significant proprioceptive impairment, left neglect but improving significantly  [] Progression has been slowed due to co-morbidities. [] Plan just implemented, too soon to assess goals progression  [] All goals are met  [] Other:     ASSESSMENT:  Pt has made improvements in PROM of LUE with decreased tone, but continues to have limited participation in functional mobility due to falls/fear of falling and proprioceptive impairment. Treatment/Activity Tolerance:  [x] Patient tolerated treatment well [] Patient limited by fatique  [] Patient limited by pain  [] Patient limited by other medical complications- falls and fear of falling, significant proprioceptive impairment.  [] Other:     Prognosis: [x] Good [] Fair  [] Poor    Patient Requires Follow-up: [x] Yes  [] No    PLAN: See eval  [x] Continue per plan of care [] Alter current plan (see comments)  [] Plan of care initiated [] Hold pending MD visit [] Discharge    Electronically signed by:           TERRELL Villasenor/MONIQUE [id number]

## 2022-02-08 NOTE — FLOWSHEET NOTE
[address] [facility] Physical Therapy  Phone: [phone]   Fax: [phone]      Physical Therapy Daily Treatment Note  Date:  2022    Patient Name:  Smith Evans    :  1980  MRN: [id number]  Medical/Treatment Diagnosis Information:  · Diagnosis: I60.7 (ICD-10-CM) - Nontraumatic subarachnoid hemorrhage from unspecified intracranial artery  · Treatment Diagnosis: functional limitations secondary to brain bleed  Insurance/Certification information:  PT Insurance Information: [employer] - 30 visits per year; preauth required  Physician Information:  Referring Practitioner: Norma Patel MD  Plan of care signed (Y/N): faxed 21     Date of Patient follow up with Physician:     Functional scale: Optimal:  raw score = 81; dysfunction = 80%    Progress Report: []  Yes  [x]  No     Date Range for reporting period:  Beginning   PN: 11/15  POC:   PN:   Ending:      Progress report due (10 Rx/or 30 days whichever is less): visit #9 or 36    Recertification due (POC duration/ or 90 days whichever is less): visit #16  3/6/22    Visit # Insurance Allowable Auth required? Date Range            128 units w/OT  64 ea discipline [x]  Yes  []  No   12/10/21    12/13 to 22          Latex Allergy:  [x]NO      []YES  Preferred Language for Healthcare:   [x]English       []other:      Pain level:  3-4/10 L quad  & LUE    SUBJECTIVE:    Just having some pain in her L arm, feels that she is warmed up enough from activity this am.  Is walking around 2 or 3 times a day and doesn't use w/c inside her home. 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 When R eye is covered, reports everything is fuzzy out of L eye. Still has trouble getting out of bed by herself.          OBJECTIVE:    :  30 sec STS w/use of RUE and 22\" mat table:  7  6MWT w/SBQC & L AFO:  125'  (165' total before seated rest)  Min/mod A required w/3 LOB    12/6:  30 sec STS w/use of RUE and 22\" mat table. Gait Pattern w/SBQC & mod A w/several LOB:  Limited use and WB'ing through LLE, L genu recurvatum during L stance phase, L knee frequently mansi just following initial contact or when not appropriately shifting weight onto LLE during stance phase;  R knee frequently flexes and pt moves QC around rapidly. Pt able to utilize steppage gait w/upright trunk posture for L swing phase, however has difficulty transitioning weight anteriorly over LLE, frequently reducing R step length. 11/15:    30 sec sit to stand transitions: 5  Gait Pattern:  W/SBQC, L toe drag, flexed L knee throughout stance phase, weight shifted onto RLE, frequently hops over LLE;  Able to initiate L hip flex, toe drag minimizes swing phase, DF assist does help but not consistently alleviate toe drag. Forward pitched posture;  Frequently lifts and moves R foot and cane. 11/4: pt not wearing AFO today     10/14:  To clinic in wheelchair with soft AFO on LLE; received script for OT and speech this date and pt is now scheduled for all 3 therapies      RESTRICTIONS/PRECAUTIONS: h/o aneurysm    Exercises/Interventions:   Therapeutic Exercises (13850) Resistance / level Sets/sec Reps Notes   Nu step / step one  Level 6LEs only w/man facilitation to lateral L knee joint for improved hip alignment           Seated in w/c w/B feet placed on mat #6  -hip ext            -frequent VCs to encourage L hip extensor activation   Standing R hip flex w/PT HHA  Standing stool pushes  Min A  Max A       -man cues to improve posture and LLE WB'ing   Standing over gray bolster, hip ext Mod A      S/L:  L hip ext AROM    Supine:  Heel slides AAROM  Bridge     x10      x8  x15                         Therapeutic Activities (65910)       Sit to stand transitions   -Man & VC's to recruit LLE mm fibers Sup1/20: Cues to not push LEs posteriorly into chair   Sit to stand transitions from mat table, R foot on 1/2 foam roll    Sit to stand w/R foot on dynadisc    Stand pivot w/SBQC w/c to mat table #6 Max A   Curb step, fwd on and fwd off  -PT HHA  -QC w/   4\"  4\"    Fwd step ups w/HR 4\"Mod A for stability, max A for L foot placement on step   Navigated 4\" steps step to pattern w/HR & QC Max A & man assist to maintain LLE stability with each step   STS mod mat table with maria alejandra-walker    Once standing added weight shifting M/L    One trial added L LE stepping (minimal success without AFO)     R foot taps onto 4\" step    Trunk rotation w/R foot on step       1/6: manual stability to maintain pelvic alignment     1/6: man cues to facilitate pelvic rotation   Bed mobility:  Sit to supine  Supine to sit  Rolling R   Sup  SBA  SBA   x2  x2  x1     2/8: instruction for improved technique         Gait to chair, half turn and sit - VCs for spatial awareness and    1/11: VCs for turning all the way prior to sitting x 3, last sit was correct placement and positioning    Standing with R foot in front (stagger stance) with thor rot   Added 1/11   Tall kneeling EOM  Tall kneeling: , 2 steps R  -man cues to sacrum & sternum to improve posture  -man facilitation to sacrum & medial lower LLE to advance          Circuit Training (<2 min rest between each):  Sit to stands  Amb w/QC  Seated stepper  Amb w/QC  Sit to stands  [employer] w/QC, RLE  [employer] w/o QC, RLE         1.0 Increase sets of each as tolerated      LEs only          Neuromuscular Re-ed (08696)       Static stand for equal BLE WB'ing w/mirror for visual feedback of correct posture & weight-bearing through LLE    Static stand w/R foot on box w/mirror for visual feedback of correct posture & weight-bearing through LLE    Static stand w/large blue bolster between feet to maintain neutral hip positioning w/mirror for visual feedback of correct posture & weight-bearing through LLE         4\"        RLE on [employer]                                  Min /Mod A -man cues for weight shift reduced to encourage pt to self correct      -man cues to facilitate posture, VC's only to facilitate weight-shift onto LLE      -man cues to facilitate posture, VC's only to facilitate weight-shift onto LLE   Shuttle Balance:  -Partial lunge, LLE on black bard, RLE on red step         [employer] BS  -ball maze -easy     2' 21\", 1' 47\"   2/8: man cues required to maintain good L foot placement on board   EOM:  Scooting:  -Out to edge  -L laterally  -R laterally            -able to scoot out w/pelvis in midline p instruction  -max cues for correct technique and encouragement  -good form, required mod A for LLE positioning   Rolling in bed R and L    Min A for placement and sequencing    bridge   CGA, VCs for hip adduction    Hip add in hooklying   TCs for target   Heel slides AAROM to AROM   AAROM to begin, but slowly gained more motion and power, responds well to cues for power   Seated:  LAQ        12/30: initially performed well then had difficulty activating quad and was unable to extend knee without external assist   Gait Training:  Foot in front then behind at elevated mat table          Amb w/QC & L AFO    Amb w/o AD     Min ACGA     CGA/min A  150'    10'      11/18:  performed x8 safely w/pt and was added to HEP;  PT (then ) inhibited L knee recurvatum w/man cues to posterior knee.                Weight shifts  -lateral w/L AFO  -foot onto box, lean onto foot, then return  -staggered w/R foot on [employer]  -foot maintained on box w/RUE reaches         Mod A               -man cues to facilitate weight shift  -max cues for improved weight-shifting & VCs to reduce speed & improve control  -man cues to facilitate shifting weight onto LLE    12/9: pt unable to adequately accept weight through LLE, activity stopped due to safety concerns   Amb bwds w/cart    Treadmill  -L foot on side of belt, RLE on belt    -lateral weight shifts with belt stopped & RUE support   0.3 mph           12/9: incr needed. Therapeutic Exercise and NMR EXR  [] (46641) Provided verbal/tactile cueing for activities related to strengthening, flexibility, endurance, ROM for improvements in  [] LE / Lumbar: LE, proximal hip, and core control with self care, mobility, lifting, ambulation. [] UE / Cervical: cervical, postural, scapular, scapulothoracic and UE control with self care, reaching, carrying, lifting, house/yardwork, driving, computer work.  [] (55679) Provided verbal/tactile cueing for activities related to improving balance, coordination, kinesthetic sense, posture, motor skill, proprioception to assist with   [] LE / lumbar: LE, proximal hip, and core control in self care, mobility, lifting, ambulation and eccentric single leg control. [] UE / cervical: cervical, scapular, scapulothoracic and UE control with self care, reaching, carrying, lifting, house/yardwork, driving, computer work.   [] (28601) Therapist is in constant attendance of 2 or more patients providing skilled therapy interventions, but not providing any significant amount of measurable one-on-one time to either patient, for improvements in  [] LE / lumbar: LE, proximal hip, and core control in self care, mobility, lifting, ambulation and eccentric single leg control. [] UE / cervical: cervical, scapular, scapulothoracic and UE control with self care, reaching, carrying, lifting, house/yardwork, driving, computer work. NMR and Therapeutic Activities:    [x] (57439 or 66014) Provided verbal/tactile cueing for activities related to improving balance, coordination, kinesthetic sense, posture, motor skill, proprioception and motor activation to allow for proper function of   [x] LE: / Lumbar core, proximal hip and LE with self care and ADLs  [x] UE / Cervical: cervical, postural, scapular, scapulothoracic and UE control with self care, carrying, lifting, driving, computer work.    [x] (47349) Gait Re-education- Provided training and instruction to the patient for proper LE, core and proximal hip recruitment and positioning and eccentric body weight control with ambulation re-education including up and down stairs     Home Management Training / Self Care:  [] (23556) Provided self-care/home management training related to activities of daily living and compensatory training, and/or use of adaptive equipment for improvement with: ADLs and compensatory training, meal preparation, safety procedures and instruction in use of adaptive equipment, including bathing, grooming, dressing, personal hygiene, basic household cleaning and chores. Home Exercise Program:    [] (24437) Reviewed/Progressed HEP activities related to strengthening, flexibility, endurance, ROM of   [] LE / Lumbar: core, proximal hip and LE for functional self-care, mobility, lifting and ambulation/stair navigation   [] UE / Cervical: cervical, postural, scapular, scapulothoracic and UE control with self care, reaching, carrying, lifting, house/yardwork, driving, computer work  [] (57993)Reviewed/Progressed HEP activities related to improving balance, coordination, kinesthetic sense, posture, motor skill, proprioception of   [] LE: core, proximal hip and LE for self care, mobility, lifting, and ambulation/stair navigation    [] UE / Cervical: cervical, postural,  scapular, scapulothoracic and UE control with self care, reaching, carrying, lifting, house/yardwork, driving, computer work    Manual Treatments:  PROM / STM / Oscillations-Mobs:  G-I, II, III, IV (PA's, Inf., Post.)  [] (42545) Provided manual therapy to mobilize LE, proximal hip and/or LS spine soft tissue/joints for the purpose of modulating pain, promoting relaxation,  increasing ROM, reducing/eliminating soft tissue swelling/inflammation/restriction, improving soft tissue extensibility and allowing for proper ROM for normal function with   [] LE / lumbar: self care, mobility, lifting and ambulation.     [] UE / Cervical: self care, reaching, carrying, lifting, house/yardwork, driving, computer work. Modalities:  [] (46954) Vasopneumatic compression: Utilized vasopneumatic compression to decrease edema / swelling for the purpose of improving mobility and quad tone / recruitment which will allow for increased overall function including but not limited to self-care, transfers, ambulation, and ascending / descending stairs. Charges:  Timed Code Treatment Minutes: 42   Total Treatment Minutes: 42     Units approved Units used Date Range    48   64 -PT   32 12/10/21  12/13 - 2/18/22   **updated 2/8**    [] EVAL - LOW (68316)   [] EVAL - MOD (05174)  [] EVAL - HIGH (27571)  [] RE-EVAL (23073)  [] BX(32209) x       [] Ionto  [x] NMR (18113) x  2    [] Vaso  [] Manual (90447) x       [] Ultrasound  [x] TA x  1      [] Mech Traction (09448)  [] Aquatic Therapy x      [] ES (un) (04847):   [] Home Management Training x  [] ES(attended) (16215)   [] Dry Needling 1-2 muscles (69958):  [] Dry Needling 3+ muscles (383393  [] Group:      [] Other: gait x     GOALS:   Patient stated goal: To return to as normal activity as possible.       Therapist goals for Patient:   Short Term Goals: To be achieved in: 2 weeks  1. Independent in HEP and progression per patient tolerance, in order to prevent re-injury.  - Goal Met  2. Patient will have a decrease in pain to facilitate improvement in movement, function, and ADLs as indicated by Functional Deficits. - Goal Met     Long Term Goals: To be achieved in: 6 weeks  1. Pt will demonstrate the ability to perform sit to/from stand with supervision.  -30 sec STS: 7 reps from 22 in high chair;  2.  -Met 11/15/21  **11/15/21**: 2a.  Pt will amb 150' w/SBQC & min A.   - Almost Met, amb 165'w/ min/mod A, multiple LOB  3. Patient will demonstrate an increase in postural awareness and control to allow for proper functional mobility as indicated by patients Functional Deficits.  - Weight remains shifted onto RLE, limiting transfers, standing and gait  4. Patient will return to functional activities including transfers without increased symptoms or restriction.  - Not Met    Overall Progression Towards Functional goals/ Treatment Progress Update:  [] Patient is progressing as expected towards functional goals listed. [x] Progression is slowed due to complexities/Impairments listed. [] Progression has been slowed due to co-morbidities. [] Plan just implemented, too soon to assess goals progression <30days   [] Goals require adjustment due to lack of progress  [] Patient is not progressing as expected and requires additional follow up with physician  [] Other    Persisting Functional Limitations/Impairments:  []Sleeping []Sitting              [x]Standing []Transfers        [x]Walking []Kneeling              []Stairs [x]Squatting / bending   [x]ADLs [x]Reaching  []Lifting  [x]Housework  []Driving [x]Job related tasks  [x]Sports/Recreation []Other:        ASSESSMENT:    Patient continues to avoid use of LLE, however is progressing gait and balance while ambulating, noted by pt able to amb w/o AD, while PT held RUE in shoulder flexion. On balance system, pt had increased difficulty accepting weight through LLE and frequently shifted RLE to middle of platform for easier weight shifting to L. Continue to address balance and gait deficits. Pt had difficulty placing R foot behind L ankle to improve supine to sit, but did not require physical assist to complete transition. Treatment/Activity Tolerance:  [x] Patient able to complete tx  [] Patient limited by fatigue  [] Patient limited by pain  [] Patient limited by other medical complications  [] Other:     Prognosis: [x] Good [] Fair  [] Poor    Patient Requires Follow-up: [x] Yes  [] No    Plan for next treatment session:  per training diary and progress as tolerated. PLAN: See eval. PT 2x / week for 6 weeks. -requesting additional visits at 2x/week x8 weeks.   [x]

## 2022-02-09 ENCOUNTER — OFFICE VISIT (OUTPATIENT)
Dept: PRIMARY CARE CLINIC | Age: 42
End: 2022-02-09
Payer: COMMERCIAL

## 2022-02-09 VITALS
SYSTOLIC BLOOD PRESSURE: 138 MMHG | BODY MASS INDEX: 41.34 KG/M2 | DIASTOLIC BLOOD PRESSURE: 70 MMHG | TEMPERATURE: 98.1 F | OXYGEN SATURATION: 98 % | WEIGHT: 248.4 LBS | HEART RATE: 59 BPM

## 2022-02-09 DIAGNOSIS — D64.9 LOW HEMOGLOBIN: ICD-10-CM

## 2022-02-09 DIAGNOSIS — F41.8 DEPRESSION WITH ANXIETY: ICD-10-CM

## 2022-02-09 DIAGNOSIS — I10 ESSENTIAL HYPERTENSION, BENIGN: Primary | ICD-10-CM

## 2022-02-09 DIAGNOSIS — K59.01 SLOW TRANSIT CONSTIPATION: ICD-10-CM

## 2022-02-09 DIAGNOSIS — E55.9 VITAMIN D DEFICIENCY DISEASE: ICD-10-CM

## 2022-02-09 DIAGNOSIS — R13.12 OROPHARYNGEAL DYSPHAGIA: ICD-10-CM

## 2022-02-09 DIAGNOSIS — G81.94 LEFT HEMIPARESIS (HCC): ICD-10-CM

## 2022-02-09 DIAGNOSIS — E78.2 MIXED HYPERLIPIDEMIA: ICD-10-CM

## 2022-02-09 PROBLEM — I26.99 PULMONARY EMBOLISM (HCC): Status: ACTIVE | Noted: 2021-03-13

## 2022-02-09 PROBLEM — I26.99 PULMONARY EMBOLISM (HCC): Status: RESOLVED | Noted: 2021-03-13 | Resolved: 2022-02-09

## 2022-02-09 PROBLEM — I60.9 SUBARACHNOID HEMORRHAGE (HCC): Status: ACTIVE | Noted: 2021-03-01

## 2022-02-09 PROBLEM — I60.9 SUBARACHNOID HEMORRHAGE (HCC): Status: RESOLVED | Noted: 2021-03-01 | Resolved: 2022-02-09

## 2022-02-09 PROCEDURE — G8484 FLU IMMUNIZE NO ADMIN: HCPCS | Performed by: FAMILY MEDICINE

## 2022-02-09 PROCEDURE — G8427 DOCREV CUR MEDS BY ELIG CLIN: HCPCS | Performed by: FAMILY MEDICINE

## 2022-02-09 PROCEDURE — 1036F TOBACCO NON-USER: CPT | Performed by: FAMILY MEDICINE

## 2022-02-09 PROCEDURE — 99214 OFFICE O/P EST MOD 30 MIN: CPT | Performed by: FAMILY MEDICINE

## 2022-02-09 PROCEDURE — G8417 CALC BMI ABV UP PARAM F/U: HCPCS | Performed by: FAMILY MEDICINE

## 2022-02-09 RX ORDER — ATORVASTATIN CALCIUM 20 MG/1
20 TABLET, FILM COATED ORAL NIGHTLY
COMMUNITY
End: 2022-06-23 | Stop reason: SDUPTHER

## 2022-02-09 RX ORDER — DULOXETIN HYDROCHLORIDE 30 MG/1
30 CAPSULE, DELAYED RELEASE ORAL 2 TIMES DAILY
Qty: 60 CAPSULE | Refills: 5 | Status: SHIPPED | OUTPATIENT
Start: 2022-02-09 | End: 2022-06-23 | Stop reason: SDUPTHER

## 2022-02-09 RX ORDER — POLYETHYLENE GLYCOL 3350 17 G/17G
17 POWDER, FOR SOLUTION ORAL DAILY
Qty: 1530 G | Refills: 5 | Status: SHIPPED | OUTPATIENT
Start: 2022-02-09 | End: 2022-02-21 | Stop reason: ALTCHOICE

## 2022-02-09 RX ORDER — METHOCARBAMOL 500 MG/1
500 TABLET, FILM COATED ORAL NIGHTLY
COMMUNITY
End: 2022-03-23 | Stop reason: SDUPTHER

## 2022-02-09 RX ORDER — SENNA PLUS 8.6 MG/1
1 TABLET ORAL 2 TIMES DAILY
Qty: 60 TABLET | Refills: 5 | Status: SHIPPED | OUTPATIENT
Start: 2022-02-09 | End: 2022-02-21 | Stop reason: ALTCHOICE

## 2022-02-09 ASSESSMENT — PATIENT HEALTH QUESTIONNAIRE - PHQ9
4. FEELING TIRED OR HAVING LITTLE ENERGY: 3
3. TROUBLE FALLING OR STAYING ASLEEP: 3
SUM OF ALL RESPONSES TO PHQ QUESTIONS 1-9: 17
5. POOR APPETITE OR OVEREATING: 3
8. MOVING OR SPEAKING SO SLOWLY THAT OTHER PEOPLE COULD HAVE NOTICED. OR THE OPPOSITE, BEING SO FIGETY OR RESTLESS THAT YOU HAVE BEEN MOVING AROUND A LOT MORE THAN USUAL: 0
1. LITTLE INTEREST OR PLEASURE IN DOING THINGS: 3
2. FEELING DOWN, DEPRESSED OR HOPELESS: 1
10. IF YOU CHECKED OFF ANY PROBLEMS, HOW DIFFICULT HAVE THESE PROBLEMS MADE IT FOR YOU TO DO YOUR WORK, TAKE CARE OF THINGS AT HOME, OR GET ALONG WITH OTHER PEOPLE: 1
SUM OF ALL RESPONSES TO PHQ9 QUESTIONS 1 & 2: 4
SUM OF ALL RESPONSES TO PHQ QUESTIONS 1-9: 16
7. TROUBLE CONCENTRATING ON THINGS, SUCH AS READING THE NEWSPAPER OR WATCHING TELEVISION: 3
SUM OF ALL RESPONSES TO PHQ QUESTIONS 1-9: 17
SUM OF ALL RESPONSES TO PHQ QUESTIONS 1-9: 17
6. FEELING BAD ABOUT YOURSELF - OR THAT YOU ARE A FAILURE OR HAVE LET YOURSELF OR YOUR FAMILY DOWN: 0
9. THOUGHTS THAT YOU WOULD BE BETTER OFF DEAD, OR OF HURTING YOURSELF: 1

## 2022-02-09 ASSESSMENT — COLUMBIA-SUICIDE SEVERITY RATING SCALE - C-SSRS
1. WITHIN THE PAST MONTH, HAVE YOU WISHED YOU WERE DEAD OR WISHED YOU COULD GO TO SLEEP AND NOT WAKE UP?: NO
6. HAVE YOU EVER DONE ANYTHING, STARTED TO DO ANYTHING, OR PREPARED TO DO ANYTHING TO END YOUR LIFE?: NO
2. HAVE YOU ACTUALLY HAD ANY THOUGHTS OF KILLING YOURSELF?: NO

## 2022-02-09 NOTE — PROGRESS NOTES
PROGRESS NOTE  Date of Service:  2022    Chief Complaint   Patient presents with   [facility]     Stroke & brain aneurysm on       SUBJECTIVE:   Jered Yusuf is a 37 y.o. female here  to establish care with me. 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 Speech therapy requesting to have a modified barium swallow study for her dysphagia. She is wheelchair-bound and needs assistant with her ADL and IADL provided by her  and a daughter who moved back in to help out. Goes to therapy twice a week.  7 para 7  Status post hysterectomy.       Past Medical History:   Diagnosis Date    Anemia     during pregnancy    History of chicken pox     HLD (hyperlipidemia)     Hypertension     Pulmonary embolism ([facility] [address] 2021    Partial visualization of pulmonary emboli in R and L pulmonary artery bifurcations    Subarachnoid hemorrhage (HCC) 3/1/2021    Wrist fracture, left 2011      Past Surgical History:   Procedure Laterality Date    BRAIN ANEURYSM SURGERY  2021    s/p right sided craniectomy with ICA clipping and R STA-MCA bypass by Dr. Nishi Valera, TOTAL ABDOMINAL      LAPAROSCOPY  2021    PEG tube placement, diagnostic laparoscopy    MOUTH SURGERY      WRIST SURGERY  2011    ORIF LEFT  WRIST      Social History     Tobacco Use    Smoking status: Never Smoker    Smokeless tobacco: Never Used   Substance Use Topics    Alcohol use: Yes     Comment: Social      Family History   Problem Relation Age of Onset    High Blood Pressure Mother     High Blood Pressure Father     High Blood Pressure Sister      Current Outpatient Medications   Medication Sig Dispense Refill    methocarbamol (ROBAXIN) 500 MG tablet Take 500 mg by mouth nightly      atorvastatin (LIPITOR) 20 MG tablet Take 20 mg by mouth nightly      DULoxetine (CYMBALTA) 30 MG extended release capsule Take 1 capsule by mouth 2 times daily 60 capsule 5    polyethylene glycol (GLYCOLAX) 17 GM/SCOOP powder Take 17 g by mouth daily 1530 g 5    senna (SENOKOT) 8.6 MG tablet Take 1 tablet by mouth 2 times daily 60 tablet 5    amLODIPine (NORVASC) 10 MG tablet Take 10 mg by mouth daily      metoprolol succinate (TOPROL XL) 100 MG extended release tablet Take 100 mg by mouth daily        No current facility-administered medications for this visit. No Known Allergies     Review of Systems    OBJECTIVE:  /70 (Site: Right Upper Arm, Position: Sitting, Cuff Size: Large Adult)   Pulse 59   Temp 98.1 °F (36.7 °C)   Wt 248 lb 6.4 oz (112.7 kg)   LMP 03/03/2016   SpO2 98%   BMI 41.34 kg/m²    Physical Exam  General Appearance: Alert, cooperative, no distress, wheelchair-bound with left-sided weakness  HEENT: Mild facial asymmetry but she is speech is understandable. Neck: Supple, symmetrical, trachea midline, no adenopathy; thyroid: no enlargement/tenderness/nodules; no carotid bruit or JVD   Lungs: Clear to auscultation bilaterally, respirations unlabored   Chest Wall: No tenderness or deformity   Heart: Regular rate and rhythm, S1 and S2 normal, no murmur, rub or gallop   Extremities: Trace edema, left-sided weakness  Skin: Skin color, texture, turgor normal, no rashes or lesions   Lymph nodes: Cervical, supraclavicular, and axillary nodes normal   Neurologic: Left-sided hemiparesis  ASSESSMENT:  1. Essential hypertension, benign    2. Mixed hyperlipidemia    3. Depression with anxiety    4. Low hemoglobin    5. Oropharyngeal dysphagia    6. Slow transit constipation         PLAN:   1. Essential hypertension, benign  Blood pressure stable at 138/70. The goal is to keep it under 140/90. Continue amlodipine 10 mg daily and Toprol- mg daily. Continue to monitor and watch salt intake. - Comprehensive Metabolic Panel; Future  - Lipid Panel; Future    2. Mixed hyperlipidemia  On Lipitor 20 mg at bedtime. LDL goal is to keep it under 80.  - Comprehensive Metabolic Panel; Future  - Lipid Panel; Future    3. Depression with anxiety  PHQ-9 score 17. Restart Cymbalta 30 mg twice a day. - DULoxetine (CYMBALTA) 30 MG extended release capsule; Take 1 capsule by mouth 2 times daily  Dispense: 60 capsule; Refill: 5    4. Low hemoglobin  Anemia work-up done. - CBC Auto Differential; Future  - Ferritin; Future  - IRON AND TIBC; Future  - VITAMIN B12 & FOLATE; Future    5. Oropharyngeal dysphagia  Due to history of hemorrhagic stroke. Modified barium swallow ordered per speech therapy request.  - FL MODIFIED BARIUM SWALLOW W VIDEO; Future    6. Slow transit constipation  Due to LOC and activity and history of previous stroke. We will try MiraLAX and Senokot. - polyethylene glycol (GLYCOLAX) 17 GM/SCOOP powder; Take 17 g by mouth daily  Dispense: 1530 g; Refill: 5  - senna (SENOKOT) 8.6 MG tablet; Take 1 tablet by mouth 2 times daily  Dispense: 60 tablet; Refill: 5    7. SAH due to ruptured aneurysm/residual left hemiparesis  Continue PT/OT/speech therapy. Continue to follow-up with Dr. Raven Huntley. Return in about 6 weeks (around 3/23/2022) for med follow-up. Electronically signed by Therese Jordan MD on 2/9/22 at 2:40 PM.     This dictation was generated by voice recognition computer software. Although all attempts are made to edit the dictation for accuracy, there may be errors in the transcription that are not intended.

## 2022-02-10 ENCOUNTER — HOSPITAL ENCOUNTER (OUTPATIENT)
Dept: SPEECH THERAPY | Age: 42
Setting detail: THERAPIES SERIES
Discharge: HOME | End: 2022-02-10
Payer: COMMERCIAL

## 2022-02-10 ENCOUNTER — HOSPITAL ENCOUNTER (OUTPATIENT)
Dept: PHYSICAL THERAPY | Age: 42
Setting detail: THERAPIES SERIES
Discharge: HOME | End: 2022-02-10
Payer: COMMERCIAL

## 2022-02-10 ENCOUNTER — HOSPITAL ENCOUNTER (OUTPATIENT)
Dept: OCCUPATIONAL THERAPY | Age: 42
Setting detail: THERAPIES SERIES
Discharge: HOME | End: 2022-02-10
Payer: COMMERCIAL

## 2022-02-10 NOTE — PROGRESS NOTES
Occupational Therapy   Patient Name:  Otf Red  :  1980   Date:  2/10/2022  Cancelled visits to date: 5  No-shows to date: 1     Patient status for today's appointment patient:  [x]? Cancelled 10/25, 10/28, 1/3, , , 2/10  []? Rescheduled appointment  []? No-show      Reason given by patient:  []? Patient ill  []? Conflicting appointment  []? No transportation                 []?  Conflict with work  []? No reason given  [x]? Other:  Grandmother passed away              Comments:       Phone call information:   []? Phone call made today to patient at 11:05 time at number provided:                 []?  Patient answered, conversation as follows:  Pt waiting on AAA, trying to make it to SLP and OT sessions, doesn't want to cancel them now. Other therapists made aware. []?  Patient did not answer, message left as follows:  []? Phone call not made today  [x]?   Phone call not needed - pt contacted us to cancel and provided reason for cancellation.      Electronically signed Makayla Dillon 37[address]

## 2022-02-10 NOTE — FLOWSHEET NOTE
Physical Therapy  Cancellation/No-show Note  Patient Name:  Digna Sigala  :  1980   Date:  2/10/2022  Cancelled visits to date: 5  No-shows to date: 1    Patient status for today's appointment patient:  [x]  Cancelled 10/25, 10/28, 1/3, , , 2/10  []  Rescheduled appointment  []  No-show      Reason given by patient:  []  Patient ill  []  Conflicting appointment  []  No transportation    []  Conflict with work  []  No reason given  [x]  Other:  Grandmother passed away   Comments:      Phone call information:   []  Phone call made today to patient at 11:05 time at number provided:      []  Patient answered, conversation as follows:  Pt waiting on AAA, trying to make it to SLP and OT sessions, doesn't want to cancel them now. Other therapists made aware. []  Patient did not answer, message left as follows:  []  Phone call not made today  [x]  Phone call not needed - pt contacted us to cancel and provided reason for cancellation.      Electronically signed by:  Leelee Abad, PT, DPT

## 2022-02-10 NOTE — FLOWSHEET NOTE
Speech Therapy  Cancellation/No-show Note  Patient Name:  William Almanzar  :  1980   Date:  2/10/2022  Cancels to Date: 6  No-shows to Date: 0    Patient status for today's appointment patient:  [x]  Cancelled (11/15, , 1/3, , , 2/10)  []  Rescheduled appointment  []  No-show     Reason given by patient:  []  Patient ill  []  Conflicting appointment  []  No transportation  []  Conflict with work  []  No reason given  [x]  Other:     Comments: pt's grandmother passed away    Phone call information:   []  Phone call made today to patient at time at number provided:      []  Patient answered, conversation as follows:   []  Patient did not answer, message left as follows:  [x]  Phone call not made today, pt called clinic to cancel    Electronically signed by:  Oswaldo Conteh, SLP

## 2022-02-11 ENCOUNTER — HOSPITAL ENCOUNTER (OUTPATIENT)
Dept: SPEECH THERAPY | Age: 42
Setting detail: THERAPIES SERIES
Discharge: HOME | End: 2022-02-11
Payer: COMMERCIAL

## 2022-02-11 ENCOUNTER — HOSPITAL ENCOUNTER (OUTPATIENT)
Dept: GENERAL RADIOLOGY | Age: 42
Discharge: HOME | End: 2022-02-11
Payer: COMMERCIAL

## 2022-02-11 DIAGNOSIS — R13.12 OROPHARYNGEAL DYSPHAGIA: ICD-10-CM

## 2022-02-11 PROCEDURE — 92526 ORAL FUNCTION THERAPY: CPT

## 2022-02-11 PROCEDURE — 92611 MOTION FLUOROSCOPY/SWALLOW: CPT

## 2022-02-11 PROCEDURE — 74230 X-RAY XM SWLNG FUNCJ C+: CPT

## 2022-02-11 NOTE — PROCEDURES
[facility] SPEECH THERAPY  MODIFIED BARIUM SWALLOW EVALUATION    Patient's Name: Pete ROJAS.B: 1980  Medical Diagnosis: Dependence on wheelchair [Z99.3]  Treatment Diagnosis: Dysphagia    Ordering MD: Dr. Silviano Watson MD  Radiologist: Dr. Carson Fischer  Date of Onset: 2/12/2021  Date of Evaluation: 2/11/2022  Type of Study: Modified Barium Swallowing Study (MBS)  Diet Prior to Study:  Regular/thin liquids  Pain Level: None reported    Impression:  Modified Barium Swallow evaluation completed on 2/11/2022. Results indicate mild oropharyngeal dysphagia. Oral phase of swallow characterized by reduced lingual control for bolus prep resulting in premature posterior bolus loss, delayed A-P transit with mild oral/base of tongue residue, and prolonged mastication of soft and regular solids. Lingual pumping intermittently present. Pharyngeal phase characterized by delayed swallow initiation across consistencies, with pooling to the pyriforms of all liquids and soft solids and pooling to the valleculae of puree and regular solids. Compensatory strategy of three-second bolus hold was minimally effective in reduced pooling. Question flash penetration x 1 with thin liquids before swallow, self clearing. No aspiration viewed throughout study, no further penetration noted. Epiglottic inversion, hyolaryngeal elevation/excursion, and pharyngeal stripping wave were grossly functional, no significant vallecular, pharyngeal, or pyriform sinus residue post swallow. Limited view of upper esophagus in lateral view d/t pt positioning, no retrograde flow observed.      Aspiration/Penetration Risk:  Mild d/t delayed swallow initiation with pharyngeal pooling    Penetration/Aspiration Scores across consistencies   CONSISTENCY  Pen/Asp rating Description    Thin  1, 2 - Material does not enter the airway   - Material enters the airway, remains above the vocal folds, and is ejected from the airway x 1   Mildly (nectar) thick  1 Material does not enter the airway    Moderately (honey) thick  1 Material does not enter the airway    Puree  1 Material does not enter the airway    Soft Solid  1 Material does not enter the airway    Regular Solid  1 Material does not enter the airway        Recommendations:    Diet Level: Regular diet with thin liquids  Strategies: Alternate solids/liquids , Upright as possible with all PO intake , Small bites/sips , Eat/feed slowly, Remain upright 30-45 min   Treatments: Speech Therapy for dysphagia treatment  Goals:  1. Pt will functionally tolerate regular solids x 20 and thin liquids x 20 with min verbal cues for strategy implementation. 2.  Pt/family will demonstrate understanding of strategies to improve swallow coordination/timing. Consistencies given: Thin, Mildly Thick (Nectar) Liquids, Moderately Thick (honey) Liquids, Puree, Soft solid, Solid    Oral Phase  -Lip closure: WFL  -Tongue control during bolus hold: Reduced  -Bolus preparation/mastication: Prolonged  -Bolus transport/lingual motion: Reduced  -Oral residue: Mild  -Initiation of pharyngeal swallow: Delayed    Pharyngeal Phase  -Soft palate elevation: WFL  -Laryngeal elevation: WFL  -Anterior hyoid excursion: WFL  -Epiglottic movement: WFL  -Laryngeal vestibule closure: WFL  -Pharyngeal stripping wave: WFL  -Pharyngoesophageal segment opening: WFL  -Tongue base retraction: Mild  -Pharyngeal residue: None    Esophageal Phase  -Unremarkable in lateral view     Following Evaluation:  Results and recommendations discussed with pt and pt;s  using study images/video. Skilled instruction re: aspiration risk, evidence-based methods of decreasing adverse outcomes associated with aspiration, and sequencing of compensatory strategies developed based on instrumental swallow evaluation.  Demonstration and training for use of safe swallowing strategies of small bites/sips, alternate solids/liquids, upright as possible with all PO intake, bolus hold of thin liquids; pt and  demonstrated understanding of strategies with min verbal cues, verbalized understanding. Timed Code Treatment: 0    Total Treatment Time: 30 minutes    Neymar Hess, [facility]  Speech-Language Pathology Graduate Clinician    The speech-language pathologist was present, directed the patient's care, made skilled judgment and was responsible for assessment and treatment.     Martha Huynh, [address] [place] [address]  Speech-Language Pathologist  [phone]

## 2022-02-15 ENCOUNTER — HOSPITAL ENCOUNTER (OUTPATIENT)
Dept: PHYSICAL THERAPY | Age: 42
Setting detail: THERAPIES SERIES
Discharge: HOME | End: 2022-02-15
Payer: COMMERCIAL

## 2022-02-15 ENCOUNTER — HOSPITAL ENCOUNTER (OUTPATIENT)
Dept: OCCUPATIONAL THERAPY | Age: 42
Setting detail: THERAPIES SERIES
Discharge: HOME | End: 2022-02-15
Payer: COMMERCIAL

## 2022-02-15 ENCOUNTER — HOSPITAL ENCOUNTER (OUTPATIENT)
Age: 42
Discharge: HOME | End: 2022-02-15
Payer: COMMERCIAL

## 2022-02-15 DIAGNOSIS — E78.2 MIXED HYPERLIPIDEMIA: ICD-10-CM

## 2022-02-15 DIAGNOSIS — D64.9 LOW HEMOGLOBIN: ICD-10-CM

## 2022-02-15 DIAGNOSIS — I10 ESSENTIAL HYPERTENSION, BENIGN: ICD-10-CM

## 2022-02-15 DIAGNOSIS — E55.9 VITAMIN D DEFICIENCY DISEASE: ICD-10-CM

## 2022-02-15 LAB
A/G RATIO: 1.5 (ref 1.1–2.2)
ALBUMIN SERPL-MCNC: 4.2 G/DL (ref 3.4–5)
ALP BLD-CCNC: 96 U/L (ref 40–129)
ALT SERPL-CCNC: 8 U/L (ref 10–40)
ANION GAP SERPL CALCULATED.3IONS-SCNC: 12 MMOL/L (ref 3–16)
AST SERPL-CCNC: 9 U/L (ref 15–37)
BASOPHILS ABSOLUTE: 0 K/UL (ref 0–0.2)
BASOPHILS RELATIVE PERCENT: 0.5 %
BILIRUB SERPL-MCNC: <0.2 MG/DL (ref 0–1)
BUN BLDV-MCNC: 9 MG/DL (ref 7–20)
CALCIUM SERPL-MCNC: 9.4 MG/DL (ref 8.3–10.6)
CHLORIDE BLD-SCNC: 105 MMOL/L (ref 99–110)
CHOLESTEROL, TOTAL: 228 MG/DL (ref 0–199)
CO2: 24 MMOL/L (ref 21–32)
CREAT SERPL-MCNC: 0.8 MG/DL (ref 0.6–1.1)
EOSINOPHILS ABSOLUTE: 0.1 K/UL (ref 0–0.6)
EOSINOPHILS RELATIVE PERCENT: 3.1 %
FERRITIN: 12.4 NG/ML (ref 15–150)
FOLATE: 9.56 NG/ML (ref 4.78–24.2)
GFR AFRICAN AMERICAN: >60
GFR NON-AFRICAN AMERICAN: >60
GLUCOSE BLD-MCNC: 92 MG/DL (ref 70–99)
HCT VFR BLD CALC: 32.4 % (ref 36–48)
HDLC SERPL-MCNC: 59 MG/DL (ref 40–60)
HEMOGLOBIN: 10.2 G/DL (ref 12–16)
IRON SATURATION: 25 % (ref 15–50)
IRON: 90 UG/DL (ref 37–145)
LDL CHOLESTEROL CALCULATED: 143 MG/DL
LYMPHOCYTES ABSOLUTE: 1.5 K/UL (ref 1–5.1)
LYMPHOCYTES RELATIVE PERCENT: 33.4 %
MCH RBC QN AUTO: 24.8 PG (ref 26–34)
MCHC RBC AUTO-ENTMCNC: 31.6 G/DL (ref 31–36)
MCV RBC AUTO: 78.4 FL (ref 80–100)
MONOCYTES ABSOLUTE: 0.2 K/UL (ref 0–1.3)
MONOCYTES RELATIVE PERCENT: 4.9 %
NEUTROPHILS ABSOLUTE: 2.6 K/UL (ref 1.7–7.7)
NEUTROPHILS RELATIVE PERCENT: 58.1 %
PDW BLD-RTO: 17 % (ref 12.4–15.4)
PLATELET # BLD: 223 K/UL (ref 135–450)
PMV BLD AUTO: 9 FL (ref 5–10.5)
POTASSIUM SERPL-SCNC: 3.7 MMOL/L (ref 3.5–5.1)
RBC # BLD: 4.12 M/UL (ref 4–5.2)
SODIUM BLD-SCNC: 141 MMOL/L (ref 136–145)
TOTAL IRON BINDING CAPACITY: 365 UG/DL (ref 260–445)
TOTAL PROTEIN: 7 G/DL (ref 6.4–8.2)
TRIGL SERPL-MCNC: 128 MG/DL (ref 0–150)
VITAMIN B-12: 614 PG/ML (ref 211–911)
VITAMIN D 25-HYDROXY: 17.9 NG/ML
VLDLC SERPL CALC-MCNC: 26 MG/DL
WBC # BLD: 4.5 K/UL (ref 4–11)

## 2022-02-15 PROCEDURE — 85025 COMPLETE CBC W/AUTO DIFF WBC: CPT

## 2022-02-15 PROCEDURE — 97112 NEUROMUSCULAR REEDUCATION: CPT

## 2022-02-15 PROCEDURE — 97530 THERAPEUTIC ACTIVITIES: CPT

## 2022-02-15 PROCEDURE — 83550 IRON BINDING TEST: CPT

## 2022-02-15 PROCEDURE — 80053 COMPREHEN METABOLIC PANEL: CPT

## 2022-02-15 PROCEDURE — 82746 ASSAY OF FOLIC ACID SERUM: CPT

## 2022-02-15 PROCEDURE — 82306 VITAMIN D 25 HYDROXY: CPT

## 2022-02-15 PROCEDURE — 82607 VITAMIN B-12: CPT

## 2022-02-15 PROCEDURE — 97110 THERAPEUTIC EXERCISES: CPT

## 2022-02-15 PROCEDURE — 83540 ASSAY OF IRON: CPT

## 2022-02-15 PROCEDURE — 82728 ASSAY OF FERRITIN: CPT

## 2022-02-15 PROCEDURE — 36415 COLL VENOUS BLD VENIPUNCTURE: CPT

## 2022-02-15 PROCEDURE — 80061 LIPID PANEL: CPT

## 2022-02-15 NOTE — PLAN OF CARE
[address] [facility]  Phone: [phone]   Fax: [phone]      Physical Therapy Re-Certification Plan of Care    Dear Melissa Turner MD,    We had the pleasure of treating the following patient for physical therapy services at [facility]. A summary of our findings can be found in the updated assessment below. This includes our plan of care. If you have any questions or concerns regarding these findings, please do not hesitate to contact me at the office phone number checked above. Thank you for the referral.     Physician Signature:________________________________Date:__________________  By signing above (or electronic signature), therapist's plan is approved by physician      Functional Outcome:   30\" STS w/RUE on surface of chair: 7, 8  5x STS:  20\", 17\"  6MWT w/SPC:  200' w/1 LOB requiring total A to regain balance    Gait pattern w/SBQC & L AFO:  Weight shifted over cane and RLE, sufficient L hip flex for swing phase, hip ER occurs, rapid L knee flex occurs at initial contact but pt corrects then typically over corrects allowing for genu recurvatum -is steadily improving since obtaining AFO. Decreased stance time of LLE w/decreased RLE step length. Widened ANDREAS. Rigid trunk and C-spine present, reducing balance & gait cycle if cervical movements are needed for direction or distractions.       Overall Response to Treatment:   []Patient is responding well to treatment and improvement is noted with regards  to goals   []Patient should continue to improve in reasonable time if they continue HEP   []Patient has plateaued and is no longer responding to skilled PT intervention    []Patient is getting worse and would benefit from return to referring MD   []Patient unable to adhere to initial POC   [x]Other: Patient has significantly progressed since obtaining L AFO and has been walking more at home and beginning to re-enter community with family, however typically utilizes wheelchair for mobility. Patient does continue to demonstrate avoidance of L side and, as functional mobility is improving, cervical and trunk AROM is significantly limited, reducing balance anytime pt has to rotate head to navigate an area or to look for an individual.  Patient requires continued PT services to further address these deficits to allow her to return to IND ambulation to return to community & children's activity. Date range of Visits:  to 22  Total Visits: 13 this PA     Recommendation:    [x]Request additional PT visits at 2x / wk for 8 weeks. 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  · Treatment Diagnosis: functional limitations secondary to brain bleed  Insurance/Certification information:  PT Insurance Information: [facility] - 30 visits per year; preauth required  Physician Information:  Referring Practitioner: Giovanny Murillo MD  Plan of care signed (Y/N): faxed 21     Date of Patient follow up with Physician:     Functional scale: Optimal:  raw score = 81; dysfunction = 80%    Progress Report: [x]  Yes  []  No     Date Range for reporting period:  Beginning   PN: 11/15  POC:   PN:   POC: 2/15  Ending:      Progress report due (10 Rx/or 30 days whichever is less): visit #9 or 3/9/31    Recertification due (POC duration/ or 90 days whichever is less): visit #16  3/6/22    Visit # Insurance Allowable Auth required?  Date Range            128 units w/OT  64 ea discipline [x]  Yes  []  No   12/10/21    12/13 to 22          Latex Allergy:  [x]NO      []YES  Preferred Language for Healthcare:   [x]English []other:      Pain level:  1/10 L quad      SUBJECTIVE:    Arrived to clinic without w/c this date. 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 Is aware of restricted visits by [employer], wants to continue with PT as pt and family are noticing improvements but wants to keep a few sessions for middle or end of the year, if necessary. Plan to discuss and notify PT next session if willing to use more session during this PT episode of care. Bed mobility continues to be consistently challenging for pt, is able to perform but takes longer, so just asks for help to complete task sooner. Will attend children's sporting events but uses wheelchair as there are too many people and unfamiliar layout at times. OBJECTIVE:     2/15:     30\" STS w/RUE on surface of chair: 7, 8  5x STS:  20\", 17\"  6MWT w/SPC:  200' w/1 LOB requiring total A to regain balance    1/6:  30 sec STS w/use of RUE and 22\" mat table:  7  6MWT w/SBQC & L AFO:  125'  (165' total before seated rest)  Min/mod A required w/3 LOB    12/6:  30 sec STS w/use of RUE and 22\" mat table. Gait Pattern w/SBQC & mod A w/several LOB:  Limited use and WB'ing through LLE, L genu recurvatum during L stance phase, L knee frequently mansi just following initial contact or when not appropriately shifting weight onto LLE during stance phase;  R knee frequently flexes and pt moves QC around rapidly. Pt able to utilize steppage gait w/upright trunk posture for L swing phase, however has difficulty transitioning weight anteriorly over LLE, frequently reducing R step length.     11/15:    30 sec sit to stand transitions: 5  Gait Pattern:  W/SBQC, L toe drag, flexed L knee throughout stance phase, weight shifted onto RLE, frequently hops over LLE;  Able to initiate L hip flex, toe drag minimizes swing phase, DF assist does help but not consistently alleviate toe drag.  Forward pitched posture;  Frequently lifts and moves R foot and cane. 11/4: pt not wearing AFO today     10/14:  To clinic in wheelchair with soft AFO on LLE; received script for OT and speech this date and pt is now scheduled for all 3 therapies      RESTRICTIONS/PRECAUTIONS: h/o aneurysm    Exercises/Interventions:   Therapeutic Exercises (10530) Resistance / level Sets/sec Reps Notes   Nu step / step one  Level 6LEs only w/man facilitation to lateral L knee joint for improved hip alignment           Seated in w/c w/B feet placed on mat #6  -hip ext            -frequent VCs to encourage L hip extensor activation   Standing R hip flex w/PT HHA  Standing stool pushes  Min A  Max A       -man cues to improve posture and LLE WB'ing   Standing over gray bolster, hip ext Mod A      S/L:  L hip ext AROM    Supine:  Heel slides AAROM  Bridge                              Therapeutic Activities (81512)       Sit to stand transitions   -Man & VC's to recruit LLE mm fibers Sup1/20: Cues to not push LEs posteriorly into chair   Sit to stand transitions from mat table, R foot on 1/2 foam roll    Sit to stand w/R foot on blue wedge [id number]: SBA/Sup   Stand pivot w/SBQC w/c to mat table #6 Max A   Curb step, fwd on and fwd off  -PT HHA  -QC w/   4\"  4\"    Fwd step ups w/HR 4\"Mod A for stability, max A for L foot placement on step   Navigated 4\" steps step to pattern w/HR & QC Max A & man assist to maintain LLE stability with each step   STS mod mat table with maria alejandra-walker    Once standing added weight shifting M/L    One trial added L LE stepping (minimal success without AFO)     R foot taps onto 4\" step    Trunk rotation w/R foot on step       1/6: manual stability to maintain pelvic alignment     1/6: man cues to facilitate pelvic rotation   Bed mobility:  Sit to supine  Supine to sit  Rolling R   Sup  SBA  SBA        2/8: instruction for improved technique         Gait to chair, half turn and sit - VCs for spatial awareness and    1/11: VCs for turning all the way prior to sitting x 3, last sit was correct placement and positioning    Standing with R foot in front (stagger stance) with thor rot   Added 1/11   Tall kneeling EOM  Tall kneeling: , 2 steps R  -man cues to sacrum & sternum to improve posture  -man facilitation to sacrum & medial lower LLE to advance          Circuit Training (<2 min rest between each):  Sit to stands  Amb w/QC  Seated stepper  Amb w/QC  Sit to stands  adSage w/QC, RLE  adSage w/o QC, RLE         1.0 Increase sets of each as tolerated      LEs only          Neuromuscular Re-ed (68912)       Static stand for equal BLE WB'ing w/mirror for visual feedback of correct posture & weight-bearing through LLE    Static stand w/R foot on box w/mirror for visual feedback of correct posture & weight-bearing through LLE    Static stand w/large blue bolster between feet to maintain neutral hip positioning w/mirror for visual feedback of correct posture & weight-bearing through LLE         4\"        RLE on Airex                                  Min /Mod A -man cues for weight shift reduced to encourage pt to self correct      -man cues to facilitate posture, VC's only to facilitate weight-shift onto LLE      -man cues to facilitate posture, VC's only to facilitate weight-shift onto LLE   Shuttle Balance:  -Partial lunge, LLE on black bard, RLE on red step         BioDex BS  -ball maze -easy        2/8: man cues required to maintain good L foot placement on board   EOM:  Scooting:  -Out to edge  -L laterally  -R laterally            -able to scoot out w/pelvis in midline p instruction  -max cues for correct technique and encouragement  -good form, required mod A for LLE positioning   Rolling in bed R and L    Min A for placement and sequencing    bridge   CGA, VCs for hip adduction    Hip add in hooklying   TCs for target   Heel slides AAROM to AROM   AAROM to begin, but slowly gained more motion and power, responds well to cues for power   Seated:  LAQ        12/30: initially performed well then had difficulty activating quad and was unable to extend knee without external assist   Gait Training:  Foot in front then behind at elevated mat table          Amb w/QC & L AFO    Amb w/o AD     Min ASBA/CGA     CGA/min A  100'          11/18:  performed x8 safely w/pt and was added to HEP;  PT (then ) inhibited L knee recurvatum w/man cues to posterior knee. Weight shifts  -lateral w/L AFO  -foot onto box, lean onto foot, then return  -staggered w/R foot on AirEx  -foot maintained on box w/RUE reaches         Mod A               -man cues to facilitate weight shift  -max cues for improved weight-shifting & VCs to reduce speed & improve control  -man cues to facilitate shifting weight onto LLE    12/9: pt unable to adequately accept weight through LLE, activity stopped due to safety concerns   Amb bwds w/cart    Treadmill  -L foot on side of belt, RLE on belt    -lateral weight shifts with belt stopped & RUE support   0.3 mph           12/9: incr difficulty reducing pt compensations to avoid use of LLE, once compensations were removed, pt rapidly fatigued and became anxious, stating she needed to stop, felt faint, was going to get sick, and finally profanities, not directed at anyone. L sided neglect did worsen as anxiety incr, however  did arrive on her R side which may have contributed to focus on R side                        Manual Intervention ([phone])       Stretches:  Seated hamstring stretch  Seated knee flexion stretch  Seated calf stretch  Seated piriformis stretch  S/L L hip flexor stretch                                                    Modalities:     Pt. Education:    12/9:  Once seated after weight shifting on TM, discussed importance of utilizing LLE in that manor frequently, daily at home in order to improve muscle activation appropriately.   Pt nodded in understanding but remained, understandably, upset. 11/9:  Pt requested order for hemiwalker, feels like it would improve her fear and ability to walk. Discussed increased support it provides and less LE & core muscle activation that is required to use hemiwalker, potentially becoming a step backwards and increasing her dependency on AD. Did discuss importance of continued walking and activity at home and if pt is not currently walking at home due to amount of work/effort required to amb w/SBQC, to obtain a hemiwalker so pt is more likely to return to typical home activities. Discussed nearby medical supply stores and to contact PCP and have order sent to them.  and pt reported understanding. 10/14:  Verbal cues for proper transfer technique and safety  -patient educated on diagnosis, prognosis and expectations for rehab  -all patient questions were answered    HEP instruction: 10/14:  Discussed safety on transfers. 10/18: Instructed pt to perform standing lateral weight-shifts hourly in front of chair, with a family member on L side as needed. Therapeutic Exercise and NMR EXR  [] (52998) Provided verbal/tactile cueing for activities related to strengthening, flexibility, endurance, ROM for improvements in  [] LE / Lumbar: LE, proximal hip, and core control with self care, mobility, lifting, ambulation. [] UE / Cervical: cervical, postural, scapular, scapulothoracic and UE control with self care, reaching, carrying, lifting, house/yardwork, driving, computer work.  [] (33459) Provided verbal/tactile cueing for activities related to improving balance, coordination, kinesthetic sense, posture, motor skill, proprioception to assist with   [] LE / lumbar: LE, proximal hip, and core control in self care, mobility, lifting, ambulation and eccentric single leg control.    [] UE / cervical: cervical, scapular, scapulothoracic and UE control with self care, reaching, carrying, lifting, house/yardwork, driving, computer work.   [] (06561) Therapist is in constant attendance of 2 or more patients providing skilled therapy interventions, but not providing any significant amount of measurable one-on-one time to either patient, for improvements in  [] LE / lumbar: LE, proximal hip, and core control in self care, mobility, lifting, ambulation and eccentric single leg control. [] UE / cervical: cervical, scapular, scapulothoracic and UE control with self care, reaching, carrying, lifting, house/yardwork, driving, computer work. NMR and Therapeutic Activities:    [x] (51236 or 12454) Provided verbal/tactile cueing for activities related to improving balance, coordination, kinesthetic sense, posture, motor skill, proprioception and motor activation to allow for proper function of   [x] LE: / Lumbar core, proximal hip and LE with self care and ADLs  [x] UE / Cervical: cervical, postural, scapular, scapulothoracic and UE control with self care, carrying, lifting, driving, computer work. [x] (47773) Gait Re-education- Provided training and instruction to the patient for proper LE, core and proximal hip recruitment and positioning and eccentric body weight control with ambulation re-education including up and down stairs     Home Management Training / Self Care:  [] (98804) Provided self-care/home management training related to activities of daily living and compensatory training, and/or use of adaptive equipment for improvement with: ADLs and compensatory training, meal preparation, safety procedures and instruction in use of adaptive equipment, including bathing, grooming, dressing, personal hygiene, basic household cleaning and chores.      Home Exercise Program:    [] (78740) Reviewed/Progressed HEP activities related to strengthening, flexibility, endurance, ROM of   [] LE / Lumbar: core, proximal hip and LE for functional self-care, mobility, lifting and ambulation/stair navigation   [] UE / Cervical: cervical, postural, scapular, scapulothoracic and UE control with self care, reaching, carrying, lifting, house/yardwork, driving, computer work  [] (66094)Reviewed/Progressed HEP activities related to improving balance, coordination, kinesthetic sense, posture, motor skill, proprioception of   [] LE: core, proximal hip and LE for self care, mobility, lifting, and ambulation/stair navigation    [] UE / Cervical: cervical, postural,  scapular, scapulothoracic and UE control with self care, reaching, carrying, lifting, house/yardwork, driving, computer work    Manual Treatments:  PROM / STM / Oscillations-Mobs:  G-I, II, III, IV (PA's, Inf., Post.)  [] (42264) Provided manual therapy to mobilize LE, proximal hip and/or LS spine soft tissue/joints for the purpose of modulating pain, promoting relaxation,  increasing ROM, reducing/eliminating soft tissue swelling/inflammation/restriction, improving soft tissue extensibility and allowing for proper ROM for normal function with   [] LE / lumbar: self care, mobility, lifting and ambulation. [] UE / Cervical: self care, reaching, carrying, lifting, house/yardwork, driving, computer work. Modalities:  [] (52594) Vasopneumatic compression: Utilized vasopneumatic compression to decrease edema / swelling for the purpose of improving mobility and quad tone / recruitment which will allow for increased overall function including but not limited to self-care, transfers, ambulation, and ascending / descending stairs.        Charges:  Timed Code Treatment Minutes: 45   Total Treatment Minutes: 45     Units approved Units used Date Range    48   64 -PT   35 12/10/21  12/13 - 2/18/22   **updated 2/15**    [] EVAL - LOW (38279)   [] EVAL - MOD (91292)  [] EVAL - HIGH (27244)  [] RE-EVAL (18968)  [] SC(69888) x       [] Ionto  [x] NMR (56558) x  1    [] Vaso  [] Manual (26328) x       [] Ultrasound  [x] TA x  2      [] Mech Traction (09131)  [] Aquatic Therapy x      [] ES (un) (61681):   [] Home Management Training x  [] ES(attended) (07690)   [] Dry Needling 1-2 muscles (62241):  [] Dry Needling 3+ muscles (367793  [] Group:      [] Other: gait x     GOALS:    Patient stated goal: To return to as normal activity as possible.       Therapist goals for Patient:   Short Term Goals: To be achieved in: 2 weeks  1. Independent in HEP and progression per patient tolerance, in order to prevent re-injury.  - Goal Met  2. Patient will have a decrease in pain to facilitate improvement in movement, function, and ADLs as indicated by Functional Deficits. - Goal Met     Long Term Goals (updated 2/15/22): To be achieved in: 8 weeks  1. Patient to complete all transfers to/from all surfaces MOD I - Progressing, 30\" STS: 8 reps w/1 hand on surface of chair  2. Patient will ambulate w/cane 500'x2 MOD I in preparation of return to community activity with children.  - Progressing, 6MWT 200' w/SPC & 1 total LOB  3. Patient will demonstrate improved cervical spine and trunk AROM while maintaining good balance during dynamic tasks to safely navigate distractions in community.  - Not Met   4. Patient to navigate 1 flight of stairs w/HR, step-to pattern and Supervision to reduce dependency on family for assistance while in community.  - Not Met  5. Patient to reduce avoidance of LLE with all functional mobility to allow her to reach her max quality of life. - Not Met     Goals discharged 2/15/22:      Overall Progression Towards Functional goals/ Treatment Progress Update:  [] Patient is progressing as expected towards functional goals listed. [x] Progression is slowed due to complexities/Impairments listed. [] Progression has been slowed due to co-morbidities.   [] Plan just implemented, too soon to assess goals progression <30days   [] Goals require adjustment due to lack of progress  [] Patient is not progressing as expected and requires additional follow up with physician  [] Other    Persisting Functional Limitations/Impairments:  []Sleeping []Sitting              [x]Standing []Transfers        [x]Walking []Kneeling              []Stairs [x]Squatting / bending   [x]ADLs [x]Reaching  []Lifting  [x]Housework  []Driving [x]Job related tasks  [x]Sports/Recreation []Other:        ASSESSMENT:   See above    Treatment/Activity Tolerance:  [x] Patient able to complete tx  [] Patient limited by fatigue  [] Patient limited by pain  [] Patient limited by other medical complications  [] Other:     Prognosis: [x] Good [] Fair  [] Poor    Patient Requires Follow-up: [x] Yes  [] No    Plan for next treatment session:  per training diary and progress as tolerated. PLAN: See josey. PT 2x / week for 6 weeks. -requesting additional visits at 2x/week x8 weeks. [x] Continue per plan of care [] Alter current plan (see comments)  [] Plan of care initiated [] Hold pending MD visit [] Discharge    Electronically signed by: Mayo Pappas, PT, DPT    Note: If patient does not return for scheduled/ recommended follow up visits, this note will serve as a discharge from care along with most recent update on progress.

## 2022-02-15 NOTE — FLOWSHEET NOTE
[address] [facility] Occupational Therapy  Phone: [phone]   Fax: [phone]      Occupational Therapy Daily Treatment Note  Date:  2/15/2022    Patient: Naomi Michel   : 1980   MRN: [id number]  Referring Physician:    Dr. 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 Date Range    +   128 units with PT  (64 units each)  30 visits/yr [x]  Yes  []  No 21-22       Latex Allergy  [x]No      []Yes  Pacemaker:  [x] No       [] Yes     Preferred Language for Healthcare:   [x]English       []other:    Pain level:   3/10 left side especially left thigh    SUBJECTIVE:  Pt reporting recent loss of grandmother. 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       Functional Disability Index:     Quick DASH: total score- 35      Stroke Rehabilitation Assessment of Movement (STREAM)  improved to 29 on this date from 2021    OBJECTIVE:      PROM AROM      L R L R PROM on 21   Shoulder Flexion  ~85*  : 0-125*       0-140   Elbow Flexion  WFL          Elbow Extension  WFL          Pronation             Supination  WFL          Wrist Flexion  WFL          Wrist Extension  WFL          Radial Deviation             Composite Flexion (Tip of 3rd Digit to Distal Palmar Crease (cm)) Held in flexion; able to stretch into extension to 2 cm lag    11/29: Held in flexion; able to stretch into extension to 4 cm lag            RESTRICTIONS/PRECAUTIONS:  hx brain bleed, fall risk    Exercises/Interventions:  Treatment focused on improving LUE use as ANDREAS. And gross assist during bimanual tasks. Patient warmed up in short sit at edge of mat with feet in weight bearinng on floor and ues in equal weightbearing on back of chair . Therapist applied resistance to chair and pateint worked on moving trunk forward toward chair and then pushed chair forward and pulled back to increase proprioceptive awareness of left ue followed by bimanual activity holding finger web with both hands and weighted ball on finger web worked on rolling ball with both ues on web with slight physical cues from therapist at wrist and posterior humerus. Patient then worked on holding mallet and hitting target with mallet in left hand with very slight assist at wrist and shoulder from therpaist.  She then progressed to bimanual control reaching for handles of weighted crate with both hands and transitioning to standing with crate with very min assist of therapist.  Patient then worked on motor control with mindful practice of  and release on finger web and towel with improved distal control noted. Patient to work on picking up laundry basket with  at home.                                          Neuromuscular Re-ed / Therapeutic Activities                                                 Manual Intervention                                                     Modalities:     Patient education:  Eval, POC, HEP- pt verbalized understanding       Home Exercise Program:  Written and verbal HEP instructions provided and reviewed:  · Scapular retraction   · Use of resting hand splint  · Mirror therapy at home  · 11/29/21: use of L hand as ANDREAS when seated and during transfers    Therapeutic Exercise and NMR:  [x] (69710) Provided verbal/tactile cueing for activities related to strengthening, flexibility, endurance, ROM  for improvements in scapular, scapulothoracic and UE control with self care, reaching, carrying, lifting, house/yardwork, driving/computer work.    [] (74536) Provided verbal/tactile cueing for activities related to improving balance, coordination, kinesthetic sense, posture, motor skill, proprioception  to assist with  scapular, scapulothoracic and UE control with self care, reaching, carrying, lifting, house/yardwork, driving/computer work.   [] Comments:    Therapeutic Activities:    [x] (43972 or 93422) Provided verbal/tactile cueing for activities related to improving balance, coordination, kinesthetic sense, posture, motor skill, proprioception and motor activation to allow for proper function of scapular, scapulothoracic and UE control with self care, carrying, lifting, driving/computer work  [] Comments:    Home Exercise Program:    [x] (36263) Reviewed/Progressed HEP activities related to strengthening, flexibility, endurance, ROM of scapular, scapulothoracic and UE control with self care, reaching, carrying, lifting, house/yardwork, driving/computer work  [] (37819) Reviewed/Progressed HEP activities related to improving balance, coordination, kinesthetic sense, posture, motor skill, proprioception of scapular, scapulothoracic and UE control with self care, reaching, carrying, lifting, house/yardwork, driving/computer work    [] Comments:    Manual Treatments:  PROM / STM / Oscillations-Mobs:  G-I, II, III, IV (PA's, Inf., Post.)  [x] (62092) Provided manual therapy to mobilize soft tissue/joints of cervical/CT, scapular GHJ and UE for the purpose of modulating pain, promoting relaxation,  increasing ROM, reducing/eliminating soft tissue swelling/inflammation/restriction, improving soft tissue extensibility and allowing for proper ROM for normal function with self care, reaching, carrying, lifting, house/yardwork, driving/computer work  [] Comments:    ADL Training:  [] (88889) Provided self-care/home management training related to activities of daily living and compensatory training, and/or use of adaptive equipment   [] Comments:     Splinting:  [] Fabrication of:   [] (71869) Orthotic/Prosthetic Management, subsequent encounter  [] (24582) Orthotic management and training (fitting and assessment)  [] Comments:      Charges:  Timed Code Treatment Minutes: 50   Total Treatment Minutes: 50     [] EVAL (LOW) 30471   [] OT Re-eval (51321)  [] EVAL (MOD) 91946   [] EVAL (HIGH) 69226       [x] Dolly (60573) x  1  [] MLROR(37140)  [x] NMR (99376) x  2 [] Estim (attended) (67928)   [] Manual ([id number]) x  [] US (91186)  [] TA () x    [] Paraffin (03018)  [] ADL  (88 [phone] [id number]) x    [] Splint/L code:    [] Estim (unattended) (22 557960)  [] Fluidotherapy (13821)  [] Other:    GOALS:   Patient stated goal: L hand use  [] Progressing: [] Met: [] Not Met: [] Adjusted     Therapist goals for Patient:   Short Term Goals: To be achieved in: 30 days  1. Pt will improve functional ROM to use LUE as ANDREAS during transfers for increased safety and independence by 11/19/21. [x] Progressing: [] Met: [] Not Met: [] Adjusted  2. Pt will improve functional LUE use to complete UB dressing mod I for increased independence in ADLs by 11/19/21. [x] Progressing: [] Met: [] Not Met: [] Adjusted  3. Pt will improve functional LUE use to complete LB dressing min A for increased independence in ADLs by 11/19/21. [] Progressing: [x] Met: [] Not Met: [] Adjusted  4. Pt will improve oculomotor and cognition skills to complete Trailmaking Part B assessment within 180 seconds with no errors by 11/19/21. [x] Progressing: [] Met: [] Not Met: [] Adjusted     Long Term Goals: To be achieved by discharge  1.  Pt will report a QuickDASH Symptom Severity Scale score of 65% or less indicating increased safety and functional independence in desired occupational pursuits by discharge. [x] Progressing: [] Met: [] Not Met: [] Adjusted  2. Pt will increase STREAM total score from 21/70 to 31/70 indicating increased safety and functional independence in desired occupational pursuits by discharge. (added 11/29/21)  [x] Progressing: [] Met: [] Not Met: [] Adjusted    Progression Towards Functional goals:  [] Patient is progressing as expected towards functional goals listed. [x] Progression is slowed due to complexities listed. -falls and fear of falling, significant proprioceptive impairment, left neglect but improving significantly  [] Progression has been slowed due to co-morbidities. [] Plan just implemented, too soon to assess goals progression  [] All goals are met  [] Other:     ASSESSMENT:  Pt has made improvements in PROM of LUE with decreased tone, but continues to have limited participation in functional mobility due to falls/fear of falling and proprioceptive impairment. Treatment/Activity Tolerance:  [x] Patient tolerated treatment well [] Patient limited by fatique  [] Patient limited by pain  [] Patient limited by other medical complications- falls and fear of falling, significant proprioceptive impairment.  [] Other:     Prognosis: [x] Good [] Fair  [] Poor    Patient Requires Follow-up: [x] Yes  [] No    PLAN: See eval  [x] Continue per plan of care [] Alter current plan (see comments)  [] Plan of care initiated [] Hold pending MD visit [] Discharge    Electronically signed by:

## 2022-02-17 ENCOUNTER — HOSPITAL ENCOUNTER (OUTPATIENT)
Dept: PHYSICAL THERAPY | Age: 42
Setting detail: THERAPIES SERIES
Discharge: HOME | End: 2022-02-17
Payer: COMMERCIAL

## 2022-02-17 ENCOUNTER — HOSPITAL ENCOUNTER (OUTPATIENT)
Dept: OCCUPATIONAL THERAPY | Age: 42
Setting detail: THERAPIES SERIES
Discharge: HOME | End: 2022-02-17
Payer: COMMERCIAL

## 2022-02-17 PROCEDURE — 97530 THERAPEUTIC ACTIVITIES: CPT

## 2022-02-17 PROCEDURE — 97110 THERAPEUTIC EXERCISES: CPT

## 2022-02-17 PROCEDURE — 97112 NEUROMUSCULAR REEDUCATION: CPT

## 2022-02-17 NOTE — FLOWSHEET NOTE
[address] [facility] Occupational Therapy  Phone: [phone]   Fax: [phone]      Occupational Therapy Daily Treatment Note  Date:  2022    Patient: Tika Edouard   : 1980   MRN: [id number]  Referring Physician:    Dr. Lindsey Contreras MD              Medical Diagnosis Information: Nontraumatic subarachnoid hemorrhage from unspecified intracranial artery (I60.7)                                                   Insurance information:   [employer]- 30 visits/yr, PA after IE, DN not billable   Date of Injury: 2021- passed out at work  Date of Surgery: n/a    Progress Report: [x]  Yes  []  No     Date Range for reporting period:  Beginning: 10/18/21  Ending: end of POC    Progress report due (10 Rx/or 30 days whichever is less): visit #10 or     Recertification due (POC duration/ or 90 days whichever is less): visit #12 or 22    Visit # Insurance Allowable Auth required? Date Range    +  10/12 128 units with PT  (64 units each)  30 visits/yr [x]  Yes  []  No 21-22       Latex Allergy  [x]No      []Yes  Pacemaker:  [x] No       [] Yes     Preferred Language for Healthcare:   [x]English       []other:    Pain level:   3/10 left side especially left thigh    SUBJECTIVE:  Pt reporting recent loss of grandmother. 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       Functional Disability Index:     Quick DASH: total score- 35      Stroke Rehabilitation Assessment of Movement (STREAM)  improved to 29 on this date from 2021    OBJECTIVE:      PROM AROM      L R L R PROM on 21   Shoulder Flexion  ~85*  : 0-125*       0-140   Elbow Flexion  WFL          Elbow Extension  WFL          Pronation             Supination  WFL          Wrist Flexion  WFL          Wrist Extension  WFL          Radial Deviation             Composite Flexion (Tip of 3rd Digit to Distal Palmar Crease (cm)) Held in flexion; able to stretch into extension to 2 cm lag    11/29: Held in flexion; able to stretch into extension to 4 cm lag            RESTRICTIONS/PRECAUTIONS:  hx brain bleed, fall risk    Exercises/Interventions:  Treatment focused on improving LUE use as ANDREAS. And gross assist during bimanual tasks. Session initiated with pt completing sit>supine transfer with min(A) followed by bridging to scoot towards head of mat with assistance to position LLE and maintain on mat while scooting. Pt then received AAROM stretching while supine into shoulder flexion/extension, elbow flexion/extension, slight shoulder abduction/adduction, finger flexion/extension. While supine, pt then completed bilateral UE exercise with use of dowel mahesh and min(A) to maintain L grasp:   1) chest press 2x10  2) shoulder flexion/extension 2x10  3) elbow flexion/extension 2x10  Pt then transferred from supine>sit with min(A) and completed the following activities while seated edge of mat following NDT principles:  1) scapular protraction/retraction with posterior/anterior pelvic tilt  2) herapist applied resistance to chair and pateint worked on moving trunk forward toward chair and then pushed chair forward and pulled back to increase proprioceptive awareness of left ue  3) finger web worked on rolling ball (forward/backward, side to side, and in clockwise circles) with both ues on web with slight physical cues from therapist at wrist and posterior humerus. 4) Patient then worked on holding mallet and hitting target (blaze pods)  in left hand with very slight assist at wrist and shoulder from therpaist.  5)patient then worked on motor control with mindful practice of  and release and pronation/supation   HEP addressed and pt reporting that she feels comfortable completing HEP at home and spouse present stating that he is willing to assist with any exercises. Pt also states that she has been wearing resting hand splint at night. Neuromuscular Re-ed / Therapeutic Activities                                                 Manual Intervention                                                     Modalities:     Patient education:  Eval, POC, HEP- pt verbalized understanding       Home Exercise Program:  Written and verbal HEP instructions provided and reviewed:  · Scapular retraction   · Use of resting hand splint  · Mirror therapy at home  · 11/29/21: use of L hand as ANDREAS when seated and during transfers    Therapeutic Exercise and NMR:  [x] (86455) Provided verbal/tactile cueing for activities related to strengthening, flexibility, endurance, ROM  for improvements in scapular, scapulothoracic and UE control with self care, reaching, carrying, lifting, house/yardwork, driving/computer work.    [] (92386) Provided verbal/tactile cueing for activities related to improving balance, coordination, kinesthetic sense, posture, motor skill, proprioception  to assist with  scapular, scapulothoracic and UE control with self care, reaching, carrying, lifting, house/yardwork, driving/computer work.   [] Comments:    Therapeutic Activities:    [x] (68853 or 96094) Provided verbal/tactile cueing for activities related to improving balance, coordination, kinesthetic sense, posture, motor skill, proprioception and motor activation to allow for proper function of scapular, scapulothoracic and UE control with self care, carrying, lifting, driving/computer work  [] Comments:    Home Exercise Program:    [x] (19083) Reviewed/Progressed HEP activities related to strengthening, flexibility, endurance, ROM of scapular, scapulothoracic and UE control with self care, reaching, carrying, lifting, house/yardwork, driving/computer work  [] (04514) Reviewed/Progressed HEP activities related to improving balance, coordination, kinesthetic sense, posture, motor skill, proprioception of scapular, scapulothoracic and UE control with self care, reaching, carrying, lifting, house/yardwork, driving/computer work    [] Comments:    Manual Treatments:  PROM / STM / Oscillations-Mobs:  G-I, II, III, IV (PA's, Inf., Post.)  [x] (86516) Provided manual therapy to mobilize soft tissue/joints of cervical/CT, scapular GHJ and UE for the purpose of modulating pain, promoting relaxation,  increasing ROM, reducing/eliminating soft tissue swelling/inflammation/restriction, improving soft tissue extensibility and allowing for proper ROM for normal function with self care, reaching, carrying, lifting, house/yardwork, driving/computer work  [] Comments:    ADL Training:  [] (18759) Provided self-care/home management training related to activities of daily living and compensatory training, and/or use of adaptive equipment   [] Comments:     Splinting:  [] Fabrication of:   [] (46956) Orthotic/Prosthetic Management, subsequent encounter  [] (67982) Orthotic management and training (fitting and assessment)  [] Comments:      Charges:  Timed Code Treatment Minutes: 45   Total Treatment Minutes: 45     [] EVAL (LOW) 38089   [] OT Re-eval (27300)  [] EVAL (MOD) 07611   [] EVAL (HIGH) 13384       [x] Dolly (87067) x  1  [] YZFGC(83083)  [x] NMR (17110) x  2 [] Estim (attended) (80534)   [] Manual ([id number]) x  [] US (67248)  [] TA () x    [] Paraffin (41130)  [] ADL  (88 649 24 60) x    [] Splint/L code:    [] Estim (unattended) (22 309019)  [] Fluidotherapy (08727)  [] Other:    GOALS:   Patient stated goal: L hand use  [] Progressing: [] Met: [] Not Met: [] Adjusted     Therapist goals for Patient:   Short Term Goals: To be achieved in: 30 days  1. Pt will improve functional ROM to use LUE as ANDREAS during transfers for increased safety and independence by 11/19/21. [x] Progressing: [] Met: [] Not Met: [] Adjusted  2. Pt will improve functional LUE use to complete UB dressing mod I for increased independence in ADLs by 11/19/21. [x] Progressing: [] Met: [] Not Met: [] Adjusted  3.   Pt will improve functional LUE use to complete LB dressing min A for increased independence in ADLs by 11/19/21. [] Progressing: [x] Met: [] Not Met: [] Adjusted  4. Pt will improve oculomotor and cognition skills to complete Trailmaking Part B assessment within 180 seconds with no errors by 11/19/21. [x] Progressing: [] Met: [] Not Met: [] Adjusted     Long Term Goals: To be achieved by discharge  1. Pt will report a QuickDASH Symptom Severity Scale score of 65% or less indicating increased safety and functional independence in desired occupational pursuits by discharge. [x] Progressing: [] Met: [] Not Met: [] Adjusted  2. Pt will increase STREAM total score from 21/70 to 31/70 indicating increased safety and functional independence in desired occupational pursuits by discharge. (added 11/29/21)  [x] Progressing: [] Met: [] Not Met: [] Adjusted    Progression Towards Functional goals:  [] Patient is progressing as expected towards functional goals listed. [x] Progression is slowed due to complexities listed. -falls and fear of falling, significant proprioceptive impairment, left neglect but improving significantly  [] Progression has been slowed due to co-morbidities. [] Plan just implemented, too soon to assess goals progression  [] All goals are met  [] Other:     ASSESSMENT:  Pt has made improvements in PROM of LUE with decreased tone, but continues to have limited participation in functional mobility due to falls/fear of falling and proprioceptive impairment. Treatment/Activity Tolerance:  [x] Patient tolerated treatment well [] Patient limited by fatique  [] Patient limited by pain  [] Patient limited by other medical complications- falls and fear of falling, significant proprioceptive impairment.  [] Other:     Prognosis: [x] Good [] Fair  [] Poor    Patient Requires Follow-up: [x] Yes  [] No    PLAN: See eval  [x] Continue per plan of care [] Alter current plan (see comments)  [] Plan of care initiated [] Hold pending MD visit [] Discharge    Electronically signed by:              Paddy Coburn, OTR/L [id number]

## 2022-02-21 ENCOUNTER — OFFICE VISIT (OUTPATIENT)
Dept: PRIMARY CARE CLINIC | Age: 42
End: 2022-02-21
Payer: COMMERCIAL

## 2022-02-21 VITALS
OXYGEN SATURATION: 99 % | HEIGHT: 66 IN | BODY MASS INDEX: 38.89 KG/M2 | SYSTOLIC BLOOD PRESSURE: 140 MMHG | TEMPERATURE: 96.3 F | WEIGHT: 242 LBS | DIASTOLIC BLOOD PRESSURE: 88 MMHG | HEART RATE: 67 BPM

## 2022-02-21 DIAGNOSIS — I10 ESSENTIAL HYPERTENSION, BENIGN: ICD-10-CM

## 2022-02-21 DIAGNOSIS — G81.94 LEFT HEMIPARESIS (HCC): ICD-10-CM

## 2022-02-21 DIAGNOSIS — Z01.818 PREOP EXAMINATION: Primary | ICD-10-CM

## 2022-02-21 DIAGNOSIS — H43.9 VITREOUS DEBRIS: ICD-10-CM

## 2022-02-21 PROCEDURE — G8427 DOCREV CUR MEDS BY ELIG CLIN: HCPCS | Performed by: FAMILY MEDICINE

## 2022-02-21 PROCEDURE — 99213 OFFICE O/P EST LOW 20 MIN: CPT | Performed by: FAMILY MEDICINE

## 2022-02-21 PROCEDURE — G8417 CALC BMI ABV UP PARAM F/U: HCPCS | Performed by: FAMILY MEDICINE

## 2022-02-21 PROCEDURE — 1036F TOBACCO NON-USER: CPT | Performed by: FAMILY MEDICINE

## 2022-02-21 PROCEDURE — G8484 FLU IMMUNIZE NO ADMIN: HCPCS | Performed by: FAMILY MEDICINE

## 2022-02-21 NOTE — PROGRESS NOTES
Chief Complaint   Patient presents with    Pre-op Exam     Vitreous debris left eye on 02/28/22 on [facility] with Dr Charlie Watts     Patient Name: Kaylin Tripathi  YOB: 1980    This patient presents to the office today for a preoperative consultation at the request of surgeon, Dr. Charlie Watts, who plans on performing vitrectomy left eye to vitreous debris on February 28 at [facility]. Patient has left hemiparesis due to [facility] from ruptured aneurysm, undergoing physical therapy and currently stable. Patient does not have any major cardiac contraindications to surgery, no recent acs, no decompensated heart failure, no uncontrolled arrythmia or major valvular heart disease.       Planned anesthesia: Local and IV sedation   Known anesthesia problems: None   Bleeding risk: No recent or remote history of abnormal bleeding  Personal or FH ofDVT/PE: No      Patient Active Problem List   Diagnosis    HTN (hypertension)    Left hemiparesis ([facility] [address]    Depression with anxiety     Past Surgical History:   Procedure Laterality Date    BRAIN ANEURYSM SURGERY  02/12/2021    s/p right sided craniectomy with ICA clipping and R STA-MCA bypass by Dr. Betsy Joseph, TOTAL ABDOMINAL      LAPAROSCOPY  03/17/2021    PEG tube placement, diagnostic laparoscopy    MOUTH SURGERY      WRIST SURGERY  04/14/2011    ORIF LEFT  WRIST       No Known Allergies  Current Outpatient Medications   Medication Sig Dispense Refill    methocarbamol (ROBAXIN) 500 MG tablet Take 500 mg by mouth nightly      atorvastatin (LIPITOR) 20 MG tablet Take 20 mg by mouth nightly      DULoxetine (CYMBALTA) 30 MG extended release capsule Take 1 capsule by mouth 2 times daily 60 capsule 5    amLODIPine (NORVASC) 10 MG tablet Take 10 mg by mouth daily      metoprolol succinate (TOPROL XL) 100 MG extended release tablet Take 100 mg by mouth daily        No current facility-administered medications for this visit. Social History     Tobacco Use    Smoking status: Never Smoker    Smokeless tobacco: Never Used   Substance Use Topics    Alcohol use: Yes     Comment: Social     Family History   Problem Relation Age of Onset    High Blood Pressure Mother     High Blood Pressure Father     High Blood Pressure Sister        Review of Systems  Review of Systems      Recent Labs:  CBC:   Lab Results   Component Value Date    WBC 4.5 02/15/2022    HGB 10.2 02/15/2022    HCT 32.4 02/15/2022    MCH 24.8 02/15/2022    MCHC 31.6 02/15/2022    RDW 17.0 02/15/2022     02/15/2022    MPV 9.0 02/15/2022     CMP:   Lab Results   Component Value Date     02/15/2022    K 3.7 02/15/2022     02/15/2022    CO2 24 02/15/2022    ANIONGAP 12 02/15/2022    GLUCOSE 92 02/15/2022    BUN 9 02/15/2022    CREATININE 0.8 02/15/2022    GFRAA >60 02/15/2022    GFRAA >60 04/22/2010    CALCIUM 9.4 02/15/2022    PROT 7.0 02/15/2022    LABALBU 4.2 02/15/2022    AGRATIO 1.5 02/15/2022    BILITOT <0.2 02/15/2022    ALKPHOS 96 02/15/2022    ALT 8 02/15/2022    AST 9 02/15/2022    GLOB 2.5 06/02/2019       HBA1C:No results found for: LABA1C, EAG    Objective:     BP (!) 140/88 (Site: Right Upper Arm, Position: Sitting, Cuff Size: Large Adult)   Pulse 67   Temp 96.3 °F (35.7 °C)   Ht 5' 6\" (1.676 m)   Wt 242 lb (109.8 kg)   LMP 03/03/2016   SpO2 99%   BMI 39.06 kg/m²  Weight: 242 lb (109.8 kg)   Physical Exam  General Appearance: Alert, cooperative, no distress,  walks with a cane, left foot with brace, with left-sided weakness  HEENT: Mild facial asymmetry, able to communicate, speech is understandable.   Neck: Supple, symmetrical, trachea midline, no adenopathy; thyroid: no enlargement/tenderness/nodules; no carotid bruit or JVD   Lungs: Clear to auscultation bilaterally, respirations unlabored   Chest Wall: No tenderness or deformity   Heart: Regular rate and rhythm, S1 and S2 normal, no murmur, rub or gallop Extremities: Trace edema, left-sided weakness  Skin: Skin color, texture, turgor normal, no rashes or lesions   Lymph nodes: Cervical, supraclavicular, and axillary nodes normal   Neurologic: Left-sided hemiparesis     Assessment:       Brandon Rodriguez was seen today for pre-op exam.    Diagnoses and all orders for this visit:    Preop examination    Vitreous debris    Essential hypertension, benign    Left hemiparesis ([address]      40 y.o. patient  approved for Surgery         Plan:     1. Preoperative workup as follows: none  2. Change in medication regimen before surgery: Continue current medications. 3. No contraindications to planned surgery  4. Patient is an acceptable cardiac risk for surgery, no cardiac testing recommended prior to surgery    Keep appointment in March. Electronically signed by Aubrie Rodriguez MD on 2/21/2022 at 9:34 AM.    This dictation was generated by voice recognition computer software. Although all attempts are made to edit the dictation for accuracy, there may be errors in the transcription that are not intended.

## 2022-02-22 ENCOUNTER — HOSPITAL ENCOUNTER (OUTPATIENT)
Dept: SPEECH THERAPY | Age: 42
Setting detail: THERAPIES SERIES
Discharge: HOME | End: 2022-02-22
Payer: COMMERCIAL

## 2022-02-22 ENCOUNTER — APPOINTMENT (OUTPATIENT)
Dept: PHYSICAL THERAPY | Age: 42
End: 2022-02-22
Payer: COMMERCIAL

## 2022-02-22 NOTE — FLOWSHEET NOTE
Speech Therapy  Cancellation/No-show Note  Patient Name:  Smith Evans  :  1980   Date:  2022  Cancels to Date: 7  No-shows to Date: 0    Patient status for today's appointment patient:  [x]  Cancelled (11/15, , 1/3, , , 2/10, )  []  Rescheduled appointment  []  No-show     Reason given by patient:  []  Patient ill  []  Conflicting appointment  []  No transportation  []  Conflict with work  []  No reason given  [x]  Other:     Comments: no reason given    Phone call information:   []  Phone call made today to patient at time at number provided:      []  Patient answered, conversation as follows:   []  Patient did not answer, message left as follows:  [x]  Phone call not made today, pt called clinic to cancel    Electronically signed by:  Brianda Barbosa, SLP

## 2022-02-24 ENCOUNTER — APPOINTMENT (OUTPATIENT)
Dept: PHYSICAL THERAPY | Age: 42
End: 2022-02-24
Payer: COMMERCIAL

## 2022-03-23 ENCOUNTER — OFFICE VISIT (OUTPATIENT)
Dept: PRIMARY CARE CLINIC | Age: 42
End: 2022-03-23
Payer: COMMERCIAL

## 2022-03-23 VITALS
SYSTOLIC BLOOD PRESSURE: 130 MMHG | HEIGHT: 66 IN | HEART RATE: 74 BPM | TEMPERATURE: 97.3 F | WEIGHT: 242 LBS | OXYGEN SATURATION: 99 % | BODY MASS INDEX: 38.89 KG/M2 | DIASTOLIC BLOOD PRESSURE: 88 MMHG

## 2022-03-23 DIAGNOSIS — D50.8 OTHER IRON DEFICIENCY ANEMIA: ICD-10-CM

## 2022-03-23 DIAGNOSIS — G81.94 LEFT HEMIPARESIS (HCC): ICD-10-CM

## 2022-03-23 DIAGNOSIS — I10 ESSENTIAL HYPERTENSION, BENIGN: Primary | ICD-10-CM

## 2022-03-23 DIAGNOSIS — F41.8 DEPRESSION WITH ANXIETY: ICD-10-CM

## 2022-03-23 DIAGNOSIS — E78.2 MIXED HYPERLIPIDEMIA: ICD-10-CM

## 2022-03-23 PROCEDURE — G8427 DOCREV CUR MEDS BY ELIG CLIN: HCPCS | Performed by: FAMILY MEDICINE

## 2022-03-23 PROCEDURE — G8484 FLU IMMUNIZE NO ADMIN: HCPCS | Performed by: FAMILY MEDICINE

## 2022-03-23 PROCEDURE — G8417 CALC BMI ABV UP PARAM F/U: HCPCS | Performed by: FAMILY MEDICINE

## 2022-03-23 PROCEDURE — 4004F PT TOBACCO SCREEN RCVD TLK: CPT | Performed by: FAMILY MEDICINE

## 2022-03-23 PROCEDURE — 99214 OFFICE O/P EST MOD 30 MIN: CPT | Performed by: FAMILY MEDICINE

## 2022-03-23 RX ORDER — SENNA AND DOCUSATE SODIUM 50; 8.6 MG/1; MG/1
1 TABLET, FILM COATED ORAL DAILY
COMMUNITY
End: 2022-06-23 | Stop reason: SDUPTHER

## 2022-03-23 RX ORDER — METHOCARBAMOL 500 MG/1
500 TABLET, FILM COATED ORAL NIGHTLY
Qty: 30 TABLET | Refills: 5 | Status: SHIPPED | OUTPATIENT
Start: 2022-03-23 | End: 2022-06-23

## 2022-03-23 RX ORDER — ACETAMINOPHEN 500 MG
500 TABLET ORAL EVERY 6 HOURS PRN
Qty: 120 TABLET | Refills: 3 | Status: SHIPPED | OUTPATIENT
Start: 2022-03-23

## 2022-03-23 RX ORDER — OFLOXACIN 3 MG/ML
SOLUTION/ DROPS OPHTHALMIC
COMMUNITY
Start: 2022-03-16 | End: 2022-06-23

## 2022-03-23 RX ORDER — PREDNISOLONE ACETATE 10 MG/ML
SUSPENSION/ DROPS OPHTHALMIC
COMMUNITY
Start: 2022-03-16 | End: 2022-06-23

## 2022-03-23 ASSESSMENT — PATIENT HEALTH QUESTIONNAIRE - PHQ9
4. FEELING TIRED OR HAVING LITTLE ENERGY: 0
SUM OF ALL RESPONSES TO PHQ QUESTIONS 1-9: 12
7. TROUBLE CONCENTRATING ON THINGS, SUCH AS READING THE NEWSPAPER OR WATCHING TELEVISION: 1
SUM OF ALL RESPONSES TO PHQ QUESTIONS 1-9: 12
SUM OF ALL RESPONSES TO PHQ9 QUESTIONS 1 & 2: 2
SUM OF ALL RESPONSES TO PHQ QUESTIONS 1-9: 12
2. FEELING DOWN, DEPRESSED OR HOPELESS: 1
3. TROUBLE FALLING OR STAYING ASLEEP: 3
6. FEELING BAD ABOUT YOURSELF - OR THAT YOU ARE A FAILURE OR HAVE LET YOURSELF OR YOUR FAMILY DOWN: 0
SUM OF ALL RESPONSES TO PHQ QUESTIONS 1-9: 12
5. POOR APPETITE OR OVEREATING: 3
1. LITTLE INTEREST OR PLEASURE IN DOING THINGS: 1
9. THOUGHTS THAT YOU WOULD BE BETTER OFF DEAD, OR OF HURTING YOURSELF: 0
10. IF YOU CHECKED OFF ANY PROBLEMS, HOW DIFFICULT HAVE THESE PROBLEMS MADE IT FOR YOU TO DO YOUR WORK, TAKE CARE OF THINGS AT HOME, OR GET ALONG WITH OTHER PEOPLE: 1
8. MOVING OR SPEAKING SO SLOWLY THAT OTHER PEOPLE COULD HAVE NOTICED. OR THE OPPOSITE, BEING SO FIGETY OR RESTLESS THAT YOU HAVE BEEN MOVING AROUND A LOT MORE THAN USUAL: 3

## 2022-03-23 NOTE — PROGRESS NOTES
Chief Complaint   Patient presents with    Follow-up     6 wks meds       Subjective:   Ana Hawthorne is a 40 y.o. female here for 6 week  follow-up on her hypertension, hyperlipidemia, depression and anemia. Patient was advised to start ferrous sulfate 325 mg twice a day, vitamin D 2000 units daily. Patient plans to resume PT/OT/speech therapy due to left-sided hemiparesis from ruptured aneurysm. Was put on hold due to her recent left eye surgery. Patient reports emotionally better on Cymbalta 30 mg twice a day. Denies chest pains, shortness of breath, abdominal pain, melena medication. Requesting Tylenol for pain due to left shoulder pain. Also wants a refill of Robaxin. Patient had her left eye vitrectomy on 2/20/2022. Happy that she can see in the left eye although it still blurred. Reviewed blood test done on February 15, 2022:  Total cholesterol 228, triglyceride 128, HDL 59, , sodium 141, potassium 3.7, glucose 92, creatinine 0.8, calcium 9.4, ALT 8, AST 9, vitamin D 17.9, vitamin L40 and folic acid normal, ferritin 12.4, iron 90, hemoglobin 10.2, hematocrit 32.4, WBC 4.5    Current Outpatient Medications   Medication Sig Dispense Refill    prednisoLONE acetate (PRED FORTE) 1 % ophthalmic suspension       ofloxacin (OCUFLOX) 0.3 % solution       methocarbamol (ROBAXIN) 500 MG tablet Take 1 tablet by mouth nightly 30 tablet 5    sennosides-docusate sodium (SENOKOT-S) 8.6-50 MG tablet Take 1 tablet by mouth daily      acetaminophen (TYLENOL) 500 MG tablet Take 1 tablet by mouth every 6 hours as needed for Pain 120 tablet 3    atorvastatin (LIPITOR) 20 MG tablet Take 20 mg by mouth nightly      DULoxetine (CYMBALTA) 30 MG extended release capsule Take 1 capsule by mouth 2 times daily 60 capsule 5    amLODIPine (NORVASC) 10 MG tablet Take 10 mg by mouth daily      metoprolol succinate (TOPROL XL) 100 MG extended release tablet Take 100 mg by mouth daily        No current facility-administered medications for this visit. No Known Allergies     Hypertension ROS: taking medications as instructed, no medication side effects noted, no chest pain on exertion, no dyspnea on exertion, no swelling of ankles. Objective:   /88 (Site: Right Upper Arm, Position: Sitting, Cuff Size: Large Adult)   Pulse 74   Temp 97.3 °F (36.3 °C)   Ht 5' 6\" (1.676 m)   Wt 242 lb (109.8 kg)   LMP 03/03/2016   SpO2 99%   BMI 39.06 kg/m²    Appearance alert, well appearing, and in no distress and wheelchair-bound. General exam BP noted to be well controlled today in office, S1, S2 normal, no gallop, no murmur, chest clear, no JVD, no HSM, no edema. Left ankle with a brace due to foot drop. Neurologic exam: Left-sided weakness. Orders Placed This Encounter   Procedures    CBC with Auto Differential     Standing Status:   Future     Standing Expiration Date:   3/23/2023    Basic Metabolic Panel     Standing Status:   Future     Standing Expiration Date:   3/23/2023    Lipid Panel     Standing Status:   Future     Standing Expiration Date:   3/23/2023     Order Specific Question:   Is Patient Fasting?/# of Hours     Answer:   > 8 hrs    Hepatic Function Panel     Standing Status:   Future     Standing Expiration Date:   3/24/2023        Assessment/Plan:    1. Essential hypertension, benign  Blood pressure stable at 130/88. The goal is to keep it under 140/90. Continue amlodipine 10 mg daily and Toprol- mg daily. Continue to monitor and watch salt intake. - Basic Metabolic Panel; Future    2. Mixed hyperlipidemia  On Lipitor 20 mg at bedtime. LDL goal is to keep it under 80.  - Lipid Panel; Future  - Hepatic Function Panel; Future    3. Other iron deficiency anemia  Check CBC. Continue ferrous sulfate for now. - CBC with Auto Differential; Future    4. Depression with anxiety  PHQ-9 improved from 17 down to 12.   Patient will feel better once she will resume physical therapy and whether will be nicer. Prefer to stay on Cymbalta 30 mg twice a day. 5. Left hemiparesis (HCC)/SAH due to ruptured aneurysm  Restart PT/OT and speech therapy. Refill Robaxin and prescription sent for Tylenol for left shoulder pain. - methocarbamol (ROBAXIN) 500 MG tablet; Take 1 tablet by mouth nightly  Dispense: 30 tablet; Refill: 5  - acetaminophen (TYLENOL) 500 MG tablet; Take 1 tablet by mouth every 6 hours as needed for Pain  Dispense: 120 tablet; Refill: 3      6. Slow transit constipation  Due to LOC and activity and history of previous stroke. We will try MiraLAX and Senokot. - polyethylene glycol (GLYCOLAX) 17 GM/SCOOP powder; Take 17 g by mouth daily  Dispense: 1530 g; Refill: 5  - senna (SENOKOT) 8.6 MG tablet; Take 1 tablet by mouth 2 times daily  Dispense: 60 tablet; Refill: 5     7. BMI over 39%  Work on weight loss. Encouraged to work on portion control since she cannot do much activity. Avoid eating late at night. Return in about 3 months (around 6/23/2022) for HTN/depression, adult well check. Electronically signed by Sydnee Nevilel MD on 3/23/2022 at 8:55 AM EDT     This dictation was generated by voice recognition computer software. Although all attempts are made to edit the dictation for accuracy, there may be errors in the transcription that are not intended.

## 2022-06-23 ENCOUNTER — OFFICE VISIT (OUTPATIENT)
Dept: PRIMARY CARE CLINIC | Age: 42
End: 2022-06-23
Payer: COMMERCIAL

## 2022-06-23 VITALS
HEART RATE: 68 BPM | SYSTOLIC BLOOD PRESSURE: 142 MMHG | DIASTOLIC BLOOD PRESSURE: 80 MMHG | WEIGHT: 252 LBS | TEMPERATURE: 97.2 F | BODY MASS INDEX: 40.67 KG/M2 | OXYGEN SATURATION: 97 %

## 2022-06-23 DIAGNOSIS — G81.94 LEFT HEMIPARESIS (HCC): ICD-10-CM

## 2022-06-23 DIAGNOSIS — D64.9 LOW HEMOGLOBIN: ICD-10-CM

## 2022-06-23 DIAGNOSIS — Z11.59 NEED FOR HEPATITIS C SCREENING TEST: ICD-10-CM

## 2022-06-23 DIAGNOSIS — B37.2 SKIN YEAST INFECTION: ICD-10-CM

## 2022-06-23 DIAGNOSIS — I10 ESSENTIAL HYPERTENSION, BENIGN: ICD-10-CM

## 2022-06-23 DIAGNOSIS — E55.9 VITAMIN D DEFICIENCY: ICD-10-CM

## 2022-06-23 DIAGNOSIS — F41.8 DEPRESSION WITH ANXIETY: ICD-10-CM

## 2022-06-23 DIAGNOSIS — E78.2 MIXED HYPERLIPIDEMIA: ICD-10-CM

## 2022-06-23 DIAGNOSIS — Z00.00 ROUTINE GENERAL MEDICAL EXAMINATION AT A HEALTH CARE FACILITY: Primary | ICD-10-CM

## 2022-06-23 PROCEDURE — 99396 PREV VISIT EST AGE 40-64: CPT | Performed by: FAMILY MEDICINE

## 2022-06-23 RX ORDER — BACLOFEN 20 MG/1
20 TABLET ORAL 2 TIMES DAILY
COMMUNITY
End: 2022-10-26 | Stop reason: SDUPTHER

## 2022-06-23 RX ORDER — NYSTATIN 100000 U/G
CREAM TOPICAL
Qty: 30 G | Refills: 1 | Status: SHIPPED | OUTPATIENT
Start: 2022-06-23

## 2022-06-23 RX ORDER — METOPROLOL SUCCINATE 100 MG/1
100 TABLET, EXTENDED RELEASE ORAL DAILY
Qty: 30 TABLET | Refills: 11 | Status: SHIPPED | OUTPATIENT
Start: 2022-06-23 | End: 2022-10-26 | Stop reason: SDUPTHER

## 2022-06-23 RX ORDER — AMLODIPINE BESYLATE 10 MG/1
10 TABLET ORAL DAILY
Qty: 30 TABLET | Refills: 11 | Status: SHIPPED | OUTPATIENT
Start: 2022-06-23 | End: 2022-10-26 | Stop reason: SDUPTHER

## 2022-06-23 RX ORDER — DULOXETIN HYDROCHLORIDE 30 MG/1
30 CAPSULE, DELAYED RELEASE ORAL 2 TIMES DAILY
Qty: 60 CAPSULE | Refills: 11 | Status: SHIPPED | OUTPATIENT
Start: 2022-06-23

## 2022-06-23 RX ORDER — ATORVASTATIN CALCIUM 20 MG/1
20 TABLET, FILM COATED ORAL NIGHTLY
Qty: 30 TABLET | Refills: 11 | Status: SHIPPED | OUTPATIENT
Start: 2022-06-23 | End: 2022-07-25 | Stop reason: SDUPTHER

## 2022-06-23 RX ORDER — SENNA AND DOCUSATE SODIUM 50; 8.6 MG/1; MG/1
1 TABLET, FILM COATED ORAL DAILY
Qty: 30 TABLET | Refills: 6 | Status: SHIPPED | OUTPATIENT
Start: 2022-06-23

## 2022-06-23 ASSESSMENT — PATIENT HEALTH QUESTIONNAIRE - PHQ9
8. MOVING OR SPEAKING SO SLOWLY THAT OTHER PEOPLE COULD HAVE NOTICED. OR THE OPPOSITE, BEING SO FIGETY OR RESTLESS THAT YOU HAVE BEEN MOVING AROUND A LOT MORE THAN USUAL: 0
2. FEELING DOWN, DEPRESSED OR HOPELESS: 0
6. FEELING BAD ABOUT YOURSELF - OR THAT YOU ARE A FAILURE OR HAVE LET YOURSELF OR YOUR FAMILY DOWN: 0
9. THOUGHTS THAT YOU WOULD BE BETTER OFF DEAD, OR OF HURTING YOURSELF: 0
SUM OF ALL RESPONSES TO PHQ9 QUESTIONS 1 & 2: 0
SUM OF ALL RESPONSES TO PHQ QUESTIONS 1-9: 7
SUM OF ALL RESPONSES TO PHQ QUESTIONS 1-9: 7
3. TROUBLE FALLING OR STAYING ASLEEP: 0
10. IF YOU CHECKED OFF ANY PROBLEMS, HOW DIFFICULT HAVE THESE PROBLEMS MADE IT FOR YOU TO DO YOUR WORK, TAKE CARE OF THINGS AT HOME, OR GET ALONG WITH OTHER PEOPLE: 2
4. FEELING TIRED OR HAVING LITTLE ENERGY: 2
5. POOR APPETITE OR OVEREATING: 3
7. TROUBLE CONCENTRATING ON THINGS, SUCH AS READING THE NEWSPAPER OR WATCHING TELEVISION: 2
SUM OF ALL RESPONSES TO PHQ QUESTIONS 1-9: 7
1. LITTLE INTEREST OR PLEASURE IN DOING THINGS: 0
SUM OF ALL RESPONSES TO PHQ QUESTIONS 1-9: 7

## 2022-06-23 NOTE — PATIENT INSTRUCTIONS
Well Visit, Ages 25 to 48: Care Instructions  Overview     Well visits can help you stay healthy. Your doctor has checked your overall health and may have suggested ways to take good care of yourself. Your doctor also may have recommended tests. At home, you can help prevent illness withhealthy eating, regular exercise, and other steps. Follow-up care is a key part of your treatment and safety. Be sure to make and go to all appointments, and call your doctor if you are having problems. It's also a good idea to know your test results and keep alist of the medicines you take. How can you care for yourself at home?  Get screening tests that you and your doctor decide on. Screening helps find diseases before any symptoms appear.  Eat healthy foods. Choose fruits, vegetables, whole grains, protein, and low-fat dairy foods. Limit fat, especially saturated fat. Reduce salt in your diet.  Limit alcohol. If you are a man, have no more than 2 drinks a day or 14 drinks a week. If you are a woman, have no more than 1 drink a day or 7 drinks a week.  Get at least 30 minutes of physical activity on most days of the week. Walking is a good choice. You also may want to do other activities, such as running, swimming, cycling, or playing tennis or team sports. Discuss any changes in your exercise program with your doctor.  Reach and stay at a healthy weight. This will lower your risk for many problems, such as obesity, diabetes, heart disease, and high blood pressure.  Do not smoke or allow others to smoke around you. If you need help quitting, talk to your doctor about stop-smoking programs and medicines. These can increase your chances of quitting for good.  Care for your mental health. It is easy to get weighed down by worry and stress. Learn strategies to manage stress, like deep breathing and mindfulness, and stay connected with your family and community.  If you find you often feel sad or hopeless, talk with your doctor. Treatment can help.  Talk to your doctor about whether you have any risk factors for sexually transmitted infections (STIs). You can help prevent STIs if you wait to have sex with a new partner (or partners) until you've each been tested for STIs. It also helps if you use condoms (male or female condoms) and if you limit your sex partners to one person who only has sex with you. Vaccines are available for some STIs, such as HPV.  Use birth control if it's important to you to prevent pregnancy. Talk with your doctor about the choices available and what might be best for you.  If you think you may have a problem with alcohol or drug use, talk to your doctor. This includes prescription medicines (such as amphetamines and opioids) and illegal drugs (such as cocaine and methamphetamine). Your doctor can help you figure out what type of treatment is best for you.  Protect your skin from too much sun. When you're outdoors from 10 a.m. to 4 p.m., stay in the shade or cover up with clothing and a hat with a wide brim. Wear sunglasses that block UV rays. Even when it's cloudy, put broad-spectrum sunscreen (SPF 30 or higher) on any exposed skin.  See a dentist one or two times a year for checkups and to have your teeth cleaned.  Wear a seat belt in the car. When should you call for help? Watch closely for changes in your health, and be sure to contact your doctor if you have any problems or symptoms that concern you. Where can you learn more? Go to [email or web address] and sign in to your [employer] account. Enter P072 in the [facility] box to learn more about \"Well Visit, Ages 25 to 48: Care Instructions. \"     If you do not have an account, please click on the \"Sign Up Now\" link. Current as of: October 6, 2021               Content Version: 13.3  © 3868-8271 [employer]. Care instructions adapted under license by [facility] ([facility]).  If you have questions about a medical condition or this instruction, always ask your healthcare professional. [place] [address] any warranty or liability for your use of this information.

## 2022-06-23 NOTE — PROGRESS NOTES
Chief Complaint   Patient presents with    Annual Exam     Needs new order for OT to [id number] [facility]. They already received the order for PT     Subjective:   Esme Ring is a 40 y.o. female and is here for a comprehensive physical exam and 3 months follow-up on her hypertension, hyperlipidemia, depression and anemia. Has left hemiparesis due to [address] Need re-order for OT referral. Will resume PT and Speech therapy tomorrow. She is able to walk with cane. Still needs assistance with her ADL/IADL including bathing/hygiene. Emotionally better.  noticed rash underneath breast.  Compliant with current medications with assistance from her  and requesting refills. PHQ 9 scored 7    Reviewed blood test done on 2/15/2022: Ferritin level 12.4, iron 90, hemoglobin 10.2, hematocrit 32.4, WBC 4.5, vitamin Q32 989, folic acid 2.20, vitamin D17.9, sodium 141, potassium 3.7, BUN 9, creatinine 0.8, ALT 8, AST 9, total cholesterol 228, triglyceride 128, HDL 59, . Advised patient to start vitamin D 2000 units a day and ferrous sulfate twice a day.  History:  LMP: Patient's last menstrual period was 2016.   Last pap date: had hysterectomy in  due to fibroids  Abnormal pap? no  : 6  Para: 7    Past Medical History:   Diagnosis Date    Anemia     during pregnancy    History of chicken pox     HLD (hyperlipidemia)     Hypertension     Pulmonary embolism ([address] 2021    Partial visualization of pulmonary emboli in R and L pulmonary artery bifurcations    Subarachnoid hemorrhage ([address] 3/1/2021    Wrist fracture, left 2011     Past Surgical History:   Procedure Laterality Date    BRAIN ANEURYSM SURGERY  2021    s/p right sided craniectomy with ICA clipping and R STA-MCA bypass by Dr. Allyson Fenton, TOTAL ABDOMINAL (CERVIX REMOVED)      LAPAROSCOPY  2021    PEG tube placement, diagnostic laparoscopy    MOUTH SURGERY      WRIST SURGERY  04/14/2011    ORIF LEFT  WRIST     Family History   Problem Relation Age of Onset    High Blood Pressure Mother     High Blood Pressure Father     High Blood Pressure Sister      Social History     Tobacco Use    Smoking status: Current Every Day Smoker     Types: Cigars    Smokeless tobacco: Never Used   Substance Use Topics    Alcohol use: Yes     Comment: Social    Drug use: No     Current Outpatient Medications   Medication Sig Dispense Refill    baclofen (LIORESAL) 20 MG tablet Take 20 mg by mouth 2 times daily      amLODIPine (NORVASC) 10 MG tablet Take 1 tablet by mouth daily 30 tablet 11    metoprolol succinate (TOPROL XL) 100 MG extended release tablet Take 1 tablet by mouth daily 30 tablet 11    DULoxetine (CYMBALTA) 30 MG extended release capsule Take 1 capsule by mouth 2 times daily 60 capsule 11    atorvastatin (LIPITOR) 20 MG tablet Take 1 tablet by mouth nightly 30 tablet 11    sennosides-docusate sodium (SENOKOT-S) 8.6-50 MG tablet Take 1 tablet by mouth daily 30 tablet 6    nystatin (MYCOSTATIN) 658347 UNIT/GM cream Apply topically 2 times daily. 30 g 1    acetaminophen (TYLENOL) 500 MG tablet Take 1 tablet by mouth every 6 hours as needed for Pain 120 tablet 3     No current facility-administered medications for this visit. No Known Allergies    Do you take any herbs or supplements that were not prescribed by a doctor? no  Are you taking calcium supplements? no  Are you taking aspirin daily?  no    Review of Systems  Do you have pain that bothers you in your daily life? yes  Review of Systems     Objective:   BP (!) 142/80   Pulse 68   Temp 97.2 °F (36.2 °C) (Infrared)   Wt 252 lb (114.3 kg)   LMP 03/03/2016   SpO2 97%   BMI 40.67 kg/m²    Physical Exam     General:   alert and appears stated age, w/c bound   Skin:   Rash underneath the breast especially the left side consistent with yeast infection   Oral cavity:   lips, mucosa, and tongue normal; teeth and gums normal   Eyes:   sclerae white, pupils equal and reactive, red reflex normal bilaterally   Ears:   normal bilaterally   Neck:   no adenopathy, supple, symmetrical, trachea midline and thyroid not enlarged, symmetric, no tenderness/mass/nodules   Lungs:  clear to auscultation bilaterally   Heart:   regular rate and rhythm, S1, S2 normal, no murmur, click, rub or gallop   Abdomen:  soft, non-tender; bowel sounds normal; no masses,  no organomegaly   Extremities:  Left hemiparesis and left ankle with a brace   Neuro:  (+) left hemiparesis, speech slightly slurred but understandable     Assessment:     Healthy female exam.  40year-old     Plan:   1. Routine general medical examination at a health care facility  Patient Counseling:  --Nutrition: Stressed importance of moderation in sodium/caffeine intake, saturated fat and cholesterol, caloric balance, sufficient intake of fresh fruits, vegetables, fiber, calcium  --Exercise: Stressed the importance of regular exercise. --Substance Abuse: Discussed cessation/primary prevention of tobacco, alcohol, or other drug use; driving or other dangerous activities under the influence; availability of treatment for abuse. --Injury prevention: Discussed safety belts, safety helmets, smoke detector, smoking near bedding or upholstery. --Dental health: Discussed importance of regular tooth brushing, flossing, and dental visits. --Immunizations reviewed. Offered DTaP and pneumonia vaccine but patient declined at this time. Advised to go to local pharmacy for Cindy Penta vaccination. --Discussed benefits of screening colonoscopy. Due at 40years old. - Lipid Panel; Future  - Comprehensive Metabolic Panel; Future    2. Essential hypertension, benign  Blood pressure stable at 142/80. Continue amlodipine 10 mg daily and Toprol- mg daily. Watch salt intake. We will continue to monitor. Avoid drop blood pressure due to history of SAH.   - amLODIPine (NORVASC) 10 MG

## 2022-06-24 ENCOUNTER — HOSPITAL ENCOUNTER (OUTPATIENT)
Dept: PHYSICAL THERAPY | Age: 42
Setting detail: THERAPIES SERIES
Discharge: HOME | End: 2022-06-24
Payer: COMMERCIAL

## 2022-06-24 PROCEDURE — 97112 NEUROMUSCULAR REEDUCATION: CPT

## 2022-06-24 PROCEDURE — 97162 PT EVAL MOD COMPLEX 30 MIN: CPT

## 2022-06-24 NOTE — PLAN OF CARE
[facility], [address] [facility], [address]  Phone: [phone]   Fax: [phone]     Physical Therapy Certification    Dear  America Malagon MD,    We had the pleasure of evaluating the following patient for physical therapy services at [address] [place] [address]. A summary of our findings can be found in the initial assessment below. This includes our plan of care. If you have any questions or concerns regarding these findings, please do not hesitate to contact me at the office phone number checked above. Thank you for the referral.       Physician Signature:_______________________________Date:__________________  By signing above (or electronic signature), therapists plan is approved by physician      Patient: Jannie Condon   : 1980   MRN: [id number]  Referring Physician:  America Malagon MD      Evaluation Date: 2022      Medical Diagnosis Information:  Diagnosis: R25.2 (ICD-10-CM) - Cramp and spasm;   Left hemiparesis with spasticity   Treatment Diagnosis: Impaired proprioception of LUE & LE, avoidance of L extremities, Impaired balance & gait                                         Insurance information: PT Insurance Information: [facility]. 30 visits pcy. PA required    Plan of care sent to provider:      []Faxed   [x]Co-signature    (attempts: 1[x] 22 2[] 3[])          Precautions/ Contra-indications:   Adhesive allergy:  [x]NO      []YES   Latex Allergy:   [x]NO      []YES     Preferred Language for Healthcare:   [x]English       []other:    C-SSRS Triggered by Intake questionnaire (Past 2 wk assessment ):   [x] No, Questionnaire did not trigger screening.   [] Yes, Patient intake triggered C-SSRS Screening     [] Completed, no further action required.    [] Completed, PCP notified via Epic      Functional Scale: (RW)  []rollator   []hemiwalker   []crutches   []loftstrands    []large based quad cane (LBQC)  [x]small based quad cane (SBQC)  [x]manual wc    [] electric wc    []lift chair     []hospital bed     []home o2    L      []reacher     []life alert     []none    []unknown  []other:   Home environment:   []apartment    []1 story      []2 story     [] in LTC facility   [] split level home    [] mobile home    []senior apartment     [x] unknown     Steps to enter first floor:    [] N/A    []no steps    []     steps to enter   []no handrail    []single HR     []bilateral HR   []ramp    [] unknown     Steps to second floor:  [] N/A    []     steps    []full flight 12-13   []no handrail     [] single HR     [] bilateral HR   [] unknown     Bathroom: []tub shower    [] walk-in shower   []grab bars    []shower chair   []tub shower bench   []raised toilet seat w/arms  []raised toilet seat w/o arms   []BSC (3 in 1)   []unknown           OBJECTIVE:     Cognitive Status:    A&O to  [x]x4    []person    []place    []time    []situation    []orientation not directly assessed    []not oriented   Able to follow:   []1 step commands    []1 step command inconsistently     [x]2 step commands       []multi-step commands     Vision:    []WNL    []wears glasses for reading    [x]wears glasses for sight    [x]Homonymous hemianopsia  []diplopia    [x]other:  L sided neglect Hearing:   [x]WNL    []wears hearing aides    []impaired     []tinnitus      Communication      [x]WNL    []slurred speech    []expressive aphasia    []receptive aphasia     []impaired:  Comments:       Functional Mobility    Bed Mobility:     Transfers:  Rolling to right side:      Sit to stand:  Weight shifted onto RLE & RUE, transitions rapidly, L foot comes off ground at times, pushes's RLE into chair  Rolling to left side:      Stand to sit:  Doesn't line up correctly prior to sit, poor eccentric control  Scooting in bed:      Stand pivot:    Supine to sit:       Bed to chair:    Sit to supine:        Gait Testing:     Gait   Level of Assistance needed:   CGA   Assistive Device Used: SBQC  L AFO   Gait Deviations (firm surface/linoleum):   Weight shifted onto RLE & QC w/R SB trunk, initiates L hip flex w/lumbar ext & demo's prolonged hip flex to increase LLE step length;  Decreased R step length, L genu recurvatum   Stairs  Level of Assistance needed:  NA    Pt negotiated up/down 4 stairs:   []WNL with 0-1 HR, reciprocal pattern, no falter     []reciprocal    []step to pattern:       []no HR    []1 HR    [] B HR   Assistive Device Used:       Deficits noted:          Balance:   Static Sitting:  []normal  [x]good  []good-  []fair+  []fair   []fair -   []poor  Dynamic Sitting: []normal  []good  [x]good-  []fair+  []fair   []fair -   []poor  Static Standing:  []normal  []good  []good-  []fair+  [x]fair   []fair -   []poor  Dynamic Standing: []normal  []good  []good-  []fair+  []fair   [x]fair -   []poor    Balance/Special Tests/Outcome Measures: Result N/A Comments   SLS      Feet Together      Romberg         Tandem Stance      QOLIBRI      TUG Score npv     10-meter Walk      30 sec Sit to Stand 3     5 X sit to stand 40\"     2MWT w/QC 65'     Functional Gait Assessment      Tinetti Assessment Tinetti Total Score: 8     REID Balance Assessment               Coordination Testing:       Finger to Nose:        []WNL  [x]Impaired   Alternating pronation/supination:   []WNL  [x]Impaired   Finger/Thumb opposition:  []WNL  [x]Impaired   Heel to shin (sitting or supine):      []WNL  [x]Impaired   Alternating Toe Tapping:       []WNL  [x]Impaired     Flexibility     Hip flexion/Adi test:    []decreased R  [x]decreased L  Hamstrings:    []decreased R  [x]decreased L  Gastrocs:     []decreased R  [x]decreased L  Obers/TFL/ITB:    []decreased R  []decreased L  Piriformis:    []decreased R  [x]decreased L  Other:      Tone   RUE:  [x]WNL []Hypertonia []Hypotonia []Tremoring []Spastic []Clonus []Flaccid  LUE:  []WNL  []Hypertonia [x]Hypotonia []Tremoring [x]Spastic []Clonus []Flaccid  RLE:  [x]WNL  []Hypertonia []Hypotonia []Tremoring []Spastic []Clonus []Flaccid  LLE:   []WNL  []Hypertonia [x]Hypotonia []Tremoring []Spastic [x]Clonus []Flaccid      Sensation      Light touch:    []WNL    [x]Diminished    []Impaired   []Absent Location:   [] RUE    [x]LUE         []RLE     [x]LLE     Comment:    Proprioception:    []WNL    []Diminished    [x]Impaired   []Absent Location:   [] RUE    [x]LUE         []RLE     [x]LLE     Comment:          MMT LEFT RIGHT Comments   Neck flexion (C1-C2)      Neck sidebending (C3)      Shoulder flexion/elevation (C4)      Shoulder abduction (C5)      Shoulder ER      Shoulder IR      Elbow flexion/wrist extension (C6)      Elbow extension/wrist flexion (C7)      Thumb abduction (C8)      Finger abduction (T1)            Hip flexion (L1-L2) 3+ WFL    Hip abduction  WFL    Hip extension 1+ WFL    Knee extension (L2-L4) 2 WFL    Knee flexion  WFL    Ankle Dorsiflexion (L4-L5) 0 WFL    Ankle Plantar flexion (S1-S2)  WFL    Ankle Eversion (S1-S2)      Great Toe Ext (L5)            Core Strength             [x] Patient history, allergies, meds reviewed. Medical chart reviewed. See intake form. Review Of Systems (ROS):  [x]Performed Review of systems (Integumentary, CardioPulmonary, Neurological) by intake and observation. Intake form has been scanned into medical record. Patient has been instructed to contact their primary care physician regarding ROS issues if not already being addressed at this time.       Co-morbidities/Complexities (which will affect course of rehabilitation):   []None        []Hx of COVID   Arthritic conditions   []Rheumatoid arthritis (M05.9)  []Osteoarthritis (M19.91)  []Gout   Cardiovascular conditions   [x]Hypertension (I10)  [x]Hyperlipidemia (E78.5)  []Angina pectoris (I20)  []Atherosclerosis Index: 60-79%   Risk of Falls:   []Low (> 24)   []Mod (19-23)   [x]High (< 18)    []Dynamic Gait Index       Risk of Falls: <19 = increased fall risk    []Functional Gait Assessment           Indications: Non-specific older adults: <22/30 = risk of falls; Parkinson's patients <15-18/30 = risk of falls    []Dash Balance Scale          []41-56 = independent     []21-40 = walking with assistance     []0-20 = wc bound    []Timed Up and Go (TUG)     seconds     []<12 = Independent    []<20 = Fall risk    []20-29  = High fall risk    []>30 = Severe fall risk  [] Falls education provided, including:       ASSESSMENT:  Patient is a 41 yo female who is returning to PT services following 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 Patient presents w/decreased LLE proprioception, muscle fiber recruitment, balance & safety with gait, pt denies falls, but does remain fall risk due to rapidly increasing fatigue levels. Patient can benefit from PT services to address functional deficits.            Functional Impairments:     [x]Noted scapular/hip hypomobility   []Noted scapular/hip hypermobility   [x]Assistance required with functional mobility/gait for safety   [x]Decreased core/proximal hip strength and neuromuscular control    [x]Decreased UE/LE functional strength    []Abnormal reflexes/sensation/myotomal/dermatomal deficits  []Hypertonic UE/LE   [x]Hypotonic UE/LE  []Dislocated//Hypermobile Glenohumeral joint  [x]Impaired balance/coordination/proprioceptive control    []other:      Functional Activity Limitations (from functional questionnaire and intake)   []Reduced ability to tolerate prolonged functional positions   [x]Reduced ability or difficulty with changes of positions or transfers between positions   [x]Reduced ability to maintain good posture and demonstrate good body mechanics with sitting, bending, and lifting   []Reduced ability to sleep   [] Reduced ability or tolerance with driving and/or computer work   [x]Reduced ability to perform lifting, reaching, carrying tasks   [x]Reduced ability to squat   [x]Reduced ability to forward bend   [x]Reduced ability to ambulate prolonged functional periods/distances/surfaces   [x]Reduced ability to ascend/descend stairs   []other:       Participation Restrictions   [x]Reduced participation in self care activities   [x]Reduced participation in home management activities   []Reduced participation in work activities   [x]Reduced participation in social activities. []Reduced participation in sport/recreational activities.     Classification:   [x]Signs/symptoms consistent with Primary prevention/risk reduction for loss of balance and falls    []Signs/symptoms consistent with Impaired neuromotor development   [x]Signs/symptoms consistent with Impaired motor function and sensory integrity associated w/non-progressive disorders of CNS - Congenital/Aquired in Infancy/Childhood or Acquired in Adolescence/Adulthood   []Signs/symptoms consistent with Impaired motor function and sensory integrity associate w/progressive disorders of the CNS     []Signs/symptoms consistent with Impaired motor function and sensory integrity associated w/acute or chronic polyneuropathies   []Signs/symptoms consistent with Impaired motor function, peripheral nerve integrity, and sendory integrity associated w/non-progressive disorders of spinal cord   []other:      Prognosis/Rehab Potential:   Tolerance of evaluation/treatment:    []Excellent     []Excellent   [x]Good     [x]Good   []Fair      []Fair   []Poor      []Poor      Physical Therapy Evaluation Complexity Justification  [x] A history of present problem with:  [] no personal factors and/or comorbidities that impact the plan of care;  []1-2 personal factors and/or comorbidities that impact the plan of care  [x]3 personal factors and/or comorbidities that impact the plan of care  [x] An examination of body systems using standardized tests and measures addressing any of the following: body structures and functions (impairments), activity limitations, and/or participation restrictions;:  [] a total of 1-2 or more elements   [x] a total of 3 or more elements   [] a total of 4 or more elements   [x] A clinical presentation with:  [x] stable and/or uncomplicated characteristics   [] evolving clinical presentation with changing characteristics  [] unstable and unpredictable characteristics;   [x] Clinical decision making of [] low, [x] moderate, [] high complexity using standardized patient assessment instrument and/or measurable assessment of functional outcome. [] EVAL (LOW) 02065 (typically 15 minutes face-to-face)  [x] EVAL (MOD) 95422 (typically 30 minutes face-to-face)  [] EVAL (HIGH) 66363 (typically 45 minutes face-to-face)  [] RE-EVAL     PLAN: PT to begin focusing on: Activation of scapular and hip musculature, Transfer & Gait safety, Evenly distributed weight-bearing through extremities, Pain Management    Frequency/Duration:  2 days per week for 8 Weeks:  Interventions:  [x]  Therapeutic exercise including: strength training, ROM, for LE, Glutes and core   [x]  NMR activation and proprioception for shoulder complex, glutes, UE/LE, and Core   [x]  Manual therapy as indicated for Shoulder & Hip complex, LE and core activation to include: STM, manual cues to facilitate/inhibit muscle groups. [x]  Modalities as needed that may include: thermal agents, E-stim, Biofeedback, US as indicated  [x]  Patient education on joint protection, postural re-education, home/activity modification, progression of HEP. HEP instruction:   Shift weight to L to facilitate LLE muscle fiber recruitment with all functional activities.         GOALS:  Patient stated goal: improve transfers and gait  [] Progressing: [] Met: [] Not Met: [] Adjusted    Therapist goals for Patient:   Short Term Goals: To be achieved in: 3 weeks  1. Independent in HEP and progression per patient tolerance, in order to prevent re-injury. [] Progressing: [] Met: [] Not Met: [] Adjusted  2. Patient to demonstrate 40% WB'ing of LLE during sit to stand transfers to assist in muscle recruitment with all tasks. [] Progressing: [] Met: [] Not Met: [] Adjusted    Long Term Goals: To be achieved in: 8 weeks  1. Patient to demonstrate TUG w/QC score of <15\" in order to demonstrate reduced fall risk to assist with reaching prior level of function. [] Progressing: [] Met: [] Not Met: [] Adjusted  2. FOTO score of at least 56 to assist with reaching prior level of function. [] Progressing: [] Met: [] Not Met: [] Adjusted  3. Patient will demonstrate an increase 30\" sit to stand reps to 10 with 40% WB'ing through LLE, to improve transfers to/from all surfaces & heights  [] Progressing: [] Met: [] Not Met: [] Adjusted  4. Patient will improve Tinetti Balance score to 15/28 in order to . [] Progressing: [] Met: [] Not Met: [] Adjusted  5. Patient will increase 2MWT w/QC to 150' in order to improve community access.   [] Progressing: [] Met: [] Not Met: [] Adjusted     Electronically signed by:  Oswaldo Scott PT

## 2022-06-27 ENCOUNTER — HOSPITAL ENCOUNTER (OUTPATIENT)
Dept: OCCUPATIONAL THERAPY | Age: 42
Setting detail: THERAPIES SERIES
Discharge: HOME | End: 2022-06-27
Payer: COMMERCIAL

## 2022-06-27 NOTE — FLOWSHEET NOTE
[address] [facility] Physical Therapy  Phone: [phone]   Fax: [phone]    Physical Therapy Daily Treatment Note  Date:  2022    Patient Name:  Tj Mcnair    :  1980  MRN: [id number]  Medical/Treatment Diagnosis Information:  · Diagnosis: R25.2 (ICD-10-CM) - Cramp and spasm;   Left hemiparesis with spasticity  · Treatment Diagnosis: Impaired proprioception of LUE & LE, avoidance of L extremities, Impaired balance & gait  Insurance/Certification information:  PT Insurance Information: [employer]. 30 visits pcy. PA required  Physician Information:   Shree Mora MD  Plan of care signed (Y/N): []  Yes [x]  No     Date of Patient follow up with Physician:      Progress Report: []  Yes  [x]  No     Date Range for reporting period:  Beginning  2022   Ending      Progress report due (10 Rx/or 30 days whichever is less): visit #10 or  (date)     Recertification due (POC duration/ or 90 days whichever is less): visit #16 or  (date)     Visit # Insurance Allowable Auth required? Date Range   eval + ?  [x]  Yes, [employer]  []  No ?       Units approved Units used Date Range   ** ** **     Latex Allergy:  [x]NO      []YES  Preferred Language for Healthcare:   [x]English       []Other:      Functional Scale:                                                                                                      Date assessed:  FOTO physical FS primary measure score = 49; risk adjusted = 50                        Functional Scale/Test Evaluation 2022 30 day  60 day  Discharge    Tinetti Assessment        30\" STS 3       5x STS 40\"       2MWT w/QC 65'            Pain level:  4/10  Location:  L knee & elbow    SUBJECTIVE:  See eval    OBJECTIVE: See eval      RESTRICTIONS/PRECAUTIONS:   HTN    Interventions/Exercises:   Therapeutic Exercises (74530) Resistance / level Sets/sec Reps Notes   TM  Seated stepper Neuromuscular Re-ed (10948)                                                                Therapeutic Activities (04426) /  Functional Tasks / Gait Training (28828)       50% LE sit to stand transfers                            Gait Training:  Step up and overs   Gabe                                   Manual Intervention (18613)                                                     Modalities:     Pt. Education:  6/24/2022 patient educated on diagnosis, prognosis and expectations for rehab, all patient questions were answered    HEP instruction:  6/24: Shift weight to L to facilitate LLE muscle fiber recruitment with all functional activities. Therapeutic Exercise and NMR EXR  [x] (10443) Provided verbal/tactile cueing for activities related to strengthening, flexibility, endurance, ROM for improvements in  [x] LE / Core stability: LE, hip, and core control with self care, mobility, lifting, ambulation. [] UE / Shoulder complex: cervical, postural, scapular, scapulothoracic and UE control with self care, reaching, carrying, lifting, house/yardwork, driving, computer work. [x] (42852) Provided verbal/tactile cueing for activities related to improving balance, coordination, kinesthetic sense, posture, motor skill, proprioception to assist with   [x] LE / Core stability: LE, hip, and core control in self care, mobility, lifting, ambulation and eccentric single leg control. [] UE / Shoulder complex: cervical, scapular, scapulothoracic and UE control with self care, reaching, carrying, lifting, house/yardwork, driving, computer work.   [] (88384) Therapist is in constant attendance of 2 or more patients providing skilled therapy interventions, but not providing any significant amount of measurable one-on-one time to either patient, for improvements in  [] LE / Core stability: LE, hip, and core control in self care, mobility, lifting, ambulation and eccentric single leg control.    [] UE / Shoulder complex: cervical, scapular, scapulothoracic and UE control with self care, reaching, carrying, lifting, house/yardwork, driving, computer work. NMR and Therapeutic Activities:    [x] (09248 or 88110) Provided verbal/tactile cueing for activities related to improving balance, coordination, kinesthetic sense, posture, motor skill, proprioception and motor activation to allow for proper function of   [x] LE / Core, hip and LE with self care and ADLs  [] UE / Shoulder complex: cervical, postural, scapular, scapulothoracic and UE control with self care, carrying, lifting, driving, computer work. [x] (42952) Gait Re-education- Provided training and instruction to the patient for proper LE, core and hip recruitment, positioning, and eccentric body weight control with ambulation re-education, including ascending & descending stairs     Home Management Training / Self Care:  [] (60183) Provided self-care/home management training related to activities of daily living and compensatory training, and/or use of adaptive equipment for improvement with: ADLs and compensatory training, meal preparation, safety procedures and instruction in use of adaptive equipment, including bathing, grooming, dressing, personal hygiene, basic household cleaning and chores.        Home Exercise Program:    [x] (39158) Reviewed/Progressed HEP activities related to strengthening, flexibility, endurance, ROM of   [] LE / Core stability: core, hip and LE for functional self-care, mobility, lifting and ambulation/stair navigation   [] UE / Shoulder complex: cervical, postural, scapular, scapulothoracic and UE control with self care, reaching, carrying, lifting, house/yardwork, driving, computer work  [] (75329)Reviewed/Progressed HEP activities related to improving balance, coordination, kinesthetic sense, posture, motor skill, proprioception of   [] LE: core, hip and LE for self care, mobility, lifting, and ambulation/stair navigation    [] UE / Shoulder complex: cervical, postural,  scapular, scapulothoracic and UE control with self care, reaching, carrying, lifting, house/yardwork, driving, computer work    Manual Treatments:  PROM / STM / Oscillations-Mobs:  G-I, II, III, IV (PA's, Inf., Post.)  [] (37419) Provided manual therapy to mobilize shoulder complex, hip, LE, and/or cervicothoracic/LS spine soft tissue/joints for the purpose of modulating pain, promoting relaxation,  increasing ROM, reducing/eliminating soft tissue swelling/inflammation/restriction, improving soft tissue extensibility and allowing for proper ROM for normal function with   [] LE / Core stability: self care, mobility, lifting and ambulation. [] UE / Shoulder complex: self care, reaching, carrying, lifting, house/yardwork, driving, computer work. Modalities:  [] (10691) Vasopneumatic compression: Utilized vasopneumatic compression to decrease edema / swelling for the purpose of improving mobility and quad tone / recruitment which will allow for increased overall function including but not limited to self-care, transfers, ambulation, and ascending / descending stairs. Charges:  Timed Code Treatment Minutes: 20   Total Treatment Minutes: 45     [] EVAL - LOW (25432)   [x] EVAL - MOD (22332)  [] EVAL - HIGH (51835)  [] RE-EVAL (50156)  [] TE (91212) x      [] Ionto  [x] NMR (34472) x  1    [] Vaso  [] Manual (10837) x      [] Ultrasound  [] TA x       [] Mech Traction (86098)  [] Gait Training x     [] ES (un) (62327):   [] Aquatic therapy x   [] Other:   [] Group:     GOALS:   Patient stated goal: improve transfers and gait  []? Progressing: []? Met: []? Not Met: []? Adjusted     Therapist goals for Patient:   Short Term Goals: To be achieved in: 3 weeks  1. Independent in HEP and progression per patient tolerance, in order to prevent re-injury. []? Progressing: []? Met: []? Not Met: []? Adjusted  2.  Patient to demonstrate 40% WB'ing of LLE during sit to stand transfers to assist in muscle recruitment with all tasks. []? Progressing: []? Met: []? Not Met: []? Adjusted     Long Term Goals: To be achieved in: 8 weeks  1. Patient to demonstrate TUG w/QC score of <15\" in order to demonstrate reduced fall risk to assist with reaching prior level of function. []? Progressing: []? Met: []? Not Met: []? Adjusted  2. FOTO score of at least 56 to assist with reaching prior level of function. []? Progressing: []? Met: []? Not Met: []? Adjusted  3. Patient will demonstrate an increase 30\" sit to stand reps to 10 with 40% WB'ing through LLE, to improve transfers to/from all surfaces & heights  []? Progressing: []? Met: []? Not Met: []? Adjusted  4. Patient will improve Tinetti Balance score to 15/28 in order to .   []? Progressing: []? Met: []? Not Met: []? Adjusted  5. Patient will increase 2MWT w/QC to 150' in order to improve community access. []? Progressing: []? Met: []? Not Met: []? Adjusted        Overall Progression Towards Functional goals/ Treatment Progress Update:  [] Patient is progressing as expected towards functional goals listed. [] Progression is slowed due to complexities/Impairments listed. [] Progression has been slowed due to co-morbidities.   [x] Plan just implemented, too soon to assess goals progression <30days   [] Goals require adjustment due to lack of progress  [] Patient is not progressing as expected and requires additional follow up with physician  [] Other    Persisting Functional Limitations/Impairments:  []Sleeping []Sitting               [x]Standing [x]Transfers        [x]Walking [x]Kneeling               [x]Stairs [x]Squatting / bending   [x]ADLs [x]Reaching  [x]Lifting  [x]Housework  [x]Driving [x]Job related tasks  []Sports/Recreation []Other:        ASSESSMENT:  See eval    Treatment/Activity Tolerance:  [x] Patient able to complete tx  [x] Patient limited by fatigue  [] Patient limited by pain  [] Patient limited by other medical complications  [] Other:     Prognosis: [x] Good [] Fair  [] Poor    Patient Requires Follow-up: [x] Yes  [] No    Plan for next treatment session:    PLAN: See eval. PT 2x / week for 8 weeks. [] Continue per plan of care [] Alter current plan (see comments)  [x] Plan of care initiated [] Hold pending MD visit [] Discharge    Electronically signed by: Jewels Demarco, PT PT, DPT    Note: If patient does not return for scheduled/ recommended follow up visits, his note will serve as a discharge from care along with most recent update on progress.

## 2022-06-27 NOTE — PROGRESS NOTES
Occupational Therapy  Cancellation/No-show Note  Patient Name:  Hang Oneal   :  1980   Date:  2022  Cancelled visits to date: 1  No-shows to date: 0    Patient status for today's appointment patient:  [x]  Cancelled  []  Rescheduled appointment  []  No-show     Reason given by patient:  [x]  Patient ill  []  Conflicting appointment  []  No transportation    []  Conflict with work  []  No reason given  []  Other:     Comments:      Phone call information:   []  Phone call made today to patient at _ time at number provided:      []  Patient answered, conversation as follows:    []  Patient did not answer, message left as follows:  []  Phone call not made today    Electronically signed by:  Cesar Hou OT

## 2022-06-29 ENCOUNTER — PATIENT MESSAGE (OUTPATIENT)
Dept: PRIMARY CARE CLINIC | Age: 42
End: 2022-06-29

## 2022-06-29 DIAGNOSIS — G81.94 LEFT HEMIPARESIS (HCC): Primary | ICD-10-CM

## 2022-06-29 NOTE — TELEPHONE ENCOUNTER
From: Guido Vera  To: Dr. Rose Spar: 6/29/2022 10:28 AM EDT  Subject: Speech Therapy     Good morning Dr. Iris Mccracken could you please send over a prescription for speech they saying they only got one for OT.      Dwight Price

## 2022-06-30 ENCOUNTER — HOSPITAL ENCOUNTER (OUTPATIENT)
Dept: OCCUPATIONAL THERAPY | Age: 42
Setting detail: THERAPIES SERIES
Discharge: HOME | End: 2022-06-30
Payer: COMMERCIAL

## 2022-06-30 PROCEDURE — 97166 OT EVAL MOD COMPLEX 45 MIN: CPT

## 2022-06-30 PROCEDURE — 97112 NEUROMUSCULAR REEDUCATION: CPT

## 2022-06-30 PROCEDURE — 97530 THERAPEUTIC ACTIVITIES: CPT

## 2022-06-30 NOTE — PROGRESS NOTES
[address] [facility] Occupational Therapy  [address] [place] [address], [address] [place] [address]  Phone: [phone]   Fax: [phone]      Occupational Therapy Daily Treatment Note  Date:  2022    Patient: Loraine Chan   : 1980   MRN: [id number]  Referring Physician: Sheri Lang MD           Medical Diagnosis Information: left hemiplegia    Date of Injury:   Onset Date:  2021  Date of Surgery NA                                      Insurance information: care source     Plan of care sent to provider:      []Faxed   [x]Co-signature    (attempts: 1[x] 2[] 3[])       Progress Report: []  Yes  [x]  No     Date Range for reporting period:  Beginnin2022  Endin2022  Progress report due (10 Rx/or 30 days whichever is less): visit #98 or      Recertification due (POC duration/ or 90 days whichever is less): visit #12 or 2022    Visit # Insurance Allowable Auth required? Date Range    [x]  Yes  []  No  pending       Units approved Units used Date Range            Latex Allergy:  [x]No      []Yes  Pacemaker:  [x] No       [] Yes     Preferred Language for Healthcare:   [x]English       []other:    Pain level: 0/10     SUBJECTIVE:  See eval    Functional Disability Index:  FOTO:  Patient's Physical FS Primary Measure- 25  Risk Adjusted Statistical FOTO-     OBJECTIVE: See eval      RESTRICTIONS/PRECAUTIONS: fall risk, poor midline, left visual hemionopsia    Exercises/Interventions: Evaluation completed with collaboration on goals  followed by educated in aarom with use of dowel mahesh to be completed three times a day focusing on chest press , scapular retraction .       Therapeutic Exercises  Resistance / level Sets/sec Reps Notes                                                                                Neuromuscular Re-ed / Therapeutic Activities                                                 Manual Intervention Modalities:     Patient education: OT evaluation home program goals          Home Exercise Program:   7/1/2022 aarom     Therapeutic Exercise and NMR:  [x] (84331) Provided verbal/tactile cueing for activities related to strengthening, flexibility, endurance, ROM  for improvements in scapular, scapulothoracic and UE control with self care, reaching, carrying, lifting, house/yardwork, driving/computer work. [x] (66117) Provided verbal/tactile cueing for activities related to improving balance, coordination, kinesthetic sense, posture, motor skill, proprioception  to assist with  scapular, scapulothoracic and UE control with self care, reaching, carrying, lifting, house/yardwork, driving/computer work.   [] Comments:    Therapeutic Activities:    [x] (42492 or 39818) Provided verbal/tactile cueing for activities related to improving balance, coordination, kinesthetic sense, posture, motor skill, proprioception and motor activation to allow for proper function of scapular, scapulothoracic and UE control with self care, carrying, lifting, driving/computer work  [] Comments:    Home Exercise Program:    [] (78129) Reviewed/Progressed HEP activities related to strengthening, flexibility, endurance, ROM of scapular, scapulothoracic and UE control with self care, reaching, carrying, lifting, house/yardwork, driving/computer work  [] (56662) Reviewed/Progressed HEP activities related to improving balance, coordination, kinesthetic sense, posture, motor skill, proprioception of scapular, scapulothoracic and UE control with self care, reaching, carrying, lifting, house/yardwork, driving/computer work    [] Comments:    Manual Treatments:  PROM / STM / Oscillations-Mobs:  G-I, II, III, IV (PA's, Inf., Post.)  [] (07936) Provided manual therapy to mobilize soft tissue/joints of cervical/CT, scapular GHJ and UE for the purpose of modulating pain, promoting relaxation,  increasing ROM, reducing/eliminating soft tissue swelling/inflammation/restriction, improving soft tissue extensibility and allowing for proper ROM for normal function with self care, reaching, carrying, lifting, house/yardwork, driving/computer work  [] Comments:    ADL Training:  [] (65495) Provided self-care/home management training related to activities of daily living and compensatory training, and/or use of adaptive equipment   [] Comments:     Splinting:  [] Fabrication of:   [] (21250) Orthotic/Prosthetic Management, subsequent encounter  [] (35058) Orthotic management and training (fitting and assessment)  [] Comments:      Charges:  Timed Code Treatment Minutes:  30   Total Treatment Minutes: 45     [] EVAL (LOW) 93248   [] OT Re-eval (31588)  [x] EVAL (MOD) 44970   [] EVAL (HIGH) 52273       [x] Dolly (31873) x     [] NVHSG(47831)  [x] NMR (29409) x     [] Estim (attended) (10546)   [] Manual ([id number]) x     [] US (77320)  [] TA (58765) x     [] Paraffin (65581)  [] ADL  [phone]) x    [] Splint/L code:    [] Estim (unattended) [phone]  [] Fluidotherapy (51401)  [] Other:    GOALS:  GOALS: Goals established 6/30/22   Patient stated goal:  Patient stated goal is to improve her over all mobility and ADL status   []? Progressing: []? Met: []? Not Met: []? Adjusted     Therapist goals for Patient 3 mos   Modified independent functional transfers  []? Progressing: []? Met: []? Not Met: []? Adjusted  Modified independent lower body dress  []? Progressing: []? Met: []? Not Met: []? Adjusted  Modified independent upper body dress  []? Progressing: []? Met: []? Not Met: []? Adjusted  Use of left upper extremity as dynamic base of support during self care        []? Progressing: []? Met: []? Not Met: []? Adjusted     Pt will report a foto score of 34 after 21 visits   []? Progressing: []? Met: []? Not Met: []? Adjusted    Progression Towards Functional goals:  [] Patient is progressing as expected towards functional goals listed.     [] Progression is slowed due to complexities listed. [] Progression has been slowed due to co-morbidities. [x] Plan just implemented, too soon to assess goals progression  [] All goals are met  [] Other:     ASSESSMENT:  See eval    Treatment/Activity Tolerance:  [x] Patient tolerated treatment well [] Patient limited by fatigue  [] Patient limited by pain  [] Patient limited by other medical complications  [] Other:     Prognosis: [x] Good [] Fair  [] Poor    Patient Requires Follow-up: [x] Yes  [] No    PLAN: See eval  [] Continue per plan of care [] Alter current plan (see comments)  [x] Plan of care initiated [] Hold pending MD visit [] Discharge    Electronically signed by:        Note: If patient does not return for scheduled/ recommended follow up visits, this note will serve as a discharge from care along with most recent update on progress.

## 2022-06-30 NOTE — PROGRESS NOTES
Shailesh Griffith    Dear  Dr. Paramjit Rowell ,    We had the pleasure of evaluating the following patient for occupational therapy services at [place] [address]. A summary of our findings can be found in the initial assessment below. This includes our plan of care. If you have any questions or concerns regarding these findings, please do not hesitate to contact me at the office phone number above.   Thank you for the referral.       Provider Signature:_______________________________Date:__________________  By signing above (or electronic signature), therapist's plan is approved by physician      Patient: Butch James   : 1980   MRN: [id number]       Evaluation Date: 2022        Medical Diagnosis/ICD 10: right SDH    Treatment Diagnosis:  Left spastic hemiplegia     Onset Date:  2021    Referral Date:  2022    Referring Physician:   Dr. Paramjit Rowell Patient stated on oral baclofen                                                                       Insurance information:    Care source     Precautions/ Contraindications:   Red Flag Symptoms: none  Safety awareness: impulsive, poor safety left disregard  Other:      Adhesive allergy: NO  Latex Allergy:  [x]NO      []YES     Preferred Language for Healthcare:   [x]English       []other:    C-SSRS Triggered by Intake questionnaire (Past 2 wk assessment):   [x] No, Questionnaire did not trigger screening.   [] Yes, Patient intake triggered further evaluation      [] C-SSRS Screening completed  [] PCP notified via Plan of Care  [] Emergency services notified    Plan of care sent to provider:      []Faxed 22  [x]Co-signature    (attempts: 1[x] 2 []3[])         Relevant Medical History:      Functional Outcome Measure:   2022: :  foto UE neuro patient score is 25  foto goal is 34 by 21 visits        Patient goal for therapy:   Increase left upper extremity function and balance for  All 4th Digit MCP Extension-Flexion       3rd Digit MCP Extension-Flexion       2nd Digit MCP Extension-Flexion       1st Digit MCP Extension-Flexion       5th Digit PIP Extension- Flexion       4th Digit PIP Extension-Flexion       3rd Digit PIP Extension-Flexion       2nd Digit PIP Extension-Flexion       1st Digit IP Extension-Flexion       5th Digit DIP Extension-Flexion       4th Digit DIP Extension-Flexion       3rd Digit DIP Extension-Flexion       2nd Digit DIP Extension-Flexion       Composite Flexion (Tip of 3rd Digit to Distal Palmar Crease (cm))         Joint mobility:    []Normal    [x]Hypo   []Hyper    Splinting Needs:  None at this time,  MD has ordered a GiveLoop      Strength WFL: []Yes [x]No (if checked, see below)    Strength (0-5) Left Right    Shoulder Flex     Shoulder Abd     Shoulder ER     Shoulder IR     Biceps     Triceps     Pronation      Supination      Wrist Flex     Wrist Ext     Wrist Radial Deviation         Orthopaedic Special Tests Positive  Negative  NT Comments    Shoulder        Empty Can              Elbow        Cozen's       Tinel's               Hand/Wrist       Finkelstein's       Tinel's       Phalen's       Froment's       [place] Sign       [place] Deformity       [place] Neck Deformity       [place]'s Nodes (DIP)       Gallo's Nodes (PIP)       Mallet Finger       Grind Test Peggy Gruber)                        Hand Assessment Left  Right  Comments    9 Hole Peg Test (s)       Strength (lbs)      Lateral Pinch Strength (lbs)      [place] Pinch Strength (lbs)      Tip Pinch Strength (lbs)      Opposition (Y/N)      Box and Blocks                Functional Mobility/Transfers:  Min/ contact guard with use of small base quad cane       Assessment of UE tone/spasticity:  WFL  Date: 6/30/22      Shoulder flexors 1      Internal rotators 1      External rotators 1      Elbow flexors 2      Elbow extensor 1      Supinators 2      Pronators       Wrist flexors 2      Wrist extensors 2      Digit flexors 2      Digit extensors           Modified Sharon Scale  0    No increase in muscle tone  1    Slight increase in muscle tone, manifested by a catch and release or by minimal        resistance at the end of the range of motion when the affected part(s) is moved in        flexion or extension  1+ Slight increase in muscle tone, manifested by a catch, followed by minimal        resistance throughout the remainder (less than half) of the ROM  2    More marked increase in muscle tone through most of the ROM, but        affected part(s) easily moved  3    Considerable increase in muscle tone, passive movement difficult  4    Affected part(s) rigid in flexion or extension    Sensation:   impaired proprioception and light touch left side    Proprioception:   Impaired     Visual Assessment:    Reports history of:  Left hemionopsia     Wears glasses: for reading or       Visual Tracking: impaired midline skip   Visual fields:  Impaired as above      Visual Perceptual Status:not formally assessed on this date   MVPT:      Trail making A:   Trail making B:     Functional Cognition:   WFL for tasks completed  Demo impaired: attention    Communication skills: wnl    Hearing:  WFL         ______________________________________________________________________________________________________________________________    [x] Patient history, allergies, meds reviewed. Medical chart reviewed. See intake form. Review Of Systems (ROS):  [x]Performed Review of systems (Integumentary, Cardiopulmonary, Neurological) by intake and observation. Intake form has been scanned into medical record. Patient has been instructed to contact their primary care physician regarding ROS issues if not already being addressed at this time.       Co-morbidities/Complexities (which will affect course of rehabilitation):   []None        []Hx of COVID   Arthritic conditions   []Rheumatoid arthritis (M05.9)  []Osteoarthritis (M19.91)  []Gout   Cardiovascular conditions   [x]Hypertension (I10)  [x]Hyperlipidemia (E78.5)  []Angina pectoris (I20)  []Atherosclerosis (I70)  []Pacemaker  []Hx of CABG/stent/  cardiac surgeries Musculoskeletal conditions   []Disc pathology   []Congenital spine pathologies   []Osteoporosis (M81.8)  []Osteopenia (M85.8)  []Scoliosis   Endocrine conditions   []Hypothyroid (E03.9)  []Hyperthyroid Gastrointestinal conditions   []Constipation (T05.77)   Metabolic conditions   []Morbid obesity (E66.01)  []Diabetes type 1(E10.65) or 2 (E11.65)   []Neuropathy (G60.9)   Cardio/Pulmonary conditions   []Asthma (J45)  []Coughing   []COPD (J44.9)  []CHF  []A-fib Psychological Disorders  []Anxiety (F41.9)  []Depression (F32.9)   []Other:   Developmental Disorders  []Autism (F84.0)  []CP (G80)  []Down Syndrome (Q90.9)  []Developmental delay     Neurological conditions  []Prior Stroke (I69.30)  []Parkinson's (G20)  []Encephalopathy (G93.40)  []MS (G35)  []Post-polio (G14)  []SCI  []TBI  []ALS Other conditions  []Fibromyalgia (M79.7)  []Vertigo  []Syncope  []Kidney Failure  []Cancer      []currently undergoing                treatment  []Pregnancy  []Incontinence Prior surgeries  []involved limb  []previous spinal surgery  [] section birth  []hysterectomy  []bowel / bladder surgery  []other relevant surgeries   Visual Conditions  []Macular degeneration  []Glaucoma  []Diabetic retinopathy  []Legally blind []Other:                   Barriers to/and or personal factors that will affect rehab potential:              Other:impaired sensation on left and visual field impairments , impaired midline awareness     Falls Risk Assessment (30 days):   [x] Falls Risk assessed and no intervention required.   [] Falls risk assessed (via clinical reasoning) and patient requires intervention due to being higher risk for falls  [] Tug Score (>12 s at risk):  [] Falls education provided, including         ASSESSMENT:     Patient demonstrates significant deficits in left hemiplegia, midline orientation affecting all ADL and functional mobility     Functional Impairments and Activity Limitations (from functional questionnaire, intake, and interview)  Reduced participation in functional mobility  Reduced participation in Instrumental ADLs  Reduced participation in Basic ADLs  Reduced sensation    Participation Restrictions: fall risk      Classification :    Neurological    Prognosis/Rehab Potential:     []Excellent   [x]Good    []Fair   []Poor    Tolerance of evaluation/treatment:    []Excellent   [x]Good    []Fair   []Poor     Occupational Therapy Evaluation Complexity Justification   [x] A Occupational Profile/Medical and Therapy History  3 personal factors and/or comorbidities that impact the plan of care; extensive review of physical cognitive, psychosocial performance  [x] Patient Assessment:  a total of 3-5 performance deficits relating to physical, cognitive, psychosocial limitations/restrictions  [x] A clinical presentation with:  moderate analytical complexity, detailed assessments, minimal to moderate modification of assessments, may have co-morbidities  [x] Clinical decision making of Moderatecomplexity using standardized patient assessment instrument and/or measurable assessment of functional outcome. [] EVAL (LOW) 86141 (typically 15 minutes face-to-face)  [x] EVAL (MOD) 53068 (typically 30 minutes face-to-face)  [] EVAL (HIGH) 89919 (typically 45 minutes face-to-face)  [] RE-EVAL 96318      PLAN    Frequency/Duration: 2days per week for 6Weeks:    Interventions:  Therapeutic Exercise: strength training, ROM, endurance training, functional mobility training  Neuromuscular re-education: facilitation of normal movement patterns, visual-perceptual and cognitive training to restore limitations caused by neurological insult.    Patient/caregiver education and training regarding Basic Activities of Daily Living  Patient/caregiver education regarding Instrumental Activities of Daily Living, including: home management  Therapeutic Activity: functional task participation that incorporates multiple parameters and treatment interventions. Patient/caregiver education on joint protection, postural re-education, activity modification, progression of HEP. Splinting including custom or pre-fabricated  [x] E-stim   [] Ultrasound  [] Fluidotherapy  [] Iontophoresis  [] Paraffin  [] Hot Packs/Cold Packs  [] Dry Needling  [x] Kinesotape or Leukotape    HEP instruction: Written and verbal HEP instructions provided and reviewed:    Initiated home program focusing on scapular retraction and aarom seated with midline orientation holding dowel mahesh.   Patient practiced and demonstrated good technique     GOALS: Goals established 6/30/22   Patient stated goal:  Patient stated goal is to improve her over all mobility and ADL status   [] Progressing: [] Met: [] Not Met: [] Adjusted    Therapist goals for Patient 3 mos   Modified independent functional transfers  [] Progressing: [] Met: [] Not Met: [] Adjusted  Modified independent lower body dress  [] Progressing: [] Met: [] Not Met: [] Adjusted  Modified independent upper body dress  [] Progressing: [] Met: [] Not Met: [] Adjusted  Use of left upper extremity as dynamic base of support during self care      [] Progressing: [] Met: [] Not Met: [] Adjusted    Pt will report a foto score of 34 after 21 visits   [] Progressing: [] Met: [] Not Met: [] Adjusted    Electronically signed by:  Hugh Carrera OT,  /L

## 2022-07-08 ENCOUNTER — APPOINTMENT (OUTPATIENT)
Dept: SPEECH THERAPY | Age: 42
End: 2022-07-08
Payer: COMMERCIAL

## 2022-07-11 ENCOUNTER — HOSPITAL ENCOUNTER (OUTPATIENT)
Dept: SPEECH THERAPY | Age: 42
Setting detail: THERAPIES SERIES
Discharge: HOME | End: 2022-07-11
Payer: COMMERCIAL

## 2022-07-11 PROCEDURE — 92610 EVALUATE SWALLOWING FUNCTION: CPT

## 2022-07-11 PROCEDURE — 92523 SPEECH SOUND LANG COMPREHEN: CPT

## 2022-07-11 NOTE — PLAN OF CARE
[facility], [address] [place] [address], [address] [place] [address] Drive  Phone: [phone]   Fax: [phone]                                                       Speech Therapy Certification    Speech-Language and Dysphagia Evaluation    Dear Dr. Maximo Zaragoza MD,    We had the pleasure of evaluating the following patient for speech therapy services at [facility]. A summary of our findings can be found in the initial assessment below. This includes our plan of care. If you have any questions or concerns regarding these findings, please do not hesitate to contact me at the office phone number checked above. Thank you for the referral.       Physician Signature:_______________________________Date:__________________  By signing above (or electronic signature), therapist's plan is approved by physician      Patient: Elinor Cabrera   : 1980   MRN: [id number]  Referring Physician: Dr. Maximo Zaragoza MD     Evaluation Date: 2022      Medical Diagnosis Information:  G81.94 (ICD-10-CM) - Left hemiparesis ([facility] [address]   Treatment Diagnosis: Mild Dysarthria; Mild Cognitive-Linguistic deficits; Mild Oropharyngeal Dysphagia                         Insurance information: [facility]; prior authorization required    Precautions/ Contra-indications: hx brain bleed  Latex Allergy:  [x]NO      []YES  Preferred Language for Healthcare:   [x]English       []other:    SUBJECTIVE:  ONSET: 2021    Recent Chest xray/Chest CT:  OSH    Recent MRI Brain/Head CT: O[facility]    Modified Barium Swallow Study (22): \"Modified Barium Swallow evaluation completed on 2022. Results indicate mild oropharyngeal dysphagia. Oral phase of swallow characterized by reduced lingual control for bolus prep resulting in premature posterior bolus loss, delayed A-P transit with mild oral/base of tongue residue, and prolonged mastication of soft and regular solids.  Lingual pumping intermittently present. Pharyngeal phase characterized by delayed swallow initiation across consistencies, with pooling to the pyriforms of all liquids and soft solids and pooling to the valleculae of puree and regular solids. Compensatory strategy of three-second bolus hold was minimally effective in reduced pooling. Question flash penetration x 1 with thin liquids before swallow, self clearing. No aspiration viewed throughout study, no further penetration noted. Epiglottic inversion, hyolaryngeal elevation/excursion, and pharyngeal stripping wave were grossly functional, no significant vallecular, pharyngeal, or pyriform sinus residue post swallow. Limited view of upper esophagus in lateral view d/t pt positioning, no retrograde flow observed. \"    History/Prior Level of Function:   The patient is a 40year old female presenting to speech therapy for difficulty with speech, cognition, and swallowing. Patient with medical history significant for [address] w/ hydrocephalus following R internal carotid aneurysm rupture February 2021. She completed inpatient rehab at [address] [place] [address] 3/28-4/17/2021 and outpatient rehab at this facility 10/18/2021-2/11/2022. Pt made good progress in areas of speech intelligibility, oropharyngeal swallow function, and use of compensatory strategies for left neglect. Pt now returning to continue skilled intervention. Current Level of Function:   The patient continues to report intermittent slurred speech, particularly in longer and more complex conversations. Pt also reports some coughing and choking with water only, feels that she sometimes drinks too fast. Both pt and  report ongoing difficulty with memory which impacts her completion of high-level ADLs.      Living Status: Private residence with  and children  Occupation/School: Previously worked full time at [employer]  Medication Management: :  []Primary   [x]Secondary []No  Finance Management: []Primary   [x]Secondary []No  Hearing: Grossly WFL  Vision: Wears glasses at all times; visual impairment and left neglect    Relevant Medical History:  [x] Patient history, allergies, meds reviewed. Medical chart reviewed. See intake form. Review Of Systems (ROS):  [x]Performed Review of systems (Integumentary, CardioPulmonary, Neurological) by intake and observation. Intake form has been scanned into medical record. Patient has been instructed to contact their primary care physician regarding ROS issues if not already being addressed at this time. Co-morbidities/Complexities (which will affect course of rehabilitation):   []None           Cardiovascular conditions   [x]Hypertension (I10)  []Hyperlipidemia (E78.5)  []Angina pectoris (I20)  []Atherosclerosis (I70)   Gastrointestinal conditions   []GERD (K21.9)   Pulmonary conditions   []Asthma (J45)  []Coughing   []COPD (J44.9)     Psychological Disorders  []Anxiety (F41.9)  []Depression (F32.9)   []Other:   Neurological Disorders  []Dementia (F03.90)  []Parkinson's Disease (G20)  [x]Other: hx CVA   []Other:             PAIN:  Pain Scale: 0/10  Numbness/Tingling: N/A       OBJECTIVE:        Barriers to/and or personal factors that will affect rehab potential:              []Age  []Sex    []Smoker              [x]Motivation/Lack of Motivation                        []Co-Morbidities              [x]Cognitive Function, education/learning barriers              []Environmental, home barriers              []profession/work barriers  []past ST/medical experience  []other:    Falls Risk Assessment (30 days):  [x] Falls Risk assessed and no intervention required.   [] Falls Risk assessed and Patient requires intervention due to being higher risk   TUG score (>12s at risk):     [] Falls education provided, including       C-SSRS Triggered by Intake questionnaire (Past 2 wk assessment):   [x] No, Questionnaire did not trigger screening.   [] Yes, Patient intake triggered further evaluation [] C-SSRS Screening completed  [] PCP notified via Plan of Care  [] Emergency services notified      Cranial nerve exam:   CN VII (facial): Impaired L symmetry during movement; Impaired coordination  CN IX/X (glossopharyngeal/vagus): MPT: Reduced; vocal quality: WFL; cough: Strong-perceptually  CN XII (hypoglossal): Impaired coordination      Dysphagia Assessment    Dysphagia Functional Outcome:   FOTO Primary Measure: HDQLIFE Swallowing Difficulties: 58    ASSESSMENT / IMPRESSIONS:  Clinical signs of minimal-to-mild oropharyngeal dysphagia, likely chronic related to hx of dysphagia and CVA. Swallow prognosis is good. Instrumental swallow study may be clinically indicated following education and training of safe swallowing strategies based on most recent MBSS. Instrumentation: MBSS or FEES is warranted to further assess oropharyngeal structures and function as clinically indicated      Current Diet Level:  Solids: IDDSI 7 Regular  Liquids: IDDSI 0 Thin Liquids  Medication: Whole with thin liquids    Medical record review and intake / patient interview:   Predisposing dysphagia risk factors: Hx of CVA and Hx of dysphagia  Clinical signs of possible chronic dysphagia: N/A  Precipitating dysphagia risk factors: reduced physical mobility  Prior Dysphagia History: Seen previously for dysphagia intervention at inpatient, rehab, and outpatient levels. FEES completed 4/8/2021 revealed moderate oral phase and mild pharyngeal phase dysphagia with recommendation for Dysphagia Minced and Moist diet and thin liquids. MBSS completed 2/11/2022 revealed mild oropharyngeal dysphagia with recommendation for regular textured diet and thin liquids.      Patient Positioning: Upright in chair    Dentition: Adequate    PO trials:    IDDSI 0 (thin):   - Cup: adequate oral acceptance, no overt clinical s/s aspiration  - Straw: adequate oral acceptance, clinical s/s delayed swallow initiation, no overt clinical s/s aspiration  IDDSI 2 (mildly thick): Not clinically indicated   IDDSI 3 (moderately thick): Not clinically indicated   IDDSI 4 (puree): timely oral prep and transit, no overt clinical s/s aspiration   IDDSI 5 (minced and moist): timely oral prep and transit, no overt clinical s/s aspiration  IDDSI 6 (soft and bite sized): minimally prolonged oral mastication and delayed transit, no overt clinical s/s aspiration  IDDSI 7 (regular): minimally prolonged oral mastication and delayed transit, no overt clinical s/s aspiration    Diet recommendation:   Solids: IDDSI 7 Regular Solids; Liquids: IDDSI 0 Thin Liquids;   Meds: Meds whole with thin liquids    Dysphagia Therapeutic Intervention:  []  Bolus control Exercises  []  Oral Motor Exercises  []  Charyl Heleightonlich Water Protocol  []  Thermal Stimulation  []  Oral Care    []  Vital Stim/NMES  []  Laryngeal Exercises  [x]  Patient/Family Education  []  Pharyngeal Exercises  [x]  Therapeutic PO trials with SLP  [x]  Diet tolerance monitoring  []  Other:     Compensatory Swallowing Strategies: Alternate solids/liquids , Upright as possible with all PO intake , Small bites/sips , Eat/feed slowly, Remain upright 30-45 min       Cognitive-Linguistic Assessment    Cognitive/Speech-Language Functional Outcome:   FOTO Primary Measure: PROMIS Cognitive Function v2.0: 27.5  Secondary Measure: NeuroQOL - Communication: 18    ASSESSMENT / IMPRESSIONS:   The pt presents with mild dysarthria and mild cognitive-linguistic deficits in the context of SAH. Expressive and receptive language grossly WFL. Pt's cognitive linguistic skills characterized by deficits in high-level attention, delayed recall, and self-monitoring for complex problem-solving. Pt also continues to demonstrate left neglect and reduced insight into performance. Pt's speech characterized by intermittent fast rate of speech, impacting articulatory precision and overall intelligibility.  Pt would benefit from skilled speech intervention to improve above Visuospatial/ Executive [place] making 1/1     Copy Cube 0/1 Missing dimensions    Clock 2/3    Naming Picture naming 3/3    Attention Number repetition 2/2     Selective attention 1/1 1 error    Serial 7s 2/3 3 correct calculations   Language Repetition 2/2     Fluency 1/1 15 words in 1 minute   Abstraction Comparisons 1/2 Attribute rather than category   Delayed Recall Recall 5 novel words 2/5 Increased to 5/5 given f:3   Orientation Spatial and Temporal 5/6 Incorrect date    Total: 22/30 Score > 26 considered WFL             Treatment Diagnosis:  Speech-Language: Mild Dysarthria      Cognitive-Linguistic: Mild Cognitive-Linguistic Deficit    Dysphagia: Mild Oropharyngeal Dysphagia      Prognosis/Rehab Potential: Good      Tolerance of evaluation/treatment: Good      PLAN:    Frequency/Duration: 2 days per week for 6 Weeks:  Therapeutic Interventions:  [x]  Speech-Language Evaluation/Treatment  [x]  Cognitive-Linguistic Skills Development  []  Voice Evaluation and Treatment  []  Lorella Pimple Voice Treatment (LSVT)  [x]  Dysphagia Evaluation/Treatment  [x]  Modified Barium Swallow Study (MBSS)  [x]  Fiberoptic Endoscopic Evaluation of Swallow (FEES)  []  Dysphagia Treatment via Neuromuscular Electrical Stimulation (NMES)  []  Evaluation, Modifications, and Training in Voice Prosthetic  []  Evaluation for Speech-Generating Augmentative and Alternative Communication Device   []  Therapeutic Services for the use of Speech-Generating Device  []  Other:          HEP instruction: Written HEP instructions provided and reviewed. Pt verbalized understanding and agreement. GOALS:  Patient stated goal: \"Help with my memory\"; \"Stop slurring\"; \"Swallowing\"  [] Progressing: [] Met: [] Not Met: [] Adjusted    Therapist goals for Patient:   Short Term Goals: To be achieved in: 5 weeks  1.  Patient will use speech strategies to facilitate increased intelligibility at discourse level, 5 minutes, ro >90% as judged by SLP and pt/family. [] Progressing: [] Met: [] Not Met: [] Adjusted  2. Patient will select and implement 2 cognitive strategies to complete functional tasks given min initiation cues in order to improve self-monitoring of ADL completion. [] Progressing: [] Met: [] Not Met: [] Adjusted  3. Patient will use learned memory strategies to recall 4 units of novel information across delayed intervals up to 10 minutes given min encoding cues. [] Progressing: [] Met: [] Not Met: [] Adjusted   4. Patient will functionally tolerate regular solids x 20 and thin liquids x 20 with min verbal cues for strategy implementation. [] Progressing: [] Met: [] Not Met: [] Adjusted  5. Patient will complete repeat instrumental swallow study (MBS or FEES) as clinically indicated to further assess pharyngeal phase and direct plan of care. [] Progressing: [] Met: [] Not Met: [] Adjusted     Long Term Goals: To be achieved in: 6 weeks  1. Patient will demonstrate improved cognitive-linguistic function to improve quality of life as evidenced by increased score on functional outcome measure. [] Progressing: [] Met: [] Not Met: [] Adjusted  2. Patient will utilize speech intelligibility strategies to increase intelligibility to 90% in conversation to a familiar/unfamiliar listener. [] Progressing: [] Met: [] Not Met: [] Adjusted  3. Patient will tolerate least restrictive diet with no overt clinical s/s of aspiration/penetration.    [] Progressing: [] Met: [] Not Met: [] Adjusted       Total Treatment Time / Charges     Time in Time out Total Time / units   Speech Sound/Lang Comp Eval  0815 0845 30 min / 1 unit   Swallow Eval  0845 0905 20 min / 1 unit   Behav Qualitative Analysis of Voice      Eval Speech Sound Production      Cognitive Tx      Speech Tx      Dysphagia Tx          Electronically signed by:    Gina Vargas  N Francie [facility] Pathologist  SP. [id number]    Note: If patient does not return for scheduled/ recommended follow up visits, this note will serve as a discharge from care along with most recent update on progress.

## 2022-07-19 ENCOUNTER — HOSPITAL ENCOUNTER (OUTPATIENT)
Dept: OCCUPATIONAL THERAPY | Age: 42
Setting detail: THERAPIES SERIES
Discharge: HOME | End: 2022-07-19
Payer: COMMERCIAL

## 2022-07-19 ENCOUNTER — HOSPITAL ENCOUNTER (OUTPATIENT)
Dept: SPEECH THERAPY | Age: 42
Setting detail: THERAPIES SERIES
Discharge: HOME | End: 2022-07-19
Payer: COMMERCIAL

## 2022-07-19 NOTE — PROGRESS NOTES
Occupational Therapy  Cancellation/No-show Note  Patient Name:  Aaliyah Hays   :  1980   Date:  2022  Cancelled visits to date: 2  No-shows to date: 0    Patient status for today's appointment patient:  [x]  Cancelled  []  Rescheduled appointment  []  No-show     Reason given by patient:  []  Patient ill  [x]  Conflicting appointment  []  No transportation    []  Conflict with work  []  No reason given  []  Other:     Comments:      Phone call information:   []  Phone call made today to patient at _ time at number provided:      []  Patient answered, conversation as follows:    []  Patient did not answer, message left as follows:  []  Phone call not made today    Electronically signed by:  Oswaldo Ham OT

## 2022-07-21 ENCOUNTER — HOSPITAL ENCOUNTER (OUTPATIENT)
Dept: SPEECH THERAPY | Age: 42
Setting detail: THERAPIES SERIES
Discharge: HOME | End: 2022-07-21
Payer: COMMERCIAL

## 2022-07-21 ENCOUNTER — HOSPITAL ENCOUNTER (OUTPATIENT)
Age: 42
Discharge: HOME | End: 2022-07-21
Payer: COMMERCIAL

## 2022-07-21 DIAGNOSIS — Z00.00 ROUTINE GENERAL MEDICAL EXAMINATION AT A HEALTH CARE FACILITY: ICD-10-CM

## 2022-07-21 DIAGNOSIS — E55.9 VITAMIN D DEFICIENCY: ICD-10-CM

## 2022-07-21 DIAGNOSIS — D64.9 LOW HEMOGLOBIN: ICD-10-CM

## 2022-07-21 DIAGNOSIS — Z11.59 NEED FOR HEPATITIS C SCREENING TEST: ICD-10-CM

## 2022-07-21 LAB
A/G RATIO: 1.5 (ref 1.1–2.2)
ALBUMIN SERPL-MCNC: 4.4 G/DL (ref 3.4–5)
ALP BLD-CCNC: 114 U/L (ref 40–129)
ALT SERPL-CCNC: 12 U/L (ref 10–40)
ANION GAP SERPL CALCULATED.3IONS-SCNC: 13 MMOL/L (ref 3–16)
AST SERPL-CCNC: 12 U/L (ref 15–37)
BASOPHILS ABSOLUTE: 0 K/UL (ref 0–0.2)
BASOPHILS RELATIVE PERCENT: 0.4 %
BILIRUB SERPL-MCNC: 0.3 MG/DL (ref 0–1)
BUN BLDV-MCNC: 6 MG/DL (ref 7–20)
CALCIUM SERPL-MCNC: 9.4 MG/DL (ref 8.3–10.6)
CHLORIDE BLD-SCNC: 104 MMOL/L (ref 99–110)
CHOLESTEROL, TOTAL: 195 MG/DL (ref 0–199)
CO2: 24 MMOL/L (ref 21–32)
CREAT SERPL-MCNC: 0.7 MG/DL (ref 0.6–1.1)
EOSINOPHILS ABSOLUTE: 0.1 K/UL (ref 0–0.6)
EOSINOPHILS RELATIVE PERCENT: 2 %
FERRITIN: 16.1 NG/ML (ref 15–150)
GFR AFRICAN AMERICAN: >60
GFR NON-AFRICAN AMERICAN: >60
GLUCOSE BLD-MCNC: 97 MG/DL (ref 70–99)
HCT VFR BLD CALC: 34 % (ref 36–48)
HDLC SERPL-MCNC: 47 MG/DL (ref 40–60)
HEMOGLOBIN: 11.1 G/DL (ref 12–16)
HEPATITIS C ANTIBODY INTERPRETATION: NORMAL
LDL CHOLESTEROL CALCULATED: 122 MG/DL
LYMPHOCYTES ABSOLUTE: 1.6 K/UL (ref 1–5.1)
LYMPHOCYTES RELATIVE PERCENT: 32.2 %
MCH RBC QN AUTO: 25.4 PG (ref 26–34)
MCHC RBC AUTO-ENTMCNC: 32.7 G/DL (ref 31–36)
MCV RBC AUTO: 77.6 FL (ref 80–100)
MONOCYTES ABSOLUTE: 0.2 K/UL (ref 0–1.3)
MONOCYTES RELATIVE PERCENT: 4.4 %
NEUTROPHILS ABSOLUTE: 3.1 K/UL (ref 1.7–7.7)
NEUTROPHILS RELATIVE PERCENT: 61 %
PDW BLD-RTO: 16.9 % (ref 12.4–15.4)
PLATELET # BLD: 199 K/UL (ref 135–450)
PMV BLD AUTO: 9 FL (ref 5–10.5)
POTASSIUM SERPL-SCNC: 3.7 MMOL/L (ref 3.5–5.1)
RBC # BLD: 4.39 M/UL (ref 4–5.2)
SODIUM BLD-SCNC: 141 MMOL/L (ref 136–145)
TOTAL PROTEIN: 7.3 G/DL (ref 6.4–8.2)
TRIGL SERPL-MCNC: 129 MG/DL (ref 0–150)
VITAMIN D 25-HYDROXY: 16.5 NG/ML
VLDLC SERPL CALC-MCNC: 26 MG/DL
WBC # BLD: 5.1 K/UL (ref 4–11)

## 2022-07-21 PROCEDURE — 85025 COMPLETE CBC W/AUTO DIFF WBC: CPT

## 2022-07-21 PROCEDURE — 82306 VITAMIN D 25 HYDROXY: CPT

## 2022-07-21 PROCEDURE — 86803 HEPATITIS C AB TEST: CPT

## 2022-07-21 PROCEDURE — 36415 COLL VENOUS BLD VENIPUNCTURE: CPT

## 2022-07-21 PROCEDURE — 80053 COMPREHEN METABOLIC PANEL: CPT

## 2022-07-21 PROCEDURE — 92507 TX SP LANG VOICE COMM INDIV: CPT

## 2022-07-21 PROCEDURE — 92526 ORAL FUNCTION THERAPY: CPT

## 2022-07-21 PROCEDURE — 82728 ASSAY OF FERRITIN: CPT

## 2022-07-21 PROCEDURE — 80061 LIPID PANEL: CPT

## 2022-07-21 NOTE — FLOWSHEET NOTE
[facility]    Speech Therapy Daily Treatment Note  Date:  2022    Patient Name:  Jerrell Brennan    :  1980  MRN: [id number]  Medical/Treatment Diagnosis Information:  G81.94 (ICD-10-CM) - Left hemiparesis ([facility] [address]           Treatment Diagnosis: Mild Dysarthria; Mild Cognitive-Linguistic deficits; Mild Oropharyngeal Dysphagia   Ins[facility]/Certification information[facility]: 7/15-10/7/2022, 24 units 10743 & 24 units 18  Physician Information: Dr. Hailey Hayes MD      Plan of care signed (Y/N): Y (2022)    Date of Patient follow up with Physician: DEWEY    Functional outcome measure:   FOTO Primary Measure: HDQLIFE Swallowing Difficulties: 62  FOTO Primary Measure: PROMIS Cognitive Function v2.0: 27.5  Secondary Measure: NeuroQOL - Communication: 18    Progress Note: []  Yes  [x]  No  Next due by: Visit #10 or 2022    RESTRICTIONS/PRECAUTIONS: hx brain bleed  Latex Allergy:  [x]NO      []YES    Preferred Language for Healthcare:   [x]English       []other:    Visit # Insurance Allowable Date Range (if applicable)    24 units 93719  24 units 16266 7/15-10/7/2022       Pain level: 2/10 headache     SUBJECTIVE: Pt alert and receptive,  and son present for session. Pt benefiting from cues to initiate tasks and communication this date. OBJECTIVE:     Exercises/Interventions:      Motor Speech Therapy (35546) Accuracy Cues Notes   Apraxia       Dysarthria      Articulation      Respiration      Phonation      Resonance      Prosody Reading sentences with pacing cues and diaphragmatic breathing x 20 Mod verbal cues    Other:        Cognitive Function (29916 & 80028) Accuracy Cues Notes   Orientation        Attention           Sustained         Selective         Alternating         Divided      Memory          Immediate/Working         Short term         Long term         Prospective      Problem Solving Functional money math training cognitive strategies:  - coin and cash calculations with 80% accuracy  - simple math word problems with 80% accuracy  Mod-max strategy reinforcement Cognitive strategies reinforced: self-talk, focus on one thing at a time, double check work   Visuospatial       Executive Function       Other:              Dysphagia Therapy (99880) Strategies Tolerance Notes   Diet texture:      Regular      Dysphagia III/Soft & Bite-Sized      Dysphagia II/Minced & Moist Via tsp x 5 with compensatory strategies and trail of 3-second bolus prep No overt clinical s/s aspiration Mod verbal cues to reinforce strategies   Dysphagia I/Puree      Liquid Consistency: Thin Via cup edge x 10 with compensatory strategies and trail of 3-second bolus prep No overt clinical s/s aspiration Mod verbal cues to reinforce strategies   Nectar thick/Mildly thick      Honey thick/Moderately thick      Ice chips       Reps Cue level    Oral Phase Exercises:       []Bolus Control Lateralizations      []Bolus Control A-P propulsions       []Bolus Control Diagonal A-P propulsions      []Bolus Control L/R A-P propulsion      []Lingual Resistance exercises   Isometric lingual resistance to improve oral transit time   Exercises: Pt instructed and trained in laryngeal/pharyngeal strengthening exercises including:      []Shari maneuver      [x]Effortful swallows Thin liquids x 10 reps Mod verbal and visual cues Treatment to improve base of tongue propulsion and increase swallow efficiency    []Mendelsohn maneuver      []Supraglottic swallow      []Super-suraglottic swallow      []Modified Shaker (sitting up chin to chest)      []Jaw opening against resistance      []Chin tuck against resistance      []Neuromuscular electrical stimulation (VitalStim)      Pt.  Education:  -Pt educated on diagnosis, prognosis and expectations for rehab  -All pt questions were answered  -Pt verbalized understanding and agreement    HEP instruction:  -Pt provided with written and illustrated instructions for HEP: SLOB intelligibility strategies, WRAP memory strategies, Goal-Plan-Do-Review metacognitive training, compensatory swallowing strategies (Alternate solids/liquids , Upright as possible with all PO intake , Small bites/sips , Eat/feed slowly, Remain upright 30-45 min)      Speech/Voice Therapy:    [x] (21313) Treatment of speech, language, voice, communication, and/or auditory processing disorder; individual    Cognitive Function:  [] (42213) Therapeutic interventions that focus on cognitive function (eg, attention, memory, reasoning, executive function, problem solving, and/or pragmatic functioning) and compensatory strategies to manage the performance of an activity (eg, managing time or schedules, initiating, organizing, and sequencing tasks), direct (one-on-one) patient contact; initial 15 minutes (Report 31059 only once per day)  [] ([phone]) each additional 15 minutes     Dysphagia Therapy:    [x] (98019) Treatment of swallowing dysfunction and/or oral function for feeding         Charges:  Timed Code Treatment Minutes: 0   Total Treatment Minutes: 45     [x] Speech-Language Treatment (14796)        [] Voice Treatment (88049)                                         [] Cognitive-Linguistic Skills Development Initial 15 minutes (74713)  [] Cognitive-Linguistic Skills Development Additional 15 minutes ([phone])   [x] Dysphagia Treatment/NMES (VitalStim) (30886)  [] Other:     GOALS:   GOALS:  Patient stated goal: \"Help with my memory\"; \"Stop slurring\"; \"Swallowing\"  [] Progressing: [] Met: [] Not Met: [] Adjusted     Therapist goals for Patient:  Short Term Goals: To be achieved in: 5 weeks  1. Patient will use speech strategies to facilitate increased intelligibility at discourse level, 5 minutes, of >90% as judged by SLP and pt/family. [] Progressing: [] Met: [] Not Met: [] Adjusted  2.  Patient will select and implement 2 cognitive strategies to complete functional tasks given min initiation cues in order to improve self-monitoring of ADL completion. [] Progressing: [] Met: [] Not Met: [] Adjusted  3. Patient will use learned memory strategies to recall 4 units of novel information across delayed intervals up to 10 minutes given min encoding cues. [] Progressing: [] Met: [] Not Met: [] Adjusted   4. Patient will functionally tolerate regular solids x 20 and thin liquids x 20 with min verbal cues for strategy implementation. [] Progressing: [] Met: [] Not Met: [] Adjusted  5. Patient will complete repeat instrumental swallow study (MBS or FEES) as clinically indicated to further assess pharyngeal phase and direct plan of care. [] Progressing: [] Met: [] Not Met: [] Adjusted      Long Term Goals: To be achieved in: 6 weeks  1. Patient will demonstrate improved cognitive-linguistic function to improve quality of life as evidenced by increased score on functional outcome measure. [] Progressing: [] Met: [] Not Met: [] Adjusted  2. Patient will utilize speech intelligibility strategies to increase intelligibility to 90% in conversation to a familiar/unfamiliar listener. [] Progressing: [] Met: [] Not Met: [] Adjusted  3. Patient will tolerate least restrictive diet with no overt clinical s/s of aspiration/penetration. [] Progressing: [] Met: [] Not Met: [] Adjusted    Progression Towards Functional goals:  [] Patient is progressing as expected towards functional goals listed. [] Progression is slowed due to complexities listed. [] Progression has been slowed due to co-morbidities.   [x] Plan just implemented, too soon to assess goals progression  [] Other:     Persisting Functional Limitations/Impairments:  []Attention []Word Finding       []Memory []Speech Intelligibility      []Executive Function []Diet Tolerance     []Problem Solving []Coughing/Choking with PO  []Expressive Language []Medication/Finance Management  []Receptive Language []Other:      ASSESSMENT:  Session focused on education on results of most recent instrumental swallow study, with trial of compensatory swallowing strategies during PO intake, and demonstration of metacognitive strategies (goal-plan-do-review) to monitor performance and implement appropriate strategies for speech and functional tasks. Pt benefited from mod-max cues to initiate and carryover all strategies for structured speech and cognitive tasks. Treatment/Activity Tolerance:  [x] Patient able to complete tx [] Patient limited by fatigue  [] Patient limited by pain  [] Patient limited by other medical complications  [] Other:     Prognosis: [x] Good [] Fair  [] Poor    Patient Requires Follow-up: [x] Yes  [] No    PLAN: See eval. ST 2x / week for 6 weeks. [] Continue per plan of care [] Alter current plan (see comments)  [x] Plan of care initiated [] Hold pending MD visit [] Discharge    Electronically signed by:   María Benjamin MA CCC-SLP  Speech-Language Pathologist  SP. [id number]      Note: If patient does not return for scheduled/ recommended follow up visits, this note will serve as a discharge from care along with most recent update on progress.

## 2022-07-23 ENCOUNTER — HOSPITAL ENCOUNTER (OUTPATIENT)
Dept: PHYSICAL THERAPY | Age: 42
Setting detail: THERAPIES SERIES
Discharge: HOME | End: 2022-07-23
Payer: COMMERCIAL

## 2022-07-23 PROCEDURE — 97112 NEUROMUSCULAR REEDUCATION: CPT

## 2022-07-23 PROCEDURE — 97116 GAIT TRAINING THERAPY: CPT

## 2022-07-23 NOTE — FLOWSHEET NOTE
[address] [facility] Physical Therapy  Phone: [phone]   Fax: [phone]    Physical Therapy Daily Treatment Note  Date:  2022    Patient Name:  Michael Leblanc    :  1980  MRN: [id number]  Medical/Treatment Diagnosis Information:  Diagnosis: R25.2 (ICD-10-CM) - Cramp and spasm;   Left hemiparesis with spasticity  Treatment Diagnosis: Impaired proprioception of LUE & LE, avoidance of L extremities, Impaired balance & gait  Insurance/Certification information:  PT Insurance Information: [employer]. 30 visits pcy. PA required  Physician Information:   Pranay Dove MD  Plan of care signed (Y/N): [x]  Yes []  No     Date of Patient follow up with Physician:      Progress Report: []  Yes  [x]  No     Date Range for reporting period:  Beginning  2022   Ending      Progress report due (10 Rx/or 30 days whichever is less): visit #10 or 3/83 (date)     Recertification due (POC duration/ or 90 days whichever is less): visit #16 or  (date)     Visit # Insurance Allowable Auth required? Date Range    + 1/16  3/120 units 16 (30 pcy) [x]  Yes, [employer]  []  No Unitl 9/10/22       Units approved Units used Date Range   120 3 9/10/22     Latex Allergy:  [x]NO      []YES  Preferred Language for Healthcare:   [x]English       []Other:      Functional Scale:                                                                                                      Date assessed:  [facility] physical FS primary measure score = 49; risk adjusted = 50                        Functional Scale/Test Evaluation 2022 30 day  60 day  Discharge    Tinetti Assessment        30\" STS 3       5x STS 40\"       2MWT w/QC 65'            Pain level:  4/10  Location:  L knee & elbow    SUBJECTIVE:   Having some L knee pain, was tasia to receive her KAFO a couple weeks ago, walking is okay,  feels gait is worse now.         OBJECTIVE: See eval      RESTRICTIONS/PRECAUTIONS: HTN    Interventions/Exercises:   Therapeutic Exercises (16492) Resistance / level Sets/sec Reps Notes   TM  Seated stepper 0.6 mph  1.0 2'50\", 30\"  5 mins  7/23: L foot stationary on silver portion  7/23: PT assisted alignment L hip                        Neuromuscular Re-ed (95800)        Total gym  -squats to facilitate use of LLE                                                        Therapeutic Activities (02825) /  Functional Tasks / Gait Training (86123)       50% LE sit to stand transfers  x3  7/23: during treatment   R foot taps on bottom step  R foot taps on 2nd step 6\"  6\" 1  1 10  10 R hand on QC  R hand on QC                 Gait Training:  Amb w/QC & KAFO      Step up and overs   SBA      Gabe     75'x2, 30'                                Manual Intervention (97115)                                                     Modalities:     Pt. Education:  6/24/2022 patient educated on diagnosis, prognosis and expectations for rehab, all patient questions were answered    HEP instruction:  6/24: Shift weight to L to facilitate LLE muscle fiber recruitment with all functional activities. Therapeutic Exercise and NMR EXR  [x] (81442) Provided verbal/tactile cueing for activities related to strengthening, flexibility, endurance, ROM for improvements in  [x] LE / Core stability: LE, hip, and core control with self care, mobility, lifting, ambulation. [] UE / Shoulder complex: cervical, postural, scapular, scapulothoracic and UE control with self care, reaching, carrying, lifting, house/yardwork, driving, computer work. [x] (32570) Provided verbal/tactile cueing for activities related to improving balance, coordination, kinesthetic sense, posture, motor skill, proprioception to assist with   [x] LE / Core stability: LE, hip, and core control in self care, mobility, lifting, ambulation and eccentric single leg control.    [] UE / Shoulder complex: cervical, scapular, scapulothoracic and UE control with self care, reaching, carrying, lifting, house/yardwork, driving, computer work.   [] (19219) Therapist is in constant attendance of 2 or more patients providing skilled therapy interventions, but not providing any significant amount of measurable one-on-one time to either patient, for improvements in  [] LE / Core stability: LE, hip, and core control in self care, mobility, lifting, ambulation and eccentric single leg control. [] UE / Shoulder complex: cervical, scapular, scapulothoracic and UE control with self care, reaching, carrying, lifting, house/yardwork, driving, computer work. NMR and Therapeutic Activities:    [x] (82034 or 92757) Provided verbal/tactile cueing for activities related to improving balance, coordination, kinesthetic sense, posture, motor skill, proprioception and motor activation to allow for proper function of   [x] LE / Core, hip and LE with self care and ADLs  [] UE / Shoulder complex: cervical, postural, scapular, scapulothoracic and UE control with self care, carrying, lifting, driving, computer work. [x] (52451) Gait Re-education- Provided training and instruction to the patient for proper LE, core and hip recruitment, positioning, and eccentric body weight control with ambulation re-education, including ascending & descending stairs     Home Management Training / Self Care:  [] (28020) Provided self-care/home management training related to activities of daily living and compensatory training, and/or use of adaptive equipment for improvement with: ADLs and compensatory training, meal preparation, safety procedures and instruction in use of adaptive equipment, including bathing, grooming, dressing, personal hygiene, basic household cleaning and chores.        Home Exercise Program:    [x] (36279) Reviewed/Progressed HEP activities related to strengthening, flexibility, endurance, ROM of   [] LE / Core stability: core, hip and LE for functional self-care, mobility, lifting and ambulation/stair navigation   [] UE / Shoulder complex: cervical, postural, scapular, scapulothoracic and UE control with self care, reaching, carrying, lifting, house/yardwork, driving, computer work  [] (67967)Reviewed/Progressed HEP activities related to improving balance, coordination, kinesthetic sense, posture, motor skill, proprioception of   [] LE: core, hip and LE for self care, mobility, lifting, and ambulation/stair navigation    [] UE / Shoulder complex: cervical, postural,  scapular, scapulothoracic and UE control with self care, reaching, carrying, lifting, house/yardwork, driving, computer work    Manual Treatments:  PROM / STM / Oscillations-Mobs:  G-I, II, III, IV (PA's, Inf., Post.)  [] (49453) Provided manual therapy to mobilize shoulder complex, hip, LE, and/or cervicothoracic/LS spine soft tissue/joints for the purpose of modulating pain, promoting relaxation,  increasing ROM, reducing/eliminating soft tissue swelling/inflammation/restriction, improving soft tissue extensibility and allowing for proper ROM for normal function with   [] LE / Core stability: self care, mobility, lifting and ambulation. [] UE / Shoulder complex: self care, reaching, carrying, lifting, house/yardwork, driving, computer work. Modalities:  [] (97216) Vasopneumatic compression: Utilized vasopneumatic compression to decrease edema / swelling for the purpose of improving mobility and quad tone / recruitment which will allow for increased overall function including but not limited to self-care, transfers, ambulation, and ascending / descending stairs.          Charges:  Timed Code Treatment Minutes: 47   Total Treatment Minutes: 47     [] EVAL - LOW (15392)   [] EVAL - MOD (21749)  [] EVAL - HIGH (91001)  [] RE-EVAL (25269)  [] TE (39637) x      [] Ionto  [x] NMR (01574) x  2    [] Vaso  [] Manual (72860) x      [] Ultrasound  [] TA x       [] Mech Traction (22408)  [x] Gait Training x  1   [] ES (un) (29844):   [] Aquatic therapy x   [] Other:   [] Group:     GOALS:   Patient stated goal: improve transfers and gait  [] Progressing: [] Met: [] Not Met: [] Adjusted     Therapist goals for Patient:   Short Term Goals: To be achieved in: 3 weeks  1. Independent in HEP and progression per patient tolerance, in order to prevent re-injury. [] Progressing: [] Met: [] Not Met: [] Adjusted  2. Patient to demonstrate 40% WB'ing of LLE during sit to stand transfers to assist in muscle recruitment with all tasks. [] Progressing: [] Met: [] Not Met: [] Adjusted     Long Term Goals: To be achieved in: 8 weeks  1. Patient to demonstrate TUG w/QC score of <15\" in order to demonstrate reduced fall risk to assist with reaching prior level of function. [] Progressing: [] Met: [] Not Met: [] Adjusted  2. FOTO score of at least 56 to assist with reaching prior level of function. [] Progressing: [] Met: [] Not Met: [] Adjusted  3. Patient will demonstrate an increase 30\" sit to stand reps to 10 with 40% WB'ing through LLE, to improve transfers to/from all surfaces & heights  [] Progressing: [] Met: [] Not Met: [] Adjusted  4. Patient will improve Tinetti Balance score to 15/28 in order to . [] Progressing: [] Met: [] Not Met: [] Adjusted  5. Patient will increase 2MWT w/QC to 150' in order to improve community access. [] Progressing: [] Met: [] Not Met: [] Adjusted        Overall Progression Towards Functional goals/ Treatment Progress Update:  [] Patient is progressing as expected towards functional goals listed. [] Progression is slowed due to complexities/Impairments listed. [] Progression has been slowed due to co-morbidities.   [x] Plan just implemented, too soon to assess goals progression <30days   [] Goals require adjustment due to lack of progress  [] Patient is not progressing as expected and requires additional follow up with physician  [] Other    Persisting Functional Limitations/Impairments:  []Sleeping []Sitting               [x]Standing [x]Transfers        [x]Walking [x]Kneeling               [x]Stairs [x]Squatting / bending   [x]ADLs [x]Reaching  [x]Lifting  [x]Housework  [x]Driving [x]Job related tasks  []Sports/Recreation []Other:        ASSESSMENT:    Fatigue levels quickly increased with activity and pt unable to complete time on treadmill due to lower back pain. Patient continues to avoid use of LLE and even demonstrates poor motor planning when RLE activation is inhibited by PT. Continue to facilitate use of LLE with all functional mobility. Encouraged pt to reduce use of KAFO as L genu recurvatum is still present, improved eccentric control however, but gait deviations are more pronounced. Suggested pt only wear KAFO with prolonged activity to assist in management of L knee pain.  and patient agreed. Treatment/Activity Tolerance:  [] Patient able to complete tx  [x] Patient limited by fatigue  [] Patient limited by pain  [] Patient limited by other medical complications  [] Other:     Prognosis: [x] Good [] Fair  [] Poor    Patient Requires Follow-up: [x] Yes  [] No    Plan for next treatment session:    PLAN: See eval. PT 2x / week for 8 weeks. [x] Continue per plan of care [] Alter current plan (see comments)  [] Plan of care initiated [] Hold pending MD visit [] Discharge    Electronically signed by: Gino Bowen, PT PT, DPT    Note: If patient does not return for scheduled/ recommended follow up visits, his note will serve as a discharge from care along with most recent update on progress.

## 2022-07-25 DIAGNOSIS — E78.2 MIXED HYPERLIPIDEMIA: ICD-10-CM

## 2022-07-25 DIAGNOSIS — D50.8 OTHER IRON DEFICIENCY ANEMIA: Primary | ICD-10-CM

## 2022-07-25 DIAGNOSIS — E55.9 VITAMIN D DEFICIENCY: ICD-10-CM

## 2022-07-25 RX ORDER — FERROUS SULFATE 325(65) MG
325 TABLET ORAL
Qty: 30 TABLET | Refills: 5 | Status: SHIPPED | OUTPATIENT
Start: 2022-07-25 | End: 2022-07-26 | Stop reason: SDUPTHER

## 2022-07-25 RX ORDER — ATORVASTATIN CALCIUM 40 MG/1
40 TABLET, FILM COATED ORAL NIGHTLY
Qty: 30 TABLET | Refills: 5 | Status: SHIPPED | OUTPATIENT
Start: 2022-07-25 | End: 2022-07-26 | Stop reason: SDUPTHER

## 2022-07-25 RX ORDER — CHOLECALCIFEROL (VITAMIN D3) 50 MCG
2000 TABLET ORAL DAILY
Qty: 30 TABLET | Refills: 5 | Status: SHIPPED | OUTPATIENT
Start: 2022-07-25 | End: 2022-07-26 | Stop reason: SDUPTHER

## 2022-07-26 ENCOUNTER — HOSPITAL ENCOUNTER (OUTPATIENT)
Dept: OCCUPATIONAL THERAPY | Age: 42
Setting detail: THERAPIES SERIES
End: 2022-07-26
Payer: COMMERCIAL

## 2022-07-26 ENCOUNTER — HOSPITAL ENCOUNTER (OUTPATIENT)
Dept: SPEECH THERAPY | Age: 42
Setting detail: THERAPIES SERIES
Discharge: HOME | End: 2022-07-26
Payer: COMMERCIAL

## 2022-07-26 DIAGNOSIS — D50.8 OTHER IRON DEFICIENCY ANEMIA: ICD-10-CM

## 2022-07-26 DIAGNOSIS — E55.9 VITAMIN D DEFICIENCY: ICD-10-CM

## 2022-07-26 DIAGNOSIS — E78.2 MIXED HYPERLIPIDEMIA: ICD-10-CM

## 2022-07-26 RX ORDER — FERROUS SULFATE 325(65) MG
325 TABLET ORAL
Qty: 30 TABLET | Refills: 5 | Status: SHIPPED | OUTPATIENT
Start: 2022-07-26

## 2022-07-26 RX ORDER — CHOLECALCIFEROL (VITAMIN D3) 50 MCG
2000 TABLET ORAL DAILY
Qty: 30 TABLET | Refills: 5 | Status: SHIPPED | OUTPATIENT
Start: 2022-07-26

## 2022-07-26 RX ORDER — ATORVASTATIN CALCIUM 40 MG/1
40 TABLET, FILM COATED ORAL NIGHTLY
Qty: 30 TABLET | Refills: 5 | Status: SHIPPED | OUTPATIENT
Start: 2022-07-26

## 2022-07-26 NOTE — FLOWSHEET NOTE
Speech Therapy  Cancellation/No-show Note  Patient Name:  Jeanie Flores  :  1980   Date:  2022  Cancels to Date: 2  No-shows to Date: 0    Patient status for today's appointment patient:  [x]  Cancelled (, )  []  Rescheduled appointment  []  No-show     Reason given by patient:  []  Patient ill  [x]  Conflicting appointment  []  No transportation    []  Conflict with work  []  No reason given  []  Other:     Comments:      Phone call information:   [x]  Phone call made today to patient at 0830 time at number provided:      [x]  Patient's  answered, conversation as follows: pt called clinic and left a voicemail this AM re: cancelling appointments   []  Patient did not answer, message left as follows:  []  Phone call not made today, pt called clinic and provided reason for cancellation    Electronically signed by:  Betty Carson, SLP

## 2022-07-26 NOTE — PROGRESS NOTES
Occupational Therapy  Cancellation/No-show Note  Patient Name:  Melany Wolfe   :  1980   Date:  2022  Cancelled visits to date: 3  No-shows to date: 0    Patient status for today's appointment patient:  [x]  Cancelled  []  Rescheduled appointment  []  No-show     Reason given by patient:  []  Patient ill  [x]  Conflicting appointment  []  No transportation    []  Conflict with work  []  No reason given  []  Other:     Comments:      Phone call information:   []  Phone call made today to patient at _ time at number provided:      []  Patient answered, conversation as follows: Patient had MD appointment    []  Patient did not answer, message left as follows:  []  Phone call not made today    Electronically signed by:  Terrence Lund OT

## 2022-07-28 ENCOUNTER — HOSPITAL ENCOUNTER (OUTPATIENT)
Dept: OCCUPATIONAL THERAPY | Age: 42
Setting detail: THERAPIES SERIES
Discharge: HOME | End: 2022-07-28
Payer: COMMERCIAL

## 2022-07-28 ENCOUNTER — HOSPITAL ENCOUNTER (OUTPATIENT)
Dept: PHYSICAL THERAPY | Age: 42
Setting detail: THERAPIES SERIES
Discharge: HOME | End: 2022-07-28
Payer: COMMERCIAL

## 2022-07-28 PROCEDURE — 97110 THERAPEUTIC EXERCISES: CPT

## 2022-07-28 PROCEDURE — 97112 NEUROMUSCULAR REEDUCATION: CPT

## 2022-07-28 PROCEDURE — 97530 THERAPEUTIC ACTIVITIES: CPT

## 2022-07-28 NOTE — FLOWSHEET NOTE
[address] [facility] Physical Therapy  Phone: [phone]   Fax: [phone]    Physical Therapy Daily Treatment Note  Date:  2022    Patient Name:  Salinas Lincoln    :  1980  MRN: [id number]  Medical/Treatment Diagnosis Information:  Diagnosis: R25.2 (ICD-10-CM) - Cramp and spasm;   Left hemiparesis with spasticity  Treatment Diagnosis: Impaired proprioception of LUE & LE, avoidance of L extremities, Impaired balance & gait  Insurance/Certification information:  PT Insurance Information: [employer]. 30 visits pcy. PA required  Physician Information:   Catrachito Patricio MD  Plan of care signed (Y/N): [x]  Yes []  No     Date of Patient follow up with Physician:      Progress Report: []  Yes  [x]  No     Date Range for reporting period:  Beginning  2022   Ending      Progress report due (10 Rx/or 30 days whichever is less): visit #10 or  (date)     Recertification due (POC duration/ or 90 days whichever is less): visit #16 or  (date)     Visit # Insurance Allowable Auth required? Date Range    +   3120 units 16 (30 pcy) [x]  Yes, [employer]  []  No Unitl 9/10/22       Units approved Units used Date Range   120 3 9/10/22     Latex Allergy:  [x]NO      []YES  Preferred Language for Healthcare:   [x]English       []Other:      Functional Scale:                                                                                                      Date assessed:  [facility] physical FS primary measure score = 49; risk adjusted = 50                        Functional Scale/Test Evaluation 2022 30 day  60 day  Discharge    Tinetti Assessment        30\" STS 3       5x STS 40\"       2MWT w/QC 65'            Pain level:  2/10  Location:  L knee & elbow    SUBJECTIVE:   Has not been using the new KAFO and feels that walking is easier again. . A little bit of L knee and R shoulder pain today.       OBJECTIVE:       RESTRICTIONS/PRECAUTIONS: HTN    Interventions/Exercises:   Therapeutic Exercises (13628) Resistance / level Sets/sec Reps Notes   TM  Seated stepper  7/23: L foot stationary on silver portion  7/23: PT assisted alignment L hip                        Neuromuscular Re-ed (44144)        Total gym  -squats to facilitate use of LLE                                                        Therapeutic Activities (39878) /  Functional Tasks / Gait Training (66297)       50% LE sit to stand transfers   7/23: during treatment   R foot taps on bottom step  R foot taps on 2nd step    R toe taps on to black board of Shuttle, L foot on lester 6\"  6\"   15 R hand on QC  R hand on QC    7/28: stability provided by SPT through R hand                 Gait Training:  Amb w/QC & AFO      Step up and overs, L foot on Airex, R foot step over half bolster & return   SBA      CGA    75'x2, 30'      x3   7/28: has not been using KAFO      7/28: LOB x 1 with max assist x2   Sit to stand, R foot on AirEx to inhibit activation, large bolster between feet to maintain correct alignment for facilitation   x5 7/28: use of mirror improved weight-shifting to L upon standing   Sit to stand w/large SB to facilitate forward flexion and increase activation of LLE   x10 7/28: use of mirror improved weight-shifting to L while seated                 Manual Intervention (30659)                                                     Modalities:     Pt. Education:  6/24/2022 patient educated on diagnosis, prognosis and expectations for rehab, all patient questions were answered    HEP instruction:  6/24: Shift weight to L to facilitate LLE muscle fiber recruitment with all functional activities. Therapeutic Exercise and NMR EXR  [x] (01051) Provided verbal/tactile cueing for activities related to strengthening, flexibility, endurance, ROM for improvements in  [x] LE / Core stability: LE, hip, and core control with self care, mobility, lifting, ambulation.   [] UE / Shoulder complex: of daily living and compensatory training, and/or use of adaptive equipment for improvement with: ADLs and compensatory training, meal preparation, safety procedures and instruction in use of adaptive equipment, including bathing, grooming, dressing, personal hygiene, basic household cleaning and chores. Home Exercise Program:    [x] (76058) Reviewed/Progressed HEP activities related to strengthening, flexibility, endurance, ROM of   [] LE / Core stability: core, hip and LE for functional self-care, mobility, lifting and ambulation/stair navigation   [] UE / Shoulder complex: cervical, postural, scapular, scapulothoracic and UE control with self care, reaching, carrying, lifting, house/yardwork, driving, computer work  [] (20257)Reviewed/Progressed HEP activities related to improving balance, coordination, kinesthetic sense, posture, motor skill, proprioception of   [] LE: core, hip and LE for self care, mobility, lifting, and ambulation/stair navigation    [] UE / Shoulder complex: cervical, postural,  scapular, scapulothoracic and UE control with self care, reaching, carrying, lifting, house/yardwork, driving, computer work    Manual Treatments:  PROM / STM / Oscillations-Mobs:  G-I, II, III, IV (PA's, Inf., Post.)  [] (55061) Provided manual therapy to mobilize shoulder complex, hip, LE, and/or cervicothoracic/LS spine soft tissue/joints for the purpose of modulating pain, promoting relaxation,  increasing ROM, reducing/eliminating soft tissue swelling/inflammation/restriction, improving soft tissue extensibility and allowing for proper ROM for normal function with   [] LE / Core stability: self care, mobility, lifting and ambulation. [] UE / Shoulder complex: self care, reaching, carrying, lifting, house/yardwork, driving, computer work.      Modalities:  [] (50680) Vasopneumatic compression: Utilized vasopneumatic compression to decrease edema / swelling for the purpose of improving mobility and quad tone / recruitment which will allow for increased overall function including but not limited to self-care, transfers, ambulation, and ascending / descending stairs. Charges:  Timed Code Treatment Minutes: 40   Total Treatment Minutes: 40     [] EVAL - LOW (19585)   [] EVAL - MOD (28984)  [] EVAL - HIGH (29490)  [] RE-EVAL (56569)  [] TE (19809) x      [] Ionto  [x] NMR (46019) x  2    [] Vaso  [] Manual (06334) x      [] Ultrasound  [x] TA x  1     [] Mech Traction (50470)  [] Gait Training x     [] ES (un) (75469):   [] Aquatic therapy x   [] Other:   [] Group:     GOALS:   Patient stated goal: improve transfers and gait  [] Progressing: [] Met: [] Not Met: [] Adjusted     Therapist goals for Patient:   Short Term Goals: To be achieved in: 3 weeks  1. Independent in HEP and progression per patient tolerance, in order to prevent re-injury. [] Progressing: [] Met: [] Not Met: [] Adjusted  2. Patient to demonstrate 40% WB'ing of LLE during sit to stand transfers to assist in muscle recruitment with all tasks. [] Progressing: [] Met: [] Not Met: [] Adjusted     Long Term Goals: To be achieved in: 8 weeks  1. Patient to demonstrate TUG w/QC score of <15\" in order to demonstrate reduced fall risk to assist with reaching prior level of function. [] Progressing: [] Met: [] Not Met: [] Adjusted  2. FOTO score of at least 56 to assist with reaching prior level of function. [] Progressing: [] Met: [] Not Met: [] Adjusted  3. Patient will demonstrate an increase 30\" sit to stand reps to 10 with 40% WB'ing through LLE, to improve transfers to/from all surfaces & heights  [] Progressing: [] Met: [] Not Met: [] Adjusted  4. Patient will improve Tinetti Balance score to 15/28 in order to . [] Progressing: [] Met: [] Not Met: [] Adjusted  5. Patient will increase 2MWT w/QC to 150' in order to improve community access.   [] Progressing: [] Met: [] Not Met: [] Adjusted        Overall Progression Towards Functional goals/ Treatment Progress Update:  [] Patient is progressing as expected towards functional goals listed. [] Progression is slowed due to complexities/Impairments listed. [] Progression has been slowed due to co-morbidities. [x] Plan just implemented, too soon to assess goals progression <30days   [] Goals require adjustment due to lack of progress  [] Patient is not progressing as expected and requires additional follow up with physician  [] Other    Persisting Functional Limitations/Impairments:  []Sleeping []Sitting               [x]Standing [x]Transfers        [x]Walking [x]Kneeling               [x]Stairs [x]Squatting / bending   [x]ADLs [x]Reaching  [x]Lifting  [x]Housework  [x]Driving [x]Job related tasks  []Sports/Recreation []Other:        ASSESSMENT:    Significant LOB occurred during gait training w/AirEx and 1/2 foam roll, required max A of 2 to maintain & regain balance. Avoidance of L extremities and L side of environment continues, inhibiting safety with transfers and walking. Is able to be oriented to L side but has significant difficulty motor planning use of LLE & UE for task completion & participation when not cued. Perform tall kneeling & 1/2 kneeling tasks to increase recruitment of L hip musculature during transfers, standing & amb tasks. Treatment/Activity Tolerance:  [] Patient able to complete tx  [x] Patient limited by fatigue  [] Patient limited by pain  [] Patient limited by other medical complications  [] Other:     Prognosis: [x] Good [] Fair  [] Poor    Patient Requires Follow-up: [x] Yes  [] No    Plan for next treatment session:    PLAN: See josey. PT 2x / week for 8 weeks.    [x] Continue per plan of care [] Alter current plan (see comments)  [] Plan of care initiated [] Hold pending MD visit [] Discharge    Electronically signed by: Maddie Hopper, PT PT, DPT    Note: If patient does not return for scheduled/ recommended follow up visits, his note will serve as a discharge from care along with most recent update on progress.

## 2022-07-28 NOTE — PROGRESS NOTES
[address] [facility] Occupational Therapy  [address] [place] [address] Emily, [address]  Phone: [phone]   Fax: [phone]      Occupational Therapy Daily Treatment Note  Date:  2022    Patient: Rowland Lanes   : 1980   MRN: [id number]  Referring Physician: China Mitchell MD           Medical Diagnosis Information: left hemiplegia    Date of Injury: 22  Date of Surgery: N/A                                      Insurance information: care source     Plan of care sent to provider:      []Faxed   [x]Co-signature    (attempts: 1[x] 2[] 3[])       Progress Report: []  Yes  [x]  No     Date Range for reporting period:  Beginnin22  Ending: end of POC    Progress report due (10 Rx/or 30 days whichever is less): visit #10 or 35     Recertification due (POC duration/ or 90 days whichever is less): visit #12 or 22    Visit # Insurance Allowable Auth required? Date Range    30 hard max    120 units [x]  Yes  []  No 22-10/1/22       Latex Allergy:  [x]No      []Yes  Pacemaker:  [x] No       [] Yes     Preferred Language for Healthcare:   [x]English       []other:    Pain level: 0/10     SUBJECTIVE:  Pt presents with . Reports fatigue from PT, but ready to work. Pt's  shows photo of pt's L hand relaxed on her lap at home, stating she will sometimes move it while she is sleeping. Functional Disability Index:  FOTO:  Patient's Physical FS Primary Measure- 25  Risk Adjusted Statistical FOTO-     OBJECTIVE: See eval      RESTRICTIONS/PRECAUTIONS: fall risk, poor midline, left visual hemianopsia    Exercises/Interventions: Treatment focused on improving postural control and R side involvement during functional tasks to decrease abnormal movement patterns. Session initiated with functional STS training with use of LUE as active support with L hand on L thigh; cues to maintain midline with improvements noted with RUE reach forward. Progressed to scooting practice with facilitation at L QL. Pt grasped dowel mahesh vertically in L hand with max A to place hand; completed push/pull x10 with emphasis on shoulder ER and cues for L weight shift and trunk lengthening. STS min A to maintain midline. With dowel mahesh in L hand to facilitate use as active support, pt completed R step up to 4-inch step for LLE stability and weight bearing; mod A for L weight shift and maintaining weight during step and hold. In high perch edge of mat, pt received brief joint mobilization for metacarpal spread and thumb abduction, followed by placement on UE ranger for push/pull and stability work. Pt completed push/tilt with BUEs on chair back to work on maintaining LUE force with nearly constant cues needed to prevent disengaging LUE muscles; progressed to RLE toe taps and RUE reaching. Improved L wrist extension following. Functional mobility to parking lot with use of dowel in R hand rather than cane to improve midline and upright posture; mod cues for L weight shift/acceptance and RLE increased swing through. Therapeutic Exercises  Resistance / level Sets/sec Reps Notes                                                                                Neuromuscular Re-ed / Therapeutic Activities                                                 Manual Intervention                                                     Modalities:     Patient education: OT evaluation home program goals          Home Exercise Program:   7/1/2022 aarom     Therapeutic Exercise and NMR:  [x] (19383) Provided verbal/tactile cueing for activities related to strengthening, flexibility, endurance, ROM  for improvements in scapular, scapulothoracic and UE control with self care, reaching, carrying, lifting, house/yardwork, driving/computer work.     [x] (44663) Provided verbal/tactile cueing for activities related to improving balance, coordination, kinesthetic sense, posture, motor skill, proprioception  to assist with  scapular, scapulothoracic and UE control with self care, reaching, carrying, lifting, house/yardwork, driving/computer work.   [] Comments:    Therapeutic Activities:    [x] (73617 or 13713) Provided verbal/tactile cueing for activities related to improving balance, coordination, kinesthetic sense, posture, motor skill, proprioception and motor activation to allow for proper function of scapular, scapulothoracic and UE control with self care, carrying, lifting, driving/computer work  [] Comments:    Home Exercise Program:    [] (25963) Reviewed/Progressed HEP activities related to strengthening, flexibility, endurance, ROM of scapular, scapulothoracic and UE control with self care, reaching, carrying, lifting, house/yardwork, driving/computer work  [] (69469) Reviewed/Progressed HEP activities related to improving balance, coordination, kinesthetic sense, posture, motor skill, proprioception of scapular, scapulothoracic and UE control with self care, reaching, carrying, lifting, house/yardwork, driving/computer work    [] Comments:    Manual Treatments:  PROM / STM / Oscillations-Mobs:  G-I, II, III, IV (PA's, Inf., Post.)  [] (67732) Provided manual therapy to mobilize soft tissue/joints of cervical/CT, scapular GHJ and UE for the purpose of modulating pain, promoting relaxation,  increasing ROM, reducing/eliminating soft tissue swelling/inflammation/restriction, improving soft tissue extensibility and allowing for proper ROM for normal function with self care, reaching, carrying, lifting, house/yardwork, driving/computer work  [] Comments:    ADL Training:  [] (88087) Provided self-care/home management training related to activities of daily living and compensatory training, and/or use of adaptive equipment   [] Comments:     Splinting:  [] Fabrication of:   [] (91996) Orthotic/Prosthetic Management, subsequent encounter  [] (56184) Orthotic management and training (fitting and assessment)  [] Comments:      Charges:  Timed Code Treatment Minutes:  45   Total Treatment Minutes: 45     [] EVAL (LOW) 62513   [] OT Re-eval (01447)  [] EVAL (MOD) 04324   [] EVAL (HIGH) 25646       [x] Dolly (39948) x    1 [] SWJNC(70603)  [x] NMR (93975) x    2 [] Estim (attended) (30170)   [] Manual ([id number]) x     [] US (60576)  [] TA (32488) x     [] Paraffin (01645)  [] ADL  (99384) x    [] Splint/L code:    [] Estim (unattended) (08798)  [] Fluidotherapy (19326)  [] Other:    GOALS: Goals established 6/30/22   Patient stated goal:  Patient stated goal is to improve her over all mobility and ADL status   [] Progressing: [] Met: [] Not Met: [] Adjusted     Therapist goals for Patient 3 mos   Modified independent functional transfers  [] Progressing: [] Met: [] Not Met: [] Adjusted  Modified independent lower body dress  [] Progressing: [] Met: [] Not Met: [] Adjusted  Modified independent upper body dress  [] Progressing: [] Met: [] Not Met: [] Adjusted  Use of left upper extremity as dynamic base of support during self care        [] Progressing: [] Met: [] Not Met: [] Adjusted     Pt will report a foto score of 34 after 21 visits   [] Progressing: [] Met: [] Not Met: [] Adjusted    Progression Towards Functional goals:  [] Patient is progressing as expected towards functional goals listed. [] Progression is slowed due to complexities listed. [] Progression has been slowed due to co-morbidities. [x] Plan just implemented, too soon to assess goals progression  [] All goals are met  [] Other:     ASSESSMENT:  Improved RLE step length with assist for L weight shift during functional mobility. Continued decreased bicep/tricep activation, but with increased anterior delt.     Treatment/Activity Tolerance:  [x] Patient tolerated treatment well [] Patient limited by fatigue  [] Patient limited by pain  [] Patient limited by other medical complications  [] Other:     Prognosis: [x] Good [] Fair  [] Poor    Patient Requires Follow-up: [x] Yes  [] No    PLAN: See eval  [x] Continue per plan of care [] Alter current plan (see comments)  [] Plan of care initiated [] Hold pending MD visit [] Discharge    Electronically signed by:    TERRELL Santana/L, C/NDT ([id number])      Note: If patient does not return for scheduled/ recommended follow up visits, this note will serve as a discharge from care along with most recent update on progress.

## 2022-08-02 ENCOUNTER — HOSPITAL ENCOUNTER (OUTPATIENT)
Dept: SPEECH THERAPY | Age: 42
Setting detail: THERAPIES SERIES
Discharge: HOME | End: 2022-08-02
Payer: COMMERCIAL

## 2022-08-02 ENCOUNTER — HOSPITAL ENCOUNTER (OUTPATIENT)
Dept: PHYSICAL THERAPY | Age: 42
Setting detail: THERAPIES SERIES
Discharge: HOME | End: 2022-08-02
Payer: COMMERCIAL

## 2022-08-02 ENCOUNTER — APPOINTMENT (OUTPATIENT)
Dept: OCCUPATIONAL THERAPY | Age: 42
End: 2022-08-02
Payer: COMMERCIAL

## 2022-08-02 PROCEDURE — 92526 ORAL FUNCTION THERAPY: CPT

## 2022-08-02 PROCEDURE — 92507 TX SP LANG VOICE COMM INDIV: CPT

## 2022-08-02 PROCEDURE — 97530 THERAPEUTIC ACTIVITIES: CPT

## 2022-08-02 PROCEDURE — 97112 NEUROMUSCULAR REEDUCATION: CPT

## 2022-08-02 NOTE — FLOWSHEET NOTE
[address] [facility] Physical Therapy  Phone: [phone]   Fax: [phone]    Physical Therapy Daily Treatment Note  Date:  2022    Patient Name:  Elinor Cabrera    :  1980  MRN: [id number]  Medical/Treatment Diagnosis Information:  Diagnosis: R25.2 (ICD-10-CM) - Cramp and spasm;   Left hemiparesis with spasticity  Treatment Diagnosis: Impaired proprioception of LUE & LE, avoidance of L extremities, Impaired balance & gait  Insurance/Certification information:  PT Insurance Information: [employer]. 30 visits pcy. PA required  Physician Information:   Maximo Zaragoza MD  Plan of care signed (Y/N): [x]  Yes []  No     Date of Patient follow up with Physician:      Progress Report: []  Yes  [x]  No     Date Range for reporting period:  Beginning  2022   Ending      Progress report due (10 Rx/or 30 days whichever is less): visit #10 or 3/48 (date)     Recertification due (POC duration/ or 90 days whichever is less): visit #16 or  (date)     Visit # Insurance Allowable Auth required? Date Range    + 3/16  9/120 units 16 (30 pcy) [x]  Yes, [employer]  []  No Until 9/10/22       Units approved Units used Date Range   120 9 9/10/22     Latex Allergy:  [x]NO      []YES  Preferred Language for Healthcare:   [x]English       []Other:      Functional Scale:                                                                                                      Date assessed:  TO physical FS primary measure score = 49; risk adjusted = 50                        Functional Scale/Test Evaluation 2022 30 day  60 day  Discharge    Tinetti Assessment        30\" STS 3       5x STS 40\"       2MWT w/QC 65'            Pain level:  2/10  Location:  L knee & elbow    SUBJECTIVE:   Had a busy weekend at [facility] with her son. Did not walk while there. Energy levels are good today.       OBJECTIVE:       RESTRICTIONS/PRECAUTIONS:   HTN    Interventions/Exercises: Therapeutic Exercises (70017) Resistance / level Sets/sec Reps Notes   TM  Seated stepper  7/23: L foot stationary on silver portion  7/23: PT assisted alignment L hip                        Neuromuscular Re-ed (86602)        Total gym  -squats to facilitate use of LLE       Tall kneel- side step across mat table    Down and back 8/2: PT assist for postural control and L leg assist   Fwd lunge onto step, L foot on top of step, R foot behind 6\"  15 8/2: PT assist at pelvis for forward weight shift   LAQ   X 10 B 8/2: PT assist for L quad activation and weight shift to L for upright posture                               Therapeutic Activities (09460) /  Functional Tasks / Gait Training (63873)       50% LE sit to stand transfers   7/23: during treatment   R foot taps on bottom step  R foot taps on 2nd step    R toe taps on to black board of Shuttle, L foot on lester 6\"  6\"   R hand on QC  R hand on QC    7/28: stability provided by SPT through R hand                 Gait Training:  Amb w/QC & AFO      Step up and overs, L foot on Airex, R foot step over half bolster & return   SBA         75'x2, 30'        7/28: has not been using KAFO      7/28: LOB x 1 with max assist x2   Sit to stand, R foot on AirEx to inhibit activation, large bolster between feet to maintain correct alignment for facilitation   7/28: use of mirror improved weight-shifting to L upon standing   Sit to stand w/large SB to facilitate forward flexion and increase activation of LLE   7/28: use of mirror improved weight-shifting to L while seated                 Manual Intervention (34415)                                                     Modalities:     Pt. Education:  6/24/2022 patient educated on diagnosis, prognosis and expectations for rehab, all patient questions were answered    HEP instruction:  6/24: Shift weight to L to facilitate LLE muscle fiber recruitment with all functional activities.         Therapeutic Exercise and NMR EXR  [x] (87991) Provided verbal/tactile cueing for activities related to strengthening, flexibility, endurance, ROM for improvements in  [x] LE / Core stability: LE, hip, and core control with self care, mobility, lifting, ambulation. [] UE / Shoulder complex: cervical, postural, scapular, scapulothoracic and UE control with self care, reaching, carrying, lifting, house/yardwork, driving, computer work. [x] (77405) Provided verbal/tactile cueing for activities related to improving balance, coordination, kinesthetic sense, posture, motor skill, proprioception to assist with   [x] LE / Core stability: LE, hip, and core control in self care, mobility, lifting, ambulation and eccentric single leg control. [] UE / Shoulder complex: cervical, scapular, scapulothoracic and UE control with self care, reaching, carrying, lifting, house/yardwork, driving, computer work.   [] (85304) Therapist is in constant attendance of 2 or more patients providing skilled therapy interventions, but not providing any significant amount of measurable one-on-one time to either patient, for improvements in  [] LE / Core stability: LE, hip, and core control in self care, mobility, lifting, ambulation and eccentric single leg control. [] UE / Shoulder complex: cervical, scapular, scapulothoracic and UE control with self care, reaching, carrying, lifting, house/yardwork, driving, computer work. NMR and Therapeutic Activities:    [x] (34034 or 86622) Provided verbal/tactile cueing for activities related to improving balance, coordination, kinesthetic sense, posture, motor skill, proprioception and motor activation to allow for proper function of   [x] LE / Core, hip and LE with self care and ADLs  [] UE / Shoulder complex: cervical, postural, scapular, scapulothoracic and UE control with self care, carrying, lifting, driving, computer work.    [x] (37186) Gait Re-education- Provided training and instruction to the patient for proper LE, core and hip recruitment, positioning, and eccentric body weight control with ambulation re-education, including ascending & descending stairs     Home Management Training / Self Care:  [] (16165) Provided self-care/home management training related to activities of daily living and compensatory training, and/or use of adaptive equipment for improvement with: ADLs and compensatory training, meal preparation, safety procedures and instruction in use of adaptive equipment, including bathing, grooming, dressing, personal hygiene, basic household cleaning and chores. Home Exercise Program:    [x] (68218) Reviewed/Progressed HEP activities related to strengthening, flexibility, endurance, ROM of   [] LE / Core stability: core, hip and LE for functional self-care, mobility, lifting and ambulation/stair navigation   [] UE / Shoulder complex: cervical, postural, scapular, scapulothoracic and UE control with self care, reaching, carrying, lifting, house/yardwork, driving, computer work  [] (12961)Reviewed/Progressed HEP activities related to improving balance, coordination, kinesthetic sense, posture, motor skill, proprioception of   [] LE: core, hip and LE for self care, mobility, lifting, and ambulation/stair navigation    [] UE / Shoulder complex: cervical, postural,  scapular, scapulothoracic and UE control with self care, reaching, carrying, lifting, house/yardwork, driving, computer work    Manual Treatments:  PROM / STM / Oscillations-Mobs:  G-I, II, III, IV (PA's, Inf., Post.)  [] (08605) Provided manual therapy to mobilize shoulder complex, hip, LE, and/or cervicothoracic/LS spine soft tissue/joints for the purpose of modulating pain, promoting relaxation,  increasing ROM, reducing/eliminating soft tissue swelling/inflammation/restriction, improving soft tissue extensibility and allowing for proper ROM for normal function with   [] LE / Core stability: self care, mobility, lifting and ambulation.     [] UE / Shoulder complex: self care, reaching, carrying, lifting, house/yardwork, driving, computer work. Modalities:  [] (47984) Vasopneumatic compression: Utilized vasopneumatic compression to decrease edema / swelling for the purpose of improving mobility and quad tone / recruitment which will allow for increased overall function including but not limited to self-care, transfers, ambulation, and ascending / descending stairs. Charges:  Timed Code Treatment Minutes: 45   Total Treatment Minutes: 45     [] EVAL - LOW (37661)   [] EVAL - MOD (26762)  [] EVAL - HIGH (46198)  [] RE-EVAL (01644)  [] TE (89489) x      [] Ionto  [x] NMR (26714) x  2    [] Vaso  [] Manual (05826) x      [] Ultrasound  [x] TA x  1    [] Mech Traction (57849)  [] Gait Training x     [] ES (un) (20858):   [] Aquatic therapy x   [] Other:   [] Group:     GOALS:   Patient stated goal: improve transfers and gait  [] Progressing: [] Met: [] Not Met: [] Adjusted     Therapist goals for Patient:   Short Term Goals: To be achieved in: 3 weeks  1. Independent in HEP and progression per patient tolerance, in order to prevent re-injury. [] Progressing: [] Met: [] Not Met: [] Adjusted  2. Patient to demonstrate 40% WB'ing of LLE during sit to stand transfers to assist in muscle recruitment with all tasks. [] Progressing: [] Met: [] Not Met: [] Adjusted     Long Term Goals: To be achieved in: 8 weeks  1. Patient to demonstrate TUG w/QC score of <15\" in order to demonstrate reduced fall risk to assist with reaching prior level of function. [] Progressing: [] Met: [] Not Met: [] Adjusted  2. FOTO score of at least 56 to assist with reaching prior level of function. [] Progressing: [] Met: [] Not Met: [] Adjusted  3. Patient will demonstrate an increase 30\" sit to stand reps to 10 with 40% WB'ing through LLE, to improve transfers to/from all surfaces & heights  [] Progressing: [] Met: [] Not Met: [] Adjusted  4.  Patient will improve Tinetti Balance score to 15/28 in order to . [] Progressing: [] Met: [] Not Met: [] Adjusted  5. Patient will increase 2MWT w/QC to 150' in order to improve community access. [] Progressing: [] Met: [] Not Met: [] Adjusted        Overall Progression Towards Functional goals/ Treatment Progress Update:  [] Patient is progressing as expected towards functional goals listed. [] Progression is slowed due to complexities/Impairments listed. [] Progression has been slowed due to co-morbidities. [x] Plan just implemented, too soon to assess goals progression <30days   [] Goals require adjustment due to lack of progress  [] Patient is not progressing as expected and requires additional follow up with physician  [] Other    Persisting Functional Limitations/Impairments:  []Sleeping []Sitting               [x]Standing [x]Transfers        [x]Walking [x]Kneeling               [x]Stairs [x]Squatting / bending   [x]ADLs [x]Reaching  [x]Lifting  [x]Housework  [x]Driving [x]Job related tasks  []Sports/Recreation []Other:        ASSESSMENT:     **reassess next session**  Pt continues to demo decreased use of L side of body. Performed tall kneeling side steps today to recruit L side of body and encourage L lateral weight shift. Pt demo min L quad contraction with LAQ and relies on lateral or backward trunk lean to straighten out L leg. Progress tall kneeling activity and consider use of 1/2 kneeling tasks to increase recruitment of L hip musculature during transfers, standing, and amb tasks. Treatment/Activity Tolerance:  [] Patient able to complete tx  [x] Patient limited by fatigue  [] Patient limited by pain  [] Patient limited by other medical complications  [] Other:     Prognosis: [x] Good [] Fair  [] Poor    Patient Requires Follow-up: [x] Yes  [] No    Plan for next treatment session:    PLAN: See eval. PT 2x / week for 8 weeks.    [x] Continue per plan of care [] Alter current plan (see comments)  [] Plan of care initiated [] Hold pending MD visit [] Discharge    Electronically signed by:   Scarlet Gilford, SPT  Therapist was present, directed the patient's care, made skilled judgement, and was responsible for assessment and treatment of the patient. J Carlos Flynn, PT, DPT    Note: If patient does not return for scheduled/ recommended follow up visits, his note will serve as a discharge from care along with most recent update on progress.

## 2022-08-02 NOTE — FLOWSHEET NOTE
[address]    Speech Therapy Daily Treatment Note  Date:  2022    Patient Name:  Jeanie Flores    :  1980  MRN: [id number]  Medical/Treatment Diagnosis Information:  G81.94 (ICD-10-CM) - Left hemiparesis ([facility] [address]           Treatment Diagnosis: Mild Dysarthria; Mild Cognitive-Linguistic deficits; Mild Oropharyngeal Dysphagia   Insurance/Certification information: [facility] authorization: 7/15-10/7/2022, 24 units 94920 & 24 units 18  Physician Information: Dr. Sarah Petersen MD      Plan of care signed (Y/N): Y (2022)    Date of Patient follow up with Physician: DEWEY    Functional outcome measure:   FOTO Primary Measure: HDQLIFE Swallowing Difficulties: 62  FOTO Primary Measure: PROMIS Cognitive Function v2.0: 27.5  Secondary Measure: NeuroQOL - Communication: 18    Progress Note: []  Yes  [x]  No  Next due by: Visit #10 or 2022    RESTRICTIONS/PRECAUTIONS: hx brain bleed  Latex Allergy:  [x]NO      []YES    Preferred Language for Healthcare:   [x]English       []other:    Visit # Insurance Allowable Date Range (if applicable)    24 units 94230  24 units 74665 7/15-10/7/2022       Pain level: 1/10 knee     SUBJECTIVE: Pt alert and motivated, in good spirits this date. Pt continues to report choking episodes with liquids. OBJECTIVE:     Exercises/Interventions:      Motor Speech Therapy (48785) Accuracy Cues Notes   Apraxia       Dysarthria      Articulation      Respiration      Phonation      Resonance      Prosody Reading paragraphs with pacing cues and diaphragmatic breathing x 2 Min-mod verbal cues Auditory feedback for self-monitoring; pt reporting 100% intelligibility    Other:        Cognitive Function (42456 & 25866) Accuracy Cues Notes   Orientation        Attention           Sustained         Selective         Alternating         Divided      Memory  External memory compensation education and expectations for rehab  -All pt questions were answered  -Pt verbalized understanding and agreement    HEP instruction:  -Pt provided with written and illustrated instructions for HEP: SLOB intelligibility strategies, WRAP memory strategies, Goal-Plan-Do-Review metacognitive training, compensatory swallowing strategies (Alternate solids/liquids , Upright as possible with all PO intake , Small bites/sips , Eat/feed slowly, Remain upright 30-45 min)      Speech/Voice Therapy:    [x] (89648) Treatment of speech, language, voice, communication, and/or auditory processing disorder; individual    Cognitive Function:  [] (10251) Therapeutic interventions that focus on cognitive function (eg, attention, memory, reasoning, executive function, problem solving, and/or pragmatic functioning) and compensatory strategies to manage the performance of an activity (eg, managing time or schedules, initiating, organizing, and sequencing tasks), direct (one-on-one) patient contact; initial 15 minutes (Report 36207 only once per day)  [] (53 [phone]) each additional 15 minutes     Dysphagia Therapy:    [x] (70561) Treatment of swallowing dysfunction and/or oral function for feeding         Charges:  Timed Code Treatment Minutes: 0   Total Treatment Minutes: 45     [x] Speech-Language Treatment (79802)        [] Voice Treatment (37212)                                         [] Cognitive-Linguistic Skills Development Initial 15 minutes (18727)  [] Cognitive-Linguistic Skills Development Additional 15 minutes (27 [phone])   [x] Dysphagia Treatment/NMES (VitalStim) (39590)  [] Other:     GOALS:   GOALS:  Patient stated goal: \"Help with my memory\"; \"Stop slurring\"; \"Swallowing\"  [] Progressing: [] Met: [] Not Met: [] Adjusted     Therapist goals for Patient:  Short Term Goals: To be achieved in: 5 weeks  1.  Patient will use speech strategies to facilitate increased intelligibility at discourse level, 5 minutes, of >90% as judged by SLP and pt/family. [] Progressing: [] Met: [] Not Met: [] Adjusted  2. Patient will select and implement 2 cognitive strategies to complete functional tasks given min initiation cues in order to improve self-monitoring of ADL completion. [] Progressing: [] Met: [] Not Met: [] Adjusted  3. Patient will use learned memory strategies to recall 4 units of novel information across delayed intervals up to 10 minutes given min encoding cues. [] Progressing: [] Met: [] Not Met: [] Adjusted   4. Patient will functionally tolerate regular solids x 20 and thin liquids x 20 with min verbal cues for strategy implementation. [] Progressing: [] Met: [] Not Met: [] Adjusted  5. Patient will complete repeat instrumental swallow study (MBS or FEES) as clinically indicated to further assess pharyngeal phase and direct plan of care. [] Progressing: [] Met: [] Not Met: [] Adjusted      Long Term Goals: To be achieved in: 6 weeks  1. Patient will demonstrate improved cognitive-linguistic function to improve quality of life as evidenced by increased score on functional outcome measure. [] Progressing: [] Met: [] Not Met: [] Adjusted  2. Patient will utilize speech intelligibility strategies to increase intelligibility to 90% in conversation to a familiar/unfamiliar listener. [] Progressing: [] Met: [] Not Met: [] Adjusted  3. Patient will tolerate least restrictive diet with no overt clinical s/s of aspiration/penetration. [] Progressing: [] Met: [] Not Met: [] Adjusted    Progression Towards Functional goals:  [] Patient is progressing as expected towards functional goals listed. [] Progression is slowed due to complexities listed. [] Progression has been slowed due to co-morbidities.   [x] Plan just implemented, too soon to assess goals progression  [] Other:     Persisting Functional Limitations/Impairments:  []Attention []Word Finding       []Memory []Speech Intelligibility      []Executive Function []Diet Tolerance     []Problem

## 2022-08-04 ENCOUNTER — HOSPITAL ENCOUNTER (OUTPATIENT)
Dept: SPEECH THERAPY | Age: 42
Setting detail: THERAPIES SERIES
Discharge: HOME | End: 2022-08-04
Payer: COMMERCIAL

## 2022-08-04 ENCOUNTER — HOSPITAL ENCOUNTER (OUTPATIENT)
Dept: OCCUPATIONAL THERAPY | Age: 42
Setting detail: THERAPIES SERIES
Discharge: HOME | End: 2022-08-04
Payer: COMMERCIAL

## 2022-08-04 PROCEDURE — 97112 NEUROMUSCULAR REEDUCATION: CPT

## 2022-08-04 PROCEDURE — 92526 ORAL FUNCTION THERAPY: CPT

## 2022-08-04 PROCEDURE — 92507 TX SP LANG VOICE COMM INDIV: CPT

## 2022-08-04 PROCEDURE — 97110 THERAPEUTIC EXERCISES: CPT

## 2022-08-04 NOTE — FLOWSHEET NOTE
[address]    Speech Therapy Daily Treatment Note  Date:  2022    Patient Name:  Aaliyah Hays    :  1980  MRN: [id number]  Medical/Treatment Diagnosis Information:  G81.94 (ICD-10-CM) - Left hemiparesis ([facility] [address]           Treatment Diagnosis: Mild Dysarthria; Mild Cognitive-Linguistic deficits; Mild Oropharyngeal Dysphagia   Insurance/Certification information: [facility] authorization: 7/15-10/7/2022, 24 units 01787 & 24 units 18  Physician Information: Dr. Anam Moses MD      Plan of care signed (Y/N): Y (2022)    Date of Patient follow up with Physician: DEWEY    Functional outcome measure:   FOTO Primary Measure: HDQLIFE Swallowing Difficulties: 62  FOTO Primary Measure: PROMIS Cognitive Function v2.0: 27.5  Secondary Measure: NeuroQOL - Communication: 18    Progress Note: []  Yes  [x]  No  Next due by: Visit #10 or 2022    RESTRICTIONS/PRECAUTIONS: hx brain bleed  Latex Allergy:  [x]NO      []YES    Preferred Language for Healthcare:   [x]English       []other:    Visit # Insurance Allowable Date Range (if applicable)    24 units 04543  24 units 38428 7/15-10/7/2022       Pain level: 0/10     SUBJECTIVE: Pt alert and receptive,  and son present for session. OBJECTIVE:     Exercises/Interventions:      Motor Speech Therapy (67014) Accuracy Cues Notes   Apraxia       Dysarthria      Articulation      Respiration      Phonation      Resonance      Prosody Reading paragraphs with pacing cues and diaphragmatic breathing     Spontaneous sentences with pacing cues and diaphragmatic breathing x 10 Min verbal cues  Auditory feedback for self-monitoring; pt reporting 100% intelligibility    Other:        Cognitive Function (40201 & 84514) Accuracy Cues Notes   Orientation        Attention           Sustained         Selective         Alternating         Divided      Memory  Visualization training for recall of important items in home; pt generated functional examples in 100% of opportunities  Additionally, discussed external memory supports in home (e.g., smart phones)      Immediate/Working Association training for encoding unrelated object pairs; immediate recall of 4 pairs in 100% of opportunities Min verbal cues for associations       Short term Delayed recall (10 minutes) of 4 pairs in 100% of opportunities        Long term         Prospective      Problem Solving       Visuospatial       Executive Function       Other:              Dysphagia Therapy (98963) Strategies Tolerance Notes   Diet texture:      Regular      Dysphagia III/Soft & Bite-Sized      Dysphagia II/Minced & Moist      Dysphagia I/Puree      Liquid Consistency:       Thin Compensatory swallowing strategy training with graded trials:  - via tsp x 10  - via cup edge x 10  - via straw sips x 10 Throat clear x via cup    No overt clinical s/s aspiration with tsp or straw Mod verbal cues and environmental supports to reinforce strategies   Nectar thick/Mildly thick      Honey thick/Moderately thick      Ice chips       Reps Cue level    Oral Phase Exercises:       []Bolus Control Lateralizations      []Bolus Control A-P propulsions       []Bolus Control Diagonal A-P propulsions      []Bolus Control L/R A-P propulsion      []Lingual Resistance exercises   Isometric lingual resistance to improve oral transit time   Exercises: Pt instructed and trained in laryngeal/pharyngeal strengthening exercises including:      []Shari maneuver      [x]Effortful swallows Thin liquids x 10 reps, 3 sets with varying bolus volumes Mod verbal and visual cues Treatment to improve base of tongue propulsion and increase swallow efficiency    []Mendelsohn maneuver      []Supraglottic swallow      []Super-suraglottic swallow      []Modified Shaker (sitting up chin to chest)      []Jaw opening against resistance      []Chin tuck against resistance      []Neuromuscular electrical stimulation (VitalStim)      Pt. Education:  -Pt educated on diagnosis, prognosis and expectations for rehab  -All pt questions were answered  -Pt verbalized understanding and agreement    HEP instruction:  -Pt provided with written and illustrated instructions for HEP: SLOB intelligibility strategies, WRAP memory strategies, Goal-Plan-Do-Review metacognitive training, compensatory swallowing strategies (Alternate solids/liquids , Upright as possible with all PO intake , Small bites/sips , Eat/feed slowly, Remain upright 30-45 min)      Speech/Voice Therapy:    [x] (25292) Treatment of speech, language, voice, communication, and/or auditory processing disorder; individual    Cognitive Function:  [] (16396) Therapeutic interventions that focus on cognitive function (eg, attention, memory, reasoning, executive function, problem solving, and/or pragmatic functioning) and compensatory strategies to manage the performance of an activity (eg, managing time or schedules, initiating, organizing, and sequencing tasks), direct (one-on-one) patient contact; initial 15 minutes (Report 59809 only once per day)  [] (42 590 442) each additional 15 minutes     Dysphagia Therapy:    [x] (76681) Treatment of swallowing dysfunction and/or oral function for feeding         Charges:  Timed Code Treatment Minutes: 0   Total Treatment Minutes: 45     [x] Speech-Language Treatment (71957)        [] Voice Treatment (03436)                                         [] Cognitive-Linguistic Skills Development Initial 15 minutes (69912)  [] Cognitive-Linguistic Skills Development Additional 15 minutes (19 [phone])   [x] Dysphagia Treatment/NMES (VitalStim) (52056)  [] Other:     GOALS:   GOALS:  Patient stated goal: \"Help with my memory\"; \"Stop slurring\"; \"Swallowing\"  [] Progressing: [] Met: [] Not Met: [] Adjusted     Therapist goals for Patient:  Short Term Goals: To be achieved in: 5 weeks  1.  Patient will use speech strategies to facilitate increased intelligibility at discourse level, 5 minutes, of >90% as judged by SLP and pt/family. [] Progressing: [] Met: [] Not Met: [] Adjusted  2. Patient will select and implement 2 cognitive strategies to complete functional tasks given min initiation cues in order to improve self-monitoring of ADL completion. [] Progressing: [] Met: [] Not Met: [] Adjusted  3. Patient will use learned memory strategies to recall 4 units of novel information across delayed intervals up to 10 minutes given min encoding cues. [] Progressing: [] Met: [] Not Met: [] Adjusted   4. Patient will functionally tolerate regular solids x 20 and thin liquids x 20 with min verbal cues for strategy implementation. [] Progressing: [] Met: [] Not Met: [] Adjusted  5. Patient will complete repeat instrumental swallow study (MBS or FEES) as clinically indicated to further assess pharyngeal phase and direct plan of care. [] Progressing: [] Met: [] Not Met: [] Adjusted      Long Term Goals: To be achieved in: 6 weeks  1. Patient will demonstrate improved cognitive-linguistic function to improve quality of life as evidenced by increased score on functional outcome measure. [] Progressing: [] Met: [] Not Met: [] Adjusted  2. Patient will utilize speech intelligibility strategies to increase intelligibility to 90% in conversation to a familiar/unfamiliar listener. [] Progressing: [] Met: [] Not Met: [] Adjusted  3. Patient will tolerate least restrictive diet with no overt clinical s/s of aspiration/penetration. [] Progressing: [] Met: [] Not Met: [] Adjusted    Progression Towards Functional goals:  [] Patient is progressing as expected towards functional goals listed. [] Progression is slowed due to complexities listed. [] Progression has been slowed due to co-morbidities.   [x] Plan just implemented, too soon to assess goals progression  [] Other:     Persisting Functional Limitations/Impairments:  [x]Attention []Word Finding [x]Memory [x]Speech Intelligibility      [x]Executive Function [x]Diet Tolerance     [x]Problem Solving [x]Coughing/Choking with PO  []Expressive Language []Medication/Finance Management  []Receptive Language []Other:      ASSESSMENT: Pt with improving associations for functional recall, extensive education provided to pt and family re: use of internal and external strategies in home environment. Pt benefited from both verbal and environmental cues (e.g, placing cup/spoon down between bites/sips) to reinforce slow rate of intake during PO trials this date. Pt continues to feel more confident with her speech intelligibility through advancing speech contexts. Treatment/Activity Tolerance:  [x] Patient able to complete tx [] Patient limited by fatigue  [] Patient limited by pain  [] Patient limited by other medical complications  [] Other:     Prognosis: [x] Good [] Fair  [] Poor    Patient Requires Follow-up: [x] Yes  [] No    PLAN: See eval. ST 2x / week for 6 weeks. [x] Continue per plan of care [] Alter current plan (see comments)  [] Plan of care initiated [] Hold pending MD visit [] Discharge    Electronically signed by:   Tonye Paget, MA CCC-SLP  Speech-Language Pathologist  SP. [id number]      Note: If patient does not return for scheduled/ recommended follow up visits, this note will serve as a discharge from care along with most recent update on progress.

## 2022-08-04 NOTE — FLOWSHEET NOTE
[address] [facility] Occupational Therapy  [address] [place] [address]  Phone: [phone]   Fax: [phone]      Occupational Therapy Daily Treatment Note  Date:  2022    Patient: Lizbeth Molina   : 1980   MRN: [id number]  Referring Physician: Marie Pham MD           Medical Diagnosis Information: left hemiplegia    Date of Injury: 22  Date of Surgery: N/A                                      Insurance information: care source     Plan of care sent to provider:      []Faxed   [x]Co-signature    (attempts: 1[x] 2[] 3[])       Progress Report: []  Yes  [x]  No     Date Range for reporting period:  Beginnin22  Ending: end of POC    Progress report due (10 Rx/or 30 days whichever is less): visit #10 or 3/81/39     Recertification due (POC duration/ or 90 days whichever is less): visit #12 or 22    Visit # Insurance Allowable Auth required? Date Range   3/12 30 hard max    120 units [x]  Yes  []  No 22-10/1/22       Latex Allergy:  [x]No      []Yes  Pacemaker:  [x] No       [] Yes     Preferred Language for Healthcare:   [x]English       []other:    Pain level: 0/10     SUBJECTIVE:   Patient reports she is feeling good and excited that her son is home visiting from the [employer]. Functional Disability Index:  FOTO:  Patient's Physical FS Primary Measure- 25  Risk Adjusted Statistical FOTO-     OBJECTIVE: See eval      RESTRICTIONS/PRECAUTIONS: fall risk, poor midline, left visual hemianopsia    Exercises/Interventions: Treatment focused on improving postural control and R side involvement during functional tasks to decrease abnormal movement patterns. Patient transferred wheelchair to mat with contact guard and verbal cues stand pivot transfer.   Initiated treatment in short sit at edge of mat patient worked on using left ue as base of support on vertical walking stick , stabilizing stick with left hand and visually scanning from left to right as she pulled band with right hand for counter force to facilitate rotator cuff times 10 reps needing verbal cues for visual scanning and occasional min assist to stabilize. Patient then worked in standing on the same activity needing contact for balance as well as facilitation of left ue. Patient then worked on lateral weight shifts with step and reach while holding vertical dowel mahesh as base of support with left , reaching with right hand and then placing rings on walking stick needing min assist for balance. Finished treatment with same activity in short sit reaching forward with right hand over base of support and then shifting and scanning to left to place rings on target of walking stick. Patient ambulated with improved weight shift and visual scanning to speech therapy. Therapeutic Exercises  Resistance / level Sets/sec Reps Notes                                                                                Neuromuscular Re-ed / Therapeutic Activities                                                 Manual Intervention                                                     Modalities:     Patient education: OT evaluation home program goals          Home Exercise Program:   7/1/2022 aarom     Therapeutic Exercise and NMR:  [x] (12278) Provided verbal/tactile cueing for activities related to strengthening, flexibility, endurance, ROM  for improvements in scapular, scapulothoracic and UE control with self care, reaching, carrying, lifting, house/yardwork, driving/computer work. [x] (90692) Provided verbal/tactile cueing for activities related to improving balance, coordination, kinesthetic sense, posture, motor skill, proprioception  to assist with  scapular, scapulothoracic and UE control with self care, reaching, carrying, lifting, house/yardwork, driving/computer work.   [] Comments:    Therapeutic Activities:    [x] (89991 or 94230) Provided verbal/tactile cueing for activities related to improving balance, coordination, kinesthetic sense, posture, motor skill, proprioception and motor activation to allow for proper function of scapular, scapulothoracic and UE control with self care, carrying, lifting, driving/computer work  [] Comments:    Home Exercise Program:    [] (17855) Reviewed/Progressed HEP activities related to strengthening, flexibility, endurance, ROM of scapular, scapulothoracic and UE control with self care, reaching, carrying, lifting, house/yardwork, driving/computer work  [] (92602) Reviewed/Progressed HEP activities related to improving balance, coordination, kinesthetic sense, posture, motor skill, proprioception of scapular, scapulothoracic and UE control with self care, reaching, carrying, lifting, house/yardwork, driving/computer work    [] Comments:    Manual Treatments:  PROM / STM / Oscillations-Mobs:  G-I, II, III, IV (PA's, Inf., Post.)  [] (18575) Provided manual therapy to mobilize soft tissue/joints of cervical/CT, scapular GHJ and UE for the purpose of modulating pain, promoting relaxation,  increasing ROM, reducing/eliminating soft tissue swelling/inflammation/restriction, improving soft tissue extensibility and allowing for proper ROM for normal function with self care, reaching, carrying, lifting, house/yardwork, driving/computer work  [] Comments:    ADL Training:  [] (37155) Provided self-care/home management training related to activities of daily living and compensatory training, and/or use of adaptive equipment   [] Comments:     Splinting:  [] Fabrication of:   [] (08112) Orthotic/Prosthetic Management, subsequent encounter  [] (90676) Orthotic management and training (fitting and assessment)  [] Comments:      Charges:  Timed Code Treatment Minutes:  45   Total Treatment Minutes: 45     [] EVAL (LOW) 80629   [] OT Re-eval (57864)  [] EVAL (MOD) 20370   [] EVAL (HIGH) 72423       [x] Dolly (62395) x    1 [] SSKEN(08213)  [x] NMR (49195) x    2 [] Estim (attended) (42441)   [] Manual ([id number]) x     [] US (72894)  [] TA (65417) x     [] Paraffin (04948)  [] ADL  (92 [phone]) x    [] Splint/L code:    [] Estim (unattended) (48752)  [] Fluidotherapy (67255)  [] Other:    GOALS: Goals established 6/30/22   Patient stated goal:  Patient stated goal is to improve her over all mobility and ADL status   [] Progressing: [] Met: [] Not Met: [] Adjusted     Therapist goals for Patient 3 mos   Modified independent functional transfers  [] Progressing: [] Met: [] Not Met: [] Adjusted  Modified independent lower body dress  [] Progressing: [] Met: [] Not Met: [] Adjusted  Modified independent upper body dress  [] Progressing: [] Met: [] Not Met: [] Adjusted  Use of left upper extremity as dynamic base of support during self care        [] Progressing: [] Met: [] Not Met: [] Adjusted     Pt will report a foto score of 34 after 21 visits   [] Progressing: [] Met: [] Not Met: [] Adjusted    Progression Towards Functional goals:  [] Patient is progressing as expected towards functional goals listed. [] Progression is slowed due to complexities listed. [] Progression has been slowed due to co-morbidities. [x] Plan just implemented, too soon to assess goals progression  [] All goals are met  [] Other:     ASSESSMENT:  Improved RLE step length with assist for L weight shift during functional mobility. Continued decreased bicep/tricep activation, but with increased anterior delt.     Treatment/Activity Tolerance:  [x] Patient tolerated treatment well [] Patient limited by fatigue  [] Patient limited by pain  [] Patient limited by other medical complications  [] Other:     Prognosis: [x] Good [] Fair  [] Poor    Patient Requires Follow-up: [x] Yes  [] No    PLAN: See eval  [x] Continue per plan of care [] Alter current plan (see comments)  [] Plan of care initiated [] Hold pending MD visit [] Discharge    Electronically signed by:              Note: If patient does not return for scheduled/ recommended follow up visits, this note will serve as a discharge from care along with most recent update on progress.

## 2022-08-08 ENCOUNTER — HOSPITAL ENCOUNTER (OUTPATIENT)
Dept: OCCUPATIONAL THERAPY | Age: 42
Setting detail: THERAPIES SERIES
Discharge: HOME | End: 2022-08-08
Payer: COMMERCIAL

## 2022-08-08 NOTE — PROGRESS NOTES
Occupational Therapy  Cancellation/No-show Note  Patient Name:  Tejal Rodriguez   :  1980   Date:  2022  Cancelled visits to date: 2  No-shows to date: 0    Patient status for today's appointment patient:  []  Cancelled  []  Rescheduled appointment  [x]  No-show     Reason given by patient:  []  Patient ill  []  Conflicting appointment  []  No transportation    []  Conflict with work  [x]  No reason given  []  Other:     Comments:      Phone call information:   [x]  Phone call made today to patient at _0830 time at number provided:      [x]  Patient answered, conversation as follows: patient not aware of appointment. Reviewed the weeks schedule.      []  Patient did not answer, message left as follows:  []  Phone call not made today    Electronically signed by:  Yue Arcos OT

## 2022-08-09 ENCOUNTER — HOSPITAL ENCOUNTER (OUTPATIENT)
Dept: SPEECH THERAPY | Age: 42
Setting detail: THERAPIES SERIES
Discharge: HOME | End: 2022-08-09
Payer: COMMERCIAL

## 2022-08-09 PROCEDURE — 92507 TX SP LANG VOICE COMM INDIV: CPT

## 2022-08-09 PROCEDURE — 92526 ORAL FUNCTION THERAPY: CPT

## 2022-08-09 NOTE — PROGRESS NOTES
[facility] Silv[place], Teo Hobbs    Speech Therapy Daily Treatment Note / Progress Note  Date:  2022    Patient Name:  Latia Griffin    :  1980  MRN: [id number]  Medical/Treatment Diagnosis Information:  G81.94 (ICD-10-CM) - Left hemiparesis ([facility] [address]           Treatment Diagnosis: Mild Dysarthria; Mild Cognitive-Linguistic deficits; Mild Oropharyngeal Dysphagia   Insurance/Certification information: [facility] authorization: 7/15-10/7/2022, 24 units 36498 & 24 units 18  Physician Information: Dr. Tiburcio Bragg MD      Plan of care signed (Y/N): Y (2022)    Date of Patient follow up with Physician: DEWEY    Functional outcome measure:   FOTO Primary Measure: HDQLIFE Swallowing Difficulties: 62  FOTO Primary Measure: PROMIS Cognitive Function v2.0: 27.5  Secondary Measure: NeuroQOL - Communication: 18    Progress Note: [x]  Yes  []  No  Next due by: 2022    RESTRICTIONS/PRECAUTIONS: hx brain bleed  Latex Allergy:  [x]NO      []YES    Preferred Language for Healthcare:   [x]English       []other:    Visit # Insurance Allowable Date Range (if applicable)   3/91 24 units 52322  24 units 67944 7/15-10/7/2022       Pain level: 0/10     SUBJECTIVE: Pt alert and receptive, arrived 5 minutes late to session. OBJECTIVE:   FOTO Primary Measure: HDQLIFE Swallowing Difficulties: 62  FOTO Primary Measure: PROMIS Cognitive Function v2.0: 27.5  Secondary Measure: NeuroQOL - Communication: 17    Exercises/Interventions:      Motor Speech Therapy (57378) Accuracy Cues Notes   Apraxia       Dysarthria      Articulation      Respiration      Phonation      Resonance      Prosody Spontaneous sentences with pacing cues and diaphragmatic breathing x 20 Min verbal cues     Other:        Cognitive Function (95567 & 31727) Accuracy Cues Notes   Orientation        Attention           Sustained         Selective         Alternating         Divided      Memory Immediate/Working Functional recall of details from a shine; pt selected strategy of repetition, immediate recall of 4/4 details Min verbal cues for associations       Short term Delayed recall (10 minutes) of 4/4 details         Long term         Prospective      Problem Solving  ADL sequencing targeting organization and reasoning with 75% accuracy  Mod cues reinforcing strategy use SLP reinforcing strategies of previewing, double-checking, and self-talk   Visuospatial       Executive Function       Other:              Dysphagia Therapy (91929) Strategies Tolerance Notes   Diet texture:      Regular      Dysphagia III/Soft & Bite-Sized      Dysphagia II/Minced & Moist      Dysphagia I/Puree      Liquid Consistency: Thin Compensatory swallowing strategy training with graded trials:  - via cup edge x 10 No overt clinical s/s aspiration with tsp or straw Min verbal cues    Nectar thick/Mildly thick      Honey thick/Moderately thick      Ice chips       Reps Cue level    Oral Phase Exercises:       [x]Bolus Control Lateralizations 10 reps Mod verbal and visual cues    [x]Bolus Control A-P propulsions  10 reps Min verbal and tactile cues    []Bolus Control Diagonal A-P propulsions      []Bolus Control L/R A-P propulsion      [x]Lingual Resistance exercises Anterior press x 10  Posterior press x 10 Min verbal and visual cues Isometric lingual resistance to improve oral transit time   Exercises: Pt instructed and trained in laryngeal/pharyngeal strengthening exercises including:      []Shari maneuver      [x]Effortful swallows Weighted boluses x 10 reps Min verbal cues Treatment to improve base of tongue propulsion and increase swallow efficiency    []Mendelsohn maneuver      []Supraglottic swallow      []Super-suraglottic swallow      []Modified Shaker (sitting up chin to chest)      []Jaw opening against resistance      []Chin tuck against resistance      []Neuromuscular electrical stimulation (VitalStim)      Pt. Education:  -Pt educated on diagnosis, prognosis and expectations for rehab  -All pt questions were answered  -Pt verbalized understanding and agreement  -Progress since initiation of care    HEP instruction:  -Pt provided with written and illustrated instructions for HEP: SLOB intelligibility strategies, WRAP memory strategies, Goal-Plan-Do-Review metacognitive training, compensatory swallowing strategies (Alternate solids/liquids , Upright as possible with all PO intake , Small bites/sips , Eat/feed slowly, Remain upright 30-45 min)      Speech/Voice Therapy:    [x] (58748) Treatment of speech, language, voice, communication, and/or auditory processing disorder; individual    Cognitive Function:  [] (16058) Therapeutic interventions that focus on cognitive function (eg, attention, memory, reasoning, executive function, problem solving, and/or pragmatic functioning) and compensatory strategies to manage the performance of an activity (eg, managing time or schedules, initiating, organizing, and sequencing tasks), direct (one-on-one) patient contact; initial 15 minutes (Report 89566 only once per day)  [] ([phone]) each additional 15 minutes     Dysphagia Therapy:    [x] (45470) Treatment of swallowing dysfunction and/or oral function for feeding         Charges:  Timed Code Treatment Minutes: 0   Total Treatment Minutes: 45     [x] Speech-Language Treatment (62520)        [] Voice Treatment (81550)                                         [] Cognitive-Linguistic Skills Development Initial 15 minutes (75536)  [] Cognitive-Linguistic Skills Development Additional 15 minutes ([phone])   [x] Dysphagia Treatment/NMES (VitalStim) (50922)  [] Other:     GOALS:   GOALS:  Patient stated goal: \"Help with my memory\"; \"Stop slurring\"; \"Swallowing\"  [x] Progressing: [] Met: [] Not Met: [] Adjusted     Therapist goals for Patient:  Short Term Goals: To be achieved in: 5 weeks  1.  Patient will use speech strategies to facilitate increased intelligibility at discourse level, 5 minutes, of >90% as judged by SLP and pt/family. [x] Progressing: [] Met: [] Not Met: [] Adjusted  2. Patient will select and implement 2 cognitive strategies to complete functional tasks given min initiation cues in order to improve self-monitoring of ADL completion. [x] Progressing: [] Met: [] Not Met: [] Adjusted  3. Patient will use learned memory strategies to recall 4 units of novel information across delayed intervals up to 10 minutes given min encoding cues. [x] Progressing: [] Met: [] Not Met: [] Adjusted   4. Patient will functionally tolerate regular solids x 20 and thin liquids x 20 with min verbal cues for strategy implementation. [x] Progressing: [] Met: [] Not Met: [] Adjusted  5. Patient will complete repeat instrumental swallow study (MBS or FEES) as clinically indicated to further assess pharyngeal phase and direct plan of care. [x] Progressing: [] Met: [] Not Met: [] Adjusted      Long Term Goals: To be achieved in: 6 weeks  1. Patient will demonstrate improved cognitive-linguistic function to improve quality of life as evidenced by increased score on functional outcome measure. [x] Progressing: [] Met: [] Not Met: [] Adjusted  2. Patient will utilize speech intelligibility strategies to increase intelligibility to 90% in conversation to a familiar/unfamiliar listener. [x] Progressing: [] Met: [] Not Met: [] Adjusted  3. Patient will tolerate least restrictive diet with no overt clinical s/s of aspiration/penetration. [x] Progressing: [] Met: [] Not Met: [] Adjusted    Progression Towards Functional goals:  [x] Patient is progressing as expected towards functional goals listed. [] Progression is slowed due to complexities listed. [] Progression has been slowed due to co-morbidities.   [] Plan just implemented, too soon to assess goals progression  [] Other:     Persisting Functional Limitations/Impairments:  [x]Attention []Word Finding [x]Memory [x]Speech Intelligibility      [x]Executive Function [x]Diet Tolerance     [x]Problem Solving [x]Coughing/Choking with PO  []Expressive Language []Medication/Finance Management  []Receptive Language []Other:      ASSESSMENT: Pt has made progress across speech, cognitive, and dysphagia goals since initiation of POC. Pt continues to demonstrate functional application of encoding strategies but benefits from re-education of strategies and initiation cues to implement strategies. Pt also benefits from consistent reinforcement of cognitive strategy use for functional activities. Fading cues provided for safe swallowing exercises and for swallow timing/coordination. Although pt feeling more confident with speech intelligibility and reports less swallowing difficulty, she continues to report concerns for choking and trouble attending/focusing. Continue POC as above. Treatment/Activity Tolerance:  [x] Patient able to complete tx [] Patient limited by fatigue  [] Patient limited by pain  [] Patient limited by other medical complications  [] Other:     Prognosis: [x] Good [] Fair  [] Poor    Patient Requires Follow-up: [x] Yes  [] No    PLAN: See eval. ST 2x / week for 6 weeks. [x] Continue per plan of care [] Alter current plan (see comments)  [] Plan of care initiated [] Hold pending MD visit [] Discharge    Electronically signed by:   Christopher Mustafa MA CCC-SLP  Speech-Language Pathologist  SP. [id number]      Note: If patient does not return for scheduled/ recommended follow up visits, this note will serve as a discharge from care along with most recent update on progress.

## 2022-08-11 ENCOUNTER — HOSPITAL ENCOUNTER (OUTPATIENT)
Dept: OCCUPATIONAL THERAPY | Age: 42
Setting detail: THERAPIES SERIES
Discharge: HOME | End: 2022-08-11
Payer: COMMERCIAL

## 2022-08-16 ENCOUNTER — HOSPITAL ENCOUNTER (OUTPATIENT)
Dept: PHYSICAL THERAPY | Age: 42
Setting detail: THERAPIES SERIES
Discharge: HOME | End: 2022-08-16
Payer: COMMERCIAL

## 2022-08-16 PROCEDURE — 97116 GAIT TRAINING THERAPY: CPT

## 2022-08-16 PROCEDURE — 97112 NEUROMUSCULAR REEDUCATION: CPT

## 2022-08-16 NOTE — FLOWSHEET NOTE
[address] [facility] Physical Therapy  Phone: [phone]   Fax: [phone]    Physical Therapy Daily Treatment Note  Date:  2022    Patient Name:  Jairo Hobbs    :  1980  MRN: [id number]  Medical/Treatment Diagnosis Information:  Diagnosis: R25.2 (ICD-10-CM) - Cramp and spasm;   Left hemiparesis with spasticity  Treatment Diagnosis: Impaired proprioception of LUE & LE, avoidance of L extremities, Impaired balance & gait  Insurance/Certification information:  PT Insurance Information: [employer]. 30 visits pcy. PA required  Physician Information:   Darian Samayoa MD  Plan of care signed (Y/N): [x]  Yes []  No     Date of Patient follow up with Physician:      Progress Report: []  Yes  [x]  No     Date Range for reporting period:  Beginning  2022   Ending      Progress report due (10 Rx/or 30 days whichever is less): visit #10 or  (date)     Recertification due (POC duration/ or 90 days whichever is less): visit #16 or  (date)     Visit # Insurance Allowable Auth required? Date Range    +    16 (30 pcy) [x]  Yes, [employer]  []  No Until 9/10/22       Units approved Units used Date Range   120 12 Until 9/10/22     Latex Allergy:  [x]NO      []YES  Preferred Language for Healthcare:   [x]English       []Other:      Functional Scale:                                                                                                      Date assessed:  TO physical FS primary measure score = 49; risk adjusted = 50                        Functional Scale/Test Evaluation 2022 30 day NEEDS REASSESS 60 day  Discharge    Tinetti Assessment        30\" STS 3       5x STS 40\"       2MWT w/QC 65'            Pain level:  2/10  Location:  L knee & elbow    SUBJECTIVE:   Pt reports she is doing well. Knee has been hurting her at times. Not wearing KAFO. IF  isn't home, then her kids usually are.        OBJECTIVE: RESTRICTIONS/PRECAUTIONS:   HTN    Interventions/Exercises:   Therapeutic Exercises (05380) Resistance / level Sets/sec Reps Notes   TM  Seated stepper  7/23: L foot stationary on silver portion  7/23: PT assisted alignment L hip                        Neuromuscular Re-ed (78740)        Total gym  -squats to facilitate use of LLE       Tall kneel- side step across mat table  8/2: PT assist for postural control and L leg assist   Fwd lunge onto step, L foot on top of step, R foot behind 8/2: PT assist at pelvis for forward weight shift   LAQ seated    2 X 10 B 8/2: PT assist for L quad activation and weight shift to L for upright posture   On mat table:  Bridge  Roll R  Roll L  Hip abd in supine  Heel slide in supine  Prone ham curl  Prone resisted knee ext     X8  X1  X1  X5 L  2 x 5 L  X8  x8           AAROM  PROM  AAROM - cues to push into hand    Stand from mat table with weight shifted over L LE and hips square prior to stand    x5 VCs for pointing head towards L knee to achieve neutral and greater weight into L LE                 Therapeutic Activities (01551) /  Functional Tasks / Gait Training (84051)       50% LE sit to stand transfers   7/23: during treatment   R foot taps on bottom step  R foot taps on 2nd step    R toe taps on to black board of Shuttle, L foot on lester 6\"  6\"   R hand on QC  R hand on QC    7/28: stability provided by SPT through R hand                 Gait Training:  Amb w/QC & AFO      Step up and overs, L foot on Airex, R foot step over half bolster & return   SBA to CGA         75'x2, 60'        8/16: VCs for pulling L hip forward but still keeping space between her feet * did have a LOB when L foot stepped too close to R and pt unable to correct, PT and  able to prevent pt from falling to the floor       7/28: LOB x 1 with max assist x2   Sit to stand, R foot on AirEx to inhibit activation, large bolster between feet to maintain correct alignment for facilitation   7/28: use of mirror improved weight-shifting to L upon standing   Sit to stand w/large SB to facilitate forward flexion and increase activation of LLE   7/28: use of mirror improved weight-shifting to L while seated                 Manual Intervention (20355)                                                     Modalities:     Pt. Education:  6/24/2022 patient educated on diagnosis, prognosis and expectations for rehab, all patient questions were answered    HEP instruction:  6/24: Shift weight to L to facilitate LLE muscle fiber recruitment with all functional activities. Therapeutic Exercise and NMR EXR  [] (86975) Provided verbal/tactile cueing for activities related to strengthening, flexibility, endurance, ROM for improvements in  [] LE / Core stability: LE, hip, and core control with self care, mobility, lifting, ambulation. [] UE / Shoulder complex: cervical, postural, scapular, scapulothoracic and UE control with self care, reaching, carrying, lifting, house/yardwork, driving, computer work.  [] (58890) Provided verbal/tactile cueing for activities related to improving balance, coordination, kinesthetic sense, posture, motor skill, proprioception to assist with   [] LE / Core stability: LE, hip, and core control in self care, mobility, lifting, ambulation and eccentric single leg control. [] UE / Shoulder complex: cervical, scapular, scapulothoracic and UE control with self care, reaching, carrying, lifting, house/yardwork, driving, computer work.   [] (28929) Therapist is in constant attendance of 2 or more patients providing skilled therapy interventions, but not providing any significant amount of measurable one-on-one time to either patient, for improvements in  [] LE / Core stability: LE, hip, and core control in self care, mobility, lifting, ambulation and eccentric single leg control.    [] UE / Shoulder complex: cervical, scapular, scapulothoracic and UE control with self care, reaching, carrying, lifting, house/yardwork, driving, computer work. NMR and Therapeutic Activities:    [x] (29305 or 90050) Provided verbal/tactile cueing for activities related to improving balance, coordination, kinesthetic sense, posture, motor skill, proprioception and motor activation to allow for proper function of   [x] LE / Core, hip and LE with self care and ADLs  [] UE / Shoulder complex: cervical, postural, scapular, scapulothoracic and UE control with self care, carrying, lifting, driving, computer work. [x] (63288) Gait Re-education- Provided training and instruction to the patient for proper LE, core and hip recruitment, positioning, and eccentric body weight control with ambulation re-education, including ascending & descending stairs     Home Management Training / Self Care:  [] (39508) Provided self-care/home management training related to activities of daily living and compensatory training, and/or use of adaptive equipment for improvement with: ADLs and compensatory training, meal preparation, safety procedures and instruction in use of adaptive equipment, including bathing, grooming, dressing, personal hygiene, basic household cleaning and chores.        Home Exercise Program:    [] (86307) Reviewed/Progressed HEP activities related to strengthening, flexibility, endurance, ROM of   [] LE / Core stability: core, hip and LE for functional self-care, mobility, lifting and ambulation/stair navigation   [] UE / Shoulder complex: cervical, postural, scapular, scapulothoracic and UE control with self care, reaching, carrying, lifting, house/yardwork, driving, computer work  [] (09400)Reviewed/Progressed HEP activities related to improving balance, coordination, kinesthetic sense, posture, motor skill, proprioception of   [] LE: core, hip and LE for self care, mobility, lifting, and ambulation/stair navigation    [] UE / Shoulder complex: cervical, postural,  scapular, scapulothoracic and UE control with self care, reaching, carrying, lifting, house/yardwork, driving, computer work    Manual Treatments:  PROM / STM / Oscillations-Mobs:  G-I, II, III, IV (PA's, Inf., Post.)  [] (29051) Provided manual therapy to mobilize shoulder complex, hip, LE, and/or cervicothoracic/LS spine soft tissue/joints for the purpose of modulating pain, promoting relaxation,  increasing ROM, reducing/eliminating soft tissue swelling/inflammation/restriction, improving soft tissue extensibility and allowing for proper ROM for normal function with   [] LE / Core stability: self care, mobility, lifting and ambulation. [] UE / Shoulder complex: self care, reaching, carrying, lifting, house/yardwork, driving, computer work. Modalities:  [] (41189) Vasopneumatic compression: Utilized vasopneumatic compression to decrease edema / swelling for the purpose of improving mobility and quad tone / recruitment which will allow for increased overall function including but not limited to self-care, transfers, ambulation, and ascending / descending stairs. Charges:  Timed Code Treatment Minutes: 46   Total Treatment Minutes: 46     [] EVAL - LOW (76810)   [] EVAL - MOD (95340)  [] EVAL - HIGH (70100)  [] RE-EVAL (25353)  [] TE (67311) x      [] Ionto  [x] NMR (16292) x  1    [] Vaso  [] Manual (79220) x      [] Ultrasound  [] TA x      [] Mech Traction (83932)  [x] Gait Training x 2    [] ES (un) (02959):   [] Aquatic therapy x   [] Other:   [] Group:     GOALS:   Patient stated goal: improve transfers and gait  [] Progressing: [] Met: [] Not Met: [] Adjusted     Therapist goals for Patient:   Short Term Goals: To be achieved in: 3 weeks  1. Independent in HEP and progression per patient tolerance, in order to prevent re-injury. [] Progressing: [] Met: [] Not Met: [] Adjusted  2. Patient to demonstrate 40% WB'ing of LLE during sit to stand transfers to assist in muscle recruitment with all tasks.   [] Progressing: [] Met: [] Not Met: [] Adjusted Long Term Goals: To be achieved in: 8 weeks  1. Patient to demonstrate TUG w/QC score of <15\" in order to demonstrate reduced fall risk to assist with reaching prior level of function. [] Progressing: [] Met: [] Not Met: [] Adjusted  2. FOTO score of at least 56 to assist with reaching prior level of function. [] Progressing: [] Met: [] Not Met: [] Adjusted  3. Patient will demonstrate an increase 30\" sit to stand reps to 10 with 40% WB'ing through LLE, to improve transfers to/from all surfaces & heights  [] Progressing: [] Met: [] Not Met: [] Adjusted  4. Patient will improve Tinetti Balance score to 15/28 in order to . [] Progressing: [] Met: [] Not Met: [] Adjusted  5. Patient will increase 2MWT w/QC to 150' in order to improve community access. [] Progressing: [] Met: [] Not Met: [] Adjusted        Overall Progression Towards Functional goals/ Treatment Progress Update:  [] Patient is progressing as expected towards functional goals listed. [] Progression is slowed due to complexities/Impairments listed. [] Progression has been slowed due to co-morbidities. [x] Plan just implemented, too soon to assess goals progression <30days   [] Goals require adjustment due to lack of progress  [] Patient is not progressing as expected and requires additional follow up with physician  [] Other    Persisting Functional Limitations/Impairments:  []Sleeping []Sitting               [x]Standing [x]Transfers        [x]Walking [x]Kneeling               [x]Stairs [x]Squatting / bending   [x]ADLs [x]Reaching  [x]Lifting  [x]Housework  [x]Driving [x]Job related tasks  []Sports/Recreation []Other:        ASSESSMENT:       Pt continues to demo decreased strength, awareness and use of L LE and UE. Focused session on activating L quad and gait mechanics.  Pt keeps L hip in retraction which causes upper body to rotate R. Pt cued to pull L hip forward to achieve neutral hips and upper body, however still learning how far to rotate. Pt had near fall while walking out of clinic when she stepped her L foot too close to the right and wasn't able to correct her weight shift. PT and  able to right her and prevent fall. Advised pt to add LAQ to HEP every day and purchase knee sleeve to give added support to L knee during gait. Treatment/Activity Tolerance:  [] Patient able to complete tx  [x] Patient limited by fatigue  [] Patient limited by pain  [] Patient limited by other medical complications  [] Other:     Prognosis: [x] Good [] Fair  [] Poor    Patient Requires Follow-up: [x] Yes  [] No    Plan for next treatment session:    PLAN: See eval. PT 2x / week for 8 weeks. [x] Continue per plan of care [] Alter current plan (see comments)  [] Plan of care initiated [] Hold pending MD visit [] Discharge    Electronically signed by:     Alex Marquez PT, DPT    Note: If patient does not return for scheduled/ recommended follow up visits, his note will serve as a discharge from care along with most recent update on progress.

## 2022-08-18 ENCOUNTER — HOSPITAL ENCOUNTER (OUTPATIENT)
Dept: OCCUPATIONAL THERAPY | Age: 42
Setting detail: THERAPIES SERIES
Discharge: HOME | End: 2022-08-18
Payer: COMMERCIAL

## 2022-08-18 PROCEDURE — 97112 NEUROMUSCULAR REEDUCATION: CPT

## 2022-08-18 PROCEDURE — 97535 SELF CARE MNGMENT TRAINING: CPT

## 2022-08-18 PROCEDURE — 97110 THERAPEUTIC EXERCISES: CPT

## 2022-08-18 NOTE — FLOWSHEET NOTE
168 [facility] Occupational Therapy  [address] [place] [address], [address] [place] [address]  Phone: [phone]   Fax: [phone]      Occupational Therapy Daily Treatment Note  Date:  2022    Patient: Nishi Antonio   : 1980   MRN: [id number]  Referring Physician: Karena Ribeiro MD           Medical Diagnosis Information: left hemiplegia    Date of Injury: 21  Date of Surgery: N/A                                      Insurance information: care source     Plan of care sent to provider:      []Faxed   [x]Co-signature    (attempts: 1[x] 2[] 3[])       Progress Report: []  Yes  [x]  No     Date Range for reporting period:  Beginnin22  Ending: end of POC    Progress report due (10 Rx/or 30 days whichever is less): visit #10 or     Recertification due (POC duration/ or 90 days whichever is less): visit #12 or 22    Visit # Insurance Allowable Auth required? Date Range    30 hard max    120 units [x]  Yes  []  No 22-10/1/22       Latex Allergy:  [x]No      []Yes  Pacemaker:  [x] No       [] Yes     Preferred Language for Healthcare:   [x]English       []other:    Pain level: 0/10     SUBJECTIVE:    Patient in good spirits, she reports that they just bought a house. She reports they hope to be moved in by [place]    Functional Disability Index:  FOTO:  Patient's Physical FS Primary Measure- 25  Risk Adjusted Statistical FOTO-     OBJECTIVE: See eval      RESTRICTIONS/PRECAUTIONS: fall risk, poor midline, left visual hemianopsia    Exercises/Interventions: Treatment focused on improving postural control and R side involvement during functional tasks to decrease abnormal movement patterns, Improve right ue function and midline orientation during functional tasks.   Patient ambulated with contact guard of OT/ with OT providing facilitation at lateral border of scapula and hip to increase weight shift to left and increase stance time on left and step length on right with nice response from patient. Patient ambulated as directed to ledesma number 6 needing verbal cues to orient to correct treatment area. Patient then positioned in short sit at edge of mat needing verbal cues/ minimal physical cues to complete pivot turn prior to sitting down. From short sit patient placed both ues forward on chair back. Patient worked on moving over [employer] with light weight beaing into anterior pelvic tilt and then posterior pelvic tilt times ten. She then worked on moving over ue into forward hip flexion and back to neutral sit times ten and then progressively moving forward over base of support into 1/2 stand to sit times 5 needing cues for eccentric control and midline orientation. Patient then worked on reaching forward to [place] on chair and getting handles of coke crate with both hands while maintaining midline orientation with min assist of therapist to facilitate midline, forearm rotation and metacarpel spread for release times 5 and then worked on control to  crate and bring in to person with min cues posterior elbow to facilitate elbow flexion/ extension and maintain activity in shoulder. Times 5 reps. She then was able to stand holding crate and sit back down times 5. Finished treatment with patient ambulated out of department with increased speed, weight shift and pelvic stability using quad cane.      Therapeutic Exercises  Resistance / level Sets/sec Reps Notes                                                                                Neuromuscular Re-ed / Therapeutic Activities                                                 Manual Intervention                                                     Modalities:     Patient education: OT evaluation home program goals          Home Exercise Program:   7/1/2022 margo     Therapeutic Exercise and NMR:  [x] (55564) Provided verbal/tactile cueing for activities related to strengthening, flexibility, endurance, ROM  for improvements in scapular, scapulothoracic and UE control with self care, reaching, carrying, lifting, house/yardwork, driving/computer work. [x] (86436) Provided verbal/tactile cueing for activities related to improving balance, coordination, kinesthetic sense, posture, motor skill, proprioception  to assist with  scapular, scapulothoracic and UE control with self care, reaching, carrying, lifting, house/yardwork, driving/computer work.   [] Comments:    Therapeutic Activities:    [x] (93161 or 24845) Provided verbal/tactile cueing for activities related to improving balance, coordination, kinesthetic sense, posture, motor skill, proprioception and motor activation to allow for proper function of scapular, scapulothoracic and UE control with self care, carrying, lifting, driving/computer work  [] Comments:    Home Exercise Program:    [] (76273) Reviewed/Progressed HEP activities related to strengthening, flexibility, endurance, ROM of scapular, scapulothoracic and UE control with self care, reaching, carrying, lifting, house/yardwork, driving/computer work  [] (48166) Reviewed/Progressed HEP activities related to improving balance, coordination, kinesthetic sense, posture, motor skill, proprioception of scapular, scapulothoracic and UE control with self care, reaching, carrying, lifting, house/yardwork, driving/computer work    [] Comments:    Manual Treatments:  PROM / STM / Oscillations-Mobs:  G-I, II, III, IV (PA's, Inf., Post.)  [] (79374) Provided manual therapy to mobilize soft tissue/joints of cervical/CT, scapular GHJ and UE for the purpose of modulating pain, promoting relaxation,  increasing ROM, reducing/eliminating soft tissue swelling/inflammation/restriction, improving soft tissue extensibility and allowing for proper ROM for normal function with self care, reaching, carrying, lifting, house/yardwork, driving/computer work  [] Comments:    ADL Training:  [] (62045) Provided self-care/home management training related to activities of daily living and compensatory training, and/or use of adaptive equipment   [] Comments:     Splinting:  [] Fabrication of:   [] (39719) Orthotic/Prosthetic Management, subsequent encounter  [] (79968) Orthotic management and training (fitting and assessment)  [] Comments:      Charges:  Timed Code Treatment Minutes:  45   Total Treatment Minutes: 45     [] EVAL (LOW) 51936   [] OT Re-eval (91861)  [] EVAL (MOD) 69429   [] EVAL (HIGH) 42873       [x] Dolly (86315) x    1 [] EZYRG(08268)  [x] NMR (81447) x    1 [] Estim (attended) (61196)   [] Manual ([id number]) x     [] US (33611)  [] TA ([id number]) x     [] Paraffin (39298)  [x] ADL  (88 649 24 60) x   1 [] Splint/L code:    [] Estim (unattended) (91901)  [] Fluidotherapy (15236)  [] Other:    GOALS: Goals established 6/30/22   Patient stated goal:  Patient stated goal is to improve her over all mobility and ADL status   [x] Progressing: [] Met: [] Not Met: [] Adjusted     Therapist goals for Patient 3 mos   Modified independent functional transfers  [x] Progressing: [] Met: [] Not Met: [] Adjusted  Modified independent lower body dress  [x] Progressing: [] Met: [] Not Met: [] Adjusted  Modified independent upper body dress  [x] Progressing: [] Met: [] Not Met: [] Adjusted  Use of left upper extremity as dynamic base of support during self care        [x] Progressing: [] Met: [] Not Met: [] Adjusted     Pt will report a foto score of 34 after 21 visits   [] Progressing: [] Met: [] Not Met: [] Adjusted    Progression Towards Functional goals:  [] Patient is progressing as expected towards functional goals listed. [] Progression is slowed due to complexities listed. [] Progression has been slowed due to co-morbidities. [x] Plan just implemented, too soon to assess goals progression  [] All goals are met  [] Other:     ASSESSMENT:  Improved RLE step length with assist for L weight shift during functional mobility. Continued decreased bicep/tricep activation, but with increased anterior delt. Treatment/Activity Tolerance:  [x] Patient tolerated treatment well [] Patient limited by fatigue  [] Patient limited by pain  [] Patient limited by other medical complications  [] Other:     Prognosis: [x] Good [] Fair  [] Poor    Patient Requires Follow-up: [x] Yes  [] No    PLAN: See eval  [x] Continue per plan of care [] Alter current plan (see comments)  [] Plan of care initiated [] Hold pending MD visit [] Discharge    Electronically signed by:              Note: If patient does not return for scheduled/ recommended follow up visits, this note will serve as a discharge from care along with most recent update on progress.

## 2022-08-20 DIAGNOSIS — K59.01 SLOW TRANSIT CONSTIPATION: ICD-10-CM

## 2022-08-22 ENCOUNTER — APPOINTMENT (OUTPATIENT)
Dept: PHYSICAL THERAPY | Age: 42
End: 2022-08-22
Payer: COMMERCIAL

## 2022-08-22 ENCOUNTER — APPOINTMENT (OUTPATIENT)
Dept: SPEECH THERAPY | Age: 42
End: 2022-08-22
Payer: COMMERCIAL

## 2022-08-22 ENCOUNTER — APPOINTMENT (OUTPATIENT)
Dept: OCCUPATIONAL THERAPY | Age: 42
End: 2022-08-22
Payer: COMMERCIAL

## 2022-08-22 RX ORDER — SENNOSIDES 8.6 MG
TABLET ORAL
Qty: 60 TABLET | Refills: 5 | OUTPATIENT
Start: 2022-08-22

## 2022-08-25 ENCOUNTER — HOSPITAL ENCOUNTER (OUTPATIENT)
Dept: OCCUPATIONAL THERAPY | Age: 42
Setting detail: THERAPIES SERIES
Discharge: HOME | End: 2022-08-25
Payer: COMMERCIAL

## 2022-08-25 ENCOUNTER — HOSPITAL ENCOUNTER (OUTPATIENT)
Dept: SPEECH THERAPY | Age: 42
Setting detail: THERAPIES SERIES
Discharge: HOME | End: 2022-08-25
Payer: COMMERCIAL

## 2022-08-25 PROCEDURE — 97110 THERAPEUTIC EXERCISES: CPT

## 2022-08-25 PROCEDURE — 92507 TX SP LANG VOICE COMM INDIV: CPT

## 2022-08-25 PROCEDURE — 97530 THERAPEUTIC ACTIVITIES: CPT

## 2022-08-25 PROCEDURE — 92526 ORAL FUNCTION THERAPY: CPT

## 2022-08-25 NOTE — FLOWSHEET NOTE
168 [facility] Occupational Therapy  [address] [place] [address]  Phone: [phone]   Fax: [phone]      Occupational Therapy Daily Treatment Note  Date:  2022    Patient: Michael Leblanc   : 1980   MRN: [id number]  Referring Physician: Luis Alfredo Crisostomo MD           Medical Diagnosis Information: left hemiplegia    Date of Injury: 21  Date of Surgery: N/A                                      Insurance information: care source     Plan of care sent to provider:      []Faxed   [x]Co-signature    (attempts: 1[x] 2[] 3[])       Progress Report: []  Yes  [x]  No     Date Range for reporting period:  Beginnin22  Ending: end of POC    Progress report due (10 Rx/or 30 days whichever is less): visit #10 or     Recertification due (POC duration/ or 90 days whichever is less): visit #12 or 22    Visit # Insurance Allowable Auth required? Date Range    30 hard max    120 units [x]  Yes  []  No 22-10/1/22       Latex Allergy:  [x]No      []Yes  Pacemaker:  [x] No       [] Yes     Preferred Language for Healthcare:   [x]English       []other:    Pain level: 1/10 left eye     SUBJECTIVE:    Patient in good spirits, reports she had a retina clean out in left eye    Functional Disability Index:  FOTO:  Patient's Physical FS Primary Measure- 25  Risk Adjusted Statistical FOTO-     OBJECTIVE: See eval      RESTRICTIONS/PRECAUTIONS: fall risk, poor midline, left visual hemianopsia    Exercises/Interventions: Treatment focused on improving postural control and R side involvement during functional tasks to decrease abnormal movement patterns, Improve right ue function and midline orientation during functional tasks. Patient ambulated with SBA/CGA of OT to treatment room with quad cane, pt reports not liking new brace of 3 wks. Pt reports it's harder to walk in this one than the prior AFO. Pt stand to sit SBA.  Pt sit to stand SBA at table and in stance ~5 min with bearing weight LUE on table with assist as pt used RUE to throw bean bags in basket/on stool while maintaining midline with vcs (x 3 trials with rest breaks) Pt with bilateral grasp on pvc pipe (assist with LUE AAROM) pt completed 12 reps x 3 sets each of elbow flexion/extension, wrist flexion/extension and front raises. Therapeutic Exercises  Resistance / level Sets/sec Reps Notes                                                                                Neuromuscular Re-ed / Therapeutic Activities                                                 Manual Intervention                                                     Modalities:     Patient education: OT evaluation home program goals          Home Exercise Program:   7/1/2022 aarom     Therapeutic Exercise and NMR:  [x] (88762) Provided verbal/tactile cueing for activities related to strengthening, flexibility, endurance, ROM  for improvements in scapular, scapulothoracic and UE control with self care, reaching, carrying, lifting, house/yardwork, driving/computer work. [x] (16876) Provided verbal/tactile cueing for activities related to improving balance, coordination, kinesthetic sense, posture, motor skill, proprioception  to assist with  scapular, scapulothoracic and UE control with self care, reaching, carrying, lifting, house/yardwork, driving/computer work.   [] Comments:    Therapeutic Activities:    [x] (91713 or 88771) Provided verbal/tactile cueing for activities related to improving balance, coordination, kinesthetic sense, posture, motor skill, proprioception and motor activation to allow for proper function of scapular, scapulothoracic and UE control with self care, carrying, lifting, driving/computer work  [] Comments:    Home Exercise Program:    [] (83531) Reviewed/Progressed HEP activities related to strengthening, flexibility, endurance, ROM of scapular, scapulothoracic and UE control with self care, reaching, carrying, lifting, house/yardwork, driving/computer work  [] (31852) Reviewed/Progressed HEP activities related to improving balance, coordination, kinesthetic sense, posture, motor skill, proprioception of scapular, scapulothoracic and UE control with self care, reaching, carrying, lifting, house/yardwork, driving/computer work    [] Comments:    Manual Treatments:  PROM / STM / Oscillations-Mobs:  G-I, II, III, IV (PA's, Inf., Post.)  [] ([id number]) Provided manual therapy to mobilize soft tissue/joints of cervical/CT, scapular GHJ and UE for the purpose of modulating pain, promoting relaxation,  increasing ROM, reducing/eliminating soft tissue swelling/inflammation/restriction, improving soft tissue extensibility and allowing for proper ROM for normal function with self care, reaching, carrying, lifting, house/yardwork, driving/computer work  [] Comments:    ADL Training:  [] (95965) Provided self-care/home management training related to activities of daily living and compensatory training, and/or use of adaptive equipment   [] Comments:     Splinting:  [] Fabrication of:   [] (08829) Orthotic/Prosthetic Management, subsequent encounter  [] (90432) Orthotic management and training (fitting and assessment)  [] Comments:      Charges:  Timed Code Treatment Minutes:  45   Total Treatment Minutes: 45     [] EVAL (LOW) 21681   [] OT Re-eval (20972)  [] EVAL (MOD) 83550   [] EVAL (HIGH) 31469       [x] Dolly (50632) x    2 [] HOCAH(64399)  [] NMR (34819) x     [] Estim (attended) (72364)   [] Manual ([id number]) x     [] US (80709)  [x] TA (87777) x    1 [] Paraffin (84055)  [] ADL  (69 649 24 60) x    [] Splint/L code:    [] Estim (unattended) (42638)  [] Fluidotherapy (18300)  [] Other:    GOALS: Goals established 6/30/22   Patient stated goal:  Patient stated goal is to improve her over all mobility and ADL status   [x] Progressing: [] Met: [] Not Met: [] Adjusted     Therapist goals for Patient 3 mos   Modified independent functional transfers  [x] Progressing: [] Met: [] Not Met: [] Adjusted  Modified independent lower body dress  [x] Progressing: [] Met: [] Not Met: [] Adjusted  Modified independent upper body dress  [x] Progressing: [] Met: [] Not Met: [] Adjusted  Use of left upper extremity as dynamic base of support during self care        [x] Progressing: [] Met: [] Not Met: [] Adjusted     Pt will report a foto score of 34 after 21 visits   [] Progressing: [] Met: [] Not Met: [] Adjusted    Progression Towards Functional goals:  [] Patient is progressing as expected towards functional goals listed. [] Progression is slowed due to complexities listed. [] Progression has been slowed due to co-morbidities. [x] Plan just implemented, too soon to assess goals progression  [] All goals are met  [] Other:     ASSESSMENT:  Improved RLE step length with assist for L weight shift during functional mobility. Continued decreased bicep/tricep activation, but with increased anterior delt. Treatment/Activity Tolerance:  [x] Patient tolerated treatment well [] Patient limited by fatigue  [] Patient limited by pain  [] Patient limited by other medical complications  [] Other:     Prognosis: [x] Good [] Fair  [] Poor    Patient Requires Follow-up: [x] Yes  [] No    PLAN: See eval  [x] Continue per plan of care [] Alter current plan (see comments)  [] Plan of care initiated [] Hold pending MD visit [] Discharge    Electronically signed by:       LM Cochran/MONIQUE [id number]        Note: If patient does not return for scheduled/ recommended follow up visits, this note will serve as a discharge from care along with most recent update on progress.

## 2022-08-25 NOTE — FLOWSHEET NOTE
Sustained         Selective         Alternating         Divided      Memory          Immediate/Working Functional recall of details from a shine; pt selected strategy of repetition, immediate recall of 3/4 details Min verbal cues for strategy use SLP reinforced summarization and rehearsal for improved encoding      Short term Delayed recall (10 minutes) of 4/4 details         Long term         Prospective      Problem Solving  Meal planning activity involving  - pt planned grocery list for meal with 80% accuracy  - pt scanned for ingredients in large visual field with 90% accuracy Min-mod cues reinforcing strategy use SLP reinforcing strategies of previewing, double-checking, and self-talk   Visuospatial       Executive Function       Other:              Dysphagia Therapy (20196) Strategies Tolerance Notes   Diet texture:      Regular Therapeutic trials training compensatory swallowing strategies  No overt clinical s/s aspiration  Independent   Dysphagia III/Soft & Bite-Sized      Dysphagia II/Minced & Moist      Dysphagia I/Puree      Liquid Consistency:       Thin Compensatory swallowing strategy training with graded trials:  - via cup edge x 10  - via straw x 5 No overt clinical s/s aspiration  Min verbal cues    Nectar thick/Mildly thick      Honey thick/Moderately thick      Ice chips       Reps Cue level    Oral Phase Exercises:       [x]Bolus Control Lateralizations      [x]Bolus Control A-P propulsions       []Bolus Control Diagonal A-P propulsions      []Bolus Control L/R A-P propulsion      [x]Lingual Resistance exercises      Exercises: Pt instructed and trained in laryngeal/pharyngeal strengthening exercises including:      []Shari maneuver      [x]Effortful swallows Weighted boluses x 20 reps Min cues fading to independent Treatment to improve base of tongue propulsion and increase swallow efficiency    []Mendelsohn maneuver      []Supraglottic swallow      []Super-suraglottic swallow      []Modified Shaker (sitting up chin to chest)      []Jaw opening against resistance      []Chin tuck against resistance      []Neuromuscular electrical stimulation (VitalStim)      Pt.  Education:  -Pt educated on diagnosis, prognosis and expectations for rehab  -All pt questions were answered  -Pt verbalized understanding and agreement    HEP instruction:  -Pt provided with written and illustrated instructions for HEP: SLOB intelligibility strategies, WRAP memory strategies, Goal-Plan-Do-Review metacognitive training, compensatory swallowing strategies (Alternate solids/liquids , Upright as possible with all PO intake , Small bites/sips , Eat/feed slowly, Remain upright 30-45 min)      Speech/Voice Therapy:    [x] (56525) Treatment of speech, language, voice, communication, and/or auditory processing disorder; individual    Cognitive Function:  [] (42860) Therapeutic interventions that focus on cognitive function (eg, attention, memory, reasoning, executive function, problem solving, and/or pragmatic functioning) and compensatory strategies to manage the performance of an activity (eg, managing time or schedules, initiating, organizing, and sequencing tasks), direct (one-on-one) patient contact; initial 15 minutes (Report 30771 only once per day)  [] ([phone]) each additional 15 minutes     Dysphagia Therapy:    [x] (59196) Treatment of swallowing dysfunction and/or oral function for feeding         Charges:  Timed Code Treatment Minutes: 0   Total Treatment Minutes: 45     [x] Speech-Language Treatment (74885)        [] Voice Treatment (73268)                                         [] Cognitive-Linguistic Skills Development Initial 15 minutes (92079)  [] Cognitive-Linguistic Skills Development Additional 15 minutes ([phone])   [x] Dysphagia Treatment/NMES (VitalStim) (30605)  [] Other:     GOALS:   GOALS:  Patient stated goal: \"Help with my memory\"; \"Stop slurring\"; \"Swallowing\"  [x] Progressing: [] Met: [] Not Met: [] Adjusted Therapist goals for Patient:  Short Term Goals: To be achieved in: 5 weeks  1. Patient will use speech strategies to facilitate increased intelligibility at discourse level, 5 minutes, of >90% as judged by SLP and pt/family. [x] Progressing: [] Met: [] Not Met: [] Adjusted  2. Patient will select and implement 2 cognitive strategies to complete functional tasks given min initiation cues in order to improve self-monitoring of ADL completion. [x] Progressing: [] Met: [] Not Met: [] Adjusted  3. Patient will use learned memory strategies to recall 4 units of novel information across delayed intervals up to 10 minutes given min encoding cues. [x] Progressing: [] Met: [] Not Met: [] Adjusted   4. Patient will functionally tolerate regular solids x 20 and thin liquids x 20 with min verbal cues for strategy implementation. [x] Progressing: [] Met: [] Not Met: [] Adjusted  5. Patient will complete repeat instrumental swallow study (MBS or FEES) as clinically indicated to further assess pharyngeal phase and direct plan of care. [x] Progressing: [] Met: [] Not Met: [] Adjusted      Long Term Goals: To be achieved in: 6 weeks  1. Patient will demonstrate improved cognitive-linguistic function to improve quality of life as evidenced by increased score on functional outcome measure. [x] Progressing: [] Met: [] Not Met: [] Adjusted  2. Patient will utilize speech intelligibility strategies to increase intelligibility to 90% in conversation to a familiar/unfamiliar listener. [x] Progressing: [] Met: [] Not Met: [] Adjusted  3. Patient will tolerate least restrictive diet with no overt clinical s/s of aspiration/penetration. [x] Progressing: [] Met: [] Not Met: [] Adjusted    Progression Towards Functional goals:  [x] Patient is progressing as expected towards functional goals listed. [] Progression is slowed due to complexities listed. [] Progression has been slowed due to co-morbidities.   [] Plan just implemented,

## 2022-08-29 ENCOUNTER — HOSPITAL ENCOUNTER (OUTPATIENT)
Dept: SPEECH THERAPY | Age: 42
Setting detail: THERAPIES SERIES
Discharge: HOME | End: 2022-08-29
Payer: COMMERCIAL

## 2022-08-29 ENCOUNTER — HOSPITAL ENCOUNTER (OUTPATIENT)
Dept: OCCUPATIONAL THERAPY | Age: 42
Setting detail: THERAPIES SERIES
Discharge: HOME | End: 2022-08-29
Payer: COMMERCIAL

## 2022-08-29 ENCOUNTER — HOSPITAL ENCOUNTER (OUTPATIENT)
Dept: PHYSICAL THERAPY | Age: 42
Setting detail: THERAPIES SERIES
Discharge: HOME | End: 2022-08-29
Payer: COMMERCIAL

## 2022-08-29 PROCEDURE — 92526 ORAL FUNCTION THERAPY: CPT

## 2022-08-29 PROCEDURE — 97112 NEUROMUSCULAR REEDUCATION: CPT

## 2022-08-29 PROCEDURE — 92507 TX SP LANG VOICE COMM INDIV: CPT

## 2022-08-29 PROCEDURE — 97110 THERAPEUTIC EXERCISES: CPT

## 2022-08-29 PROCEDURE — 97535 SELF CARE MNGMENT TRAINING: CPT

## 2022-08-29 PROCEDURE — 97530 THERAPEUTIC ACTIVITIES: CPT

## 2022-08-29 NOTE — FLOWSHEET NOTE
[address]    Speech Therapy Daily Treatment Note  Date:  2022    Patient Name:  Nishi Antonio    :  1980  MRN: [id number]  Medical/Treatment Diagnosis Information:  G81.94 (ICD-10-CM) - Left hemiparesis ([facility] [address]           Treatment Diagnosis: Mild Dysarthria; Mild Cognitive-Linguistic deficits; Mild Oropharyngeal Dysphagia   Insurance/Certification information: [facility]: 7/15-10/7/2022, 24 units 00633 & 24 units 18  Physician Information: Dr. Mark Palma MD      Plan of care signed (Y/N): Y (2022)    Date of Patient follow up with Physician: DEWEY    Functional outcome measure:   FOTO Primary Measure: HDQLIFE Swallowing Difficulties: 62  FOTO Primary Measure: PROMIS Cognitive Function v2.0: 27.5  Secondary Measure: NeuroQOL - Communication: 18    Progress Note: []  Yes  [x]  No  Next due by: 2022    RESTRICTIONS/PRECAUTIONS: hx brain bleed  Latex Allergy:  [x]NO      []YES    Preferred Language for Healthcare:   [x]English       []other:    Visit # Insurance Allowable Date Range (if applicable)    24 units 76071  24 units 16635 7/15-10/7/2022       Pain level: 0/10     SUBJECTIVE: Pt alert and receptive,  present during session. Pt reports completing swallowing exercises at home, no choking episodes since last visit. OBJECTIVE:     Exercises/Interventions:      Motor Speech Therapy (56664) Accuracy Cues Notes   Apraxia       Dysarthria      Articulation      Respiration      Phonation      Resonance      Prosody      Other:        Cognitive Function (30207 & 29539) Accuracy Cues Notes   Orientation        Attention           Sustained         Selective         Alternating         Divided      Memory          Immediate/Working Functional recall of details from short story presented verbally; pt selected strategy of repetition, immediate recall of 4/4 details Min verbal cues for encoding SLP reinforced summarization, rehearsal, and repetition       Short term Delayed recall (10 minutes) of 4/4 details         Long term         Prospective      Problem Solving  Grocery shopping activity involving planning and organization:  - pt planned grocery list on a budget with 90% accuracy  - pt calculated total price with 100% Min visual cues to select 4 strategies    Mod cues to implement strategies Goal-Plan-Do- Review for self-monitoring and self-analysis of performance   Visuospatial       Executive Function       Other:              Dysphagia Therapy (91864) Strategies Tolerance Notes   Diet texture:      Regular      Dysphagia III/Soft & Bite-Sized      Dysphagia II/Minced & Moist      Dysphagia I/Puree      Liquid Consistency: Thin Compensatory swallowing strategy training via cup edge x 10 No overt clinical s/s aspiration  Independent    Nectar thick/Mildly thick      Honey thick/Moderately thick      Ice chips       Reps Cue level    Oral Phase Exercises:       []Bolus Control Lateralizations      []Bolus Control A-P propulsions       [x]Bolus Control Diagonal A-P propulsions 10 reps Min verbal and visual cues    []Bolus Control L/R A-P propulsion      [x]Lingual Resistance exercises - Anterior x 10  - Posterior x 10 Min fading cues    Exercises: Pt instructed and trained in laryngeal/pharyngeal strengthening exercises including:      []Shari maneuver      [x]Effortful swallows Weighted boluses x 10 reps Independent Treatment to improve base of tongue propulsion and increase swallow efficiency    []Mendelsohn maneuver      []Supraglottic swallow      []Super-suraglottic swallow      []Modified Shaker (sitting up chin to chest)      []Jaw opening against resistance      []Chin tuck against resistance      []Neuromuscular electrical stimulation (VitalStim)      Pt.  Education:  -Pt educated on diagnosis, prognosis and expectations for rehab  -All pt questions were answered  -Pt verbalized understanding and agreement    HEP instruction:  -Pt provided with written and illustrated instructions for HEP: SLOB intelligibility strategies, WRAP memory strategies, Goal-Plan-Do-Review metacognitive training, compensatory swallowing strategies (Alternate solids/liquids , Upright as possible with all PO intake , Small bites/sips , Eat/feed slowly, Remain upright 30-45 min)      Speech/Voice Therapy:    [x] (48443) Treatment of speech, language, voice, communication, and/or auditory processing disorder; individual    Cognitive Function:  [] (66702) Therapeutic interventions that focus on cognitive function (eg, attention, memory, reasoning, executive function, problem solving, and/or pragmatic functioning) and compensatory strategies to manage the performance of an activity (eg, managing time or schedules, initiating, organizing, and sequencing tasks), direct (one-on-one) patient contact; initial 15 minutes (Report 14136 only once per day)  [] ([phone]) each additional 15 minutes     Dysphagia Therapy:    [x] (33822) Treatment of swallowing dysfunction and/or oral function for feeding         Charges:  Timed Code Treatment Minutes: 0   Total Treatment Minutes: 45     [x] Speech-Language Treatment (05907)        [] Voice Treatment (54857)                                         [] Cognitive-Linguistic Skills Development Initial 15 minutes (31684)  [] Cognitive-Linguistic Skills Development Additional 15 minutes ([phone])   [x] Dysphagia Treatment/NMES (VitalStim) (66465)  [] Other:     GOALS:   GOALS:  Patient stated goal: \"Help with my memory\"; \"Stop slurring\"; \"Swallowing\"  [x] Progressing: [] Met: [] Not Met: [] Adjusted     Therapist goals for Patient:  Short Term Goals: To be achieved in: 5 weeks  1. Patient will use speech strategies to facilitate increased intelligibility at discourse level, 5 minutes, of >90% as judged by SLP and pt/family. [x] Progressing: [] Met: [] Not Met: [] Adjusted  2.  Patient will select and implement 2 cognitive strategies to complete functional tasks given min initiation cues in order to improve self-monitoring of ADL completion. [x] Progressing: [] Met: [] Not Met: [] Adjusted  3. Patient will use learned memory strategies to recall 4 units of novel information across delayed intervals up to 10 minutes given min encoding cues. [x] Progressing: [] Met: [] Not Met: [] Adjusted   4. Patient will functionally tolerate regular solids x 20 and thin liquids x 20 with min verbal cues for strategy implementation. [x] Progressing: [] Met: [] Not Met: [] Adjusted  5. Patient will complete repeat instrumental swallow study (MBS or FEES) as clinically indicated to further assess pharyngeal phase and direct plan of care. [x] Progressing: [] Met: [] Not Met: [] Adjusted      Long Term Goals: To be achieved in: 6 weeks  1. Patient will demonstrate improved cognitive-linguistic function to improve quality of life as evidenced by increased score on functional outcome measure. [x] Progressing: [] Met: [] Not Met: [] Adjusted  2. Patient will utilize speech intelligibility strategies to increase intelligibility to 90% in conversation to a familiar/unfamiliar listener. [x] Progressing: [] Met: [] Not Met: [] Adjusted  3. Patient will tolerate least restrictive diet with no overt clinical s/s of aspiration/penetration. [x] Progressing: [] Met: [] Not Met: [] Adjusted    Progression Towards Functional goals:  [x] Patient is progressing as expected towards functional goals listed. [] Progression is slowed due to complexities listed. [] Progression has been slowed due to co-morbidities.   [] Plan just implemented, too soon to assess goals progression  [] Other:     Persisting Functional Limitations/Impairments:  [x]Attention []Word Finding       [x]Memory [x]Speech Intelligibility      [x]Executive Function [x]Diet Tolerance     [x]Problem Solving [x]Coughing/Choking with PO  []Expressive Language []Medication/Finance Management  []Receptive Language []Other:      ASSESSMENT: Pt with continued use of good visual scanning and high-level sequencing for meal planning and budgeting activity plus improved task termination. Reduced cognitive estimation in setting budget amounts and self-review of performance, benefited from increased cues/reinforcement. Pt now independently completing compensatory swallowing strategies and effortful swallows with thin liquids. Treatment/Activity Tolerance:  [x] Patient able to complete tx [] Patient limited by fatigue  [] Patient limited by pain  [] Patient limited by other medical complications  [] Other:     Prognosis: [x] Good [] Fair  [] Poor    Patient Requires Follow-up: [x] Yes  [] No    PLAN: See eval. ST 2x / week for 6 weeks. [x] Continue per plan of care [] Alter current plan (see comments)  [] Plan of care initiated [] Hold pending MD visit [] Discharge    Electronically signed by:   ANTOINE Burns  Speech-Language Pathology Graduate Clinician    The speech-language pathologist was present, directed the patient's care, made skilled judgment and was responsible for assessment and treatment. Christopher Mustafa MA CCC-SLP  Speech-Language Pathologist  [id number]      Note: If patient does not return for scheduled/ recommended follow up visits, this note will serve as a discharge from care along with most recent update on progress.

## 2022-08-29 NOTE — FLOWSHEET NOTE
168 [facility] Occupational Therapy  [address] [place] [address], [address] [place] [address]  Phone: [phone]   Fax: [phone]      Occupational Therapy Daily Treatment Note  Date:  2022    Patient: Elinor Cabrera   : 1980   MRN: [id number]  Referring Physician: Dl Crespo MD           Medical Diagnosis Information: left hemiplegia    Date of Injury: 21  Date of Surgery: N/A                                      Insurance information: care source     Plan of care sent to provider:      []Faxed   [x]Co-signature    (attempts: 1[x] 2[] 3[])       Progress Report: []  Yes  [x]  No     Date Range for reporting period:  Beginnin22  Ending: end of POC    Progress report due (10 Rx/or 30 days whichever is less): visit #10 or     Recertification due (POC duration/ or 90 days whichever is less): visit #12 or 22    Visit # Insurance Allowable Auth required? Date Range    30 hard max    120 units [x]  Yes  []  No 22-10/1/22       Latex Allergy:  [x]No      []Yes  Pacemaker:  [x] No       [] Yes     Preferred Language for Healthcare:   [x]English       []other:    Pain level: 2/10 \"left thigh, left wrist\"    SUBJECTIVE:    Patient in good spirits, \"looked at a few houses this weekend and both of my boys scored in their football game. \"    Functional Disability Index:  FOTO:  Patient's Physical FS Primary Measure- 25  Risk Adjusted Statistical FOTO-     OBJECTIVE: See eval      RESTRICTIONS/PRECAUTIONS: fall risk, poor midline, left visual hemianopsia    Exercises/Interventions: Treatment focused on improving postural control and R side involvement during functional tasks to decrease abnormal movement patterns, Improve right ue function and midline orientation during functional tasks. Pt seated EOM, just finished with PT. Pt wearing original AFO.  Pt with LUE  on SPC and RUE over left hand pt with 12 reps x 3 sets of shoulder flexion and balance, coordination, kinesthetic sense, posture, motor skill, proprioception  to assist with  scapular, scapulothoracic and UE control with self care, reaching, carrying, lifting, house/yardwork, driving/computer work.   [] Comments:    Therapeutic Activities:    [] (86385 or 80765) Provided verbal/tactile cueing for activities related to improving balance, coordination, kinesthetic sense, posture, motor skill, proprioception and motor activation to allow for proper function of scapular, scapulothoracic and UE control with self care, carrying, lifting, driving/computer work  [] Comments:    Home Exercise Program:    [] (55961) Reviewed/Progressed HEP activities related to strengthening, flexibility, endurance, ROM of scapular, scapulothoracic and UE control with self care, reaching, carrying, lifting, house/yardwork, driving/computer work  [] (05398) Reviewed/Progressed HEP activities related to improving balance, coordination, kinesthetic sense, posture, motor skill, proprioception of scapular, scapulothoracic and UE control with self care, reaching, carrying, lifting, house/yardwork, driving/computer work    [] Comments:    Manual Treatments:  PROM / STM / Oscillations-Mobs:  G-I, II, III, IV (PA's, Inf., Post.)  [] (49197) Provided manual therapy to mobilize soft tissue/joints of cervical/CT, scapular GHJ and UE for the purpose of modulating pain, promoting relaxation,  increasing ROM, reducing/eliminating soft tissue swelling/inflammation/restriction, improving soft tissue extensibility and allowing for proper ROM for normal function with self care, reaching, carrying, lifting, house/yardwork, driving/computer work  [] Comments:    ADL Training:  [x] (25316) Provided self-care/home management training related to activities of daily living and compensatory training, and/or use of adaptive equipment   [] Comments:     Splinting:  [] Fabrication of:   [] (81586) Orthotic/Prosthetic Management, subsequent encounter  [] (85630) Orthotic management and training (fitting and assessment)  [] Comments:      Charges:  Timed Code Treatment Minutes:  45   Total Treatment Minutes: 45     [] EVAL (LOW) 21544   [] OT Re-eval (13481)  [] EVAL (MOD) 94353   [] EVAL (HIGH) 84199       [x] Dolly (51997) x    2 [] QVWTH(96019)  [] NMR (01519) x     [] Estim (attended) (06298)   [] Manual ([id number]) x     [] US (60069)  [] TA () x     [] Paraffin (03132)  [x] ADL  (41888) x   1 [] Splint/L code:    [] Estim (unattended) (70567)  [] Fluidotherapy (24761)  [] Other:    GOALS: Goals established 6/30/22   Patient stated goal:  Patient stated goal is to improve her over all mobility and ADL status   [x] Progressing: [] Met: [] Not Met: [] Adjusted     Therapist goals for Patient 3 mos   Modified independent functional transfers  [x] Progressing: [] Met: [] Not Met: [] Adjusted  Modified independent lower body dress  [x] Progressing: [] Met: [] Not Met: [] Adjusted  Modified independent upper body dress  [x] Progressing: [] Met: [] Not Met: [] Adjusted  Use of left upper extremity as dynamic base of support during self care        [x] Progressing: [] Met: [] Not Met: [] Adjusted     Pt will report a foto score of 34 after 21 visits   [] Progressing: [] Met: [] Not Met: [] Adjusted    Progression Towards Functional goals:  [] Patient is progressing as expected towards functional goals listed. [] Progression is slowed due to complexities listed. [] Progression has been slowed due to co-morbidities. [x] Plan just implemented, too soon to assess goals progression  [] All goals are met  [] Other:     ASSESSMENT:  Pt progressing towards independence with UB dressing but requires heavy setup and vcs for initiation, sequencing and attn to task. Pt shown maria alejandra dressing technique.   Treatment/Activity Tolerance:  [x] Patient tolerated treatment well [] Patient limited by fatigue  [] Patient limited by pain  [] Patient limited by other medical complications  [] Other:     Prognosis: [x] Good [] Fair  [] Poor    Patient Requires Follow-up: [x] Yes  [] No    PLAN: See eval  [x] Continue per plan of care [] Alter current plan (see comments)  [] Plan of care initiated [] Hold pending MD visit [] Discharge    Electronically signed by:      LM Webber/MONIQUE [id number]

## 2022-09-01 ENCOUNTER — APPOINTMENT (OUTPATIENT)
Dept: OCCUPATIONAL THERAPY | Age: 42
End: 2022-09-01
Payer: COMMERCIAL

## 2022-09-01 ENCOUNTER — APPOINTMENT (OUTPATIENT)
Dept: SPEECH THERAPY | Age: 42
End: 2022-09-01
Payer: COMMERCIAL

## 2022-09-01 ENCOUNTER — APPOINTMENT (OUTPATIENT)
Dept: PHYSICAL THERAPY | Age: 42
End: 2022-09-01
Payer: COMMERCIAL

## 2022-09-06 ENCOUNTER — HOSPITAL ENCOUNTER (OUTPATIENT)
Dept: OCCUPATIONAL THERAPY | Age: 42
Setting detail: THERAPIES SERIES
End: 2022-09-06
Payer: COMMERCIAL

## 2022-09-06 ENCOUNTER — HOSPITAL ENCOUNTER (OUTPATIENT)
Dept: SPEECH THERAPY | Age: 42
Setting detail: THERAPIES SERIES
Discharge: HOME | End: 2022-09-06
Payer: COMMERCIAL

## 2022-09-06 ENCOUNTER — HOSPITAL ENCOUNTER (OUTPATIENT)
Dept: PHYSICAL THERAPY | Age: 42
Setting detail: THERAPIES SERIES
Discharge: HOME | End: 2022-09-06
Payer: COMMERCIAL

## 2022-09-06 NOTE — FLOWSHEET NOTE
Physical Therapy  Cancellation/No-show Note  Patient Name:  Teo Hunter  :  1980   Date:  2022  Cancelled visits to date: 1  No-shows to date: 0    Patient status for today's appointment patient:  [x]  Cancelled   []  Rescheduled appointment  []  No-show     Reason given by patient:  []  Patient ill  []  Conflicting appointment  []  No transportation    []  Conflict with work  [x]  No reason given - called to cancel first apt of the day  []  Other:     Comments:      Phone call information:   []  Phone call made today to patient at _ time at number provided:      []  Patient answered, conversation as follows:    []  Patient did not answer, message left as follows:  []  Phone call not made today  [x]  Phone call not needed - pt contacted us to cancel and provided reason for cancellation.      Electronically signed by:  Marcia Carrera, PT, DPT

## 2022-09-06 NOTE — PROGRESS NOTES
Occupational Therapy  Cancellation/No-show Note  Patient Name:  Patsy Wiseman   :  1980   Date:  2022  Cancelled visits to date: 0  No-shows to date: 0    Patient status for today's appointment patient:  [x]  Cancelled  []  Rescheduled appointment  []  No-show     Reason given by patient:  []  Patient ill  []  Conflicting appointment  []  No transportation    []  Conflict with work  [x]  No reason given  []  Other:     Comments:      Phone call information:   []  Phone call made today to patient at _ time at number provided:      []  Patient answered, conversation as follows:    []  Patient did not answer, message left as follows:  []  Phone call not made today    Electronically signed by:  Judge Arora OT

## 2022-09-06 NOTE — FLOWSHEET NOTE
Speech Therapy  Cancellation/No-show Note  Patient Name:  Davi Ulloa  :  1980   Date:  2022  Cancels to Date: 3  No-shows to Date: 0    Patient status for today's appointment patient:  [x]  Cancelled (, , )  []  Rescheduled appointment  []  No-show     Reason given by patient:  []  Patient ill  []  Conflicting appointment  []  No transportation    []  Conflict with work  [x]  No reason given  []  Other:     Comments:      Phone call information:   []  Phone call made today to patient at time at number provided:      []  Patient's  answered, conversation as follows:    []  Patient did not answer, message left as follows:  [x]  Phone call not made today, pt called clinic and provided reason for cancellation    Electronically signed by:  Fanta Valencia, SLP

## 2022-09-08 ENCOUNTER — HOSPITAL ENCOUNTER (OUTPATIENT)
Dept: SPEECH THERAPY | Age: 42
Setting detail: THERAPIES SERIES
Discharge: HOME | End: 2022-09-08
Payer: COMMERCIAL

## 2022-09-08 ENCOUNTER — HOSPITAL ENCOUNTER (OUTPATIENT)
Dept: OCCUPATIONAL THERAPY | Age: 42
Setting detail: THERAPIES SERIES
Discharge: HOME | End: 2022-09-08
Payer: COMMERCIAL

## 2022-09-08 ENCOUNTER — HOSPITAL ENCOUNTER (OUTPATIENT)
Dept: PHYSICAL THERAPY | Age: 42
Setting detail: THERAPIES SERIES
Discharge: HOME | End: 2022-09-08
Payer: COMMERCIAL

## 2022-09-08 PROCEDURE — 97112 NEUROMUSCULAR REEDUCATION: CPT

## 2022-09-08 PROCEDURE — 97535 SELF CARE MNGMENT TRAINING: CPT

## 2022-09-08 PROCEDURE — 97530 THERAPEUTIC ACTIVITIES: CPT

## 2022-09-08 PROCEDURE — 97116 GAIT TRAINING THERAPY: CPT

## 2022-09-08 PROCEDURE — 92507 TX SP LANG VOICE COMM INDIV: CPT

## 2022-09-08 PROCEDURE — 92526 ORAL FUNCTION THERAPY: CPT

## 2022-09-08 NOTE — FLOWSHEET NOTE
[address] [facility] Occupational Therapy  [address] [place] [address], [address]  Phone: [phone]   Fax: [phone]      Occupational Therapy Daily Treatment Note  Date:  2022    Patient: Nicol Rodriguez   : 1980   MRN: [id number]  Referring Physician: Sharla Berg MD           Medical Diagnosis Information: left hemiplegia    Date of Injury: 21  Date of Surgery: N/A                                      Insurance information: care source     Plan of care sent to provider:      []Faxed   [x]Co-signature    (attempts: 1[x] 2[] 3[])       Progress Report: []  Yes  [x]  No     Date Range for reporting period:  Beginnin22  Ending: end of POC    Progress report due (10 Rx/or 30 days whichever is less): visit #10 or     Recertification due (POC duration/ or 90 days whichever is less): visit #12 or 22    Visit # Insurance Allowable Auth required? Date Range    30 hard max    120 units [x]  Yes  []  No 22-10/1/22       Latex Allergy:  [x]No      []Yes  Pacemaker:  [x] No       [] Yes     Preferred Language for Healthcare:   [x]English       []other:    Pain level: 2/10 \"left thigh, left wrist\"    SUBJECTIVE:    Patient in good spirits     Functional Disability Index:  FOTO:  Patient's Physical FS Primary Measure- 25  Risk Adjusted Statistical FOTO-     OBJECTIVE: See eval      RESTRICTIONS/PRECAUTIONS: fall risk, poor midline, left visual hemianopsia    Exercises/Interventions: Treatment focused on improving postural control and R side involvement during functional tasks to decrease abnormal movement patterns, Improve right ue function and midline orientation during functional tasks. Pt seated EOM, just finished with PT. Treatment initiated with patient working on forced use of left ue as base of support and improving midrange trunk control and stability with dynamic standing and reaching tasks. Left ue placed on vertical dowel mahesh. motor activation to allow for proper function of scapular, scapulothoracic and UE control with self care, carrying, lifting, driving/computer work  [] Comments:    Home Exercise Program:    [] (22555) Reviewed/Progressed HEP activities related to strengthening, flexibility, endurance, ROM of scapular, scapulothoracic and UE control with self care, reaching, carrying, lifting, house/yardwork, driving/computer work  [] (09955) Reviewed/Progressed HEP activities related to improving balance, coordination, kinesthetic sense, posture, motor skill, proprioception of scapular, scapulothoracic and UE control with self care, reaching, carrying, lifting, house/yardwork, driving/computer work    [] Comments:    Manual Treatments:  PROM / STM / Oscillations-Mobs:  G-I, II, III, IV (PA's, Inf., Post.)  [] ([address]) Provided manual therapy to mobilize soft tissue/joints of cervical/CT, scapular GHJ and UE for the purpose of modulating pain, promoting relaxation,  increasing ROM, reducing/eliminating soft tissue swelling/inflammation/restriction, improving soft tissue extensibility and allowing for proper ROM for normal function with self care, reaching, carrying, lifting, house/yardwork, driving/computer work  [] Comments:    ADL Training:  [x] (50416) Provided self-care/home management training related to activities of daily living and compensatory training, and/or use of adaptive equipment   [] Comments:     Splinting:  [] Fabrication of:   [] (95353) Orthotic/Prosthetic Management, subsequent encounter  [] (58397) Orthotic management and training (fitting and assessment)  [] Comments:      Charges:  Timed Code Treatment Minutes:  45   Total Treatment Minutes: 45     [] EVAL (LOW) 04109   [] OT Re-eval (86497)  [] EVAL (MOD) 44143   [] EVAL (HIGH) 80351       [] Dolly (03752) x      [] YQDGX(49756)  [x] NMR (60262) x    1 [] Estim (attended) (08418)   [] Manual ([address] [facility]) x     [] US (79866)  [] TA (51491) x     [] Paraffin (11884)  [x] ADL  (88 [phone]) x   2 [] Splint/L code:    [] Estim (unattended) (41598)  [] Fluidotherapy (94259)  [] Other:    GOALS: Goals established 6/30/22   Patient stated goal:  Patient stated goal is to improve her over all mobility and ADL status   [x] Progressing: [] Met: [] Not Met: [] Adjusted     Therapist goals for Patient 3 mos   Modified independent functional transfers  [x] Progressing: [] Met: [] Not Met: [] Adjusted  Modified independent lower body dress  [x] Progressing: [] Met: [] Not Met: [] Adjusted  Modified independent upper body dress  [x] Progressing: [] Met: [] Not Met: [] Adjusted  Use of left upper extremity as dynamic base of support during self care        [x] Progressing: [] Met: [] Not Met: [] Adjusted     Pt will report a foto score of 34 after 21 visits   [] Progressing: [] Met: [] Not Met: [] Adjusted    Progression Towards Functional goals:  [] Patient is progressing as expected towards functional goals listed. [] Progression is slowed due to complexities listed. [] Progression has been slowed due to co-morbidities. [x] Plan just implemented, too soon to assess goals progression  [] All goals are met  [] Other:     ASSESSMENT:  Pt progressing towards independence with UB dressing but requires heavy setup and vcs for initiation, sequencing and attn to task. Pt shown maria alejandra dressing technique.   Treatment/Activity Tolerance:  [x] Patient tolerated treatment well [] Patient limited by fatigue  [] Patient limited by pain  [] Patient limited by other medical complications  [] Other:     Prognosis: [x] Good [] Fair  [] Poor    Patient Requires Follow-up: [x] Yes  [] No    PLAN: See eval  [x] Continue per plan of care [] Alter current plan (see comments)  [] Plan of care initiated [] Hold pending MD visit [] Discharge    Electronically signed by:

## 2022-09-08 NOTE — FLOWSHEET NOTE
[facility] - Outpatient Physical Therapy  Phone: [phone]   Fax: [phone]    Physical Therapy Daily Treatment Note/ Progress Note  Date:  2022    Patient Name:  Melany Wolfe    :  1980  MRN: [id number]  Medical/Treatment Diagnosis Information:  Diagnosis: R25.2 (ICD-10-CM) - Cramp and spasm;   Left hemiparesis with spasticity  Treatment Diagnosis: Impaired proprioception of LUE & LE, avoidance of L extremities, Impaired balance & gait  Insurance/Certification information:  PT Insurance Information: [employer]. 30 visits pcy. PA required  Physician Information:   Patricia Garcia MD  Plan of care signed (Y/N): [x]  Yes []  No     Date of Patient follow up with Physician: ?     Progress Report: [x]  Yes  []  No     Date Range for reporting period:  Beginning  2022   PN: 22  Ending      Progress report due (10 Rx/or 30 days whichever is less): visit #17 or 38    Recertification due (POC duration/ or 90 days whichever is less): visit #17     Visit # Insurance Allowable Auth required?  Date Range    +    16 (30 pcy) [x]  Yes, [employer]  []  No Until 9/10/22  Submitted for date extension 22       Units approved Units used Date Range   120 15 Until 9/10/22     Latex Allergy:  [x]NO      []YES  Preferred Language for Healthcare:   [x]English       []Other:      Functional Scale:                                                                                                      Date assessed:  FOTO physical FS primary measure score = 49; risk adjusted = 50                    FOTO physical FS primary measure score = 52                                                          Functional Scale/Test Evaluation 2022 30 day   22 60 day     Add TUG Discharge    Tinetti Assessment       30\" STS 3 8      5x STS 40\" 25\"      2MWT w/QC 65' 66'           Pain level:  2-3/10  Location:  L knee & elbow    SUBJECTIVE:   Pt reports the back of her L knee has really been bothering her. Had retina surgery last week and is healing from that still- can already see better out of her eye. Reports no issues at home. Denies near falls or falls lately.        OBJECTIVE:       RESTRICTIONS/PRECAUTIONS:   HTN    Interventions/Exercises:   Therapeutic Exercises (96682) Resistance / level Sets/sec Reps Notes   TM      7/23: L foot stationary on silver portion                          Neuromuscular Re-ed (85179)        Seated stepper 1 X 2 min   9/8: PT assisted alignment L hip - cues for pulling it towards midline          Total gym  -squats to facilitate use of LLE       Tall kneel- side step across mat table  8/2: PT assist for postural control and L leg assist   Fwd lunge onto step, L foot on top of step, R foot behind 8/2: PT assist at pelvis for forward weight shift   LAQ seated     Marches L 2# ankle weight   2 X 10 B    X10 L 8/2: PT assist for L quad activation and weight shift to L for upright posture    9/8: PT elevating L leg to give clearance    On mat table:  Bridge  Roll R  Roll L  Hip abd in supine  Heel slide in supine  Prone ham curl  Prone resisted knee ext       8/29: fair+ recruitment of LLE muscle fibers  8/29: fair-  recruitment of LLE muscle fibers      AAROM  PROM  AAROM - cues to push into hand    Stand from mat table with weight shifted over L LE and hips square prior to stand    VCs for pointing head towards L knee to achieve neutral and greater weight into L LE                 Therapeutic Activities (49999) /  Functional Tasks / Gait Training (68421)       Sit to supine  Supine to sit 8/29: compensations for each   R foot taps on 8\" block & hold      R foot taps on 2nd step    R toe taps on to black board of Shuttle, L foot on lester R hand holding 1/2 full cup of water; Man & VC's to improve posture and LE muscle fiber recruitment  R hand on QC    7/28: stability provided by SPT through R hand                 Gait Training:  Amb w/QC & AFO      Step up and overs, L foot on Airex, R foot step over half bolster & return   SBA         65'+70'      9/8: turned QC around to that flat side next to body- states her kids were playing with her cane and they probably didn't put it back correctly   8/16: VCs for pulling L hip forward but still keeping space between her feet      7/28: LOB x 1 with max assist x2   Sit to stand, R foot on AirEx to inhibit activation, large bolster between feet to maintain correct alignment for facilitation   7/28: use of mirror improved weight-shifting to L upon standing   Sit to stand w/large SB to facilitate forward flexion and increase activation of LLE   7/28: use of mirror improved weight-shifting to L while seated   Sit to stand from corner of mat #6, RLE on folded gray pad, large bolster behind R ankle to limited knee flex   R hand with walking stick          Manual Intervention (67093)                                                     Modalities:     Pt. Education:  9/8: Reassessed goals, discussed progress made and areas remaining for improvement, issued outcome measure and reviewed new score with pt as compared to eval    6/24/2022 patient educated on diagnosis, prognosis and expectations for rehab, all patient questions were answered    HEP instruction:  6/24: Shift weight to L to facilitate LLE muscle fiber recruitment with all functional activities. Therapeutic Exercise and NMR EXR  [] (27767) Provided verbal/tactile cueing for activities related to strengthening, flexibility, endurance, ROM for improvements in  [] LE / Core stability: LE, hip, and core control with self care, mobility, lifting, ambulation.   [] UE / Shoulder complex: cervical, postural, scapular, scapulothoracic and UE control with self care, reaching, carrying, lifting, house/yardwork, driving, computer work.  [] (52127) Provided verbal/tactile cueing for activities related to improving balance, coordination, kinesthetic sense, posture, motor skill, proprioception to assist with   [] LE / Core stability: LE, hip, and core control in self care, mobility, lifting, ambulation and eccentric single leg control. [] UE / Shoulder complex: cervical, scapular, scapulothoracic and UE control with self care, reaching, carrying, lifting, house/yardwork, driving, computer work.   [] (67385) Therapist is in constant attendance of 2 or more patients providing skilled therapy interventions, but not providing any significant amount of measurable one-on-one time to either patient, for improvements in  [] LE / Core stability: LE, hip, and core control in self care, mobility, lifting, ambulation and eccentric single leg control. [] UE / Shoulder complex: cervical, scapular, scapulothoracic and UE control with self care, reaching, carrying, lifting, house/yardwork, driving, computer work. NMR and Therapeutic Activities:    [x] (06919 or 38338) Provided verbal/tactile cueing for activities related to improving balance, coordination, kinesthetic sense, posture, motor skill, proprioception and motor activation to allow for proper function of   [x] LE / Core, hip and LE with self care and ADLs  [] UE / Shoulder complex: cervical, postural, scapular, scapulothoracic and UE control with self care, carrying, lifting, driving, computer work.    [x] (16147) Gait Re-education- Provided training and instruction to the patient for proper LE, core and hip recruitment, positioning, and eccentric body weight control with ambulation re-education, including ascending & descending stairs     Home Management Training / Self Care:  [] (20037) Provided self-care/home management training related to activities of daily living and compensatory training, and/or use of adaptive equipment for improvement with: ADLs and compensatory training, meal preparation, safety procedures and instruction in use of adaptive equipment, including bathing, grooming, dressing, personal hygiene, basic household cleaning and chores. Home Exercise Program:    [] (50637) Reviewed/Progressed HEP activities related to strengthening, flexibility, endurance, ROM of   [] LE / Core stability: core, hip and LE for functional self-care, mobility, lifting and ambulation/stair navigation   [] UE / Shoulder complex: cervical, postural, scapular, scapulothoracic and UE control with self care, reaching, carrying, lifting, house/yardwork, driving, computer work  [] (54704)Reviewed/Progressed HEP activities related to improving balance, coordination, kinesthetic sense, posture, motor skill, proprioception of   [] LE: core, hip and LE for self care, mobility, lifting, and ambulation/stair navigation    [] UE / Shoulder complex: cervical, postural,  scapular, scapulothoracic and UE control with self care, reaching, carrying, lifting, house/yardwork, driving, computer work    Manual Treatments:  PROM / STM / Oscillations-Mobs:  G-I, II, III, IV (PA's, Inf., Post.)  [] (86944) Provided manual therapy to mobilize shoulder complex, hip, LE, and/or cervicothoracic/LS spine soft tissue/joints for the purpose of modulating pain, promoting relaxation,  increasing ROM, reducing/eliminating soft tissue swelling/inflammation/restriction, improving soft tissue extensibility and allowing for proper ROM for normal function with   [] LE / Core stability: self care, mobility, lifting and ambulation. [] UE / Shoulder complex: self care, reaching, carrying, lifting, house/yardwork, driving, computer work. Modalities:  [] (32772) Vasopneumatic compression: Utilized vasopneumatic compression to decrease edema / swelling for the purpose of improving mobility and quad tone / recruitment which will allow for increased overall function including but not limited to self-care, transfers, ambulation, and ascending / descending stairs.          Charges:  Timed Code Treatment Minutes: 45   Total Treatment Minutes: 45     [] EVAL - LOW (48560) [] EVAL - MOD (40758)  [] EVAL - HIGH (12609)  [] RE-EVAL (10165)  [] TE (51826) x      [] Ionto  [x] NMR (18778) x  1    [] Vaso  [] Manual (61875) x      [] Ultrasound  [x] TA x 1     [] Mech Traction (93112)  [x] Gait Training x  1   [] ES (un) (12743):   [] Aquatic therapy x   [] Other:   [] Group:     GOALS:   Patient stated goal: improve transfers and gait  [x] Progressing: [] Met: [] Not Met: [] Adjusted     Therapist goals for Patient:   Short Term Goals: To be achieved in: 3 weeks  1. Independent in HEP and progression per patient tolerance, in order to prevent re-injury. [] Progressing: [x] Met: [] Not Met: [] Adjusted  2. Patient to demonstrate 40% WB'ing of LLE during sit to stand transfers to assist in muscle recruitment with all tasks. [x] Progressing: [] Met: [] Not Met: [] Adjusted     Long Term Goals: To be achieved in: 8 weeks  1. Patient to demonstrate TUG w/QC score of <15\" in order to demonstrate reduced fall risk to assist with reaching prior level of function. - NOT TESTED 9/8  [] Progressing: [] Met: [] Not Met: [] Adjusted  2. FOTO score of at least 56 to assist with reaching prior level of function. [x] Progressing: [] Met: [] Not Met: [] Adjusted  3. Patient will demonstrate an increase 30\" sit to stand reps to 10 with 40% WB'ing through LLE, to improve transfers to/from all surfaces & heights  [x] Progressing: [] Met: [] Not Met: [] Adjusted  4. Patient will improve Tinetti Balance score to 15/28 in order to . [x] Progressing: [] Met: [] Not Met: [] Adjusted  5. Patient will increase 2MWT w/QC to 150' in order to improve community access. [x] Progressing: [] Met: [] Not Met: [] Adjusted        Overall Progression Towards Functional goals/ Treatment Progress Update:  [] Patient is progressing as expected towards functional goals listed. [] Progression is slowed due to complexities/Impairments listed. [] Progression has been slowed due to co-morbidities.   [x] Plan just implemented, too soon to assess goals progression <30days   [] Goals require adjustment due to lack of progress  [] Patient is not progressing as expected and requires additional follow up with physician  [] Other    Persisting Functional Limitations/Impairments:  []Sleeping []Sitting               [x]Standing [x]Transfers        [x]Walking [x]Kneeling               [x]Stairs [x]Squatting / bending   [x]ADLs [x]Reaching  [x]Lifting  [x]Housework  [x]Driving [x]Job related tasks  []Sports/Recreation []Other:        ASSESSMENT:    Pt has been seen for 7 visits since restarting therapy in June 2022. She is inconsistent with attending appointments due to various reasons. Recently she had surgery to improve her vision which caused her to miss a day of therapy (PT, OT, and ST). Pt still remains at a high risk for falls and has had multiple near falls in therapy over the last few weeks. She does demo an improvement on her Tinetti score and with sit to stand testing. Pt complaining of L knee pain due to excessive recurvatum with weight bearing on L and not wearing KAFO. Pt states KAFO too difficult to ambulate in, even with SBQC. L LE also remains weak and pt continues to avoid WBing appropriately as well as showing signs of L neglect. Patient would benefit from continues skilled therapy to meet her goals and improve safety and function at home and in the community. Treatment/Activity Tolerance:  [x] Patient able to complete tx  [] Patient limited by fatigue  [] Patient limited by pain   [] Patient limited by other medical complications  [] Other:     Prognosis: [x] Good [] Fair  [] Poor    Patient Requires Follow-up: [x] Yes  [] No    Plan for next treatment session:    PLAN: See eval. PT 2x / week for 8 weeks.    [x] Continue per plan of care [] Alter current plan (see comments)  [] Plan of care initiated [] Hold pending MD visit [] Discharge    Electronically signed by:  Kelle Grande, PT, DPT    Note: If patient does not return for scheduled/ recommended follow up visits, his note will serve as a discharge from care along with most recent update on progress.

## 2022-09-08 NOTE — PROGRESS NOTES
[facility] [address], [address]    Speech Therapy Daily Treatment Note / Progress Note  Date:  2022    Patient Name:  Errol Olivo    :  1980  MRN: [id number]  Medical/Treatment Diagnosis Information:  G81.94 (ICD-10-CM) - Left hemiparesis ([address]           Treatment Diagnosis: Mild Dysarthria; Mild Cognitive-Linguistic deficits; Mild Oropharyngeal Dysphagia   Insurance/Certification information: [facility] authorization: 7/15-10/7/2022, 24 units 60171 & 24 units 18  Physician Information: Dr. Ant Santa MD      Plan of care signed (Y/N): Y (2022)    Date of Patient follow up with Physician: DEWEY    Functional outcome measure:   FOTO Primary Measure: HDQLIFE Swallowing Difficulties: 62  FOTO Primary Measure: PROMIS Cognitive Function v2.0: 33.1  Secondary Measure: NeuroQOL - Communication: 19    Progress Note: [x]  Yes  []  No  Next due by: 10/8/2022    RESTRICTIONS/PRECAUTIONS: hx brain bleed  Latex Allergy:  [x]NO      []YES    Preferred Language for Healthcare:   [x]English       []other:    Visit # Insurance Allowable Date Range (if applicable)    24 units 74301  24 units 11708 7/15-10/7/2022       Pain level: 0/10     SUBJECTIVE: Pt alert and receptive,  present during session. Pt continues to deny choking or difficulty with eating/drinking. OBJECTIVE:   FOTO Primary Measure: HDQLIFE Swallowing Difficulties: 62  FOTO Primary Measure: PROMIS Cognitive Function v2.0: 33.1  Secondary Measure: NeuroQOL - Communication: 19    Exercises/Interventions:      Motor Speech Therapy (27455) Accuracy Cues Notes   Apraxia       Dysarthria      Articulation      Respiration      Phonation      Resonance      Prosody 5 minute procedural and descriptive discourse with cues for pacing cue x 1  - SLP rating 100% intelligible  - pt rating 100% intelligible  Pt verbalized strategies of slow rate and adequate breath support   Other:        Cognitive Function (17382 & 37555) Accuracy Cues Notes   Orientation        Attention           Sustained         Selective         Alternating         Divided      Memory          Immediate/Working Functional recall of details from short story presented verbally; pt selected strategy of repetition, immediate recall of 3/5 details Min verbal cues for encoding SLP reinforced summarization, rehearsal, and repetition       Short term Delayed recall (10 minutes) of 4/5 details         Long term         Prospective      Problem Solving  Functional schedule planning to organize calendar for son's football games, involving shifting attention between two visual stimuli:  - pt selected appropriate cognitive strategies x 2 to complete task  - pt completed task with 60% accuracy independently, increased given mod cues Min visual cues to select strategies    Mod cues to implement strategies Goal-Plan-Do- Review for self-monitoring and self-analysis of performance   Visuospatial       Executive Function       Other:              Dysphagia Therapy (76536) Strategies Tolerance Notes   Diet texture:      Regular Compensatory swallowing strategy training (chips) No overt clinical s/s aspiration  Independent   Dysphagia III/Soft & Bite-Sized      Dysphagia II/Minced & Moist      Dysphagia I/Puree      Liquid Consistency:       Thin Compensatory swallowing strategy training via cup edge No overt clinical s/s aspiration  Independent    Nectar thick/Mildly thick      Honey thick/Moderately thick      Ice chips       Reps Cue level    Oral Phase Exercises:       []Bolus Control Lateralizations      []Bolus Control A-P propulsions       []Bolus Control Diagonal A-P propulsions      []Bolus Control L/R A-P propulsion      []Lingual Resistance exercises      Exercises: Pt instructed and trained in laryngeal/pharyngeal strengthening exercises including:      []Shari maneuver      [x]Effortful swallows Weighted boluses x 10 reps Independent Treatment to improve base of tongue propulsion and increase swallow efficiency    []Mendelsohn maneuver      []Supraglottic swallow      []Super-suraglottic swallow      []Modified Shaker (sitting up chin to chest)      []Jaw opening against resistance      []Chin tuck against resistance      []Neuromuscular electrical stimulation (VitalStim)      Pt.  Education:  -Pt educated on diagnosis, prognosis and expectations for rehab  -All pt questions were answered  -Pt verbalized understanding and agreement  -Progress since initiation of POC    HEP instruction:  -Pt provided with written and illustrated instructions for HEP: SLOB intelligibility strategies, WRAP memory strategies, Goal-Plan-Do-Review metacognitive training, compensatory swallowing strategies (Alternate solids/liquids , Upright as possible with all PO intake , Small bites/sips , Eat/feed slowly, Remain upright 30-45 min)      Speech/Voice Therapy:    [x] (26706) Treatment of speech, language, voice, communication, and/or auditory processing disorder; individual    Cognitive Function:  [] (21720) Therapeutic interventions that focus on cognitive function (eg, attention, memory, reasoning, executive function, problem solving, and/or pragmatic functioning) and compensatory strategies to manage the performance of an activity (eg, managing time or schedules, initiating, organizing, and sequencing tasks), direct (one-on-one) patient contact; initial 15 minutes (Report 40714 only once per day)  [] ([phone]) each additional 15 minutes     Dysphagia Therapy:    [x] (33084) Treatment of swallowing dysfunction and/or oral function for feeding         Charges:  Timed Code Treatment Minutes: 0   Total Treatment Minutes: 45     [x] Speech-Language Treatment (59728)        [] Voice Treatment (17093)                                         [] Cognitive-Linguistic Skills Development Initial 15 minutes (10543)  [] Cognitive-Linguistic Skills Development Additional 15 minutes ([phone]) [x] Dysphagia Treatment/NMES (VitalStim) (48770)  [] Other:     GOALS:   GOALS:  Patient stated goal: \"Help with my memory\"; \"Stop slurring\"; \"Swallowing\"  [x] Progressing: [] Met: [] Not Met: [] Adjusted     Therapist goals for Patient:  Short Term Goals: To be achieved in: 5 weeks  1. Patient will use speech strategies to facilitate increased intelligibility at discourse level, 5 minutes, of >90% as judged by SLP and pt/family. [] Progressing: [x] Met: [] Not Met: [] Adjusted  2. Patient will select and implement 2 cognitive strategies to complete functional tasks given min initiation cues in order to improve self-monitoring of ADL completion. [x] Progressing: [] Met: [] Not Met: [] Adjusted  3. Patient will use learned memory strategies to recall 4 units of novel information across delayed intervals up to 10 minutes given min encoding cues. [x] Progressing: [] Met: [] Not Met: [] Adjusted   4. Patient will functionally tolerate regular solids x 20 and thin liquids x 20 with min verbal cues for strategy implementation. [] Progressing: [x] Met: [] Not Met: [] Adjusted  5. Patient will complete repeat instrumental swallow study (MBS or FEES) as clinically indicated to further assess pharyngeal phase and direct plan of care. [x] Progressing: [] Met: [] Not Met: [] Adjusted      Long Term Goals: To be achieved in: 6 weeks  1. Patient will demonstrate improved cognitive-linguistic function to improve quality of life as evidenced by increased score on functional outcome measure. [x] Progressing: [] Met: [] Not Met: [] Adjusted  2. Patient will utilize speech intelligibility strategies to increase intelligibility to 90% in conversation to a familiar/unfamiliar listener. [] Progressing: [x] Met: [] Not Met: [] Adjusted  3. Patient will tolerate least restrictive diet with no overt clinical s/s of aspiration/penetration.   [] Progressing: [x] Met: [] Not Met: [] Adjusted    Progression Towards Functional goals:  [x] Patient is progressing as expected towards functional goals listed. [] Progression is slowed due to complexities listed. [] Progression has been slowed due to co-morbidities. [] Plan just implemented, too soon to assess goals progression  [] Other:     Persisting Functional Limitations/Impairments:  [x]Attention []Word Finding       [x]Memory []Speech Intelligibility     [x]Executive Function []Diet Tolerance     [x]Problem Solving []Coughing/Choking with PO  []Expressive Language []Medication/Finance Management  []Receptive Language []Other:      ASSESSMENT: Pt has made good progress since initiation of POC and has met goals for speech intelligibility and diet tolerance. Pt demonstrates independent use of compensatory swallowing strategies, reports consuming an unrestricted diet at home and continuing to complete home program. Pt with improving self-awareness for speech; although intermittent fast rate of speech, pt > 90% intelligible across a variety of speech contexts. Pt continues to present with deficits in high-level attention, impaired insight and self-monitoring for problem-solving, and decreased initiation/termination of tasks. Continue skilled speech intervention with POC as above. Treatment/Activity Tolerance:  [x] Patient able to complete tx [] Patient limited by fatigue  [] Patient limited by pain  [] Patient limited by other medical complications  [] Other:     Prognosis: [x] Good [] Fair  [] Poor    Patient Requires Follow-up: [x] Yes  [] No    PLAN: See eval. ST 2x / week for 6 weeks. [x] Continue per plan of care [] Alter current plan (see comments)  [] Plan of care initiated [] Hold pending MD visit [] Discharge    Electronically signed by:   ANTOINE Bennett  Speech-Language Pathology Graduate Clinician    The speech-language pathologist was present, directed the patient's care, made skilled judgment and was responsible for assessment and treatment.     Bienvenido Lund MA [facility]-SLP  Speech-Language Pathologist  [id number]      Note: If patient does not return for scheduled/ recommended follow up visits, this note will serve as a discharge from care along with most recent update on progress.

## 2022-09-12 ENCOUNTER — HOSPITAL ENCOUNTER (OUTPATIENT)
Dept: SPEECH THERAPY | Age: 42
Setting detail: THERAPIES SERIES
Discharge: HOME | End: 2022-09-12
Payer: COMMERCIAL

## 2022-09-12 ENCOUNTER — HOSPITAL ENCOUNTER (OUTPATIENT)
Dept: OCCUPATIONAL THERAPY | Age: 42
Setting detail: THERAPIES SERIES
Discharge: HOME | End: 2022-09-12
Payer: COMMERCIAL

## 2022-09-12 ENCOUNTER — HOSPITAL ENCOUNTER (OUTPATIENT)
Dept: PHYSICAL THERAPY | Age: 42
Setting detail: THERAPIES SERIES
Discharge: HOME | End: 2022-09-12
Payer: COMMERCIAL

## 2022-09-12 DIAGNOSIS — D50.8 OTHER IRON DEFICIENCY ANEMIA: ICD-10-CM

## 2022-09-12 DIAGNOSIS — E78.2 MIXED HYPERLIPIDEMIA: ICD-10-CM

## 2022-09-12 PROCEDURE — 97112 NEUROMUSCULAR REEDUCATION: CPT

## 2022-09-12 PROCEDURE — 97116 GAIT TRAINING THERAPY: CPT

## 2022-09-12 PROCEDURE — 97110 THERAPEUTIC EXERCISES: CPT

## 2022-09-12 PROCEDURE — 92507 TX SP LANG VOICE COMM INDIV: CPT

## 2022-09-12 RX ORDER — FERROUS SULFATE 325(65) MG
TABLET ORAL
Qty: 30 TABLET | Refills: 5 | OUTPATIENT
Start: 2022-09-12

## 2022-09-12 RX ORDER — ATORVASTATIN CALCIUM 40 MG/1
40 TABLET, FILM COATED ORAL NIGHTLY
Qty: 30 TABLET | Refills: 5 | OUTPATIENT
Start: 2022-09-12

## 2022-09-12 NOTE — FLOWSHEET NOTE
[facility] - Outpatient Physical Therapy  Phone: [phone]   Fax: [phone]    Physical Therapy Daily Treatment Note/ Progress Note  Date:  2022    Patient Name:  Gerri Baker    :  1980  MRN: [id number]  Medical/Treatment Diagnosis Information:  Diagnosis: R25.2 (ICD-10-CM) - Cramp and spasm;   Left hemiparesis with spasticity  Treatment Diagnosis: Impaired proprioception of LUE & LE, avoidance of L extremities, Impaired balance & gait  Insurance/Certification information:  PT Insurance Information: [employer]. 30 visits pcy. PA required  Physician Information:   Lorri Luna MD  Plan of care signed (Y/N): [x]  Yes []  No     Date of Patient follow up with Physician: ?     Progress Report: []  Yes  [x]  No     Date Range for reporting period:  Beginning  2022   PN: 22  Ending      Progress report due (10 Rx/or 30 days whichever is less): visit #17 or     Recertification due (POC duration/ or 90 days whichever is less): visit #17     Visit # Insurance Allowable Auth required?  Date Range    +    16 (30 pcy) [x]  Yes, [employer]  []  No Until 9/10/22  Extended to 11/10/22       Units approved Units used Date Range   120 18 Until 9/10/22     Latex Allergy:  [x]NO      []YES  Preferred Language for Healthcare:   [x]English       []Other:      Functional Scale:                                                                                                      Date assessed:  FOTO physical FS primary measure score = 49; risk adjusted = 50                    FOTO physical FS primary measure score = 52                                                          Functional Scale/Test Evaluation 2022 30 day   22 60 day     Add TUG Discharge    Tinetti Assessment       30\" STS 3 8      5x STS 40\" 25\"      2MWT w/QC 65' 66'           Pain level:  3/10  Location:  L knee & elbow    SUBJECTIVE:   Pt reports she is doing well Gait Training (67634)       Sit to supine  Supine to sit Min A  CGA 1  1 9/12: assist due to narrow mat - helped pull L leg up onto table and position hips   R foot taps on 8\" block & hold      R foot taps on 2nd step    R toe taps on to black board of Shuttle, L foot on lester R hand holding 1/2 full cup of water; Man & VC's to improve posture and LE muscle fiber recruitment  R hand on QC    7/28: stability provided by SPT through R hand                 Gait Training:  Amb w/QC & AFO      Step up and overs, L foot on Airex, R foot step over half bolster & return   SBA         55 feet, 30 feet      9/8: turned QC around to that flat side next to body- states her kids were playing with her cane and they probably didn't put it back correctly   8/16: VCs for pulling L hip forward but still keeping space between her feet      7/28: LOB x 1 with max assist x2   Sit to stand, R foot on AirEx to inhibit activation, large bolster between feet to maintain correct alignment for facilitation   7/28: use of mirror improved weight-shifting to L upon standing   Sit to stand w/large SB to facilitate forward flexion and increase activation of LLE   7/28: use of mirror improved weight-shifting to L while seated   Sit to stand from corner of mat #6, RLE on folded gray pad, large bolster behind R ankle to limited knee flex   R hand with walking stick          Manual Intervention (52904)                                                     Modalities:     Pt. Education:  9/8: Reassessed goals, discussed progress made and areas remaining for improvement, issued outcome measure and reviewed new score with pt as compared to eval    6/24/2022 patient educated on diagnosis, prognosis and expectations for rehab, all patient questions were answered    HEP instruction:  6/24: Shift weight to L to facilitate LLE muscle fiber recruitment with all functional activities.         Therapeutic Exercise and NMR EXR  [] (03354) Provided verbal/tactile cueing for activities related to strengthening, flexibility, endurance, ROM for improvements in  [] LE / Core stability: LE, hip, and core control with self care, mobility, lifting, ambulation. [] UE / Shoulder complex: cervical, postural, scapular, scapulothoracic and UE control with self care, reaching, carrying, lifting, house/yardwork, driving, computer work.  [] (00988) Provided verbal/tactile cueing for activities related to improving balance, coordination, kinesthetic sense, posture, motor skill, proprioception to assist with   [] LE / Core stability: LE, hip, and core control in self care, mobility, lifting, ambulation and eccentric single leg control. [] UE / Shoulder complex: cervical, scapular, scapulothoracic and UE control with self care, reaching, carrying, lifting, house/yardwork, driving, computer work.   [] (16604) Therapist is in constant attendance of 2 or more patients providing skilled therapy interventions, but not providing any significant amount of measurable one-on-one time to either patient, for improvements in  [] LE / Core stability: LE, hip, and core control in self care, mobility, lifting, ambulation and eccentric single leg control. [] UE / Shoulder complex: cervical, scapular, scapulothoracic and UE control with self care, reaching, carrying, lifting, house/yardwork, driving, computer work. NMR and Therapeutic Activities:    [x] (40750 or 86444) Provided verbal/tactile cueing for activities related to improving balance, coordination, kinesthetic sense, posture, motor skill, proprioception and motor activation to allow for proper function of   [x] LE / Core, hip and LE with self care and ADLs  [] UE / Shoulder complex: cervical, postural, scapular, scapulothoracic and UE control with self care, carrying, lifting, driving, computer work.    [x] (16090) Gait Re-education- Provided training and instruction to the patient for proper LE, core and hip recruitment, positioning, and carrying, lifting, house/yardwork, driving, computer work. Modalities:  [] (44084) Vasopneumatic compression: Utilized vasopneumatic compression to decrease edema / swelling for the purpose of improving mobility and quad tone / recruitment which will allow for increased overall function including but not limited to self-care, transfers, ambulation, and ascending / descending stairs. Charges:  Timed Code Treatment Minutes: 43   Total Treatment Minutes: 43     [] EVAL - LOW (21685)   [] EVAL - MOD (70810)  [] EVAL - HIGH (45471)  [] RE-EVAL (14648)  [] TE (13577) x      [] Ionto  [x] NMR (65931) x  2    [] Vaso  [] Manual (08803) x      [] Ultrasound  []TA x     [] Mech Traction (37022)  [x] Gait Training x  1   [] ES (un) (73312):   [] Aquatic therapy x   [] Other:   [] Group:     GOALS:   Patient stated goal: improve transfers and gait  [x] Progressing: [] Met: [] Not Met: [] Adjusted     Therapist goals for Patient:   Short Term Goals: To be achieved in: 3 weeks  1. Independent in HEP and progression per patient tolerance, in order to prevent re-injury. [] Progressing: [x] Met: [] Not Met: [] Adjusted  2. Patient to demonstrate 40% WB'ing of LLE during sit to stand transfers to assist in muscle recruitment with all tasks. [x] Progressing: [] Met: [] Not Met: [] Adjusted     Long Term Goals: To be achieved in: 8 weeks  1. Patient to demonstrate TUG w/QC score of <15\" in order to demonstrate reduced fall risk to assist with reaching prior level of function. - NOT TESTED 9/8  [] Progressing: [] Met: [] Not Met: [] Adjusted  2. FOTO score of at least 56 to assist with reaching prior level of function. [x] Progressing: [] Met: [] Not Met: [] Adjusted  3. Patient will demonstrate an increase 30\" sit to stand reps to 10 with 40% WB'ing through LLE, to improve transfers to/from all surfaces & heights  [x] Progressing: [] Met: [] Not Met: [] Adjusted  4.  Patient will improve Tinetti Balance score to 15/28 in order to . [x] Progressing: [] Met: [] Not Met: [] Adjusted  5. Patient will increase 2MWT w/QC to 150' in order to improve community access. [x] Progressing: [] Met: [] Not Met: [] Adjusted        Overall Progression Towards Functional goals/ Treatment Progress Update:  [] Patient is progressing as expected towards functional goals listed. [] Progression is slowed due to complexities/Impairments listed. [] Progression has been slowed due to co-morbidities. [x] Plan just implemented, too soon to assess goals progression <30days   [] Goals require adjustment due to lack of progress  [] Patient is not progressing as expected and requires additional follow up with physician  [] Other    Persisting Functional Limitations/Impairments:  []Sleeping []Sitting               [x]Standing [x]Transfers        [x]Walking [x]Kneeling               [x]Stairs [x]Squatting / bending   [x]ADLs [x]Reaching  [x]Lifting  [x]Housework  [x]Driving [x]Job related tasks  []Sports/Recreation []Other:        ASSESSMENT:    Pt's gait limited this visit due to fatigue from previous 2 therapies (OT and ST). Pt continues to lack endurance and fall risk increases significantly when pt is fatigued. Pt is carlita to pay more attention to L side since eye surgery, even though eye covered with patch today. L hamstring and quad also remain weak with limits her ability to navigate while amb and keep L LE in neutral. Plan to continue with strengthening, functional mobility, gait, and endurance to meet all goals. Treatment/Activity Tolerance:  [x] Patient able to complete tx  [] Patient limited by fatigue  [] Patient limited by pain   [] Patient limited by other medical complications  [] Other:     Prognosis: [x] Good [] Fair  [] Poor    Patient Requires Follow-up: [x] Yes  [] No    Plan for next treatment session:    PLAN: See eval. PT 2x / week for 8 weeks.    [x] Continue per plan of care [] Alter current plan (see comments)  [] Plan of care initiated [] Hold pending MD visit [] Discharge    Electronically signed by:  Jennie Salvador, PT, DPT    Note: If patient does not return for scheduled/ recommended follow up visits, his note will serve as a discharge from care along with most recent update on progress.

## 2022-09-12 NOTE — FLOWSHEET NOTE
[facility] Occupational Therapy  [address] [place] [address] [address], [address]  Phone: [phone]   Fax: [phone]      Occupational Therapy Daily Treatment Note  Date:  2022    Patient: Geri Wharton   : 1980   MRN: [id number]  Referring Physician: Farhat Boogie MD           Medical Diagnosis Information: left hemiplegia    Date of Injury: 21  Date of Surgery: N/A                                      Insurance information: care source     Plan of care sent to provider:      []Faxed   [x]Co-signature    (attempts: 1[x] 2[] 3[])       Progress Report: []  Yes  [x]  No     Date Range for reporting period:  Beginnin22  Ending: end of POC    Progress report due (10 Rx/or 30 days whichever is less): visit #10 or 7506    Recertification due (POC duration/ or 90 days whichever is less): visit #12 or 22    Visit # Insurance Allowable Auth required? Date Range    30 hard max    120 units [x]  Yes  []  No 22-10/1/22       Latex Allergy:  [x]No      []Yes  Pacemaker:  [x] No       [] Yes     Preferred Language for Healthcare:   [x]English       []other:    Pain level: 10 \"left thigh, left wrist\"    SUBJECTIVE:    Patient in good spirits     Functional Disability Index:  FOTO:  Patient's Physical FS Primary Measure- 25  Risk Adjusted Statistical FOTO-     OBJECTIVE: See eval      RESTRICTIONS/PRECAUTIONS: fall risk, poor midline, left visual hemianopsia    Exercises/Interventions: Treatment focused on improving postural control and left side involvement during functional tasks to decrease abnormal movement patterns, Improve left t ue function and midline orientation during functional tasks. Patient initiated treatment ambulated back to mat with OT providing increased base of support with use of left hand as base of support on OT forearm and contact for pelvic stabilization .   Patient provided visual cues from OT to improve left LE base of support. From mat OT provided inhibitiion of long forearm flexors to increase rom in hand in order to use as base of support on chair. Min assist provided by OT to use as base of support. Posterior cues provided by OT on humerus and min cues provided dorsal aspect of forearm to maintain contact with ue on chair as base of support. Patient then worked on midline orientation and moving left ue with body for isometric contraction pushing chair forward with both hands. Patient then worked on maintaining left UE as base of support and increasing activity in left leg to stabilize as base of support while weight bearing forward on ladder and tapping with right foot with significant recruitment of hip and rotator cuff as well as back extensors. Patient then worked on transitions to stand and dynamic changes in base of support while moving ladder sideways with ues needing min cues at hip and posterior shoulder. She then worked on holding weighted ball with both hands, transitioning to stand holding ball with visual target on ladder to reach up to about 80 degrees of shoulder flexion. Patient completed times 2 with verbal cues for hip and knee extension and very slight cues posterior humerus. Significant decrease in hypertonicity noted with increased rom throughout left ue.       Therapeutic Exercises  Resistance / level Sets/sec Reps Notes                                                                                Neuromuscular Re-ed / Therapeutic Activities                                                 Manual Intervention                                                     Modalities:     Patient education: OT evaluation home program goals          Home Exercise Program:   7/1/2022 aarom     Therapeutic Exercise and NMR:  [x] (69733) Provided verbal/tactile cueing for activities related to strengthening, flexibility, endurance, ROM  for improvements in scapular, scapulothoracic and UE control with self care, reaching, carrying, lifting, house/yardwork, driving/computer work. [x] (67498) Provided verbal/tactile cueing for activities related to improving balance, coordination, kinesthetic sense, posture, motor skill, proprioception  to assist with  scapular, scapulothoracic and UE control with self care, reaching, carrying, lifting, house/yardwork, driving/computer work.   [] Comments:    Therapeutic Activities:    [] (13397 or 14262) Provided verbal/tactile cueing for activities related to improving balance, coordination, kinesthetic sense, posture, motor skill, proprioception and motor activation to allow for proper function of scapular, scapulothoracic and UE control with self care, carrying, lifting, driving/computer work  [] Comments:    Home Exercise Program:    [] (05482) Reviewed/Progressed HEP activities related to strengthening, flexibility, endurance, ROM of scapular, scapulothoracic and UE control with self care, reaching, carrying, lifting, house/yardwork, driving/computer work  [] (55932) Reviewed/Progressed HEP activities related to improving balance, coordination, kinesthetic sense, posture, motor skill, proprioception of scapular, scapulothoracic and UE control with self care, reaching, carrying, lifting, house/yardwork, driving/computer work    [] Comments:    Manual Treatments:  PROM / STM / Oscillations-Mobs:  G-I, II, III, IV (PA's, Inf., Post.)  [] (17801) Provided manual therapy to mobilize soft tissue/joints of cervical/CT, scapular GHJ and UE for the purpose of modulating pain, promoting relaxation,  increasing ROM, reducing/eliminating soft tissue swelling/inflammation/restriction, improving soft tissue extensibility and allowing for proper ROM for normal function with self care, reaching, carrying, lifting, house/yardwork, driving/computer work  [] Comments:    ADL Training:  [x] (62206) Provided self-care/home management training related to activities of daily living and compensatory training, and/or use of adaptive equipment   [] Comments:     Splinting:  [] Fabrication of:   [] (67798) Orthotic/Prosthetic Management, subsequent encounter  [] (94890) Orthotic management and training (fitting and assessment)  [] Comments:      Charges:  Timed Code Treatment Minutes:  45   Total Treatment Minutes: 45     [] EVAL (LOW) 98805   [] OT Re-eval (87710)  [] EVAL (MOD) 00541   [] EVAL (HIGH) 17791       [x] Dolly (69292) x   1   [] DMCPT(52957)  [x] NMR (60527) x    2 [] Estim (attended) (38545)   [] Manual ([address] [facility]) x     [] US (04392)  [] TA () x     [] Paraffin (61776)  [] ADL  ([id number] [phone] [id number]) x     [] Splint/L code:    [] Estim (unattended) (92930)  [] Fluidotherapy (68820)  [] Other:    GOALS: Goals established 6/30/22   Patient stated goal:  Patient stated goal is to improve her over all mobility and ADL status   [x] Progressing: [] Met: [] Not Met: [] Adjusted     Therapist goals for Patient 3 mos   Modified independent functional transfers  [x] Progressing: [] Met: [] Not Met: [] Adjusted  Modified independent lower body dress  [x] Progressing: [] Met: [] Not Met: [] Adjusted  Modified independent upper body dress  [x] Progressing: [] Met: [] Not Met: [] Adjusted  Use of left upper extremity as dynamic base of support during self care        [x] Progressing: [] Met: [] Not Met: [] Adjusted     Pt will report a foto score of 34 after 21 visits   [] Progressing: [] Met: [] Not Met: [] Adjusted    Progression Towards Functional goals:  [] Patient is progressing as expected towards functional goals listed. [] Progression is slowed due to complexities listed. [] Progression has been slowed due to co-morbidities. [x] Plan just implemented, too soon to assess goals progression  [] All goals are met  [] Other:     ASSESSMENT:  Pt progressing towards independence with UB dressing but requires heavy setup and vcs for initiation, sequencing and attn to task.  Pt shown maria alejandra dressing technique.   Treatment/Activity Tolerance:  [x] Patient tolerated treatment well [] Patient limited by fatigue  [] Patient limited by pain  [] Patient limited by other medical complications  [] Other:     Prognosis: [x] Good [] Fair  [] Poor    Patient Requires Follow-up: [x] Yes  [] No    PLAN: See eval  [x] Continue per plan of care [] Alter current plan (see comments)  [] Plan of care initiated [] Hold pending MD visit [] Discharge    Electronically signed by:

## 2022-09-12 NOTE — FLOWSHEET NOTE
[facility], [facility]    Speech Therapy Daily Treatment Note  Date:  2022    Patient Name:  Salinas Lincoln    :  1980  MRN: [id number]  Medical/Treatment Diagnosis Information:  G81.94 (ICD-10-CM) - Left hemiparesis ([facility] [address]           Treatment Diagnosis: Mild Dysarthria; Mild Cognitive-Linguistic deficits; Mild Oropharyngeal Dysphagia   Insurance/Certification information: [facility] [place] [facility]: 7/15-10/7/2022, 24 units 67432 & 24 units 18  Physician Information: Dr. Catrachito Patricio MD      Plan of care signed (Y/N): Y (2022)    Date of Patient follow up with Physician: DEWEY    Functional outcome measure:   FOTO Primary Measure: HDQLIFE Swallowing Difficulties: 62  FOTO Primary Measure: PROMIS Cognitive Function v2.0: 33.1  Secondary Measure: NeuroQOL - Communication: 19    Progress Note: []  Yes  [x]  No  Next due by: 10/8/2022    RESTRICTIONS/PRECAUTIONS: hx brain bleed  Latex Allergy:  [x]NO      []YES    Preferred Language for Healthcare:   [x]English       []other:    Visit # Insurance Allowable Date Range (if applicable)    24 units 09240  24 units 61800 7/15-10/7/2022       Pain level: 0/10     SUBJECTIVE: Pt alert and receptive,  present during session. Pt reports recent irritation of eye following previous surgery, bandage covering left eye. OBJECTIVE:     Exercises/Interventions:      Motor Speech Therapy (51723) Accuracy Cues Notes   Apraxia       Dysarthria      Articulation      Respiration      Phonation      Resonance      Prosody      Other:        Cognitive Function (65402 & 20524) Accuracy Cues Notes   Orientation        Attention           Sustained         Selective         Alternating         Divided      Memory          Immediate/Working         Short term         Long term         Prospective      Problem Solving  Functional cognitive planning, sequencing, and organization activities:  -Structured movie planning activity, paper based, comprehension questions with 83% accuracy  -Pt navigated across movie theater website to verbalize movie names and rating with 75% accuracy Mod cues to select one strategy to train throughout task    Min-mod verbal cues to use cognitive strategies  Goal-Plan-Do- Review for self-monitoring and self-analysis of performance:  - before task rating 8  - after task rating 6    Multiple opportunities to practice reading words/numbers on left side of page   Visuospatial       Executive Function       Other:                Pt. Education:  -Pt educated on diagnosis, prognosis and expectations for rehab  -All pt questions were answered  -Pt verbalized understanding and agreement    HEP instruction:  -Pt provided with written and illustrated instructions for HEP: SLOB intelligibility strategies, WRAP memory strategies, Goal-Plan-Do-Review metacognitive training, compensatory swallowing strategies (Alternate solids/liquids , Upright as possible with all PO intake , Small bites/sips , Eat/feed slowly, Remain upright 30-45 min)      Speech/Voice Therapy:    [x] (51073) Treatment of speech, language, voice, communication, and/or auditory processing disorder; individual    Cognitive Function:  [] (83226) Therapeutic interventions that focus on cognitive function (eg, attention, memory, reasoning, executive function, problem solving, and/or pragmatic functioning) and compensatory strategies to manage the performance of an activity (eg, managing time or schedules, initiating, organizing, and sequencing tasks), direct (one-on-one) patient contact; initial 15 minutes (Report  only once per day)  [] ([phone]) each additional 15 minutes     Dysphagia Therapy:    [] (13131) Treatment of swallowing dysfunction and/or oral function for feeding         Charges:  Timed Code Treatment Minutes: 0   Total Treatment Minutes: 39     [x] Speech-Language Treatment (75515)        [] Voice Treatment (67363) [] Cognitive-Linguistic Skills Development Initial 15 minutes (34722)  [] Cognitive-Linguistic Skills Development Additional 15 minutes (51392)   [] Dysphagia Treatment/NMES (VitalStim) (70628)  [] Other:     GOALS:   GOALS:  Patient stated goal: \"Help with my memory\"; \"Stop slurring\"; \"Swallowing\"  [x] Progressing: [] Met: [] Not Met: [] Adjusted     Therapist goals for Patient:  Short Term Goals: To be achieved in: 5 weeks  1. Patient will use speech strategies to facilitate increased intelligibility at discourse level, 5 minutes, of >90% as judged by SLP and pt/family. [] Progressing: [x] Met: [] Not Met: [] Adjusted  2. Patient will select and implement 2 cognitive strategies to complete functional tasks given min initiation cues in order to improve self-monitoring of ADL completion. [x] Progressing: [] Met: [] Not Met: [] Adjusted  3. Patient will use learned memory strategies to recall 4 units of novel information across delayed intervals up to 10 minutes given min encoding cues. [x] Progressing: [] Met: [] Not Met: [] Adjusted   4. Patient will functionally tolerate regular solids x 20 and thin liquids x 20 with min verbal cues for strategy implementation. [] Progressing: [x] Met: [] Not Met: [] Adjusted  5. Patient will complete repeat instrumental swallow study (MBS or FEES) as clinically indicated to further assess pharyngeal phase and direct plan of care. [x] Progressing: [] Met: [] Not Met: [] Adjusted      Long Term Goals: To be achieved in: 6 weeks  1. Patient will demonstrate improved cognitive-linguistic function to improve quality of life as evidenced by increased score on functional outcome measure. [x] Progressing: [] Met: [] Not Met: [] Adjusted  2. Patient will utilize speech intelligibility strategies to increase intelligibility to 90% in conversation to a familiar/unfamiliar listener. [] Progressing: [x] Met: [] Not Met: [] Adjusted  3.  Patient will tolerate least restrictive diet with no overt clinical s/s of aspiration/penetration. [] Progressing: [x] Met: [] Not Met: [] Adjusted    Progression Towards Functional goals:  [x] Patient is progressing as expected towards functional goals listed. [] Progression is slowed due to complexities listed. [] Progression has been slowed due to co-morbidities. [] Plan just implemented, too soon to assess goals progression  [] Other:     Persisting Functional Limitations/Impairments:  [x]Attention []Word Finding       [x]Memory []Speech Intelligibility     [x]Executive Function []Diet Tolerance     [x]Problem Solving []Coughing/Choking with PO  []Expressive Language []Medication/Finance Management  []Receptive Language []Other:      ASSESSMENT: Pt with improved visual scanning and tolerance for reading but benefited from min cues to scan all the way to the left for reading. Pt progressing with application of appropriate cognitive strategies during functional cognitive tasks, pt benefited from focusing on one strategy she finds difficult. Pt re-education of cognitive strategies with self reflection of difficulties with double checking work and relative strength of thinking out loud. Treatment/Activity Tolerance:  [x] Patient able to complete tx [] Patient limited by fatigue  [] Patient limited by pain  [] Patient limited by other medical complications  [] Other:     Prognosis: [x] Good [] Fair  [] Poor    Patient Requires Follow-up: [x] Yes  [] No    PLAN: See josey. ST 2x / week for 6 weeks. [x] Continue per plan of care [] Alter current plan (see comments)  [] Plan of care initiated [] Hold pending MD visit [] Discharge    Electronically signed by:   Lucy Officer, ANTOINE  Speech-Language Pathology Graduate Clinician    The speech-language pathologist was present, directed the patient's care, made skilled judgment and was responsible for assessment and treatment.     Jose Church CCC-SLP  Speech-Language Pathologist  SP. [phone]      Note: If patient does not return for scheduled/ recommended follow up visits, this note will serve as a discharge from care along with most recent update on progress.

## 2022-09-12 NOTE — TELEPHONE ENCOUNTER
Recent Visits  Date Type Provider Dept   06/23/22 Office Visit [facility]   03/23/22 Office Visit [facility]   02/21/22 Office Visit MD Charline Saldivar [facility]   02/09/22 Office Visit [facility] [address] recent visits within past 540 days with a meds authorizing provider and meeting all other requirements  Future Appointments  Date Type Provider Dept   10/26/22 Appointment Nisreen Hope MD [facility]   Showing future appointments within next 150 days with a meds authorizing provider and meeting all other requirements     Medication was sent to the pt's pharmacy on 7/26/22. This request denied.

## 2022-09-20 ENCOUNTER — HOSPITAL ENCOUNTER (OUTPATIENT)
Dept: SPEECH THERAPY | Age: 42
Setting detail: THERAPIES SERIES
Discharge: HOME | End: 2022-09-20
Payer: COMMERCIAL

## 2022-09-20 NOTE — FLOWSHEET NOTE
Speech Therapy  Cancellation/No-show Note  Patient Name:  Rowland Lanes  :  1980   Date:  2022  Cancels to Date: 4  No-shows to Date: 0    Patient status for today's appointment patient:  [x]  Cancelled (, , , )  []  Rescheduled appointment  []  No-show     Reason given by patient:  []  Patient ill  [x]  Conflicting appointment  []  No transportation    []  Conflict with work  []  No reason given  []  Other:     Comments:      Phone call information:   []  Phone call made today to patient at time at number provided:      []  Patient's  answered, conversation as follows:    []  Patient did not answer, message left as follows:  [x]  Phone call not made today, pt called clinic and provided reason for cancellation    Electronically signed by:  Eloisa Burgos, SLP

## 2022-09-22 ENCOUNTER — HOSPITAL ENCOUNTER (OUTPATIENT)
Dept: OCCUPATIONAL THERAPY | Age: 42
Setting detail: THERAPIES SERIES
Discharge: HOME | End: 2022-09-22
Payer: COMMERCIAL

## 2022-09-22 ENCOUNTER — HOSPITAL ENCOUNTER (OUTPATIENT)
Dept: SPEECH THERAPY | Age: 42
Setting detail: THERAPIES SERIES
Discharge: HOME | End: 2022-09-22
Payer: COMMERCIAL

## 2022-09-22 PROCEDURE — 97112 NEUROMUSCULAR REEDUCATION: CPT

## 2022-09-22 PROCEDURE — 92507 TX SP LANG VOICE COMM INDIV: CPT

## 2022-09-22 PROCEDURE — 97110 THERAPEUTIC EXERCISES: CPT

## 2022-09-22 NOTE — FLOWSHEET NOTE
[facility], [facility]    Speech Therapy Daily Treatment Note  Date:  2022    Patient Name:  Kris Doshi    :  1980  MRN: [id number]  Medical/Treatment Diagnosis Information:  G81.94 (ICD-10-CM) - Left hemiparesis ([facility] [address]           Treatment Diagnosis: Mild Dysarthria; Mild Cognitive-Linguistic deficits; Mild Oropharyngeal Dysphagia   Ins[facility]/Certification information[facility] [place] [facility]: 7/15-10/7/2022, 24 units 25991 & 24 units 18  Physician Information: Dr. Dalia White MD      Plan of care signed (Y/N): Y (2022)    Date of Patient follow up with Physician: DEWEY    Functional outcome measure:   FOTO Primary Measure: HDQLIFE Swallowing Difficulties: 62  FOTO Primary Measure: PROMIS Cognitive Function v2.0: 33.1  Secondary Measure: NeuroQOL - Communication: 19    Progress Note: []  Yes  [x]  No  Next due by: 10/8/2022    RESTRICTIONS/PRECAUTIONS: hx brain bleed  Latex Allergy:  [x]NO      []YES    Preferred Language for Healthcare:   [x]English       []other:    Visit # Insurance Allowable Date Range (if applicable)   85/24 24 units 92723  24 units 22271 7/15-10/7/2022       Pain level: 0/10     SUBJECTIVE: Pt alert and receptive,  not present this date. Rapid speech when pt excited, good carryover of slow rate following initial cue. OBJECTIVE:     Exercises/Interventions:      Motor Speech Therapy (26110) Accuracy Cues Notes   Apraxia       Dysarthria      Articulation      Respiration      Phonation      Resonance      Prosody      Other:        Cognitive Function (79927 & 66853) Accuracy Cues Notes   Orientation        Attention           Sustained         Selective         Alternating         Divided      Memory          Immediate/Working         Short term         Long term         Prospective      Problem Solving  Functional online shopping task involving planning, sequencing, and organization:  -Pt listed steps before activity began with 60% accuracy  -Pt navigated across shopping website to filter clothing selection with 100% accuracy  -Pt compared prices for different jeans and chose the best deal with 90% accuracy   -Pt stated steps to check out with 70% accuracy    Min cues to select two strategies to train throughout task    Min-mod verbal cues to implement strategies    Goal-Plan-Do- Review for self-monitoring and self-analysis of performance:  - before task rating 5 for notes, 8 for outlining steps    Multiple opportunities to practice reading words/numbers on left side of page   Visuospatial       Executive Function       Other:                Pt. Education:  -Pt educated on diagnosis, prognosis and expectations for rehab  -All pt questions were answered  -Pt verbalized understanding and agreement    HEP instruction:  -Pt provided with written and illustrated instructions for HEP: SLOB intelligibility strategies, WRAP memory strategies, Goal-Plan-Do-Review metacognitive training, compensatory swallowing strategies (Alternate solids/liquids , Upright as possible with all PO intake , Small bites/sips , Eat/feed slowly, Remain upright 30-45 min)      Speech/Voice Therapy:    [x] (90950) Treatment of speech, language, voice, communication, and/or auditory processing disorder; individual    Cognitive Function:  [] (01846) Therapeutic interventions that focus on cognitive function (eg, attention, memory, reasoning, executive function, problem solving, and/or pragmatic functioning) and compensatory strategies to manage the performance of an activity (eg, managing time or schedules, initiating, organizing, and sequencing tasks), direct (one-on-one) patient contact; initial 15 minutes (Report  only once per day)  [] ([phone]) each additional 15 minutes     Dysphagia Therapy:    [] (11034) Treatment of swallowing dysfunction and/or oral function for feeding         Charges:  Timed Code Treatment Minutes: 0   Total Treatment Minutes: 45     [x] Speech-Language Treatment (57709)        [] Voice Treatment (04698)                                         [] Cognitive-Linguistic Skills Development Initial 15 minutes (24979)  [] Cognitive-Linguistic Skills Development Additional 15 minutes (64329)   [] Dysphagia Treatment/NMES (VitalStim) (01767)  [] Other:     GOALS:   GOALS:  Patient stated goal: \"Help with my memory\"; \"Stop slurring\"; \"Swallowing\"  [x] Progressing: [] Met: [] Not Met: [] Adjusted     Therapist goals for Patient:  Short Term Goals: To be achieved in: 5 weeks  1. Patient will use speech strategies to facilitate increased intelligibility at discourse level, 5 minutes, of >90% as judged by SLP and pt/family. [] Progressing: [x] Met: [] Not Met: [] Adjusted  2. Patient will select and implement 2 cognitive strategies to complete functional tasks given min initiation cues in order to improve self-monitoring of ADL completion. [x] Progressing: [] Met: [] Not Met: [] Adjusted  3. Patient will use learned memory strategies to recall 4 units of novel information across delayed intervals up to 10 minutes given min encoding cues. [x] Progressing: [] Met: [] Not Met: [] Adjusted   4. Patient will functionally tolerate regular solids x 20 and thin liquids x 20 with min verbal cues for strategy implementation. [] Progressing: [x] Met: [] Not Met: [] Adjusted  5. Patient will complete repeat instrumental swallow study (MBS or FEES) as clinically indicated to further assess pharyngeal phase and direct plan of care. [x] Progressing: [] Met: [] Not Met: [] Adjusted      Long Term Goals: To be achieved in: 6 weeks  1. Patient will demonstrate improved cognitive-linguistic function to improve quality of life as evidenced by increased score on functional outcome measure. [x] Progressing: [] Met: [] Not Met: [] Adjusted  2.  Patient will utilize speech intelligibility strategies to increase intelligibility to 90% in conversation to a familiar/unfamiliar listener. [] Progressing: [x] Met: [] Not Met: [] Adjusted  3. Patient will tolerate least restrictive diet with no overt clinical s/s of aspiration/penetration. [] Progressing: [x] Met: [] Not Met: [] Adjusted    Progression Towards Functional goals:  [x] Patient is progressing as expected towards functional goals listed. [] Progression is slowed due to complexities listed. [] Progression has been slowed due to co-morbidities. [] Plan just implemented, too soon to assess goals progression  [] Other:     Persisting Functional Limitations/Impairments:  [x]Attention []Word Finding       [x]Memory []Speech Intelligibility     [x]Executive Function []Diet Tolerance     [x]Problem Solving []Coughing/Choking with PO  []Expressive Language []Medication/Finance Management  []Receptive Language []Other:      ASSESSMENT: Pt progressing with choice of appropriate strategy, benefited from increased cues to implement strategies as task progressed. Pt with difficulty reading and selecting on phone screen compared to computer, ongoing reinforcement of scanning for functional phone use. Treatment/Activity Tolerance:  [x] Patient able to complete tx [] Patient limited by fatigue  [] Patient limited by pain  [] Patient limited by other medical complications  [] Other:     Prognosis: [x] Good [] Fair  [] Poor    Patient Requires Follow-up: [x] Yes  [] No    PLAN: See eval. ST 2x / week for 6 weeks. [x] Continue per plan of care [] Alter current plan (see comments)  [] Plan of care initiated [] Hold pending MD visit [] Discharge    Electronically signed by:   ANTOINE Merida  Speech-Language Pathology Graduate Clinician    The speech-language pathologist was present, directed the patient's care, made skilled judgment and was responsible for assessment and treatment.     Gina Vargas MA CCC-SLP  Speech-Language Pathologist  [phone]      Note: If patient does not return for scheduled/ recommended follow up visits, this note will serve as a discharge from care along with most recent update on progress.

## 2022-09-22 NOTE — FLOWSHEET NOTE
168 [facility] Occupational Therapy  [address] [place] [address], [address]  Phone: [phone]   Fax: [phone]      Occupational Therapy Daily Treatment Note  Date:  2022    Patient: Nishi Antonio   : 1980   MRN: [id number]  Referring Physician: Karena Ribeiro MD           Medical Diagnosis Information: left hemiplegia    Date of Injury: 21  Date of Surgery: N/A                                      Insurance information: care source     Plan of care sent to provider:      []Faxed   [x]Co-signature    (attempts: 1[x] 2[] 3[])       Progress Report: []  Yes  [x]  No     Date Range for reporting period:  Beginnin22  Ending: end of POC    Progress report due (10 Rx/or 30 days whichever is less): visit #10 or 3282    Recertification due (POC duration/ or 90 days whichever is less): visit #12 or 22    Visit # Insurance Allowable Auth required? Date Range   30 hard max    120 units [x]  Yes  []  No 22-10/1/22       Latex Allergy:  [x]No      []Yes  Pacemaker:  [x] No       [] Yes     Preferred Language for Healthcare:   [x]English       []other:    Pain level: 2/10 \"left thigh, left wrist\"    SUBJECTIVE:    Patient in good spirits     Functional Disability Index:  FOTO:  Patient's Physical FS Primary Measure- 25  Risk Adjusted Statistical FOTO-     OBJECTIVE: See eval      RESTRICTIONS/PRECAUTIONS: fall risk, poor midline, left visual hemianopsia    Exercises/Interventions: Treatment focused on improving postural control and left side involvement during functional tasks to decrease abnormal movement patterns, Improve left ue function and midline orientation during functional tasks. Session initiated with pt stand-step transfer from wheelchair to mat with CGA.  With PVC pipe in L hand pt worked on stabilizing mahesh while adjusting position of trunk on EOM, changing base of support and then performing sit to stand using LUE for support on mahesh. With bilateral grasp on weighted utility cart and seated at EOM pt pushed cart forward with therapy student providing neuro muscular facilitation with posterior cues on humerus and min cues provided dorsal aspect of forearm to maintain contact with cart. Bungee around waist to facilitate abdominal and glute involvement pt pushed cart taking small steps (forward and backward) as needed to maintain midline. Increased tolerance of standing/walking with use of bungee noted. Pt reports back ache following task and took rest break as needed with vertical towel roll placed along spine while seated. While seated in chair pt grasps PVC pipe with bilateral overhand grasp and pushed mahesh forward, and up into shoulder flexion to work on [place]. Cues needed for maintaining midline when reaching up. Session ended with pt ambulating with bungee around waist and bilateral grasp on weighted utility cart and therapist providing ongoing facilitaton to maintain LUE grasp on cart. Pt ambulated ~ 20 ft until fatigued and requesting wheelchair. Cuing for posture, maintaining midline and LE placement throughout session. Therapeutic Exercises  Resistance / level Sets/sec Reps Notes                                                                                Neuromuscular Re-ed / Therapeutic Activities                                                 Manual Intervention                                                     Modalities:     Patient education: OT evaluation home program goals          Home Exercise Program:   7/1/2022 aarom     Therapeutic Exercise and NMR:  [x] (15611) Provided verbal/tactile cueing for activities related to strengthening, flexibility, endurance, ROM  for improvements in scapular, scapulothoracic and UE control with self care, reaching, carrying, lifting, house/yardwork, driving/computer work.     [x] (23429) Provided verbal/tactile cueing for activities related to improving balance, coordination, kinesthetic sense, posture, motor skill, proprioception  to assist with  scapular, scapulothoracic and UE control with self care, reaching, carrying, lifting, house/yardwork, driving/computer work.   [] Comments:    Therapeutic Activities:    [] (47032 or 77381) Provided verbal/tactile cueing for activities related to improving balance, coordination, kinesthetic sense, posture, motor skill, proprioception and motor activation to allow for proper function of scapular, scapulothoracic and UE control with self care, carrying, lifting, driving/computer work  [] Comments:    Home Exercise Program:    [] (98579) Reviewed/Progressed HEP activities related to strengthening, flexibility, endurance, ROM of scapular, scapulothoracic and UE control with self care, reaching, carrying, lifting, house/yardwork, driving/computer work  [] (35874) Reviewed/Progressed HEP activities related to improving balance, coordination, kinesthetic sense, posture, motor skill, proprioception of scapular, scapulothoracic and UE control with self care, reaching, carrying, lifting, house/yardwork, driving/computer work    [] Comments:    Manual Treatments:  PROM / STM / Oscillations-Mobs:  G-I, II, III, IV (PA's, Inf., Post.)  [] (54499) Provided manual therapy to mobilize soft tissue/joints of cervical/CT, scapular GHJ and UE for the purpose of modulating pain, promoting relaxation,  increasing ROM, reducing/eliminating soft tissue swelling/inflammation/restriction, improving soft tissue extensibility and allowing for proper ROM for normal function with self care, reaching, carrying, lifting, house/yardwork, driving/computer work  [] Comments:    ADL Training:  [x] (62344) Provided self-care/home management training related to activities of daily living and compensatory training, and/or use of adaptive equipment   [] Comments:     Splinting:  [] Fabrication of:   [] (41164) Orthotic/Prosthetic Management, subsequent encounter  [] (40114) Orthotic management and training (fitting and assessment)  [] Comments:      Charges:  Timed Code Treatment Minutes:  45   Total Treatment Minutes: 45     [] EVAL (LOW) 49505   [] OT Re-eval (06484)  [] EVAL (MOD) 96811   [] EVAL (HIGH) 49992       [x] Dolly (26260) x   1   [] VPYCQ(42657)  [x] NMR (79518) x    2 [] Estim (attended) (67107)   [] Manual ([id number]) x     [] US (22956)  [] TA () x     [] Paraffin (01349)  [] ADL  (24831) x     [] Splint/L code:    [] Estim (unattended) (75117)  [] Fluidotherapy (96422)  [] Other:    GOALS: Goals established 6/30/22   Patient stated goal:  Patient stated goal is to improve her over all mobility and ADL status   [x] Progressing: [] Met: [] Not Met: [] Adjusted     Therapist goals for Patient 3 mos   Modified independent functional transfers  [x] Progressing: [] Met: [] Not Met: [] Adjusted  Modified independent lower body dress  [x] Progressing: [] Met: [] Not Met: [] Adjusted  Modified independent upper body dress  [x] Progressing: [] Met: [] Not Met: [] Adjusted  Use of left upper extremity as dynamic base of support during self care        [x] Progressing: [] Met: [] Not Met: [] Adjusted     Pt will report a foto score of 34 after 21 visits   [] Progressing: [] Met: [] Not Met: [] Adjusted    Progression Towards Functional goals:  [] Patient is progressing as expected towards functional goals listed. [] Progression is slowed due to complexities listed. [] Progression has been slowed due to co-morbidities. [x] Plan just implemented, too soon to assess goals progression  [] All goals are met  [] Other:     ASSESSMENT:  Pt progressing towards independence with UB dressing but requires heavy setup and vcs for initiation, sequencing and attn to task. Pt shown maria alejandra dressing technique.   Treatment/Activity Tolerance:  [x] Patient tolerated treatment well [] Patient limited by fatigue  [] Patient limited by pain  [] Patient limited by other medical complications  [] Other:     Prognosis: [x] Good [] Fair  [] Poor    Patient Requires Follow-up: [x] Yes  [] No    PLAN: See eval  [x] Continue per plan of care [] Alter current plan (see comments)  [] Plan of care initiated [] Hold pending MD visit [] Discharge    Electronically signed by:            SYEDA Lopez/OT    Occupational Therapist was present, directed the patient's care, made skilled judgement, and was responsible for assessment and treatment of the patient.

## 2022-09-27 ENCOUNTER — HOSPITAL ENCOUNTER (OUTPATIENT)
Dept: OCCUPATIONAL THERAPY | Age: 42
Setting detail: THERAPIES SERIES
Discharge: HOME | End: 2022-09-27
Payer: COMMERCIAL

## 2022-09-27 ENCOUNTER — HOSPITAL ENCOUNTER (OUTPATIENT)
Dept: SPEECH THERAPY | Age: 42
Setting detail: THERAPIES SERIES
Discharge: HOME | End: 2022-09-27
Payer: COMMERCIAL

## 2022-09-27 PROCEDURE — 97112 NEUROMUSCULAR REEDUCATION: CPT

## 2022-09-27 PROCEDURE — 97110 THERAPEUTIC EXERCISES: CPT

## 2022-09-27 PROCEDURE — 92507 TX SP LANG VOICE COMM INDIV: CPT

## 2022-09-27 NOTE — FLOWSHEET NOTE
[address] [facility] Occupational Therapy  [address] [place] [address], [address] [place] [address] Drive  Phone: [phone]   Fax: [phone]      Occupational Therapy Daily Treatment Note  Date:  2022    Patient: Smiat Huerta   : 1980   MRN: [id number]  Referring Physician: Gordon Arriaga MD           Medical Diagnosis Information: left hemiplegia    Date of Injury: 21  Date of Surgery: N/A                                      Insurance information: care source     Plan of care sent to provider:      []Faxed   [x]Co-signature    (attempts: 1[x] 2[] 3[])       Progress Report: []  Yes  [x]  No     Date Range for reporting period:  Beginnin22  Ending: end of POC    Progress report due (10 Rx/or 30 days whichever is less): visit #10 or 3/65/7092    Recertification due (POC duration/ or 90 days whichever is less): visit #12 or 22    Visit # Insurance Allowable Auth required? Date Range    30 hard max    120 units [x]  Yes  []  No 22-10/1/22       Latex Allergy:  [x]No      []Yes  Pacemaker:  [x] No       [] Yes     Preferred Language for Healthcare:   [x]English       []other:    Pain level: 2/10 R shoulder and back    SUBJECTIVE:    Patient in good spirits, reports RUE discomfort. Functional Disability Index:  FOTO:  Patient's Physical FS Primary Measure- 25  Risk Adjusted Statistical FOTO-     OBJECTIVE: See eval      RESTRICTIONS/PRECAUTIONS: fall risk, poor midline, left visual hemianopsia    Exercises/Interventions: Treatment focused on improving postural control and left side involvement during functional tasks to decrease abnormal movement patterns, Improve left ue function and midline orientation during functional tasks. Session initiated with pt stand-step transfer from wheelchair to mat with cane, SBA. While seated on mat pt grasps PVC pipe with bilateral overhand grasp and pushed mahesh forward, and up into shoulder flexion to work on [email or web address]. Training:  [x] (67231) Provided self-care/home management training related to activities of daily living and compensatory training, and/or use of adaptive equipment   [] Comments:     Splinting:  [] Fabrication of:   [] (49761) Orthotic/Prosthetic Management, subsequent encounter  [] (25532) Orthotic management and training (fitting and assessment)  [] Comments:      Charges:  Timed Code Treatment Minutes:  45   Total Treatment Minutes: 45     [] EVAL (LOW) 45800   [] OT Re-eval (29716)  [] EVAL (MOD) 59603   [] EVAL (HIGH) 30622       [x] Dolly (88300) x   1   [] MMDFS(68664)  [x] NMR (78263) x    2 [] Estim (attended) (35347)   [] Manual ([id number]) x     [] US (37712)  [] TA () x     [] Paraffin (63079)  [] ADL  ([id number]) x     [] Splint/L code:    [] Estim (unattended) (93128)  [] Fluidotherapy (73921)  [] Other:    GOALS: Goals established 6/30/22   Patient stated goal:  Patient stated goal is to improve her over all mobility and ADL status   [x] Progressing: [] Met: [] Not Met: [] Adjusted     Therapist goals for Patient 3 mos   Modified independent functional transfers  [x] Progressing: [] Met: [] Not Met: [] Adjusted  Modified independent lower body dress  [x] Progressing: [] Met: [] Not Met: [] Adjusted  Modified independent upper body dress  [x] Progressing: [] Met: [] Not Met: [] Adjusted  Use of left upper extremity as dynamic base of support during self care        [x] Progressing: [] Met: [] Not Met: [] Adjusted     Pt will report a foto score of 34 after 21 visits   [] Progressing: [] Met: [] Not Met: [] Adjusted    Progression Towards Functional goals:  [] Patient is progressing as expected towards functional goals listed. [] Progression is slowed due to complexities listed. [] Progression has been slowed due to co-morbidities.   [x] Plan just implemented, too soon to assess goals progression  [] All goals are met  [] Other:     ASSESSMENT:  Pt progressing towards use of LUE as base of support during functional tasks and independence with UB dressing but requires heavy setup and vcs for initiation, sequencing and attn to task. Pt shown maria alejandra dressing technique. Treatment/Activity Tolerance:  [x] Patient tolerated treatment well [] Patient limited by fatigue  [] Patient limited by pain  [] Patient limited by other medical complications  [] Other:     Prognosis: [x] Good [] Fair  [] Poor    Patient Requires Follow-up: [x] Yes  [] No    PLAN: See eval  [x] Continue per plan of care [] Alter current plan (see comments)  [] Plan of care initiated [] Hold pending MD visit [] Discharge    Electronically signed by:            Eric Ro S/OT    Occupational Therapist was present, directed the patient's care, made skilled judgement, and was responsible for assessment and treatment of the patient.

## 2022-09-27 NOTE — FLOWSHEET NOTE
[facility], [facility]    Speech Therapy Daily Treatment Note  Date:  2022    Patient Name:  Jeanie Flores    :  1980  MRN: [id number]  Medical/Treatment Diagnosis Information:  G81.94 (ICD-10-CM) - Left hemiparesis ([facility] [address]           Treatment Diagnosis: Mild Dysarthria; Mild Cognitive-Linguistic deficits; Mild Oropharyngeal Dysphagia   Insurance/Certification information: [facility]: 7/15-10/7/2022, 24 units 46628 & 24 units 18  Physician Information: Dr. Sarah Petersen MD      Plan of care signed (Y/N): Y (2022)    Date of Patient follow up with Physician: DEWEY    Functional outcome measure:   FOTO Primary Measure: HDQLIFE Swallowing Difficulties: 62  FOTO Primary Measure: PROMIS Cognitive Function v2.0: 33.1  Secondary Measure: NeuroQOL - Communication: 19    Progress Note: []  Yes  [x]  No  Next due by: 10/8/2022    RESTRICTIONS/PRECAUTIONS: hx brain bleed  Latex Allergy:  [x]NO      []YES    Preferred Language for Healthcare:   [x]English       []other:    Visit # Insurance Allowable Date Range (if applicable)   65/ 24 units 29297  24 units 10806 7/15-10/7/2022       Pain level: 0/10     SUBJECTIVE: Pt alert and receptive, independent in session this date. Good participation as usual.    OBJECTIVE:     Exercises/Interventions:      Motor Speech Therapy (53428) Accuracy Cues Notes   Apraxia       Dysarthria      Articulation      Respiration      Phonation      Resonance      Prosody Ongoing reinforcement of slow rate of speech, SLP cue x 1 during conversational speech      Other:        Cognitive Function (66576 & 61982) Accuracy Cues Notes   Orientation        Attention           Sustained         Selective         Alternating         Divided      Memory  Functional strategy application; given functional scenarios involving recall, pt selected appropriate internal and external strategies in 100% of opportunities Min cues SLP cued for thinking out loud for stronger use of strategies       Immediate/Working Functional recall of details from short story presented verbally; pt selected strategy of repetition, immediate recall of 4/5 details  SLP reinforced summarization, rehearsal, and repetition      Short term Delayed recall (15 minutes) of 5/5 details         Long term         Prospective      Problem Solving  Functional calendar organization task involving multiple months and prospective memory:  - pt organized to-do lists by month with 80% accuracy  - pt planned calendar with to-do list items with 86% accuracy  - pt determined availability for upcoming events using existing calendar with 80% accuracy Min cues to select and implement two strategies to train throughout task    Min cues with multiple opportunities to practice reading on left side    Visuospatial       Executive Function       Other:                Pt. Education:  -Pt educated on diagnosis, prognosis and expectations for rehab  -All pt questions were answered  -Pt verbalized understanding and agreement    HEP instruction:  -Pt provided with written and illustrated instructions for HEP: SLOB intelligibility strategies, WRAP memory strategies, Goal-Plan-Do-Review metacognitive training, compensatory swallowing strategies (Alternate solids/liquids , Upright as possible with all PO intake , Small bites/sips , Eat/feed slowly, Remain upright 30-45 min)      Speech/Voice Therapy:    [x] (03639) Treatment of speech, language, voice, communication, and/or auditory processing disorder; individual    Cognitive Function:  [] (83581) Therapeutic interventions that focus on cognitive function (eg, attention, memory, reasoning, executive function, problem solving, and/or pragmatic functioning) and compensatory strategies to manage the performance of an activity (eg, managing time or schedules, initiating, organizing, and sequencing tasks), direct (one-on-one) patient contact; initial 15 minutes (Report 25617 only once per day)  [] (78 [phone]) each additional 15 minutes     Dysphagia Therapy:    [] (15566) Treatment of swallowing dysfunction and/or oral function for feeding         Charges:  Timed Code Treatment Minutes: 0   Total Treatment Minutes: 45     [x] Speech-Language Treatment (56632)        [] Voice Treatment (87687)                                         [] Cognitive-Linguistic Skills Development Initial 15 minutes (47001)  [] Cognitive-Linguistic Skills Development Additional 15 minutes (98193)   [] Dysphagia Treatment/NMES (VitalStim) (18445)  [] Other:     GOALS:   GOALS:  Patient stated goal: \"Help with my memory\"; \"Stop slurring\"; \"Swallowing\"  [x] Progressing: [] Met: [] Not Met: [] Adjusted     Therapist goals for Patient:  Short Term Goals: To be achieved in: 5 weeks  1. Patient will use speech strategies to facilitate increased intelligibility at discourse level, 5 minutes, of >90% as judged by SLP and pt/family. [] Progressing: [x] Met: [] Not Met: [] Adjusted  2. Patient will select and implement 2 cognitive strategies to complete functional tasks given min initiation cues in order to improve self-monitoring of ADL completion. [x] Progressing: [] Met: [] Not Met: [] Adjusted  3. Patient will use learned memory strategies to recall 4 units of novel information across delayed intervals up to 10 minutes given min encoding cues. [x] Progressing: [] Met: [] Not Met: [] Adjusted   4. Patient will functionally tolerate regular solids x 20 and thin liquids x 20 with min verbal cues for strategy implementation. [] Progressing: [x] Met: [] Not Met: [] Adjusted  5. Patient will complete repeat instrumental swallow study (MBS or FEES) as clinically indicated to further assess pharyngeal phase and direct plan of care. [x] Progressing: [] Met: [] Not Met: [] Adjusted      Long Term Goals: To be achieved in: 6 weeks  1.  Patient will demonstrate improved cognitive-linguistic function to improve quality of life as evidenced by increased score on functional outcome measure. [x] Progressing: [] Met: [] Not Met: [] Adjusted  2. Patient will utilize speech intelligibility strategies to increase intelligibility to 90% in conversation to a familiar/unfamiliar listener. [] Progressing: [x] Met: [] Not Met: [] Adjusted  3. Patient will tolerate least restrictive diet with no overt clinical s/s of aspiration/penetration. [] Progressing: [x] Met: [] Not Met: [] Adjusted    Progression Towards Functional goals:  [x] Patient is progressing as expected towards functional goals listed. [] Progression is slowed due to complexities listed. [] Progression has been slowed due to co-morbidities. [] Plan just implemented, too soon to assess goals progression  [] Other:     Persisting Functional Limitations/Impairments:  [x]Attention []Word Finding       [x]Memory []Speech Intelligibility     [x]Executive Function []Diet Tolerance     [x]Problem Solving []Coughing/Choking with PO  []Expressive Language []Medication/Finance Management  []Receptive Language []Other:      ASSESSMENT: Pt progressing with functional application of memory strategy, improved delayed recall (15 minutes) of novel information this date. Pt with continued left side neglect. Following initial visual and verbal cue, pt with independent double-checking of upper left corner. Appropriate choice of cognitive strategies, benefited from cues to choose only two strategies and intermittent cues to double check work. Prospective memory targeted this date using questions pertaining to future schedule. Treatment/Activity Tolerance:  [x] Patient able to complete tx [] Patient limited by fatigue  [] Patient limited by pain  [] Patient limited by other medical complications  [] Other:     Prognosis: [x] Good [] Fair  [] Poor    Patient Requires Follow-up: [x] Yes  [] No    PLAN: See eval. ST 2x / week for 6 weeks.    [x] Continue per plan of care [] Alter current plan (see comments)  [] Plan of care initiated [] Hold pending MD visit [] Discharge    Electronically signed by:   ANTOINE Petit  Speech-Language Pathology Graduate Clinician    The speech-language pathologist was present, directed the patient's care, made skilled judgment and was responsible for assessment and treatment. Keith Valle MA CCC-SLP  Speech-Language Pathologist  [phone]      Note: If patient does not return for scheduled/ recommended follow up visits, this note will serve as a discharge from care along with most recent update on progress.

## 2022-09-29 ENCOUNTER — HOSPITAL ENCOUNTER (OUTPATIENT)
Dept: SPEECH THERAPY | Age: 42
Setting detail: THERAPIES SERIES
Discharge: HOME | End: 2022-09-29
Payer: COMMERCIAL

## 2022-09-29 ENCOUNTER — HOSPITAL ENCOUNTER (OUTPATIENT)
Dept: OCCUPATIONAL THERAPY | Age: 42
Setting detail: THERAPIES SERIES
Discharge: HOME | End: 2022-09-29
Payer: COMMERCIAL

## 2022-09-29 PROCEDURE — 97535 SELF CARE MNGMENT TRAINING: CPT

## 2022-09-29 PROCEDURE — 97168 OT RE-EVAL EST PLAN CARE: CPT

## 2022-09-29 PROCEDURE — 92507 TX SP LANG VOICE COMM INDIV: CPT

## 2022-09-29 NOTE — PLAN OF CARE
[address] [facility] Occupational Therapy  [address] [place] [address] Host, [address]  Phone: [phone]   Fax: [phone]     Occupational Therapy Re-Certification Plan of Care    Dear Dr. Sofia Gonzalez ,    We had the pleasure of treating the following patient for occupational therapy services at [address] [place] [address]. A summary of our findings can be found in the updated assessment below. This includes our plan of care. If you have any questions or concerns regarding these findings, please do not hesitate to contact me at the office phone number checked above. Thank you for the referral.     Physician Signature:________________________________Date:__________________  By signing above (or electronic signature), therapists plan is approved by physician      Functional Outcome: na    Overall Response to Treatment:   [x]Patient is responding well to treatment and improvement is noted with regards  to goals   []Patient should continue to improve in reasonable time if they continue HEP   []Patient has plateaued and is no longer responding to skilled OT intervention    []Patient is getting worse and would benefit from return to referring MD   []Patient unable to adhere to initial POC   []Other:     Date range of Visits: 22-10/1/22  Total Visits: 10    Recommendation:    [x]Continue OT 2x / wk for 6 weeks.     []Hold OT, pending MD visit   Occupational Therapy Daily Treatment Note  Date:  2022    Patient: Smita Huerta   : 1980   MRN: [id number]  Referring Physician: Gordon Arriaga MD           Medical Diagnosis Information: left hemiplegia    Date of Injury: 22  Date of Surgery: N/A                                      Insurance information: care source     Plan of care sent to provider:      []Faxed   [x]Co-signature    (attempts: 1[x] 2[] 3[])       Progress Report: []  Yes  [x]  No     Date Range for reporting period:  Beginnin22  Ending: end of POC    Progress report due (10 Rx/or 30 days whichever is less): visit #10 or     Recertification due (POC duration/ or 90 days whichever is less): visit #12 or 22    Visit # Insurance Allowable Auth required? Date Range   10/12 30 hard max    120 units [x]  Yes  []  No 22-10/1/22       Latex Allergy:  [x]No      []Yes  Pacemaker:  [x] No       [] Yes     Preferred Language for Healthcare:   [x]English       []other:    Pain level: 2/10 R shoulder and back    SUBJECTIVE:    Patient in good spirits. Functional Disability Index:  FOTO:  Patient's Physical FS Primary Measure- 25  Risk Adjusted Statistical FOTO-     OBJECTIVE: See eval    Re-certification 75  Functional Transfers: Mod I with set up and use of cane from wheelchair to mat    Lower Body Dressing:   Donning/Curtis shoes- Min A with verbal cues to cross ankle at knee to don and doff. Decreased trunk control and balance with this positioning. Pt reports family assists with shoes. Donning/Curtis Pants-  Verbal cues for attention to task and sequencing of task. Min-Mod A for sit to stand to pull pants up, pt cued for weight shift in L side to help with balance and trunk control to utilize RUE to reach around to L side to pull up pants, and verbal cues to use maria alejandra dressing technique. Pt reports family assists with pulling her pants up once she has them up passed her calf. Curtis KFO brace- Min A. Verbal cues for attention to task, and sequencing of task. Pt reports typically her family dons and doffs brace for her. LUE as dynamic base of support during self-care: With pt holding onto football horizontally or onto therapy students hand/forearm to facilitate LUE as dynamic base of support during dressing. Pt progressing to using LUE as dynamic base of support.       RESTRICTIONS/PRECAUTIONS: fall risk, poor midline, left visual hemianopsia    Exercises/Interventions:  Treatment focused on improving postural control and left side involvement during functional tasks to decrease abnormal movement patterns, improve left ue function and midline orientation during ADLs. Re-certification completed this date with pt making progress with use of LUE as dynamic base of support, and progressing toward LB dressing goals, see above. Pt requires cuing for attention to task and sequencing of dressing task. Neuromuscular facilitation of LUE with use of grasping football horizontally or grasp on therapy students hand/forearm to emphasis use of LUE for dynamic base of support and inhibit internal rotation of humerus and facilitate scapular stabilization during ADLs. Therapeutic Exercises  Resistance / level Sets/sec Reps Notes                                                                                Neuromuscular Re-ed / Therapeutic Activities                                                 Manual Intervention                                                     Modalities:     Patient education: OT evaluation home program goals          Home Exercise Program:   7/1/2022 aarom     Therapeutic Exercise and NMR:  [x] (74377) Provided verbal/tactile cueing for activities related to strengthening, flexibility, endurance, ROM  for improvements in scapular, scapulothoracic and UE control with self care, reaching, carrying, lifting, house/yardwork, driving/computer work. [x] (66104) Provided verbal/tactile cueing for activities related to improving balance, coordination, kinesthetic sense, posture, motor skill, proprioception  to assist with  scapular, scapulothoracic and UE control with self care, reaching, carrying, lifting, house/yardwork, driving/computer work.   [] Comments:    Therapeutic Activities:    [] (29917 or 70581) Provided verbal/tactile cueing for activities related to improving balance, coordination, kinesthetic sense, posture, motor skill, proprioception and motor activation to allow for proper function of scapular, scapulothoracic and UE control with self care, carrying, lifting, driving/computer work  [] Comments:    Home Exercise Program:    [] (89463) Reviewed/Progressed HEP activities related to strengthening, flexibility, endurance, ROM of scapular, scapulothoracic and UE control with self care, reaching, carrying, lifting, house/yardwork, driving/computer work  [] (09248) Reviewed/Progressed HEP activities related to improving balance, coordination, kinesthetic sense, posture, motor skill, proprioception of scapular, scapulothoracic and UE control with self care, reaching, carrying, lifting, house/yardwork, driving/computer work    [] Comments:    Manual Treatments:  PROM / STM / Oscillations-Mobs:  G-I, II, III, IV (PA's, Inf., Post.)  [] ([id number]) Provided manual therapy to mobilize soft tissue/joints of cervical/CT, scapular GHJ and UE for the purpose of modulating pain, promoting relaxation,  increasing ROM, reducing/eliminating soft tissue swelling/inflammation/restriction, improving soft tissue extensibility and allowing for proper ROM for normal function with self care, reaching, carrying, lifting, house/yardwork, driving/computer work  [] Comments:    ADL Training:  [x] (60841) Provided self-care/home management training related to activities of daily living and compensatory training, and/or use of adaptive equipment   [] Comments:     Splinting:  [] Fabrication of:   [] (06039) Orthotic/Prosthetic Management, subsequent encounter  [] (12275) Orthotic management and training (fitting and assessment)  [] Comments:      Charges:  Timed Code Treatment Minutes:  35   Total Treatment Minutes: 50     [] EVAL (LOW) 41474   [x] OT Re-eval (08000)  [] EVAL (MOD) 99023   [] EVAL (HIGH) 90981       [] Dolly (82926) x      [] GWPYK(98062)  [x] NMR (40797) x    1 [] Estim (attended) (69506)   [] Manual ([id number]) x     [] US (59213)  [] TA (11483) x     [] Paraffin (49692)  [x] ADL  (11996) x  1   [] Splint/L code:    [] Estim (unattended) (93797)  [] Fluidotherapy (96724)  [] Other:    GOALS: Goals established 6/30/22   Patient stated goal:  Patient stated goal is to improve her over all mobility and ADL status   [x] Progressing: [] Met: [] Not Met: [] Adjusted     Therapist goals for Patient   Modified independent functional transfers  [x] Progressing: [] Met: [] Not Met: [] Adjusted   Modified independent lower body dress  [x] Progressing: [] Met: [] Not Met: [] Adjusted  Modified independent upper body dress  [x] Progressing: [] Met: [] Not Met: [] Adjusted  Use of left upper extremity as dynamic base of support during self care  [x] Progressing: [] Met: [] Not Met: [] Adjusted       Pt will report a foto score of 34 after 21 visits   [] Progressing: [] Met: [] Not Met: [] Adjusted    Progression Towards Functional goals:  [] Patient is progressing as expected towards functional goals listed. [] Progression is slowed due to complexities listed. [] Progression has been slowed due to co-morbidities. [x] Plan just implemented, too soon to assess goals progression  [] All goals are met  [] Other:     ASSESSMENT:  Pt progressing towards use of LUE as base of support during functional tasks and independence with LB dressing but requires min-mod assistance and vcs for initiation, sequencing and attn to task. Pt reminded of maria alejandra dressing technique.     Treatment/Activity Tolerance:  [x] Patient tolerated treatment well [] Patient limited by fatigue  [] Patient limited by pain  [] Patient limited by other medical complications  [] Other:     Prognosis: [x] Good [] Fair  [] Poor    Patient Requires Follow-up: [x] Yes  [] No    PLAN: See eval  [x] Continue per plan of care [x] Alter current plan (see comments)  [] Plan of care initiated [] Hold pending MD visit [] Discharge    Electronically signed by:            Contreras Layne, S/OT    Occupational Therapist was present, directed the patient's care, made skilled judgement, and was responsible for assessment and treatment of the patient.

## 2022-09-29 NOTE — PLAN OF CARE
7/15-10/7/2022, 24 units 51581 & 24 units 88303  Physician Information: Dr. Anam Moses MD      Plan of care signed (Y/N): Y (7/12/2022)    Date of Patient follow up with Physician: DEWEY    Functional outcome measure:   FOTO Primary Measure: HDQLIFE Swallowing Difficulties: 57 (9/8/2022)  FOTO Primary Measure: PROMIS Cognitive Function v2.0: 30.1 (9/29/2022)  Secondary Measure: NeuroQOL - Communication: 19 (9/8/2022)    Progress Note: [x]  Yes  []  No  Next due by: 10/8/2022    RESTRICTIONS/PRECAUTIONS: hx brain bleed  Latex Allergy:  [x]NO      []YES    Preferred Language for Healthcare:   [x]English       []other:    Visit # Insurance Allowable Date Range (if applicable)   41/99 24 units 45997  24 units 22398 7/15-10/7/2022       Pain level: 0/10     SUBJECTIVE: Pt alert and receptive,  present in session. Pt teary at the beginning of the session, reports feeling frustrated but did not elaborate when prompted. Pt feels she does not participate in hobbies and continues to have difficulty with her thinking. OBJECTIVE:   FOTO Primary Measure: PROMIS Cognitive Function v2.0: 33.1    Brando Cognitive Assessment (MoCA) 8.3  Section Subtest Score Comments   Visuospatial/ Executive Lacrosse making 0/1  Missing lines    Copy Cube 0/1     Clock 1/3 Missing number 11  Incorrect hand lengths   Naming Picture naming 3/3     Attention Number repetition 2/2      Selective attention 1/1     Serial 7s 3/3    Language Repetition 2/2      Fluency 0/1 9 words in 1 minute   Abstraction Comparisons 2/2    Delayed Recall Recall 5 novel words 3/5 Increased to 5/5 given f:3   Orientation Spatial and Temporal 4/6 Incorrect date and FROILAN     Total: 21/30 Score > 26 considered WFL       Exercises/Interventions:      Motor Speech Therapy (99695) Accuracy Cues Notes   Apraxia       Dysarthria      Articulation      Respiration      Phonation      Resonance      Prosody      Other:        Cognitive Function (15698 & 47728) Accuracy Cues Notes   Orientation        Attention           Sustained         Selective         Alternating         Divided      Memory          Immediate/Working Functional recall of details from short story presented verbally; pt selected strategy of repetition, immediate recall of 5/5 details  SLP reinforced summarization, rehearsal, and repetition      Short term Delayed recall (20 minutes) of 5/5 details         Long term         Prospective      Problem Solving       Visuospatial       Executive Function       Other:          Pt. Education:  -Pt educated on diagnosis, prognosis and expectations for rehab  -All pt questions were answered  -Pt verbalized understanding and agreement  -Progress since initiation of POC    HEP instruction:  -Pt provided with written and illustrated instructions for HEP: SLOB intelligibility strategies, WRAP memory strategies, Goal-Plan-Do-Review metacognitive training, compensatory swallowing strategies (Alternate solids/liquids , Upright as possible with all PO intake , Small bites/sips , Eat/feed slowly, Remain upright 30-45 min)      Speech/Voice Therapy:    [x] (54193) Treatment of speech, language, voice, communication, and/or auditory processing disorder; individual    Cognitive Function:  [] (48005) Therapeutic interventions that focus on cognitive function (eg, attention, memory, reasoning, executive function, problem solving, and/or pragmatic functioning) and compensatory strategies to manage the performance of an activity (eg, managing time or schedules, initiating, organizing, and sequencing tasks), direct (one-on-one) patient contact; initial 15 minutes (Report  only once per day)  [] ([id number] [phone]) each additional 15 minutes     Dysphagia Therapy:    [] (08724) Treatment of swallowing dysfunction and/or oral function for feeding         Charges:  Timed Code Treatment Minutes: 0   Total Treatment Minutes: 45     [x] Speech-Language Treatment (50705)        [] Voice Treatment (26588)                                         [] Cognitive-Linguistic Skills Development Initial 15 minutes (59264)  [] Cognitive-Linguistic Skills Development Additional 15 minutes (09478)   [] Dysphagia Treatment/NMES (VitalStim) (15796)  [] Other:     GOALS:  Patient stated goal: \"Help with my memory\"; \"Stop slurring\"; \"Swallowing\"  [x] Progressing: [] Met: [] Not Met: [] Adjusted     Therapist goals for Patient:  Short Term Goals: To be achieved in: 5 weeks  1. Patient will use speech strategies to facilitate increased intelligibility at discourse level, 5 minutes, of >90% as judged by SLP and pt/family. [] Progressing: [x] Met: [] Not Met: [] Adjusted  2. Patient will select and implement 2 cognitive strategies to complete functional tasks given min initiation cues in order to improve self-monitoring of ADL completion. [x] Progressing: [] Met: [] Not Met: [] Adjusted  3. Patient will use learned memory strategies to recall 4 units of novel information across delayed intervals up to 10 minutes given min encoding cues. [] Progressing: [x] Met: [] Not Met: [] Adjusted   4. Patient will functionally tolerate regular solids x 20 and thin liquids x 20 with min verbal cues for strategy implementation. [] Progressing: [x] Met: [] Not Met: [] Adjusted  5. Patient will complete repeat instrumental swallow study (MBS or FEES) as clinically indicated to further assess pharyngeal phase and direct plan of care. [x] Progressing: [] Met: [] Not Met: [] Adjusted   NEW GOAL: Patient will verbalize procedural discourse/sequence, 2 minutes, with no more than 2 cues for topic maintenance/organization in order to improve thought organization for complex expression. NEW GOAL: Patient will sustain attention to visual information for 5 minutes in a quiet environment with no more than 2 verbal cues to attend in order to improve working memory and visual scanning. Long Term Goals: To be achieved in: 6 weeks  1.  Patient will demonstrate improved cognitive-linguistic function to improve quality of life as evidenced by increased score on functional outcome measure. [x] Progressing: [] Met: [] Not Met: [] Adjusted  2. Patient will utilize speech intelligibility strategies to increase intelligibility to 90% in conversation to a familiar/unfamiliar listener. [] Progressing: [x] Met: [] Not Met: [] Adjusted  3. Patient will tolerate least restrictive diet with no overt clinical s/s of aspiration/penetration. [] Progressing: [x] Met: [] Not Met: [] Adjusted    Progression Towards Functional goals:  [x] Patient is progressing as expected towards functional goals listed. [] Progression is slowed due to complexities listed. [] Progression has been slowed due to co-morbidities. [] Plan just implemented, too soon to assess goals progression  [] Other:     Persisting Functional Limitations/Impairments:  [x]Attention []Word Finding       [x]Memory []Speech Intelligibility     [x]Executive Function []Diet Tolerance     [x]Problem Solving []Coughing/Choking with PO  []Expressive Language []Medication/Finance Management  []Receptive Language []Other:      ASSESSMENT: The patient is making progress towards all cognitive goals and has met short-term goal for delayed recall. Pt met goals for speech intelligibility and tolerating unrestricted diet 9/8/2022. Pt is progressing in self-monitoring of performance during functional tasks, advanced from max cues to now min-mod cues to implement metacognitive strategies. Pt continues to demonstrate deficits in executive functioning, attention, and working memory. Pt continues to benefit from skilled intervention to improve independence and participation in pt's hobbies and IADLs. Continue POC with goals advanced as above.        Treatment/Activity Tolerance:  [x] Patient able to complete tx [] Patient limited by fatigue  [] Patient limited by pain  [] Patient limited by other medical complications  [] Other:

## 2022-10-04 ENCOUNTER — HOSPITAL ENCOUNTER (OUTPATIENT)
Dept: OCCUPATIONAL THERAPY | Age: 42
Setting detail: THERAPIES SERIES
Discharge: HOME | End: 2022-10-04
Payer: COMMERCIAL

## 2022-10-04 ENCOUNTER — HOSPITAL ENCOUNTER (OUTPATIENT)
Dept: SPEECH THERAPY | Age: 42
Setting detail: THERAPIES SERIES
Discharge: HOME | End: 2022-10-04
Payer: COMMERCIAL

## 2022-10-04 ENCOUNTER — HOSPITAL ENCOUNTER (OUTPATIENT)
Dept: PHYSICAL THERAPY | Age: 42
Setting detail: THERAPIES SERIES
Discharge: HOME | End: 2022-10-04
Payer: COMMERCIAL

## 2022-10-04 PROCEDURE — 97112 NEUROMUSCULAR REEDUCATION: CPT

## 2022-10-04 PROCEDURE — 92507 TX SP LANG VOICE COMM INDIV: CPT

## 2022-10-04 PROCEDURE — 97116 GAIT TRAINING THERAPY: CPT

## 2022-10-04 PROCEDURE — 97110 THERAPEUTIC EXERCISES: CPT

## 2022-10-04 PROCEDURE — 97535 SELF CARE MNGMENT TRAINING: CPT

## 2022-10-04 NOTE — FLOWSHEET NOTE
[address]    Speech Therapy Daily Treatment Note  Date:  10/4/2022    Patient Name:  Bishop Myers    :  1980  MRN: [id number]  Medical/Treatment Diagnosis Information:  G81.94 (ICD-10-CM) - Left hemiparesis ([facility] [address]           Treatment Diagnosis: Mild Dysarthria; Mild Cognitive-Linguistic deficits; Mild Oropharyngeal Dysphagia   Insurance/Certification information: [facility] authorization: 7/, 24 units 73536 & 24 units 79951  Physician Information: Dr. Philip Salas MD      Plan of care signed (Y/N): Y (2022)    Date of Patient follow up with Physician: DEWEY    Functional outcome measure:   FOTO Primary Measure: HDQLIFE Swallowing Difficulties: 57 (2022)  FOTO Primary Measure: PROMIS Cognitive Function v2.0: 30.1 (2022)  Secondary Measure: NeuroQOL - Communication: 19 (2022)    Progress Note: []  Yes  [x]  No  Next due by: 10/29/2022    RESTRICTIONS/PRECAUTIONS: hx brain bleed  Latex Allergy:  [x]NO      []YES    Preferred Language for Healthcare:   [x]English       []other:    Visit # Insurance Allowable Date Range (if applicable)   85/98  + 1/10 24 units 76232  24 units 75986 7/15-10/7/2022  Date ext. 2022       Pain level: 0/10     SUBJECTIVE: Pt alert and receptive, arrived with cane and forgot glasses.      OBJECTIVE:     Exercises/Interventions:    Speech Therapy (83862) Accuracy Cues Notes   Comprehension        Yes/No questions      Complex questions      1-2 step directions      Multi-step directions      Common objects/pictures      L/R discrimination      Conversation      Other:      Expression       Initiation      Repetition      Automatic speech      Confrontational      Convergent      Divergent      Responsive      Conversation Verbal sequencing (1 minute) targeting thought organization:  - 1 cue for topic maintenance  - 1 cue for reducing speech rate Min cues for expansion of response Conversation cards + further prompting for verbal sequencing   Other:        Cognitive Function (76367 & 81730) Accuracy Cues Notes   Orientation        Attention           Sustained Visual attention for \"spot the difference\":  - pt verbalized page number in top left corner in 100% of opportunities  - pt spotted 3 differences between pictures in 100% of opportunities Initiation and mod cues to attend to left Multiple opportunities to practice skill    Consistently omitted object on left side of picture scene when recounting differences      Selective         Alternating         Divided      Memory          Immediate/Working         Short term         Long term         Prospective      Problem Solving       Visuospatial       Executive Function  Functional planning task involving preparing team meals for football:  - pt verbalized items she needed for task with 100% accuracy  - pt reviewed use of metacognitive strategies with mod cues Min cues for expansion of response  Task preparation strategies: Task preparation strategies: Planning, Outlining steps, Visualization, and Self-talk   Other:          Pt. Education:  -Pt educated on diagnosis, prognosis and expectations for rehab  -All pt questions were answered  -Pt verbalized understanding and agreement    HEP instruction:  -Pt provided with written and illustrated instructions for HEP: SLOB intelligibility strategies, WRAP memory strategies, Goal-Plan-Do-Review metacognitive training, compensatory swallowing strategies (Alternate solids/liquids , Upright as possible with all PO intake , Small bites/sips , Eat/feed slowly, Remain upright 30-45 min)      Speech/Voice Therapy:    [x] (64492) Treatment of speech, language, voice, communication, and/or auditory processing disorder; individual    Cognitive Function:  [] (29681) Therapeutic interventions that focus on cognitive function (eg, attention, memory, reasoning, executive function, problem solving, and/or pragmatic functioning) and compensatory strategies to manage the performance of an activity (eg, managing time or schedules, initiating, organizing, and sequencing tasks), direct (one-on-one) patient contact; initial 15 minutes (Report 62605 only once per day)  [] ([phone]) each additional 15 minutes     Dysphagia Therapy:    [] (83690) Treatment of swallowing dysfunction and/or oral function for feeding         Charges:  Timed Code Treatment Minutes: 0   Total Treatment Minutes: 45     [x] Speech-Language Treatment (38631)        [] Voice Treatment (15835)                                         [] Cognitive-Linguistic Skills Development Initial 15 minutes (27466)  [] Cognitive-Linguistic Skills Development Additional 15 minutes (53942)   [] Dysphagia Treatment/NMES (VitalStim) (20110)  [] Other:     GOALS:  Patient stated goal: \"Help with my memory\"; \"Stop slurring\"; \"Swallowing\"  [x] Progressing: [] Met: [] Not Met: [] Adjusted     Therapist goals for Patient:  Short Term Goals: To be achieved in: 5 weeks  1. Patient will use speech strategies to facilitate increased intelligibility at discourse level, 5 minutes, of >90% as judged by SLP and pt/family. [] Progressing: [x] Met: [] Not Met: [] Adjusted  2. Patient will select and implement 2 cognitive strategies to complete functional tasks given min initiation cues in order to improve self-monitoring of ADL completion. [x] Progressing: [] Met: [] Not Met: [] Adjusted  3. Patient will use learned memory strategies to recall 4 units of novel information across delayed intervals up to 10 minutes given min encoding cues. [] Progressing: [x] Met: [] Not Met: [] Adjusted   4. Patient will functionally tolerate regular solids x 20 and thin liquids x 20 with min verbal cues for strategy implementation. [] Progressing: [x] Met: [] Not Met: [] Adjusted  5.  Patient will complete repeat instrumental swallow study (MBS or FEES) as clinically indicated to further assess pharyngeal phase and direct plan of care. [x] Progressing: [] Met: [] Not Met: [] Adjusted   6. Patient will verbalize procedural discourse/sequence, 2 minutes, with no more than 2 cues for topic maintenance/organization in order to improve thought organization for complex expression. [] Progressing: [] Met: [] Not Met: [] Adjusted   7. Patient will sustain attention to visual information for 5 minutes in a quiet environment with no more than 2 verbal cues to attend in order to improve working memory and visual scanning. [] Progressing: [] Met: [] Not Met: [] Adjusted      Long Term Goals: To be achieved in: 6 weeks  1. Patient will demonstrate improved cognitive-linguistic function to improve quality of life as evidenced by increased score on functional outcome measure. [x] Progressing: [] Met: [] Not Met: [] Adjusted  2. Patient will utilize speech intelligibility strategies to increase intelligibility to 90% in conversation to a familiar/unfamiliar listener. [] Progressing: [x] Met: [] Not Met: [] Adjusted  3. Patient will tolerate least restrictive diet with no overt clinical s/s of aspiration/penetration. [] Progressing: [x] Met: [] Not Met: [] Adjusted    Progression Towards Functional goals:  [x] Patient is progressing as expected towards functional goals listed. [] Progression is slowed due to complexities listed. [] Progression has been slowed due to co-morbidities. [] Plan just implemented, too soon to assess goals progression  [] Other:     Persisting Functional Limitations/Impairments:  [x]Attention []Word Finding       [x]Memory []Speech Intelligibility     [x]Executive Function []Diet Tolerance     [x]Problem Solving []Coughing/Choking with PO  []Expressive Language []Medication/Finance Management  []Receptive Language []Other:      ASSESSMENT: Pt progressing with improved verbal sequencing skills utilizing reviewed strategies, increased thought organization during conversation.  Pt continues with left side neglect, but improved accuracy when prompted to look on left side of visual scene; benefited from multiple opportunities to practice. Pt receptive to reviewed cognitive strategies for functional tasks, continues to benefit from SLP support for consistent implementation. Treatment/Activity Tolerance:  [x] Patient able to complete tx [] Patient limited by fatigue  [] Patient limited by pain  [] Patient limited by other medical complications  [] Other:     Prognosis: [x] Good [] Fair  [] Poor    Patient Requires Follow-up: [x] Yes  [] No    PLAN: See eval. ST 2x / week for 6 weeks. [x] Continue per plan of care [] Alter current plan (see comments)  [] Plan of care initiated [] Hold pending MD visit [] Discharge    Electronically signed by: Merlene [facility]  Speech-Language Pathology Graduate Clinician    The speech-language pathologist was present, directed the patient's care, made skilled judgment and was responsible for assessment and treatment. Maria Ochoa MA CCC-SLP  Speech-Language Pathologist  [place]. [address]      Note: If patient does not return for scheduled/ recommended follow up visits, this note will serve as a discharge from care along with most recent update on progress.

## 2022-10-04 NOTE — FLOWSHEET NOTE
[address] [facility] Occupational Therapy  [address] [place] [address], [address]  Phone: [phone]   Fax: [phone]      Occupational Therapy Daily Treatment Note  Date:  10/4/2022    Patient: Terese Moreau   : 1980   MRN: [id number]  Referring Physician: Raven Elmore MD           Medical Diagnosis Information: left hemiplegia    Date of Injury: 22  Date of Surgery: N/A                                      Insurance information: care source     Plan of care sent to provider:      []Faxed   [x]Co-signature    (attempts: 1[x] 2[] 3[])       Progress Report: []  Yes  [x]  No     Date Range for reporting period:  Beginnin22  Ending: end of POC    Progress report due (10 Rx/or 30 days whichever is less): visit #10 or     Recertification due (POC duration/ or 90 days whichever is less): visit #12 or 22    Visit # Insurance Allowable Auth required? Date Range   30 hard max    120 units [x]  Yes  []  No 22-10/1/22       Latex Allergy:  [x]No      []Yes  Pacemaker:  [x] No       [] Yes     Preferred Language for Healthcare:   [x]English       []other:    Pain level: 3/10 R shoulder     SUBJECTIVE:    Patient in good spirits. Functional Disability Index:  FOTO:  Patient's Physical FS Primary Measure- 25  Risk Adjusted Statistical FOTO-     OBJECTIVE: See eval    Re-certification   Functional Transfers: Mod I with set up and use of cane from wheelchair to mat    Lower Body Dressing:   Donning/[place] shoes- Min A with verbal cues to cross ankle at knee to don and doff. Decreased trunk control and balance with this positioning. Pt reports family assists with shoes. Donning/[place] Pants-  Verbal cues for attention to task and sequencing of task.  Min-Mod A for sit to stand to pull pants up, pt cued for weight shift in L side to help with balance and trunk control to utilize RUE to reach around to L side to pull up pants, and forearm to maintain contact with cart, pt then reached with RUE for increased use of LUE for support. Task progressed to pt forward weight shift working into lift off for stand and then progression of stand  for further reach of RUE and increased involvement of core muscles during reach. Pt then with weighted swiffer pushed forward with BUE therapy student providing neuromusclar facilitation posterior humerus and carpals to maintain grasp on swiffer. Task then progressed to pt just using LUE and with bungee at waist lifted trunk for increased abdominal, glute involvement and trunk stability with functional task. Pt then with bilateral grasp on steering wheel pushed into therapist for proprioceptive feedback and ambulated ~30ft to chair. Pt then completed R and L turning with wheel for emphasis on internal/external rotation of humerus. Therapeutic Exercises  Resistance / level Sets/sec Reps Notes                                                                                Neuromuscular Re-ed / Therapeutic Activities                                                 Manual Intervention                                                     Modalities:     Patient education: OT evaluation home program goals          Home Exercise Program:   7/1/2022 aarom     Therapeutic Exercise and NMR:  [x] (05826) Provided verbal/tactile cueing for activities related to strengthening, flexibility, endurance, ROM  for improvements in scapular, scapulothoracic and UE control with self care, reaching, carrying, lifting, house/yardwork, driving/computer work. [x] (39475) Provided verbal/tactile cueing for activities related to improving balance, coordination, kinesthetic sense, posture, motor skill, proprioception  to assist with  scapular, scapulothoracic and UE control with self care, reaching, carrying, lifting, house/yardwork, driving/computer work.   [] Comments:    Therapeutic Activities:    [] (02107 or 06210) Provided verbal/tactile cueing for activities related to improving balance, coordination, kinesthetic sense, posture, motor skill, proprioception and motor activation to allow for proper function of scapular, scapulothoracic and UE control with self care, carrying, lifting, driving/computer work  [] Comments:    Home Exercise Program:    [] (62660) Reviewed/Progressed HEP activities related to strengthening, flexibility, endurance, ROM of scapular, scapulothoracic and UE control with self care, reaching, carrying, lifting, house/yardwork, driving/computer work  [] (34531) Reviewed/Progressed HEP activities related to improving balance, coordination, kinesthetic sense, posture, motor skill, proprioception of scapular, scapulothoracic and UE control with self care, reaching, carrying, lifting, house/yardwork, driving/computer work    [] Comments:    Manual Treatments:  PROM / STM / Oscillations-Mobs:  G-I, II, III, IV (PA's, Inf., Post.)  [] (55851) Provided manual therapy to mobilize soft tissue/joints of cervical/CT, scapular GHJ and UE for the purpose of modulating pain, promoting relaxation,  increasing ROM, reducing/eliminating soft tissue swelling/inflammation/restriction, improving soft tissue extensibility and allowing for proper ROM for normal function with self care, reaching, carrying, lifting, house/yardwork, driving/computer work  [] Comments:    ADL Training:  [x] (80378) Provided self-care/home management training related to activities of daily living and compensatory training, and/or use of adaptive equipment   [] Comments:     Splinting:  [] Fabrication of:   [] (23318) Orthotic/Prosthetic Management, subsequent encounter  [] (36495) Orthotic management and training (fitting and assessment)  [] Comments:      Charges:  Timed Code Treatment Minutes:  60   Total Treatment Minutes: 60     [] EVAL (LOW) 42777   [] OT Re-eval (43018)  [] EVAL (MOD) 69013   [] EVAL (HIGH) 62287       [x] Dolly (39945) x   1 [] OIDCS(41954)  [x] NMR (25455) x    2 [] Estim (attended) (09110)   [] Manual ([id number]) x     [] US (02452)  [] TA (97450) x     [] Paraffin (92937)  [x] ADL  (45204) x  1   [] Splint/L code:    [] Estim (unattended) (36620)  [] Fluidotherapy (69618)  [] Other:    GOALS: Goals established 6/30/22   Patient stated goal:  Patient stated goal is to improve her over all mobility and ADL status   [x] Progressing: [] Met: [] Not Met: [] Adjusted     Therapist goals for Patient   Modified independent functional transfers  [x] Progressing: [] Met: [] Not Met: [] Adjusted   Modified independent lower body dress  [x] Progressing: [] Met: [] Not Met: [] Adjusted  Modified independent upper body dress  [x] Progressing: [] Met: [] Not Met: [] Adjusted  Use of left upper extremity as dynamic base of support during self care  [x] Progressing: [] Met: [] Not Met: [] Adjusted       Pt will report a foto score of 34 after 21 visits   [] Progressing: [] Met: [] Not Met: [] Adjusted    Progression Towards Functional goals:  [] Patient is progressing as expected towards functional goals listed. [] Progression is slowed due to complexities listed. [] Progression has been slowed due to co-morbidities. [x] Plan just implemented, too soon to assess goals progression  [] All goals are met  [] Other:     ASSESSMENT:  Pt progressing towards use of LUE as base of support during functional tasks and independence with LB dressing but requires min-mod assistance and vcs for initiation, sequencing and attn to task. Pt reminded of maria alejandra dressing technique.     Treatment/Activity Tolerance:  [x] Patient tolerated treatment well [] Patient limited by fatigue  [] Patient limited by pain  [] Patient limited by other medical complications  [] Other:     Prognosis: [x] Good [] Fair  [] Poor    Patient Requires Follow-up: [x] Yes  [] No    PLAN: See eval  [x] Continue per plan of care [x] Alter current plan (see comments)  [] Plan of care initiated [] Hold pending MD visit [] Discharge    Electronically signed by:            Brook Davey, S/OT    Occupational Therapist was present, directed the patient's care, made skilled judgement, and was responsible for assessment and treatment of the patient.

## 2022-10-04 NOTE — FLOWSHEET NOTE
[facility] - Outpatient Physical Therapy  Phone: [phone]   Fax: [phone]    Physical Therapy Daily Treatment Note/ Progress Note  Date:  10/4/2022    Patient Name:  Yamilet Sky    :  1980  MRN: [id number]  Medical/Treatment Diagnosis Information:  Diagnosis: R25.2 (ICD-10-CM) - Cramp and spasm;   Left hemiparesis with spasticity  Treatment Diagnosis: Impaired proprioception of LUE & LE, avoidance of L extremities, Impaired balance & gait  Insurance/Certification information:  PT Insurance Information: [employer]. 30 visits pcy. PA required  Physician Information:   Korey Prakash MD  Plan of care signed (Y/N): [x]  Yes []  No     Date of Patient follow up with Physician: ?     Progress Report: []  Yes  [x]  No     Date Range for reporting period:  Beginning  2022   PN: 22  Ending      Progress report due (10 Rx/or 30 days whichever is less): visit #17 or     Recertification due (POC duration/ or 90 days whichever is less): visit #17     Visit # Insurance Allowable Auth required?  Date Range    +    16 (30 pcy) [x]  Yes, [employer]  []  No Until 9/10/22  Extended to 11/10/22       Units approved Units used Date Range   120 18 Until 11/10/22     Latex Allergy:  [x]NO      []YES  Preferred Language for Healthcare:   [x]English       []Other:      Functional Scale:                                                                                                      Date assessed:  FOTO physical FS primary measure score = 49; risk adjusted = 50                    FOTO physical FS primary measure score = 52                                                          Functional Scale/Test Evaluation 2022 30 day   22 60 day     Add TUG Discharge    Tinetti Assessment       30\" STS 3 8      5x STS 40\" 25\"      2MWT w/QC 65' 66'           Pain level:  3/10  Location:  L knee & elbow    SUBJECTIVE:   Pt reports her L eye vision is getting better - less cloudy - can see about 70%. L knee painful as well as L shoulder. Willing to try harness balance today.        OBJECTIVE:   10/4: amb with L toe out and AFO, co-treat with OT  9/12:  pt visibly fatigued in B knees by the beginning of PT session - this was 3rd therapy of the day, wearing L eye patch       RESTRICTIONS/PRECAUTIONS:   HTN    Interventions/Exercises:   Therapeutic Exercises (53876) Resistance / level Sets/sec Reps Notes   TM      7/23: L foot stationary on silver portion                          Neuromuscular Re-ed (04289)        Seated stepper  9/8: PT assisted alignment L hip - cues for pulling it towards midline          Total gym  -squats to facilitate use of LLE       Tall kneel- side step across mat table  8/2: PT assist for postural control and L leg assist   Fwd lunge onto step, L foot on top of step, R foot behind 8/2: PT assist at pelvis for forward weight shift   LAQ seated     Marches L      Seated hip abd with band at knees  Seated LAQ with band at ankle 8/2: PT assist for L quad activation and weight shift to L for upright posture    9/8: PT elevating L leg to give clearance    On mat table:  Bridge  Roll R  Roll L  Hip abd in supine  Heel slide in supine  Prone ham curl  Prone resisted knee ext  Shakeel Hali with ecc control and neutral hip  L hip fall out and back to neutral      8/29: fair+ recruitment of LLE muscle fibers  8/29: fair-  recruitment of LLE muscle fibers      AAROM  PROM  AAROM - cues to push into hand    Stand from mat table with weight shifted over L LE and hips square prior to stand    VCs for pointing head towards L knee to achieve neutral and greater weight into L LE   Standing with wall on left:  Calling out letters on specifically colored post it notes    Only 1 mistake on letter - stated the F was a P   In biodex harness:  Finding neutral in between bars, shifting more weight into L LE and straightening L knee    Neutral stand with L hand on bar and R hand tapping cones following color at various positions     Neutral stance with L hand on bar and R hand tapping blaze pods only on specific color at various reaching distances    Neutral stance with L hand on pole and R hand on bar - blaze pods on floor tapping with R foot only on specific color     X 8 min throughout session         X2 min        30 sec x  3 trials               30 sec x 3      Pt sitting down supported by harness for rest breaks intermittently                                               Therapeutic Activities (91058) /  Functional Tasks / Gait Training (35608)       Sit to supine  Supine to sit 9/12: assist due to narrow mat - helped pull L leg up onto table and position hips   R foot taps on 8\" block & hold      R foot taps on 2nd step    R toe taps on to black board of [place], L foot on lester R hand holding 1/2 full cup of water; Man & VC's to improve posture and LE muscle fiber recruitment  R hand on QC    7/28: stability provided by SPT through R hand                 Gait Training:  Amb w/QC & AFO      Step up and overs, L foot on Airex, R foot step over half bolster & return   CGA         120 feet      9/8: turned QC around to that flat side next to body- states her kids were playing with her cane and they probably didn't put it back correctly   8/16: VCs for pulling L hip forward but still keeping space between her feet      7/28: LOB x 1 with max assist x2   Sit to stand, R foot on AirEx to inhibit activation, large bolster between feet to maintain correct alignment for facilitation   7/28: use of mirror improved weight-shifting to L upon standing   Sit to stand w/large SB to facilitate forward flexion and increase activation of LLE   7/28: use of mirror improved weight-shifting to L while seated   Sit to stand from corner of mat #6, RLE on folded gray pad, large bolster behind R ankle to limited knee flex   R hand with walking stick   Donned [employer] harness in standing, doffed in standing    X1 each                                 Manual Intervention (98590)                                                     Modalities:     Pt. Education:  9/8: Reassessed goals, discussed progress made and areas remaining for improvement, issued outcome measure and reviewed new score with pt as compared to eval    6/24/2022 patient educated on diagnosis, prognosis and expectations for rehab, all patient questions were answered    HEP instruction:  6/24: Shift weight to L to facilitate LLE muscle fiber recruitment with all functional activities. Therapeutic Exercise and NMR EXR  [] (19311) Provided verbal/tactile cueing for activities related to strengthening, flexibility, endurance, ROM for improvements in  [] LE / Core stability: LE, hip, and core control with self care, mobility, lifting, ambulation. [] UE / Shoulder complex: cervical, postural, scapular, scapulothoracic and UE control with self care, reaching, carrying, lifting, house/yardwork, driving, computer work.  [] (52318) Provided verbal/tactile cueing for activities related to improving balance, coordination, kinesthetic sense, posture, motor skill, proprioception to assist with   [] LE / Core stability: LE, hip, and core control in self care, mobility, lifting, ambulation and eccentric single leg control. [] UE / Shoulder complex: cervical, scapular, scapulothoracic and UE control with self care, reaching, carrying, lifting, house/yardwork, driving, computer work.   [] (44728) Therapist is in constant attendance of 2 or more patients providing skilled therapy interventions, but not providing any significant amount of measurable one-on-one time to either patient, for improvements in  [] LE / Core stability: LE, hip, and core control in self care, mobility, lifting, ambulation and eccentric single leg control.    [] UE / Shoulder complex: cervical, scapular, scapulothoracic and UE control with self care, reaching, carrying, lifting, carrying, lifting, house/yardwork, driving, computer work    Manual Treatments:  PROM / STM / Oscillations-Mobs:  G-I, II, III, IV (PA's, Inf., Post.)  [] (41771) Provided manual therapy to mobilize shoulder complex, hip, LE, and/or cervicothoracic/LS spine soft tissue/joints for the purpose of modulating pain, promoting relaxation,  increasing ROM, reducing/eliminating soft tissue swelling/inflammation/restriction, improving soft tissue extensibility and allowing for proper ROM for normal function with   [] LE / Core stability: self care, mobility, lifting and ambulation. [] UE / Shoulder complex: self care, reaching, carrying, lifting, house/yardwork, driving, computer work. Modalities:  [] (48666) Vasopneumatic compression: Utilized vasopneumatic compression to decrease edema / swelling for the purpose of improving mobility and quad tone / recruitment which will allow for increased overall function including but not limited to self-care, transfers, ambulation, and ascending / descending stairs. Charges:  Timed Code Treatment Minutes: 43   Total Treatment Minutes: 43     [] EVAL - LOW (70264)   [] EVAL - MOD (87461)  [] EVAL - HIGH (05854)  [] RE-EVAL (29049)  [] TE (40210) x      [] Ionto  [x] NMR (38429) x  2    [] Vaso  [] Manual (08845) x      [] Ultrasound  []TA x     [] Mech Traction (23301)  [x] Gait Training x  1   [] ES (un) (88908):   [] Aquatic therapy x   [] Other:   [] Group:     GOALS:   Patient stated goal: improve transfers and gait  [x] Progressing: [] Met: [] Not Met: [] Adjusted     Therapist goals for Patient:   Short Term Goals: To be achieved in: 3 weeks  1. Independent in HEP and progression per patient tolerance, in order to prevent re-injury. [] Progressing: [x] Met: [] Not Met: [] Adjusted  2. Patient to demonstrate 40% WB'ing of LLE during sit to stand transfers to assist in muscle recruitment with all tasks.   [x] Progressing: [] Met: [] Not Met: [] Adjusted     Long Term Goals: To be achieved in: 8 weeks  1. Patient to demonstrate TUG w/QC score of <15\" in order to demonstrate reduced fall risk to assist with reaching prior level of function. - NOT TESTED 9/8  [] Progressing: [] Met: [] Not Met: [] Adjusted  2. FOTO score of at least 56 to assist with reaching prior level of function. [x] Progressing: [] Met: [] Not Met: [] Adjusted  3. Patient will demonstrate an increase 30\" sit to stand reps to 10 with 40% WB'ing through LLE, to improve transfers to/from all surfaces & heights  [x] Progressing: [] Met: [] Not Met: [] Adjusted  4. Patient will improve Tinetti Balance score to 15/28 in order to . [x] Progressing: [] Met: [] Not Met: [] Adjusted  5. Patient will increase 2MWT w/QC to 150' in order to improve community access. [x] Progressing: [] Met: [] Not Met: [] Adjusted        Overall Progression Towards Functional goals/ Treatment Progress Update:  [] Patient is progressing as expected towards functional goals listed. [] Progression is slowed due to complexities/Impairments listed. [] Progression has been slowed due to co-morbidities. [x] Plan just implemented, too soon to assess goals progression <30days   [] Goals require adjustment due to lack of progress  [] Patient is not progressing as expected and requires additional follow up with physician  [] Other    Persisting Functional Limitations/Impairments:  []Sleeping []Sitting               [x]Standing [x]Transfers        [x]Walking [x]Kneeling               [x]Stairs [x]Squatting / bending   [x]ADLs [x]Reaching  [x]Lifting  [x]Housework  [x]Driving [x]Job related tasks  []Sports/Recreation []Other:        ASSESSMENT:    Pt able to really focus on neutral and positioning of L hand and LE due to suspension in harness. Was able to find neutral weight bearing independently, but fatigued  quickly during session. Able to challenge balance outside [facility] and work on hand opening without the risk of falling.  Plan to continue to utilize harness support to drive weight bearing and balance strategies. Treatment/Activity Tolerance:  [x] Patient able to complete tx  [] Patient limited by fatigue  [] Patient limited by pain   [] Patient limited by other medical complications  [] Other:     Prognosis: [x] Good [] Fair  [] Poor    Patient Requires Follow-up: [x] Yes  [] No    Plan for next treatment session:    PLAN: See eval. PT 2x / week for 8 weeks. [x] Continue per plan of care [] Alter current plan (see comments)  [] Plan of care initiated [] Hold pending MD visit [] Discharge    Electronically signed by:  Magdalena Jackson, PT, DPT    Note: If patient does not return for scheduled/ recommended follow up visits, his note will serve as a discharge from care along with most recent update on progress.

## 2022-10-06 ENCOUNTER — HOSPITAL ENCOUNTER (OUTPATIENT)
Dept: OCCUPATIONAL THERAPY | Age: 42
Setting detail: THERAPIES SERIES
Discharge: HOME | End: 2022-10-06
Payer: COMMERCIAL

## 2022-10-06 ENCOUNTER — HOSPITAL ENCOUNTER (OUTPATIENT)
Dept: PHYSICAL THERAPY | Age: 42
Setting detail: THERAPIES SERIES
Discharge: HOME | End: 2022-10-06
Payer: COMMERCIAL

## 2022-10-06 NOTE — FLOWSHEET NOTE
Occupational Therapy  Cancellation/No-show Note  Patient Name:  Heidi Mancia   :  1980   Date:  10/6/2022  Cancelled visits to date: 2  No-shows to date: 0    Patient status for today's appointment patient:  [x]  Cancelled- 22, 10/6/22  []  Rescheduled appointment  []  No-show     Reason given by patient:  []  Patient ill  []  Conflicting appointment  []  No transportation    []  Conflict with work  []  No reason given  [x]  Other:     Comments:   is sick    Phone call information:   []  Phone call made today to patient at _ time at number provided:      []  Patient answered, conversation as follows:    []  Patient did not answer, message left as follows:  [x]  Phone call not made today- pt called us to cancel    Electronically signed by:  Michael Perla OT   Kelsey Mancini, S/OT    Occupational Therapist was present, directed the patient's care, made skilled judgement, and was responsible for assessment and treatment of the patient.

## 2022-10-06 NOTE — FLOWSHEET NOTE
Physical Therapy  Cancellation/No-show Note  Patient Name:  Heidi Mancia  :  1980   Date:  10/6/2022  Cancelled visits to date: 2  No-shows to date: 0    Patient status for today's appointment patient:  [x]  Cancelled , 10/6  []  Rescheduled appointment  []  No-show     Reason given by patient:  []  Patient ill  []  Conflicting appointment  []  No transportation    []  Conflict with work  []  No reason given   [x]  Other:   sick    Comments:      Phone call information:   []  Phone call made today to patient at _ time at number provided:      []  Patient answered, conversation as follows:    []  Patient did not answer, message left as follows:  []  Phone call not made today  [x]  Phone call not needed - pt contacted us to cancel and provided reason for cancellation.      Electronically signed by:  Stuart Whitaker, PT, DPT

## 2022-10-11 ENCOUNTER — APPOINTMENT (OUTPATIENT)
Dept: PHYSICAL THERAPY | Age: 42
End: 2022-10-11
Payer: COMMERCIAL

## 2022-10-11 ENCOUNTER — APPOINTMENT (OUTPATIENT)
Dept: OCCUPATIONAL THERAPY | Age: 42
End: 2022-10-11
Payer: COMMERCIAL

## 2022-10-13 ENCOUNTER — HOSPITAL ENCOUNTER (OUTPATIENT)
Dept: PHYSICAL THERAPY | Age: 42
Setting detail: THERAPIES SERIES
Discharge: HOME | End: 2022-10-13
Payer: COMMERCIAL

## 2022-10-13 NOTE — FLOWSHEET NOTE
Physical Therapy  Cancellation/No-show Note  Patient Name:  Walter Orellana  :  1980   Date:  10/13/2022  Cancelled visits to date: 3  No-shows to date: 0    Patient status for today's appointment patient:  [x]  Cancelled , 10/6, 10/13  []  Rescheduled appointment  []  No-show     Reason given by patient:  []  Patient ill  [x]  Conflicting appointment  []  No transportation    []  Conflict with work  []  No reason given   []  Other:   sick    Comments:      Phone call information:   []  Phone call made today to patient at _ time at number provided:      []  Patient answered, conversation as follows:    []  Patient did not answer, message left as follows:  []  Phone call not made today  [x]  Phone call not needed - pt contacted us to cancel and provided reason for cancellation.      Electronically signed by:  Stacey Cote, PT, DPT

## 2022-10-18 ENCOUNTER — HOSPITAL ENCOUNTER (OUTPATIENT)
Dept: PHYSICAL THERAPY | Age: 42
Setting detail: THERAPIES SERIES
Discharge: HOME | End: 2022-10-18
Payer: COMMERCIAL

## 2022-10-18 PROCEDURE — 97530 THERAPEUTIC ACTIVITIES: CPT

## 2022-10-18 PROCEDURE — 97112 NEUROMUSCULAR REEDUCATION: CPT

## 2022-10-18 NOTE — FLOWSHEET NOTE
[address] [facility]  Phone: [phone]   Fax: [phone]    Physical Therapy Re-Certification Plan of Care    Dear Hira Mckenna, Severa Ammon, MD  ,    We had the pleasure of treating the following patient for physical therapy services at [facility]. A summary of our findings can be found in the updated assessment below. This includes our plan of care. If you have any questions or concerns regarding these findings, please do not hesitate to contact me at the office phone number checked above. Thank you for the referral.     Physician Signature:________________________________Date:__________________  By signing above (or electronic signature), therapist's plan is approved by physician      Functional Outcome:   FOTO physical FS primary measure score = 49; risk adjusted = 50                  6/24  FOTO physical FS primary measure score = 52                                                    9/8  FOTO physical FS primary measure score = 53     10/18      Functional Scale/Test Evaluation 6/24/2022 30 day   9/8/22 60 day   10/18 Discharge    Tinetti Assessment 8/28 12/28 11/28     30\" STS 3 8 1     5x STS 40\" 25\" 1'26\"     2MWT w/QC 72' 66' 92' w/QC     TUG - - 50\" w/QC          Overall Response to Treatment:   []Patient is responding well to treatment and improvement is noted with regards  to goals   []Patient should continue to improve in reasonable time if they continue HEP   []Patient has plateaued and is no longer responding to skilled PT intervention    []Patient is getting worse and would benefit from return to referring MD   []Patient unable to adhere to initial POC   [x]Other: Patient continues to demo difficulty [address] through LLE with all functional tasks, with L foot frequently leaving ground with sit to stand transfers. L hip is further ER and TKE continues.   Was able to achieve L quad muscle tremoring w/staggered stance while kicking bolster w/RLE. Patient had significant difficulty 30\" sit to stand due to attempting to lean backwards with LEs against chair, reduced use of LLE, and not able to use RUE. Patient can continue to benefit from PT services to continue to improve LLE functionality, posture and gait. Date range of Visits:  to 10/18/22  Total Visits: 10/17    Recommendation:    [x]Continue PT 2x / wk for 4 weeks. PA ends 11/10/22              []Hold PT, pending MD visit        Physical Therapy Daily Treatment Note/ Progress Note  Date:  10/18/2022    Patient Name:  Giselle Sosa    :  1980  MRN: [id number]  Medical/Treatment Diagnosis Information:  Diagnosis: R25.2 (ICD-10-CM) - Cramp and spasm;   Left hemiparesis with spasticity  Treatment Diagnosis: Impaired proprioception of LUE & LE, avoidance of L extremities, Impaired balance & gait  Insurance/Certification information:  PT Insurance Information: [employer]. 30 visits pcy. PA required  Physician Information:   Sandro Cool MD  Plan of care signed (Y/N): [x]  Yes []  No     Date of Patient follow up with Physician: ?     Progress Report: [x]  Yes  []  No     Date Range for reporting period:  Beginning  2022   PN: 22  POC: 10/18/22  Ending      Progress report due (10 Rx/or 30 days whichever is less): visit #17 or     Recertification due (POC duration/ or 90 days whichever is less): visit #17     Visit # Insurance Allowable Auth required?  Date Range    +    16 (30 pcy) [x]  Yes, [employer]  []  No Until 9/10/22  Extended to 11/10/22       Units approved Units used Date Range   120 18 Until 11/10/22     Latex Allergy:  [x]NO      []YES  Preferred Language for Healthcare:   [x]English       []Other:      Functional Scale:                                                                                                      Date assessed:  FOTO physical FS primary measure score = 49; risk adjusted = 50 6/24  FOTO physical FS primary measure score = 52                                                    9/8  FOTO physical FS primary measure score = 53     10/18        Functional Scale/Test Evaluation 6/24/2022 30 day   9/8/22 60 day   10/18 Discharge    Tinetti Assessment 8/28 12/28 11/28     30\" STS 3 8 1     5x STS 40\" 25\" 1'26\"     2MWT w/QC 72' 66' 92'     TUG - - 50\" w/QC          Pain level:  3/10  Location:  L knee & elbow    SUBJECTIVE:   Things are going okay, has been able to get to her son's football games, but uses w/c and is able to sit on field level. Can't think of anything that is challenging for her to perform at home, things are going good. Eye surgery was good, can see colors and shapes better, however peripheral remains significantly limited.       OBJECTIVE:   10/18:  Transfers:  L foot comes off ground during sit to stand transitions when RUE is used    10/4: amb with L toe out and AFO, co-treat with OT  9/12:  pt visibly fatigued in B knees by the beginning of PT session - this was 3rd therapy of the day, wearing L eye patch       RESTRICTIONS/PRECAUTIONS:   HTN    Interventions/Exercises:   Therapeutic Exercises (16547) Resistance / level Sets/sec Reps Notes   TM      7/23: L foot stationary on silver portion                          Neuromuscular Re-ed (99069)        Seated stepper  9/8: PT assisted alignment L hip - cues for pulling it towards midline          Total gym  -squats to facilitate use of LLE       Tall kneel- side step across mat table  8/2: PT assist for postural control and L leg assist   Fwd lunge onto step, L foot on top of step, R foot behind 8/2: PT assist at pelvis for forward weight shift   LAQ seated     Marches L      Seated hip abd with band at knees  Seated LAQ with band at ankle 8/2: PT assist for L quad activation and weight shift to L for upright posture    9/8: PT elevating L leg to give clearance    On mat table:  [employer] R  Roll L  Hip abd in supine  Heel slide in supine  Prone ham curl  Prone resisted knee ext  Marches with ecc control and neutral hip  L hip fall out and back to neutral     Tall kneeling                          2', 30\"   8/29: fair+ recruitment of LLE muscle fibers  8/29: fair-  recruitment of LLE muscle fibers      AAROM  PROM  AAROM - cues to push into hand    Stand from mat table with weight shifted over L LE and hips square prior to stand    VCs for pointing head towards L knee to achieve neutral and greater weight into L LE   Standing with wall on left:  Calling out letters on specifically colored post it notes    Only 1 mistake on letter - stated the F was a P   In biodex harness:  Finding neutral in between bars, shifting more weight into L LE and straightening L knee    Neutral stand with L hand on bar and R hand tapping cones following color at various positions     Neutral stance with L hand on bar and R hand tapping blaze pods only on specific color at various reaching distances    Neutral stance with L hand on pole and R hand on bar - blaze pods on floor tapping with R foot only on specific color           Pt sitting down supported by harness for rest breaks intermittently                                               Therapeutic Activities (92638) /  Functional Tasks / Gait Training (95221)       Sit to supine  Supine to sit    Sitting to/from tall kneeling Sup  Sup    Mod/max A 1  1    1     R foot taps on 8\" block & hold      R foot taps on 2nd step    R toe taps on to black board of Shuttle, L foot on lester R hand holding 1/2 full cup of water; Man & VC's to improve posture and LE muscle fiber recruitment  R hand on QC    7/28: stability provided by SPT through R hand   Kicking tall bolster w/R foot CGA/min A  15 10/19: cues to improve LLE stability & balance          Gait Training:  Amb w/QC & AFO      Step up and overs, L foot on Airex, R foot step over half bolster & return   SBA         92', 75'              7/28: LOB x 1 with max assist x2   Sit to stand, R foot on AirEx to inhibit activation, large bolster between feet to maintain correct alignment for facilitation   7/28: use of mirror improved weight-shifting to L upon standing   Sit to stand w/large SB to facilitate forward flexion and increase activation of LLE   7/28: use of mirror improved weight-shifting to L while seated   Sit to stand from corner of mat #6, RLE on folded gray pad, large bolster behind R ankle to limited knee flex   R hand with walking stick   Donned biodex harness in standing, doffed in standing                                   Manual Intervention (31067)                                                     Modalities:     Pt. Education:  9/8: Reassessed goals, discussed progress made and areas remaining for improvement, issued outcome measure and reviewed new score with pt as compared to eval    6/24/2022 patient educated on diagnosis, prognosis and expectations for rehab, all patient questions were answered    HEP instruction:  6/24: Shift weight to L to facilitate LLE muscle fiber recruitment with all functional activities. Therapeutic Exercise and NMR EXR  [] (55260) Provided verbal/tactile cueing for activities related to strengthening, flexibility, endurance, ROM for improvements in  [] LE / Core stability: LE, hip, and core control with self care, mobility, lifting, ambulation. [] UE / Shoulder complex: cervical, postural, scapular, scapulothoracic and UE control with self care, reaching, carrying, lifting, house/yardwork, driving, computer work.  [] (65926) Provided verbal/tactile cueing for activities related to improving balance, coordination, kinesthetic sense, posture, motor skill, proprioception to assist with   [] LE / Core stability: LE, hip, and core control in self care, mobility, lifting, ambulation and eccentric single leg control.    [] UE / Shoulder complex: cervical, scapular, scapulothoracic and UE control with self care, reaching, carrying, lifting, house/yardwork, driving, computer work.   [] (87742) Therapist is in constant attendance of 2 or more patients providing skilled therapy interventions, but not providing any significant amount of measurable one-on-one time to either patient, for improvements in  [] LE / Core stability: LE, hip, and core control in self care, mobility, lifting, ambulation and eccentric single leg control. [] UE / Shoulder complex: cervical, scapular, scapulothoracic and UE control with self care, reaching, carrying, lifting, house/yardwork, driving, computer work. NMR and Therapeutic Activities:    [x] (32008 or 69969) Provided verbal/tactile cueing for activities related to improving balance, coordination, kinesthetic sense, posture, motor skill, proprioception and motor activation to allow for proper function of   [x] LE / Core, hip and LE with self care and ADLs  [] UE / Shoulder complex: cervical, postural, scapular, scapulothoracic and UE control with self care, carrying, lifting, driving, computer work. [x] (74370) Gait Re-education- Provided training and instruction to the patient for proper LE, core and hip recruitment, positioning, and eccentric body weight control with ambulation re-education, including ascending & descending stairs     Home Management Training / Self Care:  [] (21464) Provided self-care/home management training related to activities of daily living and compensatory training, and/or use of adaptive equipment for improvement with: ADLs and compensatory training, meal preparation, safety procedures and instruction in use of adaptive equipment, including bathing, grooming, dressing, personal hygiene, basic household cleaning and chores.        Home Exercise Program:    [] (49532) Reviewed/Progressed HEP activities related to strengthening, flexibility, endurance, ROM of   [] LE / Core stability: core, hip and LE for functional self-care, mobility, lifting and ambulation/stair navigation   [] UE / Shoulder complex: cervical, postural, scapular, scapulothoracic and UE control with self care, reaching, carrying, lifting, house/yardwork, driving, computer work  [] (31474)Reviewed/Progressed HEP activities related to improving balance, coordination, kinesthetic sense, posture, motor skill, proprioception of   [] LE: core, hip and LE for self care, mobility, lifting, and ambulation/stair navigation    [] UE / Shoulder complex: cervical, postural,  scapular, scapulothoracic and UE control with self care, reaching, carrying, lifting, house/yardwork, driving, computer work    Manual Treatments:  PROM / STM / Oscillations-Mobs:  G-I, II, III, IV (PA's, Inf., Post.)  [] (68562) Provided manual therapy to mobilize shoulder complex, hip, LE, and/or cervicothoracic/LS spine soft tissue/joints for the purpose of modulating pain, promoting relaxation,  increasing ROM, reducing/eliminating soft tissue swelling/inflammation/restriction, improving soft tissue extensibility and allowing for proper ROM for normal function with   [] LE / Core stability: self care, mobility, lifting and ambulation. [] UE / Shoulder complex: self care, reaching, carrying, lifting, house/yardwork, driving, computer work. Modalities:  [] (59895) Vasopneumatic compression: Utilized vasopneumatic compression to decrease edema / swelling for the purpose of improving mobility and quad tone / recruitment which will allow for increased overall function including but not limited to self-care, transfers, ambulation, and ascending / descending stairs.          Charges:  Timed Code Treatment Minutes: 45   Total Treatment Minutes: 45     [] EVAL - LOW (48308)   [] EVAL - MOD (53108)  [] EVAL - HIGH (39558)  [] RE-EVAL (41260)  [] TE (31915) x      [] Ionto  [x] NMR (35591) x  2    [] Vaso  [] Manual (16358) x      [] Ultrasound  [x]TA x 1    [] Mech Traction (54781)  [] Gait Training x     [] ES (un) (19313):   [] Aquatic therapy x   [] Other:   [] Group:     GOALS:   Patient stated goal: improve transfers and gait  [x] Progressing: [] Met: [] Not Met: [] Adjusted     Therapist goals for Patient:   Short Term Goals: To be achieved in: 3 weeks  1. Independent in HEP and progression per patient tolerance, in order to prevent re-injury. [] Progressing: [x] Met: [] Not Met: [] Adjusted  2. Patient to demonstrate 40% WB'ing of LLE during sit to stand transfers to assist in muscle recruitment with all tasks. [x] Progressing: [] Met: [] Not Met: [] Adjusted     Long Term Goals: To be achieved in: 8 weeks  1. Patient to demonstrate TUG w/QC score of <15\" in order to demonstrate reduced fall risk to assist with reaching prior level of function. - NOT TESTED 9/8  [] Progressing: [] Met: [] Not Met: [] Adjusted  2. FOTO score of at least 56 to assist with reaching prior level of function. [x] Progressing: [] Met: [] Not Met: [] Adjusted  3. Patient will demonstrate an increase 30\" sit to stand reps to 10 with 40% WB'ing through LLE, to improve transfers to/from all surfaces & heights  [x] Progressing: [] Met: [] Not Met: [] Adjusted  4. Patient will improve Tinetti Balance score to 15/28 in order to . [x] Progressing: [] Met: [] Not Met: [] Adjusted  5. Patient will increase 2MWT w/QC to 150' in order to improve community access. [x] Progressing: [] Met: [] Not Met: [] Adjusted        Overall Progression Towards Functional goals/ Treatment Progress Update:  [] Patient is progressing as expected towards functional goals listed. [] Progression is slowed due to complexities/Impairments listed. [x] Progression has been slowed due to co-morbidities.   [] Plan just implemented, too soon to assess goals progression <30days   [] Goals require adjustment due to lack of progress  [] Patient is not progressing as expected and requires additional follow up with physician  [] Other    Persisting Functional Limitations/Impairments:  []Sleeping []Sitting               [x]Standing [x]Transfers        [x]Walking [x]Kneeling               [x]Stairs [x]Squatting / bending   [x]ADLs [x]Reaching  [x]Lifting  [x]Housework  [x]Driving [x]Job related tasks  []Sports/Recreation []Other:        ASSESSMENT:    see above      Treatment/Activity Tolerance:  [x] Patient able to complete tx  [] Patient limited by fatigue  [] Patient limited by pain   [] Patient limited by other medical complications  [] Other:     Prognosis: [x] Good [] Fair  [] Poor    Patient Requires Follow-up: [x] Yes  [] No    Plan for next treatment session:    PLAN: See josey. PT 2x / week for 8 weeks. [x] Continue per plan of care [] Alter current plan (see comments)  [] Plan of care initiated [] Hold pending MD visit [] Discharge    Electronically signed by:  Deirdre Allen, PT, DPT    Note: If patient does not return for scheduled/ recommended follow up visits, his note will serve as a discharge from care along with most recent update on progress.

## 2022-10-20 ENCOUNTER — HOSPITAL ENCOUNTER (OUTPATIENT)
Dept: PHYSICAL THERAPY | Age: 42
Setting detail: THERAPIES SERIES
Discharge: HOME | End: 2022-10-20
Payer: COMMERCIAL

## 2022-10-20 PROCEDURE — 97112 NEUROMUSCULAR REEDUCATION: CPT

## 2022-10-20 PROCEDURE — 97116 GAIT TRAINING THERAPY: CPT

## 2022-10-20 NOTE — FLOWSHEET NOTE
[facility] - Outpatient Physical Therapy  Phone: [phone]   Fax: [phone]        Physical Therapy Daily Treatment Note/ Progress Note  Date:  10/20/2022    Patient Name:  Stephani Oliveira    :  1980  MRN: [id number]  Medical/Treatment Diagnosis Information:  Diagnosis: R25.2 (ICD-10-CM) - Cramp and spasm;   Left hemiparesis with spasticity  Treatment Diagnosis: Impaired proprioception of LUE & LE, avoidance of L extremities, Impaired balance & gait  Insurance/Certification information:  PT Insurance Information: [employer]. 30 visits pcy. PA required  Physician Information:   Vanessa Salvador MD  Plan of care signed (Y/N): [x]  Yes []  No     Date of Patient follow up with Physician: ?     Progress Report: []  Yes  [x]  No     Date Range for reporting period:  Beginning  2022   PN: 22  POC: 10/18/22  Ending      Progress report due (10 Rx/or 30 days whichever is less): visit #17 or 71    Recertification due (POC duration/ or 90 days whichever is less): visit #17     Visit # Insurance Allowable Auth required?  Date Range    + 10/16   16 (30 pcy) [x]  Yes, [employer]  []  No Until 9/10/22  Extended to 11/10/22         Latex Allergy:  [x]NO      []YES  Preferred Language for Healthcare:   [x]English       []Other:      Functional Scale:                                                                                                      Date assessed:  FOTO physical FS primary measure score = 49; risk adjusted = 50                    FOTO physical FS primary measure score = 52                                                      FOTO physical FS primary measure score = 53     10/18        Functional Scale/Test Evaluation 2022 30 day   22 60 day   10/18 Discharge    Tinetti Assessment      30\" STS 3 8 1     5x STS 40\" 25\" 1'26\"     2MWT w/QC 65' 66' 92'     TUG - - 50\" w/QC          Pain level:  1-2/10  Location:  L knee & elbow    SUBJECTIVE:  Pt reports she has been busy with her kids' homecoming and senior night. Vision is better but still difficult to see peripheral areas.      OBJECTIVE:   10/20: Difficulty remains with maintaining weight bearing on L - visible mm fatigue through session, required consistent cues for weight shifting L     10/18:  Transfers:  L foot comes off ground during sit to stand transitions when RUE is used    10/4: amb with L toe out and AFO, co-treat with OT  9/12:  pt visibly fatigued in B knees by the beginning of PT session - this was 3rd therapy of the day, wearing L eye patch       RESTRICTIONS/PRECAUTIONS:   HTN    Interventions/Exercises:   Therapeutic Exercises (48011) Resistance / level Sets/sec Reps Notes   TM      7/23: L foot stationary on silver portion                          Neuromuscular Re-ed (88677)        Seated stepper  9/8: PT assisted alignment L hip - cues for pulling it towards midline          Total gym  -squats to facilitate use of LLE       Tall kneel- side step across mat table  8/2: PT assist for postural control and L leg assist   Fwd lunge onto step, L foot on top of step, R foot behind 8/2: PT assist at pelvis for forward weight shift   LAQ seated     Marches L      Seated hip abd with band at knees  Seated LAQ with band at ankle 8/2: PT assist for L quad activation and weight shift to L for upright posture    9/8: PT elevating L leg to give clearance    On mat table:  Bridge  Roll R  Roll L  Hip abd in supine  Heel slide in supine  Prone ham curl  Prone resisted knee ext  Marches with ecc control and neutral hip  L hip fall out and back to neutral     Tall kneeling                            8/29: fair+ recruitment of LLE muscle fibers  8/29: fair-  recruitment of LLE muscle fibers      AAROM  PROM  AAROM - cues to push into hand    Stand from mat table with weight shifted over L LE and hips square prior to stand    VCs for pointing head towards L knee to achieve neutral and greater weight into L LE   Standing with wall on left:  Calling out letters on specifically colored post it notes    Only 1 mistake on letter - stated the F was a P   In biodex harness:  Finding neutral in between bars, shifting more weight into L LE and straightening L knee    Neutral stand with L hand on bar and R hand tapping cones following color at various positions     Neutral stance, R hand tapping blaze pods only on specific color at various reaching distances    Neutral stance with L hand on pole and R hand on bar - blaze pods on floor tapping with R foot only on specific color    SLS on L in harness       ~10 min throughout session   30 sec x  3 trials          10 sec x 4      Pt sitting down supported by harness for rest breaks intermittently - x2 on 10/20                                  Short periods of balance - unable to keep R foot off floor without holding onto R bar, VCs for knee ext    Sit to stand without UE support to facilitate forward flexion and increase WB on LLE   x6 Cues to flex forward at trunk and reach ahead w R arm, head/hips,momentum                                      Therapeutic Activities (24720) /  Functional Tasks / Gait Training (72105)       Sit to supine  Supine to sit    Sitting to/from tall kneeling Sup  Sup    Mod/max A     R foot taps on 8\" block & hold      R foot taps on 2nd step    R toe taps on to black board of Shuttle, L foot on lester R hand holding 1/2 full cup of water; Man & VC's to improve posture and LE muscle fiber recruitment  R hand on QC    7/28: stability provided by SPT through R hand   Kicking tall bolster w/R foot CGA/min A  10/19: cues to improve LLE stability & balance          Gait Training:  Amb w/QC & AFO      Step up and overs, L foot on Airex, R foot step over half bolster & return   SBA/ CGA         90', 71'        Gentle manual facilitation to bring pelvis to neutral-  toe tout with fatigue, pt able to correct trajectory to avoid obstacles 7/28: LOB x 1 with max assist x2   Sit to stand, R foot on AirEx to inhibit activation, large bolster between feet to maintain correct alignment for facilitation   7/28: use of mirror improved weight-shifting to L upon standing   Sit to stand w/large SB to facilitate forward flexion and increase activation of LLE   7/28: use of mirror improved weight-shifting to L while seated   Sit to stand from corner of mat #6, RLE on folded gray pad, large bolster behind R ankle to limited knee flex   R hand with walking stick   Donned biodex harness in standing, doffed in standing                                   Manual Intervention (75409)                                                     Modalities:     Pt. Education:  9/8: Reassessed goals, discussed progress made and areas remaining for improvement, issued outcome measure and reviewed new score with pt as compared to eval    6/24/2022 patient educated on diagnosis, prognosis and expectations for rehab, all patient questions were answered    HEP instruction:  6/24: Shift weight to L to facilitate LLE muscle fiber recruitment with all functional activities. Therapeutic Exercise and NMR EXR  [] (23051) Provided verbal/tactile cueing for activities related to strengthening, flexibility, endurance, ROM for improvements in  [] LE / Core stability: LE, hip, and core control with self care, mobility, lifting, ambulation. [] UE / Shoulder complex: cervical, postural, scapular, scapulothoracic and UE control with self care, reaching, carrying, lifting, house/yardwork, driving, computer work.  [] (77038) Provided verbal/tactile cueing for activities related to improving balance, coordination, kinesthetic sense, posture, motor skill, proprioception to assist with   [] LE / Core stability: LE, hip, and core control in self care, mobility, lifting, ambulation and eccentric single leg control.    [] UE / Shoulder complex: cervical, scapular, scapulothoracic and UE control with self care, reaching, carrying, lifting, house/yardwork, driving, computer work.   [] (87328) Therapist is in constant attendance of 2 or more patients providing skilled therapy interventions, but not providing any significant amount of measurable one-on-one time to either patient, for improvements in  [] LE / Core stability: LE, hip, and core control in self care, mobility, lifting, ambulation and eccentric single leg control. [] UE / Shoulder complex: cervical, scapular, scapulothoracic and UE control with self care, reaching, carrying, lifting, house/yardwork, driving, computer work. NMR and Therapeutic Activities:    [x] (26550 or 55156) Provided verbal/tactile cueing for activities related to improving balance, coordination, kinesthetic sense, posture, motor skill, proprioception and motor activation to allow for proper function of   [x] LE / Core, hip and LE with self care and ADLs  [] UE / Shoulder complex: cervical, postural, scapular, scapulothoracic and UE control with self care, carrying, lifting, driving, computer work. [x] (15995) Gait Re-education- Provided training and instruction to the patient for proper LE, core and hip recruitment, positioning, and eccentric body weight control with ambulation re-education, including ascending & descending stairs     Home Management Training / Self Care:  [] (93538) Provided self-care/home management training related to activities of daily living and compensatory training, and/or use of adaptive equipment for improvement with: ADLs and compensatory training, meal preparation, safety procedures and instruction in use of adaptive equipment, including bathing, grooming, dressing, personal hygiene, basic household cleaning and chores.        Home Exercise Program:    [] (74688) Reviewed/Progressed HEP activities related to strengthening, flexibility, endurance, ROM of   [] LE / Core stability: core, hip and LE for functional self-care, mobility, lifting and ambulation/stair navigation   [] UE / Shoulder complex: cervical, postural, scapular, scapulothoracic and UE control with self care, reaching, carrying, lifting, house/yardwork, driving, computer work  [] (22678)Reviewed/Progressed HEP activities related to improving balance, coordination, kinesthetic sense, posture, motor skill, proprioception of   [] LE: core, hip and LE for self care, mobility, lifting, and ambulation/stair navigation    [] UE / Shoulder complex: cervical, postural,  scapular, scapulothoracic and UE control with self care, reaching, carrying, lifting, house/yardwork, driving, computer work    Manual Treatments:  PROM / STM / Oscillations-Mobs:  G-I, II, III, IV (PA's, Inf., Post.)  [] (91308) Provided manual therapy to mobilize shoulder complex, hip, LE, and/or cervicothoracic/LS spine soft tissue/joints for the purpose of modulating pain, promoting relaxation,  increasing ROM, reducing/eliminating soft tissue swelling/inflammation/restriction, improving soft tissue extensibility and allowing for proper ROM for normal function with   [] LE / Core stability: self care, mobility, lifting and ambulation. [] UE / Shoulder complex: self care, reaching, carrying, lifting, house/yardwork, driving, computer work. Modalities:  [] (69500) Vasopneumatic compression: Utilized vasopneumatic compression to decrease edema / swelling for the purpose of improving mobility and quad tone / recruitment which will allow for increased overall function including but not limited to self-care, transfers, ambulation, and ascending / descending stairs.        Units approved Units used Date Range   120 24 Until 11/10/22       Charges:  Timed Code Treatment Minutes: 47   Total Treatment Minutes: 47     [] EVAL - LOW (40233)   [] EVAL - MOD (30514)  [] EVAL - HIGH (47947)  [] RE-EVAL (08794)  [] TE (30880) x      [] Ionto  [x] NMR (40935) x  2    [] Vaso  [] Manual (28795) x      [] Ultrasound  []TA x [] [facility] (37382)  [x] Gait Training x 1   [] ES ([phone]):   [] Aquatic therapy x   [] Other:   [] Group:     GOALS:   Patient stated goal: improve transfers and gait  [x] Progressing: [] Met: [] Not Met: [] Adjusted     Therapist goals for Patient:   Short Term Goals: To be achieved in: 3 weeks  1. Independent in HEP and progression per patient tolerance, in order to prevent re-injury. [] Progressing: [x] Met: [] Not Met: [] Adjusted  2. Patient to demonstrate 40% WB'ing of LLE during sit to stand transfers to assist in muscle recruitment with all tasks. [x] Progressing: [] Met: [] Not Met: [] Adjusted     Long Term Goals: To be achieved in: 8 weeks  1. Patient to demonstrate TUG w/QC score of <15\" in order to demonstrate reduced fall risk to assist with reaching prior level of function. - NOT TESTED 9/8  [] Progressing: [] Met: [] Not Met: [] Adjusted  2. FOTO score of at least 56 to assist with reaching prior level of function. [] Progressing: [] Met: [] Not Met: [] Adjusted  3. Patient will demonstrate an increase 30\" sit to stand reps to 10 with 40% WB'ing through LLE, to improve transfers to/from all surfaces & heights  [x] Progressing: [] Met: [] Not Met: [] Adjusted  4. Patient will improve Tinetti Balance score to 15/28 in order to . [] Progressing: [] Met: [] Not Met: [] Adjusted  5. Patient will increase 2MWT w/QC to 150' in order to improve community access. [x] Progressing: [] Met: [] Not Met: [] Adjusted        Overall Progression Towards Functional goals/ Treatment Progress Update:  [] Patient is progressing as expected towards functional goals listed. [] Progression is slowed due to complexities/Impairments listed. [x] Progression has been slowed due to co-morbidities.   [] Plan just implemented, too soon to assess goals progression <30days   [] Goals require adjustment due to lack of progress  [] Patient is not progressing as expected and requires additional follow up with physician  [] Other    Persisting Functional Limitations/Impairments:  []Sleeping []Sitting               [x]Standing [x]Transfers        [x]Walking [x]Kneeling               [x]Stairs [x]Squatting / bending   [x]ADLs [x]Reaching  [x]Lifting  [x]Housework  [x]Driving [x]Job related tasks  []Sports/Recreation []Other:        ASSESSMENT: Pt session focused on increasing WB through LLE with ambulation, sit-to-stand, and static standing. Pt required cues positioning LLE and for weight shifting to left side throughout activities. She was successful in sit-to-stand without UE support. Pt will benefit from continued therapy addressing functional activity, endurance, and balance. Treatment/Activity Tolerance:  [x] Patient able to complete tx  [] Patient limited by fatigue  [] Patient limited by pain   [] Patient limited by other medical complications  [] Other:     Prognosis: [x] Good [] Fair  [] Poor    Patient Requires Follow-up: [x] Yes  [] No    Plan for next treatment session:    PLAN: See eval. PT 2x / week for 8 weeks. [x] Continue per plan of care [] Alter current plan (see comments)  [] Plan of care initiated [] Hold pending MD visit [] Discharge    Electronically signed by:   Radha Cai, Physical Therapy Student    Therapist was present, directed the patient's care, made skilled judgement, and was responsible for assessment and treatment of the patient. Grant Parker, PT, DPT    Note: If patient does not return for scheduled/ recommended follow up visits, his note will serve as a discharge from care along with most recent update on progress.

## 2022-10-25 ENCOUNTER — HOSPITAL ENCOUNTER (OUTPATIENT)
Dept: PHYSICAL THERAPY | Age: 42
Setting detail: THERAPIES SERIES
Discharge: HOME | End: 2022-10-25
Payer: COMMERCIAL

## 2022-10-25 PROCEDURE — 97116 GAIT TRAINING THERAPY: CPT

## 2022-10-25 PROCEDURE — 97112 NEUROMUSCULAR REEDUCATION: CPT

## 2022-10-25 NOTE — FLOWSHEET NOTE
[facility] - Outpatient Physical Therapy  Phone: [phone]   Fax: [phone]        Physical Therapy Daily Treatment Note/ Progress Note  Date:  10/25/2022    Patient Name:  Joycelyn Tidwell    :  1980  MRN: [id number]  Medical/Treatment Diagnosis Information:  Diagnosis: R25.2 (ICD-10-CM) - Cramp and spasm;   Left hemiparesis with spasticity  Treatment Diagnosis: Impaired proprioception of LUE & LE, avoidance of L extremities, Impaired balance & gait  Insurance/Certification information:  PT Insurance Information: [employer]. 30 visits pcy. PA required  Physician Information:   Romaine Schumacher MD  Plan of care signed (Y/N): [x]  Yes []  No     Date of Patient follow up with Physician: ?     Progress Report: []  Yes  [x]  No     Date Range for reporting period:  Beginning  2022   PN: 22  POC: 10/18/22  Ending      Progress report due (10 Rx/or 30 days whichever is less): visit #17 or     Recertification due (POC duration/ or 90 days whichever is less): visit #17     Visit # Insurance Allowable Auth required?  Date Range    +    16 (30 pcy) [x]  Yes, [employer]  []  No Until 9/10/22  Extended to 11/10/22         Latex Allergy:  [x]NO      []YES  Preferred Language for Healthcare:   [x]English       []Other:      Functional Scale:                                                                                                      Date assessed:  FOTO physical FS primary measure score = 49; risk adjusted = 50                    FOTO physical FS primary measure score = 52                                                      FOTO physical FS primary measure score = 53     10/18        Functional Scale/Test Evaluation 2022 30 day   22 60 day   10/18 Discharge    Tinetti Assessment      30\" STS 3 8 1     5x STS 40\" 25\" 1'26\"     2MWT w/QC 65' 66' 92'     TUG - - 50\" w/QC          Pain level:  0/10  Location:  L knee & elbow    SUBJECTIVE:  Pt reports she is doing well. Was busy over the weekend with homecoming.      OBJECTIVE:   10/25: vision still slightly blurry but getting better    10/20: Difficulty remains with maintaining weight bearing on L - visible mm fatigue through session, required consistent cues for weight shifting L     10/18:  Transfers:  L foot comes off ground during sit to stand transitions when RUE is used    10/4: amb with L toe out and AFO, co-treat with OT  9/12:  pt visibly fatigued in B knees by the beginning of PT session - this was 3rd therapy of the day, wearing L eye patch       RESTRICTIONS/PRECAUTIONS:   HTN    Interventions/Exercises:   Therapeutic Exercises (92444) Resistance / level Sets/sec Reps Notes   TM      7/23: L foot stationary on silver portion                          Neuromuscular Re-ed (67872)        Seated stepper  9/8: PT assisted alignment L hip - cues for pulling it towards midline          Total gym  -squats to facilitate use of LLE       Tall kneel- side step across mat table  8/2: PT assist for postural control and L leg assist   Fwd lunge onto step, L foot on top of step, R foot behind 8/2: PT assist at pelvis for forward weight shift   LAQ seated     Marches L      Seated hip abd with band at knees  Seated LAQ with band at ankle 8/2: PT assist for L quad activation and weight shift to L for upright posture    9/8: PT elevating L leg to give clearance    On mat table:  Bridge  Roll R  Roll L  Hip abd in supine  Heel slide in supine  Prone ham curl  Prone resisted knee ext  Marches with ecc control and neutral hip  L hip fall out and back to neutral     Tall kneeling                            8/29: fair+ recruitment of LLE muscle fibers  8/29: fair-  recruitment of LLE muscle fibers      AAROM  PROM  AAROM - cues to push into hand    Stand from mat table with weight shifted over L LE and hips square prior to stand    VCs for pointing head towards L knee to achieve neutral and greater weight into L LE   Standing with wall on left:  Calling out letters on specifically colored post it notes    Only 1 mistake on letter - stated the F was a P   In biodex harness:  Finding neutral in between bars, shifting more weight into L LE and straightening L knee    Neutral stand with L hand on bar and R hand tapping cones following color at various positions     Neutral stance, R hand tapping blaze pods only on specific color at various reaching distances    Neutral stance with R hand on bar - blaze pods on floor tapping  on specific color        SLS on L in harness       ~10 min throughout session   30 sec x 2 B (R 28, 23;   L 8, 6)       10 sec x 4      Pt sitting down supported by harness for rest breaks intermittently - x1 on 10/25                                    Short periods of balance - difficulty lifting R LE and R hand   Sit to stand without UE support to facilitate forward flexion and increase WB on LLE   x5 Cues to flex forward at trunk and reach ahead w R arm, head/hips,momentum                                      Therapeutic Activities (10476) /  Functional Tasks / Gait Training (14497)       Sit to supine  Supine to sit    Sitting to/from tall kneeling Sup  Sup    Mod/max A     R foot taps on 8\" block & hold      R foot taps on 2nd step    R toe taps on to black board of Shuttle, L foot on lester R hand holding 1/2 full cup of water; Man & VC's to improve posture and LE muscle fiber recruitment  R hand on QC    7/28: stability provided by SPT through R hand   Kicking tall bolster w/R foot CGA/min A  10/19: cues to improve LLE stability & balance          Gait Training:  Amb w/QC & AFO      Step up and overs, L foot on Airex, R foot step over half bolster & return   SBA/ CGA         95 feet        Gentle manual facilitation to bring pelvis to neutral-  toe tout with fatigue, pt able to correct trajectory to avoid obstacles       7/28: LOB x 1 with max assist x2   Sit to stand, R foot on AirEx to inhibit activation, large bolster between feet to maintain correct alignment for facilitation   7/28: use of mirror improved weight-shifting to L upon standing   Sit to stand w/large SB to facilitate forward flexion and increase activation of LLE   7/28: use of mirror improved weight-shifting to L while seated   Sit to stand from corner of mat #6, RLE on folded gray pad, large bolster behind R ankle to limited knee flex   R hand with walking stick   Donned biodex harness in standing, doffed in standing       Gait in biodex harness without AD or HHA   X 35 feet VCs for keeping L toes pointing forward and big steps -                         Manual Intervention (90632)                                                     Modalities:     Pt. Education:  9/8: Reassessed goals, discussed progress made and areas remaining for improvement, issued outcome measure and reviewed new score with pt as compared to eval    6/24/2022 patient educated on diagnosis, prognosis and expectations for rehab, all patient questions were answered    HEP instruction:  6/24: Shift weight to L to facilitate LLE muscle fiber recruitment with all functional activities. Therapeutic Exercise and NMR EXR  [] (52303) Provided verbal/tactile cueing for activities related to strengthening, flexibility, endurance, ROM for improvements in  [] LE / Core stability: LE, hip, and core control with self care, mobility, lifting, ambulation. [] UE / Shoulder complex: cervical, postural, scapular, scapulothoracic and UE control with self care, reaching, carrying, lifting, house/yardwork, driving, computer work.  [] (52840) Provided verbal/tactile cueing for activities related to improving balance, coordination, kinesthetic sense, posture, motor skill, proprioception to assist with   [] LE / Core stability: LE, hip, and core control in self care, mobility, lifting, ambulation and eccentric single leg control.    [] UE / Shoulder complex: cervical, scapular, scapulothoracic and UE control with self care, reaching, carrying, lifting, house/yardwork, driving, computer work.   [] (30590) Therapist is in constant attendance of 2 or more patients providing skilled therapy interventions, but not providing any significant amount of measurable one-on-one time to either patient, for improvements in  [] LE / Core stability: LE, hip, and core control in self care, mobility, lifting, ambulation and eccentric single leg control. [] UE / Shoulder complex: cervical, scapular, scapulothoracic and UE control with self care, reaching, carrying, lifting, house/yardwork, driving, computer work. NMR and Therapeutic Activities:    [x] (43806 or 61025) Provided verbal/tactile cueing for activities related to improving balance, coordination, kinesthetic sense, posture, motor skill, proprioception and motor activation to allow for proper function of   [x] LE / Core, hip and LE with self care and ADLs  [] UE / Shoulder complex: cervical, postural, scapular, scapulothoracic and UE control with self care, carrying, lifting, driving, computer work. [x] (47247) Gait Re-education- Provided training and instruction to the patient for proper LE, core and hip recruitment, positioning, and eccentric body weight control with ambulation re-education, including ascending & descending stairs     Home Management Training / Self Care:  [] (71554) Provided self-care/home management training related to activities of daily living and compensatory training, and/or use of adaptive equipment for improvement with: ADLs and compensatory training, meal preparation, safety procedures and instruction in use of adaptive equipment, including bathing, grooming, dressing, personal hygiene, basic household cleaning and chores.        Home Exercise Program:    [] (66005) Reviewed/Progressed HEP activities related to strengthening, flexibility, endurance, ROM of   [] LE / Core stability: core, hip and LE for functional self-care, mobility, lifting and ambulation/stair navigation   [] UE / Shoulder complex: cervical, postural, scapular, scapulothoracic and UE control with self care, reaching, carrying, lifting, house/yardwork, driving, computer work  [] (89379)Reviewed/Progressed HEP activities related to improving balance, coordination, kinesthetic sense, posture, motor skill, proprioception of   [] LE: core, hip and LE for self care, mobility, lifting, and ambulation/stair navigation    [] UE / Shoulder complex: cervical, postural,  scapular, scapulothoracic and UE control with self care, reaching, carrying, lifting, house/yardwork, driving, computer work    Manual Treatments:  PROM / STM / Oscillations-Mobs:  G-I, II, III, IV (PA's, Inf., Post.)  [] (10991) Provided manual therapy to mobilize shoulder complex, hip, LE, and/or cervicothoracic/LS spine soft tissue/joints for the purpose of modulating pain, promoting relaxation,  increasing ROM, reducing/eliminating soft tissue swelling/inflammation/restriction, improving soft tissue extensibility and allowing for proper ROM for normal function with   [] LE / Core stability: self care, mobility, lifting and ambulation. [] UE / Shoulder complex: self care, reaching, carrying, lifting, house/yardwork, driving, computer work. Modalities:  [] (76871) Vasopneumatic compression: Utilized vasopneumatic compression to decrease edema / swelling for the purpose of improving mobility and quad tone / recruitment which will allow for increased overall function including but not limited to self-care, transfers, ambulation, and ascending / descending stairs.        Units approved Units used Date Range   120 27 Until 11/10/22       Charges:  Timed Code Treatment Minutes: 47   Total Treatment Minutes: 47     [] EVAL - LOW (39319)   [] EVAL - MOD (59049)  [] EVAL - HIGH (18601)  [] RE-EVAL (49080)  [] TE (65402) x      [] Ionto  [x] NMR (54989) x  2    [] Vaso  [] Manual (75996) x [] Ultrasound  []TA x                  [] Mech Traction (24277)  [x] Gait Training x 1   [] ES (un) (19614):   [] Aquatic therapy x   [] Other:   [] Group:     GOALS:   Patient stated goal: improve transfers and gait  [x] Progressing: [] Met: [] Not Met: [] Adjusted     Therapist goals for Patient:   Short Term Goals: To be achieved in: 3 weeks  1. Independent in HEP and progression per patient tolerance, in order to prevent re-injury. [] Progressing: [x] Met: [] Not Met: [] Adjusted  2. Patient to demonstrate 40% WB'ing of LLE during sit to stand transfers to assist in muscle recruitment with all tasks. [x] Progressing: [] Met: [] Not Met: [] Adjusted     Long Term Goals: To be achieved in: 8 weeks  1. Patient to demonstrate TUG w/QC score of <15\" in order to demonstrate reduced fall risk to assist with reaching prior level of function. - NOT TESTED 9/8  [] Progressing: [] Met: [] Not Met: [] Adjusted  2. FOTO score of at least 56 to assist with reaching prior level of function. [] Progressing: [] Met: [] Not Met: [] Adjusted  3. Patient will demonstrate an increase 30\" sit to stand reps to 10 with 40% WB'ing through LLE, to improve transfers to/from all surfaces & heights  [x] Progressing: [] Met: [] Not Met: [] Adjusted  4. Patient will improve Tinetti Balance score to 15/28 in order to . [] Progressing: [] Met: [] Not Met: [] Adjusted  5. Patient will increase 2MWT w/QC to 150' in order to improve community access. [x] Progressing: [] Met: [] Not Met: [] Adjusted        Overall Progression Towards Functional goals/ Treatment Progress Update:  [] Patient is progressing as expected towards functional goals listed. [] Progression is slowed due to complexities/Impairments listed. [x] Progression has been slowed due to co-morbidities.   [] Plan just implemented, too soon to assess goals progression <30days   [] Goals require adjustment due to lack of progress  [] Patient is not progressing as expected and requires additional follow up with physician  [] Other    Persisting Functional Limitations/Impairments:  []Sleeping []Sitting               [x]Standing [x]Transfers        [x]Walking [x]Kneeling               [x]Stairs [x]Squatting / bending   [x]ADLs [x]Reaching  [x]Lifting  [x]Housework  [x]Driving [x]Job related tasks  []Sports/Recreation []Other:        ASSESSMENT:  Pt session focused on increasing WB through LLE with weight shift, SLS, and ambulation. Pt required cues for positioning L LE. Pt relied on R UE throughout session, cues to reduce UE support were intermittently successful. She was able to take several steps in the body weight support harness without UE support. Pt will benefit from continued therapy addressing functional activity, endurance, and balance. Treatment/Activity Tolerance:  [x] Patient able to complete tx  [] Patient limited by fatigue  [] Patient limited by pain   [] Patient limited by other medical complications  [] Other:     Prognosis: [x] Good [] Fair  [] Poor    Patient Requires Follow-up: [x] Yes  [] No    Plan for next treatment session:    PLAN: See eval. PT 2x / week for 8 weeks. [x] Continue per plan of care [] Alter current plan (see comments)  [] Plan of care initiated [] Hold pending MD visit [] Discharge    Electronically signed by:   Radha Cai, Physical Therapy Student    Therapist was present, directed the patient's care, made skilled judgement, and was responsible for assessment and treatment of the patient. Grant Parker, PT, DPT    Note: If patient does not return for scheduled/ recommended follow up visits, his note will serve as a discharge from care along with most recent update on progress.

## 2022-10-26 ENCOUNTER — OFFICE VISIT (OUTPATIENT)
Dept: PRIMARY CARE CLINIC | Age: 42
End: 2022-10-26
Payer: COMMERCIAL

## 2022-10-26 VITALS
DIASTOLIC BLOOD PRESSURE: 100 MMHG | HEART RATE: 69 BPM | SYSTOLIC BLOOD PRESSURE: 140 MMHG | OXYGEN SATURATION: 99 % | RESPIRATION RATE: 20 BRPM | TEMPERATURE: 98.2 F

## 2022-10-26 DIAGNOSIS — E55.9 VITAMIN D DEFICIENCY: ICD-10-CM

## 2022-10-26 DIAGNOSIS — F41.8 DEPRESSION WITH ANXIETY: ICD-10-CM

## 2022-10-26 DIAGNOSIS — E78.2 MIXED HYPERLIPIDEMIA: ICD-10-CM

## 2022-10-26 DIAGNOSIS — G81.94 LEFT HEMIPARESIS (HCC): ICD-10-CM

## 2022-10-26 DIAGNOSIS — I10 ESSENTIAL HYPERTENSION, BENIGN: Primary | ICD-10-CM

## 2022-10-26 DIAGNOSIS — D50.8 OTHER IRON DEFICIENCY ANEMIA: ICD-10-CM

## 2022-10-26 PROCEDURE — 3074F SYST BP LT 130 MM HG: CPT

## 2022-10-26 PROCEDURE — G8484 FLU IMMUNIZE NO ADMIN: HCPCS

## 2022-10-26 PROCEDURE — 99214 OFFICE O/P EST MOD 30 MIN: CPT

## 2022-10-26 PROCEDURE — 4004F PT TOBACCO SCREEN RCVD TLK: CPT

## 2022-10-26 PROCEDURE — G8417 CALC BMI ABV UP PARAM F/U: HCPCS

## 2022-10-26 PROCEDURE — 3078F DIAST BP <80 MM HG: CPT

## 2022-10-26 PROCEDURE — G8427 DOCREV CUR MEDS BY ELIG CLIN: HCPCS

## 2022-10-26 RX ORDER — HYDROCHLOROTHIAZIDE 12.5 MG/1
12.5 CAPSULE, GELATIN COATED ORAL EVERY MORNING
Qty: 90 CAPSULE | Refills: 1 | Status: SHIPPED | OUTPATIENT
Start: 2022-10-26 | End: 2022-11-09

## 2022-10-26 RX ORDER — BACLOFEN 20 MG/1
20 TABLET ORAL 2 TIMES DAILY
Qty: 60 TABLET | Refills: 2 | Status: SHIPPED | OUTPATIENT
Start: 2022-10-26 | End: 2022-11-21

## 2022-10-26 RX ORDER — CETIRIZINE HYDROCHLORIDE 10 MG/1
10 TABLET ORAL DAILY
Qty: 90 TABLET | Refills: 1 | Status: SHIPPED | OUTPATIENT
Start: 2022-10-26 | End: 2023-01-24

## 2022-10-26 RX ORDER — AMLODIPINE BESYLATE 10 MG/1
10 TABLET ORAL DAILY
Qty: 30 TABLET | Refills: 11 | Status: SHIPPED | OUTPATIENT
Start: 2022-10-26

## 2022-10-26 RX ORDER — METOPROLOL SUCCINATE 100 MG/1
100 TABLET, EXTENDED RELEASE ORAL DAILY
Qty: 30 TABLET | Refills: 11 | Status: SHIPPED | OUTPATIENT
Start: 2022-10-26 | End: 2022-11-21

## 2022-10-26 RX ORDER — BUSPIRONE HYDROCHLORIDE 5 MG/1
5 TABLET ORAL DAILY
Qty: 30 TABLET | Refills: 0 | Status: SHIPPED | OUTPATIENT
Start: 2022-10-26 | End: 2022-11-21

## 2022-10-26 SDOH — ECONOMIC STABILITY: FOOD INSECURITY: WITHIN THE PAST 12 MONTHS, THE FOOD YOU BOUGHT JUST DIDN'T LAST AND YOU DIDN'T HAVE MONEY TO GET MORE.: OFTEN TRUE

## 2022-10-26 SDOH — ECONOMIC STABILITY: FOOD INSECURITY: WITHIN THE PAST 12 MONTHS, YOU WORRIED THAT YOUR FOOD WOULD RUN OUT BEFORE YOU GOT MONEY TO BUY MORE.: OFTEN TRUE

## 2022-10-26 ASSESSMENT — PATIENT HEALTH QUESTIONNAIRE - PHQ9
SUM OF ALL RESPONSES TO PHQ QUESTIONS 1-9: 14
9. THOUGHTS THAT YOU WOULD BE BETTER OFF DEAD, OR OF HURTING YOURSELF: 0
SUM OF ALL RESPONSES TO PHQ QUESTIONS 1-9: 14
10. IF YOU CHECKED OFF ANY PROBLEMS, HOW DIFFICULT HAVE THESE PROBLEMS MADE IT FOR YOU TO DO YOUR WORK, TAKE CARE OF THINGS AT HOME, OR GET ALONG WITH OTHER PEOPLE: 1
4. FEELING TIRED OR HAVING LITTLE ENERGY: 2
SUM OF ALL RESPONSES TO PHQ QUESTIONS 1-9: 14
3. TROUBLE FALLING OR STAYING ASLEEP: 2
2. FEELING DOWN, DEPRESSED OR HOPELESS: 2
6. FEELING BAD ABOUT YOURSELF - OR THAT YOU ARE A FAILURE OR HAVE LET YOURSELF OR YOUR FAMILY DOWN: 2
5. POOR APPETITE OR OVEREATING: 3
7. TROUBLE CONCENTRATING ON THINGS, SUCH AS READING THE NEWSPAPER OR WATCHING TELEVISION: 3
SUM OF ALL RESPONSES TO PHQ QUESTIONS 1-9: 14
8. MOVING OR SPEAKING SO SLOWLY THAT OTHER PEOPLE COULD HAVE NOTICED. OR THE OPPOSITE, BEING SO FIGETY OR RESTLESS THAT YOU HAVE BEEN MOVING AROUND A LOT MORE THAN USUAL: 0

## 2022-10-26 ASSESSMENT — ENCOUNTER SYMPTOMS
SHORTNESS OF BREATH: 0
WHEEZING: 0
CHEST TIGHTNESS: 0
BLOOD IN STOOL: 0
VOMITING: 0
COLOR CHANGE: 0
NAUSEA: 0
DIARRHEA: 0
COUGH: 0
ABDOMINAL PAIN: 0

## 2022-10-26 ASSESSMENT — SOCIAL DETERMINANTS OF HEALTH (SDOH): HOW HARD IS IT FOR YOU TO PAY FOR THE VERY BASICS LIKE FOOD, HOUSING, MEDICAL CARE, AND HEATING?: NOT VERY HARD

## 2022-10-26 NOTE — PROGRESS NOTES
Maria Elena Garcia (:  1980) is a 40 y.o. female,Established patient, here for evaluation of the following chief complaint(s):  Follow-up (Medication refills )      HPI  Patient presents for medication refills. PHQ9 positive this date, patient currently taking Cymbalta 30mg BID for anxiety and depression. Patient states has taken Buspar in te past with good relief, states discontinued medication as ran out and switched providers - never had refilled. PHQ-9  10/26/2022 2022 3/23/2022   Little interest or pleasure in doing things - 0 1   Feeling down, depressed, or hopeless 2 0 1   Trouble falling or staying asleep, or sleeping too much 2 0 3   Feeling tired or having little energy 2 2 0   Poor appetite or overeating 3 3 3   Feeling bad about yourself - or that you are a failure or have let yourself or your family down 2 0 0   Trouble concentrating on things, such as reading the newspaper or watching television 3 2 1   Moving or speaking so slowly that other people could have noticed. Or the opposite - being so fidgety or restless that you have been moving around a lot more than usual 0 0 3   Thoughts that you would be better off dead, or of hurting yourself in some way 0 0 0   PHQ-2 Score - 0 2   PHQ-9 Total Score 14 7 12   If you checked off any problems, how difficult have these problems made it for you to do your work, take care of things at home, or get along with other people? 1 2 1     Flu shot - declines    ASSESSMENT/PLAN:  1. Essential hypertension, benign  -     amLODIPine (NORVASC) 10 MG tablet; Take 1 tablet by mouth daily, Disp-30 tablet, R-11Normal  -     metoprolol succinate (TOPROL XL) 100 MG extended release tablet; Take 1 tablet by mouth daily, Disp-30 tablet, R-11Normal  -     hydroCHLOROthiazide (MICROZIDE) 12.5 MG capsule; Take 1 capsule by mouth every morning, Disp-90 capsule, R-1Normal  2.  Depression with anxiety  -     Angel Patel [facility]  -     busPIRone (BUSPAR) 5 MG tablet; Take 1 tablet by mouth daily, Disp-30 tablet, R-0Normal  3. Mixed hyperlipidemia  -     Lipid, Fasting; Future  4. Left hemiparesis (HCC)  -     baclofen (LIORESAL) 20 MG tablet; Take 1 tablet by mouth 2 times daily, Disp-60 tablet, R-2Normal  5. Vitamin D deficiency  -     Vitamin D 25 Hydroxy; Future  6. Other iron deficiency anemia  -     CBC with Auto Differential; Future  -     Ferritin; Future     BP (!) 140/100   Pulse 69   Temp 98.2 °F (36.8 °C) (Oral)   Resp 20   LMP 03/03/2016   SpO2 99%      SUBJECTIVE/OBJECTIVE:  Review of Systems   Constitutional:  Negative for fatigue, fever and unexpected weight change. Eyes:  Negative for visual disturbance. Respiratory:  Negative for cough, chest tightness, shortness of breath and wheezing. Cardiovascular:  Negative for chest pain, palpitations and leg swelling. Gastrointestinal:  Negative for abdominal pain, blood in stool, diarrhea, nausea and vomiting. Skin:  Negative for color change and rash. Neurological:  Positive for headaches. Negative for dizziness, weakness and light-headedness. Physical Exam  Vitals and nursing note reviewed. Constitutional:       General: She is not in acute distress. Appearance: Normal appearance. She is obese. Cardiovascular:      Rate and Rhythm: Normal rate and regular rhythm. Heart sounds: Normal heart sounds. Pulmonary:      Effort: Pulmonary effort is normal.      Breath sounds: Normal breath sounds. Musculoskeletal:      Comments: Left hemiparesis - baseline   Skin:     General: Skin is warm and dry. Neurological:      Mental Status: She is alert and oriented to person, place, and time. Mental status is at baseline. Psychiatric:         Mood and Affect: Mood normal.         Behavior: Behavior normal.         Thought Content:  Thought content normal.         Judgment: Judgment normal.       Current Outpatient Medications   Medication Sig Dispense Refill    amLODIPine (NORVASC) 10 MG tablet Take 1 tablet by mouth daily 30 tablet 11    baclofen (LIORESAL) 20 MG tablet Take 1 tablet by mouth 2 times daily 60 tablet 2    metoprolol succinate (TOPROL XL) 100 MG extended release tablet Take 1 tablet by mouth daily 30 tablet 11    cetirizine (ZYRTEC) 10 MG tablet Take 1 tablet by mouth daily 90 tablet 1    hydroCHLOROthiazide (MICROZIDE) 12.5 MG capsule Take 1 capsule by mouth every morning 90 capsule 1    busPIRone (BUSPAR) 5 MG tablet Take 1 tablet by mouth daily 30 tablet 0    ferrous sulfate (IRON 325) 325 (65 Fe) MG tablet Take 1 tablet by mouth daily (with breakfast) 30 tablet 5    vitamin D (CHOLECALCIFEROL) 50 MCG (2000 UT) TABS tablet Take 1 tablet by mouth in the morning. 30 tablet 5    atorvastatin (LIPITOR) 40 MG tablet Take 1 tablet by mouth nightly 30 tablet 5    DULoxetine (CYMBALTA) 30 MG extended release capsule Take 1 capsule by mouth 2 times daily 60 capsule 11    sennosides-docusate sodium (SENOKOT-S) 8.6-50 MG tablet Take 1 tablet by mouth daily 30 tablet 6    nystatin (MYCOSTATIN) 913539 UNIT/GM cream Apply topically 2 times daily. 30 g 1    acetaminophen (TYLENOL) 500 MG tablet Take 1 tablet by mouth every 6 hours as needed for Pain 120 tablet 3     No current facility-administered medications for this visit. Return in about 2 weeks (around 11/9/2022) for Blood pressure re-check, MA visit.     Electronically signed by HUSSEIN Sena CNP on 10/26/2022 at 9:39 AM

## 2022-10-27 ENCOUNTER — HOSPITAL ENCOUNTER (OUTPATIENT)
Dept: PHYSICAL THERAPY | Age: 42
Setting detail: THERAPIES SERIES
Discharge: HOME | End: 2022-10-27
Payer: COMMERCIAL

## 2022-10-27 DIAGNOSIS — K59.01 SLOW TRANSIT CONSTIPATION: ICD-10-CM

## 2022-10-27 PROCEDURE — 97112 NEUROMUSCULAR REEDUCATION: CPT

## 2022-10-27 PROCEDURE — 97116 GAIT TRAINING THERAPY: CPT

## 2022-10-27 RX ORDER — SENNOSIDES 8.6 MG
TABLET ORAL
Qty: 60 TABLET | Refills: 3 | Status: SHIPPED | OUTPATIENT
Start: 2022-10-27 | End: 2022-11-07

## 2022-10-27 NOTE — FLOWSHEET NOTE
[facility] - Outpatient Physical Therapy  Phone: [phone]   Fax: [phone]        Physical Therapy Daily Treatment Note/ Progress Note  Date:  10/27/2022    Patient Name:  Walter Orellana    :  1980  MRN: [id number]  Medical/Treatment Diagnosis Information:  Diagnosis: R25.2 (ICD-10-CM) - Cramp and spasm;   Left hemiparesis with spasticity  Treatment Diagnosis: Impaired proprioception of LUE & LE, avoidance of L extremities, Impaired balance & gait  Insurance/Certification information:  PT Insurance Information: [employer]. 30 visits pcy. PA required  Physician Information:   Clary Lu MD  Plan of care signed (Y/N): [x]  Yes []  No     Date of Patient follow up with Physician: ?     Progress Report: []  Yes  [x]  No     Date Range for reporting period:  Beginning  2022   PN: 22  POC: 10/18/22  Ending      Progress report due (10 Rx/or 30 days whichever is less): visit #17 or     Recertification due (POC duration/ or 90 days whichever is less): visit #17     Visit # Insurance Allowable Auth required?  Date Range    +    16 (30 pcy) [x]  Yes, [employer]  []  No Until 9/10/22  Extended to 11/10/22         Latex Allergy:  [x]NO      []YES  Preferred Language for Healthcare:   [x]English       []Other:      Functional Scale:                                                                                                      Date assessed:  FOTO physical FS primary measure score = 49; risk adjusted = 50                    FOTO physical FS primary measure score = 52                                                      FOTO physical FS primary measure score = 53     10/18        Functional Scale/Test Evaluation 2022 30 day   22 60 day   10/18 Discharge    Tinetti Assessment      30\" STS 3 8 1     5x STS 40\" 25\" 1'26\"     2MWT w/QC 65' 66' 92'     TUG - - 50\" w/QC          Pain level:  0/10  Location:  L knee & elbow    SUBJECTIVE:  Pt reports she was sore in her low back and R arm after last visit.      OBJECTIVE:   10/27: improved step length on L with cues for smaller step - able to keep toes in neutral; Cristal York, OT present for some of session  addressing L UE position     10/25: vision still slightly blurry but getting better    10/20: Difficulty remains with maintaining weight bearing on L - visible mm fatigue through session, required consistent cues for weight shifting L     10/18:  Transfers:  L foot comes off ground during sit to stand transitions when RUE is used    10/4: amb with L toe out and AFO, co-treat with OT  9/12:  pt visibly fatigued in B knees by the beginning of PT session - this was 3rd therapy of the day, wearing L eye patch       RESTRICTIONS/PRECAUTIONS:   HTN    Interventions/Exercises:   Therapeutic Exercises (67033) Resistance / level Sets/sec Reps Notes   TM      7/23: L foot stationary on silver portion                          Neuromuscular Re-ed (02698)        Seated stepper  9/8: PT assisted alignment L hip - cues for pulling it towards midline          Total gym  -squats to facilitate use of LLE       Tall kneel- side step across mat table  8/2: PT assist for postural control and L leg assist   Fwd lunge onto step, L foot on top of step, R foot behind 8/2: PT assist at pelvis for forward weight shift   LAQ seated     Marches L      Seated hip abd with band at knees  Seated LAQ with band at ankle 8/2: PT assist for L quad activation and weight shift to L for upright posture    9/8: PT elevating L leg to give clearance    On mat table:  Bridge  Roll R  Roll L  Hip abd in supine  Heel slide in supine  Prone ham curl  Prone resisted knee ext  Marches with ecc control and neutral hip  L hip fall out and back to neutral     Tall kneeling                            8/29: fair+ recruitment of LLE muscle fibers  8/29: fair-  recruitment of LLE muscle fibers      AAROM  PROM  AAROM - cues to push into hand    Stand from mat table with weight shifted over L LE and hips square prior to stand    VCs for pointing head towards L knee to achieve neutral and greater weight into L LE   Standing with wall on left:  Calling out letters on specifically colored post it notes    Only 1 mistake on letter - stated the F was a P   In biodex harness:  Finding neutral in between bars, shifting more weight into L LE and straightening L knee    Neutral stand with L hand on bar and R hand tapping cones following color at various positions     Neutral stance, R hand tapping blaze pods only on specific color at various reaching distances    Neutral stance with R hand on bar - blaze pods on floor tapping  on specific color        SLS on L in harness      Toe tap on 6 inch step        ~10 min throughout session       10 sec x 4       X10 B Pt sitting down supported by harness for rest breaks intermittently - x1 on 10/25                                    Short periods of balance - L hand on bar    B UE support  VCs for erect posture, glute squeeze, and pulling in abs   Sit to stand without UE support to facilitate forward flexion and increase WB on LLE   x5 Cues to flex forward at trunk and reach ahead w R arm, head/hips,momentum                                      Therapeutic Activities (86892) /  Functional Tasks / Gait Training (19657)       Sit to supine  Supine to sit    Sitting to/from tall kneeling Sup  Sup    Mod/max A     R foot taps on 8\" block & hold      R foot taps on 2nd step    R toe taps on to black board of Shuttle, L foot on lester R hand holding 1/2 full cup of water; Man & VC's to improve posture and LE muscle fiber recruitment  R hand on QC    7/28: stability provided by SPT through R hand   Kicking tall bolster w/R foot CGA/min A  10/19: cues to improve LLE stability & balance          Gait Training:  Amb w/QC & AFO          Step up and overs, L foot on Airex, R foot step over half bolster & return   SBA/ CGA         95 feet        Gentle manual facilitation to bring pelvis to neutral-  toe out with fatigue, pt able to correct trajectory to avoid obstacles       7/28: LOB x 1 with max assist x2   Sit to stand, R foot on AirEx to inhibit activation, large bolster between feet to maintain correct alignment for facilitation   7/28: use of mirror improved weight-shifting to L upon standing   Sit to stand w/large SB to facilitate forward flexion and increase activation of LLE   7/28: use of mirror improved weight-shifting to L while seated   Sit to stand from corner of mat #6, RLE on folded gray pad, large bolster behind R ankle to limited knee flex   R hand with walking stick   Donned biodex harness in standing, doffed in standing       Gait in biodex harness with B UE support   X 50 feet VCs for keeping L toes pointing forward and big steps -   B UE support, improved L step length and toe position                         Manual Intervention (13524)                                                     Modalities:     Pt. Education:  9/8: Reassessed goals, discussed progress made and areas remaining for improvement, issued outcome measure and reviewed new score with pt as compared to eval    6/24/2022 patient educated on diagnosis, prognosis and expectations for rehab, all patient questions were answered    HEP instruction:  6/24: Shift weight to L to facilitate LLE muscle fiber recruitment with all functional activities. Therapeutic Exercise and NMR EXR  [] (59073) Provided verbal/tactile cueing for activities related to strengthening, flexibility, endurance, ROM for improvements in  [] LE / Core stability: LE, hip, and core control with self care, mobility, lifting, ambulation.   [] UE / Shoulder complex: cervical, postural, scapular, scapulothoracic and UE control with self care, reaching, carrying, lifting, house/yardwork, driving, computer work.  [] (31974) Provided verbal/tactile cueing for activities related to improving balance, coordination, kinesthetic sense, posture, motor skill, proprioception to assist with   [] LE / Core stability: LE, hip, and core control in self care, mobility, lifting, ambulation and eccentric single leg control. [] UE / Shoulder complex: cervical, scapular, scapulothoracic and UE control with self care, reaching, carrying, lifting, house/yardwork, driving, computer work.   [] (45834) Therapist is in constant attendance of 2 or more patients providing skilled therapy interventions, but not providing any significant amount of measurable one-on-one time to either patient, for improvements in  [] LE / Core stability: LE, hip, and core control in self care, mobility, lifting, ambulation and eccentric single leg control. [] UE / Shoulder complex: cervical, scapular, scapulothoracic and UE control with self care, reaching, carrying, lifting, house/yardwork, driving, computer work. NMR and Therapeutic Activities:    [x] (92120 or 70170) Provided verbal/tactile cueing for activities related to improving balance, coordination, kinesthetic sense, posture, motor skill, proprioception and motor activation to allow for proper function of   [x] LE / Core, hip and LE with self care and ADLs  [] UE / Shoulder complex: cervical, postural, scapular, scapulothoracic and UE control with self care, carrying, lifting, driving, computer work.    [x] (43933) Gait Re-education- Provided training and instruction to the patient for proper LE, core and hip recruitment, positioning, and eccentric body weight control with ambulation re-education, including ascending & descending stairs     Home Management Training / Self Care:  [] (78279) Provided self-care/home management training related to activities of daily living and compensatory training, and/or use of adaptive equipment for improvement with: ADLs and compensatory training, meal preparation, safety procedures and instruction in use of adaptive equipment, including bathing, grooming, dressing, personal hygiene, basic household cleaning and chores. Home Exercise Program:    [] (50196) Reviewed/Progressed HEP activities related to strengthening, flexibility, endurance, ROM of   [] LE / Core stability: core, hip and LE for functional self-care, mobility, lifting and ambulation/stair navigation   [] UE / Shoulder complex: cervical, postural, scapular, scapulothoracic and UE control with self care, reaching, carrying, lifting, house/yardwork, driving, computer work  [] (49826)Reviewed/Progressed HEP activities related to improving balance, coordination, kinesthetic sense, posture, motor skill, proprioception of   [] LE: core, hip and LE for self care, mobility, lifting, and ambulation/stair navigation    [] UE / Shoulder complex: cervical, postural,  scapular, scapulothoracic and UE control with self care, reaching, carrying, lifting, house/yardwork, driving, computer work    Manual Treatments:  PROM / STM / Oscillations-Mobs:  G-I, II, III, IV (PA's, Inf., Post.)  [] (01519) Provided manual therapy to mobilize shoulder complex, hip, LE, and/or cervicothoracic/LS spine soft tissue/joints for the purpose of modulating pain, promoting relaxation,  increasing ROM, reducing/eliminating soft tissue swelling/inflammation/restriction, improving soft tissue extensibility and allowing for proper ROM for normal function with   [] LE / Core stability: self care, mobility, lifting and ambulation. [] UE / Shoulder complex: self care, reaching, carrying, lifting, house/yardwork, driving, computer work. Modalities:  [] (32185) Vasopneumatic compression: Utilized vasopneumatic compression to decrease edema / swelling for the purpose of improving mobility and quad tone / recruitment which will allow for increased overall function including but not limited to self-care, transfers, ambulation, and ascending / descending stairs.        Units approved Units used Date Range   120 30 Until 11/10/22       Charges:  Timed Code Treatment Minutes: 46   Total Treatment Minutes: 46     [] EVAL - LOW (70552)   [] EVAL - MOD (43220)  [] EVAL - HIGH (28770)  [] RE-EVAL (85808)  [] TE (86073) x      [] Ionto  [x] NMR (00279) x  2    [] Vaso  [] Manual (49301) x      [] Ultrasound  []TA x                  [] Mech Traction (98388)  [x] Gait Training x 1   [] ES (un) (51753):   [] Aquatic therapy x   [] Other:   [] Group:     GOALS:   Patient stated goal: improve transfers and gait  [x] Progressing: [] Met: [] Not Met: [] Adjusted     Therapist goals for Patient:   Short Term Goals: To be achieved in: 3 weeks  1. Independent in HEP and progression per patient tolerance, in order to prevent re-injury. [] Progressing: [x] Met: [] Not Met: [] Adjusted  2. Patient to demonstrate 40% WB'ing of LLE during sit to stand transfers to assist in muscle recruitment with all tasks. [x] Progressing: [] Met: [] Not Met: [] Adjusted     Long Term Goals: To be achieved in: 8 weeks  1. Patient to demonstrate TUG w/QC score of <15\" in order to demonstrate reduced fall risk to assist with reaching prior level of function. - NOT TESTED 9/8  [] Progressing: [] Met: [] Not Met: [] Adjusted  2. FOTO score of at least 56 to assist with reaching prior level of function. [] Progressing: [] Met: [] Not Met: [] Adjusted  3. Patient will demonstrate an increase 30\" sit to stand reps to 10 with 40% WB'ing through LLE, to improve transfers to/from all surfaces & heights  [x] Progressing: [] Met: [] Not Met: [] Adjusted  4. Patient will improve Tinetti Balance score to 15/28 in order to . [] Progressing: [] Met: [] Not Met: [] Adjusted  5. Patient will increase 2MWT w/QC to 150' in order to improve community access. [x] Progressing: [] Met: [] Not Met: [] Adjusted        Overall Progression Towards Functional goals/ Treatment Progress Update:  [] Patient is progressing as expected towards functional goals listed.     [] Progression is slowed due to complexities/Impairments listed. [x] Progression has been slowed due to co-morbidities. [] Plan just implemented, too soon to assess goals progression <30days   [] Goals require adjustment due to lack of progress  [] Patient is not progressing as expected and requires additional follow up with physician  [] Other    Persisting Functional Limitations/Impairments:  []Sleeping []Sitting               [x]Standing [x]Transfers        [x]Walking [x]Kneeling               [x]Stairs [x]Squatting / bending   [x]ADLs [x]Reaching  [x]Lifting  [x]Housework  [x]Driving [x]Job related tasks  []Sports/Recreation []Other:        ASSESSMENT: Session focused on positioning L LE and increasing WB through L LE with SLS, toe-taps, and ambulation. OT present and assisting with L hand positioning. Pt demonstrated improvement in positioning L LE for WB activities and keeping toes straight ahead during gait in the body weight support harness. She required a few seated breaks due to back pain during session. Pt cued for neutral posture, glut squeeze, and abdominal support throughout session. Pt will benefit from continued therapy addressing functional activity, endurance, and balance. Treatment/Activity Tolerance:  [x] Patient able to complete tx  [] Patient limited by fatigue  [] Patient limited by pain   [] Patient limited by other medical complications  [] Other:     Prognosis: [x] Good [] Fair  [] Poor    Patient Requires Follow-up: [x] Yes  [] No    Plan for next treatment session:    PLAN: See josey. PT 2x / week for 8 weeks. [x] Continue per plan of care [] Alter current plan (see comments)  [] Plan of care initiated [] Hold pending MD visit [] Discharge    Electronically signed by:   Barbara Clements, Physical Therapy Student    Therapist was present, directed the patient's care, made skilled judgement, and was responsible for assessment and treatment of the patient.      Starla Carmichael, PT, DPT    Note: If patient does not return for scheduled/ recommended follow up visits, his note will serve as a discharge from care along with most recent update on progress.

## 2022-11-01 ENCOUNTER — HOSPITAL ENCOUNTER (OUTPATIENT)
Dept: PHYSICAL THERAPY | Age: 42
Setting detail: THERAPIES SERIES
Discharge: HOME | End: 2022-11-01
Payer: COMMERCIAL

## 2022-11-01 ENCOUNTER — TELEPHONE (OUTPATIENT)
Dept: PRIMARY CARE CLINIC | Age: 42
End: 2022-11-01

## 2022-11-01 PROCEDURE — 97116 GAIT TRAINING THERAPY: CPT

## 2022-11-01 NOTE — LETTER
[address] [address]  Phone: [phone]  Fax: [phone]    Ale Ann MD         November 1, 2022     Patient: Carl Jin   YOB: 1980   Date of Visit: 11/1/2022       To Whom It May Concern: It is my medical opinion that Lilia Luna requires a disability parking placard for the following reasons:  She cannot walk 200 feet without stopping to rest.  Duration of need: 5 years    If you have any questions or concerns, please don't hesitate to call.     Sincerely,        HUSSEIN Lopes

## 2022-11-01 NOTE — FLOWSHEET NOTE
[facility] - Outpatient Physical Therapy  Phone: [phone]   Fax: [phone]        Physical Therapy Daily Treatment Note/ Progress Note  Date:  2022    Patient Name:  Patsy Wiseman    :  1980  MRN: [id number]  Medical/Treatment Diagnosis Information:  Diagnosis: R25.2 (ICD-10-CM) - Cramp and spasm;   Left hemiparesis with spasticity  Treatment Diagnosis: Impaired proprioception of LUE & LE, avoidance of L extremities, Impaired balance & gait  Insurance/Certification information:  PT Insurance Information: [employer]. 30 visits pcy. PA required  Physician Information:   Adalberto Martin MD  Plan of care signed (Y/N): [x]  Yes []  No     Date of Patient follow up with Physician: ?     Progress Report: []  Yes  [x]  No     Date Range for reporting period:  Beginning  2022   PN: 22  POC: 10/18/22  Ending:    Progress report due (10 Rx/or 30 days whichever is less): visit #17 or 75/3/52    Recertification due (POC duration/ or 90 days whichever is less): visit #17     Visit # Insurance Allowable Auth required?  Date Range    +    16 (30 pcy) [x]  Yes, [employer]  []  No Until 9/10/22  Extended to 11/10/22         Latex Allergy:  [x]NO      []YES  Preferred Language for Healthcare:   [x]English       []Other:      Functional Scale:                                                                                                      Date assessed:  FOTO physical FS primary measure score = 49; risk adjusted = 50                    FOTO physical FS primary measure score = 52                                                      FOTO physical FS primary measure score = 53     10/18        Functional Scale/Test Evaluation 2022 30 day   22 60 day   10/18 Discharge    Tinetti Assessment      30\" STS 3 8 1     5x STS 40\" 25\" 1'26\"     2MWT w/QC 65' 66' 92'     TUG - - 50\" w/QC          Pain level:  0/10  Location:  L knee & elbow    SUBJECTIVE:  Pt reports she is doing fine today. Nothing new over the weekend.      OBJECTIVE:   11/1: able to demo appropriate and efficient gait mechanics during session with cues for forward push, Shira Martinez, OT present for some of session    10/27: improved step length on L with cues for smaller step - able to keep toes in neutral; Shira Martinez OT present for some of session  addressing L UE position     10/25: vision still slightly blurry but getting better    10/20: Difficulty remains with maintaining weight bearing on L - visible mm fatigue through session, required consistent cues for weight shifting L     10/18:  Transfers:  L foot comes off ground during sit to stand transitions when RUE is used    10/4: amb with L toe out and AFO, co-treat with OT  9/12:  pt visibly fatigued in B knees by the beginning of PT session - this was 3rd therapy of the day, wearing L eye patch       RESTRICTIONS/PRECAUTIONS:   HTN    Interventions/Exercises:   Therapeutic Exercises (96293) Resistance / level Sets/sec Reps Notes   TM      7/23: L foot stationary on silver portion                          Neuromuscular Re-ed (73206)        Seated stepper  9/8: PT assisted alignment L hip - cues for pulling it towards midline          Total gym  -squats to facilitate use of LLE       Tall kneel- side step across mat table  8/2: PT assist for postural control and L leg assist   Fwd lunge onto step, L foot on top of step, R foot behind 8/2: PT assist at pelvis for forward weight shift   LAQ seated     Marches L      Seated hip abd with band at knees  Seated LAQ with band at ankle 8/2: PT assist for L quad activation and weight shift to L for upright posture    9/8: PT elevating L leg to give clearance    On mat table:  Bridge  Roll R  Roll L  Hip abd in supine  Heel slide in supine  Prone ham curl  Prone resisted knee ext  Marches with ecc control and neutral hip  L hip fall out and back to neutral     Tall kneeling 8/29: fair+ recruitment of LLE muscle fibers  8/29: fair-  recruitment of LLE muscle fibers      AAROM  PROM  AAROM - cues to push into hand    Stand from mat table with weight shifted over L LE and hips square prior to stand    VCs for pointing head towards L knee to achieve neutral and greater weight into L LE   Standing with wall on left:  Calling out letters on specifically colored post it notes    Only 1 mistake on letter - stated the F was a P   In biodex harness:  Finding neutral in between bars, shifting more weight into L LE and straightening L knee    Neutral stand with L hand on bar and R hand tapping cones following color at various positions     Neutral stance, R hand tapping blaze pods only on specific color at various reaching distances    Neutral stance with R hand on bar - blaze pods on floor tapping  on specific color        SLS on L in harness      Toe tap on 6 inch step            Pt sitting down supported by harness for rest breaks intermittently - x1 on 10/25                                    Short periods of balance - L hand on bar    B UE support  VCs for erect posture, glute squeeze, and pulling in abs   Sit to stand without UE support to facilitate forward flexion and increase WB on LLE   Cues to flex forward at trunk and reach ahead w R arm, head/hips,momentum                                      Therapeutic Activities (71205) /  Functional Tasks / Gait Training (58778)       Sit to supine  Supine to sit    Sitting to/from tall kneeling Sup  Sup    Mod/max A     R foot taps on 8\" block & hold      R foot taps on 2nd step    R toe taps on to black board of Shuttle, L foot on lester R hand holding 1/2 full cup of water; Man & VC's to improve posture and LE muscle fiber recruitment  R hand on QC    7/28: stability provided by SPT through R hand   Kicking tall bolster w/R foot CGA/min A  10/19: cues to improve LLE stability & balance   Sit to stand with hands on ends of small bolster   X 6 through session  11/1: able to scoot and stand with cues for forward lean, good foot position without cuing    Gait Training:  Amb w/QC & AFO          Step up and overs, L foot on Airex, R foot step over half bolster & return   SBA/ CGA             Gentle manual facilitation to bring pelvis to neutral-  toe out with fatigue, pt able to correct trajectory to avoid obstacles       7/28: LOB x 1 with max assist x2   Sit to stand, R foot on AirEx to inhibit activation, large bolster between feet to maintain correct alignment for facilitation   7/28: use of mirror improved weight-shifting to L upon standing   Sit to stand w/large SB to facilitate forward flexion and increase activation of LLE   7/28: use of mirror improved weight-shifting to L while seated   Sit to stand from corner of mat #6, RLE on folded gray pad, large bolster behind R ankle to limited knee flex   R hand with walking stick   Donned biodex harness in standing, doffed in standing       Gait in biodex harness with B UE support   VCs for keeping L toes pointing forward and big steps -   B UE support, improved L step length and toe position    Gait with hands on ends of small bolster - constant cues for pushing through B UEs into slight resistance    X 140 feet    X140 feet    X20 feet following directions to look R and L while maintaining straight trajectory  11/1: OT/SPT in front throughout session, SOT facilitating at L UE, B step length and foot positioning greatly improved with mid line stability and full body engagement causing a decrease in tone                  Manual Intervention (66764)                                                     Modalities:     Pt. Education:  9/8: Reassessed goals, discussed progress made and areas remaining for improvement, issued outcome measure and reviewed new score with pt as compared to eval    6/24/2022 patient educated on diagnosis, prognosis and expectations for rehab, all patient questions were answered    HEP instruction:  6/24: Shift weight to L to facilitate LLE muscle fiber recruitment with all functional activities. Therapeutic Exercise and NMR EXR  [] (48423) Provided verbal/tactile cueing for activities related to strengthening, flexibility, endurance, ROM for improvements in  [] LE / Core stability: LE, hip, and core control with self care, mobility, lifting, ambulation. [] UE / Shoulder complex: cervical, postural, scapular, scapulothoracic and UE control with self care, reaching, carrying, lifting, house/yardwork, driving, computer work.  [] (40449) Provided verbal/tactile cueing for activities related to improving balance, coordination, kinesthetic sense, posture, motor skill, proprioception to assist with   [] LE / Core stability: LE, hip, and core control in self care, mobility, lifting, ambulation and eccentric single leg control. [] UE / Shoulder complex: cervical, scapular, scapulothoracic and UE control with self care, reaching, carrying, lifting, house/yardwork, driving, computer work.   [] (20175) Therapist is in constant attendance of 2 or more patients providing skilled therapy interventions, but not providing any significant amount of measurable one-on-one time to either patient, for improvements in  [] LE / Core stability: LE, hip, and core control in self care, mobility, lifting, ambulation and eccentric single leg control. [] UE / Shoulder complex: cervical, scapular, scapulothoracic and UE control with self care, reaching, carrying, lifting, house/yardwork, driving, computer work.      NMR and Therapeutic Activities:    [] (20674 or 11809) Provided verbal/tactile cueing for activities related to improving balance, coordination, kinesthetic sense, posture, motor skill, proprioception and motor activation to allow for proper function of   [] LE / Core, hip and LE with self care and ADLs  [] UE / Shoulder complex: cervical, postural, scapular, scapulothoracic and UE control with self care, carrying, lifting, driving, computer work. [x] (34607) Gait Re-education- Provided training and instruction to the patient for proper LE, core and hip recruitment, positioning, and eccentric body weight control with ambulation re-education, including ascending & descending stairs     Home Management Training / Self Care:  [] (21258) Provided self-care/home management training related to activities of daily living and compensatory training, and/or use of adaptive equipment for improvement with: ADLs and compensatory training, meal preparation, safety procedures and instruction in use of adaptive equipment, including bathing, grooming, dressing, personal hygiene, basic household cleaning and chores.        Home Exercise Program:    [] (51489) Reviewed/Progressed HEP activities related to strengthening, flexibility, endurance, ROM of   [] LE / Core stability: core, hip and LE for functional self-care, mobility, lifting and ambulation/stair navigation   [] UE / Shoulder complex: cervical, postural, scapular, scapulothoracic and UE control with self care, reaching, carrying, lifting, house/yardwork, driving, computer work  [] (34275)Reviewed/Progressed HEP activities related to improving balance, coordination, kinesthetic sense, posture, motor skill, proprioception of   [] LE: core, hip and LE for self care, mobility, lifting, and ambulation/stair navigation    [] UE / Shoulder complex: cervical, postural,  scapular, scapulothoracic and UE control with self care, reaching, carrying, lifting, house/yardwork, driving, computer work    Manual Treatments:  PROM / STM / Oscillations-Mobs:  G-I, II, III, IV (PA's, Inf., Post.)  [] (71786) Provided manual therapy to mobilize shoulder complex, hip, LE, and/or cervicothoracic/LS spine soft tissue/joints for the purpose of modulating pain, promoting relaxation,  increasing ROM, reducing/eliminating soft tissue swelling/inflammation/restriction, improving soft tissue extensibility and allowing for proper ROM for normal function with   [] LE / Core stability: self care, mobility, lifting and ambulation. [] UE / Shoulder complex: self care, reaching, carrying, lifting, house/yardwork, driving, computer work. Modalities:  [] (89652) Vasopneumatic compression: Utilized vasopneumatic compression to decrease edema / swelling for the purpose of improving mobility and quad tone / recruitment which will allow for increased overall function including but not limited to self-care, transfers, ambulation, and ascending / descending stairs. Units approved Units used Date Range   120 33 Until 11/10/22       Charges:  Timed Code Treatment Minutes: 47   Total Treatment Minutes: 47     [] EVAL - LOW (77779)   [] EVAL - MOD (74887)  [] EVAL - HIGH (00439)  [] RE-EVAL (25231)  [] TE (97590) x      [] Ionto  [] NMR (47671) x      [] Vaso  [] Manual (67782) x      [] Ultrasound  []TA x                  [] Mech Traction (23671)  [x] Gait Training x 3   [] ES (un) (57078):   [] Aquatic therapy x   [] Other:   [] Group:     GOALS:   Patient stated goal: improve transfers and gait  [x] Progressing: [] Met: [] Not Met: [] Adjusted     Therapist goals for Patient:   Short Term Goals: To be achieved in: 3 weeks  1. Independent in HEP and progression per patient tolerance, in order to prevent re-injury. [] Progressing: [x] Met: [] Not Met: [] Adjusted  2. Patient to demonstrate 40% WB'ing of LLE during sit to stand transfers to assist in muscle recruitment with all tasks. [x] Progressing: [] Met: [] Not Met: [] Adjusted     Long Term Goals: To be achieved in: 8 weeks  1. Patient to demonstrate TUG w/QC score of <15\" in order to demonstrate reduced fall risk to assist with reaching prior level of function. - NOT TESTED 9/8  [] Progressing: [] Met: [] Not Met: [] Adjusted  2.  FOTO score of at least 56 to assist with reaching prior level of function. [] Progressing: [] Met: [] Not Met: [] Adjusted  3. Patient will demonstrate an increase 30\" sit to stand reps to 10 with 40% WB'ing through LLE, to improve transfers to/from all surfaces & heights  [x] Progressing: [] Met: [] Not Met: [] Adjusted  4. Patient will improve Tinetti Balance score to 15/28 in order to . [] Progressing: [] Met: [] Not Met: [] Adjusted  5. Patient will increase 2MWT w/QC to 150' in order to improve community access. [x] Progressing: [] Met: [] Not Met: [] Adjusted        Overall Progression Towards Functional goals/ Treatment Progress Update:  [] Patient is progressing as expected towards functional goals listed. [] Progression is slowed due to complexities/Impairments listed. [x] Progression has been slowed due to co-morbidities. [] Plan just implemented, too soon to assess goals progression <30days   [] Goals require adjustment due to lack of progress  [] Patient is not progressing as expected and requires additional follow up with physician  [] Other    Persisting Functional Limitations/Impairments:  []Sleeping []Sitting               [x]Standing [x]Transfers        [x]Walking [x]Kneeling               [x]Stairs [x]Squatting / bending   [x]ADLs [x]Reaching  [x]Lifting  [x]Housework  [x]Driving [x]Job related tasks  []Sports/Recreation []Other:        ASSESSMENT: Gait mechanics greatly improved with B UE and core engagement and stability. Tone decreased throughout session. NO complaints of back or knee pain during gait due to neutral alignment. Plan to continue POC until insurance approval ends. Treatment/Activity Tolerance:  [x] Patient able to complete tx  [] Patient limited by fatigue  [] Patient limited by pain   [] Patient limited by other medical complications  [] Other:     Prognosis: [x] Good [] Fair  [] Poor    Patient Requires Follow-up: [x] Yes  [] No    Plan for next treatment session:    PLAN: See eval. PT 2x / week for 8 weeks.    [x] Continue per plan of care [] Alter current plan (see comments)  [] Plan of care initiated [] Hold pending MD visit [] Discharge    Electronically signed by:   Rebecca Gonzalez, Physical Therapy Student    Therapist was present, directed the patient's care, made skilled judgement, and was responsible for assessment and treatment of the patient. Brayden Noel, PT, DPT    Note: If patient does not return for scheduled/ recommended follow up visits, his note will serve as a discharge from care along with most recent update on progress.

## 2022-11-01 NOTE — TELEPHONE ENCOUNTER
Pt  states HUSSEIN Ordoñez completed a form for pt to receive a handicap placard during OV on 10/26/22. Pt also needs a handicap placard letter in order to get placard.

## 2022-11-03 ENCOUNTER — HOSPITAL ENCOUNTER (OUTPATIENT)
Dept: PHYSICAL THERAPY | Age: 42
Setting detail: THERAPIES SERIES
Discharge: HOME | End: 2022-11-03
Payer: COMMERCIAL

## 2022-11-03 PROCEDURE — 97116 GAIT TRAINING THERAPY: CPT

## 2022-11-03 NOTE — FLOWSHEET NOTE
[facility] - Outpatient Physical Therapy  Phone: [phone]   Fax: [phone]        Physical Therapy Daily Treatment Note/ Progress Note  Date:  11/3/2022    Patient Name:  Severo Kwon    :  1980  MRN: [id number]  Medical/Treatment Diagnosis Information:  Diagnosis: R25.2 (ICD-10-CM) - Cramp and spasm;   Left hemiparesis with spasticity  Treatment Diagnosis: Impaired proprioception of LUE & LE, avoidance of L extremities, Impaired balance & gait  Insurance/Certification information:  PT Insurance Information: [employer]. 30 visits pcy. PA required  Physician Information:   Jacqueline Cowden, MD  Plan of care signed (Y/N): [x]  Yes []  No     Date of Patient follow up with Physician: ?     Progress Report: []  Yes  [x]  No     Date Range for reporting period:  Beginning  2022   PN: 22  POC: 10/18/22  Ending:    Progress report due (10 Rx/or 30 days whichever is less): visit #17 or 36/3/56    Recertification due (POC duration/ or 90 days whichever is less): visit #17     Visit # Insurance Allowable Auth required?  Date Range    +    16 (30 pcy) [x]  Yes, [employer]  []  No Until 9/10/22  Extended to 11/10/22         Latex Allergy:  [x]NO      []YES  Preferred Language for Healthcare:   [x]English       []Other:      Functional Scale:                                                                                                      Date assessed:  FOTO physical FS primary measure score = 49; risk adjusted = 50                    FOTO physical FS primary measure score = 52                                                      FOTO physical FS primary measure score = 53     10/18        Functional Scale/Test Evaluation 2022 30 day   22 60 day   10/18 Discharge    Tinetti Assessment      30\" STS 3 8 1     5x STS 40\" 25\" 1'26\"     2MWT w/QC 65' 66' 92'     TUG - - 50\" w/QC          Pain level:  0/10  Location:  L knee & elbow    SUBJECTIVE:  Pt reports she is doing fine today. Has noticed more stability when standing and transferring at home. Still having difficulty with moving L UE and using her hand.      OBJECTIVE:   11/3: required constant cues for correct gait pattern - improved further when extending L arm     11/1: able to demo appropriate and efficient gait mechanics during session with cues for forward push, Greg Rowell OT present for some of session    10/27: improved step length on L with cues for smaller step - able to keep toes in neutral; Greg Rowell OT present for some of session  addressing L UE position     10/25: vision still slightly blurry but getting better    10/20: Difficulty remains with maintaining weight bearing on L - visible mm fatigue through session, required consistent cues for weight shifting L     10/18:  Transfers:  L foot comes off ground during sit to stand transitions when RUE is used    10/4: amb with L toe out and AFO, co-treat with OT  9/12:  pt visibly fatigued in B knees by the beginning of PT session - this was 3rd therapy of the day, wearing L eye patch       RESTRICTIONS/PRECAUTIONS:   HTN    Interventions/Exercises:   Therapeutic Exercises (16671) Resistance / level Sets/sec Reps Notes   TM      7/23: L foot stationary on silver portion                          Neuromuscular Re-ed (61398)        Seated stepper  9/8: PT assisted alignment L hip - cues for pulling it towards midline          Total gym  -squats to facilitate use of LLE       Tall kneel- side step across mat table  8/2: PT assist for postural control and L leg assist   Fwd lunge onto step, L foot on top of step, R foot behind 8/2: PT assist at pelvis for forward weight shift   LAQ seated     Marches L      Seated hip abd with band at knees  Seated LAQ with band at ankle 8/2: PT assist for L quad activation and weight shift to L for upright posture    9/8: PT elevating L leg to give clearance    On mat table:  Brayan Feldman L  Hip abd in supine  Heel slide in supine  Prone ham curl  Prone resisted knee ext  Marches with ecc control and neutral hip  L hip fall out and back to neutral     Tall kneeling                            8/29: fair+ recruitment of LLE muscle fibers  8/29: fair-  recruitment of LLE muscle fibers      AAROM  PROM  AAROM - cues to push into hand    Stand from mat table with weight shifted over L LE and hips square prior to stand    VCs for pointing head towards L knee to achieve neutral and greater weight into L LE   Standing with wall on left:  Calling out letters on specifically colored post it notes    Only 1 mistake on letter - stated the F was a P   In biodex harness:  Finding neutral in between bars, shifting more weight into L LE and straightening L knee    Neutral stand with L hand on bar and R hand tapping cones following color at various positions     Neutral stance, R hand tapping blaze pods only on specific color at various reaching distances    Neutral stance with R hand on bar - blaze pods on floor tapping  on specific color        SLS on L in harness      Toe tap on 6 inch step            Pt sitting down supported by harness for rest breaks intermittently - x1 on 10/25                                    Short periods of balance - L hand on bar    B UE support  VCs for erect posture, glute squeeze, and pulling in abs   Sit to stand without UE support to facilitate forward flexion and increase WB on LLE   Cues to flex forward at trunk and reach ahead w R arm, head/hips,momentum                                      Therapeutic Activities (94569) /  Functional Tasks / Gait Training (72151)       Sit to supine  Supine to sit    Sitting to/from tall kneeling Sup  Sup    Mod/max A     R foot taps on 8\" block & hold      R foot taps on 2nd step    R toe taps on to black board of Shuttle, L foot on lester R hand holding 1/2 full cup of water; Man & VC's to improve posture and LE muscle fiber recruitment  R hand on QC    7/28: stability provided by SPT through R hand   Kicking tall bolster w/R foot CGA/min A  10/19: cues to improve LLE stability & balance   Sit to stand with hands on ends of small bolster   X 4 through session     X 3 with hands on dowel 11/1: able to scoot and stand with cues for forward lean, good foot position without cuing    Gait Training:  Amb w/QC & AFO          Step up and overs, L foot on Airex, R foot step over half bolster & return   SBA/ CGA             Gentle manual facilitation to bring pelvis to neutral-  toe out with fatigue, pt able to correct trajectory to avoid obstacles       7/28: LOB x 1 with max assist x2   Sit to stand, R foot on AirEx to inhibit activation, large bolster between feet to maintain correct alignment for facilitation   7/28: use of mirror improved weight-shifting to L upon standing   Sit to stand w/large SB to facilitate forward flexion and increase activation of LLE   7/28: use of mirror improved weight-shifting to L while seated   Sit to stand from corner of mat #6, RLE on folded gray pad, large bolster behind R ankle to limited knee flex   R hand with walking stick   Donned biodex harness in standing, doffed in standing       Gait in biodex harness with B UE support   VCs for keeping L toes pointing forward and big steps -   B UE support, improved L step length and toe position    Gait with hands on ends of small bolster - constant cues for pushing through B UEs into slight resistance    X 180 feet + 50 feet 11/1: SPT in front throughout session, PT facilitating at B hips, B step length and foot positioning greatly improved with mid line stability and full body engagement causing a decrease in tone    L foot forward step and retro step, R foot in place     X 10 with hands on ends of bolster    X10 with hands holding dowel    Retro stepping with hands on dowel in // bars   X 5 steps  11/3: difficulty keeping L hand on dowel and pushing forward while stepping back                                Manual Intervention (71278)                                                     Modalities:     Pt. Education:  9/8: Reassessed goals, discussed progress made and areas remaining for improvement, issued outcome measure and reviewed new score with pt as compared to eval    6/24/2022 patient educated on diagnosis, prognosis and expectations for rehab, all patient questions were answered    HEP instruction:  6/24: Shift weight to L to facilitate LLE muscle fiber recruitment with all functional activities. Therapeutic Exercise and NMR EXR  [] (87723) Provided verbal/tactile cueing for activities related to strengthening, flexibility, endurance, ROM for improvements in  [] LE / Core stability: LE, hip, and core control with self care, mobility, lifting, ambulation. [] UE / Shoulder complex: cervical, postural, scapular, scapulothoracic and UE control with self care, reaching, carrying, lifting, house/yardwork, driving, computer work.  [] (50822) Provided verbal/tactile cueing for activities related to improving balance, coordination, kinesthetic sense, posture, motor skill, proprioception to assist with   [] LE / Core stability: LE, hip, and core control in self care, mobility, lifting, ambulation and eccentric single leg control. [] UE / Shoulder complex: cervical, scapular, scapulothoracic and UE control with self care, reaching, carrying, lifting, house/yardwork, driving, computer work.   [] (08923) Therapist is in constant attendance of 2 or more patients providing skilled therapy interventions, but not providing any significant amount of measurable one-on-one time to either patient, for improvements in  [] LE / Core stability: LE, hip, and core control in self care, mobility, lifting, ambulation and eccentric single leg control.    [] UE / Shoulder complex: cervical, scapular, scapulothoracic and UE control with self care, reaching, carrying, lifting, house/yardwork, driving, computer work. NMR and Therapeutic Activities:    [] (47052 or 21772) Provided verbal/tactile cueing for activities related to improving balance, coordination, kinesthetic sense, posture, motor skill, proprioception and motor activation to allow for proper function of   [] LE / Core, hip and LE with self care and ADLs  [] UE / Shoulder complex: cervical, postural, scapular, scapulothoracic and UE control with self care, carrying, lifting, driving, computer work. [x] (55004) Gait Re-education- Provided training and instruction to the patient for proper LE, core and hip recruitment, positioning, and eccentric body weight control with ambulation re-education, including ascending & descending stairs     Home Management Training / Self Care:  [] (50875) Provided self-care/home management training related to activities of daily living and compensatory training, and/or use of adaptive equipment for improvement with: ADLs and compensatory training, meal preparation, safety procedures and instruction in use of adaptive equipment, including bathing, grooming, dressing, personal hygiene, basic household cleaning and chores.        Home Exercise Program:    [] (61712) Reviewed/Progressed HEP activities related to strengthening, flexibility, endurance, ROM of   [] LE / Core stability: core, hip and LE for functional self-care, mobility, lifting and ambulation/stair navigation   [] UE / Shoulder complex: cervical, postural, scapular, scapulothoracic and UE control with self care, reaching, carrying, lifting, house/yardwork, driving, computer work  [] (48770)Reviewed/Progressed HEP activities related to improving balance, coordination, kinesthetic sense, posture, motor skill, proprioception of   [] LE: core, hip and LE for self care, mobility, lifting, and ambulation/stair navigation    [] UE / Shoulder complex: cervical, postural,  scapular, scapulothoracic and UE control with self care, reaching, carrying, lifting, house/yardwork, driving, computer work    Manual Treatments:  PROM / STM / Oscillations-Mobs:  G-I, II, III, IV (PA's, Inf., Post.)  [] (20020) Provided manual therapy to mobilize shoulder complex, hip, LE, and/or cervicothoracic/LS spine soft tissue/joints for the purpose of modulating pain, promoting relaxation,  increasing ROM, reducing/eliminating soft tissue swelling/inflammation/restriction, improving soft tissue extensibility and allowing for proper ROM for normal function with   [] LE / Core stability: self care, mobility, lifting and ambulation. [] UE / Shoulder complex: self care, reaching, carrying, lifting, house/yardwork, driving, computer work. Modalities:  [] (07603) Vasopneumatic compression: Utilized vasopneumatic compression to decrease edema / swelling for the purpose of improving mobility and quad tone / recruitment which will allow for increased overall function including but not limited to self-care, transfers, ambulation, and ascending / descending stairs. Units approved Units used Date Range   120 36 Until 11/10/22       Charges:  Timed Code Treatment Minutes: 45   Total Treatment Minutes: 45     [] EVAL - LOW (56803)   [] EVAL - MOD (93854)  [] EVAL - HIGH (88110)  [] RE-EVAL (72851)  [] TE (35555) x      [] Ionto  [] NMR (86257) x      [] Vaso  [] Manual (31836) x      [] Ultrasound  []TA x                  [] Mech Traction (49051)  [x] Gait Training x 3   [] ES (un) (67275):   [] Aquatic therapy x   [] Other:   [] Group:     GOALS:   Patient stated goal: improve transfers and gait  [x] Progressing: [] Met: [] Not Met: [] Adjusted     Therapist goals for Patient:   Short Term Goals: To be achieved in: 3 weeks  1. Independent in HEP and progression per patient tolerance, in order to prevent re-injury. [] Progressing: [x] Met: [] Not Met: [] Adjusted  2.  Patient to demonstrate 40% WB'ing of LLE during sit to stand transfers to assist in muscle recruitment with all tasks. [x] Progressing: [] Met: [] Not Met: [] Adjusted     Long Term Goals: To be achieved in: 8 weeks  1. Patient to demonstrate TUG w/QC score of <15\" in order to demonstrate reduced fall risk to assist with reaching prior level of function. - NOT TESTED 9/8  [] Progressing: [] Met: [] Not Met: [] Adjusted  2. FOTO score of at least 56 to assist with reaching prior level of function. [] Progressing: [] Met: [] Not Met: [] Adjusted  3. Patient will demonstrate an increase 30\" sit to stand reps to 10 with 40% WB'ing through LLE, to improve transfers to/from all surfaces & heights  [x] Progressing: [] Met: [] Not Met: [] Adjusted  4. Patient will improve Tinetti Balance score to 15/28 in order to . [] Progressing: [] Met: [] Not Met: [] Adjusted  5. Patient will increase 2MWT w/QC to 150' in order to improve community access. [x] Progressing: [] Met: [] Not Met: [] Adjusted        Overall Progression Towards Functional goals/ Treatment Progress Update:  [] Patient is progressing as expected towards functional goals listed. [] Progression is slowed due to complexities/Impairments listed. [x] Progression has been slowed due to co-morbidities. [] Plan just implemented, too soon to assess goals progression <30days   [] Goals require adjustment due to lack of progress  [] Patient is not progressing as expected and requires additional follow up with physician  [] Other    Persisting Functional Limitations/Impairments:  []Sleeping []Sitting               [x]Standing [x]Transfers        [x]Walking [x]Kneeling               [x]Stairs [x]Squatting / bending   [x]ADLs [x]Reaching  [x]Lifting  [x]Housework  [x]Driving [x]Job related tasks  []Sports/Recreation []Other:        ASSESSMENT: Loses L LE alignment and function when stops pressing BUEs forward during gait drills. Had much difficulty with retro steps while pushing forward. Plan to continue with gait training, stability, and endurance.  Did state her back was painful at the end of the session. Treatment/Activity Tolerance:  [x] Patient able to complete tx  [] Patient limited by fatigue  [] Patient limited by pain   [] Patient limited by other medical complications  [] Other:     Prognosis: [x] Good [] Fair  [] Poor    Patient Requires Follow-up: [x] Yes  [] No    Plan for next treatment session:    PLAN: See eval. PT 2x / week for 8 weeks. [x] Continue per plan of care [] Alter current plan (see comments)  [] Plan of care initiated [] Hold pending MD visit [] Discharge    Electronically signed by:   Reagan Abreu, Physical Therapy Student    Therapist was present, directed the patient's care, made skilled judgement, and was responsible for assessment and treatment of the patient. Teresa Saunders, PT, DPT    Note: If patient does not return for scheduled/ recommended follow up visits, his note will serve as a discharge from care along with most recent update on progress.

## 2022-11-04 DIAGNOSIS — K59.01 SLOW TRANSIT CONSTIPATION: ICD-10-CM

## 2022-11-07 RX ORDER — SENNOSIDES 8.6 MG
TABLET ORAL
Qty: 60 TABLET | Refills: 5 | Status: SHIPPED | OUTPATIENT
Start: 2022-11-07

## 2022-11-07 NOTE — TELEPHONE ENCOUNTER
Recent Visits  Date Type Provider Dept   10/26/22 Office Visit HUSSEIN Campa - CNP [facility]   06/23/22 Office Visit Altagarcia Bone MD [facility]   03/23/22 Office Visit Altagracia Bone MD [facility]   02/21/22 Office Visit Altagracia Bone MD [facility]   02/09/22 Office Visit Altagracia Bone MD [facility] [address] recent visits within past 540 days with a meds authorizing provider and meeting all other requirements  Future Appointments  No visits were found meeting these conditions.   Showing future appointments within next 150 days with a meds authorizing provider and meeting all other requirements

## 2022-11-08 ENCOUNTER — HOSPITAL ENCOUNTER (OUTPATIENT)
Dept: SPEECH THERAPY | Age: 42
Setting detail: THERAPIES SERIES
Discharge: HOME | End: 2022-11-08
Payer: COMMERCIAL

## 2022-11-08 ENCOUNTER — HOSPITAL ENCOUNTER (OUTPATIENT)
Dept: PHYSICAL THERAPY | Age: 42
Setting detail: THERAPIES SERIES
Discharge: HOME | End: 2022-11-08
Payer: COMMERCIAL

## 2022-11-08 PROCEDURE — 92507 TX SP LANG VOICE COMM INDIV: CPT

## 2022-11-08 PROCEDURE — 97116 GAIT TRAINING THERAPY: CPT

## 2022-11-08 PROCEDURE — 97530 THERAPEUTIC ACTIVITIES: CPT

## 2022-11-08 NOTE — PROGRESS NOTES
Leidy Zepeda    Speech Therapy Daily Treatment Note / Progress Note  Date:  2022    Patient Name:  Arvind Christy    :  1980  MRN: [id number]  Medical/Treatment Diagnosis Information:  G81.94 (ICD-10-CM) - Left hemiparesis ([facility] [address]           Treatment Diagnosis: Mild Dysarthria; Mild Cognitive-Linguistic deficits; Mild Oropharyngeal Dysphagia   Insurance/Certification information: [facility] authorization: 7/, 24 units 37417 & 24 units 30832  Physician Information: Dr. Radha Cook MD      Plan of care signed (Y/N): Y (2022, 2022)    Date of Patient follow up with Physician: DEWEY    Functional outcome measure:   FOTO Primary Measure: HDQLIFE Swallowing Difficulties: 57 (2022)  FOTO Primary Measure: PROMIS Cognitive Function v2.0: 27.5 (2022)  Secondary Measure: NeuroQOL - Communication: 19 (2022)    Progress Note: [x]  Yes  []  No  Next due by: 2022    RESTRICTIONS/PRECAUTIONS: hx brain bleed  Latex Allergy:  [x]NO      []YES    Preferred Language for Healthcare:   [x]English       []other:    Visit # Insurance Allowable Date Range (if applicable)   71/17  +  24 units 92883  24 units 75602 7/15-10/7/2022  Date ext. 2022       Pain level: 0/10     SUBJECTIVE: Pt alert and receptive, last seen 10/4/2022. Pt reports occasional coughing throughout the day and sensation of choking on spit but denies concerns with PO intake, discussed option for ENT referral. Pt states she has better managed her online grocery shopping list using metacognitive strategies (e.g., double-checking).      OBJECTIVE:   FOTO Primary Measure: PROMIS Cognitive Function v2.0: 27.5    Exercises/Interventions:    Speech Therapy (98690) Accuracy Cues Notes   Comprehension        Yes/No questions      Complex questions      1-2 step directions      Multi-step directions      Common objects/pictures      L/R discrimination function, problem solving, and/or pragmatic functioning) and compensatory strategies to manage the performance of an activity (eg, managing time or schedules, initiating, organizing, and sequencing tasks), direct (one-on-one) patient contact; initial 15 minutes (Report 79916 only once per day)  [] ([id number] [phone]) each additional 15 minutes     Dysphagia Therapy:    [] (01126) Treatment of swallowing dysfunction and/or oral function for feeding         Charges:  Timed Code Treatment Minutes: 0   Total Treatment Minutes: 45     [x] Speech-Language Treatment (93923)        [] Voice Treatment (04976)                                         [] Cognitive-Linguistic Skills Development Initial 15 minutes (15938)  [] Cognitive-Linguistic Skills Development Additional 15 minutes ([phone])   [] Dysphagia Treatment/NMES (VitalStim) (50125)  [] Other:     GOALS:  Patient stated goal: \"Help with my memory\"; \"Stop slurring\"; \"Swallowing\"  [x] Progressing: [] Met: [] Not Met: [] Adjusted     Therapist goals for Patient:  Short Term Goals: To be achieved in: 5 weeks  1. Patient will use speech strategies to facilitate increased intelligibility at discourse level, 5 minutes, of >90% as judged by SLP and pt/family. [] Progressing: [x] Met: [] Not Met: [] Adjusted  2. Patient will select and implement 2 cognitive strategies to complete functional tasks given min initiation cues in order to improve self-monitoring of ADL completion. [x] Progressing: [] Met: [] Not Met: [] Adjusted  3. Patient will use learned memory strategies to recall 4 units of novel information across delayed intervals up to 10 minutes given min encoding cues. [] Progressing: [x] Met: [] Not Met: [] Adjusted   4. Patient will functionally tolerate regular solids x 20 and thin liquids x 20 with min verbal cues for strategy implementation. [] Progressing: [x] Met: [] Not Met: [] Adjusted  5.  Patient will complete repeat instrumental swallow study (MBS or FEES) as clinically indicated to further assess pharyngeal phase and direct plan of care. [x] Progressing: [] Met: [] Not Met: [] Adjusted   6. Patient will verbalize procedural discourse/sequence, 2 minutes, with no more than 2 cues for topic maintenance/organization in order to improve thought organization for complex expression. [x] Progressing: [] Met: [] Not Met: [] Adjusted   7. Patient will sustain attention to visual information for 5 minutes in a quiet environment with no more than 2 verbal cues to attend in order to improve working memory and visual scanning. [x] Progressing: [] Met: [] Not Met: [] Adjusted      Long Term Goals: To be achieved in: 6 weeks  1. Patient will demonstrate improved cognitive-linguistic function to improve quality of life as evidenced by increased score on functional outcome measure. [x] Progressing: [] Met: [] Not Met: [] Adjusted  2. Patient will utilize speech intelligibility strategies to increase intelligibility to 90% in conversation to a familiar/unfamiliar listener. [] Progressing: [x] Met: [] Not Met: [] Adjusted  3. Patient will tolerate least restrictive diet with no overt clinical s/s of aspiration/penetration. [] Progressing: [x] Met: [] Not Met: [] Adjusted    Progression Towards Functional goals:  [x] Patient is progressing as expected towards functional goals listed. [] Progression is slowed due to complexities listed. [] Progression has been slowed due to co-morbidities.   [] Plan just implemented, too soon to assess goals progression  [] Other:     Persisting Functional Limitations/Impairments:  [x]Attention []Word Finding       [x]Memory []Speech Intelligibility     [x]Executive Function []Diet Tolerance     [x]Problem Solving []Coughing/Choking with PO  []Expressive Language []Medication/Finance Management  []Receptive Language []Other:      ASSESSMENT: Pt is making progress towards all cognitive communication goals, with improvements noted in metacognitive strategy use for simple selective attention and in functional discourse. Pt continues to benefit from SLP intervention to target self-monitoring and adjustment of performance to increase high-level attention, problem-solving, and thought organization. Continue POC as above. Treatment/Activity Tolerance:  [x] Patient able to complete tx [] Patient limited by fatigue  [] Patient limited by pain  [] Patient limited by other medical complications  [] Other:     Prognosis: [x] Good [] Fair  [] Poor    Patient Requires Follow-up: [x] Yes  [] No    PLAN: See eval. ST 2x / week for 6 weeks. [x] Continue per plan of care [] Alter current plan (see comments)  [] Plan of care initiated [] Hold pending MD visit [] Discharge    Electronically signed by:   Jennifer Hernández MA CCC-SLP  Speech-Language Pathologist  SP. [id number]      Note: If patient does not return for scheduled/ recommended follow up visits, this note will serve as a discharge from care along with most recent update on progress.

## 2022-11-08 NOTE — FLOWSHEET NOTE
[facility] - Outpatient Physical Therapy  Phone: [phone]   Fax: [phone]        Physical Therapy Daily Treatment Note/ Progress Note  Date:  2022    Patient Name:  Mame West    :  1980  MRN: [id number]  Medical/Treatment Diagnosis Information:  Diagnosis: R25.2 (ICD-10-CM) - Cramp and spasm;   Left hemiparesis with spasticity  Treatment Diagnosis: Impaired proprioception of LUE & LE, avoidance of L extremities, Impaired balance & gait  Insurance/Certification information:  PT Insurance Information: [employer]. 30 visits pcy. PA required  Physician Information:   Andrew Curtis MD  Plan of care signed (Y/N): [x]  Yes []  No     Date of Patient follow up with Physician: ?     Progress Report: []  Yes  [x]  No     Date Range for reporting period:  Beginning  2022   PN: 22  POC: 10/18/22  Ending:    Progress report due (10 Rx/or 30 days whichever is less): visit #17 or     Recertification due (POC duration/ or 90 days whichever is less): visit #17     Visit # Insurance Allowable Auth required?  Date Range    + 15/16   16 (30 pcy) [x]  Yes, [employer]  []  No Until 9/10/22  Extended to 11/10/22         Latex Allergy:  [x]NO      []YES  Preferred Language for Healthcare:   [x]English       []Other:      Functional Scale:                                                                                                      Date assessed:  FOTO physical FS primary measure score = 49; risk adjusted = 50                    FOTO physical FS primary measure score = 52                                                      FOTO physical FS primary measure score = 53     10/18        Functional Scale/Test Evaluation 2022 30 day   22 60 day   10/18 Discharge    Tinetti Assessment      30\" STS 3 8 1     5x STS 40\" 25\" 1'26\"     2MWT w/QC 65' 66' 92'     TUG - - 50\" w/QC          Pain level:  0/10  Location:  L knee & elbow    SUBJECTIVE:      Doing okay today. Feels that she has been progressing and wants to request additional visits for PT at next visit.         OBJECTIVE:   11/3: required constant cues for correct gait pattern - improved further when extending L arm     11/1: able to demo appropriate and efficient gait mechanics during session with cues for forward push, Julian Munoz, KAL present for some of session    10/27: improved step length on L with cues for smaller step - able to keep toes in neutral; Julian Munoz OT present for some of session  addressing L UE position     10/25: vision still slightly blurry but getting better    10/20: Difficulty remains with maintaining weight bearing on L - visible mm fatigue through session, required consistent cues for weight shifting L     10/18:  Transfers:  L foot comes off ground during sit to stand transitions when RUE is used    10/4: amb with L toe out and AFO, co-treat with OT  9/12:  pt visibly fatigued in B knees by the beginning of PT session - this was 3rd therapy of the day, wearing L eye patch       RESTRICTIONS/PRECAUTIONS:   HTN    Interventions/Exercises:   Therapeutic Exercises (03912) Resistance / level Sets/sec Reps Notes   TM      7/23: L foot stationary on silver portion                          Neuromuscular Re-ed (00742)        Seated stepper  9/8: PT assisted alignment L hip - cues for pulling it towards midline   Foot on lowest mat table  -static hold    45\"x4 R, 30\"x2 L  11/8:  assisted L foot on to table   Total gym  -squats to facilitate use of LLE       Tall kneel- side step across mat table  8/2: PT assist for postural control and L leg assist   Fwd lunge onto step, L foot on top of step, R foot behind 8/2: PT assist at pelvis for forward weight shift   LAQ seated     Marches L      Seated hip abd with band at knees  Seated LAQ with band at ankle 8/2: PT assist for L quad activation and weight shift to L for upright posture    9/8: PT elevating L leg to give clearance    On mat table:  Bridge  Roll R  Roll L  Hip abd in supine  Heel slide in supine  Prone ham curl  Prone resisted knee ext  Marches with ecc control and neutral hip  L hip fall out and back to neutral     Tall kneeling                            8/29: fair+ recruitment of LLE muscle fibers  8/29: fair-  recruitment of LLE muscle fibers      AAROM  PROM  AAROM - cues to push into hand    Stand from mat table with weight shifted over L LE and hips square prior to stand    VCs for pointing head towards L knee to achieve neutral and greater weight into L LE   Standing with wall on left:  Calling out letters on specifically colored post it notes    Only 1 mistake on letter - stated the F was a P   In biodex harness:  Finding neutral in between bars, shifting more weight into L LE and straightening L knee    Neutral stand with L hand on bar and R hand tapping cones following color at various positions     Neutral stance, R hand tapping blaze pods only on specific color at various reaching distances    Neutral stance with R hand on bar - blaze pods on floor tapping  on specific color        SLS on L in harness      Toe tap on 6 inch step            Pt sitting down supported by harness for rest breaks intermittently - x1 on 10/25                                    Short periods of balance - L hand on bar    B UE support  VCs for erect posture, glute squeeze, and pulling in abs   Sit to stand without UE support to facilitate forward flexion and increase WB on LLE   Cues to flex forward at trunk and reach ahead w R arm, head/hips,momentum                                      Therapeutic Activities (35888) /  Functional Tasks / Gait Training (10456)       Sit to supine  Supine to sit    Sitting to/from tall kneeling Sup  Sup    Mod/max A     R foot taps on 8\" block & hold      R foot taps on 2nd step    R toe taps on to black board of [employer], L foot on lester R hand holding 1/2 full cup of water; Man & VC's to improve posture and LE muscle fiber recruitment  R hand on QC    7/28: stability provided by SPT through R hand   Kicking tall bolster w/R foot CGA/min A  10/19: cues to improve LLE stability & balance   Sit to stand with hands on ends of small bolster    -R hand on L knee, 60-40% WB'ing R-LLEs   x10 11/1: able to scoot and stand with cues for forward lean, good foot position without cuing    Gait Training:  Amb w/QC & AFO          Step up and overs, L foot on Airex, R foot step over half bolster & return   SBA/ CGA             Gentle manual facilitation to bring pelvis to neutral-  toe out with fatigue, pt able to correct trajectory to avoid obstacles       7/28: LOB x 1 with max assist x2   Sit to stand, R foot on AirEx to inhibit activation, large bolster between feet to maintain correct alignment for facilitation   7/28: use of mirror improved weight-shifting to L upon standing   Sit to stand w/large SB to facilitate forward flexion and increase activation of LLE   7/28: use of mirror improved weight-shifting to L while seated   Sit to stand from corner of mat #6, RLE on folded gray pad, large bolster behind R ankle to limited knee flex   R hand with walking stick   Donned biodex harness in standing, doffed in standing       Gait in biodex harness with B UE support   VCs for keeping L toes pointing forward and big steps -   B UE support, improved L step length and toe position    Gait with hands on horiz QC supported by PT - constant cues for pushing through B UEs into slight resistance    100', 75' 11/1: SPT in front throughout session, PT facilitating at B hips, B step length and foot positioning greatly improved with mid line stability and full body engagement causing a decrease in tone    L foot forward step and retro step over towel roll, R foot in place     X 10 with hands on horiz QC    Retro stepping with hands on QC   3x5' 11/3: difficulty keeping L hand on dowel and pushing   forward while stepping back                                Manual Intervention (31360)                                                     Modalities:     Pt. Education:  9/8: Reassessed goals, discussed progress made and areas remaining for improvement, issued outcome measure and reviewed new score with pt as compared to eval    6/24/2022 patient educated on diagnosis, prognosis and expectations for rehab, all patient questions were answered    HEP instruction:  6/24: Shift weight to L to facilitate LLE muscle fiber recruitment with all functional activities. Therapeutic Exercise and NMR EXR  [] (82387) Provided verbal/tactile cueing for activities related to strengthening, flexibility, endurance, ROM for improvements in  [] LE / Core stability: LE, hip, and core control with self care, mobility, lifting, ambulation. [] UE / Shoulder complex: cervical, postural, scapular, scapulothoracic and UE control with self care, reaching, carrying, lifting, house/yardwork, driving, computer work.  [] (90207) Provided verbal/tactile cueing for activities related to improving balance, coordination, kinesthetic sense, posture, motor skill, proprioception to assist with   [] LE / Core stability: LE, hip, and core control in self care, mobility, lifting, ambulation and eccentric single leg control. [] UE / Shoulder complex: cervical, scapular, scapulothoracic and UE control with self care, reaching, carrying, lifting, house/yardwork, driving, computer work.   [] (27445) Therapist is in constant attendance of 2 or more patients providing skilled therapy interventions, but not providing any significant amount of measurable one-on-one time to either patient, for improvements in  [] LE / Core stability: LE, hip, and core control in self care, mobility, lifting, ambulation and eccentric single leg control.    [] UE / Shoulder complex: cervical, scapular, scapulothoracic and UE control with self care, reaching, carrying, lifting, house/yardwork, driving, computer work. NMR and Therapeutic Activities:    [] (85615 or 61931) Provided verbal/tactile cueing for activities related to improving balance, coordination, kinesthetic sense, posture, motor skill, proprioception and motor activation to allow for proper function of   [] LE / Core, hip and LE with self care and ADLs  [] UE / Shoulder complex: cervical, postural, scapular, scapulothoracic and UE control with self care, carrying, lifting, driving, computer work. [x] (95107) Gait Re-education- Provided training and instruction to the patient for proper LE, core and hip recruitment, positioning, and eccentric body weight control with ambulation re-education, including ascending & descending stairs     Home Management Training / Self Care:  [] (04662) Provided self-care/home management training related to activities of daily living and compensatory training, and/or use of adaptive equipment for improvement with: ADLs and compensatory training, meal preparation, safety procedures and instruction in use of adaptive equipment, including bathing, grooming, dressing, personal hygiene, basic household cleaning and chores.        Home Exercise Program:    [] (29213) Reviewed/Progressed HEP activities related to strengthening, flexibility, endurance, ROM of   [] LE / Core stability: core, hip and LE for functional self-care, mobility, lifting and ambulation/stair navigation   [] UE / Shoulder complex: cervical, postural, scapular, scapulothoracic and UE control with self care, reaching, carrying, lifting, house/yardwork, driving, computer work  [] (83574)Reviewed/Progressed HEP activities related to improving balance, coordination, kinesthetic sense, posture, motor skill, proprioception of   [] LE: core, hip and LE for self care, mobility, lifting, and ambulation/stair navigation    [] UE / Shoulder complex: cervical, postural,  scapular, scapulothoracic and UE control with self care, reaching, carrying, lifting, house/yardwork, driving, computer work    Manual Treatments:  PROM / STM / Oscillations-Mobs:  G-I, II, III, IV (PA's, Inf., Post.)  [] (78736) Provided manual therapy to mobilize shoulder complex, hip, LE, and/or cervicothoracic/LS spine soft tissue/joints for the purpose of modulating pain, promoting relaxation,  increasing ROM, reducing/eliminating soft tissue swelling/inflammation/restriction, improving soft tissue extensibility and allowing for proper ROM for normal function with   [] LE / Core stability: self care, mobility, lifting and ambulation. [] UE / Shoulder complex: self care, reaching, carrying, lifting, house/yardwork, driving, computer work. Modalities:  [] (97000) Vasopneumatic compression: Utilized vasopneumatic compression to decrease edema / swelling for the purpose of improving mobility and quad tone / recruitment which will allow for increased overall function including but not limited to self-care, transfers, ambulation, and ascending / descending stairs. Units approved Units used Date Range   120 39 Until 11/10/22       Charges:  Timed Code Treatment Minutes: 39   Total Treatment Minutes: 39     [] EVAL - LOW (37118)   [] EVAL - MOD (27422)  [] EVAL - HIGH (31730)  [] RE-EVAL (71727)  [] TE (92502) x      [] Ionto  [] NMR (29601) x      [] Vaso  [] Manual (14454) x      [] Ultrasound  [x]TA x 1                 [] Mech Traction (26450)  [x] Gait Training x 2   [] ES (un) (93854):   [] Aquatic therapy x   [] Other:   [] Group:     GOALS:   Patient stated goal: improve transfers and gait  [x] Progressing: [] Met: [] Not Met: [] Adjusted     Therapist goals for Patient:   Short Term Goals: To be achieved in: 3 weeks  1. Independent in HEP and progression per patient tolerance, in order to prevent re-injury. [] Progressing: [x] Met: [] Not Met: [] Adjusted  2.  Patient to demonstrate 40% WB'ing of LLE during sit to stand transfers to assist in muscle recruitment with all tasks. [x] Progressing: [] Met: [] Not Met: [] Adjusted     Long Term Goals: To be achieved in: 8 weeks  1. Patient to demonstrate TUG w/QC score of <15\" in order to demonstrate reduced fall risk to assist with reaching prior level of function. - NOT TESTED 9/8  [] Progressing: [] Met: [] Not Met: [] Adjusted  2. FOTO score of at least 56 to assist with reaching prior level of function. [] Progressing: [] Met: [] Not Met: [] Adjusted  3. Patient will demonstrate an increase 30\" sit to stand reps to 10 with 40% WB'ing through LLE, to improve transfers to/from all surfaces & heights  [x] Progressing: [] Met: [] Not Met: [] Adjusted  4. Patient will improve Tinetti Balance score to 15/28 in order to . [] Progressing: [] Met: [] Not Met: [] Adjusted  5. Patient will increase 2MWT w/QC to 150' in order to improve community access. [x] Progressing: [] Met: [] Not Met: [] Adjusted        Overall Progression Towards Functional goals/ Treatment Progress Update:  [] Patient is progressing as expected towards functional goals listed. [] Progression is slowed due to complexities/Impairments listed. [x] Progression has been slowed due to co-morbidities. [] Plan just implemented, too soon to assess goals progression <30days   [] Goals require adjustment due to lack of progress  [] Patient is not progressing as expected and requires additional follow up with physician  [] Other    Persisting Functional Limitations/Impairments:  []Sleeping []Sitting               [x]Standing [x]Transfers        [x]Walking [x]Kneeling               [x]Stairs [x]Squatting / bending   [x]ADLs [x]Reaching  [x]Lifting  [x]Housework  [x]Driving [x]Job related tasks  []Sports/Recreation []Other:        ASSESSMENT:    Patient continues to demo improved LLE WB'ing w/increase R step length when pushing against cane in PT's hands. Pt even able to place R foot onto low mat table, required assist to place L foot on table.   Pt increased WB'ing through LLE during sit to stand transfers as well. Pt and  feel that additional PT sessions would be beneficial and would like to request additional sessions next visit. Treatment/Activity Tolerance:  [x] Patient able to complete tx  [] Patient limited by fatigue  [] Patient limited by pain   [] Patient limited by other medical complications  [] Other:     Prognosis: [x] Good [] Fair  [] Poor    Patient Requires Follow-up: [x] Yes  [] No    Plan for next treatment session:    PLAN: See eval. PT 2x / week for 8 weeks. [x] Continue per plan of care [] Alter current plan (see comments)  [] Plan of care initiated [] Hold pending MD visit [] Discharge    Electronically signed by:  Keith Zhong, PT, DPT    Note: If patient does not return for scheduled/ recommended follow up visits, his note will serve as a discharge from care along with most recent update on progress.

## 2022-11-09 ENCOUNTER — NURSE ONLY (OUTPATIENT)
Dept: PRIMARY CARE CLINIC | Age: 42
End: 2022-11-09

## 2022-11-09 VITALS — DIASTOLIC BLOOD PRESSURE: 88 MMHG | SYSTOLIC BLOOD PRESSURE: 134 MMHG

## 2022-11-09 DIAGNOSIS — I10 ESSENTIAL HYPERTENSION, BENIGN: Primary | ICD-10-CM

## 2022-11-09 RX ORDER — BENZOCAINE/MENTHOL 6 MG-10 MG
1 LOZENGE MUCOUS MEMBRANE DAILY
Qty: 1 EACH | Refills: 0 | Status: SHIPPED | OUTPATIENT
Start: 2022-11-09

## 2022-11-09 NOTE — PROGRESS NOTES
Pt here for a BP check today. In office it is 134/88. Pt is taking Amlodipine 10 mg daily, Metoprolol 100 mg daily. Pt states that she isn't taking HCTZ. Forgot to pick it up    Per [place] not to take HCTZ. Pt to check BP at home. If BP is 140/90 or above, pt to call office to discuss. Pt to return in 3 months for a f/u    BP cuff request sent to [facility] for review.

## 2022-11-10 ENCOUNTER — HOSPITAL ENCOUNTER (OUTPATIENT)
Dept: PHYSICAL THERAPY | Age: 42
Setting detail: THERAPIES SERIES
Discharge: HOME | End: 2022-11-10
Payer: COMMERCIAL

## 2022-11-10 PROCEDURE — 97112 NEUROMUSCULAR REEDUCATION: CPT

## 2022-11-10 PROCEDURE — 97116 GAIT TRAINING THERAPY: CPT

## 2022-11-10 PROCEDURE — 97530 THERAPEUTIC ACTIVITIES: CPT

## 2022-11-10 NOTE — PLAN OF CARE
[address] [facility]  Phone: [phone]   Fax: [phone]      Physical Therapy Re-Certification Plan of Care    Dear Jane Li, Catarina Haines MD  ,    We had the pleasure of treating the following patient for physical therapy services at [facility]. A summary of our findings can be found in the updated assessment below. This includes our plan of care. If you have any questions or concerns regarding these findings, please do not hesitate to contact me at the office phone number checked above. Thank you for the referral.     Physician Signature:________________________________Date:__________________  By signing above (or electronic signature), therapist's plan is approved by physician      Functional Outcome:   FOTO physical FS primary measure score = 49; risk adjusted = 50                  6/24  FOTO physical FS primary measure score = 52                                                    9/8  FOTO physical FS primary measure score = 53     10/18    Functional Scale/Test Evaluation 6/24/2022 30 day   9/8/22 60 day   10/18 90 day  11/10     Tinetti Assessment 8/28 12/28 11/28 13/28     30\" STS 3 8 1 9     5x STS 40\" 25\" 1'26\" 15\"     2MWT w/QC 72' 66' 92' 102'     TUG - - 50\" w/QC 40\" w/QC  44\" w/QC        Overall Response to Treatment:   []Patient is responding well to treatment and improvement is noted with regards  to goals   []Patient should continue to improve in reasonable time if they continue HEP   []Patient has plateaued and is no longer responding to skilled PT intervention    []Patient is getting worse and would benefit from return to referring MD   []Patient unable to adhere to initial POC   [x]Other:  Patient has been able to steadily progress LLE WB'ing during transfers and gait training, allowing gait deviations to decrease and minor carry over w/uncued gait upon arrival to clinic.   Pt remains impulsive and continues to avoid use of L extremities, increasing fall risk, although, confidence in LLE is improving. During assessment this date, pt regressed to minimal use of LLE to complete tasks, even though instructed to increase use of LLE. Since beginning this episode of care, pt has been able to improve fall risk, stamina, and functional strength. Pt can continue to progress with additional PT visits. Date range of Visits:  to 11/10/22  Total Visits: 17    Recommendation:    [x]Request additional PT visits at 2x / wk for 8 weeks, pending PA through [facility]              []Hold PT, pending MD visit            Physical Therapy Daily Treatment Note/ Progress Note  Date:  11/10/2022    Patient Name:  Mame West    :  1980  MRN: [id number]  Medical/Treatment Diagnosis Information:  Diagnosis: R25.2 (ICD-10-CM) - Cramp and spasm;   Left hemiparesis with spasticity  Treatment Diagnosis: Impaired proprioception of LUE & LE, avoidance of L extremities, Impaired balance & gait  Insurance/Certification information:  PT Insurance Information: [employer]. 30 visits pcy. PA required  Physician Information:   Andrew Curtis MD  Plan of care signed (Y/N): [x]  Yes []  No     Date of Patient follow up with Physician: ?     Progress Report: [x]  Yes  []  No     Date Range for reporting period:  Beginning  2022   PN: 22  POC: 10/18/22  POC: 11/10/22  Ending:    Progress report due (10 Rx/or 30 days whichever is less): visit #17 or     Recertification due (POC duration/ or 90 days whichever is less): visit #17     Visit # Insurance Allowable Auth required?  Date Range    +    16 (30 pcy) [x]  Yes, [employer]  []  No Until 9/10/22  Extended to 11/10/22         Latex Allergy:  [x]NO      []YES  Preferred Language for Healthcare:   [x]English       []Other:      Functional Scale:                                                                                                      Date assessed:  FOTO physical FS primary measure score = 49; risk adjusted = 50                  6/24  FOTO physical FS primary measure score = 52                                                    9/8  FOTO physical FS primary measure score = 53     10/18        Functional Scale/Test Evaluation 6/24/2022 30 day   9/8/22 60 day   10/18 90 day  11/10     Tinetti Assessment 8/28 12/28 11/28 13/28     30\" STS 3 8 1 9     5x STS 40\" 25\" 1'26\" 15\"     2MWT w/QC 65' 66' 92' 102'     TUG - - 50\" w/QC 40\" w/QC  44\" w/QC          Pain level:  0/10  Location:  L knee & elbow    SUBJECTIVE:     Doing okay today, no complaints. Continues to increase activity at home, but doesn't focus workload on LLE.          OBJECTIVE:   11/3: required constant cues for correct gait pattern - improved further when extending L arm     11/1: able to demo appropriate and efficient gait mechanics during session with cues for forward push, Chidi Mathias, OT present for some of session    10/27: improved step length on L with cues for smaller step - able to keep toes in neutral; Chidi Mathias, OT present for some of session  addressing L UE position     10/25: vision still slightly blurry but getting better    10/20: Difficulty remains with maintaining weight bearing on L - visible mm fatigue through session, required consistent cues for weight shifting L     10/18:  Transfers:  L foot comes off ground during sit to stand transitions when RUE is used    10/4: amb with L toe out and AFO, co-treat with OT  9/12:  pt visibly fatigued in B knees by the beginning of PT session - this was 3rd therapy of the day, wearing L eye patch       RESTRICTIONS/PRECAUTIONS:   HTN    Interventions/Exercises:   Therapeutic Exercises (05144) Resistance / level Sets/sec Reps Notes   TM      7/23: L foot stationary on silver portion                          Neuromuscular Re-ed (21227)        Seated stepper  9/8: PT assisted alignment L hip - cues for pulling it towards midline   Foot on lowest mat table  -static hold    30\"x3 R, 30\"x1 L  11/8:  assisted L foot on to table   Total gym  -squats to facilitate use of LLE       Tall kneel- side step across mat table  8/2: PT assist for postural control and L leg assist   Fwd lunge onto step, L foot on top of step, R foot behind 8/2: PT assist at pelvis for forward weight shift   LAQ seated     Marches L      Seated hip abd with band at knees  Seated LAQ with band at ankle 8/2: PT assist for L quad activation and weight shift to L for upright posture    9/8: PT elevating L leg to give clearance    On mat table:  Bridge  Roll R  Roll L  Hip abd in supine  Heel slide in supine  Prone ham curl  Prone resisted knee ext  Shakeel Hali with ecc control and neutral hip  L hip fall out and back to neutral     Tall kneeling                            8/29: fair+ recruitment of LLE muscle fibers  8/29: fair-  recruitment of LLE muscle fibers      AAROM  PROM  AAROM - cues to push into hand    Stand from mat table with weight shifted over L LE and hips square prior to stand    VCs for pointing head towards L knee to achieve neutral and greater weight into L LE   Standing with wall on left:  Calling out letters on specifically colored post it notes    Only 1 mistake on letter - stated the F was a P   In biodex harness:  Finding neutral in between bars, shifting more weight into L LE and straightening L knee    Neutral stand with L hand on bar and R hand tapping cones following color at various positions     Neutral stance, R hand tapping blaze pods only on specific color at various reaching distances    Neutral stance with R hand on bar - blaze pods on floor tapping  on specific color        SLS on L in harness      Toe tap on 6 inch step            Pt sitting down supported by harness for rest breaks intermittently - x1 on 10/25                                    Short periods of balance - L hand on bar    B UE support  VCs for erect posture, glute squeeze, and pulling in abs   Sit to stand without UE support to facilitate forward flexion and increase WB on LLE   Cues to flex forward at trunk and reach ahead w R arm, head/hips,momentum                                      Therapeutic Activities (71292) /  Functional Tasks / Gait Training (92322)       Sit to supine  Supine to sit    Sitting to/from tall kneeling Sup  Sup    Mod/max A     R foot taps on 8\" block & hold      R foot taps on 2nd step    R toe taps on to black board of Shuttle, L foot on lester R hand holding 1/2 full cup of water; Man & VC's to improve posture and LE muscle fiber recruitment  R hand on QC    7/28: stability provided by SPT through R hand   Kicking tall bolster w/R foot CGA/min A  10/19: cues to improve LLE stability & balance   Sit to stand with hands on ends of small bolster    -R hand on L knee, 60-40% WB'ing R-LLEs   x9 11/1: able to scoot and stand with cues for forward lean, good foot position without cuing   11/10: 25% WB'ing during 30\" STS   Gait Training:  Amb w/QC & AFO          Step up and overs, L foot on Airex, R foot step over half bolster & return   Sup          102', 60', 30'      VC's to improve sequencing of QC to progress modified 3-point pattern      7/28: LOB x 1 with max assist x2   Sit to stand, R foot on AirEx to inhibit activation, large bolster between feet to maintain correct alignment for facilitation   7/28: use of mirror improved weight-shifting to L upon standing   Sit to stand w/large SB to facilitate forward flexion and increase activation of LLE   7/28: use of mirror improved weight-shifting to L while seated   Sit to stand from corner of mat #6, RLE on folded gray pad, large bolster behind R ankle to limited knee flex   R hand with walking stick   Donned biodex harness in standing, doffed in standing       Gait in biodex harness with B UE support   VCs for keeping L toes pointing forward and big steps -   B UE support, improved L step length and toe position    Gait with hands on horiz QC supported by PT - constant cues for pushing through B UEs into slight resistance    11/1: SPT in front throughout session, PT facilitating at B hips, B step length and foot positioning greatly improved with mid line stability and full body engagement causing a decrease in tone    L foot forward step and retro step over towel roll, R foot in place        Retro stepping with hands on QC   11/3: difficulty keeping L hand on dowel and pushing   forward while stepping back                                Manual Intervention (07662)                                                     Modalities:     Pt. Education:  9/8: Reassessed goals, discussed progress made and areas remaining for improvement, issued outcome measure and reviewed new score with pt as compared to eval    6/24/2022 patient educated on diagnosis, prognosis and expectations for rehab, all patient questions were answered    HEP instruction:  6/24: Shift weight to L to facilitate LLE muscle fiber recruitment with all functional activities. Therapeutic Exercise and NMR EXR  [] (84119) Provided verbal/tactile cueing for activities related to strengthening, flexibility, endurance, ROM for improvements in  [] LE / Core stability: LE, hip, and core control with self care, mobility, lifting, ambulation. [] UE / Shoulder complex: cervical, postural, scapular, scapulothoracic and UE control with self care, reaching, carrying, lifting, house/yardwork, driving, computer work. [x] (09248) Provided verbal/tactile cueing for activities related to improving balance, coordination, kinesthetic sense, posture, motor skill, proprioception to assist with   [x] LE / Core stability: LE, hip, and core control in self care, mobility, lifting, ambulation and eccentric single leg control.    [] UE / Shoulder complex: cervical, scapular, scapulothoracic and UE control with self care, reaching, carrying, lifting, house/yardwork, driving, computer work.   [] (92333) Therapist is in constant attendance of 2 or more patients providing skilled therapy interventions, but not providing any significant amount of measurable one-on-one time to either patient, for improvements in  [] LE / Core stability: LE, hip, and core control in self care, mobility, lifting, ambulation and eccentric single leg control. [] UE / Shoulder complex: cervical, scapular, scapulothoracic and UE control with self care, reaching, carrying, lifting, house/yardwork, driving, computer work. NMR and Therapeutic Activities:    [x] (06546 or 33004) Provided verbal/tactile cueing for activities related to improving balance, coordination, kinesthetic sense, posture, motor skill, proprioception and motor activation to allow for proper function of   [x] LE / Core, hip and LE with self care and ADLs  [] UE / Shoulder complex: cervical, postural, scapular, scapulothoracic and UE control with self care, carrying, lifting, driving, computer work. [x] (63161) Gait Re-education- Provided training and instruction to the patient for proper LE, core and hip recruitment, positioning, and eccentric body weight control with ambulation re-education, including ascending & descending stairs     Home Management Training / Self Care:  [] (58455) Provided self-care/home management training related to activities of daily living and compensatory training, and/or use of adaptive equipment for improvement with: ADLs and compensatory training, meal preparation, safety procedures and instruction in use of adaptive equipment, including bathing, grooming, dressing, personal hygiene, basic household cleaning and chores.        Home Exercise Program:    [] (43743) Reviewed/Progressed HEP activities related to strengthening, flexibility, endurance, ROM of   [] LE / Core stability: core, hip and LE for functional self-care, mobility, lifting and ambulation/stair navigation   [] UE / Shoulder complex: cervical, postural, scapular, scapulothoracic and UE control with self care, reaching, carrying, lifting, house/yardwork, driving, computer work  [] (57271)Reviewed/Progressed HEP activities related to improving balance, coordination, kinesthetic sense, posture, motor skill, proprioception of   [] LE: core, hip and LE for self care, mobility, lifting, and ambulation/stair navigation    [] UE / Shoulder complex: cervical, postural,  scapular, scapulothoracic and UE control with self care, reaching, carrying, lifting, house/yardwork, driving, computer work    Manual Treatments:  PROM / STM / Oscillations-Mobs:  G-I, II, III, IV (PA's, Inf., Post.)  [] (19514) Provided manual therapy to mobilize shoulder complex, hip, LE, and/or cervicothoracic/LS spine soft tissue/joints for the purpose of modulating pain, promoting relaxation,  increasing ROM, reducing/eliminating soft tissue swelling/inflammation/restriction, improving soft tissue extensibility and allowing for proper ROM for normal function with   [] LE / Core stability: self care, mobility, lifting and ambulation. [] UE / Shoulder complex: self care, reaching, carrying, lifting, house/yardwork, driving, computer work. Modalities:  [] (26187) Vasopneumatic compression: Utilized vasopneumatic compression to decrease edema / swelling for the purpose of improving mobility and quad tone / recruitment which will allow for increased overall function including but not limited to self-care, transfers, ambulation, and ascending / descending stairs.        Units approved Units used Date Range   120 42 Until 11/10/22       Charges:  Timed Code Treatment Minutes: 40   Total Treatment Minutes: 40     [] EVAL - LOW (25442)   [] EVAL - MOD (04774)  [] EVAL - HIGH (62856)  [] RE-EVAL (70665)  [] TE (73278) x      [] Ionto  [x] NMR (93941) x 1     [] Vaso  [] Manual (42570) x      [] Ultrasound  [x]TA x 1                 [] Mech Traction (77539)  [x] Gait Training x 1   [] ES (un) (92116):   [] Aquatic therapy x   [] Other:   [] Group:     GOALS:   Patient stated goal: improve transfers and gait  [x] Progressing: [] Met: [] Not Met: [] Adjusted     Therapist goals for Patient:   Short Term Goals: To be achieved in: 3 weeks  1. Independent in HEP and progression per patient tolerance, in order to prevent re-injury. [] Progressing: [x] Met: [] Not Met: [] Adjusted  2. Patient to demonstrate 40% WB'ing of LLE during sit to stand transfers to assist in muscle recruitment with all tasks. [x] Progressing: [] Met: [] Not Met: [] Adjusted     Long Term Goals: To be achieved in: 8 weeks  1. Patient to demonstrate TUG w/QC score of <15\" in order to demonstrate reduced fall risk to assist with reaching prior level of function. - NOT TESTED   [] Progressing: [] Met: [x] Not Met: 40\" w/QC [] Adjusted  2. FOTO score of at least 56 to assist with reaching prior level of function. [] Progressing: [] Met: [x] Not Met: [] Adjusted  3. Patient will demonstrate an increase 30\" sit to stand reps to 10 with 40% WB'ing through LLE, to improve transfers to/from all surfaces & heights  [x] Progressin w/25% WB'ing LLE [] Met: [] Not Met:  [] Adjusted  4. Patient will improve Tinetti Balance score to 15/28 in order to . [x] Progressin/28 [] Met: [] Not Met: [] Adjusted  5. Patient will increase 2MWT w/QC to 150' in order to improve community access. [x] Progressin' [] Met: [] Not Met: [] Adjusted        Overall Progression Towards Functional goals/ Treatment Progress Update:  [] Patient is progressing as expected towards functional goals listed. [] Progression is slowed due to complexities/Impairments listed. [x] Progression has been slowed due to co-morbidities.   [] Plan just implemented, too soon to assess goals progression <30days   [] Goals require adjustment due to lack of progress  [] Patient is not progressing as expected and requires additional follow up with physician  [] Other    Persisting Functional Limitations/Impairments:  []Sleeping []Sitting               [x]Standing [x]Transfers        [x]Walking [x]Kneeling               [x]Stairs [x]Squatting / bending   [x]ADLs [x]Reaching  [x]Lifting  [x]Housework  [x]Driving [x]Job related tasks  []Sports/Recreation []Other:        ASSESSMENT:    see above      Treatment/Activity Tolerance:  [x] Patient able to complete tx  [] Patient limited by fatigue  [] Patient limited by pain   [] Patient limited by other medical complications  [] Other:     Prognosis: [x] Good [] Fair  [] Poor    Patient Requires Follow-up: [x] Yes  [] No    Plan for next treatment session:    PLAN: See eval. PT 2x / week for 8 weeks. [x] Continue per plan of care [] Alter current plan (see comments)  [] Plan of care initiated [] Hold pending MD visit [] Discharge    Electronically signed by:  Kim Avilez PT, DPT    Note: If patient does not return for scheduled/ recommended follow up visits, his note will serve as a discharge from care along with most recent update on progress.

## 2022-11-15 ENCOUNTER — APPOINTMENT (OUTPATIENT)
Dept: OCCUPATIONAL THERAPY | Age: 42
End: 2022-11-15
Payer: COMMERCIAL

## 2022-11-15 ENCOUNTER — APPOINTMENT (OUTPATIENT)
Dept: PHYSICAL THERAPY | Age: 42
End: 2022-11-15
Payer: COMMERCIAL

## 2022-11-17 ENCOUNTER — HOSPITAL ENCOUNTER (OUTPATIENT)
Dept: OCCUPATIONAL THERAPY | Age: 42
Setting detail: THERAPIES SERIES
Discharge: HOME | End: 2022-11-17
Payer: COMMERCIAL

## 2022-11-17 ENCOUNTER — HOSPITAL ENCOUNTER (OUTPATIENT)
Dept: SPEECH THERAPY | Age: 42
Setting detail: THERAPIES SERIES
Discharge: HOME | End: 2022-11-17
Payer: COMMERCIAL

## 2022-11-17 ENCOUNTER — HOSPITAL ENCOUNTER (OUTPATIENT)
Dept: PHYSICAL THERAPY | Age: 42
Setting detail: THERAPIES SERIES
End: 2022-11-17
Payer: COMMERCIAL

## 2022-11-17 NOTE — FLOWSHEET NOTE
Occupational Therapy  Cancellation/No-show Note  Patient Name:  Maria Elena Garcia   :  1980   Date:  2022  Cancelled visits to date: 2  No-shows to date: 1    Patient status for today's appointment patient:  []  Cancelled- 22, 10/6/22  []  Rescheduled appointment  [x]  No-show, 22     Reason given by patient:  []  Patient ill  []  Conflicting appointment  []  No transportation    []  Conflict with work  []  No reason given  [x]  Other:     Comments:      Phone call information:   [x]  Phone call made today to patient at 9:27 am time at number provided:      [x]  Patient answered, conversation as follows: Pt was not aware of her appointment this morning, said it did not come up in her mychart. Let her know she also has a speech appointment today at 10:30, pt said she would like to cancel that one as she will not be able to make it by then. (Judy Coronado to let her know)   []  Patient did not answer, message left as follows:  []  Phone call not made today-     Electronically signed by:  KAL Sheppard S/OT    Occupational Therapist was present, directed the patient's care, made skilled judgement, and was responsible for assessment and treatment of the patient.

## 2022-11-17 NOTE — FLOWSHEET NOTE
Speech Therapy  Cancellation/No-show Note  Patient Name:  Cirilo Farris  :  1980   Date:  2022  Cancels to Date: 5  No-shows to Date: 1    Patient status for today's appointment patient:  [x]  Cancelled (, , , , )  []  Rescheduled appointment  []  No-show (11/15)     Reason given by patient:  []  Patient ill  []  Conflicting appointment  []  No transportation    []  Conflict with work  []  No reason given  [x]  Other:     Comments: no show to OT, unable to make it to speech therapy when called and reminded    Phone call information:   []  Phone call made today to patient at [phone] time at number provided:      []  Patient's  answered, conversation as follows:    []  Patient did not answer, message left as follows:   [x]  Phone call not made today, pt called clinic and provided reason for cancellation; OT called pt    Electronically signed by:  Britany Smith, SLP

## 2022-11-19 DIAGNOSIS — F41.8 DEPRESSION WITH ANXIETY: ICD-10-CM

## 2022-11-19 DIAGNOSIS — G81.94 LEFT HEMIPARESIS (HCC): ICD-10-CM

## 2022-11-19 DIAGNOSIS — I10 ESSENTIAL HYPERTENSION, BENIGN: ICD-10-CM

## 2022-11-21 ENCOUNTER — HOSPITAL ENCOUNTER (OUTPATIENT)
Dept: PHYSICAL THERAPY | Age: 42
Setting detail: THERAPIES SERIES
Discharge: HOME | End: 2022-11-21
Payer: COMMERCIAL

## 2022-11-21 PROCEDURE — 97530 THERAPEUTIC ACTIVITIES: CPT

## 2022-11-21 PROCEDURE — 97116 GAIT TRAINING THERAPY: CPT

## 2022-11-21 PROCEDURE — 97112 NEUROMUSCULAR REEDUCATION: CPT

## 2022-11-21 RX ORDER — BACLOFEN 20 MG/1
TABLET ORAL
Qty: 60 TABLET | Refills: 2 | Status: SHIPPED | OUTPATIENT
Start: 2022-11-21

## 2022-11-21 RX ORDER — METOPROLOL SUCCINATE 100 MG/1
TABLET, EXTENDED RELEASE ORAL
Qty: 90 TABLET | Refills: 11 | Status: SHIPPED | OUTPATIENT
Start: 2022-11-21

## 2022-11-21 RX ORDER — BUSPIRONE HYDROCHLORIDE 5 MG/1
TABLET ORAL
Qty: 90 TABLET | Refills: 0 | Status: SHIPPED | OUTPATIENT
Start: 2022-11-21

## 2022-11-21 NOTE — FLOWSHEET NOTE
[facility] - Outpatient Physical Therapy  Phone: [phone]   Fax: [phone]            Physical Therapy Daily Treatment Note/ Progress Note  Date:  2022    Patient Name:  Hannah Cherry    :  1980  MRN: [id number]  Medical/Treatment Diagnosis Information:  Diagnosis: R25.2 (ICD-10-CM) - Cramp and spasm;   Left hemiparesis with spasticity  Treatment Diagnosis: Impaired proprioception of LUE & LE, avoidance of L extremities, Impaired balance & gait  Insurance/Certification information:  PT Insurance Information: [employer]. 30 visits pcy. PA required  Physician Information:   Cristina Maldonado MD  Plan of care signed (Y/N): [x]  Yes []  No     Date of Patient follow up with Physician: ?     Progress Report: []  Yes  [x]  No     Date Range for reporting period:  Beginning  2022   PN: 22  POC: 10/18/22  POC: 11/10/22  Ending:    Progress report due (10 Rx/or 30 days whichever is less): visit #17 or /10    Recertification due (POC duration/ or 90 days whichever is less): visit #33 or 22     Visit # Insurance Allowable Auth required?  Date Range    +  +    16 (30 pcy) [x]  Yes, [employer]  []  No Until 9/10/22  Extended to 11/10/22  Extended to 22         Latex Allergy:  [x]NO      []YES  Preferred Language for Healthcare:   [x]English       []Other:      Functional Scale:                                                                                                      Date assessed:  FOTO physical FS primary measure score = 49; risk adjusted = 50                    FOTO physical FS primary measure score = 52                                                      FOTO physical FS primary measure score = 53     10/18        Functional Scale/Test Evaluation 2022 30 day   22 60 day   10/18 90 day  11/10     Tinetti Assessment      30\" STS 3 8 1 9     5x STS 40\" 25\" 1'26\" 15\"     2MWT w/QC 65' 66' 80' 102'     TUG - - 50\" w/QC 40\" w/QC  44\" w/QC          Pain level:  0/10  Location:  L knee & elbow    SUBJECTIVE:      Pt reports she is very tired for cooking yesterday.      OBJECTIVE:   11/21: difficulty with maintaining fingers open on L, improved with cues for tone reducing techniques     11/3: required constant cues for correct gait pattern - improved further when extending L arm     11/1: able to demo appropriate and efficient gait mechanics during session with cues for forward push, Quincy Huynh OT present for some of session    10/27: improved step length on L with cues for smaller step - able to keep toes in neutral; Quincy Huynh OT present for some of session  addressing L UE position     10/25: vision still slightly blurry but getting better    10/20: Difficulty remains with maintaining weight bearing on L - visible mm fatigue through session, required consistent cues for weight shifting L     10/18:  Transfers:  L foot comes off ground during sit to stand transitions when RUE is used    10/4: amb with L toe out and AFO, co-treat with OT  9/12:  pt visibly fatigued in B knees by the beginning of PT session - this was 3rd therapy of the day, wearing L eye patch       RESTRICTIONS/PRECAUTIONS:   HTN    Interventions/Exercises:   Therapeutic Exercises (58844) Resistance / level Sets/sec Reps Notes   TM      7/23: L foot stationary on silver portion                          Neuromuscular Re-ed (74937)        Seated stepper  9/8: PT assisted alignment L hip - cues for pulling it towards midline   Foot on lowest mat table  -static hold     11/8:  assisted L foot on to table   Total gym  -squats to facilitate use of LLE       Tall kneel- side step across mat table  8/2: PT assist for postural control and L leg assist   Fwd lunge onto step, L foot on top of step, R foot behind 8/2: PT assist at pelvis for forward weight shift   LAQ seated     Marches L      Seated hip abd with band at knees  Seated LAQ with band at ankle 8/2: PT assist for L quad activation and weight shift to L for upright posture    9/8: PT elevating L leg to give clearance    On mat table:  Bridge  Roll R  Roll L  Hip abd in supine  Heel slide in supine  Prone ham curl  Prone resisted knee ext  Marches with ecc control and neutral hip  L hip fall out and back to neutral     Tall kneeling                            8/29: fair+ recruitment of LLE muscle fibers  8/29: fair-  recruitment of LLE muscle fibers      AAROM  PROM  AAROM - cues to push into hand    Stand from mat table with weight shifted over L LE and hips square prior to stand    VCs for pointing head towards L knee to achieve neutral and greater weight into L LE   Standing with wall on left:  Calling out letters on specifically colored post it notes    Only 1 mistake on letter - stated the F was a P   In biodex harness:  Finding neutral in between bars, shifting more weight into L LE and straightening L knee    Neutral stand with L hand on bar and R hand tapping cones following color at various positions     Neutral stance, R hand tapping blaze pods only on specific color at various reaching distances    Neutral stance with R hand on bar - blaze pods on floor tapping  on specific color        SLS on L in harness      Toe tap on 6 inch step         Kicking yellow ball one foot at a time       ~5 min throughout session       10 sec x 4   X10 B Pt sitting down supported by harness for rest breaks intermittently - x1 on 10/25                                    Short periods of balance - L hand on bar    B UE support  VCs for erect posture, glute squeeze, and pulling in abs    L forearm on biodex bar   Sit to stand without UE support to facilitate forward flexion and increase WB on LLE   Cues to flex forward at trunk and reach ahead w R arm, head/hips,momentum                                      Therapeutic Activities (04593) /  Functional Tasks / Gait Training (81917)       Sit to supine  Supine to sit    Sitting to/from tall kneeling Sup  Sup    Mod/max A     R foot taps on 8\" block & hold      R foot taps on 2nd step    R toe taps on to black board of Shuttle, L foot on lester R hand holding 1/2 full cup of water; Man & VC's to improve posture and LE muscle fiber recruitment  R hand on QC    7/28: stability provided by SPT through R hand   Kicking tall bolster w/R foot CGA/min A  10/19: cues to improve LLE stability & balance   Sit to stand with hands on ends of small bolster/ pushing both hands into PT    -R hand on L knee, 60-40% WB'ing R-LLEs   X 10 through session   11/1: able to scoot and stand with cues for forward lean, good foot position without cuing   11/10: 25% WB'ing during 30\" STS  11/21: improved WBing in LLE with hands on bolster, heavy VCs for pushing forward hard    Gait Training:  Amb w/QC & AFO          Step up and overs, L foot on Airex, R foot step over half bolster & return   Sup              VC's to improve sequencing of QC to progress modified 3-point pattern      7/28: LOB x 1 with max assist x2   Sit to stand, R foot on AirEx to inhibit activation, large bolster between feet to maintain correct alignment for facilitation   7/28: use of mirror improved weight-shifting to L upon standing   Sit to stand w/large SB to facilitate forward flexion and increase activation of LLE   7/28: use of mirror improved weight-shifting to L while seated   Sit to stand from corner of mat #6, RLE on folded gray pad, large bolster behind R ankle to limited knee flex   R hand with walking stick   Donned biodex harness in standing, doffed in standing       Gait in biodex harness with B UE support   VCs for keeping L toes pointing forward and big steps -   B UE support, improved L step length and toe position    Gait with hands on horiz bolster supported by PT - constant cues for pushing through B UEs into slight resistance    120 feet 11/1: SPT in front throughout session, PT facilitating at B hips, B step length and foot positioning greatly improved with mid line stability and full body engagement causing a decrease in tone    L foot forward step and retro step over towel roll, R foot in place        Retro stepping with hands on QC   11/3: difficulty keeping L hand on dowel and pushing   forward while stepping back                                Manual Intervention (76690)                                                     Modalities:     Pt. Education:  9/8: Reassessed goals, discussed progress made and areas remaining for improvement, issued outcome measure and reviewed new score with pt as compared to eval    6/24/2022 patient educated on diagnosis, prognosis and expectations for rehab, all patient questions were answered    HEP instruction:  6/24: Shift weight to L to facilitate LLE muscle fiber recruitment with all functional activities. Therapeutic Exercise and NMR EXR  [] (64363) Provided verbal/tactile cueing for activities related to strengthening, flexibility, endurance, ROM for improvements in  [] LE / Core stability: LE, hip, and core control with self care, mobility, lifting, ambulation. [] UE / Shoulder complex: cervical, postural, scapular, scapulothoracic and UE control with self care, reaching, carrying, lifting, house/yardwork, driving, computer work. [x] (50688) Provided verbal/tactile cueing for activities related to improving balance, coordination, kinesthetic sense, posture, motor skill, proprioception to assist with   [x] LE / Core stability: LE, hip, and core control in self care, mobility, lifting, ambulation and eccentric single leg control.    [] UE / Shoulder complex: cervical, scapular, scapulothoracic and UE control with self care, reaching, carrying, lifting, house/yardwork, driving, computer work.   [] (68368) Therapist is in constant attendance of 2 or more patients providing skilled therapy interventions, but not providing any significant amount of measurable one-on-one time to either patient, for improvements in  [] LE / Core stability: LE, hip, and core control in self care, mobility, lifting, ambulation and eccentric single leg control. [] UE / Shoulder complex: cervical, scapular, scapulothoracic and UE control with self care, reaching, carrying, lifting, house/yardwork, driving, computer work. NMR and Therapeutic Activities:    [x] (25867 or 14023) Provided verbal/tactile cueing for activities related to improving balance, coordination, kinesthetic sense, posture, motor skill, proprioception and motor activation to allow for proper function of   [x] LE / Core, hip and LE with self care and ADLs  [] UE / Shoulder complex: cervical, postural, scapular, scapulothoracic and UE control with self care, carrying, lifting, driving, computer work. [x] (03400) Gait Re-education- Provided training and instruction to the patient for proper LE, core and hip recruitment, positioning, and eccentric body weight control with ambulation re-education, including ascending & descending stairs     Home Management Training / Self Care:  [] (68917) Provided self-care/home management training related to activities of daily living and compensatory training, and/or use of adaptive equipment for improvement with: ADLs and compensatory training, meal preparation, safety procedures and instruction in use of adaptive equipment, including bathing, grooming, dressing, personal hygiene, basic household cleaning and chores.        Home Exercise Program:    [] (34082) Reviewed/Progressed HEP activities related to strengthening, flexibility, endurance, ROM of   [] LE / Core stability: core, hip and LE for functional self-care, mobility, lifting and ambulation/stair navigation   [] UE / Shoulder complex: cervical, postural, scapular, scapulothoracic and UE control with self care, reaching, carrying, lifting, house/yardwork, driving, computer work  [] (09771)Reviewed/Progressed HEP activities related to improving balance, coordination, kinesthetic sense, posture, motor skill, proprioception of   [] LE: core, hip and LE for self care, mobility, lifting, and ambulation/stair navigation    [] UE / Shoulder complex: cervical, postural,  scapular, scapulothoracic and UE control with self care, reaching, carrying, lifting, house/yardwork, driving, computer work    Manual Treatments:  PROM / STM / Oscillations-Mobs:  G-I, II, III, IV (PA's, Inf., Post.)  [] (22444) Provided manual therapy to mobilize shoulder complex, hip, LE, and/or cervicothoracic/LS spine soft tissue/joints for the purpose of modulating pain, promoting relaxation,  increasing ROM, reducing/eliminating soft tissue swelling/inflammation/restriction, improving soft tissue extensibility and allowing for proper ROM for normal function with   [] LE / Core stability: self care, mobility, lifting and ambulation. [] UE / Shoulder complex: self care, reaching, carrying, lifting, house/yardwork, driving, computer work. Modalities:  [] (19373) Vasopneumatic compression: Utilized vasopneumatic compression to decrease edema / swelling for the purpose of improving mobility and quad tone / recruitment which will allow for increased overall function including but not limited to self-care, transfers, ambulation, and ascending / descending stairs.        Units approved Units used Date Range   32 TE  32 neuro  32 gait  32 TA  0  1  1  1 Until 12/31/22       Charges:  Timed Code Treatment Minutes: 45   Total Treatment Minutes: 45     [] EVAL - LOW (63223)   [] EVAL - MOD (51441)  [] EVAL - HIGH (96399)  [] RE-EVAL (91430)  [] TE (83606) x      [] Ionto  [x] NMR (30860) x 1     [] Vaso  [] Manual (90160) x      [] Ultrasound  [x]TA x 1                 [] Mech Traction (43725)  [x] Gait Training x 1   [] ES (un) (53122):   [] Aquatic therapy x   [] Other:   [] Group:     GOALS:   Patient stated goal: improve transfers and gait  [x] Progressing: [] Met: [] Not Met: [] Adjusted     Therapist goals for Patient:   Short Term Goals: To be achieved in: 3 weeks  1. Independent in HEP and progression per patient tolerance, in order to prevent re-injury. [] Progressing: [x] Met: [] Not Met: [] Adjusted  2. Patient to demonstrate 40% WB'ing of LLE during sit to stand transfers to assist in muscle recruitment with all tasks. [x] Progressing: [] Met: [] Not Met: [] Adjusted     Long Term Goals: To be achieved in: 8 weeks  1. Patient to demonstrate TUG w/QC score of <15\" in order to demonstrate reduced fall risk to assist with reaching prior level of function. - NOT TESTED   [] Progressing: [] Met: [x] Not Met: 40\" w/QC [] Adjusted  2. FOTO score of at least 56 to assist with reaching prior level of function. [] Progressing: [] Met: [x] Not Met: [] Adjusted  3. Patient will demonstrate an increase 30\" sit to stand reps to 10 with 40% WB'ing through LLE, to improve transfers to/from all surfaces & heights  [x] Progressin w/25% WB'ing LLE [] Met: [] Not Met:  [] Adjusted  4. Patient will improve Tinetti Balance score to 15/ in order to . [x] Progressin/28 [] Met: [] Not Met: [] Adjusted  5. Patient will increase 2MWT w/QC to 150' in order to improve community access. [x] Progressin' [] Met: [] Not Met: [] Adjusted        Overall Progression Towards Functional goals/ Treatment Progress Update:  [] Patient is progressing as expected towards functional goals listed. [] Progression is slowed due to complexities/Impairments listed. [x] Progression has been slowed due to co-morbidities.   [] Plan just implemented, too soon to assess goals progression <30days   [] Goals require adjustment due to lack of progress  [] Patient is not progressing as expected and requires additional follow up with physician  [] Other    Persisting Functional Limitations/Impairments:  []Sleeping []Sitting               [x]Standing [x]Transfers        [x]Walking [x]Kneeling               [x]Stairs [x]Squatting / bending   [x]ADLs [x]Reaching  [x]Lifting  [x]Housework  [x]Driving [x]Job related tasks  []Sports/Recreation []Other:        ASSESSMENT:   Pt demos very little carry over from previous sessions. L step length too large at times through session. More stable overall, but L LE continues to fatigue quickly. Difficulty with maintaining forward pressure during gait without AD. Questionable how much patient does at home due to poor carryover. Treatment/Activity Tolerance:  [x] Patient able to complete tx  [] Patient limited by fatigue  [] Patient limited by pain   [] Patient limited by other medical complications  [] Other:     Prognosis: [x] Good [] Fair  [] Poor    Patient Requires Follow-up: [x] Yes  [] No    Plan for next treatment session:    PLAN: See josey. PT 2x / week for 8 weeks. [x] Continue per plan of care [] Alter current plan (see comments)  [] Plan of care initiated [] Hold pending MD visit [] Discharge    Electronically signed by:  Víctor Olmstead, PT, DPT    Note: If patient does not return for scheduled/ recommended follow up visits, his note will serve as a discharge from care along with most recent update on progress.

## 2022-11-28 ENCOUNTER — HOSPITAL ENCOUNTER (OUTPATIENT)
Dept: PHYSICAL THERAPY | Age: 42
Setting detail: THERAPIES SERIES
Discharge: HOME | End: 2022-11-28
Payer: COMMERCIAL

## 2022-11-28 PROCEDURE — 97530 THERAPEUTIC ACTIVITIES: CPT

## 2022-11-28 PROCEDURE — 97112 NEUROMUSCULAR REEDUCATION: CPT

## 2022-11-28 NOTE — FLOWSHEET NOTE
[facility] - Outpatient Physical Therapy  Phone: [phone]   Fax: [phone]        Physical Therapy Daily Treatment Note/ Progress Note  Date:  2022    Patient Name:  Sonu Greenwood    :  1980  MRN: [id number]  Medical/Treatment Diagnosis Information:  Diagnosis: R25.2 (ICD-10-CM) - Cramp and spasm;   Left hemiparesis with spasticity  Treatment Diagnosis: Impaired proprioception of LUE & LE, avoidance of L extremities, Impaired balance & gait  Insurance/Certification information:  PT Insurance Information: [employer]. 30 visits pcy. PA required  Physician Information:   Gagandeep Braun MD  Plan of care signed (Y/N): [x]  Yes []  No     Date of Patient follow up with Physician: ?     Progress Report: []  Yes  [x]  No     Date Range for reporting period:  Beginning  2022   PN: 22  POC: 10/18/22  POC: 11/10/22  Ending:    Progress report due (10 Rx/or 30 days whichever is less): visit #17 or 75/46/45    Recertification due (POC duration/ or 90 days whichever is less): visit #33 or 22     Visit # Insurance Allowable Auth required?  Date Range    +  +    16 (30 pcy) [x]  Yes, [employer]  []  No Until 9/10/22  Extended to 11/10/22  Extended to 22         Latex Allergy:  [x]NO      []YES  Preferred Language for Healthcare:   [x]English       []Other:      Functional Scale:                                                                                                      Date assessed:  FOTO physical FS primary measure score = 49; risk adjusted = 50                    FOTO physical FS primary measure score = 52                                                      FOTO physical FS primary measure score = 53     10/18        Functional Scale/Test Evaluation 2022 30 day   22 60 day   10/18 90 day  11/10     Tinetti Assessment      30\" STS 3 8 1 9     5x STS 40\" 25\" 1'26\" 15\"     2MWT w/QC 65' 66' 92' 102'     TUG - - 50\" w/QC 40\" w/QC  44\" w/QC          Pain level:  3/10  Location:  L knee     SUBJECTIVE:    Doing okay today, was very active over the Thanksgiving weekend with family and friends and L knee is hurting some. OBJECTIVE:   11/28:  Gait pattern: L hip ER during swing phase and increases upon initial contact, continues to lower QC while not in PT to increase R trunk SB when advancing LLE. Can improve R step length when cued, but does not improve without cues.     11/21: difficulty with maintaining fingers open on L, improved with cues for tone reducing techniques   11/3: required constant cues for correct gait pattern - improved further when extending L arm   11/1: able to demo appropriate and efficient gait mechanics during session with cues for forward push, Greg Rowell, OT present for some of session  10/27: improved step length on L with cues for smaller step - able to keep toes in neutral; Greg Rowell OT present for some of session  addressing L UE position   10/25: vision still slightly blurry but getting better  10/20: Difficulty remains with maintaining weight bearing on L - visible mm fatigue through session, required consistent cues for weight shifting L   10/18:  Transfers:  L foot comes off ground during sit to stand transitions when RUE is used  10/4: amb with L toe out and AFO, co-treat with OT  9/12:  pt visibly fatigued in B knees by the beginning of PT session - this was 3rd therapy of the day, wearing L eye patch       RESTRICTIONS/PRECAUTIONS:   HTN    Interventions/Exercises:   Therapeutic Exercises (85862) Resistance / level Sets/sec Reps Notes   TM      7/23: L foot stationary on silver portion                          Neuromuscular Re-ed (33829)        Seated stepper  9/8: PT assisted alignment L hip - cues for pulling it towards midline   Foot on mat table #6  -hold with cervical rotation    x4 R, x1 L   15\", 30\"   11/28:  assisted L foot on to table   Total gym  -squats to facilitate use of LLE       Tall kneel- side step across mat table  8/2: PT assist for postural control and L leg assist   Fwd lunge onto step, L foot on top of step, R foot behind 8/2: PT assist at pelvis for forward weight shift   LAQ seated     Marches L      Seated hip abd with band at knees  Seated LAQ with band at ankle 8/2: PT assist for L quad activation and weight shift to L for upright posture    9/8: PT elevating L leg to give clearance    On mat table:  Bridge  Roll R  Roll L  Hip abd in supine  Heel slide in supine  Prone ham curl  Prone resisted knee ext  Shakeel Hali with ecc control and neutral hip  L hip fall out and back to neutral     Tall kneeling                            8/29: fair+ recruitment of LLE muscle fibers  8/29: fair-  recruitment of LLE muscle fibers      AAROM  PROM  AAROM - cues to push into hand    Stand from mat table with weight shifted over L LE and hips square prior to stand    VCs for pointing head towards L knee to achieve neutral and greater weight into L LE   Standing with wall on left:  Calling out letters on specifically colored post it notes    Only 1 mistake on letter - stated the F was a P   In biodex harness:  Finding neutral in between bars, shifting more weight into L LE and straightening L knee    Neutral stand with L hand on bar and R hand tapping cones following color at various positions     Neutral stance, R hand tapping blaze pods only on specific color at various reaching distances    Neutral stance with R hand on bar - blaze pods on floor tapping  on specific color        SLS on L in harness      Toe tap on 6 inch step         Kicking yellow ball one foot at a time       ~5 min throughout session       Pt sitting down supported by harness for rest breaks intermittently - x1 on 10/25                                    Short periods of balance - L hand on bar    B UE support  VCs for erect posture, glute squeeze, and pulling in abs    L forearm on biodex bar   Sit to stand without UE support to facilitate forward flexion and increase WB on LLE   Cues to flex forward at trunk and reach ahead w R arm, head/hips,momentum                                      Therapeutic Activities (23113) /  Functional Tasks / Gait Training (20291)       Sit to supine  Supine to sit    Sitting to/from tall kneeling Sup  Sup    Mod/max A     R foot taps on 8\" block & hold      R foot taps on 2nd step    R toe taps on to black board of Shuttle, L foot on lester R hand holding 1/2 full cup of water; Man & VC's to improve posture and LE muscle fiber recruitment  R hand on QC    7/28: stability provided by SPT through R hand   Kicking tall bolster w/R foot CGA/min A  10/19: cues to improve LLE stability & balance   Sit to stand with RUE on HR      -R hand on L knee, 60-40% WB'ing R-LLEs        Sit to stand w/large round bolster between feet  -R foot on AirEx 16\"                22\"    22\" 2 X 10             10    8 11/1: able to scoot and stand with cues for forward lean, good foot position without cuing   11/10: 25% WB'ing during 30\" STS  11/21: improved WBing in LLE with hands on bolster, heavy VCs for pushing forward hard     -R hand on L knee   Gait Training:  Amb w/QC & AFO    -man facilitation to pelvis to improve L swing phase & initial contact          Step up and overs, L foot on Airex, R foot step over half bolster & return   Sup     Sup/CGA     125'    85'                             7/28: LOB x 1 with max assist x2   Fwd step ups, L foot on 1st step 6\" 1 10 11/28: minimal activation of LLE muscle fibers   Sit to stand w/large SB to facilitate forward flexion and increase activation of LLE   7/28: use of mirror improved weight-shifting to L while seated   Sit to stand from corner of mat #6, RLE on folded gray pad, large bolster behind R ankle to limited knee flex   R hand with walking stick   Donned biodex harness in standing, doffed in standing       Gait in biodex harness with B UE support   VCs for keeping L toes pointing forward and big steps -   B UE support, improved L step length and toe position    Gait with hands on horiz bolster supported by PT - constant cues for pushing through B UEs into slight resistance    11/1: SPT in front throughout session, PT facilitating at B hips, B step length and foot positioning greatly improved with mid line stability and full body engagement causing a decrease in tone    L foot forward step and retro step over towel roll, R foot in place        Retro stepping with hands on QC   11/3: difficulty keeping L hand on dowel and pushing   forward while stepping back                                Manual Intervention (76263)       Seated piriformis stretch  3x30\" B                                            Modalities:     Pt. Education:  9/8: Reassessed goals, discussed progress made and areas remaining for improvement, issued outcome measure and reviewed new score with pt as compared to eval    6/24/2022 patient educated on diagnosis, prognosis and expectations for rehab, all patient questions were answered    HEP instruction:  6/24: Shift weight to L to facilitate LLE muscle fiber recruitment with all functional activities. Therapeutic Exercise and NMR EXR  [] (84965) Provided verbal/tactile cueing for activities related to strengthening, flexibility, endurance, ROM for improvements in  [] LE / Core stability: LE, hip, and core control with self care, mobility, lifting, ambulation. [] UE / Shoulder complex: cervical, postural, scapular, scapulothoracic and UE control with self care, reaching, carrying, lifting, house/yardwork, driving, computer work.   [x] (70228) Provided verbal/tactile cueing for activities related to improving balance, coordination, kinesthetic sense, posture, motor skill, proprioception to assist with   [x] LE / Core stability: LE, hip, and core control in self care, mobility, lifting, ambulation and eccentric single leg control. [] UE / Shoulder complex: cervical, scapular, scapulothoracic and UE control with self care, reaching, carrying, lifting, house/yardwork, driving, computer work.   [] (49006) Therapist is in constant attendance of 2 or more patients providing skilled therapy interventions, but not providing any significant amount of measurable one-on-one time to either patient, for improvements in  [] LE / Core stability: LE, hip, and core control in self care, mobility, lifting, ambulation and eccentric single leg control. [] UE / Shoulder complex: cervical, scapular, scapulothoracic and UE control with self care, reaching, carrying, lifting, house/yardwork, driving, computer work. NMR and Therapeutic Activities:    [x] (54101 or 08042) Provided verbal/tactile cueing for activities related to improving balance, coordination, kinesthetic sense, posture, motor skill, proprioception and motor activation to allow for proper function of   [x] LE / Core, hip and LE with self care and ADLs  [] UE / Shoulder complex: cervical, postural, scapular, scapulothoracic and UE control with self care, carrying, lifting, driving, computer work. [x] (18560) Gait Re-education- Provided training and instruction to the patient for proper LE, core and hip recruitment, positioning, and eccentric body weight control with ambulation re-education, including ascending & descending stairs     Home Management Training / Self Care:  [] (75279) Provided self-care/home management training related to activities of daily living and compensatory training, and/or use of adaptive equipment for improvement with: ADLs and compensatory training, meal preparation, safety procedures and instruction in use of adaptive equipment, including bathing, grooming, dressing, personal hygiene, basic household cleaning and chores.        Home Exercise Program:    [] (14954) Reviewed/Progressed HEP activities related to strengthening, flexibility, endurance, ROM of   [] LE / Core stability: core, hip and LE for functional self-care, mobility, lifting and ambulation/stair navigation   [] UE / Shoulder complex: cervical, postural, scapular, scapulothoracic and UE control with self care, reaching, carrying, lifting, house/yardwork, driving, computer work  [] (60078)Reviewed/Progressed HEP activities related to improving balance, coordination, kinesthetic sense, posture, motor skill, proprioception of   [] LE: core, hip and LE for self care, mobility, lifting, and ambulation/stair navigation    [] UE / Shoulder complex: cervical, postural,  scapular, scapulothoracic and UE control with self care, reaching, carrying, lifting, house/yardwork, driving, computer work    Manual Treatments:  PROM / STM / Oscillations-Mobs:  G-I, II, III, IV (PA's, Inf., Post.)  [] (42068) Provided manual therapy to mobilize shoulder complex, hip, LE, and/or cervicothoracic/LS spine soft tissue/joints for the purpose of modulating pain, promoting relaxation,  increasing ROM, reducing/eliminating soft tissue swelling/inflammation/restriction, improving soft tissue extensibility and allowing for proper ROM for normal function with   [] LE / Core stability: self care, mobility, lifting and ambulation. [] UE / Shoulder complex: self care, reaching, carrying, lifting, house/yardwork, driving, computer work. Modalities:  [] (83569) Vasopneumatic compression: Utilized vasopneumatic compression to decrease edema / swelling for the purpose of improving mobility and quad tone / recruitment which will allow for increased overall function including but not limited to self-care, transfers, ambulation, and ascending / descending stairs.        Units approved Units used Date Range   32 TE  32 neuro  32 gait  32 TA  0  1  1  1 Until 12/31/22       Charges:  Timed Code Treatment Minutes: 42   Total Treatment Minutes: 42     [] EVAL - LOW (50398)   [] EVAL - MOD (53936)  [] EVAL - HIGH (18656)  [] RE-EVAL (26710)  [] TE ([id number]) x      [] Ionto  [x] NMR (36547) x 1     [] Vaso  [] Manual (64043) x      [] Ultrasound  [x]TA x 1                 [] Mech Traction (72265)  [x] Gait Training x 1   [] ES (un) (14640):   [] Aquatic therapy x   [] Other:   [] Group:     GOALS:   Patient stated goal: improve transfers and gait  [x] Progressing: [] Met: [] Not Met: [] Adjusted     Therapist goals for Patient:   Short Term Goals: To be achieved in: 3 weeks  1. Independent in HEP and progression per patient tolerance, in order to prevent re-injury. [] Progressing: [x] Met: [] Not Met: [] Adjusted  2. Patient to demonstrate 40% WB'ing of LLE during sit to stand transfers to assist in muscle recruitment with all tasks. [x] Progressing: [] Met: [] Not Met: [] Adjusted     Long Term Goals: To be achieved in: 8 weeks  1. Patient to demonstrate TUG w/QC score of <15\" in order to demonstrate reduced fall risk to assist with reaching prior level of function. - NOT TESTED   [] Progressing: [] Met: [x] Not Met: 40\" w/QC [] Adjusted  2. FOTO score of at least 56 to assist with reaching prior level of function. [] Progressing: [] Met: [x] Not Met: [] Adjusted  3. Patient will demonstrate an increase 30\" sit to stand reps to 10 with 40% WB'ing through LLE, to improve transfers to/from all surfaces & heights  [x] Progressin w/25% WB'ing LLE [] Met: [] Not Met:  [] Adjusted  4. Patient will improve Tinetti Balance score to 15/28 in order to . [x] Progressin/28 [] Met: [] Not Met: [] Adjusted  5. Patient will increase 2MWT w/QC to 150' in order to improve community access. [x] Progressin' [] Met: [] Not Met: [] Adjusted        Overall Progression Towards Functional goals/ Treatment Progress Update:  [] Patient is progressing as expected towards functional goals listed. [] Progression is slowed due to complexities/Impairments listed. [x] Progression has been slowed due to co-morbidities.   [] Plan just implemented, too soon to assess goals progression <30days   [] Goals require adjustment due to lack of progress  [] Patient is not progressing as expected and requires additional follow up with physician  [] Other    Persisting Functional Limitations/Impairments:  []Sleeping []Sitting               [x]Standing [x]Transfers        [x]Walking [x]Kneeling               [x]Stairs [x]Squatting / bending   [x]ADLs [x]Reaching  [x]Lifting  [x]Housework  [x]Driving [x]Job related tasks  []Sports/Recreation []Other:        ASSESSMENT:      Increased non-use of LLE present this date vs previous sessions, pt unable to elicit LLE muscle contraction with step ups, maintaining L heel off the ground and hopping up to step w/RLE. Manual and verbal cues did not improve. Following instructions and manual cues to improve, pt was able to increase muscle fiber recruitment of LLE with tasks, but poor LLE stance phase continued at end of session. Continue to address non-use of LLE, progress gait pattern. Treatment/Activity Tolerance:  [x] Patient able to complete tx  [] Patient limited by fatigue  [] Patient limited by pain   [] Patient limited by other medical complications  [] Other:     Prognosis: [x] Good [] Fair  [] Poor    Patient Requires Follow-up: [x] Yes  [] No    Plan for next treatment session:    PLAN: See josye. PT 2x / week for 8 weeks. [x] Continue per plan of care [] Alter current plan (see comments)  [] Plan of care initiated [] Hold pending MD visit [] Discharge    Electronically signed by:  Kenyetta Perkins PT, DPT    Note: If patient does not return for scheduled/ recommended follow up visits, his note will serve as a discharge from care along with most recent update on progress.

## 2022-12-01 ENCOUNTER — HOSPITAL ENCOUNTER (OUTPATIENT)
Dept: PHYSICAL THERAPY | Age: 42
Setting detail: THERAPIES SERIES
Discharge: HOME | End: 2022-12-01
Payer: COMMERCIAL

## 2022-12-01 PROCEDURE — 97116 GAIT TRAINING THERAPY: CPT

## 2022-12-01 PROCEDURE — 97112 NEUROMUSCULAR REEDUCATION: CPT

## 2022-12-01 PROCEDURE — 97530 THERAPEUTIC ACTIVITIES: CPT

## 2022-12-01 NOTE — FLOWSHEET NOTE
[facility] - Outpatient Physical Therapy  Phone: [phone]   Fax: [phone]        Physical Therapy Daily Treatment Note/ Progress Note  Date:  2022    Patient Name:  Hannah Cherry    :  1980  MRN: [id number]  Medical/Treatment Diagnosis Information:  Diagnosis: R25.2 (ICD-10-CM) - Cramp and spasm;   Left hemiparesis with spasticity  Treatment Diagnosis: Impaired proprioception of LUE & LE, avoidance of L extremities, Impaired balance & gait  Insurance/Certification information:  PT Insurance Information: [employer]. 30 visits pcy. [place] required  Physician Information:   Cristina Maldonado MD  Plan of care signed (Y/N): [x]  Yes []  No     Date of Patient follow up with Physician: ?     Progress Report: []  Yes  [x]  No     Date Range for reporting period:  Beginning  2022   PN: 22  POC: 10/18/22  POC: 11/10/22  Ending:    Progress report due (10 Rx/or 30 days whichever is less): visit #17 or     Recertification due (POC duration/ or 90 days whichever is less): visit #33 or 22     Visit # Insurance Allowable Auth required?  Date Range    +  + 3/16   16 (30 pcy) [x]  Yes, [employer]  []  No Until 9/10/22  Extended to 11/10/22  Extended to 22         Latex Allergy:  [x]NO      []YES  Preferred Language for Healthcare:   [x]English       []Other:      Functional Scale:                                                                                                      Date assessed:  FOTO physical FS primary measure score = 49; risk adjusted = 50                    FOTO physical FS primary measure score = 52                                                      FOTO physical FS primary measure score = 53     10/18        Functional Scale/Test Evaluation 2022 30 day   22 60 day   10/18 90 day  11/10     Tinetti Assessment      30\" STS 3 8 1 9     5x STS 40\" 25\" 1'26\" 15\"     2MWT w/QC 65' 66' 92' 102'     TUG - - 50\" w/QC 40\" w/QC  44\" w/QC          Pain level:  3/10  Location:  L knee     SUBJECTIVE:    Pt reports that she has not been up out of her chair much since last visit. OBJECTIVE:   12/1: Unable to successfully maintain plantigrade position and bring L knee up to mat table. L hip was externally rotated throughout gait pattern and she struggled with L foot placement walking into the clinic.        11/28:  Gait pattern: L hip ER during swing phase and increases upon initial contact, continues to lower QC while not in PT to increase R trunk SB when advancing LLE. Can improve R step length when cued, but does not improve without cues.     11/21: difficulty with maintaining fingers open on L, improved with cues for tone reducing techniques   11/3: required constant cues for correct gait pattern - improved further when extending L arm   11/1: able to demo appropriate and efficient gait mechanics during session with cues for forward push, Olga Doyle OT present for some of session  10/27: improved step length on L with cues for smaller step - able to keep toes in neutral; Olga Doyle OT present for some of session  addressing L UE position   10/25: vision still slightly blurry but getting better  10/20: Difficulty remains with maintaining weight bearing on L - visible mm fatigue through session, required consistent cues for weight shifting L   10/18:  Transfers:  L foot comes off ground during sit to stand transitions when RUE is used  10/4: amb with L toe out and AFO, co-treat with OT  9/12:  pt visibly fatigued in B knees by the beginning of PT session - this was 3rd therapy of the day, wearing L eye patch       RESTRICTIONS/PRECAUTIONS:   HTN    Interventions/Exercises:   Therapeutic Exercises (09638) Resistance / level Sets/sec Reps Notes   TM      7/23: L foot stationary on silver portion                          Neuromuscular Re-ed (74256)        Seated stepper  9/8: PT assisted alignment L hip - cues for pulling it towards midline   Foot on mat table #6  -hold with cervical rotation  11/28:  assisted L foot on to table   Total gym  -squats to facilitate use of LLE       Tall kneel- side step across mat table  8/2: PT assist for postural control and L leg assist   Fwd lunge onto step, L foot on top of step, R foot behind 8/2: PT assist at pelvis for forward weight shift   LAQ seated     Marches L      Seated hip abd with band at knees  Seated LAQ with band at ankle 8/2: PT assist for L quad activation and weight shift to L for upright posture    9/8: PT elevating L leg to give clearance    On mat table:  Bridge  Roll R  Roll L  Hip abd in supine  Heel slide in supine  Prone ham curl  Prone resisted knee ext  Marches with ecc control and neutral hip  L hip fall out and back to neutral     Tall kneeling    Plantigrade position      Plantigrade, lifting R knee to mat table                           4 min        X6   8/29: fair+ recruitment of LLE muscle fibers  8/29: fair-  recruitment of LLE muscle fibers      AAROM  PROM  AAROM - cues to push into hand           12/1: Manual assistance to stabilize L wrist, VC and manual assist for foot placement.   VC for Weight Shift  Min-mod assist to lift R  knee, pt unable to complete activity without assist   Stand from mat table with weight shifted over L LE and hips square prior to stand    VCs for pointing head towards L knee to achieve neutral and greater weight into L LE   Standing with wall on left:  Calling out letters on specifically colored post it notes    Only 1 mistake on letter - stated the F was a P   In biodex harness:  Finding neutral in between bars, shifting more weight into L LE and straightening L knee    Neutral stand with L hand on bar and R hand tapping cones following color at various positions     Neutral stance, R hand tapping blaze pods only on specific color at various reaching distances    Neutral stance with R hand on bar - blaze activation of LLE muscle fibers   Sit to stand w/large SB to facilitate forward flexion and increase activation of LLE   7/28: use of mirror improved weight-shifting to L while seated   Sit to stand from corner of mat #6, RLE on folded gray pad, large bolster behind R ankle to limited knee flex   R hand with walking stick   Donned biodex harness in standing, doffed in standing       Gait in biodex harness with B UE support   VCs for keeping L toes pointing forward and big steps -   B UE support, improved L step length and toe position    Gait with hands on horiz bolster supported by PT - constant cues for pushing through B UEs into slight resistance    120 feet 11/1: PT in front throughout session, SPT facilitating at B hips, B step length and foot positioning greatly improved with mid line stability and full body engagement causing a decrease in tone     12/1: more difficulty with forward push this date    L foot forward step and retro step over towel roll, R foot in place        Retro stepping with hands on QC   11/3: difficulty keeping L hand on dowel and pushing   forward while stepping back    Gait with hands on cart pushing forward   50 ft VC to keep arms out in front and push forward                        Manual Intervention (42052)       Seated piriformis stretch                                             Modalities:     Pt. Education:  9/8: Reassessed goals, discussed progress made and areas remaining for improvement, issued outcome measure and reviewed new score with pt as compared to eval    6/24/2022 patient educated on diagnosis, prognosis and expectations for rehab, all patient questions were answered    HEP instruction:  6/24: Shift weight to L to facilitate LLE muscle fiber recruitment with all functional activities.         Therapeutic Exercise and NMR EXR  [] (84635) Provided verbal/tactile cueing for activities related to strengthening, flexibility, endurance, ROM for improvements in  [] LE / Core stability: LE, hip, and core control with self care, mobility, lifting, ambulation. [] UE / Shoulder complex: cervical, postural, scapular, scapulothoracic and UE control with self care, reaching, carrying, lifting, house/yardwork, driving, computer work. [x] (77580) Provided verbal/tactile cueing for activities related to improving balance, coordination, kinesthetic sense, posture, motor skill, proprioception to assist with   [x] LE / Core stability: LE, hip, and core control in self care, mobility, lifting, ambulation and eccentric single leg control. [] UE / Shoulder complex: cervical, scapular, scapulothoracic and UE control with self care, reaching, carrying, lifting, house/yardwork, driving, computer work.   [] (31364) Therapist is in constant attendance of 2 or more patients providing skilled therapy interventions, but not providing any significant amount of measurable one-on-one time to either patient, for improvements in  [] LE / Core stability: LE, hip, and core control in self care, mobility, lifting, ambulation and eccentric single leg control. [] UE / Shoulder complex: cervical, scapular, scapulothoracic and UE control with self care, reaching, carrying, lifting, house/yardwork, driving, computer work. NMR and Therapeutic Activities:    [x] (53823 or 14598) Provided verbal/tactile cueing for activities related to improving balance, coordination, kinesthetic sense, posture, motor skill, proprioception and motor activation to allow for proper function of   [x] LE / Core, hip and LE with self care and ADLs  [] UE / Shoulder complex: cervical, postural, scapular, scapulothoracic and UE control with self care, carrying, lifting, driving, computer work.    [x] (88108) Gait Re-education- Provided training and instruction to the patient for proper LE, core and hip recruitment, positioning, and eccentric body weight control with ambulation re-education, including ascending & descending stairs Home Management Training / Self Care:  [] (67393) Provided self-care/home management training related to activities of daily living and compensatory training, and/or use of adaptive equipment for improvement with: ADLs and compensatory training, meal preparation, safety procedures and instruction in use of adaptive equipment, including bathing, grooming, dressing, personal hygiene, basic household cleaning and chores. Home Exercise Program:    [] (68746) Reviewed/Progressed HEP activities related to strengthening, flexibility, endurance, ROM of   [] LE / Core stability: core, hip and LE for functional self-care, mobility, lifting and ambulation/stair navigation   [] UE / Shoulder complex: cervical, postural, scapular, scapulothoracic and UE control with self care, reaching, carrying, lifting, house/yardwork, driving, computer work  [] (51348)Reviewed/Progressed HEP activities related to improving balance, coordination, kinesthetic sense, posture, motor skill, proprioception of   [] LE: core, hip and LE for self care, mobility, lifting, and ambulation/stair navigation    [] UE / Shoulder complex: cervical, postural,  scapular, scapulothoracic and UE control with self care, reaching, carrying, lifting, house/yardwork, driving, computer work    Manual Treatments:  PROM / STM / Oscillations-Mobs:  G-I, II, III, IV (PA's, Inf., Post.)  [] (35209) Provided manual therapy to mobilize shoulder complex, hip, LE, and/or cervicothoracic/LS spine soft tissue/joints for the purpose of modulating pain, promoting relaxation,  increasing ROM, reducing/eliminating soft tissue swelling/inflammation/restriction, improving soft tissue extensibility and allowing for proper ROM for normal function with   [] LE / Core stability: self care, mobility, lifting and ambulation. [] UE / Shoulder complex: self care, reaching, carrying, lifting, house/yardwork, driving, computer work.      Modalities:  [] (28504) Vasopneumatic compression: Utilized vasopneumatic compression to decrease edema / swelling for the purpose of improving mobility and quad tone / recruitment which will allow for increased overall function including but not limited to self-care, transfers, ambulation, and ascending / descending stairs. Units approved Units used Date Range   32 TE  32 neuro  32 gait  32 TA  0  2  2  2 Until 22       Charges:  Timed Code Treatment Minutes: 40   Total Treatment Minutes: 40     [] EVAL - LOW (80492)   [] EVAL - MOD (26856)  [] EVAL - HIGH (53918)  [] RE-EVAL (49193)  [] TE (69044) x      [] Ionto  [x] NMR (67111) x 1     [] Vaso  [] Manual (50546) x      [] Ultrasound  [x]TA x 1                 [] Mech Traction (70785)  [x] Gait Training x 1   [] ES (un) (64042):   [] Aquatic therapy x   [] Other:   [] Group:     GOALS:   Patient stated goal: improve transfers and gait  [x] Progressing: [] Met: [] Not Met: [] Adjusted     Therapist goals for Patient:   Short Term Goals: To be achieved in: 3 weeks  1. Independent in HEP and progression per patient tolerance, in order to prevent re-injury. [] Progressing: [x] Met: [] Not Met: [] Adjusted  2. Patient to demonstrate 40% WB'ing of LLE during sit to stand transfers to assist in muscle recruitment with all tasks. [x] Progressing: [] Met: [] Not Met: [] Adjusted     Long Term Goals: To be achieved in: 8 weeks  1. Patient to demonstrate TUG w/QC score of <15\" in order to demonstrate reduced fall risk to assist with reaching prior level of function. - NOT TESTED   [] Progressing: [] Met: [x] Not Met: 40\" w/QC [] Adjusted  2. FOTO score of at least 56 to assist with reaching prior level of function. [] Progressing: [] Met: [x] Not Met: [] Adjusted  3. Patient will demonstrate an increase 30\" sit to stand reps to 10 with 40% WB'ing through LLE, to improve transfers to/from all surfaces & heights  [x] Progressin w/25% WB'ing LLE [] Met: [] Not Met:  [] Adjusted  4.  Patient will improve Tinetti Balance score to 15/28 in order to . [x] Progressin/28 [] Met: [] Not Met: [] Adjusted  5. Patient will increase 2MWT w/QC to 150' in order to improve community access. [x] Progressin' [] Met: [] Not Met: [] Adjusted        Overall Progression Towards Functional goals/ Treatment Progress Update:  [] Patient is progressing as expected towards functional goals listed. [] Progression is slowed due to complexities/Impairments listed. [x] Progression has been slowed due to co-morbidities. [] Plan just implemented, too soon to assess goals progression <30days   [] Goals require adjustment due to lack of progress  [] Patient is not progressing as expected and requires additional follow up with physician  [] Other    Persisting Functional Limitations/Impairments:  []Sleeping []Sitting               [x]Standing [x]Transfers        [x]Walking [x]Kneeling               [x]Stairs [x]Squatting / bending   [x]ADLs [x]Reaching  [x]Lifting  [x]Housework  [x]Driving [x]Job related tasks  []Sports/Recreation []Other:        ASSESSMENT:  Increased difficulty with maintaining upright posture and foot placement with gait at this visit vs previous sessions. Pt required VC for posture throughout session and VC for foot placement with gait and before sitting down. Noted decreased in tone after gait training with B hands on small bolster. Pt was had difficulty with plantigrade position and kept trying to turn around to sit down. She was unable to lift L knee to mat table in plantigrade position and required min-mod assist. Pt LLE visibly fatigued at end of session. Continue to address non-use of LLE, gait, and functional activity.       Treatment/Activity Tolerance:  [x] Patient able to complete tx  [] Patient limited by fatigue  [] Patient limited by pain   [] Patient limited by other medical complications  [] Other:     Prognosis: [x] Good [] Fair  [] Poor    Patient Requires Follow-up: [x] Yes  [] No    Plan for next treatment session:    PLAN: See eval. PT 2x / week for 8 weeks. [x] Continue per plan of care [] Alter current plan (see comments)  [] Plan of care initiated [] Hold pending MD visit [] Discharge    Electronically signed by:   Julian Campos, Physical Therapy Student     Therapist was present, directed the patient's care, made skilled judgement, and was responsible for assessment and treatment of the patient. Chase Underwood, PT, DPT    Note: If patient does not return for scheduled/ recommended follow up visits, his note will serve as a discharge from care along with most recent update on progress.

## 2022-12-05 ENCOUNTER — HOSPITAL ENCOUNTER (OUTPATIENT)
Dept: OCCUPATIONAL THERAPY | Age: 42
Setting detail: THERAPIES SERIES
Discharge: HOME | End: 2022-12-05
Payer: COMMERCIAL

## 2022-12-05 PROCEDURE — 97535 SELF CARE MNGMENT TRAINING: CPT

## 2022-12-05 PROCEDURE — 97168 OT RE-EVAL EST PLAN CARE: CPT

## 2022-12-05 PROCEDURE — 97112 NEUROMUSCULAR REEDUCATION: CPT

## 2022-12-05 NOTE — PLAN OF CARE
Occupational Therapy Re-Certification Plan of Care    Dear Dr. Emmanuel Early ,    We had the pleasure of treating the following patient for occupational therapy services at [address] [place] [address]. A summary of our findings can be found in the updated assessment below. This includes our plan of care. If you have any questions or concerns regarding these findings, please do not hesitate to contact me at the office phone number checked above. Thank you for the referral.     Physician Signature:________________________________Date:__________________  By signing above (or electronic signature), therapists plan is approved by physician      Functional Outcome: foto    Overall Response to Treatment:   [x]Patient is responding well to treatment and improvement is noted with regards  to goals   []Patient should continue to improve in reasonable time if they continue HEP   []Patient has plateaued and is no longer responding to skilled OT intervention    []Patient is getting worse and would benefit from return to referring MD   []Patient unable to adhere to initial POC   []Other:     Date range of Visits: 2022 to 2022  Total Visits: 12    Recommendation:    [x]Continue OT 1x / wk for 6 weeks. []Hold OT, pending MD visit   [address] [place] [address] Occupational Therapy  [address] [place] [address].     [facility], [address] [place] [address] Drive  Phone: [phone]   Fax: [phone]      Occupational Therapy Daily Treatment Note  Date:  2022    Patient: Tracy Morgan   : 1980   MRN: [id number]  Referring Physician: Miguelina Quinones MD           Medical Diagnosis Information: left hemiplegia    Date of Injury: 22  Date of Surgery: N/A                                      Insurance information: care source     Plan of care sent to provider:      []Faxed   [x]Co-signature    (attempts: 1[x] 2[] 3[])       Progress Report: []  Yes  [x]  No     Date Range for reporting period:  Beginnin22  Ending: end of POC    Progress report due (10 Rx/or 30 days whichever is less): visit #10 or 6/3/4438    Recertification due (POC duration/ or 90 days whichever is less): visit #20     Visit # Insurance Allowable Auth required? Date Range    30 hard max    120 units [x]  Yes  []  No 22- 2022       Latex Allergy:  [x]No      []Yes  Pacemaker:  [x] No       [] Yes     Preferred Language for Healthcare:   [x]English       []other:    Pain level: 0/10 R shoulder     SUBJECTIVE:    Patient in good spirits. Functional Disability Index:  FOTO:  Patient's Physical FS Primary Measure- 25  Risk Adjusted Statistical FOTO-     OBJECTIVE: See eval    Re-certification   Functional Transfers: Mod I with set up and use of cane from wheelchair to mat    Lower Body Dressing:   Donning/[place] shoes- Min A with verbal cues to cross ankle at knee to don and doff. Decreased trunk control and balance with this positioning. Pt reports family assists with shoes. Donning/[place] Pants-  Verbal cues for attention to task and sequencing of task. Min-Mod A for sit to stand to pull pants up, pt cued for weight shift in L side to help with balance and trunk control to utilize RUE to reach around to L side to pull up pants, and verbal cues to use maria alejandra dressing technique. Pt reports family assists with pulling her pants up once she has them up passed her calf. [place] KFO brace- Min A. Verbal cues for attention to task, and sequencing of task. Pt reports typically her family dons and doffs brace for her. LUE as dynamic base of support during self-care: With pt holding onto football horizontally or onto therapy students hand/forearm to facilitate LUE as dynamic base of support during dressing. Pt progressing to using LUE as dynamic base of support.     Re eval 2022  Transfers, modified independent to min assist pending fatigue  Lower body dressing on this date verbal cues/ contact guard for balance  Ue as base of support with min assist as a dynamic base of support on moveable surface      RESTRICTIONS/PRECAUTIONS: fall risk, poor midline, left visual hemianopsia    Exercises/Interventions:   quick re evaluation completed followed by work on upper body dressing seated, lower body dressing seated and dynamic balance during forward reach seated and standing needing slight assist to maintain base of support through left ue and lower extremity . Therapeutic Exercises  Resistance / level Sets/sec Reps Notes                                                                                Neuromuscular Re-ed / Therapeutic Activities                                                 Manual Intervention                                                     Modalities:     Patient education: OT evaluation home program goals          Home Exercise Program:   7/1/2022 aarom     Therapeutic Exercise and NMR:  [x] (71746) Provided verbal/tactile cueing for activities related to strengthening, flexibility, endurance, ROM  for improvements in scapular, scapulothoracic and UE control with self care, reaching, carrying, lifting, house/yardwork, driving/computer work. [x] (17246) Provided verbal/tactile cueing for activities related to improving balance, coordination, kinesthetic sense, posture, motor skill, proprioception  to assist with  scapular, scapulothoracic and UE control with self care, reaching, carrying, lifting, house/yardwork, driving/computer work.   [] Comments:    Therapeutic Activities:    [] (67127 or 68940) Provided verbal/tactile cueing for activities related to improving balance, coordination, kinesthetic sense, posture, motor skill, proprioception and motor activation to allow for proper function of scapular, scapulothoracic and UE control with self care, carrying, lifting, driving/computer work  [] Comments:    Home Exercise Program:    [] (30686) Reviewed/Progressed HEP activities related to strengthening, flexibility, endurance, ROM of scapular, scapulothoracic and UE control with self care, reaching, carrying, lifting, house/yardwork, driving/computer work  [] (52121) Reviewed/Progressed HEP activities related to improving balance, coordination, kinesthetic sense, posture, motor skill, proprioception of scapular, scapulothoracic and UE control with self care, reaching, carrying, lifting, house/yardwork, driving/computer work    [] Comments:    Manual Treatments:  PROM / STM / Oscillations-Mobs:  G-I, II, III, IV (PA's, Inf., Post.)  [] (37196) Provided manual therapy to mobilize soft tissue/joints of cervical/CT, scapular GHJ and UE for the purpose of modulating pain, promoting relaxation,  increasing ROM, reducing/eliminating soft tissue swelling/inflammation/restriction, improving soft tissue extensibility and allowing for proper ROM for normal function with self care, reaching, carrying, lifting, house/yardwork, driving/computer work  [] Comments:    ADL Training:  [x] (64818) Provided self-care/home management training related to activities of daily living and compensatory training, and/or use of adaptive equipment   [] Comments:     Splinting:  [] Fabrication of:   [] (59966) Orthotic/Prosthetic Management, subsequent encounter  [] (17084) Orthotic management and training (fitting and assessment)  [] Comments:      Charges:  Timed Code Treatment Minutes: 30   Total Treatment Minutes: 45     [] EVAL (LOW) 84340   [x] OT Re-eval (23687)  [] EVAL (MOD) 73447   [] EVAL (HIGH) 40228       [] Dolly (41441) x   [] LDBNA(29399)  [x] NMR (37337) x    1 [] Estim (attended) (96593)   [] Manual ([id number]) x     [] US (81385)  [] TA (30541) x     [] Paraffin (39740)  [x] ADL  (84780) x  1   [] Splint/L code:    [] Estim (unattended) (59076)  [] Fluidotherapy (88343)  [] Other:    GOALS: Goals established 6/30/22   Patient stated goal:  Patient stated goal is to improve her over all mobility and ADL

## 2022-12-08 ENCOUNTER — APPOINTMENT (OUTPATIENT)
Dept: SPEECH THERAPY | Age: 42
End: 2022-12-08
Payer: COMMERCIAL

## 2022-12-08 ENCOUNTER — APPOINTMENT (OUTPATIENT)
Dept: PHYSICAL THERAPY | Age: 42
End: 2022-12-08
Payer: COMMERCIAL

## 2022-12-09 ENCOUNTER — APPOINTMENT (OUTPATIENT)
Dept: SPEECH THERAPY | Age: 42
End: 2022-12-09
Payer: COMMERCIAL

## 2022-12-12 ENCOUNTER — HOSPITAL ENCOUNTER (OUTPATIENT)
Dept: SPEECH THERAPY | Age: 42
Setting detail: THERAPIES SERIES
Discharge: HOME | End: 2022-12-12
Payer: COMMERCIAL

## 2022-12-12 NOTE — FLOWSHEET NOTE
Speech Therapy  Cancellation/No-show Note  Patient Name:  Severo Kwon  :  1980   Date:  2022  Cancels to Date: 6  No-shows to Date: 1    Patient status for today's appointment patient:  [x]  Cancelled (, , , , , )  []  Rescheduled appointment  []  No-show (11/15)     Reason given by patient:  []  Patient ill  []  Conflicting appointment  []  No transportation    []  Conflict with work  []  No reason given  [x]  Other:     Comments: pt unable to make appointment, rescheduled    Phone call information:   []  Phone call made today to patient at time at number provided:      []  Patient's  answered, conversation as follows:    []  Patient did not answer, message left as follows:   [x]  Phone call not made today, pt called clinic and provided reason for cancellation    Electronically signed by:  Pratik Baires, SLP

## 2022-12-13 ENCOUNTER — HOSPITAL ENCOUNTER (OUTPATIENT)
Dept: PHYSICAL THERAPY | Age: 42
Setting detail: THERAPIES SERIES
End: 2022-12-13
Payer: COMMERCIAL

## 2022-12-13 ENCOUNTER — HOSPITAL ENCOUNTER (OUTPATIENT)
Dept: OCCUPATIONAL THERAPY | Age: 42
Setting detail: THERAPIES SERIES
Discharge: HOME | End: 2022-12-13
Payer: COMMERCIAL

## 2022-12-13 NOTE — FLOWSHEET NOTE
Occupational Therapy   Cancellation/No-show Note  Patient Name:  Stephani Oliveira  :  1980   Date:  2022  Cancelled visits to date: 3  No-shows to date: 1    Patient status for today's appointment patient:  []  Cancelled , 10/6, 10/13  []  Rescheduled appointment  [x]  No-show      Reason given by patient:  []  Patient ill  []  Conflicting appointment  []  No transportation    []  Conflict with work  []  No reason given   [x]  Other:  Pt thought she had rescheduled visits for this week. Comments:      Phone call information:   [x]  Phone call made today to patient at _ time at number provided:      [x]  Patient answered, conversation as follows: Pt thought she had rescheduled visit, and requested to cancel visits for Friday ()   []  Patient did not answer, message left as follows:  []  Phone call not made today  []  Phone call not needed - pt contacted us to cancel and provided reason for cancellation.      Electronically signed by:

## 2022-12-13 NOTE — FLOWSHEET NOTE
Physical Therapy  Cancellation/No-show Note  Patient Name:  Smita Huerta  :  1980   Date:  2022  Cancelled visits to date: 3  No-shows to date: 1    Patient status for today's appointment patient:  []  Cancelled , 10/6, 10/13  []  Rescheduled appointment  [x]  No-show      Reason given by patient:  []  Patient ill  []  Conflicting appointment  []  No transportation    []  Conflict with work  []  No reason given   [x]  Other:  Pt thought she had rescheduled visits for this week. Comments:      Phone call information:   [x]  Phone call made today to patient at _ time at number provided:      [x]  Patient answered, conversation as follows: Pt thought she had rescheduled visit, and requested to cancel visits for Friday ()   []  Patient did not answer, message left as follows:  []  Phone call not made today  []  Phone call not needed - pt contacted us to cancel and provided reason for cancellation. Electronically signed by:  Telly Ozuna, Physical Therapy Student     Therapist was present, directed the patient's care, made skilled judgement, and was responsible for assessment and treatment of the patient.    Rin Branch, PT, DPT

## 2022-12-16 ENCOUNTER — APPOINTMENT (OUTPATIENT)
Dept: PHYSICAL THERAPY | Age: 42
End: 2022-12-16
Payer: COMMERCIAL

## 2022-12-16 ENCOUNTER — HOSPITAL ENCOUNTER (OUTPATIENT)
Dept: SPEECH THERAPY | Age: 42
Setting detail: THERAPIES SERIES
End: 2022-12-16
Payer: COMMERCIAL

## 2022-12-19 ENCOUNTER — HOSPITAL ENCOUNTER (OUTPATIENT)
Dept: SPEECH THERAPY | Age: 42
Setting detail: THERAPIES SERIES
Discharge: HOME | End: 2022-12-19
Payer: COMMERCIAL

## 2022-12-19 ENCOUNTER — HOSPITAL ENCOUNTER (OUTPATIENT)
Dept: OCCUPATIONAL THERAPY | Age: 42
Setting detail: THERAPIES SERIES
Discharge: HOME | End: 2022-12-19
Payer: COMMERCIAL

## 2022-12-19 PROCEDURE — 92507 TX SP LANG VOICE COMM INDIV: CPT

## 2022-12-19 PROCEDURE — 97112 NEUROMUSCULAR REEDUCATION: CPT

## 2022-12-19 PROCEDURE — 97110 THERAPEUTIC EXERCISES: CPT

## 2022-12-19 PROCEDURE — 97530 THERAPEUTIC ACTIVITIES: CPT

## 2022-12-19 NOTE — FLOWSHEET NOTE
Occupational Therapy Re-Certification Plan of Care    Dear Dr. Helga Fitzgerald ,    We had the pleasure of treating the following patient for occupational therapy services at [address] [place] [address]. A summary of our findings can be found in the updated assessment below. This includes our plan of care. If you have any questions or concerns regarding these findings, please do not hesitate to contact me at the office phone number checked above. Thank you for the referral.     Physician Signature:________________________________Date:__________________  By signing above (or electronic signature), therapists plan is approved by physician      Functional Outcome: foto    Overall Response to Treatment:   [x]Patient is responding well to treatment and improvement is noted with regards  to goals   []Patient should continue to improve in reasonable time if they continue HEP   []Patient has plateaued and is no longer responding to skilled OT intervention    []Patient is getting worse and would benefit from return to referring MD   []Patient unable to adhere to initial POC   []Other:     Date range of Visits: 2022 to 2022  Total Visits: 12    Recommendation:    [x]Continue OT 1x / wk for 6 weeks. []Hold OT, pending MD visit   [address] [facility] Occupational Therapy  [address] [place] [address].     [address]  Phone: [phone]   Fax: [phone]      Occupational Therapy Daily Treatment Note  Date:  2022    Patient: Heidi Mancia   : 1980   MRN: [id number]  Referring Physician: Sol Ace MD           Medical Diagnosis Information: left hemiplegia    Date of Injury: 22  Date of Surgery: N/A                                      Insurance information: care source     Plan of care sent to provider:      []Faxed   [x]Co-signature    (attempts: 1[x] 2[] 3[])       Progress Report: []  Yes  [x]  No     Date Range for reporting period:  Beginnin22  Ending: end of POC    Progress report due (10 Rx/or 30 days whichever is less): visit #10 or 3/4/1108    Recertification due (POC duration/ or 90 days whichever is less): visit #20     Visit # Insurance Allowable Auth required? Date Range    24 units until 2022 [x]  Yes  []  No 22- 2022       Latex Allergy:  [x]No      []Yes  Pacemaker:  [x] No       [] Yes     Preferred Language for Healthcare:   [x]English       []other:    Pain level: 0/10 R shoulder     SUBJECTIVE:    Patient in good spirits. Functional Disability Index:  FOTO:  Patient's Physical FS Primary Measure- 25  Risk Adjusted Statistical FOTO-     OBJECTIVE: See eval    Re-certification   Functional Transfers: Mod I with set up and use of cane from wheelchair to mat    Lower Body Dressing:   Donning/Turpin shoes- Min A with verbal cues to cross ankle at knee to don and doff. Decreased trunk control and balance with this positioning. Pt reports family assists with shoes. Donning/Turpin Pants-  Verbal cues for attention to task and sequencing of task. Min-Mod A for sit to stand to pull pants up, pt cued for weight shift in L side to help with balance and trunk control to utilize RUE to reach around to L side to pull up pants, and verbal cues to use maria alejandra dressing technique. Pt reports family assists with pulling her pants up once she has them up passed her calf. Turpin KFO brace- Min A. Verbal cues for attention to task, and sequencing of task. Pt reports typically her family dons and doffs brace for her. LUE as dynamic base of support during self-care: With pt holding onto football horizontally or onto therapy students hand/forearm to facilitate LUE as dynamic base of support during dressing. Pt progressing to using LUE as dynamic base of support.     Re eval 2022  Transfers, modified independent to min assist pending fatigue  Lower body dressing on this date verbal cues/ contact guard for balance  Ue as base of support with min assist as a dynamic base of support on moveable surface      RESTRICTIONS/PRECAUTIONS: fall risk, poor midline, left visual hemianopsia    Exercises/Interventions:    Patient ambulated back to department with use of quad cane and contact for safety with improved step length and weight shift noted. Patient then sat at edge of mat and warmed up with both ues in equal weight bearing forward on wheelchair handles. Patient worked on pushing wheelchair forward and back from [employer] position at edge of mat and increased the proximal trunk demands . Patient then worked on use of left ue as base of support with counter force and reach with use of band on wheelchair and pulling and stepping with band to side and then pulling and stepping with band forward with right ue and right lower extremity while left side was forced into stabilizer function and base of support function needing OT to verbally cue for trunk and provide intermittent physical assist for balance and to maintained function at trunk and ue on left. Patient then ambulated carrying bolster with both hands about 50 feet with min assist at shoulder and trunk. Therapeutic Exercises  Resistance / level Sets/sec Reps Notes                                                                                Neuromuscular Re-ed / Therapeutic Activities                                                 Manual Intervention                                                     Modalities:     Patient education: OT evaluation home program goals          Home Exercise Program:   7/1/2022 aarom     Therapeutic Exercise and NMR:  [x] (13191) Provided verbal/tactile cueing for activities related to strengthening, flexibility, endurance, ROM  for improvements in scapular, scapulothoracic and UE control with self care, reaching, carrying, lifting, house/yardwork, driving/computer work.     [x] (84882) Provided verbal/tactile cueing for activities related to improving balance, coordination, kinesthetic sense, posture, motor skill, proprioception  to assist with  scapular, scapulothoracic and UE control with self care, reaching, carrying, lifting, house/yardwork, driving/computer work.   [] Comments:    Therapeutic Activities:    [] (11060 or 92890) Provided verbal/tactile cueing for activities related to improving balance, coordination, kinesthetic sense, posture, motor skill, proprioception and motor activation to allow for proper function of scapular, scapulothoracic and UE control with self care, carrying, lifting, driving/computer work  [] Comments:    Home Exercise Program:    [] (76295) Reviewed/Progressed HEP activities related to strengthening, flexibility, endurance, ROM of scapular, scapulothoracic and UE control with self care, reaching, carrying, lifting, house/yardwork, driving/computer work  [] (02362) Reviewed/Progressed HEP activities related to improving balance, coordination, kinesthetic sense, posture, motor skill, proprioception of scapular, scapulothoracic and UE control with self care, reaching, carrying, lifting, house/yardwork, driving/computer work    [] Comments:    Manual Treatments:  PROM / STM / Oscillations-Mobs:  G-I, II, III, IV (PA's, Inf., Post.)  [] (28457) Provided manual therapy to mobilize soft tissue/joints of cervical/CT, scapular GHJ and UE for the purpose of modulating pain, promoting relaxation,  increasing ROM, reducing/eliminating soft tissue swelling/inflammation/restriction, improving soft tissue extensibility and allowing for proper ROM for normal function with self care, reaching, carrying, lifting, house/yardwork, driving/computer work  [] Comments:    ADL Training:  [x] (70073) Provided self-care/home management training related to activities of daily living and compensatory training, and/or use of adaptive equipment   [] Comments:     Splinting:  [] Fabrication of:   [] (18115) Orthotic/Prosthetic Management, subsequent encounter  [] (62091) Orthotic management and training (fitting and assessment)  [] Comments:      Charges:  Timed Code Treatment Minutes: 45   Total Treatment Minutes: 45     [] EVAL (LOW) 01542   [] OT Re-eval (44023)  [] EVAL (MOD) 22364   [] EVAL (HIGH) 45732       [x] Dolly (50990) x 1  [] UGIHK(16809)  [x] NMR (93035) x    2 [] Estim (attended) (81979)   [] Manual ([id number]) x     [] US (90847)  [] TA (35508) x     [] Paraffin (60089)  [] ADL  (46104) x  1   [] Splint/L code:    [x] Estim (unattended) (86589)  [] Fluidotherapy (04816)  [] Other:    GOALS: Goals established 6/30/22   Patient stated goal:  Patient stated goal is to improve her over all mobility and ADL status   [x] Progressing: [] Met: [] Not Met: [] Adjusted     Therapist goals for Patient   Modified independent functional transfers  [x] Progressing: [] Met: [] Not Met: [] Adjusted   Modified independent lower body dress  [x] Progressing: [] Met: [] Not Met: [] Adjusted  Modified independent upper body dress  [x] Progressing: [] Met: [] Not Met: [] Adjusted  Use of left upper extremity as dynamic base of support during self care  [x] Progressing: [] Met: [] Not Met: [] Adjusted       Pt will report a foto score of 34 after 21 visits   [] Progressing: [] Met: [] Not Met: [] Adjusted    Progression Towards Functional goals:  [] Patient is progressing as expected towards functional goals listed. [] Progression is slowed due to complexities listed. [] Progression has been slowed due to co-morbidities. [x] Plan just implemented, too soon to assess goals progression  [] All goals are met  [] Other:     ASSESSMENT:  Pt progressing towards use of LUE as base of support during functional tasks and independence with LB dressing but requires min-mod assistance and vcs for initiation, sequencing and attn to task. Pt reminded of maria alejandra dressing technique.     Treatment/Activity Tolerance:  [x] Patient tolerated treatment well [] Patient limited by fatigue  [] Patient limited by pain  [] Patient limited by other medical complications  [] Other:     Prognosis: [x] Good [] Fair  [] Poor    Patient Requires Follow-up: [x] Yes  [] No    PLAN: See eval  [x] Continue per plan of care [x] Alter current plan (see comments)  [] Plan of care initiated [] Hold pending MD visit [] Discharge    Electronically signed by:

## 2022-12-19 NOTE — PROGRESS NOTES
[address]    Speech Therapy Daily Treatment Note / Progress Note  Date:  2022    Patient Name:  Mame West    :  1980  MRN: [id number]  Medical/Treatment Diagnosis Information:  G81.94 (ICD-10-CM) - Left hemiparesis ([facility] [address]           Treatment Diagnosis: Mild Dysarthria; Mild Cognitive-Linguistic deficits; Mild Oropharyngeal Dysphagia   Insurance/Certification information: [facility] authorization: 7/, 24 units [id number] & 24 units 94964; NEW AUTH 2022-2023, 24 units 37416  Physician Information: Dr. Andrew Curtis MD      Plan of care signed (Y/N): Y (2022, 2022)    Date of Patient follow up with Physician: DEWEY    Functional outcome measure:   FOTO Primary Measure: HDQLIFE Swallowing Difficulties: 57 (2022)  FOTO Primary Measure: PROMIS Cognitive Function v2.0: 27.5 (2022)  Secondary Measure: NeuroQOL - Communication: 19 (2022)    Progress Note: [x]  Yes  []  No  Next due by: 2022    RESTRICTIONS/PRECAUTIONS: hx brain bleed  Latex Allergy:  [x]NO      []YES    Preferred Language for Healthcare:   [x]English       []other:    Visit # Insurance Allowable Date Range (if applicable)   1614  +  24 units 26695   2022-2023       Pain level: 0/10     SUBJECTIVE: Pt alert and receptive, last seen 2022. Pt reports she has been busy but would like to continue therapy.      OBJECTIVE:   FOTO Primary Measure: PROMIS Cognitive Function v2.0: 27.5    Exercises/Interventions:    Speech Therapy (03909) Accuracy Cues Notes   Comprehension        Yes/No questions      Complex questions      1-2 step directions      Multi-step directions      Common objects/pictures      L/R discrimination      Conversation      Other:      Expression       Initiation      Repetition      Automatic speech      Confrontational      Convergent      Divergent      Responsive      Conversation Discourse involving abstract language and deduction (2 minute) targeting thought organization:  - cues x 2 for reduced speech rate  - average 3 cues for topic maintenance  SLP reinforced self-monitoring of speech intelligibility and topic maintenance   Other:        Cognitive Function (89903 & 47296) Accuracy Cues Notes   Orientation        Attention           Sustained Visual attention for functional reading, 5 minutes:  - pt scanned left to right to read complete sentences and answer choices, 0 verbal cues to attend    Visual attention for picture scene sequencing, 5 minutes, in quiet environment:  - pt sequenced picture scenes with 83% accuracy  - pt completed task with average 2 verbal cues to attend Intermittent initiation cues to continue task Multiple opportunities to practice skill    SLP reinforced metacognitive strategy of double-checking        Selective         Alternating         Divided      Memory          Immediate/Working         Short term         Long term         Prospective      Problem Solving       Visuospatial       Executive Function       Other:          Pt. Education:  -Pt educated on diagnosis, prognosis and expectations for rehab  -All pt questions were answered  -Pt verbalized understanding and agreement  -Progress since initiation of POC    HEP instruction:  -Pt provided with written and illustrated instructions for HEP: SLOB intelligibility strategies, WRAP memory strategies, Goal-Plan-Do-Review metacognitive training, compensatory swallowing strategies (Alternate solids/liquids , Upright as possible with all PO intake , Small bites/sips , Eat/feed slowly, Remain upright 30-45 min)      Speech/Voice Therapy:    [x] (39705) Treatment of speech, language, voice, communication, and/or auditory processing disorder; individual    Cognitive Function:  [] (40963) Therapeutic interventions that focus on cognitive function (eg, attention, memory, reasoning, executive function, problem solving, and/or pragmatic functioning) and compensatory strategies to manage the performance of an activity (eg, managing time or schedules, initiating, organizing, and sequencing tasks), direct (one-on-one) patient contact; initial 15 minutes (Report 33092 only once per day)  [] ([phone]) each additional 15 minutes     Dysphagia Therapy:    [] (07590) Treatment of swallowing dysfunction and/or oral function for feeding         Charges:  Timed Code Treatment Minutes: 0   Total Treatment Minutes: 45     [x] Speech-Language Treatment (82370)        [] Voice Treatment (26102)                                         [] Cognitive-Linguistic Skills Development Initial 15 minutes (62511)  [] Cognitive-Linguistic Skills Development Additional 15 minutes (40544)   [] Dysphagia Treatment/NMES (VitalStim) (81301)  [] Other:     GOALS:  Patient stated goal: \"Help with my memory\"; \"Stop slurring\"; \"Swallowing\"  [x] Progressing: [] Met: [] Not Met: [] Adjusted     Therapist goals for Patient:  Short Term Goals: To be achieved in: 5 weeks  1. Patient will use speech strategies to facilitate increased intelligibility at discourse level, 5 minutes, of >90% as judged by SLP and pt/family. [] Progressing: [x] Met: 9/8/2022 [] Not Met: [] Adjusted  2. Patient will select and implement 2 cognitive strategies to complete functional tasks given min initiation cues in order to improve self-monitoring of ADL completion. [x] Progressing: [] Met: [] Not Met: [] Adjusted  3. Patient will use learned memory strategies to recall 4 units of novel information across delayed intervals up to 10 minutes given min encoding cues. [] Progressing: [x] Met: 9/29/2022 [] Not Met: [] Adjusted   4. Patient will functionally tolerate regular solids x 20 and thin liquids x 20 with min verbal cues for strategy implementation. [] Progressing: [x] Met: 9/8/2022 [] Not Met: [] Adjusted  5.  Patient will complete repeat instrumental swallow study (MBS or FEES) as clinically indicated to further assess pharyngeal phase and direct plan of care. [] Progressing: [] Met: [] Not Met: [x] Not clinically indicated  6. Patient will verbalize procedural discourse/sequence, 2 minutes, with no more than 2 cues for topic maintenance/organization in order to improve thought organization for complex expression. [x] Progressing: [] Met: [] Not Met: [] Adjusted   7. Patient will sustain attention to visual information for 5 minutes in a quiet environment with no more than 2 verbal cues to attend in order to improve working memory and visual scanning. [x] Progressing: [] Met: [] Not Met: [] Adjusted      Long Term Goals: To be achieved in: 6 weeks  1. Patient will demonstrate improved cognitive-linguistic function to improve quality of life as evidenced by increased score on functional outcome measure. [x] Progressing: [] Met: [] Not Met: [] Adjusted  2. Patient will utilize speech intelligibility strategies to increase intelligibility to 90% in conversation to a familiar/unfamiliar listener. [] Progressing: [x] Met: [] Not Met: [] Adjusted  3. Patient will tolerate least restrictive diet with no overt clinical s/s of aspiration/penetration. [] Progressing: [x] Met: [] Not Met: [] Adjusted    Progression Towards Functional goals:  [x] Patient is progressing as expected towards functional goals listed. [] Progression is slowed due to complexities listed. [] Progression has been slowed due to co-morbidities.   [] Plan just implemented, too soon to assess goals progression  [] Other:     Persisting Functional Limitations/Impairments:  [x]Attention []Word Finding       [x]Memory []Speech Intelligibility     [x]Executive Function []Diet Tolerance     [x]Problem Solving []Coughing/Choking with PO  []Expressive Language []Medication/Finance Management  []Receptive Language []Other:      ASSESSMENT: Pt is making progress towards all cognitive communication goals, but with scheduling gaps per pt schedule. Pt demonstrating improving visual attention and task initiation/termination for structured tasks (e.g., reading) but continues to benefit from intervention for attention in unstructured contexts. During more complex discourse involving abstract language/thinking, pt benefits from increased SLP support for speech intelligibility and thought organization. Continue POC as above. Treatment/Activity Tolerance:  [x] Patient able to complete tx [] Patient limited by fatigue  [] Patient limited by pain  [] Patient limited by other medical complications  [] Other:     Prognosis: [x] Good [] Fair  [] Poor    Patient Requires Follow-up: [x] Yes  [] No    PLAN: See eval. ST 2x / week for 6 weeks. [x] Continue per plan of care [] Alter current plan (see comments)  [] Plan of care initiated [] Hold pending MD visit [] Discharge    Electronically signed by:   Amilcar Canales MA CCC-SLP  Speech-Language Pathologist  SP. [id number]      Note: If patient does not return for scheduled/ recommended follow up visits, this note will serve as a discharge from care along with most recent update on progress.

## 2022-12-20 ENCOUNTER — HOSPITAL ENCOUNTER (OUTPATIENT)
Dept: PHYSICAL THERAPY | Age: 42
Setting detail: THERAPIES SERIES
Discharge: HOME | End: 2022-12-20
Payer: COMMERCIAL

## 2022-12-20 NOTE — FLOWSHEET NOTE
Physical Therapy  Cancellation/No-show Note  Patient Name:  Unice Holter  :  1980   Date:  2022  Cancelled visits to date: 4  No-shows to date: 1    Patient status for today's appointment patient:  [x]  Cancelled , 10/6, 10/13,   []  Rescheduled appointment  []  No-show      Reason given by patient:  []  Patient ill  []  Conflicting appointment  []  No transportation    []  Conflict with work  []  No reason given   []  Other:  Pt thought she had rescheduled visits for this week. Comments:      Phone call information:   []  Phone call made today to patient at _ time at number provided:      []  Patient answered, conversation as follows: Pt thought she had rescheduled visit, and requested to cancel visits for Friday ()   []  Patient did not answer, message left as follows:  []  Phone call not made today  [x]  Phone call not needed - pt contacted us to cancel and provided reason for cancellation.      Electronically signed by:   Lo Lea, PT, DPT

## 2022-12-23 ENCOUNTER — APPOINTMENT (OUTPATIENT)
Dept: PHYSICAL THERAPY | Age: 42
End: 2022-12-23
Payer: COMMERCIAL

## 2022-12-28 ENCOUNTER — HOSPITAL ENCOUNTER (OUTPATIENT)
Dept: PHYSICAL THERAPY | Age: 42
Setting detail: THERAPIES SERIES
Discharge: HOME | End: 2022-12-28
Payer: COMMERCIAL

## 2022-12-28 NOTE — FLOWSHEET NOTE
Physical Therapy  Cancellation/No-show Note  Patient Name:  Tracy Morgan  :  1980   Date:  2022  Cancelled visits to date: 5  No-shows to date: 1    Patient status for today's appointment patient:  [x]  Cancelled , 10/6, 10/13, ,   []  Rescheduled appointment  []  No-show      Reason given by patient:  []  Patient ill  []  Conflicting appointment  []  No transportation    []  Conflict with work  []  No reason given   []  Other:  Pt thought she had rescheduled visits for this week. Comments:      Phone call information:   []  Phone call made today to patient at _ time at number provided:      []  Patient answered, conversation as follows: Pt thought she had rescheduled visit, and requested to cancel visits for Friday ()   []  Patient did not answer, message left as follows:  []  Phone call not made today  [x]  Phone call not needed - pt contacted us to cancel and provided reason for cancellation.      Electronically signed by:   Sandie Bustamante, PT, DPT

## 2022-12-29 ENCOUNTER — HOSPITAL ENCOUNTER (OUTPATIENT)
Dept: SPEECH THERAPY | Age: 42
Setting detail: THERAPIES SERIES
Discharge: HOME | End: 2022-12-29
Payer: COMMERCIAL

## 2022-12-29 NOTE — FLOWSHEET NOTE
Speech Therapy  Cancellation/No-show Note  Patient Name:  Tejal Rodriguez  :  1980   Date:  2022  Cancels to Date: 7  No-shows to Date: 1    Patient status for today's appointment patient:  [x]  Cancelled (, , , , , , )  []  Rescheduled appointment  []  No-show (11/15)     Reason given by patient:  [x]  Patient ill  []  Conflicting appointment  []  No transportation    []  Conflict with work  []  No reason given  []  Other:     Comments:     Phone call information:   []  Phone call made today to patient at time at number provided:      []  Patient's  answered, conversation as follows:    []  Patient did not answer, message left as follows:   [x]  Phone call not made today, pt called clinic and provided reason for cancellation    Electronically signed by:  Christopher Mustafa, SLP

## 2023-01-12 ENCOUNTER — HOSPITAL ENCOUNTER (OUTPATIENT)
Dept: SPEECH THERAPY | Age: 43
Setting detail: THERAPIES SERIES
Discharge: HOME | End: 2023-01-12
Payer: COMMERCIAL

## 2023-01-12 ENCOUNTER — HOSPITAL ENCOUNTER (OUTPATIENT)
Dept: GENERAL RADIOLOGY | Age: 43
Discharge: HOME | End: 2023-01-12
Payer: COMMERCIAL

## 2023-01-12 ENCOUNTER — HOSPITAL ENCOUNTER (OUTPATIENT)
Age: 43
Discharge: HOME | End: 2023-01-12
Payer: COMMERCIAL

## 2023-01-12 DIAGNOSIS — M25.562 LEFT KNEE PAIN, UNSPECIFIED CHRONICITY: ICD-10-CM

## 2023-01-12 PROCEDURE — 73560 X-RAY EXAM OF KNEE 1 OR 2: CPT

## 2023-01-12 PROCEDURE — 92507 TX SP LANG VOICE COMM INDIV: CPT

## 2023-01-12 NOTE — FLOWSHEET NOTE
[address] [facility]    Speech Therapy Daily Treatment Note  Date:  2023    Patient Name:  Carl Jin    :  1980  MRN: [id number]  Medical/Treatment Diagnosis Information:  G81.94 (ICD-10-CM) - Left hemiparesis ([facility] [address]           Treatment Diagnosis: Mild Dysarthria; Mild Cognitive-Linguistic deficits; Mild Oropharyngeal Dysphagia   Insurance/Certification information: [facility]: 7/, 24 units [id number] & 24 units 51502; NEW AUTH 2022-2023, 24 units 74243; date updated 2023-2023  Physician Information: Dr. Casandra Crain MD      Plan of care signed (Y/N): Y (2022, 2022)    Date of Patient follow up with Physician: DEWEY    Functional outcome measure:   FOTO Primary Measure: HDQLIFE Swallowing Difficulties: 57 (2022)  FOTO Primary Measure: PROMIS Cognitive Function v2.0: 27.5 (2022)  Secondary Measure: NeuroQOL - Communication: 19 (2022)    Progress Note: [x]  Yes  []  No  Next due by: 2022    RESTRICTIONS/PRECAUTIONS: hx brain bleed  Latex Allergy:  [x]NO      []YES    Preferred Language for Healthcare:   [x]English       []other:    Visit # Insurance Allowable Date Range (if applicable)   67/31  + 6/81 24 units 17406   2023-2023       Pain level: 0/10     SUBJECTIVE: Pt alert and receptive, last seen 2022. Discussed and encouraged increased frequency of visits to get most benefit from therapy.     OBJECTIVE:   Exercises/Interventions:    Speech Therapy (95460) Accuracy Cues Notes   Comprehension        Yes/No questions      Complex questions      1-2 step directions      Multi-step directions      Common objects/pictures      L/R discrimination      Conversation      Other:      Expression       Initiation      Repetition      Automatic speech      Confrontational      Convergent      Divergent      Responsive      Conversation Complex narrative discourse (2 minute) targeting thought organization:  - cues x 2 for reduced speech rate  - average 1 cues for topic maintenance  SLP reinforced self-monitoring of speech intelligibility and topic maintenance   Other:        Cognitive Function (05719 & 93152) Accuracy Cues Notes   Orientation        Attention           Sustained Visual attention for picture scene sequencing, 5 minutes, in quiet environment:  - pt sequenced picture scenes with 83% accuracy  - pt completed task with average 2 verbal cues to attend  Multiple opportunities to practice skill    SLP reinforced metacognitive strategy of double-checking, self-correction x 2        Selective         Alternating         Divided      Memory          Immediate/Working         Short term         Long term         Prospective      Problem Solving       Visuospatial       Executive Function       Other:          Pt. Education:  -Pt educated on diagnosis, prognosis and expectations for rehab  -All pt questions were answered  -Pt verbalized understanding and agreement    HEP instruction:  -Pt provided with written and illustrated instructions for HEP: SLOB intelligibility strategies, WRAP memory strategies, Goal-Plan-Do-Review metacognitive training, compensatory swallowing strategies (Alternate solids/liquids , Upright as possible with all PO intake , Small bites/sips , Eat/feed slowly, Remain upright 30-45 min)      Speech/Voice Therapy:    [x] (33488) Treatment of speech, language, voice, communication, and/or auditory processing disorder; individual    Cognitive Function:  [] (30745) Therapeutic interventions that focus on cognitive function (eg, attention, memory, reasoning, executive function, problem solving, and/or pragmatic functioning) and compensatory strategies to manage the performance of an activity (eg, managing time or schedules, initiating, organizing, and sequencing tasks), direct (one-on-one) patient contact; initial 15 minutes (Report  only once per day)  [] ([phone]) each additional 15 minutes     Dysphagia Therapy:    [] (59371) Treatment of swallowing dysfunction and/or oral function for feeding         Charges:  Timed Code Treatment Minutes: 0   Total Treatment Minutes: 45     [x] Speech-Language Treatment (96259)        [] Voice Treatment (82522)                                         [] Cognitive-Linguistic Skills Development Initial 15 minutes (75351)  [] Cognitive-Linguistic Skills Development Additional 15 minutes (10213)   [] Dysphagia Treatment/NMES ([facility]) (17635)  [] Other:     GOALS:  Patient stated goal: \"Help with my memory\"; \"Stop slurring\"; \"Swallowing\"  [x] Progressing: [] Met: [] Not Met: [] Adjusted     Therapist goals for Patient:  Short Term Goals: To be achieved in: 5 weeks  1. Patient will use speech strategies to facilitate increased intelligibility at discourse level, 5 minutes, of >90% as judged by SLP and pt/family. [] Progressing: [x] Met: 9/8/2022 [] Not Met: [] Adjusted  2. Patient will select and implement 2 cognitive strategies to complete functional tasks given min initiation cues in order to improve self-monitoring of ADL completion. [x] Progressing: [] Met: [] Not Met: [] Adjusted  3. Patient will use learned memory strategies to recall 4 units of novel information across delayed intervals up to 10 minutes given min encoding cues. [] Progressing: [x] Met: 9/29/2022 [] Not Met: [] Adjusted   4. Patient will functionally tolerate regular solids x 20 and thin liquids x 20 with min verbal cues for strategy implementation. [] Progressing: [x] Met: 9/8/2022 [] Not Met: [] Adjusted  5. Patient will complete repeat instrumental swallow study (MBS or FEES) as clinically indicated to further assess pharyngeal phase and direct plan of care. [] Progressing: [] Met: [] Not Met: [x] Not clinically indicated  6.  Patient will verbalize procedural discourse/sequence, 2 minutes, with no more than 2 cues for topic maintenance/organization in order to improve thought organization for complex expression. [x] Progressing: [] Met: [] Not Met: [] Adjusted   7. Patient will sustain attention to visual information for 5 minutes in a quiet environment with no more than 2 verbal cues to attend in order to improve working memory and visual scanning. [x] Progressing: [] Met: [] Not Met: [] Adjusted      Long Term Goals: To be achieved in: 6 weeks  1. Patient will demonstrate improved cognitive-linguistic function to improve quality of life as evidenced by increased score on functional outcome measure. [x] Progressing: [] Met: [] Not Met: [] Adjusted  2. Patient will utilize speech intelligibility strategies to increase intelligibility to 90% in conversation to a familiar/unfamiliar listener. [] Progressing: [x] Met: [] Not Met: [] Adjusted  3. Patient will tolerate least restrictive diet with no overt clinical s/s of aspiration/penetration. [] Progressing: [x] Met: [] Not Met: [] Adjusted    Progression Towards Functional goals:  [x] Patient is progressing as expected towards functional goals listed. [] Progression is slowed due to complexities listed. [] Progression has been slowed due to co-morbidities. [] Plan just implemented, too soon to assess goals progression  [] Other:     Persisting Functional Limitations/Impairments:  [x]Attention []Word Finding       [x]Memory []Speech Intelligibility     [x]Executive Function []Diet Tolerance     [x]Problem Solving []Coughing/Choking with PO  []Expressive Language []Medication/Finance Management  []Receptive Language []Other:      ASSESSMENT: Pt with improving thought organization for re-telling of narratives, ongoing cues for carryover of slow rate of speech to improve speech intelligibility. Pt continues to benefit from cues to initiate and terminate tasks as well as cues to double-check herself.  Increased support required for pt to correct errors (sequencing, visual attention) after they are identified. Treatment/Activity Tolerance:  [x] Patient able to complete tx [] Patient limited by fatigue  [] Patient limited by pain  [] Patient limited by other medical complications  [] Other:     Prognosis: [x] Good [] Fair  [] Poor    Patient Requires Follow-up: [x] Yes  [] No    PLAN: See eval. ST 2x / week for 6 weeks. [x] Continue per plan of care [] Alter current plan (see comments)  [] Plan of care initiated [] Hold pending MD visit [] Discharge    Electronically signed by:   Claudean Glory, MA CCC-SLP  Speech-Language Pathologist  [place]. [id number]      Note: If patient does not return for scheduled/ recommended follow up visits, this note will serve as a discharge from care along with most recent update on progress.

## 2023-01-21 ENCOUNTER — APPOINTMENT (OUTPATIENT)
Dept: PHYSICAL THERAPY | Age: 43
End: 2023-01-21
Payer: COMMERCIAL

## 2023-01-23 ENCOUNTER — HOSPITAL ENCOUNTER (OUTPATIENT)
Dept: SPEECH THERAPY | Age: 43
Setting detail: THERAPIES SERIES
Discharge: HOME | End: 2023-01-23
Payer: COMMERCIAL

## 2023-01-23 PROCEDURE — 92507 TX SP LANG VOICE COMM INDIV: CPT

## 2023-01-23 NOTE — PROGRESS NOTES
[address]    Speech Therapy Daily Treatment Note / Progress Note  Date:  2023    Patient Name:  Lilliana Barney    :  1980  MRN: [id number]  Medical/Treatment Diagnosis Information:  G81.94 (ICD-10-CM) - Left hemiparesis ([facility] [address]           Treatment Diagnosis: Mild Dysarthria; Mild Cognitive-Linguistic deficits; Mild Oropharyngeal Dysphagia   Insurance/Certification information: [facility] authorization: 7/, 24 units 66029 & 24 units 45653; NEW AUTH 2022-2023, 24 units 95820; date updated 2023-2023  Physician Information: Dr. Derrick Velasquez MD      Plan of care signed (Y/N): Y (2022, 2022)    Date of Patient follow up with Physician: DEWEY    Functional outcome measure:   FOTO Primary Measure: HDQLIFE Swallowing Difficulties: 57 (2022)  FOTO Primary Measure: PROMIS Cognitive Function v2.0: 33.9 (2022)  Secondary Measure: NeuroQOL - Communication: 19 (2022)    Progress Note: [x]  Yes  []  No  Next due by: 2022    RESTRICTIONS/PRECAUTIONS: hx brain bleed  Latex Allergy:  [x]NO      []YES    Preferred Language for Healthcare:   [x]English       []other:    Visit # Insurance Allowable Date Range (if applicable)   62/92  + 8/14 24 units 28313   2023-2023       Pain level: 0/10     SUBJECTIVE: Pt arrived 15 minutes late to appointment but able to extend visit. Pt became teary when completing patient-reported outcome measure, reports she's had \"a lot going on\".      OBJECTIVE:   FOTO Primary Measure: PROMIS Cognitive Function v2.0: 33.9    Exercises/Interventions:  Speech Therapy (56012) Accuracy Cues Notes   Comprehension        Yes/No questions      Complex questions      1-2 step directions      Multi-step directions      Common objects/pictures      L/R discrimination      Conversation      Other:      Expression       Initiation      Repetition      Automatic speech Confrontational      Convergent      Divergent      Responsive      Conversation Complex narrative discourse (1 minute) targeting topic maintenance:  - abstract describing without naming with average 1 cue for 1 min discourse  SLP reinforced self-monitoring of speech intelligibility and topic maintenance   Other:        Cognitive Function (23643 & 12651) Accuracy Cues Notes   Orientation        Attention           Sustained Visual attention for functional reading, 5 minutes:  - pt scanned left to right to read complete sentences and answer choices, 2 verbal cues to attend Verbal and visual cues provided for attending and initiation Multiple opportunities to practice skill      Selective         Alternating         Divided      Memory          Immediate/Working         Short term         Long term         Prospective      Problem Solving       Visuospatial       Executive Function  Direct training of cognitive strategies to functionally sequence a list of errands to complete with a map; pt sequenced errands accurately in 83% of opportunities    Cognitive strategies reviewed to reinforce carryover of strategies in various at-home activities Min-mod visual and verbal cues used Review of cognitive strategies prior to sequencing task to practice self-monitoring    Other:          Pt. Education:  -Pt educated on diagnosis, prognosis and expectations for rehab  -All pt questions were answered  -Pt verbalized understanding and agreement  -Progress since initiation of POC    HEP instruction:  -Pt provided with written and illustrated instructions for HEP: SLOB intelligibility strategies, WRAP memory strategies, Goal-Plan-Do-Review metacognitive training, compensatory swallowing strategies (Alternate solids/liquids , Upright as possible with all PO intake , Small bites/sips , Eat/feed slowly, Remain upright 30-45 min)      Speech/Voice Therapy:    [x] (96189) Treatment of speech, language, voice, communication, and/or auditory processing disorder; individual    Cognitive Function:  [] (51717) Therapeutic interventions that focus on cognitive function (eg, attention, memory, reasoning, executive function, problem solving, and/or pragmatic functioning) and compensatory strategies to manage the performance of an activity (eg, managing time or schedules, initiating, organizing, and sequencing tasks), direct (one-on-one) patient contact; initial 15 minutes (Report 40513 only once per day)  [] (92 706 355) each additional 15 minutes     Dysphagia Therapy:    [] (45383) Treatment of swallowing dysfunction and/or oral function for feeding         Charges:  Timed Code Treatment Minutes: 0   Total Treatment Minutes: 45     [x] Speech-Language Treatment (07572)        [] Voice Treatment (68051)                                         [] Cognitive-Linguistic Skills Development Initial 15 minutes (70291)  [] Cognitive-Linguistic Skills Development Additional 15 minutes ([phone])   [] Dysphagia Treatment/NMES ([facility]) (53287)  [] Other:     GOALS:  Patient stated goal: \"Help with my memory\"; \"Stop slurring\"; \"Swallowing\"  [x] Progressing: [] Met: [] Not Met: [] Adjusted     Therapist goals for Patient:  Short Term Goals: To be achieved in: 5 weeks  1. Patient will use speech strategies to facilitate increased intelligibility at discourse level, 5 minutes, of >90% as judged by SLP and pt/family. [] Progressing: [x] Met: 9/8/2022 [] Not Met: [] Adjusted  2. Patient will select and implement 2 cognitive strategies to complete functional tasks given min initiation cues in order to improve self-monitoring of ADL completion. [x] Progressing: [] Met: [] Not Met: [] Adjusted  3. Patient will use learned memory strategies to recall 4 units of novel information across delayed intervals up to 10 minutes given min encoding cues. [] Progressing: [x] Met: 9/29/2022 [] Not Met: [] Adjusted   4.  Patient will functionally tolerate regular solids x 20 and thin liquids x 20 with min verbal cues for strategy implementation. [] Progressing: [x] Met: 9/8/2022 [] Not Met: [] Adjusted  5. Patient will complete repeat instrumental swallow study (MBS or FEES) as clinically indicated to further assess pharyngeal phase and direct plan of care. [] Progressing: [] Met: [] Not Met: [x] Not clinically indicated  6. Patient will verbalize procedural discourse/sequence, 2 minutes, with no more than 2 cues for topic maintenance/organization in order to improve thought organization for complex expression. [x] Progressing: [] Met: [] Not Met: [] Adjusted   7. Patient will sustain attention to visual information for 5 minutes in a quiet environment with no more than 2 verbal cues to attend in order to improve working memory and visual scanning. [x] Progressing: [] Met: [] Not Met: [] Adjusted      Long Term Goals: To be achieved in: 6 weeks  1. Patient will demonstrate improved cognitive-linguistic function to improve quality of life as evidenced by increased score on functional outcome measure. [x] Progressing: [] Met: [] Not Met: [] Adjusted  2. Patient will utilize speech intelligibility strategies to increase intelligibility to 90% in conversation to a familiar/unfamiliar listener. [] Progressing: [x] Met: [] Not Met: [] Adjusted  3. Patient will tolerate least restrictive diet with no overt clinical s/s of aspiration/penetration. [] Progressing: [x] Met: [] Not Met: [] Adjusted    Progression Towards Functional goals:  [x] Patient is progressing as expected towards functional goals listed. [] Progression is slowed due to complexities listed. [] Progression has been slowed due to co-morbidities.   [] Plan just implemented, too soon to assess goals progression  [] Other:     Persisting Functional Limitations/Impairments:  [x]Attention []Word Finding       [x]Memory []Speech Intelligibility     [x]Executive Function []Diet Tolerance     [x]Problem Solving []Coughing/Choking with PO  []Expressive Language []Medication/Finance Management  []Receptive Language []Other:      ASSESSMENT: Pt is making progress towards all cognitive communication goals, but with scheduling gaps per pt schedule. Pt advancing to more complex verbal expression with fading cues for topic maintenance and thought organization. Although pt with improving visual scanning and executive functioning, pt requires SLP support to initiate and consistently carry out strategies. Pt continues to benefit from skilled intervention of problem-solving/metacognitive strategies to improve self-monitoring and self-correcting during IADL completion. Continue POC as above. Treatment/Activity Tolerance:  [x] Patient able to complete tx [] Patient limited by fatigue  [] Patient limited by pain  [] Patient limited by other medical complications  [] Other:     Prognosis: [x] Good [] Fair  [] Poor    Patient Requires Follow-up: [x] Yes  [] No    PLAN: See dyana RICK 2x / week for 6 weeks. [x] Continue per plan of care [] Alter current plan (see comments)  [] Plan of care initiated [] Hold pending MD visit [] Discharge    Electronically signed by:   Albino Braun [facility]  Speech-Language Pathology Graduate Clinician    The speech-language pathologist was present, directed the patient's care, made skilled judgment and was responsible for assessment and treatment. Morelia Ayala MA CCC-SLP  Speech-Language Pathologist  [place]. [id number]      Note: If patient does not return for scheduled/ recommended follow up visits, this note will serve as a discharge from care along with most recent update on progress.

## 2023-01-25 ENCOUNTER — APPOINTMENT (OUTPATIENT)
Dept: OCCUPATIONAL THERAPY | Age: 43
End: 2023-01-25
Payer: COMMERCIAL

## 2023-01-26 ENCOUNTER — APPOINTMENT (OUTPATIENT)
Dept: PHYSICAL THERAPY | Age: 43
End: 2023-01-26
Payer: COMMERCIAL

## 2023-01-26 ENCOUNTER — APPOINTMENT (OUTPATIENT)
Dept: OCCUPATIONAL THERAPY | Age: 43
End: 2023-01-26
Payer: COMMERCIAL

## 2023-01-30 ENCOUNTER — HOSPITAL ENCOUNTER (OUTPATIENT)
Dept: PHYSICAL THERAPY | Age: 43
Setting detail: THERAPIES SERIES
Discharge: HOME | End: 2023-01-30
Payer: COMMERCIAL

## 2023-01-30 ENCOUNTER — HOSPITAL ENCOUNTER (OUTPATIENT)
Dept: SPEECH THERAPY | Age: 43
Setting detail: THERAPIES SERIES
Discharge: HOME | End: 2023-01-30
Payer: COMMERCIAL

## 2023-01-30 PROCEDURE — 97161 PT EVAL LOW COMPLEX 20 MIN: CPT

## 2023-01-30 PROCEDURE — 92507 TX SP LANG VOICE COMM INDIV: CPT

## 2023-01-30 PROCEDURE — 97116 GAIT TRAINING THERAPY: CPT

## 2023-01-30 PROCEDURE — 97112 NEUROMUSCULAR REEDUCATION: CPT

## 2023-01-30 NOTE — FLOWSHEET NOTE
[address] [facility] Physical Therapy  Phone: [phone]   Fax: [phone]    Physical Therapy Daily Treatment Note    Date:  2023     Patient Name:  Nani Cochran    :  1980  MRN: [id number]  Medical Diagnosis:  Cramp and spasm [R25.2]  Treatment Diagnosis: hypertonia in L UE and LE, weakness in L UE and LE, impaired gait, decreased balance, poor endurance                                Insurance/Certification information:  PT Insurance Information: Cramp and spasm (post CVA)  Physician Information:  Brock Muniz MD    Plan of care signed (Y/N): []  Yes [x]  No     Date of Patient follow up with Physician:      Progress Report: []  Yes  [x]  No     Date Range for reporting period:  Beginnin2023  Ending:       Progress report due (10 Rx/or 30 days whichever is less): visit 8 or 1/16/15     Recertification due (POC duration/ or 90 days whichever is less): visit #8 or 23     Visit # Insurance Allowable Auth required?  Date Range    64 units for TE, neuro, man, TA, gait []  Yes  []  No 23-23         Latex Allergy:  [x]NO      []YES  Preferred Language for Healthcare:   [x]English       []Other:        Functional Scale/Test Evaluation 2023 30 day  60 day  Discharge    Tinetti Assessment        TUG       REID             Pain level:  0-6/10  Location:  L knee    SUBJECTIVE:  See eval    OBJECTIVE: See eval      RESTRICTIONS/PRECAUTIONS:  nontraumatic subarachnoid hemorrhage and aneurysm clipped in 2021        Interventions/Exercises:   Therapeutic Exercises (33297) Resistance / level Sets/sec Reps Notes                               Neuromuscular Re-ed (94738)        REID   x1 /56                                                    Therapeutic Activities (51734) /  Functional Tasks / Gait Training (86828)       Gait training with SBQC  60 feet x3 VCs for L toe in neutral, equal step length, faster speed Manual Intervention (69681)                                                     Modalities:     Pt. Education:  1/30/2023 Patient educated on diagnosis, prognosis and expectations for rehab, all patient questions were answered; encouraged pt to get up and move more during the day - knee is getting tight and painful because she doesn't use it. Needs to be up and moving multiple times a day for at least 5 min - start to increase time. Plan to co-tx with OT to get max benefit for sessions. HEP instruction:  1/30: amb for at least 5 min everyday, HEP from last episode of care       Therapeutic Exercise and NMR EXR  [] (42002) Provided verbal/tactile cueing for activities related to strengthening, flexibility, endurance, ROM for improvements in  [] LE / Core stability: LE, hip, and core control with self care, mobility, lifting, ambulation. [] UE / Shoulder complex: cervical, postural, scapular, scapulothoracic and UE control with self care, reaching, carrying, lifting, house/yardwork, driving, computer work.  [] (72722) Provided verbal/tactile cueing for activities related to improving balance, coordination, kinesthetic sense, posture, motor skill, proprioception to assist with   [] LE / Core stability: LE, hip, and core control in self care, mobility, lifting, ambulation and eccentric single leg control. [] UE / Shoulder complex: cervical, scapular, scapulothoracic and UE control with self care, reaching, carrying, lifting, house/yardwork, driving, computer work.   [] (30506) Therapist is in constant attendance of 2 or more patients providing skilled therapy interventions, but not providing any significant amount of measurable one-on-one time to either patient, for improvements in  [] LE / Core stability: LE, hip, and core control in self care, mobility, lifting, ambulation and eccentric single leg control.    [] UE / Shoulder complex: cervical, scapular, scapulothoracic and UE control with self care, reaching, carrying, lifting, house/yardwork, driving, computer work. NMR and Therapeutic Activities:    [x] (14003 or 21826) Provided verbal/tactile cueing for activities related to improving balance, coordination, kinesthetic sense, posture, motor skill, proprioception and motor activation to allow for proper function of   [] LE / Core, hip and LE with self care and ADLs  [] UE / Shoulder complex: cervical, postural, scapular, scapulothoracic and UE control with self care, carrying, lifting, driving, computer work. [x] (71335) Gait Re-education- Provided training and instruction to the patient for proper LE, core and hip recruitment, positioning, and eccentric body weight control with ambulation re-education, including ascending & descending stairs     Home Management Training / Self Care:  [] (85941) Provided self-care/home management training related to activities of daily living and compensatory training, and/or use of adaptive equipment for improvement with: ADLs and compensatory training, meal preparation, safety procedures and instruction in use of adaptive equipment, including bathing, grooming, dressing, personal hygiene, basic household cleaning and chores.        Home Exercise Program:    [] (54114) Reviewed/Progressed HEP activities related to strengthening, flexibility, endurance, ROM of   [] LE / Core stability: core, hip and LE for functional self-care, mobility, lifting and ambulation/stair navigation   [] UE / Shoulder complex: cervical, postural, scapular, scapulothoracic and UE control with self care, reaching, carrying, lifting, house/yardwork, driving, computer work  [] (00635)Reviewed/Progressed HEP activities related to improving balance, coordination, kinesthetic sense, posture, motor skill, proprioception of   [] LE: core, hip and LE for self care, mobility, lifting, and ambulation/stair navigation    [] UE / Shoulder complex: cervical, postural,  scapular, scapulothoracic and UE control with self care, reaching, carrying, lifting, house/yardwork, driving, computer work    Manual Treatments:  PROM / STM / Oscillations-Mobs:  G-I, II, III, IV (PA's, Inf., Post.)  [] (09096) Provided manual therapy to mobilize shoulder complex, hip, LE, and/or cervicothoracic/LS spine soft tissue/joints for the purpose of modulating pain, promoting relaxation,  increasing ROM, reducing/eliminating soft tissue swelling/inflammation/restriction, improving soft tissue extensibility and allowing for proper ROM for normal function with   [] LE / Core stability: self care, mobility, lifting and ambulation. [] UE / Shoulder complex: self care, reaching, carrying, lifting, house/yardwork, driving, computer work. Modalities:  [] (12110) Vasopneumatic compression: Utilized vasopneumatic compression to decrease edema / swelling for the purpose of improving mobility and quad tone / recruitment which will allow for increased overall function including but not limited to self-care, transfers, ambulation, and ascending / descending stairs. Charges:  Timed Code Treatment Minutes: 25   Total Treatment Minutes: 45         Units approved Units used Date Range   64 TE  64 neuro  64 man  64 TA  64 gait 0  1  0  0  1   1/30/23-4/6/23     [x] EVAL - LOW (44128)   [] EVAL - MOD (89491)  [] EVAL - HIGH (47810)  [] RE-EVAL (72096)  [] TE (20826) x      [] Ionto  [x] NMR (58970) x  1    [] Vaso  [] Manual (24432) x      [] Ultrasound  [] TA x       [] Mech Traction (53997)  [x] Gait Training x 1    [] ES (un) (77172):   [] Aquatic therapy x   [] Other:   [] Group:     GOALS:   Patient stated goal: improve gait and mobility   [] Progressing: [] Met: [] Not Met: [] Adjusted    Therapist goals for Patient:   Short Term Goals: To be achieved in: 2 weeks  1. Independent in HEP and progression per patient tolerance, in order to prevent re-injury. [] Progressing: [] Met: [] Not Met: [] Adjusted  2. Patient will have a decrease in pain at knee to facilitate improvement in movement, function, and ADLs as indicated by Functional Deficits. [] Progressing: [] Met: [] Not Met: [] Adjusted    Long Term Goals: To be achieved in: 4 weeks  1. Patient to demonstrate REID score to 35/56 or greater to demonstrate improved fall risk to assist with reaching prior level of function. [] Progressing: [] Met: [] Not Met: [] Adjusted  2. Patient will perform correct and safe sit to stand transfer with 0-1 UE support x 5 to demonstrate an increase in strength in LEs as well as improved weight bearing on L.    [] Progressing: [] Met: [] Not Met: [] Adjusted  3. Patient will return to functional activities including climbing the stairs and getting into and out of the shower without increased symptoms or restriction. [] Progressing: [] Met: [] Not Met: [] Adjusted  4. Pt will amb 280 feet consecutively to show improvement in endurance and ease fatigue during prolonged ADLs such as shopping. [] Progressing: [] Met: [] Not Met: [] Adjusted     Overall Progression Towards Functional goals/ Treatment Progress Update:  [] Patient is progressing as expected towards functional goals listed. [] Progression is slowed due to complexities/Impairments listed. [] Progression has been slowed due to co-morbidities.   [x] Plan just implemented, too soon to assess goals progression <30days   [] Goals require adjustment due to lack of progress  [] Patient is not progressing as expected and requires additional follow up with physician  [] Other    Persisting Functional Limitations/Impairments:  []Sleeping []Sitting               [x]Standing [x]Transfers        [x]Walking [x]Kneeling               [x]Stairs [x]Squatting / bending   [x]ADLs []Reaching  [x]Lifting  [x]Housework  []Driving []Job related tasks  []Sports/Recreation []Other:        ASSESSMENT:  See eval    Treatment/Activity Tolerance:  [x] Patient able to complete tx [] Patient limited by fatigue  [] Patient limited by pain  [] Patient limited by other medical complications  [] Other:     Prognosis: [] Good [x] Fair  [] Poor    Patient Requires Follow-up: [x] Yes  [] No    Plan for next treatment session:  transfers with weight bearing on L, balance in midline, gait and stair training, endurance     PLAN: See josey. PT 2x / week for 4 weeks. [] Continue per plan of care [] Alter current plan (see comments)  [x] Plan of care initiated [] Hold pending MD visit [] Discharge    Electronically signed by: Omega Bauer, PT, DPT    Note: If patient does not return for scheduled/ recommended follow up visits, his note will serve as a discharge from care along with most recent update on progress.

## 2023-01-30 NOTE — PLAN OF CARE
[facility], [facility], [address]  Phone: [phone]   Fax: [phone]     Physical Therapy Certification    Dear Jad Chowdhury, Alexis Hardy MD  ,    We had the pleasure of evaluating the following patient for physical therapy services at [address] [place] [address]. A summary of our findings can be found in the initial assessment below. This includes our plan of care. If you have any questions or concerns regarding these findings, please do not hesitate to contact me at the office phone number checked above. Thank you for the referral.       Physician Signature:_______________________________Date:__________________  By signing above (or electronic signature), therapists plan is approved by physician      Patient: Nghia Faria   : 1980   MRN: [id number]  Referring Physician: Josué Hines MD        Evaluation Date: 2023      Medical Diagnosis Information:  Cramp and spasm [R25.2]   PT diagnosis: hypertonia in L UE and LE, weakness in L UE and LE, impaired gait, decreased balance, poor endurance                                Insurance information: PT Insurance Information: Cramp and spasm (post CVA)    Plan of care sent to provider:      []Faxed   []Co-signature    (attempts: 1[x] 23 2[] 3[])          Precautions/ Contra-indications:   Adhesive allergy:  [x]NO      []YES   Latex Allergy:   [x]NO      []YES     Preferred Language for Healthcare:   [x]English       []other:    C-SSRS Triggered by Intake questionnaire (Past 2 wk assessment ):   [x] No, Questionnaire did not trigger screening.   [] Yes, Patient intake triggered C-SSRS Screening     [] Completed, no further action required.    [] Completed, PCP notified via Epic      Functional Scale:       Date assessed:  23  REID         Relevant Medical History:  nontraumatic subarachnoid hemorrhage and aneurysm clipped in 2021    SUBJECTIVE: History of current complaint: Pt hasn't been in therapy since late last year. Nothing has really changed. Has the hardest time with getting in and out of the shower. Having an easier time with getting in and out of chairs, cars, and bed. L leg still difficult to move, feels heavy. Does some walking. Dr. Bennett Cruz may do a knee scope on the L in the next month or so. Denies  falls and near falls.       Pain Scale: 0-6/10 L knee   Easing factors: sitting   Provocative factors: standing, walking      Type: []Constant   [x]Intermittent  []Radiating []Localized []other:    Numbness/Tingling: denies  Pt. reports bowel and bladder changes:   [x]yes some constipation - thinks it is due pain relievers (now on diclofenac)   []no     Baseline function/PLOF:  History of falls      [x]yes   []no   []unknown  Pt able to drive      []yes   [x]no   []unknown  Pt independent with ADLs   [x]yes   []no   []unknown  Pt required assistance from family for:      []bathing    [x]cooking - lifting pots and pans    [x]cleaning    []dressing    [x]laundry    []unknown    Transfers:   independent   Ambulation: supervision with SBQC-- short step on R     Occupation/School/Recreational activities: n/a      Social support/Living environment:      Family/caregiver support:   [x] yes:   []no  Equipment owned:  []SPC    []standard walker (SW)   []rolling walker (RW)  []rollator   []hemiwalker   []crutches   []loftstrands    []large based quad cane (LBQC)  []small based quad cane (SBQC)  []manual wc    [] electric wc    []lift chair     [x]hospital bed     []home o2    L      []reacher     []life alert     []none    []unknown  []other:   Home environment:   []apartment    []1 story      [x]2 story     [] in LTC facility   [] split level home    [] mobile home    []senior apartment     [] unknown     Steps to enter first floor:    [] N/A    [x]no steps    []  steps to enter   []no handrail    []single HR     []bilateral HR   []ramp    [] unknown   Steps to second floor:  [] N/A    []     steps    [x]full flight 12-13   []no handrail     [] single HR     [] bilateral HR   [] unknown     Bathroom: []tub shower    [] walk-in shower   []grab bars    [x]shower chair   []tub shower bench   []raised toilet seat w/arms  []raised toilet seat w/o arms   [x]BSC (3 in 1)   []unknown           OBJECTIVE:     Cognitive Status:    A&O to  [x]x4    []person    []place    []time    []situation    []orientation not directly assessed    []not oriented   Able to follow:   []1 step commands    []1 step command inconsistently     [x]2 step commands       []multi-step commands     Vision:    []WNL    []wears glasses for reading    [x]wears glasses for sight    []Homonymous hemianopsia  []diplopia    [x]other:  recently had a sx of her L eye - blurry at times  Hearing:   [x]WNL    []wears hearing aides    []impaired     []tinnitus      Communication      [x]WNL    []slurred speech    []expressive aphasia    []receptive aphasia     []impaired:  Comments:       Functional Mobility    Bed Mobility:     Transfers:  Rolling to right side:      Sit to stand:  supervision  Rolling to left side:      Stand to sit:  supervision   Scooting in bed:      Stand pivot:    Supine to sit:       Bed to chair:    Sit to supine:        Gait Testing:     Gait   Level of Assistance needed:  close SBA   Assistive Device Used: SBQC   Gait Deviations (firm surface/linoleum):   Step length discrepancy, L toe out, non-fluid     Balance:   Static Sitting:  [x]normal  []good  []good-  []fair+  []fair   []fair -   []poor  Dynamic Sitting: [x]normal  []good  []good-  []fair+  []fair   []fair -   []poor  Static Standing:  []normal  []good  [x]good-  []fair+  []fair   []fair -   []poor  Dynamic Standing: []normal  []good  []good-  [x]fair+  []fair   []fair -   []poor    Balance/Special Tests/Outcome Measures: Result N/A Comments   SLS      Feet Together      Romberg         Tandem Stance      QOLIBRI      TUG  Score      10-meter Walk      30 sec Sit to Stand      Mini-BESTest      Dynamic Gait Index      Functional Gait Assessment      Tinetti Assessment       REID Balance Assessment 28/56              Coordination Testing:       Finger to Nose:        []WNL  []Impaired   Alternating pronation/supination:   []WNL  []Impaired   Finger/Thumb opposition:  []WNL  []Impaired   Heel to shin (sitting or supine):      [x]WNL  []Impaired   Alternating Toe Tapping:       []WNL  []Impaired     Tone   RUE:  [x]WNL []Hypertonia []Hypotonia []Tremoring []Spastic []Clonus []Flaccid  LUE:  []WNL  [x]Hypertonia []Hypotonia []Tremoring [x]Spastic []Clonus []Flaccid  RLE:  [x]WNL  []Hypertonia []Hypotonia []Tremoring []Spastic []Clonus []Flaccid  LLE:   []WNL  [x]Hypertonia []Hypotonia []Tremoring [x]Spastic []Clonus []Flaccid      Sensation      Light touch:    []WNL    []Diminished    []Impaired   [x]Absent Location:   [] RUE    []LUE         []RLE     [x]LLE     Comment:    Proprioception:    []WNL    [x]Diminished    []Impaired   []Absent Location:   [] RUE    []LUE         []RLE     [x]LLE     Comment:        MMT LEFT RIGHT Comments   Neck flexion (C1-C2)      Neck sidebending (C3)      Shoulder flexion/elevation (C4)      Shoulder abduction (C5)      Shoulder ER      Shoulder IR      Elbow flexion/wrist extension (C6)      Elbow extension/wrist flexion (C7)      Thumb abduction (C8)      Finger abduction (T1)            Hip flexion (L1-L2) 4- 5    Hip abduction      Hip ER 4- 4+    Hip IR 2 4+    Hip extension       Knee extension (L2-L4) 3+ 5    Knee flexion 3 5    Ankle Dorsiflexion (L4-L5) 0 5    Ankle Plantar flexion (S1-S2)      Ankle Eversion (S1-S2)      Great Toe Ext (L5)            Core Strength             [x] Patient history, allergies, meds reviewed. Medical chart reviewed. See intake form.      Review Of Systems (ROS):  [x]Performed Review of systems (Integumentary, CardioPulmonary, Neurological) by intake and observation. Intake form has been scanned into medical record. Patient has been instructed to contact their primary care physician regarding ROS issues if not already being addressed at this time.      Co-morbidities/Complexities (which will affect course of rehabilitation):   []None        []Hx of COVID   Arthritic conditions   []Rheumatoid arthritis (M05.9)  []Osteoarthritis (M19.91)  []Gout   Cardiovascular conditions   [x]Hypertension (I10)  [x]Hyperlipidemia (E78.5)  []Angina pectoris (I20)  []Atherosclerosis (I70)  []Pacemaker  []Hx of CABG/stent/  cardiac surgeries   Musculoskeletal conditions   []Disc pathology   []Congenital spine pathologies   []Osteoporosis (M81.8)  []Osteopenia (M85.8)  []Scoliosis       Endocrine conditions   []Hypothyroid (E03.9)  []Hyperthyroid Gastrointestinal conditions   []Constipation (K59.00)   Metabolic conditions   []Morbid obesity (E66.01)  []Diabetes type 1(E10.65) or 2 (E11.65)   []Neuropathy (G60.9)     Cardio/Pulmonary conditions   []Asthma (J45)  []Coughing   []COPD (J44.9)  []CHF  []A-fib   Psychological Disorders  []Anxiety (F41.9)  []Depression (F32.9)   []Other:   Developmental Disorders  []Autism (F84.0)  []CP (G80)  []Down Syndrome (Q90.9)  []Developmental delay     Neurological conditions  [x]Prior Stroke (I69.30)  []Parkinson's (G20)  []Encephalopathy (G93.40)  []MS (G35)  []Post-polio (G14)  []SCI  []TBI  []ALS Other conditions  [x]Fibromyalgia (M79.7)  []Vertigo  []Syncope  []Kidney Failure  []Cancer      []currently undergoing                treatment  []Pregnancy  []Incontinence   Prior surgeries  []involved limb  []previous spinal surgery  [] section birth  []hysterectomy  []bowel / bladder surgery  []other relevant surgeries   []Other:                Barriers to/and or personal factors that will affect rehab potential:              []Age  [x]Sex    []Smoker              [x]Motivation/Lack of Motivation                        [x]Co-Morbidities          []Cognitive Function, education/learning barriers              [x]Environmental, home barriers              []profession/work barriers  []past PT/medical experience  []other:    Falls Risk Assessment (30 days):   [] Falls Risk assessed and no intervention required. [x] Falls Risk assessed and Patient requires intervention due to being higher risk   []Tinetti Balance:    Total Score:        Balance:            Gait:         Disability Index:       Risk of Falls:   []Low (> 24)   []Mod (19-23)   []High (< 18)    []Dynamic Gait Index       Risk of Falls: <19 = increased fall risk    []Functional Gait Assessment           Indications: Non-specific older adults: <22/30 = risk of falls; Parkinson's patients <15-18/30 = risk of falls    [x]Dash Balance Scale     28     []41-56 = independent     [x]21-40 = walking with assistance     []0-20 = wc bound    []Timed Up and Go (TUG)     seconds     []<12 = Independent    []<20 = Fall risk    []20-29  = High fall risk    []>30 = Severe fall risk  [] Falls education provided, including:       ASSESSMENT:         Functional Impairments:     []Noted scapular/hip hypomobility   []Noted scapular/hip hypermobility   [x]Assistance required with functional mobility/gait for safety   [x]Decreased core/proximal hip strength and neuromuscular control    [x]Decreased UE/LE functional strength    []Abnormal reflexes/sensation/myotomal/dermatomal deficits  [x]Hypertonic UE/LE   []Hypotonic UE/LE  []Dislocated//Hypermobile Glenohumeral joint  [x]Impaired balance/coordination/proprioceptive control    []other:      Functional Activity Limitations (from functional questionnaire and intake)   []Reduced ability to tolerate prolonged functional positions   []Reduced ability or difficulty with changes of positions or transfers between positions   []Reduced ability to maintain good posture and demonstrate good body mechanics with sitting, bending, and lifting   []Reduced ability to sleep   [] Reduced ability or tolerance with driving and/or computer work   []Reduced ability to perform lifting, reaching, carrying tasks   []Reduced ability to squat   []Reduced ability to forward bend   [x]Reduced ability to ambulate prolonged functional periods/distances/surfaces   []Reduced ability to ascend/descend stairs   []other:       Participation Restrictions   []Reduced participation in self care activities   [x]Reduced participation in home management activities   [x]Reduced participation in work activities   [x]Reduced participation in social activities.   [x]Reduced participation in sport/recreational activities.    Classification:   []Signs/symptoms consistent with Primary prevention/risk reduction for loss of balance and falls    []Signs/symptoms consistent with Impaired neuromotor development   [x]Signs/symptoms consistent with Impaired motor function and sensory integrity associated w/non-progressive disorders of CNS - Congenital/Aquired in Infancy/Childhood or Acquired in Adolescence/Adulthood   []Signs/symptoms consistent with Impaired motor function and sensory integrity associate w/progressive disorders of the CNS     []Signs/symptoms consistent with Impaired motor function and sensory integrity associated w/acute or chronic polyneuropathies   []Signs/symptoms consistent with Impaired motor function, peripheral nerve integrity, and sendory integrity associated w/non-progressive disorders of spinal cord   []other:      Prognosis/Rehab Potential:   Tolerance of evaluation/treatment:    []Excellent     []Excellent   []Good     []Good   [x]Fair      [x]Fair   []Poor      []Poor      Physical Therapy Evaluation Complexity Justification  [x] A history of present problem with:  [] no personal factors and/or comorbidities that impact the plan of care;  []1-2 personal factors and/or comorbidities that impact the plan of care  [x]3 personal factors and/or comorbidities that impact the plan of care  [x]  An examination of body systems using standardized tests and measures addressing any of the following: body structures and functions (impairments), activity limitations, and/or participation restrictions;:  [] a total of 1-2 or more elements   [x] a total of 3 or more elements   [] a total of 4 or more elements   [x] A clinical presentation with:  [x] stable and/or uncomplicated characteristics   [] evolving clinical presentation with changing characteristics  [] unstable and unpredictable characteristics;   [x] Clinical decision making of [x] low, [] moderate, [] high complexity using standardized patient assessment instrument and/or measurable assessment of functional outcome. [x] EVAL (LOW) 95355 (typically 15 minutes face-to-face)  [] EVAL (MOD) 83955 (typically 30 minutes face-to-face)  [] EVAL (HIGH) 69594 (typically 45 minutes face-to-face)  [] RE-EVAL     PLAN: PT to begin focusing on: Activation of hip musculature, Transfer & Gait safety, Evenly distributed weight-bearing through extremities, Pain Management    Frequency/Duration:  2 days per week for 4 Weeks:  Interventions:  [x]  Therapeutic exercise including: strength training, ROM, for LE, Glutes and core   [x]  NMR activation and proprioception for shoulder complex, glutes, UE/LE, and Core   [x]  Manual therapy as indicated for Shoulder & Hip complex, LE and core activation to include: STM, manual cues to facilitate/inhibit muscle groups. [x]  Modalities as needed that may include: thermal agents, E-stim, Biofeedback, US as indicated  [x]  Patient education on joint protection, postural re-education, home/activity modification, progression of HEP. HEP instruction: Written HEP instructions provided and reviewed. GOALS:  Patient stated goal: improve gait and mobility   [] Progressing: [] Met: [] Not Met: [] Adjusted    Therapist goals for Patient:   Short Term Goals: To be achieved in: 2 weeks  1.  Independent in HEP and progression per patient tolerance, in order to prevent re-injury. [] Progressing: [] Met: [] Not Met: [] Adjusted  2. Patient will have a decrease in pain at knee to facilitate improvement in movement, function, and ADLs as indicated by Functional Deficits. [] Progressing: [] Met: [] Not Met: [] Adjusted    Long Term Goals: To be achieved in: 4 weeks  1. Patient to demonstrate REID score to 35/56 or greater to demonstrate improved fall risk to assist with reaching prior level of function. [] Progressing: [] Met: [] Not Met: [] Adjusted  2. Patient will perform correct and safe sit to stand transfer with 0-1 UE support x 5 to demonstrate an increase in strength in LEs as well as improved weight bearing on L.    [] Progressing: [] Met: [] Not Met: [] Adjusted  3. Patient will return to functional activities including climbing the stairs and getting into and out of the shower without increased symptoms or restriction. [] Progressing: [] Met: [] Not Met: [] Adjusted  4. Pt will amb 280 feet consecutively to show improvement in endurance and ease fatigue during prolonged ADLs such as shopping.   [] Progressing: [] Met: [] Not Met: [] Adjusted     Electronically signed by:  Joaquin Killian, PT, DPT

## 2023-01-30 NOTE — FLOWSHEET NOTE
[facility] [address]    Speech Therapy Daily Treatment Note  Date:  2023    Patient Name:  Peter Tripathi    :  1980  MRN: [id number]  Medical/Treatment Diagnosis Information:  G81.94 (ICD-10-CM) - Left hemiparesis ([facility] [address]           Treatment Diagnosis: Mild Dysarthria; Mild Cognitive-Linguistic deficits; Mild Oropharyngeal Dysphagia   Insurance/Certification information: [employer] authorization: 7/, 24 units 75069 & 24 units 94687; NEW AUTH 2022-2023, 24 units 81729; date updated 2023-2023  Physician Information: Dr. Jose Roberto Kingston MD      Plan of care signed (Y/N): Y (2022, 2022)    Date of Patient follow up with Physician: DEWEY    Functional outcome measure:   FOTO Primary Measure: HDQLIFE Swallowing Difficulties: 57 (2022)  FOTO Primary Measure: PROMIS Cognitive Function v2.0: 33.9 (2022)  Secondary Measure: NeuroQOL - Communication: 19 (2022)    Progress Note: []  Yes  [x]  No  Next due by: 2022    RESTRICTIONS/PRECAUTIONS: hx brain bleed  Latex Allergy:  [x]NO      []YES    Preferred Language for Healthcare:   [x]English       []other:    Visit # Insurance Allowable Date Range (if applicable)     + 658 24 units 83021   2023-2023       Pain level: 0/10     SUBJECTIVE: Pt alert and receptive,  present during session.  In good spirits this date with good participation as usual.     OBJECTIVE:   Exercises/Interventions:  Speech Therapy (79826) Accuracy Cues Notes   Comprehension        Yes/No questions      Complex questions      1-2 step directions      Multi-step directions      Common objects/pictures      L/R discrimination      Conversation      Other:      Expression       Initiation      Repetition      Automatic speech      Confrontational      Convergent      Divergent      Responsive      Conversation Complex narrative discourse (2 minutes) targeting topic maintenance; abstract describing without naming with 0 cues    Verbal problem-solving sequences (2 minutes) given functional scenarios and abstract reasoning with 0 cues   SLP reinforced self-monitoring of speech intelligibility and topic maintenance   Other:        Cognitive Function (94016 & 14959) Accuracy Cues Notes   Orientation        Attention           Sustained Visual attention for functional map reading, 5 minutes:  - pt scanned left to right to locate places on to-do list with 2 cues    Visual attention for functional reading, 5 minutes:  - pt scanned left to right to read complete sentences with 0 cues Verbal and visual cues provided for attending and initiation Multiple opportunities to practice skill      Selective         Alternating         Divided      Memory          Immediate/Working         Short term         Long term         Prospective      Problem Solving       Visuospatial       Executive Function  Direct training of cognitive strategies to functionally sequence a list of errands within a time-frame and using a map; pt sequenced errands accurately in 92% of opportunities; 2 verbal cues Min visual and verbal cues used Review of cognitive strategies prior to sequencing task to practice self-monitoring    Other:          Pt. Education:  -Pt educated on diagnosis, prognosis and expectations for rehab  -All pt questions were answered  -Pt verbalized understanding and agreement    HEP instruction:  -Pt provided with written and illustrated instructions for HEP: SLOB intelligibility strategies, WRAP memory strategies, Goal-Plan-Do-Review metacognitive training, compensatory swallowing strategies (Alternate solids/liquids , Upright as possible with all PO intake , Small bites/sips , Eat/feed slowly, Remain upright 30-45 min)      Speech/Voice Therapy:    [x] (02830) Treatment of speech, language, voice, communication, and/or auditory processing disorder; individual    Cognitive Function:  [] (36606) Therapeutic interventions that focus on cognitive function (eg, attention, memory, reasoning, executive function, problem solving, and/or pragmatic functioning) and compensatory strategies to manage the performance of an activity (eg, managing time or schedules, initiating, organizing, and sequencing tasks), direct (one-on-one) patient contact; initial 15 minutes (Report 83957 only once per day)  [] ([phone]) each additional 15 minutes     Dysphagia Therapy:    [] (02118) Treatment of swallowing dysfunction and/or oral function for feeding         Charges:  Timed Code Treatment Minutes: 0   Total Treatment Minutes: 45     [x] Speech-Language Treatment (76445)        [] Voice Treatment (32049)                                         [] Cognitive-Linguistic Skills Development Initial 15 minutes (79658)  [] Cognitive-Linguistic Skills Development Additional 15 minutes ([phone])   [] Dysphagia Treatment/NMES (VitalStim) (99271)  [] Other:     GOALS:  Patient stated goal: \"Help with my memory\"; \"Stop slurring\"; \"Swallowing\"  [x] Progressing: [] Met: [] Not Met: [] Adjusted     Therapist goals for Patient:  Short Term Goals: To be achieved in: 5 weeks  1. Patient will use speech strategies to facilitate increased intelligibility at discourse level, 5 minutes, of >90% as judged by SLP and pt/family. [] Progressing: [x] Met: 9/8/2022 [] Not Met: [] Adjusted  2. Patient will select and implement 2 cognitive strategies to complete functional tasks given min initiation cues in order to improve self-monitoring of ADL completion. [x] Progressing: [] Met: [] Not Met: [] Adjusted  3. Patient will use learned memory strategies to recall 4 units of novel information across delayed intervals up to 10 minutes given min encoding cues. [] Progressing: [x] Met: 9/29/2022 [] Not Met: [] Adjusted   4. Patient will functionally tolerate regular solids x 20 and thin liquids x 20 with min verbal cues for strategy implementation.   [] Progressing: [x] Met: 9/8/2022 [] Not Met: [] Adjusted  5. Patient will complete repeat instrumental swallow study (MBS or FEES) as clinically indicated to further assess pharyngeal phase and direct plan of care. [] Progressing: [] Met: [] Not Met: [x] Not clinically indicated  6. Patient will verbalize procedural discourse/sequence, 2 minutes, with no more than 2 cues for topic maintenance/organization in order to improve thought organization for complex expression. [x] Progressing: [] Met: [] Not Met: [] Adjusted   7. Patient will sustain attention to visual information for 5 minutes in a quiet environment with no more than 2 verbal cues to attend in order to improve working memory and visual scanning. [x] Progressing: [] Met: [] Not Met: [] Adjusted      Long Term Goals: To be achieved in: 6 weeks  1. Patient will demonstrate improved cognitive-linguistic function to improve quality of life as evidenced by increased score on functional outcome measure. [x] Progressing: [] Met: [] Not Met: [] Adjusted  2. Patient will utilize speech intelligibility strategies to increase intelligibility to 90% in conversation to a familiar/unfamiliar listener. [] Progressing: [x] Met: [] Not Met: [] Adjusted  3. Patient will tolerate least restrictive diet with no overt clinical s/s of aspiration/penetration. [] Progressing: [x] Met: [] Not Met: [] Adjusted    Progression Towards Functional goals:  [x] Patient is progressing as expected towards functional goals listed. [] Progression is slowed due to complexities listed. [] Progression has been slowed due to co-morbidities.   [] Plan just implemented, too soon to assess goals progression  [] Other:     Persisting Functional Limitations/Impairments:  [x]Attention []Word Finding       [x]Memory []Speech Intelligibility     [x]Executive Function []Diet Tolerance     [x]Problem Solving []Coughing/Choking with PO  []Expressive Language []Medication/Finance Management  []Receptive Language []Other:      ASSESSMENT: Pt continues to demonstrate improving thought organization and topic maintenance for increasing discourse, problem-solving scenarios targeted this date. Pt with functional visual scanning for reading but continues to require SLP support to consistently scan for visual information across a busy and disorganized visual field. Pt continues to improve in use of cognitive strategies to complete functional tasks, increased independence in double-checking to correct mistakes. Treatment/Activity Tolerance:  [x] Patient able to complete tx [] Patient limited by fatigue  [] Patient limited by pain  [] Patient limited by other medical complications  [] Other:     Prognosis: [x] Good [] Fair  [] Poor    Patient Requires Follow-up: [x] Yes  [] No    PLAN: See eval. ST 2x / week for 6 weeks. [x] Continue per plan of care [] Alter current plan (see comments)  [] Plan of care initiated [] Hold pending MD visit [] Discharge    Electronically signed by:   Bharat Ochoa, [facility]  Speech-Language Pathology Graduate Clinician    The speech-language pathologist was present, directed the patient's care, made skilled judgment and was responsible for assessment and treatment. Pernell Randall MA CCC-SLP  Speech-Language Pathologist  [phone]      Note: If patient does not return for scheduled/ recommended follow up visits, this note will serve as a discharge from care along with most recent update on progress.

## 2023-02-06 ENCOUNTER — HOSPITAL ENCOUNTER (OUTPATIENT)
Dept: PHYSICAL THERAPY | Age: 43
Setting detail: THERAPIES SERIES
Discharge: HOME | End: 2023-02-06
Payer: COMMERCIAL

## 2023-02-06 ENCOUNTER — HOSPITAL ENCOUNTER (OUTPATIENT)
Dept: SPEECH THERAPY | Age: 43
Setting detail: THERAPIES SERIES
Discharge: HOME | End: 2023-02-06
Payer: COMMERCIAL

## 2023-02-06 PROCEDURE — 92507 TX SP LANG VOICE COMM INDIV: CPT

## 2023-02-06 PROCEDURE — 97112 NEUROMUSCULAR REEDUCATION: CPT

## 2023-02-06 PROCEDURE — 97530 THERAPEUTIC ACTIVITIES: CPT

## 2023-02-06 NOTE — FLOWSHEET NOTE
[address] [facility] Physical Therapy  Phone: [phone]   Fax: [phone]    Physical Therapy Daily Treatment Note    Date:  2023     Patient Name:  Jennifer Briggs    :  1980  MRN: [id number]  Medical Diagnosis:  Cramp and spasm [R25.2]  Treatment Diagnosis: hypertonia in L UE and LE, weakness in L UE and LE, impaired gait, decreased balance, poor endurance                                Insurance/Certification information:  PT Insurance Information: Cramp and spasm (post CVA)  Physician Information:  Eliseo Coleman MD    Plan of care signed (Y/N): [x]  Yes []  No     Date of Patient follow up with Physician:      Progress Report: []  Yes  [x]  No     Date Range for reporting period:  Beginnin2023  Ending:       Progress report due (10 Rx/or 30 days whichever is less): visit 8 or 22     Recertification due (POC duration/ or 90 days whichever is less): visit #8 or 23     Visit # Insurance Allowable Auth required? Date Range    64 units for TE, neuro, man, TA, gait []  Yes  []  No 23-23         Latex Allergy:  [x]NO      []YES  Preferred Language for Healthcare:   [x]English       []Other:        Functional Scale/Test Evaluation 2023 30 day  60 day  Discharge    Tinetti Assessment        TUG       REID             Pain level:  0-6/10  Location:  L knee    SUBJECTIVE:   Has been doing okay since last session, feels that she's walking more. Has noticed some spasms/cramping in L knee, is worse at night,  has been stretching her LLE out a little.         OBJECTIVE: See eval      RESTRICTIONS/PRECAUTIONS:  nontraumatic subarachnoid hemorrhage and aneurysm clipped in 2021        Interventions/Exercises:   Therapeutic Exercises (10516) Resistance / level Sets/sec Reps Notes                               Neuromuscular Re-ed (87910)        REID   28/56   Foot on 4\" block + AirEx  -head nods    -cerv rotation     -Static w/frequent postural corrections  -Anterior weight shifts  -R foot taps to block only  -R foot taps to block + AirEx On block:  LLE  LLE  RLE  RLE       3 mins  60\"   8  10 B      20  20         W/QC  W/QC  W/QC                                             Therapeutic Activities (12164) /  Functional Tasks / Gait Training (77390)       Gait training with SBQC  90 feet                                                             Manual Intervention (47708)                                                     Modalities:     Pt. Education:  1/30/2023 Patient educated on diagnosis, prognosis and expectations for rehab, all patient questions were answered; encouraged pt to get up and move more during the day - knee is getting tight and painful because she doesn't use it. Needs to be up and moving multiple times a day for at least 5 min - start to increase time. Plan to co-tx with OT to get max benefit for sessions. HEP instruction:  1/30: amb for at least 5 min everyday, HEP from last episode of care       Therapeutic Exercise and NMR EXR  [] (56028) Provided verbal/tactile cueing for activities related to strengthening, flexibility, endurance, ROM for improvements in  [] LE / Core stability: LE, hip, and core control with self care, mobility, lifting, ambulation. [] UE / Shoulder complex: cervical, postural, scapular, scapulothoracic and UE control with self care, reaching, carrying, lifting, house/yardwork, driving, computer work. [x] (43669) Provided verbal/tactile cueing for activities related to improving balance, coordination, kinesthetic sense, posture, motor skill, proprioception to assist with   [x] LE / Core stability: LE, hip, and core control in self care, mobility, lifting, ambulation and eccentric single leg control.    [] UE / Shoulder complex: cervical, scapular, scapulothoracic and UE control with self care, reaching, carrying, lifting, house/yardwork, driving, computer work.   [] (56159) Therapist is in constant attendance of 2 or more patients providing skilled therapy interventions, but not providing any significant amount of measurable one-on-one time to either patient, for improvements in  [] LE / Core stability: LE, hip, and core control in self care, mobility, lifting, ambulation and eccentric single leg control. [] UE / Shoulder complex: cervical, scapular, scapulothoracic and UE control with self care, reaching, carrying, lifting, house/yardwork, driving, computer work. NMR and Therapeutic Activities:    [x] (62823 or 58837) Provided verbal/tactile cueing for activities related to improving balance, coordination, kinesthetic sense, posture, motor skill, proprioception and motor activation to allow for proper function of   [x] LE / Core, hip and LE with self care and ADLs  [] UE / Shoulder complex: cervical, postural, scapular, scapulothoracic and UE control with self care, carrying, lifting, driving, computer work. [x] (49009) Gait Re-education- Provided training and instruction to the patient for proper LE, core and hip recruitment, positioning, and eccentric body weight control with ambulation re-education, including ascending & descending stairs     Home Management Training / Self Care:  [] (50808) Provided self-care/home management training related to activities of daily living and compensatory training, and/or use of adaptive equipment for improvement with: ADLs and compensatory training, meal preparation, safety procedures and instruction in use of adaptive equipment, including bathing, grooming, dressing, personal hygiene, basic household cleaning and chores.        Home Exercise Program:    [] (92902) Reviewed/Progressed HEP activities related to strengthening, flexibility, endurance, ROM of   [] LE / Core stability: core, hip and LE for functional self-care, mobility, lifting and ambulation/stair navigation   [] UE / Shoulder complex: cervical, postural, scapular, scapulothoracic and UE control with self care, reaching, carrying, lifting, house/yardwork, driving, computer work  [] (54145)Reviewed/Progressed HEP activities related to improving balance, coordination, kinesthetic sense, posture, motor skill, proprioception of   [] LE: core, hip and LE for self care, mobility, lifting, and ambulation/stair navigation    [] UE / Shoulder complex: cervical, postural,  scapular, scapulothoracic and UE control with self care, reaching, carrying, lifting, house/yardwork, driving, computer work    Manual Treatments:  PROM / STM / Oscillations-Mobs:  G-I, II, III, IV (PA's, Inf., Post.)  [] (02271) Provided manual therapy to mobilize shoulder complex, hip, LE, and/or cervicothoracic/LS spine soft tissue/joints for the purpose of modulating pain, promoting relaxation,  increasing ROM, reducing/eliminating soft tissue swelling/inflammation/restriction, improving soft tissue extensibility and allowing for proper ROM for normal function with   [] LE / Core stability: self care, mobility, lifting and ambulation. [] UE / Shoulder complex: self care, reaching, carrying, lifting, house/yardwork, driving, computer work. Modalities:  [] (13846) Vasopneumatic compression: Utilized vasopneumatic compression to decrease edema / swelling for the purpose of improving mobility and quad tone / recruitment which will allow for increased overall function including but not limited to self-care, transfers, ambulation, and ascending / descending stairs.          Charges:  Timed Code Treatment Minutes: 40   Total Treatment Minutes: 40         Units approved Units used Date Range   64 TE  64 neuro  64 man  64 TA  64 gait 0  3  0  1  1   1/30/23-4/6/23     [] EVAL - LOW (67906)   [] EVAL - MOD (07160)  [] EVAL - HIGH (66748)  [] RE-EVAL (06883)  [] TE (90351) x      [] Ionto  [x] NMR (37079) x  2    [] Vaso  [] Manual (55014) x      [] Ultrasound  [x] TA x  1     [] Mech Traction (30108)  [] Gait Training x     [] ES (un) (01460):   [] Aquatic therapy x   [] Other:   [] Group:     GOALS:   Patient stated goal: improve gait and mobility   [] Progressing: [] Met: [] Not Met: [] Adjusted    Therapist goals for Patient:   Short Term Goals: To be achieved in: 2 weeks  1. Independent in HEP and progression per patient tolerance, in order to prevent re-injury. [] Progressing: [] Met: [] Not Met: [] Adjusted  2. Patient will have a decrease in pain at knee to facilitate improvement in movement, function, and ADLs as indicated by Functional Deficits. [] Progressing: [] Met: [] Not Met: [] Adjusted    Long Term Goals: To be achieved in: 4 weeks  1. Patient to demonstrate REID score to 35/56 or greater to demonstrate improved fall risk to assist with reaching prior level of function. [] Progressing: [] Met: [] Not Met: [] Adjusted  2. Patient will perform correct and safe sit to stand transfer with 0-1 UE support x 5 to demonstrate an increase in strength in LEs as well as improved weight bearing on L.    [] Progressing: [] Met: [] Not Met: [] Adjusted  3. Patient will return to functional activities including climbing the stairs and getting into and out of the shower without increased symptoms or restriction. [] Progressing: [] Met: [] Not Met: [] Adjusted  4. Pt will amb 280 feet consecutively to show improvement in endurance and ease fatigue during prolonged ADLs such as shopping. [] Progressing: [] Met: [] Not Met: [] Adjusted     Overall Progression Towards Functional goals/ Treatment Progress Update:  [] Patient is progressing as expected towards functional goals listed. [] Progression is slowed due to complexities/Impairments listed. [] Progression has been slowed due to co-morbidities.   [x] Plan just implemented, too soon to assess goals progression <30days   [] Goals require adjustment due to lack of progress  [] Patient is not progressing as expected and requires additional follow up with physician  [] Other    Persisting Functional Limitations/Impairments:  []Sleeping []Sitting               [x]Standing [x]Transfers        [x]Walking [x]Kneeling               [x]Stairs [x]Squatting / bending   [x]ADLs []Reaching  [x]Lifting  [x]Housework  []Driving []Job related tasks  []Sports/Recreation []Other:        ASSESSMENT:  LLE muscle tremors noted with R foot on box, no LOB noted but L knee did unlock x2, pt able to utilize knee and hip strategies to regain balance, took step w/RLE, min/mod A required from PT, CGA during task. Pt remains heavily dependent on R side of body and frequently orients herself to utilize R side of body more easily in environment. Continue to address learned non use of LLE to facilitate balance and mobility. Treatment/Activity Tolerance:  [x] Patient able to complete tx [] Patient limited by fatigue  [] Patient limited by pain  [] Patient limited by other medical complications  [] Other:     Prognosis: [] Good [x] Fair  [] Poor    Patient Requires Follow-up: [x] Yes  [] No    Plan for next treatment session:  transfers with weight bearing on L, balance in midline, gait and stair training, endurance     PLAN: See dyana PT 2x / week for 4 weeks. [x] Continue per plan of care [] Alter current plan (see comments)  [] Plan of care initiated [] Hold pending MD visit [] Discharge    Electronically signed by: Melania Trammell, PT, DPT    Note: If patient does not return for scheduled/ recommended follow up visits, his note will serve as a discharge from care along with most recent update on progress.

## 2023-02-06 NOTE — FLOWSHEET NOTE
[facility]    Speech Therapy Daily Treatment Note  Date:  2023    Patient Name:  Hannah Coe    :  1980  MRN: [id number]  Medical/Treatment Diagnosis Information:  G81.94 (ICD-10-CM) - Left hemiparesis ([facility] [address]           Treatment Diagnosis: Mild Dysarthria; Mild Cognitive-Linguistic deficits; Mild Oropharyngeal Dysphagia   Insurance/Certification information: [facility]: 7/, 24 units 30787 & 24 units 65133; NEW AUTH 2022-2023, 24 units [id number]; date updated 2023-2023  Physician Information: Dr. Zeferino Resendiz MD      Plan of care signed (Y/N): Y (2022, 2022)    Date of Patient follow up with Physician: DEWEY    Functional outcome measure:   FOTO Primary Measure: HDQLIFE Swallowing Difficulties: 57 (2022)  FOTO Primary Measure: PROMIS Cognitive Function v2.0: 33.9 (2022)  Secondary Measure: NeuroQOL - Communication: 19 (2022)    Progress Note: []  Yes  [x]  No  Next due by: 2022    RESTRICTIONS/PRECAUTIONS: hx brain bleed  Latex Allergy:  [x]NO      []YES    Preferred Language for Healthcare:   [x]English       []other:    Visit # Insurance Allowable Date Range (if applicable)   26/79  + 8/49 24 units [id number]   2023-2023       Pain level: 0/10     SUBJECTIVE: Pt alert and receptive,  present during session. Reports possibility of knee surgery in the next couple of months.      OBJECTIVE:   Exercises/Interventions:  Speech Therapy (06430) Accuracy Cues Notes   Comprehension        Yes/No questions      Complex questions      1-2 step directions      Multi-step directions      Common objects/pictures      L/R discrimination      Conversation      Other:      Expression       Initiation      Repetition      Automatic speech      Confrontational      Convergent      Divergent      Responsive      Conversation Verbal problem-solving sequences (2 minutes) given functional picture scenes and abstract reasoning with 1 cue Visual support SLP reinforced self-monitoring of speech intelligibility and topic maintenance   Other:        Cognitive Function (18303 & 02551) Accuracy Cues Notes   Orientation        Attention           Sustained Visual attention for deductive reasoning task (I.e., figure characteristics), 5 minutes x 2:  - pt scanned to locate shapes that fit into written categories with 2 cues    Visual attention for functional reading, 5 minutes:  - pt scanned left to right to read complete sentences with 0 cues Verbal and visual cues provided for attending and initiation SLP reinforced use of metacognitive strategies      Selective         Alternating         Divided      Memory          Immediate/Working         Short term         Long term         Prospective      Problem Solving       Visuospatial       Executive Function  Direct training of cognitive strategies to functionally sequence household chore: laundry  - written support of metacognitive strategies  - pt applied strategies to laundry task with 1 verbal cue Visual support    Other:          Pt. Education:  -Pt educated on diagnosis, prognosis and expectations for rehab  -All pt questions were answered  -Pt verbalized understanding and agreement    HEP instruction:  -Pt provided with written and illustrated instructions for HEP: SLOB intelligibility strategies, WRAP memory strategies, Goal-Plan-Do-Review metacognitive training, compensatory swallowing strategies (Alternate solids/liquids , Upright as possible with all PO intake , Small bites/sips , Eat/feed slowly, Remain upright 30-45 min)      Speech/Voice Therapy:    [x] (04309) Treatment of speech, language, voice, communication, and/or auditory processing disorder; individual    Cognitive Function:  [] (58468) Therapeutic interventions that focus on cognitive function (eg, attention, memory, reasoning, executive function, problem solving, and/or pragmatic functioning) and compensatory strategies to manage the performance of an activity (eg, managing time or schedules, initiating, organizing, and sequencing tasks), direct (one-on-one) patient contact; initial 15 minutes (Report 67832 only once per day)  [] ([phone]) each additional 15 minutes     Dysphagia Therapy:    [] (70185) Treatment of swallowing dysfunction and/or oral function for feeding         Charges:  Timed Code Treatment Minutes: 0   Total Treatment Minutes: 45     [x] Speech-Language Treatment (67882)        [] Voice Treatment (67526)                                         [] Cognitive-Linguistic Skills Development Initial 15 minutes (39660)  [] Cognitive-Linguistic Skills Development Additional 15 minutes (71500)   [] Dysphagia Treatment/NMES ([employer]) (31167)  [] Other:     GOALS:  Patient stated goal: \"Help with my memory\"; \"Stop slurring\"; \"Swallowing\"  [x] Progressing: [] Met: [] Not Met: [] Adjusted     Therapist goals for Patient:  Short Term Goals: To be achieved in: 5 weeks  1. Patient will use speech strategies to facilitate increased intelligibility at discourse level, 5 minutes, of >90% as judged by SLP and pt/family. [] Progressing: [x] Met: 9/8/2022 [] Not Met: [] Adjusted  2. Patient will select and implement 2 cognitive strategies to complete functional tasks given min initiation cues in order to improve self-monitoring of ADL completion. [x] Progressing: [] Met: [] Not Met: [] Adjusted  3. Patient will use learned memory strategies to recall 4 units of novel information across delayed intervals up to 10 minutes given min encoding cues. [] Progressing: [x] Met: 9/29/2022 [] Not Met: [] Adjusted   4. Patient will functionally tolerate regular solids x 20 and thin liquids x 20 with min verbal cues for strategy implementation. [] Progressing: [x] Met: 9/8/2022 [] Not Met: [] Adjusted  5.  Patient will complete repeat instrumental swallow study (MBS or FEES) as clinically indicated to further assess pharyngeal phase and direct plan of care. [] Progressing: [] Met: [] Not Met: [x] Not clinically indicated  6. Patient will verbalize procedural discourse/sequence, 2 minutes, with no more than 2 cues for topic maintenance/organization in order to improve thought organization for complex expression. [x] Progressing: [] Met: [] Not Met: [] Adjusted   7. Patient will sustain attention to visual information for 5 minutes in a quiet environment with no more than 2 verbal cues to attend in order to improve working memory and visual scanning. [x] Progressing: [] Met: [] Not Met: [] Adjusted      Long Term Goals: To be achieved in: 6 weeks  1. Patient will demonstrate improved cognitive-linguistic function to improve quality of life as evidenced by increased score on functional outcome measure. [x] Progressing: [] Met: [] Not Met: [] Adjusted  2. Patient will utilize speech intelligibility strategies to increase intelligibility to 90% in conversation to a familiar/unfamiliar listener. [] Progressing: [x] Met: [] Not Met: [] Adjusted  3. Patient will tolerate least restrictive diet with no overt clinical s/s of aspiration/penetration. [] Progressing: [x] Met: [] Not Met: [] Adjusted    Progression Towards Functional goals:  [x] Patient is progressing as expected towards functional goals listed. [] Progression is slowed due to complexities listed. [] Progression has been slowed due to co-morbidities. [] Plan just implemented, too soon to assess goals progression  [] Other:     Persisting Functional Limitations/Impairments:  [x]Attention []Word Finding       [x]Memory []Speech Intelligibility     [x]Executive Function []Diet Tolerance     [x]Problem Solving []Coughing/Choking with PO  []Expressive Language []Medication/Finance Management  []Receptive Language []Other:      ASSESSMENT: Pt progressing in thought organization for increasing discourse with more abstract language.  Pt with increasing independence in visual scanning for reading and for locating shapes/patterns in a busy environment. Pt benefited from SLP support to orient to correct section of page. Pt continues to recognize functional use of cognitive strategies for ADLs/responsibilities but requires intermittent support from SLP to implement strategies (e.g., double check work) throughout various tasks. Treatment/Activity Tolerance:  [x] Patient able to complete tx [] Patient limited by fatigue  [] Patient limited by pain  [] Patient limited by other medical complications  [] Other:     Prognosis: [x] Good [] Fair  [] Poor    Patient Requires Follow-up: [x] Yes  [] No    PLAN: See dyana ST 2x / week for 6 weeks. [x] Continue per plan of care [] Alter current plan (see comments)  [] Plan of care initiated [] Hold pending MD visit [] Discharge    Electronically signed by:   Sorin Rojas [facility]  Speech-Language Pathology Graduate Clinician    The speech-language pathologist was present, directed the patient's care, made skilled judgment and was responsible for assessment and treatment. Aure Zaman MA CCC-SLP  Speech-Language Pathologist  [place]. [id number]      Note: If patient does not return for scheduled/ recommended follow up visits, this note will serve as a discharge from care along with most recent update on progress.

## 2023-02-09 ENCOUNTER — HOSPITAL ENCOUNTER (OUTPATIENT)
Dept: SPEECH THERAPY | Age: 43
Setting detail: THERAPIES SERIES
Discharge: HOME | End: 2023-02-09
Payer: COMMERCIAL

## 2023-02-09 ENCOUNTER — HOSPITAL ENCOUNTER (OUTPATIENT)
Dept: PHYSICAL THERAPY | Age: 43
Setting detail: THERAPIES SERIES
Discharge: HOME | End: 2023-02-09
Payer: COMMERCIAL

## 2023-02-09 NOTE — FLOWSHEET NOTE
Speech Therapy  Cancellation/No-show Note  Patient Name:  Luciano York  :  1980   Date:  2023  Cancels to Date: 8  No-shows to Date: 1    Patient status for today's appointment patient:  [x]  Cancelled (, , , , , , , )  []  Rescheduled appointment  []  No-show (11/15)     Reason given by patient:  []  Patient ill  []  Conflicting appointment  []  No transportation    []  Conflict with work  []  No reason given  [x]  Other:     Comments: family emergency    Phone call information:   []  Phone call made today to patient at time at number provided:      []  Patient's  answered, conversation as follows:    []  Patient did not answer, message left as follows:   [x]  Phone call not made today, pt called clinic and provided reason for cancellation    Electronically signed by:  José Miguel Zaragoza, SLP

## 2023-02-09 NOTE — FLOWSHEET NOTE
[address]     Physical Therapy  Cancellation/No-show Note  Patient Name:  Jane Anthony  :  1980   Date:  2023  Cancelled visits to date: 1  No-shows to date: 0    Patient status for today's appointment patient:  [x]  Cancelled 23  []  Rescheduled appointment  []  No-show     Reason given by patient:  []  Patient ill  []  Conflicting appointment  []  No transportation    []  Conflict with work  []  No reason given  [x]  Other:  family emergency   Comments:      Phone call information:   []  Phone call made today to patient at _ time at number provided:      []  Patient answered, conversation as follows:    []  Patient did not answer, message left as follows:  []  Phone call not made today  [x]  Phone call not needed - pt contacted us to cancel and provided reason for cancellation.      Electronically signed by:  Darylene Pitt, PT, DPT

## 2023-02-13 ENCOUNTER — HOSPITAL ENCOUNTER (OUTPATIENT)
Dept: SPEECH THERAPY | Age: 43
Setting detail: THERAPIES SERIES
Discharge: HOME | End: 2023-02-13
Payer: COMMERCIAL

## 2023-02-13 ENCOUNTER — HOSPITAL ENCOUNTER (OUTPATIENT)
Dept: OCCUPATIONAL THERAPY | Age: 43
Setting detail: THERAPIES SERIES
Discharge: HOME | End: 2023-02-13
Payer: COMMERCIAL

## 2023-02-13 PROCEDURE — 92507 TX SP LANG VOICE COMM INDIV: CPT

## 2023-02-13 PROCEDURE — 97110 THERAPEUTIC EXERCISES: CPT

## 2023-02-13 PROCEDURE — 97530 THERAPEUTIC ACTIVITIES: CPT

## 2023-02-13 PROCEDURE — 97165 OT EVAL LOW COMPLEX 30 MIN: CPT

## 2023-02-13 NOTE — PLAN OF CARE
[address], [address] [facility] [address]  Phone: [phone]   Fax: [phone]                                                     Occupational Therapy Certification    Dear Dr. Gilma Hope,    We had the pleasure of evaluating the following patient for occupational therapy services at [facility]. A summary of our findings can be found in the initial assessment below. This includes our plan of care. If you have any questions or concerns regarding these findings, please do not hesitate to contact me at the office phone number above. Thank you for the referral.       Referring Practitioner Signature:_______________________________Date:__________________  By signing above (or electronic signature), therapists plan is approved by the referring practitioner      Patient: Brigette Funes   : 1980   MRN: [id number]  Referring Practitioner:  Dr. Gilma Hope   Evaluation Date: 2023      Medical Diagnosis Information: spastic left hemiplegia                                          Insurance information:  care source    Precautions/ Contra-indications: fall risk     Latex Allergy:  []NO      []YES  Preferred Language for Healthcare:   []English       []Other:    C-SSRS Triggered by Intake questionnaire (Past 2 wk assessment ):   [x] No, Questionnaire did not trigger screening.   [] Yes, Patient intake triggered C-SSRS Screening     [] Completed, no further action required.    [] Completed, PCP notified via [place]  Reason for Seeking OT Services and Patient Goals: decrease spasticity and improve overall mobility     Personal Interests and Values: family     Occupational History:on disability     Home Environment: lives with , currently uses bedside commode for toileting    Personal, Temporal, and Virtual Contexts:      SUBJECTIVE: Patient stated complaint  Improve activity tolerance and balance with walking , bathing, dressing and toileting     Pain Scale: 0/10  Easing factors: some soreness in left side with mobility   Provocative factors:      Type: []Constant   []Intermittent  []Radiating []Localized []other:       OBJECTIVE:   Hand dominance: right    Inspection: ambulates with increased flexor tone on left , shoulder in internal rotation , anterior subluxation     d    Date Right Left    MCP circumference- cm  Wrist circumference- cm MCP circumference- cm  Wrist circumference- cm            ROM WFL: []Yes [x]No (if checked, see below) measured in a seated position edge of mat     PROM AROM    L R L R   Shoulder Flexion  0-100      Shoulder Abduction        Shoulder External Rotation        Shoulder Internal Rotation        Elbow Flexion  wnl      Elbow Extension  wnl      Pronation        Supination  wnl      Wrist Flexion        Wrist Extension        Radial Deviation        Ulnar Deviation       CMC Abduction       5th Digit MCP Extension-Flexion       4th Digit MCP Extension-Flexion       3rd Digit MCP Extension-Flexion       2nd Digit MCP Extension-Flexion       1st Digit MCP Extension-Flexion       5th Digit PIP Extension- Flexion       4th Digit PIP Extension-Flexion       3rd Digit PIP Extension-Flexion       2nd Digit PIP Extension-Flexion       1st Digit IP Extension-Flexion       5th Digit DIP Extension-Flexion       4th Digit DIP Extension-Flexion       3rd Digit DIP Extension-Flexion       2nd Digit DIP Extension-Flexion       Composite Flexion (Tip of 3rd Digit to Distal Palmar Crease (cm))         Joint mobility:    [x]Normal    []Hypo   []Hyper    Splinting Needs:     Strength WFL: []Yes []No (if checked, see below)    Strength (0-5) Left Right    Shoulder Flex     Shoulder Abd     Shoulder ER     Shoulder IR     Biceps     Triceps     Pronation      Supination      Wrist Flex     Wrist Ext     Wrist Radial Deviation         Orthopaedic Special Tests Positive  Negative  NT Comments    Shoulder        Empty Can              Elbow        Cozen's       Tinel's               Hand/Wrist       Finkelstein's       Tinel's       Phalen's       Froment's       [place] Sign       [place] Deformity       [place] Neck Deformity       Heberden's Nodes (DIP)       Gallo's Nodes (PIP)       Mallet Finger       Grind Test Peggy Gruber)                      Hand Assessment Left  Right  Comments    9 Hole Peg Test (s)       Strength (lbs)      Lateral Pinch Strength (lbs)      Palmar Pinch Strength (lbs)      Tip Pinch Strength (lbs)      Opposition (Y/N)          Assessment of UE tone/spasticity: [facility]    Date: 2/13/2023          Shoulder flexors  3         Internal rotators  3         External rotators  3         Elbow flexors           Elbow extensor 3          Supinators 3          Pronators           Wrist flexors           Wrist extensors 3          Digit flexors 3          Digit extensors  3           Modified Sharon Scale  0    No increase in muscle tone  1    Slight increase in muscle tone, manifested by a catch and release or by minimal        resistance at the end of the range of motion when the affected part(s) is moved in        flexion or extension  1+ Slight increase in muscle tone, manifested by a catch, followed by minimal        resistance throughout the remainder (less than half) of the ROM  2    More marked increase in muscle tone through most of the ROM, but        affected part(s) easily moved  3    Considerable increase in muscle tone, passive movement difficult  4    Affected part(s) rigid in flexion or extension    Functional Outcome Measures:    Functional Mobility/Transfers: supervision to contact guard throughout    Sensation:  numbness and tingling    Perception: left neglect    Coordination:  spastic hemiplegia left side    Visual Assessment:    Visual acuity: [facility]   Oculomotor function:impaired    Visual field cut:left hemionopsia    Visual Perceptual Status: left neglect not formally assessed     MVPT:       [place] making A:      [place] making B:     Hearing/Communication:  WFL    Cognition:  WFL, mild impulsivitiy, decreased problem solving                  [x] Patient history, allergies, meds reviewed. Medical chart reviewed. See intake form. Review Of Systems (ROS):  [x]Performed Review of systems (Integumentary, CardioPulmonary, Neurological) by intake and observation. Intake form has been scanned into medical record. Patient has been instructed to contact their primary care physician regarding ROS issues if not already being addressed at this time.       Co-morbidities/Complexities (which will affect course of rehabilitation):   []None        []Hx of COVID   Arthritic conditions   []Rheumatoid arthritis (M05.9)  []Osteoarthritis (M19.91)  []Gout   Cardiovascular conditions   []Hypertension (I10)  []Hyperlipidemia (E78.5)  []Angina pectoris (I20)  []Atherosclerosis (I70)  []Pacemaker  []Hx of CABG/stent/  cardiac surgeries   Musculoskeletal conditions   []Disc pathology   []Congenital spine pathologies   []Osteoporosis (M81.8)  []Osteopenia (M85.8)  []Scoliosis       Endocrine conditions   []Hypothyroid (E03.9)  []Hyperthyroid Gastrointestinal conditions   []Constipation (O24.63)   Metabolic conditions   []Morbid obesity (E66.01)  [x]Diabetes type 1(E10.65) or 2 (E11.65)   [x]Neuropathy (G60.9)     Cardio/Pulmonary conditions   []Asthma (J45)  []Coughing   []COPD (J44.9)  []CHF  []A-fib   Psychological Disorders  []Anxiety (F41.9)  []Depression (F32.9)   []Other:   Developmental Disorders  []Autism (F84.0)  []CP (G80)  []Down Syndrome (Q90.9)  []Developmental delay     Neurological conditions  []Prior Stroke (I69.30)  []Parkinson's (G20)  []Encephalopathy (G93.40)  []MS (G35)  []Post-polio (G14)  []SCI  []TBI  []ALS Other conditions  []Fibromyalgia (M79.7)  []Vertigo  []Syncope  []Kidney Failure  []Cancer      []currently undergoing treatment  []Pregnancy  []Incontinence   Prior surgeries  []involved limb  []previous spinal surgery  [] section birth  []hysterectomy  []bowel / bladder surgery  []other relevant surgeries   []Other:              Barriers to/and or personal factors that will affect rehab potential:              []Age  []Sex    []Smoker              []Motivation/Lack of Motivation                        [x]Co-Morbidities              []Cognitive Function, education/learning barriers              []Environmental, home barriers              []profession/work barriers  [x]past PT/medical experience  []other:  Justification: Patient demonstrates left hemiplegia with spasticity making mobility difficult , adl pursuits difficult     Falls Risk Assessment (30 days):   [] Falls risk assessed and no intervention required. [x] Falls risk assessed (via clinical reasoning) and patient requires intervention due to being higher risk for falls  [] Falls education provided, including       ASSESSMENT: The pt has chief complaints of decreased balance ,mobility , and rom. These symptoms as well as client factor and performance skill deficits create barriers to the patient engaging in desired occupational pursuits. Therefore, the patient is in need of skilled occupational therapy services to mitigate functional deficits in order to allow the patient to return to baseline, prevent further decline, and/or compensate for decreased functional abilities.       Functional Impairments and Activity Limitations:    [x]Reduced participation in functional mobility   []Reduced cognition   [x]Reduced participation in high level IADLs   [x]Reduced participation ADLs   [x]Reduced endurance  []Reduced fine motor control   [x]Reduced ROM   [x]Reduced sensation   []Reduced coordination  []Reduced strength   [x]Reduced balance   []Reduced posture   [x]Reduced safety awareness   []Reduced functional vision      Participation Restrictions  none   Prognosis/Rehab Potential:      []Excellent   [x]Good    []Fair   []Poor    Tolerance of evaluation/treatment:    []Excellent   [x]Good    []Fair   []Poor    Occupational Therapy Evaluation Complexity Justification   A Occupational Profile/Medical and Therapy History  [] no personal factors and/or comorbidities that impact the plan of care; brief history relating to presenting problem  [x]1-2 personal factors and/or comorbidities that impact the plan of care; additional review of physical, cognitive, or psychosocial performance  []3 personal factors and/or comorbidities that impact the plan of care; extensive review of physical cognitive, psychosocial performance    Patient Assessment:  [x] a total of 1-3 performance deficits relating to physical, cognitive, psychosocial limitations/restrictions  [] a total of 3-5 performance deficits relating to physical, cognitive, psychosocial limitations/restrictions  [] a total of 5 or more performance deficits relating to physical, cognitive, psychosocial limitations/restrictions    A clinical presentation with:  [x] low complexity, limited amount of treatment options no assessment modification, no co-morbidities  [] moderate analytical complexity, detailed assessments, minimal to moderate modification of assessments, may have co-morbidities  [] high analytic complexity, comprehensive assessments, multiple treatment options, significant modifications of assessment, co-morbidities affecting performance    Clinical decision making of [] low, [] moderate, [] high complexity using standardized patient assessment instrument and/or measurable assessment of functional outcome.     [] EVAL (LOW) 05089 (typically 15 minutes face-to-face)  [x] EVAL (MOD) 77083 (typically 30 minutes face-to-face)  [] EVAL (HIGH) 48166 (typically 45 minutes face-to-face)  [] RE-EVAL 26800    PLAN:  Frequency/Duration:  2 days per week for 6 Weeks:  INTERVENTIONS:  [x] Strength training, ROM, balance training, functional mobility training  [x] Neuromuscular re-education and positioning  [x] Modalities as needed that may include: E-stim, Ultrasound, Fluidotherapy, Iontophoresis as indicated, Paraffin, Hot Packs, Cold Packs  [x] Patient/caregiver education on joint protection, postural re-education, activity modification, progression of HEP. [x] Patient/caregiver education regarding home management and safety as well as ADL and IADL performance  [x] Cognitive re-orientation and training  [x] Manual therapy including soft tissue mobilization, manual lymph drainage, and joint manipululations  [x] Adaptive Equipment Education and Training  [x] Splinting including custom or pre-fabricated    HEP instruction: Written and verbal HEP instructions provided and reviewed:  Access Code: [email or web address]  URL: [email or web address]  Date: 02/13/2023  Prepared by: Zach Doe    Exercises  Supine Shoulder Flexion Extension AAROM with Dowel - 1 x daily - 7 x weekly - 3 sets - 10 reps  Seated Elbow Flexion AAROM - 1 x daily - 7 x weekly - 3 sets - 10 reps  Seated Cradle Shoulder Flexion and Horizontal Abduction PROM - 1 x daily - 7 x weekly - 3 sets - 10 reps      GOALS:  Patient stated goal: improve mobility , transfers and independent ADL  [] Progressing: [] Met: [] Not Met: [] Adjusted     Therapist goals for Patient:   Short Term Goals: To be achieved in: 30 days  1. Pt will integrate left ue with bed mobility independently   [] Progressing: [] Met: [] Not Met: [] Adjusted  2. Pt will be able to use her left ue as base of support for balance during ADL  [] Progressing: [] Met: [] Not Met: [] Adjusted     Long Term Goals: To be achieved by discharge    3. Pt will report a QuickDASH Symptom Severity Scale score of  40 or less indicating increased safety and functional independence in desired occupational pursuits by discharge.   [] Progressing: [] Met: [] Not Met: [] Adjusted    Electronically signed by:

## 2023-02-13 NOTE — FLOWSHEET NOTE
[address]    Speech Therapy Daily Treatment Note  Date:  2023    Patient Name:  Latia Griffin    :  1980  MRN: [id number]  Medical/Treatment Diagnosis Information:  G81.94 (ICD-10-CM) - Left hemiparesis ([facility] [address]           Treatment Diagnosis: Mild Dysarthria; Mild Cognitive-Linguistic deficits; Mild Oropharyngeal Dysphagia   Insurance/Certification information: [employer] authorization: 7/, 24 units 50315 & 24 units 20237; NEW AUTH 2022-2023, 24 units 68541; date updated 2023-2023  Physician Information: Dr. Tiburcio Bragg MD      Plan of care signed (Y/N): Y (2022, 2022)    Date of Patient follow up with Physician: DEWEY    Functional outcome measure:   FOTO Primary Measure: HDQLIFE Swallowing Difficulties: 57 (2022)  FOTO Primary Measure: PROMIS Cognitive Function v2.0: 33.9 (2022)  Secondary Measure: NeuroQOL - Communication: 19 (2022)    Progress Note: []  Yes  [x]  No  Next due by: 2022    RESTRICTIONS/PRECAUTIONS: hx brain bleed  Latex Allergy:  [x]NO      []YES    Preferred Language for Healthcare:   [x]English       []other:    Visit # Insurance Allowable Date Range (if applicable)   24/38  + 3/52 24 units 61821   2023-2023       Pain level: 2/10 left knee and arm     SUBJECTIVE: Pt alert and receptive, arrived 10 minutes late to session,  present during session. Pt walked to therapy room with cane.      OBJECTIVE:   Exercises/Interventions:  Speech Therapy (36580) Accuracy Cues Notes   Comprehension        Yes/No questions      Complex questions      1-2 step directions      Multi-step directions      Common objects/pictures      L/R discrimination      Conversation      Other:      Expression       Initiation      Repetition      Automatic speech      Confrontational      Convergent      Divergent      Responsive      Conversation Verbal problem-solving sequences (2 minutes) given functional scenario with 1 cue for thought organization  SLP reinforced self-monitoring of speech intelligibility and topic maintenance   Other:        Cognitive Function (49969 & 86790) Accuracy Cues Notes   Orientation        Attention           Sustained Visual attention for deductive reasoning task (I.e., figure inclusion and exclusion), 5 minutes x 2:  - pt scanned to locate shapes that fit into written categories with 1 cue    Visual attention for functional reading off a list (20 sentences/phrases), 5 minutes:  - pt scanned left to right, up and down, to read complete sentences with 1 cue Verbal cues to initiate SLP reinforced use of metacognitive strategies      Selective         Alternating         Divided      Memory          Immediate/Working         Short term         Long term         Prospective      Problem Solving       Visuospatial       Executive Function  Direct training of cognitive strategies post-task with deductive reasoning task:  - written support of metacognitive strategies  - pt identified strategies used during task independently    Education and skilled instruction for use of spoon theory to self-monitor fatigue:  - pt identified daily tasks that are fatigue with min cues  - pt identified activities that are \"relaxing\" to combat fatigue with min cues Visual support Handouts provided   Other:          Pt. Education:  -Pt educated on diagnosis, prognosis and expectations for rehab  -All pt questions were answered  -Pt verbalized understanding and agreement    HEP instruction:  -Pt provided with written and illustrated instructions for HEP: SLOB intelligibility strategies, WRAP memory strategies, Goal-Plan-Do-Review metacognitive training, compensatory swallowing strategies (Alternate solids/liquids , Upright as possible with all PO intake , Small bites/sips , Eat/feed slowly, Remain upright 30-45 min)      Speech/Voice Therapy:    [x] (81601) Treatment of speech, language, voice, communication, and/or auditory processing disorder; individual    Cognitive Function:  [] (57113) Therapeutic interventions that focus on cognitive function (eg, attention, memory, reasoning, executive function, problem solving, and/or pragmatic functioning) and compensatory strategies to manage the performance of an activity (eg, managing time or schedules, initiating, organizing, and sequencing tasks), direct (one-on-one) patient contact; initial 15 minutes (Report 53551 only once per day)  [] ([phone]) each additional 15 minutes     Dysphagia Therapy:    [] (82634) Treatment of swallowing dysfunction and/or oral function for feeding         Charges:  Timed Code Treatment Minutes: 0   Total Treatment Minutes: 35     [x] Speech-Language Treatment (63580)        [] Voice Treatment (92061)                                         [] Cognitive-Linguistic Skills Development Initial 15 minutes (25845)  [] Cognitive-Linguistic Skills Development Additional 15 minutes ([phone])   [] Dysphagia Treatment/NMES (VitalStim) (85727)  [] Other:     GOALS:  Patient stated goal: \"Help with my memory\"; \"Stop slurring\"; \"Swallowing\"  [x] Progressing: [] Met: [] Not Met: [] Adjusted     Therapist goals for Patient:  Short Term Goals: To be achieved in: 5 weeks  1. Patient will use speech strategies to facilitate increased intelligibility at discourse level, 5 minutes, of >90% as judged by SLP and pt/family. [] Progressing: [x] Met: 9/8/2022 [] Not Met: [] Adjusted  2. Patient will select and implement 2 cognitive strategies to complete functional tasks given min initiation cues in order to improve self-monitoring of ADL completion. [x] Progressing: [] Met: [] Not Met: [] Adjusted  3. Patient will use learned memory strategies to recall 4 units of novel information across delayed intervals up to 10 minutes given min encoding cues. [] Progressing: [x] Met: 9/29/2022 [] Not Met: [] Adjusted   4.  Patient will functionally tolerate regular solids x 20 and thin liquids x 20 with min verbal cues for strategy implementation. [] Progressing: [x] Met: 9/8/2022 [] Not Met: [] Adjusted  5. Patient will complete repeat instrumental swallow study (MBS or FEES) as clinically indicated to further assess pharyngeal phase and direct plan of care. [] Progressing: [] Met: [] Not Met: [x] Not clinically indicated  6. Patient will verbalize procedural discourse/sequence, 2 minutes, with no more than 2 cues for topic maintenance/organization in order to improve thought organization for complex expression. [x] Progressing: [] Met: [] Not Met: [] Adjusted   7. Patient will sustain attention to visual information for 5 minutes in a quiet environment with no more than 2 verbal cues to attend in order to improve working memory and visual scanning. [x] Progressing: [] Met: [] Not Met: [] Adjusted      Long Term Goals: To be achieved in: 6 weeks  1. Patient will demonstrate improved cognitive-linguistic function to improve quality of life as evidenced by increased score on functional outcome measure. [x] Progressing: [] Met: [] Not Met: [] Adjusted  2. Patient will utilize speech intelligibility strategies to increase intelligibility to 90% in conversation to a familiar/unfamiliar listener. [] Progressing: [x] Met: [] Not Met: [] Adjusted  3. Patient will tolerate least restrictive diet with no overt clinical s/s of aspiration/penetration. [] Progressing: [x] Met: [] Not Met: [] Adjusted    Progression Towards Functional goals:  [x] Patient is progressing as expected towards functional goals listed. [] Progression is slowed due to complexities listed. [] Progression has been slowed due to co-morbidities.   [] Plan just implemented, too soon to assess goals progression  [] Other:     Persisting Functional Limitations/Impairments:  [x]Attention []Word Finding       [x]Memory []Speech Intelligibility     [x]Executive Function []Diet Tolerance     [x]Problem Solving []Coughing/Choking with PO  []Expressive Language []Medication/Finance Management  []Receptive Language []Other:      ASSESSMENT: Pt continues to progress in visual scanning for functional tasks including reading and deductive reasoning. Pt with improving recognition and application of metacognitive strategies to improve performance on functional tasks, strategies reinforced throughout all tasks this session. Additional metacognitive strategy training with education on spoon theory. Pt continues to monitor speech rate during discourse, with min cues to maintain topic and thought organization. Treatment/Activity Tolerance:  [x] Patient able to complete tx [] Patient limited by fatigue  [] Patient limited by pain  [] Patient limited by other medical complications  [] Other:     Prognosis: [x] Good [] Fair  [] Poor    Patient Requires Follow-up: [x] Yes  [] No    PLAN: See eval. ST 2x / week for 6 weeks. [x] Continue per plan of care [] Alter current plan (see comments)  [] Plan of care initiated [] Hold pending MD visit [] Discharge    Electronically signed by:   Angela Bob MA CCC-SLP  Speech-Language Pathologist  SP. [id number]      Note: If patient does not return for scheduled/ recommended follow up visits, this note will serve as a discharge from care along with most recent update on progress.

## 2023-02-13 NOTE — FLOWSHEET NOTE
[address] [facility] Occupational Therapy  [address] [place], [place] [address]amarilis Granados, [address]  Phone: [phone]   Fax: [phone]      Occupational Therapy Daily Treatment Note  Date:  2023    Patient: Errol Olivo   : 1980   MRN: [id number]  Referring Physician:  DR. Luz Barraza        Medical Diagnosis Information: left hemiplegia       Insurance information: care source    Plan of care sent to provider:      [x]Faxed   []Co-signature    (attempts: 1[] 2[] 3[])       Progress Report: []  Yes  [x]  No     Date Range for reporting period:  Beginnin2023  Ending: 3/13/2023    Progress report due (10 Rx/or 30 days whichever is less): visit #10 or      Recertification due (POC duration/ or 90 days whichever is less): visit #2023    Visit # Insurance Allowable Auth required? Date Range   1   [x]  Yes  []  No  1       Units approved Units used Date Range   1       Latex Allergy:  [x]No      []Yes  Pacemaker:  [] No       [] Yes     Preferred Language for Healthcare:   [x]English       []other:    Pain level:  0/10     SUBJECTIVE:  See eval    Functional Disability Index:   Quick dash    OBJECTIVE: See eval      RESTRICTIONS/PRECAUTIONS:     Exercises/Interventions: evaluation followed by education on home program completed with review of plan of care. Patient and  verbalize and demonstrate understanding     Therapeutic Exercises  Resistance / level Sets/sec Reps Notes                                                                                Neuromuscular Re-ed / Therapeutic Activities                                                 Manual Intervention                                                     Modalities:     Patient education:     HEP instruction: Written and verbal HEP instructions provided and reviewed:  Access Code: [email or web address]  URL: [email or web address]  Date: 2023  Prepared by: Lisset Deras     Exercises  Supine Shoulder Flexion Extension AAROM with Dowel - 1 x daily - 7 x weekly - 3 sets - 10 reps  Seated Elbow Flexion AAROM - 1 x daily - 7 x weekly - 3 sets - 10 reps  Seated Cradle Shoulder Flexion and Horizontal Abduction PROM - 1 x daily - 7 x weekly - 3 sets - 10 reps         Home Exercise Program:     HEP instruction: Written and verbal HEP instructions provided and reviewed:  Access Code: [email or web address]  URL: [email or web address]  Date: 02/13/2023  Prepared by: Darlyn Herring     Exercises  Supine Shoulder Flexion Extension AAROM with Dowel - 1 x daily - 7 x weekly - 3 sets - 10 reps  Seated Elbow Flexion AAROM - 1 x daily - 7 x weekly - 3 sets - 10 reps  Seated Cradle Shoulder Flexion and Horizontal Abduction PROM - 1 x daily - 7 x weekly - 3 sets - 10 reps       Therapeutic Exercise and NMR:  [x] (87825) Provided verbal/tactile cueing for activities related to strengthening, flexibility, endurance, ROM  for improvements in scapular, scapulothoracic and UE control with self care, reaching, carrying, lifting, house/yardwork, driving/computer work.    [] (18434) Provided verbal/tactile cueing for activities related to improving balance, coordination, kinesthetic sense, posture, motor skill, proprioception  to assist with  scapular, scapulothoracic and UE control with self care, reaching, carrying, lifting, house/yardwork, driving/computer work.   [] Comments:    Therapeutic Activities:    [x] (53025 or 73910) Provided verbal/tactile cueing for activities related to improving balance, coordination, kinesthetic sense, posture, motor skill, proprioception and motor activation to allow for proper function of scapular, scapulothoracic and UE control with self care, carrying, lifting, driving/computer work  [] Comments:    Home Exercise Program:    [] (38042) Reviewed/Progressed HEP activities related to strengthening, flexibility, endurance, ROM of scapular, scapulothoracic and UE control with self care, reaching, carrying, lifting, house/yardwork, driving/computer work  [] (15275) Reviewed/Progressed HEP activities related to improving balance, coordination, kinesthetic sense, posture, motor skill, proprioception of scapular, scapulothoracic and UE control with self care, reaching, carrying, lifting, house/yardwork, driving/computer work    [] Comments:    Manual Treatments:  PROM / STM / Oscillations-Mobs:  G-I, II, III, IV (PA's, Inf., Post.)  [x] (45973) Provided manual therapy to mobilize soft tissue/joints of cervical/CT, scapular GHJ and UE for the purpose of modulating pain, promoting relaxation,  increasing ROM, reducing/eliminating soft tissue swelling/inflammation/restriction, improving soft tissue extensibility and allowing for proper ROM for normal function with self care, reaching, carrying, lifting, house/yardwork, driving/computer work  [] Comments:    ADL Training:  [x] (32106) Provided self-care/home management training related to activities of daily living and compensatory training, and/or use of adaptive equipment   [] Comments:     Splinting:  [] Fabrication of:   [] (31554) Orthotic/Prosthetic Management, subsequent encounter  [] (51048) Orthotic management and training (fitting and assessment)  [] Comments:      Charges:  Timed Code Treatment Minutes: 30   Total Treatment Minutes: 45     [x] EVAL (LOW) 85893   [] OT Re-eval (17167)  [] EVAL (MOD) 09261   [] EVAL (HIGH) 17918       [x] Dolly (38037) x     [] Ionto(83586)  [] NMR (61460) x     [] Estim (attended) (96225)   [] Manual (24157) x     [] US (61193)  [x] TA (71718) x     [] Paraffin (49426)  [] ADL  (37339) x    [] Splint/L code:    [] Estim (unattended) (47581)  [] Fluidotherapy (62301)  [] Other:    GOALS: Patient stated goal: improve mobility , transfers and independent ADL  [] Progressing: [] Met: [] Not Met: [] Adjusted     Therapist goals for Patient:   Short Term Goals: To be achieved in: 30 days  1. Pt will integrate left ue with bed mobility  independently   [] Progressing: [] Met: [] Not Met: [] Adjusted  2. Pt will be able to use her left ue as base of support for balance during ADL  [] Progressing: [] Met: [] Not Met: [] Adjusted     Long Term Goals: To be achieved by discharge     3. Pt will report a QuickDASH Symptom Severity Scale score of  40 or less indicating increased safety and functional independence in desired occupational pursuits by discharge. [] Progressing: [] Met: [] Not Met: [] Adjusted    Progression Towards Functional goals:  [] Patient is progressing as expected towards functional goals listed. [] Progression is slowed due to complexities listed. [] Progression has been slowed due to co-morbidities. [x] Plan just implemented, too soon to assess goals progression  [] All goals are met  [] Other:     ASSESSMENT:  See eval    Treatment/Activity Tolerance:  [x] Patient tolerated treatment well [] Patient limited by fatigue  [] Patient limited by pain  [] Patient limited by other medical complications  [] Other:     Prognosis: [x] Good [] Fair  [] Poor    Patient Requires Follow-up: [x] Yes  [] No    PLAN: See eval  [] Continue per plan of care [] Alter current plan (see comments)  [x] Plan of care initiated [] Hold pending MD visit [] Discharge    Electronically signed by:     hali    Note: If patient does not return for scheduled/ recommended follow up visits, this note will serve as a discharge from care along with most recent update on progress.   augusto

## 2023-02-16 ENCOUNTER — HOSPITAL ENCOUNTER (OUTPATIENT)
Dept: SPEECH THERAPY | Age: 43
Setting detail: THERAPIES SERIES
Discharge: HOME | End: 2023-02-16
Payer: COMMERCIAL

## 2023-02-16 ENCOUNTER — HOSPITAL ENCOUNTER (OUTPATIENT)
Dept: PHYSICAL THERAPY | Age: 43
Setting detail: THERAPIES SERIES
Discharge: HOME | End: 2023-02-16
Payer: COMMERCIAL

## 2023-02-16 NOTE — FLOWSHEET NOTE
[address]     Physical Therapy  Cancellation/No-show Note  Patient Name:  Lizbeth Molina  :  1980   Date:  2023  Cancelled visits to date: 2  No-shows to date: 0    Patient status for today's appointment patient:  [x]  Cancelled 23, 23  []  Rescheduled appointment  []  No-show     Reason given by patient:  []  Patient ill  []  Conflicting appointment  []  No transportation    []  Conflict with work  [x]  No reason given  []  Other:  family emergency   Comments:      Phone call information:   []  Phone call made today to patient at _ time at number provided:      []  Patient answered, conversation as follows:    []  Patient did not answer, message left as follows:  []  Phone call not made today  [x]  Phone call not needed - pt contacted us to cancel and provided reason for cancellation.      Electronically signed by:  Jania Hamlin, PT, DPT

## 2023-02-16 NOTE — FLOWSHEET NOTE
Speech Therapy  Cancellation/No-show Note  Patient Name:  Anam Tovar  :  1980   Date:  2023  Cancels to Date: 5  No-shows to Date: 1    Patient status for today's appointment patient:  [x]  Cancelled (, , , , , , , , )  []  Rescheduled appointment  []  No-show (11/15)     Reason given by patient:  []  Patient ill  []  Conflicting appointment  []  No transportation    []  Conflict with work  [x]  No reason given  []  Other:     Comments:     Phone call information:   []  Phone call made today to patient at time at number provided:      []  Patient's  answered, conversation as follows:    []  Patient did not answer, message left as follows:   [x]  Phone call not made today, pt called clinic and provided reason for cancellation    Electronically signed by:  Jared Boudreaux, SLP

## 2023-02-18 ENCOUNTER — HOSPITAL ENCOUNTER (OUTPATIENT)
Dept: PHYSICAL THERAPY | Age: 43
Setting detail: THERAPIES SERIES
Discharge: HOME | End: 2023-02-18
Payer: COMMERCIAL

## 2023-02-18 PROCEDURE — 97112 NEUROMUSCULAR REEDUCATION: CPT

## 2023-02-18 PROCEDURE — 97530 THERAPEUTIC ACTIVITIES: CPT

## 2023-02-18 NOTE — FLOWSHEET NOTE
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, weakness in L UE and LE, impaired gait, decreased balance, poor endurance                                Insurance/Certification information:  PT Insurance Information: Cramp and spasm (post CVA)  Physician Information:  Tobias Machuca MD    Plan of care signed (Y/N): [x]  Yes []  No     Date of Patient follow up with Physician:      Progress Report: []  Yes  [x]  No     Date Range for reporting period:  Beginnin2023  Ending:       Progress report due (10 Rx/or 30 days whichever is less): visit 8 or      Recertification due (POC duration/ or 90 days whichever is less): visit #8 or 23     Visit # Insurance Allowable Auth required? Date Range   3/8 64 units for TE, neuro, man, TA, gait []  Yes  []  No 23-23         Latex Allergy:  [x]NO      []YES  Preferred Language for Healthcare:   [x]English       []Other:        Functional Scale/Test Evaluation 2023 30 day  60 day  Discharge    Tinetti Assessment        TUG       REID /            Pain level:  0/10  Location:       SUBJECTIVE:     Denies pain this am.  Had a rough week with family situation at son's school.         OBJECTIVE:        RESTRICTIONS/PRECAUTIONS:  nontraumatic subarachnoid hemorrhage and aneurysm clipped in 2021        Interventions/Exercises:   Therapeutic Exercises (47596) Resistance / level Sets/sec Reps Notes                               Neuromuscular Re-ed ()        REID   28/56   Foot on 4\" block + AirEx  -head nods    -cerv rotation     -Static w/frequent postural corrections  -Anterior weight shifts  -R foot taps to block only  -R foot taps to block + AirEx On block:  LLE  LLE  RLE  RLE                 W/QC  W/QC  W/QC   L foot on TM  -shift weight forward then posterior     15    R foot taps on TM   8 -frequent verbal cues to improve weight shift onto LLE                               Therapeutic Activities (81675) /  Functional Tasks / Gait Training (66671)       Gait training with SBQC  100 feet  -man cues to B pelvis to reduce L pelvic rotation   Sit to stand transitions  -R foot on dynadisc            -hold standing position   green     60\"   8   -man cues to facilitate LLE use  -man cues to improve LLE activation                                                    Manual Intervention (99837)                                                     Modalities:     Pt. Education:  1/30/2023 Patient educated on diagnosis, prognosis and expectations for rehab, all patient questions were answered; encouraged pt to get up and move more during the day - knee is getting tight and painful because she doesn't use it. Needs to be up and moving multiple times a day for at least 5 min - start to increase time. Plan to co-tx with OT to get max benefit for sessions. HEP instruction:  1/30: amb for at least 5 min everyday, HEP from last episode of care       Therapeutic Exercise and NMR EXR  [] (20206) Provided verbal/tactile cueing for activities related to strengthening, flexibility, endurance, ROM for improvements in  [] LE / Core stability: LE, hip, and core control with self care, mobility, lifting, ambulation. [] UE / Shoulder complex: cervical, postural, scapular, scapulothoracic and UE control with self care, reaching, carrying, lifting, house/yardwork, driving, computer work.   [x] (51883) Provided verbal/tactile cueing for activities related to improving balance, coordination, kinesthetic sense, posture, motor skill, proprioception to assist with   [x] LE / Core stability: LE, hip, and core control in self care, mobility, lifting, ambulation and eccentric single leg control. [] UE / Shoulder complex: cervical, scapular, scapulothoracic and UE control with self care, reaching, carrying, lifting, house/yardwork, driving, computer work.   [] (71561) Therapist is in constant attendance of 2 or more patients providing skilled therapy interventions, but not providing any significant amount of measurable one-on-one time to either patient, for improvements in  [] LE / Core stability: LE, hip, and core control in self care, mobility, lifting, ambulation and eccentric single leg control. [] UE / Shoulder complex: cervical, scapular, scapulothoracic and UE control with self care, reaching, carrying, lifting, house/yardwork, driving, computer work. NMR and Therapeutic Activities:    [x] (63733 or 64746) Provided verbal/tactile cueing for activities related to improving balance, coordination, kinesthetic sense, posture, motor skill, proprioception and motor activation to allow for proper function of   [x] LE / Core, hip and LE with self care and ADLs  [] UE / Shoulder complex: cervical, postural, scapular, scapulothoracic and UE control with self care, carrying, lifting, driving, computer work. [x] (19289) Gait Re-education- Provided training and instruction to the patient for proper LE, core and hip recruitment, positioning, and eccentric body weight control with ambulation re-education, including ascending & descending stairs     Home Management Training / Self Care:  [] (47557) Provided self-care/home management training related to activities of daily living and compensatory training, and/or use of adaptive equipment for improvement with: ADLs and compensatory training, meal preparation, safety procedures and instruction in use of adaptive equipment, including bathing, grooming, dressing, personal hygiene, basic household cleaning and chores.        Home Exercise Program:    [] (72978) Reviewed/Progressed HEP activities related to strengthening, flexibility, endurance, ROM of [] LE / Core stability: core, hip and LE for functional self-care, mobility, lifting and ambulation/stair navigation   [] UE / Shoulder complex: cervical, postural, scapular, scapulothoracic and UE control with self care, reaching, carrying, lifting, house/yardwork, driving, computer work  [] (38168)Reviewed/Progressed HEP activities related to improving balance, coordination, kinesthetic sense, posture, motor skill, proprioception of   [] LE: core, hip and LE for self care, mobility, lifting, and ambulation/stair navigation    [] UE / Shoulder complex: cervical, postural,  scapular, scapulothoracic and UE control with self care, reaching, carrying, lifting, house/yardwork, driving, computer work    Manual Treatments:  PROM / STM / Oscillations-Mobs:  G-I, II, III, IV (PA's, Inf., Post.)  [] (78737) Provided manual therapy to mobilize shoulder complex, hip, LE, and/or cervicothoracic/LS spine soft tissue/joints for the purpose of modulating pain, promoting relaxation,  increasing ROM, reducing/eliminating soft tissue swelling/inflammation/restriction, improving soft tissue extensibility and allowing for proper ROM for normal function with   [] LE / Core stability: self care, mobility, lifting and ambulation. [] UE / Shoulder complex: self care, reaching, carrying, lifting, house/yardwork, driving, computer work. Modalities:  [] (90614) Vasopneumatic compression: Utilized vasopneumatic compression to decrease edema / swelling for the purpose of improving mobility and quad tone / recruitment which will allow for increased overall function including but not limited to self-care, transfers, ambulation, and ascending / descending stairs.          Charges:  Timed Code Treatment Minutes: 40   Total Treatment Minutes: 40         Units approved Units used Date Range   64 TE  64 neuro  64 man  64 TA  64 gait 0  5  0  2  1   1/30/23-4/6/23     [] EVAL - LOW (58321)   [] EVAL - MOD (20928)  [] EVAL - HIGH (41609)  [] RE-EVAL (38927)  [] TE (81321) x      [] Ionto  [x] NMR (02631) x  2    [] Vaso  [] Manual (07751) x      [] Ultrasound  [x] TA x  1     [] Mech Traction (28464)  [] Gait Training x     [] ES (un) (02429):   [] Aquatic therapy x   [] Other:   [] Group:     GOALS:   Patient stated goal: improve gait and mobility   [] Progressing: [] Met: [] Not Met: [] Adjusted    Therapist goals for Patient:   Short Term Goals: To be achieved in: 2 weeks  1. Independent in HEP and progression per patient tolerance, in order to prevent re-injury. [] Progressing: [] Met: [] Not Met: [] Adjusted  2. Patient will have a decrease in pain at knee to facilitate improvement in movement, function, and ADLs as indicated by Functional Deficits. [] Progressing: [] Met: [] Not Met: [] Adjusted    Long Term Goals: To be achieved in: 4 weeks  1. Patient to demonstrate REID score to 35/56 or greater to demonstrate improved fall risk to assist with reaching prior level of function. [] Progressing: [] Met: [] Not Met: [] Adjusted  2. Patient will perform correct and safe sit to stand transfer with 0-1 UE support x 5 to demonstrate an increase in strength in LEs as well as improved weight bearing on L.    [] Progressing: [] Met: [] Not Met: [] Adjusted  3. Patient will return to functional activities including climbing the stairs and getting into and out of the shower without increased symptoms or restriction. [] Progressing: [] Met: [] Not Met: [] Adjusted  4. Pt will amb 280 feet consecutively to show improvement in endurance and ease fatigue during prolonged ADLs such as shopping. [] Progressing: [] Met: [] Not Met: [] Adjusted     Overall Progression Towards Functional goals/ Treatment Progress Update:  [] Patient is progressing as expected towards functional goals listed. [] Progression is slowed due to complexities/Impairments listed. [] Progression has been slowed due to co-morbidities.   [x] Plan just implemented, too soon to assess goals progression <30days   [] Goals require adjustment due to lack of progress  [] Patient is not progressing as expected and requires additional follow up with physician  [] Other    Persisting Functional Limitations/Impairments:  []Sleeping []Sitting               [x]Standing [x]Transfers        [x]Walking [x]Kneeling               [x]Stairs [x]Squatting / bending   [x]ADLs []Reaching  [x]Lifting  [x]Housework  []Driving []Job related tasks  []Sports/Recreation []Other:        ASSESSMENT:   Pt able to demo improved recruitment of LLE muscle fibers, noted by tremors w/R foot taps and sit to stand transitions. Manual cues to pelvis to reduce L rotation improved L foot placement, reduced genu recurvatum and improved gait speed, gait pattern regressed without man cues and encouragement. Continue to address learned non use of LLE to facilitate balance and mobility. Treatment/Activity Tolerance:  [x] Patient able to complete tx [] Patient limited by fatigue  [] Patient limited by pain  [] Patient limited by other medical complications  [] Other:     Prognosis: [] Good [x] Fair  [] Poor    Patient Requires Follow-up: [x] Yes  [] No    Plan for next treatment session:  transfers with weight bearing on L, balance in midline, gait and stair training, endurance     PLAN: See josey. PT 2x / week for 4 weeks. [x] Continue per plan of care [] Alter current plan (see comments)  [] Plan of care initiated [] Hold pending MD visit [] Discharge    Electronically signed by: New Krishnan, PT, DPT    Note: If patient does not return for scheduled/ recommended follow up visits, his note will serve as a discharge from care along with most recent update on progress.

## 2023-02-21 ENCOUNTER — HOSPITAL ENCOUNTER (OUTPATIENT)
Dept: SPEECH THERAPY | Age: 43
Setting detail: THERAPIES SERIES
Discharge: HOME | End: 2023-02-21
Payer: COMMERCIAL

## 2023-02-21 NOTE — FLOWSHEET NOTE
Speech Therapy  Cancellation/No-show Note  Patient Name:  Jose Akhtar  :  1980   Date:  2023  Cancels to Date: 10  No-shows to Date: 1    Patient status for today's appointment patient:  [x]  Cancelled (, , , , , , , , , )  []  Rescheduled appointment  []  No-show (11/15)     Reason given by patient:  [x]  Patient ill  []  Conflicting appointment  []  No transportation    []  Conflict with work  []  No reason given  []  Other:     Comments:     Phone call information:   []  Phone call made today to patient at time at number provided:      []  Patient's  answered, conversation as follows:    []  Patient did not answer, message left as follows:   [x]  Phone call not made today, pt called clinic and provided reason for cancellation    Electronically signed by:  Poli Cornell, SLP

## 2023-02-23 ENCOUNTER — HOSPITAL ENCOUNTER (OUTPATIENT)
Dept: PHYSICAL THERAPY | Age: 43
Setting detail: THERAPIES SERIES
Discharge: HOME | End: 2023-02-23
Payer: COMMERCIAL

## 2023-02-23 ENCOUNTER — HOSPITAL ENCOUNTER (OUTPATIENT)
Dept: SPEECH THERAPY | Age: 43
Setting detail: THERAPIES SERIES
Discharge: HOME | End: 2023-02-23
Payer: COMMERCIAL

## 2023-02-23 PROCEDURE — 97116 GAIT TRAINING THERAPY: CPT

## 2023-02-23 PROCEDURE — 92507 TX SP LANG VOICE COMM INDIV: CPT

## 2023-02-23 PROCEDURE — 97112 NEUROMUSCULAR REEDUCATION: CPT

## 2023-02-23 NOTE — FLOWSHEET NOTE
[address] [facility]  Phone: [phone]   Fax: [phone]    Physical Therapy Daily Treatment Note    Date:  2023     Patient Name:  Jose Akhtar    :  1980  MRN: [id number]  Medical Diagnosis:  Cramp and spasm [R25.2]  Treatment Diagnosis: hypertonia in L UE and LE, weakness in L UE and LE, impaired gait, decreased balance, poor endurance                                Insurance/Certification information:  PT Insurance Information: Cramp and spasm (post CVA)  Physician Information:  John Melgar MD    Plan of care signed (Y/N): [x]  Yes []  No     Date of Patient follow up with Physician:      Progress Report: []  Yes  [x]  No     Date Range for reporting period:  Beginnin2023  Ending:       Progress report due (10 Rx/or 30 days whichever is less): visit 8 or      Recertification due (POC duration/ or 90 days whichever is less): visit #8 or 23     Visit # Insurance Allowable Auth required? Date Range    64 units for TE, neuro, man, TA, gait []  Yes  []  No 23-23         Latex Allergy:  [x]NO      []YES  Preferred Language for Healthcare:   [x]English       []Other:        Functional Scale/Test Evaluation 2023 30 day  60 day  Discharge    Tinetti Assessment        TUG       REID             Pain level:  0/10  Location:       SUBJECTIVE:   Pt reports she has been wearing a knee brace since yesterday and it is helping with her knee pain. Feels it keeps her supported.        OBJECTIVE:        RESTRICTIONS/PRECAUTIONS:  nontraumatic subarachnoid hemorrhage and aneurysm clipped in 2021        Interventions/Exercises:   Therapeutic Exercises (79782) Resistance / level Sets/sec Reps Notes                               Neuromuscular Re-ed (65378)        REID      Foot on 4\" block -head nods    -cerv rotation     -Static w/frequent postural corrections  -Anterior weight shifts  -R foot taps to block only  -R foot taps to block + AirEx    - L foot taps on 4 inch step On block:      RLE  RLE         1 min B  1 min B   20  x10         W/QC  Light UE support on bar, VCs for no recurvatum at L knee       L foot on TM  -shift weight forward then posterior        R foot taps on TM   -frequent verbal cues to improve weight shift onto LLE   Neutral stance:  Perturbations  EC    1 min  20 sec x 3                          Therapeutic Activities (22249) /  Functional Tasks / Gait Training (96975)       Gait training with SBQC  80 feet x 3  -man cues to B pelvis to reduce L pelvic rotation   Sit to stand transitions  -R foot on dynadisc            -hold standing position   green     60\"   2   -man cues to facilitate LLE use  -man cues to improve LLE activation   Sit to stands with equal weight in B LEs and ecc control   1 x5 No UE support, good weight bearing B                                             Manual Intervention (89344)                                                     Modalities:     Pt. Education:  1/30/2023 Patient educated on diagnosis, prognosis and expectations for rehab, all patient questions were answered; encouraged pt to get up and move more during the day - knee is getting tight and painful because she doesn't use it. Needs to be up and moving multiple times a day for at least 5 min - start to increase time. Plan to co-tx with OT to get max benefit for sessions. HEP instruction:  1/30: amb for at least 5 min everyday, HEP from last episode of care       Therapeutic Exercise and NMR EXR  [] (55885) Provided verbal/tactile cueing for activities related to strengthening, flexibility, endurance, ROM for improvements in  [] LE / Core stability: LE, hip, and core control with self care, mobility, lifting, ambulation.   [] UE / Shoulder complex: cervical, postural, scapular, scapulothoracic and UE control with self care, reaching, carrying, lifting, house/yardwork, driving, computer work.  [] (33945) Provided verbal/tactile cueing for activities related to improving balance, coordination, kinesthetic sense, posture, motor skill, proprioception to assist with   [] LE / Core stability: LE, hip, and core control in self care, mobility, lifting, ambulation and eccentric single leg control. [] UE / Shoulder complex: cervical, scapular, scapulothoracic and UE control with self care, reaching, carrying, lifting, house/yardwork, driving, computer work.   [] (25782) Therapist is in constant attendance of 2 or more patients providing skilled therapy interventions, but not providing any significant amount of measurable one-on-one time to either patient, for improvements in  [] LE / Core stability: LE, hip, and core control in self care, mobility, lifting, ambulation and eccentric single leg control. [] UE / Shoulder complex: cervical, scapular, scapulothoracic and UE control with self care, reaching, carrying, lifting, house/yardwork, driving, computer work. NMR and Therapeutic Activities:    [x] (26027 or 06327) Provided verbal/tactile cueing for activities related to improving balance, coordination, kinesthetic sense, posture, motor skill, proprioception and motor activation to allow for proper function of   [x] LE / Core, hip and LE with self care and ADLs  [] UE / Shoulder complex: cervical, postural, scapular, scapulothoracic and UE control with self care, carrying, lifting, driving, computer work.    [x] (58177) Gait Re-education- Provided training and instruction to the patient for proper LE, core and hip recruitment, positioning, and eccentric body weight control with ambulation re-education, including ascending & descending stairs     Home Management Training / Self Care:  [] (00634) Provided self-care/home management training related to activities of daily living and compensatory training, and/or use of adaptive equipment for improvement with: ADLs and compensatory training, meal preparation, safety procedures and instruction in use of adaptive equipment, including bathing, grooming, dressing, personal hygiene, basic household cleaning and chores. Home Exercise Program:    [] (46048) Reviewed/Progressed HEP activities related to strengthening, flexibility, endurance, ROM of   [] LE / Core stability: core, hip and LE for functional self-care, mobility, lifting and ambulation/stair navigation   [] UE / Shoulder complex: cervical, postural, scapular, scapulothoracic and UE control with self care, reaching, carrying, lifting, house/yardwork, driving, computer work  [] (55192)Reviewed/Progressed HEP activities related to improving balance, coordination, kinesthetic sense, posture, motor skill, proprioception of   [] LE: core, hip and LE for self care, mobility, lifting, and ambulation/stair navigation    [] UE / Shoulder complex: cervical, postural,  scapular, scapulothoracic and UE control with self care, reaching, carrying, lifting, house/yardwork, driving, computer work    Manual Treatments:  PROM / STM / Oscillations-Mobs:  G-I, II, III, IV (PA's, Inf., Post.)  [] (85979) Provided manual therapy to mobilize shoulder complex, hip, LE, and/or cervicothoracic/LS spine soft tissue/joints for the purpose of modulating pain, promoting relaxation,  increasing ROM, reducing/eliminating soft tissue swelling/inflammation/restriction, improving soft tissue extensibility and allowing for proper ROM for normal function with   [] LE / Core stability: self care, mobility, lifting and ambulation. [] UE / Shoulder complex: self care, reaching, carrying, lifting, house/yardwork, driving, computer work.      Modalities:  [] (49388) Vasopneumatic compression: Utilized vasopneumatic compression to decrease edema / swelling for the purpose of improving mobility and quad tone / recruitment which will allow for increased overall function including but not limited to self-care, transfers, ambulation, and ascending / descending stairs. Charges:  Timed Code Treatment Minutes: 43   Total Treatment Minutes: 43         Units approved Units used Date Range   64 TE  64 neuro  64 man  64 TA  64 gait 0  7  0  2  2   1/30/23-4/6/23     [] EVAL - LOW (33624)   [] EVAL - MOD (01950)  [] EVAL - HIGH (08739)  [] RE-EVAL (26543)  [] TE (34900) x      [] Ionto  [x] NMR (76274) x  2    [] Vaso  [] Manual (69909) x      [] Ultrasound  [] TA x      [] Mech Traction (37907)  [x] Gait Training x  1   [] ES (un) (61924):   [] Aquatic therapy x   [] Other:   [] Group:     GOALS:   Patient stated goal: improve gait and mobility   [] Progressing: [] Met: [] Not Met: [] Adjusted    Therapist goals for Patient:   Short Term Goals: To be achieved in: 2 weeks  1. Independent in HEP and progression per patient tolerance, in order to prevent re-injury. [] Progressing: [] Met: [] Not Met: [] Adjusted  2. Patient will have a decrease in pain at knee to facilitate improvement in movement, function, and ADLs as indicated by Functional Deficits. [] Progressing: [] Met: [] Not Met: [] Adjusted    Long Term Goals: To be achieved in: 4 weeks  1. Patient to demonstrate REID score to 35/56 or greater to demonstrate improved fall risk to assist with reaching prior level of function. [] Progressing: [] Met: [] Not Met: [] Adjusted  2. Patient will perform correct and safe sit to stand transfer with 0-1 UE support x 5 to demonstrate an increase in strength in LEs as well as improved weight bearing on L.    [] Progressing: [] Met: [] Not Met: [] Adjusted  3. Patient will return to functional activities including climbing the stairs and getting into and out of the shower without increased symptoms or restriction. [] Progressing: [] Met: [] Not Met: [] Adjusted  4. Pt will amb 280 feet consecutively to show improvement in endurance and ease fatigue during prolonged ADLs such as shopping.   [] Progressing: [] Met: [] Not Met: [] Adjusted     Overall Progression Towards Functional goals/ Treatment Progress Update:  [] Patient is progressing as expected towards functional goals listed. [] Progression is slowed due to complexities/Impairments listed. [] Progression has been slowed due to co-morbidities. [x] Plan just implemented, too soon to assess goals progression <30days   [] Goals require adjustment due to lack of progress  [] Patient is not progressing as expected and requires additional follow up with physician  [] Other    Persisting Functional Limitations/Impairments:  []Sleeping []Sitting               [x]Standing [x]Transfers        [x]Walking [x]Kneeling               [x]Stairs [x]Squatting / bending   [x]ADLs []Reaching  [x]Lifting  [x]Housework  []Driving []Job related tasks  []Sports/Recreation []Other:        ASSESSMENT:   Pt reported feeling lightheaded through session. Wasn't able to lay down after taking her medicine this morning like she usually does. Difficulty with standing with dynadisc under R foot this date. Encouraged use and weight bearing on L LE. Knee brace does appear to improve weight bearing ability and tolerance slightly. Gait overall unchanged session to session. Patient still inconsistent with visits. Continue to address learned non use of LLE to facilitate balance and mobility. Treatment/Activity Tolerance:  [x] Patient able to complete tx [] Patient limited by fatigue  [] Patient limited by pain  [] Patient limited by other medical complications  [] Other:     Prognosis: [] Good [x] Fair  [] Poor    Patient Requires Follow-up: [x] Yes  [] No    Plan for next treatment session:  transfers with weight bearing on L, balance in midline, gait and stair training, endurance     PLAN: See dyana PT 2x / week for 4 weeks.    [x] Continue per plan of care [] Alter current plan (see comments)  [] Plan of care initiated [] Hold pending MD visit [] Discharge    Electronically signed by: Hernando Black, PT, DPT    Note: If patient does not return for scheduled/ recommended follow up visits, his note will serve as a discharge from care along with most recent update on progress.

## 2023-02-23 NOTE — PLAN OF CARE
Assessment & Plan     Essential hypertension with goal blood pressure less than 140/90  Stable no change in treatment plan.   - diltiazem ER (DILT-XR) 180 MG 24 hr capsule; TAKE ONE CAPSULE BY MOUTH ONCE DAILY  - lisinopril (ZESTRIL) 20 MG tablet; Take 1 tablet (20 mg) by mouth daily    Primary insomnia  Stable no change in treatment plan.   - zolpidem (AMBIEN) 10 MG tablet; TAKE ONE HALF TO ONE TABLET BY MOUTH EVERY NIGHT AT BEDTIME AS NEEDED FOR SLEEP    Chronic pain syndrome  Has been following with Pain clinic stable     Narcotic dependence (H)  Has been following with pain clinic     Mild single current episode of major depressive disorder (H)  Stable no change in treatment plan.     Anxiety state  We will try the below and see if this is helpful for the anxiety   - busPIRone (BUSPAR) 5 MG tablet; Take 1 tablet (5 mg) by mouth 2 times daily    Bleeding skin mole  Would like to see derm   - Adult Dermatology Referral; Future             Patient Instructions   Send me a [employer] message in 2 weeks to let me know how the new med is working       Return in about 2 weeks (around 8/31/2022) for via [employer] update, with me.    Gwendolyn Solis MD  [facility]    Ashok Molina is a 79 year old, presenting for the following health issues:  Hypertension and Depression      History of Present Illness       Reason for visit:  Reestablish relationship with dr Solis    She eats 0-1 servings of fruits and vegetables daily.She consumes 0 sweetened beverage(s) daily.She exercises with enough effort to increase her heart rate 20 to 29 minutes per day.  She exercises with enough effort to increase her heart rate 4 days per week. She is missing 1 dose(s) of medications per week.    Today's PHQ-9         PHQ-9 Total Score: 7    PHQ-9 Q9 Thoughts of better off dead/self-harm past 2 weeks :   Several days  Thoughts of suicide or self harm: (P) No  Self-harm Plan:     Self-harm Action:      Niko Seaman Discharge Summary    Dear Dr. Kian Cody MD,    We had the pleasure of treating the following patient for speech therapy services at [address] [place] [address]. A summary of our findings can be found in the discharge summary below. If you have any questions or concerns regarding these findings, please do not hesitate to contact me at the office phone number checked above. Thank you for the referral.     Physician Signature:________________________________Date:__________________  By signing above (or electronic signature), therapists plan is approved by physician      Functional Outcome:    2022   PROMIS Cognitive Function v2.0 27.5 27.5 33.1 30.1 27.5 27.5 33.9 27.5   NeuroQOL - Communication 18.0 18.0 19.0 DNT DNT DNT DNT DNT   HDQLIFE Swallowing Difficulties 58.0 58.0 57.0 DNT DNT DNT DNT DNT       Overall Response to Treatment:   [x]Patient is responding well to treatment and improvement is noted with regards  to goals   [x]Patient should continue to improve in reasonable time if they continue HEP   []Patient has plateaued and is no longer responding to skilled ST intervention    []Patient is getting worse and would benefit from return to referring MD   []Patient unable to adhere to initial POC   []Other:     Date range of Visits: 2022-2023  Total Visits: 21    Recommendation:    [x] Discharge to HEP. Follow up with ST PRN.        Speech Therapy Daily Treatment Note  Date:  2023    Patient Name:  Dottie Turner    :  1980  MRN: [id number]  Medical/Treatment Diagnosis Information:  G81.94 (ICD-10-CM) - Left hemiparesis ([facility] [address]           Treatment Diagnosis: Mild Dysarthria; Mild Cognitive-Linguistic deficits; Mild Oropharyngeal Dysphagia   Insurance/Certification information: [facility]   Safety concerns for self or others: (P) No    How difficult have these problems made it for you to do your work, take care of things at home, or get along with other people: Not difficult at all       Hypertension Follow-up      Do you check your blood pressure regularly outside of the clinic? No     Are you following a low salt diet? No    Are your blood pressures ever more than 140 on the top number (systolic) OR more   than 90 on the bottom number (diastolic), for example 140/90?     Depression and Anxiety Follow-Up    How are you doing with your depression since your last visit? No change    How are you doing with your anxiety since your last visit?  No change    Are you having other symptoms that might be associated with depression or anxiety? No    Have you had a significant life event? Husbands health     Having to take over much of the $ issues to run household      Do you have any concerns with your use of alcohol or other drugs? No     She is NOT suicidal she is stressed and tired and at times will wonder if she was gone what it would be like she has not plan and is sure she would not harm herself     Social History     Tobacco Use     Smoking status: Former Smoker     Types: Cigarettes     Quit date: 10/30/1962     Years since quittin.8     Smokeless tobacco: Never Used   Substance Use Topics     Alcohol use: Yes     Alcohol/week: 4.0 standard drinks     Types: 4 Standard drinks or equivalent per week     Comment: occasional     Drug use: No     Comment: medical marijuana in the past     PHQ 3/21/2022 2022 2022   PHQ-9 Total Score 8 18 7   Q9: Thoughts of better off dead/self-harm past 2 weeks Several days Nearly every day Several days   F/U: Thoughts of suicide or self-harm Yes No No   F/U: Self harm-plan No - -   F/U: Self-harm action No - -   F/U: Safety concerns No No No     ROMAIN-7 SCORE 2021 3/21/2022 3/21/2022   Total Score - - -   Total Score - - 3 (minimal anxiety)   Total  authorization: 7/15-11/25/2022, 24 units 21670 & 24 units 88075; NEW AUTH 12/7/2022-1/27/2023, 24 units 01097; date updated 1/9/2023-2/17/2023, extended to 3/31/2023  Physician Information: Dr. Romaine Schumacher MD      Plan of care signed (Y/N): Y (7/12/2022, 9/30/2022)    Date of Patient follow up with Physician: PRIYAD    Functional outcome measure:   FOTO Primary Measure: HDQLIFE Swallowing Difficulties: 57 (9/8/2022)  FOTO Primary Measure: PROMIS Cognitive Function v2.0: 27.5 (2/23/2022)  Secondary Measure: NeuroQOL - Communication: 19 (9/8/2022)    Progress Note: [x]  Yes  []  No  Next due by: 2/23/2022    RESTRICTIONS/PRECAUTIONS: hx brain bleed  Latex Allergy:  [x]NO      []YES    Preferred Language for Healthcare:   [x]English       []other:    Visit # Insurance Allowable Date Range (if applicable)   55/28  + 7/32 24 units 52990   Date extension 3/31/2023       Pain level: 0/10    SUBJECTIVE: Pt alert and receptive, feels ready to take a break from speech therapy to focus on HEP.     OBJECTIVE:   FOTO Primary Measure: PROMIS Cognitive Function v2.0: 27.5    [place] Cognitive Assessment (MoCA) 8.1  Section Subtest Score Comments   Visuospatial/ Executive [place] making 0/1 Decreased scanning to left    Copy Cube 0/1 Missing dimensions    Clock 3/3    Naming Picture naming 3/3    Attention Number repetition 2/2     Selective attention 1/1 Omission x 1    Serial 7s 2/3 2 correct calculations   Language Repetition 2/2     Fluency 1/1    Abstraction Comparisons 1/2 Attribute rather than category   Delayed Recall Recall 5 novel words 2/5 Increased to 4/5 given category, 5/5 given f:3   Orientation Spatial and Temporal 6/6     Total: 23/30 Score > 26 considered WFL       Exercises/Interventions:  Speech Therapy (31264) Accuracy Cues Notes   Comprehension        Yes/No questions      Complex questions      1-2 step directions      Multi-step directions      Common objects/pictures      L/R discrimination Conversation      Other:      Expression       Initiation      Repetition      Automatic speech      Confrontational      Convergent      Divergent      Responsive      Conversation      Other:        Cognitive Function (34216 & 28839) Accuracy Cues Notes   Orientation        Attention           Sustained Visual attention for functional reading of questions with multiple choice answers, 5 minutes:  - pt scanned left to right, up and down, to read complete sentences with 2 cues Verbal cues to initiate SLP reinforced use of metacognitive strategies      Selective         Alternating         Divided      Memory          Immediate/Working         Short term         Long term         Prospective      Problem Solving       Visuospatial       Executive Function  Direct training of cognitive strategies during-task to complete to-do list:  - pt generated appropriate items on daily and weekly to-do list with min cues  - educated on functional application of to-do lists to complete home tasks Visual support    Other:          Pt. Education:  -Pt educated on diagnosis, prognosis and expectations for rehab  -All pt questions were answered  -Pt verbalized understanding and agreement  -Progress since initiation of POC    HEP instruction:  -Pt provided with written and illustrated instructions for HEP: SLOB intelligibility strategies, WRAP memory strategies, Goal-Plan-Do-Review metacognitive training, compensatory swallowing strategies (Alternate solids/liquids , Upright as possible with all PO intake , Small bites/sips , Eat/feed slowly, Remain upright 30-45 min)      Speech/Voice Therapy:    [x] (54901) Treatment of speech, language, voice, communication, and/or auditory processing disorder; individual    Cognitive Function:  [] (72988) Therapeutic interventions that focus on cognitive function (eg, attention, memory, reasoning, executive function, problem solving, and/or pragmatic functioning) and compensatory strategies to Score 0 3 3         Suicide Assessment Five-step Evaluation and Treatment (SAFE-T)            Review of Systems   Constitutional, HEENT, cardiovascular, pulmonary, gi and gu systems are negative, except as otherwise noted.      Objective    BP (!) 146/80   Pulse 71   Temp 97.1  F (36.2  C) (Tympanic)   Resp 12   Wt 53.4 kg (117 lb 12.8 oz)   LMP  (LMP Unknown)   SpO2 98%   BMI 22.26 kg/m    Body mass index is 22.26 kg/m .  Physical Exam   GENERAL APPEARANCE: healthy, alert and no distress  RESP: lungs clear to auscultation - no rales, rhonchi or wheezes  CV: regular rates and rhythm, normal S1 S2, no S3 or S4 and no murmur, click or rub  PSYCH: mentation appears normal and affect normal/bright                    .  ..   manage the performance of an activity (eg, managing time or schedules, initiating, organizing, and sequencing tasks), direct (one-on-one) patient contact; initial 15 minutes (Report 75443 only once per day)  [] ([phone]) each additional 15 minutes     Dysphagia Therapy:    [] (49642) Treatment of swallowing dysfunction and/or oral function for feeding         Charges:  Timed Code Treatment Minutes: 0   Total Treatment Minutes: 45     [x] Speech-Language Treatment (18653)        [] Voice Treatment (34540)                                         [] Cognitive-Linguistic Skills Development Initial 15 minutes (47349)  [] Cognitive-Linguistic Skills Development Additional 15 minutes (47038)   [] Dysphagia Treatment/NMES (VitalStim) (91837)  [] Other:     GOALS:  Patient stated goal: \"Help with my memory\"; \"Stop slurring\"; \"Swallowing\"  [x] Progressing: [] Met: [] Not Met: [] Adjusted     Therapist goals for Patient:  Short Term Goals: To be achieved in: 5 weeks  1. Patient will use speech strategies to facilitate increased intelligibility at discourse level, 5 minutes, of >90% as judged by SLP and pt/family. [] Progressing: [x] Met: 9/8/2022 [] Not Met: [] Adjusted  2. Patient will select and implement 2 cognitive strategies to complete functional tasks given min initiation cues in order to improve self-monitoring of ADL completion. [x] Progressing: [] Met: [] Not Met: [] Adjusted  3. Patient will use learned memory strategies to recall 4 units of novel information across delayed intervals up to 10 minutes given min encoding cues. [] Progressing: [x] Met: 9/29/2022 [] Not Met: [] Adjusted   4. Patient will functionally tolerate regular solids x 20 and thin liquids x 20 with min verbal cues for strategy implementation. [] Progressing: [x] Met: 9/8/2022 [] Not Met: [] Adjusted  5.  Patient will complete repeat instrumental swallow study (MBS or FEES) as clinically indicated to further assess pharyngeal phase and direct plan of care. [] Progressing: [] Met: [] Not Met: [x] Not clinically indicated  6. Patient will verbalize procedural discourse/sequence, 2 minutes, with no more than 2 cues for topic maintenance/organization in order to improve thought organization for complex expression. [] Progressing: [x] Met: 2/23/2022 [] Not Met: [] Adjusted   7. Patient will sustain attention to visual information for 5 minutes in a quiet environment with no more than 2 verbal cues to attend in order to improve working memory and visual scanning. [] Progressing: [x] Met: 2/23/2022 [] Not Met: [] Adjusted      Long Term Goals: To be achieved in: 6 weeks  1. Patient will demonstrate improved cognitive-linguistic function to improve quality of life as evidenced by increased score on functional outcome measure. [x] Progressing: [] Met: [] Not Met: [] Adjusted  2. Patient will utilize speech intelligibility strategies to increase intelligibility to 90% in conversation to a familiar/unfamiliar listener. [] Progressing: [x] Met: [] Not Met: [] Adjusted  3. Patient will tolerate least restrictive diet with no overt clinical s/s of aspiration/penetration. [] Progressing: [x] Met: [] Not Met: [] Adjusted    Progression Towards Functional goals:  [x] Patient is progressing as expected towards functional goals listed. [] Progression is slowed due to complexities listed. [] Progression has been slowed due to co-morbidities. [] Plan just implemented, too soon to assess goals progression  [] Other:     Persisting Functional Limitations/Impairments:  [x]Attention []Word Finding       [x]Memory []Speech Intelligibility     [x]Executive Function []Diet Tolerance     [x]Problem Solving []Coughing/Choking with PO  []Expressive Language []Medication/Finance Management  []Receptive Language []Other:      ASSESSMENT: Pt has made good progress in cognitive-linguistic skills in context of CVA.  Pt has met two additional short-term goals and demonstrates improved attention and thought organization for functional task completion and discourse. Pt previously met speech and dysphagia goals earlier in plan of care. Pt continues to make advances in applying metacognitive strategies for functional completion of IADLs. Improvements noted in MoCA as compared to initial evaluation, with score of 23/30 and improved score on clock drawing and orientation. At this time, pt discharged from speech therapy with return to care as clinically indicated. Treatment/Activity Tolerance:  [x] Patient able to complete tx [] Patient limited by fatigue  [] Patient limited by pain  [] Patient limited by other medical complications  [] Other:     Prognosis: [x] Good [] Fair  [] Poor    Patient Requires Follow-up: [] Yes  [x] No    PLAN: See eval. ST 2x / week for 6 weeks. [] Continue per plan of care [] Alter current plan (see comments)  [] Plan of care initiated [] Hold pending MD visit [x] Discharge    Electronically signed by:   Antoni Garcia MA CCC-SLP  Speech-Language Pathologist  SP. [id number]      Note: If patient does not return for scheduled/ recommended follow up visits, this note will serve as a discharge from care along with most recent update on progress.

## 2023-02-26 DIAGNOSIS — I10 ESSENTIAL HYPERTENSION, BENIGN: ICD-10-CM

## 2023-02-27 RX ORDER — AMLODIPINE BESYLATE 10 MG/1
TABLET ORAL
Qty: 90 TABLET | Refills: 3 | Status: SHIPPED | OUTPATIENT
Start: 2023-02-27

## 2023-02-27 NOTE — TELEPHONE ENCOUNTER
Recent Visits  Date Type Provider Dept   10/26/22 Office Visit HUSSEIN Grande - CNP [facility]   06/23/22 Office Visit Sara Stewart MD [facility]   03/23/22 Office Visit Sara Stewart MD [facility]   02/21/22 Office Visit Sara Stewart MD [facility]   02/09/22 Office Visit Sara Stewart MD [facility] [address] recent visits within past 540 days with a meds authorizing provider and meeting all other requirements  Future Appointments  Date Type Provider Dept   03/10/23 Appointment Sara Stewart MD [facility]   Showing future appointments within next 150 days with a meds authorizing provider and meeting all other requirements

## 2023-02-28 NOTE — FLOWSHEET NOTE
[address] [facility] Occupational Therapy  [address] [place] [address]  Phone: [phone]   Fax: [phone]      Occupational Therapy Daily Treatment Note  Date:  2023    Patient: Janine Coleman   : 1980   MRN: [id number]  Referring Physician:  DR. Kim Graham        Medical Diagnosis Information: left hemiplegia       Insurance information: care source    Plan of care sent to provider:      [x]Faxed   []Co-signature    (attempts: 1[] 2[] 3[])       Progress Report: []  Yes  [x]  No     Date Range for reporting period:  Beginnin2023  Ending: 3/13/2023    Progress report due (10 Rx/or 30 days whichever is less): visit #10 or      Recertification due (POC duration/ or 90 days whichever is less): visit #2023    Visit # Insurance Allowable Auth required? Date Range    32  [x]  Yes  []  No Thru        Units approved Units used Date Range   32       Latex Allergy:  [x]No      []Yes  Pacemaker:  [] No       [] Yes     Preferred Language for Healthcare:   [x]English       []other:    Pain level:  0/10     SUBJECTIVE:  See eval    Functional Disability Index:   Quick dash    OBJECTIVE: See eval      RESTRICTIONS/PRECAUTIONS:     Exercises/Interventions: evaluation followed by education on home program completed with review of plan of care. Patient and  verbalize and demonstrate understanding     Therapeutic Exercises  Resistance / level Sets/sec Reps Notes                                                                                Neuromuscular Re-ed / Therapeutic Activities                                                 Manual Intervention                                                     Modalities:     Patient education:     HEP instruction: Written and verbal HEP instructions provided and reviewed:  Access Code: [email or web address]  URL: [email or web address]  Date: 2023  Prepared by: Cecilia Hanson     Exercises  Supine Shoulder Flexion Extension AAROM with Dowel - 1 x daily - 7 x weekly - 3 sets - 10 reps  Seated Elbow Flexion AAROM - 1 x daily - 7 x weekly - 3 sets - 10 reps  Seated Cradle Shoulder Flexion and Horizontal Abduction PROM - 1 x daily - 7 x weekly - 3 sets - 10 reps         Home Exercise Program:     HEP instruction: Written and verbal HEP instructions provided and reviewed:  Access Code: [email or web address]  URL: [email or web address]  Date: 02/13/2023  Prepared by: Juan Miguel Pérez     Exercises  Supine Shoulder Flexion Extension AAROM with Dowel - 1 x daily - 7 x weekly - 3 sets - 10 reps  Seated Elbow Flexion AAROM - 1 x daily - 7 x weekly - 3 sets - 10 reps  Seated Cradle Shoulder Flexion and Horizontal Abduction PROM - 1 x daily - 7 x weekly - 3 sets - 10 reps       Therapeutic Exercise and NMR:  [x] (66322) Provided verbal/tactile cueing for activities related to strengthening, flexibility, endurance, ROM  for improvements in scapular, scapulothoracic and UE control with self care, reaching, carrying, lifting, house/yardwork, driving/computer work.    [] (31141) Provided verbal/tactile cueing for activities related to improving balance, coordination, kinesthetic sense, posture, motor skill, proprioception  to assist with  scapular, scapulothoracic and UE control with self care, reaching, carrying, lifting, house/yardwork, driving/computer work.   [] Comments:    Therapeutic Activities:    [x] (58662 or 32665) Provided verbal/tactile cueing for activities related to improving balance, coordination, kinesthetic sense, posture, motor skill, proprioception and motor activation to allow for proper function of scapular, scapulothoracic and UE control with self care, carrying, lifting, driving/computer work  [] Comments:    Home Exercise Program:    [] (59358) Reviewed/Progressed HEP activities related to strengthening, flexibility, endurance, ROM of scapular, scapulothoracic and UE control with self care, reaching, carrying, lifting, house/yardwork, driving/computer work  [] (14485) Reviewed/Progressed HEP activities related to improving balance, coordination, kinesthetic sense, posture, motor skill, proprioception of scapular, scapulothoracic and UE control with self care, reaching, carrying, lifting, house/yardwork, driving/computer work    [] Comments:    Manual Treatments:  PROM / STM / Oscillations-Mobs:  G-I, II, III, IV (PA's, Inf., Post.)  [x] (15431) Provided manual therapy to mobilize soft tissue/joints of cervical/CT, scapular GHJ and UE for the purpose of modulating pain, promoting relaxation,  increasing ROM, reducing/eliminating soft tissue swelling/inflammation/restriction, improving soft tissue extensibility and allowing for proper ROM for normal function with self care, reaching, carrying, lifting, house/yardwork, driving/computer work  [] Comments:    ADL Training:  [x] (60971) Provided self-care/home management training related to activities of daily living and compensatory training, and/or use of adaptive equipment   [] Comments:     Splinting:  [] Fabrication of:   [] (97157) Orthotic/Prosthetic Management, subsequent encounter  [] (19689) Orthotic management and training (fitting and assessment)  [] Comments:      Charges:  Timed Code Treatment Minutes: 30   Total Treatment Minutes: 45     [x] EVAL (LOW) 07126   [] OT Re-eval (01300)  [] EVAL (MOD) 30895   [] EVAL (HIGH) 24518       [x] Dolly (83791) x     [] Ionto(88057)  [] NMR (74604) x     [] Estim (attended) (95891)   [] Manual (89409) x     [] US (20206)  [x] TA (23909) x     [] Paraffin (29375)  [] ADL  (21550) x    [] Splint/L code:    [] Estim (unattended) (14833)  [] Fluidotherapy (00190)  [] Other:    GOALS: Patient stated goal: improve mobility , transfers and independent ADL  [] Progressing: [] Met: [] Not Met: [] Adjusted     Therapist goals for Patient:   Short Term Goals: To be achieved in: 30 days  1. Pt will integrate left ue with bed  mobility independently   [] Progressing: [] Met: [] Not Met: [] Adjusted  2. Pt will be able to use her left ue as base of support for balance during ADL  [] Progressing: [] Met: [] Not Met: [] Adjusted     Long Term Goals: To be achieved by discharge     3. Pt will report a QuickDASH Symptom Severity Scale score of  40 or less indicating increased safety and functional independence in desired occupational pursuits by discharge. [] Progressing: [] Met: [] Not Met: [] Adjusted    Progression Towards Functional goals:  [] Patient is progressing as expected towards functional goals listed. [] Progression is slowed due to complexities listed. [] Progression has been slowed due to co-morbidities. [x] Plan just implemented, too soon to assess goals progression  [] All goals are met  [] Other:     ASSESSMENT:  See eval    Treatment/Activity Tolerance:  [x] Patient tolerated treatment well [] Patient limited by fatigue  [] Patient limited by pain  [] Patient limited by other medical complications  [] Other:     Prognosis: [x] Good [] Fair  [] Poor    Patient Requires Follow-up: [x] Yes  [] No    PLAN: See eval  [] Continue per plan of care [] Alter current plan (see comments)  [x] Plan of care initiated [] Hold pending MD visit [] Discharge    Electronically signed by:     hali    Note: If patient does not return for scheduled/ recommended follow up visits, this note will serve as a discharge from care along with most recent update on progress.   augusto

## 2023-03-02 ENCOUNTER — HOSPITAL ENCOUNTER (OUTPATIENT)
Dept: OCCUPATIONAL THERAPY | Age: 43
Setting detail: THERAPIES SERIES
Discharge: HOME | End: 2023-03-02

## 2023-03-06 NOTE — FLOWSHEET NOTE
168 [facility] Occupational Therapy  [address] [place] [address], [address]  Phone: [phone]   Fax: [phone]      Occupational Therapy Daily Treatment Note  Date:  3/6/2023    Patient: Guido Vera   : 1980   MRN: [id number]  Referring Physician:  DR. Maura Marr        Medical Diagnosis Information: left hemiplegia       Insurance information: care source    Plan of care sent to provider:      [x]Faxed   []Co-signature    (attempts: 1[] 2[] 3[])       Progress Report: []  Yes  [x]  No     Date Range for reporting period:  Beginnin2023  Ending: 3/13/2023    Progress report due (10 Rx/or 30 days whichever is less): visit #10 or      Recertification due (POC duration/ or 90 days whichever is less): visit #2023    Visit # Insurance Allowable Auth required? Date Range   2  units through 2023 [x]  Yes  []  No  1       Units approved Units used Date Range   32 6 Until 2023     Latex Allergy:  [x]No      []Yes  Pacemaker:  [] No       [] Yes     Preferred Language for Healthcare:   [x]English       []other:    Pain level:  0/10     SUBJECTIVE:    present and patient waiting in waiting area in wheelchair on this date. Functional Disability Index:   Quick dash    OBJECTIVE:  OT assisted patient with sit to stand from wheelchair and then patient ambulated with cane through double doors to treatment area about 30 feet. After getting through double doors to treatment area. Patient given cane to hold in horizontal direction with both hands at midline. OT cued patient to push through cane and initiate forward weight shift through pelvis to increase pelvic stabilization and midline orientation . Ambulated to mat with this technique an verbal cues from OT and physical cues to provide some resistance to cane in order to facilitate stabilization.   Patient demonstrated improved step length, weight shift and heel strike with technique. She then sat at edge of mat and continued to lace hands at midline and worked on reaching forward with both hands and coming to 1/2 stand and completed side steps in this position and then transitioned back to sit times 5 each direction with OT providing cues for base of support. She then progressed to holding vertical walking stick with left hand while working on forward step progression and sweeping with swifter. Stand by and verbal cues provided for alignment. Patinient has not returned to IADl. She was encouraged to return to some simple home making. Use of crock pot etc.  Provided with resources for adaptive cooking utensils. Patient verbalized interested and expressed self motivation. RESTRICTIONS/PRECAUTIONS:     Exercises/Interventions: e     Therapeutic Exercises  Resistance / level Sets/sec Reps Notes                                                                                Neuromuscular Re-ed / Therapeutic Activities                                                 Manual Intervention                                                     Modalities:     Patient education:     HEP instruction: Written and verbal HEP instructions provided and reviewed:  Access Code: [email or web address]  URL: [email or web address]/  Date: 02/13/2023  Prepared by: Mansi Blood     Exercises  Supine Shoulder Flexion Extension AAROM with Dowel - 1 x daily - 7 x weekly - 3 sets - 10 reps  Seated Elbow Flexion AAROM - 1 x daily - 7 x weekly - 3 sets - 10 reps  Seated Cradle Shoulder Flexion and Horizontal Abduction PROM - 1 x daily - 7 x weekly - 3 sets - 10 reps         Home Exercise Program:     HEP instruction: Written and verbal HEP instructions provided and reviewed:  Access Code: [email or web address]  URL: Gaikai/  Date: 02/13/2023  Prepared by: Mansi Blood     Exercises  Supine Shoulder Flexion Extension AAROM with Dowel - 1 x daily - 7 x weekly - 3 sets - 10 reps  Seated Elbow Flexion AAROM - 1 x daily - 7 x weekly - 3 sets - 10 reps  Seated Cradle Shoulder Flexion and Horizontal Abduction PROM - 1 x daily - 7 x weekly - 3 sets - 10 reps       Therapeutic Exercise and NMR:  [x] (16138) Provided verbal/tactile cueing for activities related to strengthening, flexibility, endurance, ROM  for improvements in scapular, scapulothoracic and UE control with self care, reaching, carrying, lifting, house/yardwork, driving/computer work.    [] (07711) Provided verbal/tactile cueing for activities related to improving balance, coordination, kinesthetic sense, posture, motor skill, proprioception  to assist with  scapular, scapulothoracic and UE control with self care, reaching, carrying, lifting, house/yardwork, driving/computer work.   [] Comments:    Therapeutic Activities:    [x] (18133 or 43181) Provided verbal/tactile cueing for activities related to improving balance, coordination, kinesthetic sense, posture, motor skill, proprioception and motor activation to allow for proper function of scapular, scapulothoracic and UE control with self care, carrying, lifting, driving/computer work  [] Comments:    Home Exercise Program:    [] (33554) Reviewed/Progressed HEP activities related to strengthening, flexibility, endurance, ROM of scapular, scapulothoracic and UE control with self care, reaching, carrying, lifting, house/yardwork, driving/computer work  [] (85509) Reviewed/Progressed HEP activities related to improving balance, coordination, kinesthetic sense, posture, motor skill, proprioception of scapular, scapulothoracic and UE control with self care, reaching, carrying, lifting, house/yardwork, driving/computer work    [] Comments:    Manual Treatments:  PROM / STM / Oscillations-Mobs:  G-I, II, III, IV (PA's, Inf., Post.)  [x] (69551) Provided manual therapy to mobilize soft tissue/joints of cervical/CT, scapular GHJ and UE for the purpose of modulating pain, promoting relaxation,  increasing ROM, reducing/eliminating soft tissue swelling/inflammation/restriction, improving soft tissue extensibility and allowing for proper ROM for normal function with self care, reaching, carrying, lifting, house/yardwork, driving/computer work  [] Comments:    ADL Training:  [x] (37897) Provided self-care/home management training related to activities of daily living and compensatory training, and/or use of adaptive equipment   [] Comments:     Splinting:  [] Fabrication of:   [] (14084) Orthotic/Prosthetic Management, subsequent encounter  [] (15214) Orthotic management and training (fitting and assessment)  [] Comments:      Charges:  Timed Code Treatment Minutes: 45   Total Treatment Minutes: 45     [] EVAL (LOW) 79031   [] OT Re-eval (82256)  [] EVAL (MOD) 15320   [] EVAL (HIGH) 96694       [x] Dolly (16802) x     [] ESMXM(70775)  [x] NMR (13352) x     [] Estim (attended) (43727)   [] Manual ([id number]) x     [] US (88536)  [x] TA () x     [] Paraffin (37217)  [] ADL  ([phone] [id number]) x    [] Splint/L code:    [] Estim (unattended) (22 575754)  [] Fluidotherapy (07746)  [] Other:    GOALS: Patient stated goal: improve mobility , transfers and independent ADL  [] Progressing: [] Met: [] Not Met: [] Adjusted     Therapist goals for Patient:   Short Term Goals: To be achieved in: 30 days  1. Pt will integrate left ue with bed mobility independently   [] Progressing: [] Met: [] Not Met: [] Adjusted  2. Pt will be able to use her left ue as base of support for balance during ADL  [] Progressing: [] Met: [] Not Met: [] Adjusted     Long Term Goals: To be achieved by discharge     3. Pt will report a QuickDASH Symptom Severity Scale score of  40 or less indicating increased safety and functional independence in desired occupational pursuits by discharge. [] Progressing: [] Met: [] Not Met: [] Adjusted    Progression Towards Functional goals:  [] Patient is progressing as expected towards functional goals listed.     [] Progression is slowed due to complexities listed. [] Progression has been slowed due to co-morbidities. [x] Plan just implemented, too soon to assess goals progression  [] All goals are met  [] Other:     ASSESSMENT:  See eval    Treatment/Activity Tolerance:  [x] Patient tolerated treatment well [] Patient limited by fatigue  [] Patient limited by pain  [] Patient limited by other medical complications  [] Other:     Prognosis: [x] Good [] Fair  [] Poor    Patient Requires Follow-up: [x] Yes  [] No    PLAN: See eval  [] Continue per plan of care [] Alter current plan (see comments)  [x] Plan of care initiated [] Hold pending MD visit [] Discharge    Electronically signed by:     hali    Note: If patient does not return for scheduled/ recommended follow up visits, this note will serve as a discharge from care along with most recent update on progress.   augusto

## 2023-03-07 ENCOUNTER — HOSPITAL ENCOUNTER (OUTPATIENT)
Dept: OCCUPATIONAL THERAPY | Age: 43
Setting detail: THERAPIES SERIES
Discharge: HOME | End: 2023-03-07
Payer: COMMERCIAL

## 2023-03-07 PROCEDURE — 97112 NEUROMUSCULAR REEDUCATION: CPT

## 2023-03-07 PROCEDURE — 97110 THERAPEUTIC EXERCISES: CPT

## 2023-03-07 PROCEDURE — 97535 SELF CARE MNGMENT TRAINING: CPT

## 2023-03-13 ENCOUNTER — OFFICE VISIT (OUTPATIENT)
Dept: PRIMARY CARE CLINIC | Age: 43
End: 2023-03-13
Payer: COMMERCIAL

## 2023-03-13 VITALS
WEIGHT: 257 LBS | BODY MASS INDEX: 41.3 KG/M2 | SYSTOLIC BLOOD PRESSURE: 138 MMHG | DIASTOLIC BLOOD PRESSURE: 96 MMHG | TEMPERATURE: 97.9 F | OXYGEN SATURATION: 99 % | HEART RATE: 55 BPM | HEIGHT: 66 IN | RESPIRATION RATE: 16 BRPM

## 2023-03-13 DIAGNOSIS — B37.2 SKIN YEAST INFECTION: ICD-10-CM

## 2023-03-13 DIAGNOSIS — G81.94 LEFT HEMIPARESIS (HCC): ICD-10-CM

## 2023-03-13 DIAGNOSIS — I10 ESSENTIAL HYPERTENSION, BENIGN: Primary | ICD-10-CM

## 2023-03-13 PROCEDURE — 4004F PT TOBACCO SCREEN RCVD TLK: CPT

## 2023-03-13 PROCEDURE — G8417 CALC BMI ABV UP PARAM F/U: HCPCS

## 2023-03-13 PROCEDURE — 99214 OFFICE O/P EST MOD 30 MIN: CPT

## 2023-03-13 PROCEDURE — 3080F DIAST BP >= 90 MM HG: CPT

## 2023-03-13 PROCEDURE — G8427 DOCREV CUR MEDS BY ELIG CLIN: HCPCS

## 2023-03-13 PROCEDURE — G8484 FLU IMMUNIZE NO ADMIN: HCPCS

## 2023-03-13 PROCEDURE — 3075F SYST BP GE 130 - 139MM HG: CPT

## 2023-03-13 RX ORDER — NYSTATIN 100000 U/G
CREAM TOPICAL
Qty: 30 G | Refills: 1 | Status: SHIPPED | OUTPATIENT
Start: 2023-03-13

## 2023-03-13 RX ORDER — DICLOFENAC SODIUM 75 MG/1
TABLET, DELAYED RELEASE ORAL
Qty: 60 TABLET | Refills: 5 | Status: SHIPPED | OUTPATIENT
Start: 2023-03-13

## 2023-03-13 RX ORDER — POLYETHYLENE GLYCOL 3350 17 G/17G
POWDER, FOR SOLUTION ORAL
COMMUNITY
Start: 2023-01-10 | End: 2023-03-13

## 2023-03-13 RX ORDER — DICLOFENAC SODIUM 75 MG/1
TABLET, DELAYED RELEASE ORAL
COMMUNITY
Start: 2023-01-26 | End: 2023-03-13 | Stop reason: SDUPTHER

## 2023-03-13 RX ORDER — HYDROCHLOROTHIAZIDE 12.5 MG/1
12.5 CAPSULE, GELATIN COATED ORAL DAILY
Qty: 30 CAPSULE | Refills: 5 | Status: SHIPPED | OUTPATIENT
Start: 2023-03-13

## 2023-03-13 RX ORDER — HYDROCHLOROTHIAZIDE 12.5 MG/1
CAPSULE, GELATIN COATED ORAL DAILY
COMMUNITY
Start: 2019-06-25 | End: 2023-03-13 | Stop reason: SDUPTHER

## 2023-03-13 SDOH — ECONOMIC STABILITY: FOOD INSECURITY: WITHIN THE PAST 12 MONTHS, THE FOOD YOU BOUGHT JUST DIDN'T LAST AND YOU DIDN'T HAVE MONEY TO GET MORE.: SOMETIMES TRUE

## 2023-03-13 SDOH — ECONOMIC STABILITY: INCOME INSECURITY: HOW HARD IS IT FOR YOU TO PAY FOR THE VERY BASICS LIKE FOOD, HOUSING, MEDICAL CARE, AND HEATING?: NOT VERY HARD

## 2023-03-13 SDOH — ECONOMIC STABILITY: HOUSING INSECURITY
IN THE LAST 12 MONTHS, WAS THERE A TIME WHEN YOU DID NOT HAVE A STEADY PLACE TO SLEEP OR SLEPT IN A SHELTER (INCLUDING NOW)?: NO

## 2023-03-13 SDOH — ECONOMIC STABILITY: FOOD INSECURITY: WITHIN THE PAST 12 MONTHS, YOU WORRIED THAT YOUR FOOD WOULD RUN OUT BEFORE YOU GOT MONEY TO BUY MORE.: SOMETIMES TRUE

## 2023-03-13 ASSESSMENT — ENCOUNTER SYMPTOMS
BLOOD IN STOOL: 0
VOMITING: 0
COUGH: 0
WHEEZING: 0
DIARRHEA: 0
CONSTIPATION: 0
NAUSEA: 0
CHEST TIGHTNESS: 0
SHORTNESS OF BREATH: 0
ABDOMINAL PAIN: 0

## 2023-03-13 ASSESSMENT — PATIENT HEALTH QUESTIONNAIRE - PHQ9
SUM OF ALL RESPONSES TO PHQ QUESTIONS 1-9: 0
SUM OF ALL RESPONSES TO PHQ9 QUESTIONS 1 & 2: 0
SUM OF ALL RESPONSES TO PHQ QUESTIONS 1-9: 0
SUM OF ALL RESPONSES TO PHQ QUESTIONS 1-9: 0
1. LITTLE INTEREST OR PLEASURE IN DOING THINGS: 0
SUM OF ALL RESPONSES TO PHQ QUESTIONS 1-9: 0
2. FEELING DOWN, DEPRESSED OR HOPELESS: 0
SUM OF ALL RESPONSES TO PHQ9 QUESTIONS 1 & 2: 0
2. FEELING DOWN, DEPRESSED OR HOPELESS: 0
SUM OF ALL RESPONSES TO PHQ QUESTIONS 1-9: 0
SUM OF ALL RESPONSES TO PHQ QUESTIONS 1-9: 0
1. LITTLE INTEREST OR PLEASURE IN DOING THINGS: 0
SUM OF ALL RESPONSES TO PHQ QUESTIONS 1-9: 0
SUM OF ALL RESPONSES TO PHQ QUESTIONS 1-9: 0

## 2023-03-13 NOTE — ASSESSMENT & PLAN NOTE
BP elevated above goal, will add-on HCTZ 12.5 daily. F/u in 1-2 weeks for repeat measurement. DASH diet, weight reduction.

## 2023-03-13 NOTE — PROGRESS NOTES
Brittaney Perea (:  1980) is a 44 y.o. female,Established patient, here for evaluation of the following chief complaint(s):  Hypertension (Medication refills)      Hypertension  Pertinent negatives include no chest pain, headaches, palpitations or shortness of breath. Patient of Dr. Jose F Mclean presents for follow-up on HTN. Patient currently taking Amlodipine 10mg and metoprolol 100mg daily. ASSESSMENT/PLAN:  1. Essential hypertension, benign  Assessment & Plan:  BP elevated above goal, will add-on HCTZ 12.5 daily. F/u in 1-2 weeks for repeat measurement. DASH diet, weight reduction. Orders:  -     hydroCHLOROthiazide (MICROZIDE) 12.5 MG capsule; Take 1 capsule by mouth daily, Disp-30 capsule, R-5Normal  2. Skin yeast infection  -     nystatin (MYCOSTATIN) 791205 UNIT/GM cream; Apply topically 2 times daily. , Disp-30 g, R-1, Normal  3. Left hemiparesis (HCC)  -     diclofenac (VOLTAREN) 75 MG EC tablet; TAKE 1 TABLET BY MOUTH TWICE A DAY, Disp-60 tablet, R-5Normal     BP (!) 138/96   Pulse 55   Temp 97.9 °F (36.6 °C) (Oral)   Resp 16   Ht 5' 6\" (1.676 m)   Wt 257 lb (116.6 kg)   LMP 2016   SpO2 99%   BMI 41.48 kg/m²      SUBJECTIVE/OBJECTIVE:  Review of Systems   Constitutional:  Negative for fatigue, fever and unexpected weight change. Eyes:  Negative for visual disturbance. Respiratory:  Negative for cough, chest tightness, shortness of breath and wheezing. Cardiovascular:  Negative for chest pain, palpitations and leg swelling. Gastrointestinal:  Negative for abdominal pain, blood in stool, constipation, diarrhea, nausea and vomiting. Musculoskeletal:  Positive for gait problem (L sided hemiparesis, weakness in LLE) and myalgias. Neurological:  Negative for dizziness, weakness, light-headedness and headaches. All other systems reviewed and are negative. Physical Exam  Vitals and nursing note reviewed. Constitutional:       General: She is not in acute distress. Appearance: Normal appearance. She is obese. Cardiovascular:      Rate and Rhythm: Normal rate and regular rhythm. Pulses: Normal pulses. Heart sounds: Normal heart sounds. Pulmonary:      Effort: Pulmonary effort is normal.      Breath sounds: Normal breath sounds. Musculoskeletal:      Comments: Left hemiparesis, LUE   Skin:     General: Skin is warm and dry. Capillary Refill: Capillary refill takes less than 2 seconds. Neurological:      Mental Status: She is alert and oriented to person, place, and time. Mental status is at baseline. Psychiatric:         Mood and Affect: Mood normal.         Behavior: Behavior normal.         Thought Content: Thought content normal.         Judgment: Judgment normal.       Current Outpatient Medications   Medication Sig Dispense Refill    hydroCHLOROthiazide (MICROZIDE) 12.5 MG capsule Take 1 capsule by mouth daily 30 capsule 5    diclofenac (VOLTAREN) 75 MG EC tablet TAKE 1 TABLET BY MOUTH TWICE A DAY 60 tablet 5    nystatin (MYCOSTATIN) 154344 UNIT/GM cream Apply topically 2 times daily. 30 g 1    amLODIPine (NORVASC) 10 MG tablet TAKE 1 TABLET BY MOUTH EVERY DAY 90 tablet 3    metoprolol succinate (TOPROL XL) 100 MG extended release tablet TAKE 1 TABLET BY MOUTH EVERY DAY 90 tablet 11    baclofen (LIORESAL) 20 MG tablet TAKE 1 TABLET BY MOUTH TWICE A DAY 60 tablet 2    busPIRone (BUSPAR) 5 MG tablet TAKE 1 TABLET BY MOUTH EVERY DAY 90 tablet 0    senna (SENOKOT) 8.6 MG TABS tablet TAKE 1 TABLET BY MOUTH TWICE A DAY 60 tablet 5    ferrous sulfate (IRON 325) 325 (65 Fe) MG tablet Take 1 tablet by mouth daily (with breakfast) 30 tablet 5    vitamin D (CHOLECALCIFEROL) 50 MCG (2000 UT) TABS tablet Take 1 tablet by mouth in the morning.  30 tablet 5    atorvastatin (LIPITOR) 40 MG tablet Take 1 tablet by mouth nightly 30 tablet 5    DULoxetine (CYMBALTA) 30 MG extended release capsule Take 1 capsule by mouth 2 times daily 60 capsule 11    acetaminophen (TYLENOL) 500 MG tablet Take 1 tablet by mouth every 6 hours as needed for Pain 120 tablet 3    Blood Pressure Monitor MISC 1 kit by Does not apply route daily Please dispense 1 extra large BP Cuff. DX I10 (Patient not taking: Reported on 3/13/2023) 1 each 0     No current facility-administered medications for this visit. Return in about 2 weeks (around 3/27/2023) for Blood pressure re-check, MA visit.     Electronically signed by HUSSEIN Hemphill CNP on 3/13/2023 at 8:40 AM

## 2023-03-20 ENCOUNTER — HOSPITAL ENCOUNTER (OUTPATIENT)
Dept: OCCUPATIONAL THERAPY | Age: 43
Setting detail: THERAPIES SERIES
Discharge: HOME | End: 2023-03-20
Payer: COMMERCIAL

## 2023-03-20 PROCEDURE — 97110 THERAPEUTIC EXERCISES: CPT

## 2023-03-20 PROCEDURE — 97112 NEUROMUSCULAR REEDUCATION: CPT

## 2023-03-20 PROCEDURE — 97535 SELF CARE MNGMENT TRAINING: CPT

## 2023-03-20 NOTE — PROGRESS NOTES
168 [facility] Occupational Therapy  [address] [place] [address], [address] [place] [address] Drive  Phone: [phone]   Fax: [phone]      Occupational Therapy Daily Treatment Note  Date:  3/20/2023    Patient: Kari Walter   : 1980   MRN: [id number]  Referring Physician:  DR. Wendi Espino        Medical Diagnosis Information: left hemiplegia       Insurance information: care source    Plan of care sent to provider:      [x]Faxed   []Co-signature    (attempts: 1[] 2[] 3[])       Progress Report: []  Yes  [x]  No     Date Range for reporting period:  Beginnin2023  Ending: 3/13/2023    Progress report due (10 Rx/or 30 days whichever is less): visit #10 or      Recertification due (POC duration/ or 90 days whichever is less): visit #2023    Visit # Insurance Allowable Auth required? Date Range   3  units through 2023 [x]  Yes  []  No  1       Units approved Units used Date Range   32 6 Until 2023     Latex Allergy:  [x]No      []Yes  Pacemaker:  [] No       [] Yes     Preferred Language for Healthcare:   [x]English       []other:    Pain level:  0/10     SUBJECTIVE:    present and patient waiting in waiting area in wheelchair on this date. Functional Disability Index:   Quick dash    OBJECTIVE: Quick re assess on progress toward goals. Patient is stand by for bed mobility and needs min cues to integrate left side into transitional movement patterns. Patient ambulates with contact guard from waiting area to mat about 50 feet. After getting through double doors to treatment area. . Patient then worked in short sit at edge of bed . Weight bearing through both ues with anterior posterior pelvic tilt followed by aarom sit to 1/2 stand and side scoots. Patient questioned if she has been working on home program of aarom and trying to take on more IADL. She states no to OT.    Encouraged to try , she does report doing a little bit of

## 2023-03-23 ENCOUNTER — HOSPITAL ENCOUNTER (OUTPATIENT)
Dept: SPEECH THERAPY | Age: 43
Setting detail: THERAPIES SERIES
End: 2023-03-23
Payer: COMMERCIAL

## 2023-03-28 ENCOUNTER — APPOINTMENT (OUTPATIENT)
Dept: SPEECH THERAPY | Age: 43
End: 2023-03-28
Payer: COMMERCIAL

## 2023-03-30 ENCOUNTER — APPOINTMENT (OUTPATIENT)
Dept: SPEECH THERAPY | Age: 43
End: 2023-03-30
Payer: COMMERCIAL

## 2023-04-01 ENCOUNTER — HOSPITAL ENCOUNTER (OUTPATIENT)
Dept: PHYSICAL THERAPY | Age: 43
Setting detail: THERAPIES SERIES
Discharge: HOME | End: 2023-04-01
Payer: COMMERCIAL

## 2023-04-01 PROCEDURE — 97530 THERAPEUTIC ACTIVITIES: CPT

## 2023-04-01 PROCEDURE — 97112 NEUROMUSCULAR REEDUCATION: CPT

## 2023-04-01 NOTE — PLAN OF CARE
Not Met: [] Adjusted  2. Patient will perform correct and safe sit to stand transfer with 0-1 UE support x 5 to demonstrate an increase in strength in LEs as well as improved weight bearing on L.    [] Progressing: [x] Met: [x] Not Met: weight remains shifted on RLE [] Adjusted  3. Patient will return to functional activities including climbing the stairs and getting into and out of the shower without increased symptoms or restriction. [x] Progressing: [] Met: [] Not Met: [] Adjusted  4. Pt will amb 280 feet consecutively to show improvement in endurance and ease fatigue during prolonged ADLs such as shopping. [] Progressing: [] Met: [x] Not Met: [] Adjusted     Overall Progression Towards Functional goals/ Treatment Progress Update:  [] Patient is progressing as expected towards functional goals listed. [] Progression is slowed due to complexities/Impairments listed. [x] Progression has been slowed due to co-morbidities. [] Plan just implemented, too soon to assess goals progression <30days   [] Goals require adjustment due to lack of progress  [] Patient is not progressing as expected and requires additional follow up with physician  [] Other    Persisting Functional Limitations/Impairments:  []Sleeping []Sitting               [x]Standing [x]Transfers        [x]Walking [x]Kneeling               [x]Stairs [x]Squatting / bending   [x]ADLs []Reaching  [x]Lifting  [x]Housework  []Driving []Job related tasks  []Sports/Recreation []Other:        ASSESSMENT:   see above    Treatment/Activity Tolerance:  [x] Patient able to complete tx [] Patient limited by fatigue  [] Patient limited by pain  [] Patient limited by other medical complications  [] Other:     Prognosis: [] Good [x] Fair  [] Poor    Patient Requires Follow-up: [x] Yes  [] No    Plan for next treatment session:  transfers with weight bearing on L, balance in midline, gait and stair training, endurance     PLAN: See josey. PT 2x / week for 4 weeks.

## 2023-04-03 DIAGNOSIS — F41.8 DEPRESSION WITH ANXIETY: ICD-10-CM

## 2023-04-03 NOTE — FLOWSHEET NOTE
[] Manual (22177) x     [] US (67102)  [x] TA (06651) x     [] Paraffin (84601)  [] ADL  (17165) x    [] Splint/L code:    [] Estim (unattended) (22 [id number])  [] Fluidotherapy (52874)  [] Other:    GOALS: Patient stated goal: improve mobility , transfers and independent ADL  [x] Progressing: [] Met: [] Not Met: [] Adjusted     Therapist goals for Patient:   Short Term Goals: To be achieved in: 30 days  1. Pt will integrate left ue with bed mobility independently   [x] Progressing: [] Met: [] Not Met: [] Adjusted  2. Pt will be able to use her left ue as base of support for balance during ADL  [x] Progressing: [] Met: [] Not Met: [] Adjusted     Long Term Goals: To be achieved by discharge     3. Pt will report a QuickDASH Symptom Severity Scale score of  40 or less indicating increased safety and functional independence in desired occupational pursuits by discharge. [x] Progressing: [] Met: [] Not Met: [] Adjusted    Progression Towards Functional goals:  [] Patient is progressing as expected towards functional goals listed. [] Progression is slowed due to complexities listed. [] Progression has been slowed due to co-morbidities. [x] Plan just implemented, too soon to assess goals progression  [] All goals are met  [] Other:     ASSESSMENT:  See eval    Treatment/Activity Tolerance:  [x] Patient tolerated treatment well [] Patient limited by fatigue  [] Patient limited by pain  [] Patient limited by other medical complications  [] Other:     Prognosis: [x] Good [] Fair  [] Poor    Patient Requires Follow-up: [x] Yes  [] No    PLAN: See eval  [x] Continue per plan of care [] Alter current plan (see comments)  [] Plan of care initiated [] Hold pending MD visit [] Discharge    Electronically signed by:          Note: If patient does not return for scheduled/ recommended follow up visits, this note will serve as a discharge from care along with most recent update on progress.

## 2023-04-04 ENCOUNTER — HOSPITAL ENCOUNTER (OUTPATIENT)
Dept: OCCUPATIONAL THERAPY | Age: 43
Setting detail: THERAPIES SERIES
Discharge: HOME | End: 2023-04-04
Payer: COMMERCIAL

## 2023-04-04 PROCEDURE — 97110 THERAPEUTIC EXERCISES: CPT

## 2023-04-04 PROCEDURE — 97535 SELF CARE MNGMENT TRAINING: CPT

## 2023-04-04 PROCEDURE — 97112 NEUROMUSCULAR REEDUCATION: CPT

## 2023-04-04 RX ORDER — BUSPIRONE HYDROCHLORIDE 5 MG/1
TABLET ORAL
Qty: 90 TABLET | Refills: 0 | Status: SHIPPED | OUTPATIENT
Start: 2023-04-04

## 2023-04-04 NOTE — TELEPHONE ENCOUNTER
Recent Visits  Date Type Provider Dept   03/13/23 Office Visit HUSSEIN Lee - CNP [facility]   10/26/22 Office Visit HUSSEIN Lee - CNP [facility]   06/23/22 Office Visit Laura Ball MD [facility]   03/23/22 Office Visit Laura Ball MD [facility]   02/21/22 Office Visit Laura Ball MD [facility]   02/09/22 Office Visit Laura Ball MD [facility] [address] recent visits within past 540 days with a meds authorizing provider and meeting all other requirements  Future Appointments  No visits were found meeting these conditions.   Showing future appointments within next 150 days with a meds authorizing provider and meeting all other requirements

## 2023-04-06 ENCOUNTER — TELEPHONE (OUTPATIENT)
Dept: PRIMARY CARE CLINIC | Age: 43
End: 2023-04-06

## 2023-04-14 ENCOUNTER — APPOINTMENT (OUTPATIENT)
Dept: PHYSICAL THERAPY | Age: 43
End: 2023-04-14
Payer: COMMERCIAL

## 2023-04-14 ENCOUNTER — APPOINTMENT (OUTPATIENT)
Dept: OCCUPATIONAL THERAPY | Age: 43
End: 2023-04-14
Payer: COMMERCIAL

## 2023-04-18 ENCOUNTER — NURSE ONLY (OUTPATIENT)
Dept: PRIMARY CARE CLINIC | Age: 43
End: 2023-04-18

## 2023-04-18 VITALS — SYSTOLIC BLOOD PRESSURE: 134 MMHG | DIASTOLIC BLOOD PRESSURE: 92 MMHG

## 2023-04-18 DIAGNOSIS — I10 ESSENTIAL HYPERTENSION, BENIGN: Primary | ICD-10-CM

## 2023-04-19 NOTE — TELEPHONE ENCOUNTER
Recent Visits  Date Type Provider Dept   03/13/23 Office Visit HUSSEIN Rachel CNP [facility]   10/26/22 Office Visit [facility]   06/23/22 Office Visit Radha Orozco [facility]   03/23/22 Office Visit Radha Orozco MD [facility]   02/21/22 Office Visit Radha Orozco MD [facility]   02/09/22 Office Visit Radha Orozco MD [facility] [address] recent visits within past 540 days with a meds authorizing provider and meeting all other requirements  Future Appointments  No visits were found meeting these conditions.   Showing future appointments within next 150 days with a meds authorizing provider and meeting all other requirements

## 2023-04-20 RX ORDER — CETIRIZINE HYDROCHLORIDE 10 MG/1
TABLET ORAL
Qty: 90 TABLET | Refills: 1 | Status: SHIPPED | OUTPATIENT
Start: 2023-04-20

## 2023-04-24 ENCOUNTER — HOSPITAL ENCOUNTER (OUTPATIENT)
Dept: PHYSICAL THERAPY | Age: 43
Setting detail: THERAPIES SERIES
Discharge: HOME | End: 2023-04-24
Payer: COMMERCIAL

## 2023-04-24 PROCEDURE — 97112 NEUROMUSCULAR REEDUCATION: CPT

## 2023-04-24 PROCEDURE — 97116 GAIT TRAINING THERAPY: CPT

## 2023-04-24 NOTE — FLOWSHEET NOTE
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, weakness in L UE and LE, impaired gait, decreased balance, poor endurance                                Insurance/Certification information:  PT Insurance Information: Cramp and spasm (post CVA)  Physician Information:  Carmen Turner MD    Plan of care signed (Y/N): [x]  Yes []  No     Date of Patient follow up with Physician:      Progress Report: []  Yes  [x]  No     Date Range for reporting period:  Beginnin2023  POC: 23  Ending:       Progress report due (10 Rx/or 30 days whichever is less): visit 8 or      Recertification due (POC duration/ or 90 days whichever is less): visit #8 or 23     Visit # Insurance Allowable Auth required? Date Range    64 units for TE, neuro, man, TA, gait []  Yes  []  No 23-23-23         Latex Allergy:  [x]NO      []YES  Preferred Language for Healthcare:   [x]English       []Other:        Functional Scale/Test Evaluation 2023 30 day 23 60 day  Discharge    Tinetti Assessment        30\" STS   5x STS  8  18\"     REID  -           Pain level:  2/10  Location:   L knee and low back     SUBJECTIVE:    Pt reports she is doing well at home. No falls or near falls lately. Would like to improve balance more. OBJECTIVE:    : pt able to maintain L toe neutral intermittently without cuing during gait with SBQC; 2 seated breaks through session    : gait pattern:  L trunk & pelvic rotation, Circumduction of LLE, step to with RLE, weight shifted over cane and heavily dependent on RUE.    Gait distance: 120'   Ascended & descended 3 6\" steps in clinic w/step to pattern, HR

## 2023-04-26 ENCOUNTER — HOSPITAL ENCOUNTER (OUTPATIENT)
Dept: PHYSICAL THERAPY | Age: 43
Setting detail: THERAPIES SERIES
Discharge: HOME | End: 2023-04-26
Payer: COMMERCIAL

## 2023-04-26 ENCOUNTER — HOSPITAL ENCOUNTER (OUTPATIENT)
Dept: OCCUPATIONAL THERAPY | Age: 43
Setting detail: THERAPIES SERIES
Discharge: HOME | End: 2023-04-26
Payer: COMMERCIAL

## 2023-04-26 PROCEDURE — 97110 THERAPEUTIC EXERCISES: CPT

## 2023-04-26 PROCEDURE — 97112 NEUROMUSCULAR REEDUCATION: CPT

## 2023-04-26 PROCEDURE — 97530 THERAPEUTIC ACTIVITIES: CPT

## 2023-04-26 NOTE — FLOWSHEET NOTE
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 and LE, weakness in L UE and LE, impaired gait, decreased balance, poor endurance                                Insurance/Certification information:  PT Insurance Information: Cramp and spasm (post CVA)  Physician Information:  Gabby Davalos MD    Plan of care signed (Y/N): [x]  Yes []  No     Date of Patient follow up with Physician:      Progress Report: []  Yes  [x]  No     Date Range for reporting period:  Beginnin2023  POC: 23  Ending:       Progress report due (10 Rx/or 30 days whichever is less): visit 8 or      Recertification due (POC duration/ or 90 days whichever is less): visit #8 or 23     Visit # Insurance Allowable Auth required? Date Range    64 units for TE, neuro, man, TA, gait []  Yes  []  No 23-23-23         Latex Allergy:  [x]NO      []YES  Preferred Language for Healthcare:   [x]English       []Other:        Functional Scale/Test Evaluation 2023 30 day 23 60 day  Discharge    Tinetti Assessment        30\" STS   5x STS  8  18\"     REID  -           Pain level:  2/10  Location:   L knee and low back     SUBJECTIVE:      Doing okay today, was pretty tired following last session. Just finished with OT, having some posterior shoulder pain. OBJECTIVE:    : pt able to maintain L toe neutral intermittently without cuing during gait with SBQC; 2 seated breaks through session    : gait pattern:  L trunk & pelvic rotation, Circumduction of LLE, step to with RLE, weight shifted over cane and heavily dependent on RUE.    Gait distance: 120'   Ascended & descended 3 6\" steps in clinic w/step
12-Apr-2022 23:00

## 2023-04-26 NOTE — PROGRESS NOTES
[address] [facility] Occupational Therapy  [address] [place] [address]  Phone: [phone]   Fax: [phone]      Occupational Therapy Daily Treatment Note  Date:  2023    Patient: Selene Miranda   : 1980   MRN: [id number]  Referring Physician:  Dr. Shanell Page        Medical Diagnosis Information: left hemiplegia       Insurance information: care source    Plan of care sent to provider:      [x]Faxed   []Co-signature    (attempts: 1[] 2[] 3[])       Progress Report: []  Yes  [x]  No     Date Range for reporting period:  Beginnin2023  Ending: end of POC    Progress report due (10 Rx/or 30 days whichever is less): visit #12 or 3/52/96    Recertification due (POC duration/ or 90 days whichever is less): visit #12 or 2023    Visit # Insurance Allowable Auth required? Date Range    units  [x]  Yes  []  No Through 23       Latex Allergy:  [x]No      []Yes  Pacemaker:  [] No       [] Yes     Preferred Language for Healthcare:   [x]English       []other:    Pain level:  0/10     SUBJECTIVE:  Pt denies new information on this date.     Functional Disability Index:   Quick dash    OBJECTIVE:       PROM AROM     L R L R   Shoulder Flexion  0-100  23: 0-110 prior to discomfort   23: active scapular elevation and scapular retraction; ~10* shoulder flexion with min A     Elbow Flexion  wnl    23: ~20* active elbow flexion with min A                        Assessment of UE tone/spasticity:      Date: 2023       Shoulder flexors  3  2       Internal rotators  3  2       External rotators  3  1+       Elbow flexors    1       Elbow extensor 3   1       Supinators 3          Pronators           Wrist flexors    2       Wrist extensors 3          Digit flexors 3   3       Digit extensors  3              RESTRICTIONS/PRECAUTIONS: fall risk, hx CVA    Exercises/Interventions:  Treatment focus on improving LUE function as

## 2023-05-03 ENCOUNTER — HOSPITAL ENCOUNTER (OUTPATIENT)
Dept: PHYSICAL THERAPY | Age: 43
Setting detail: THERAPIES SERIES
Discharge: HOME | End: 2023-05-03

## 2023-05-03 NOTE — PROGRESS NOTES
[address] [facility]     Physical Therapy  Cancellation/No-show Note  Patient Name:  Raúl Nye  :  1980   Date:  5/3/2023  Cancelled visits to date: 3  No-shows to date: 0    Patient status for today's appointment patient:  [x]  Cancelled 23, 23, 5/3  []  Rescheduled appointment  []  No-show     Reason given by patient:  []  Patient ill  []  Conflicting appointment  []  No transportation    []  Conflict with work  []  No reason given  [x]  Other:  sick child   Comments:      Phone call information:   []  Phone call made today to patient at _ time at number provided:      []  Patient answered, conversation as follows:    []  Patient did not answer, message left as follows:  []  Phone call not made today  [x]  Phone call not needed - pt contacted us to cancel and provided reason for cancellation.      Electronically signed by:  Giorgio Tomlinson, PT, DPT

## 2023-05-06 DIAGNOSIS — E78.2 MIXED HYPERLIPIDEMIA: ICD-10-CM

## 2023-05-08 ENCOUNTER — HOSPITAL ENCOUNTER (OUTPATIENT)
Dept: PHYSICAL THERAPY | Age: 43
Setting detail: THERAPIES SERIES
Discharge: HOME | End: 2023-05-08
Payer: COMMERCIAL

## 2023-05-08 PROCEDURE — 97530 THERAPEUTIC ACTIVITIES: CPT

## 2023-05-08 PROCEDURE — 97112 NEUROMUSCULAR REEDUCATION: CPT

## 2023-05-08 RX ORDER — ATORVASTATIN CALCIUM 40 MG/1
40 TABLET, FILM COATED ORAL NIGHTLY
Qty: 90 TABLET | Refills: 0 | Status: SHIPPED | OUTPATIENT
Start: 2023-05-08

## 2023-05-08 NOTE — TELEPHONE ENCOUNTER
Recent Visits  Date Type Provider Dept   03/13/23 Office Visit HUSSEIN Mcgarry CNP [facility]   10/26/22 Office Visit HUSSEIN Mcgarry CNP [facility]   06/23/22 Office Visit Olga Rowell MD [facility]   03/23/22 Office Visit Olga Rowell MD [facility]   02/21/22 Office Visit Olga Rowell MD [facility]   02/09/22 Office Visit Olga Rowell MD [facility] [address] recent visits within past 540 days with a meds authorizing provider and meeting all other requirements  Future Appointments  No visits were found meeting these conditions.   Showing future appointments within next 150 days with a meds authorizing provider and meeting all other requirements

## 2023-05-08 NOTE — PLAN OF CARE
[address] [facility] Physical Therapy  Phone: [phone]   Fax: [phone]    Physical Therapy Re-Certification Plan of Care    Dear Lula Thomas, Luis Fernando Beckford MD  ,    We had the pleasure of treating the following patient for physical therapy services at [facility]. A summary of our findings can be found in the updated assessment below. This includes our plan of care. If you have any questions or concerns regarding these findings, please do not hesitate to contact me at the office phone number checked above. Thank you for the referral.     Physician Signature:________________________________Date:__________________  By signing above (or electronic signature), therapist's plan is approved by physician      Functional Outcome:     Functional Scale/Test Evaluation 1/30/2023 30 day 4/1/23 60 day 5/8/23 Discharge    Tinetti Assessment  13/28 14/28     30\" STS   5x STS  8  18\" 7 (24 secs)  13.5\"    REID 28/56 - 37/56          Overall Response to Treatment:   []Patient is responding well to treatment and improvement is noted with regards  to goals   []Patient should continue to improve in reasonable time if they continue HEP   []Patient has plateaued and is no longer responding to skilled PT intervention    []Patient is getting worse and would benefit from return to referring MD   []Patient unable to adhere to initial POC   [x]Other:  Patient continues to become distracted during session, reducing task performance. Patient has been able to improve REID score and 5x STS but had difficulty improving 30\" STS, in part due to stopping repetition of transfers to talk. Patient has had difficult family events going on during episode of care, limiting participation, but, currently, pt can continue to benefit from PT services to further progress strength and recruitment of muscles of LLE.   Patient's easily distractible demeanor and learned non-use of L

## 2023-05-10 ENCOUNTER — APPOINTMENT (OUTPATIENT)
Dept: PHYSICAL THERAPY | Age: 43
End: 2023-05-10
Payer: COMMERCIAL

## 2023-05-10 ENCOUNTER — PATIENT MESSAGE (OUTPATIENT)
Dept: PRIMARY CARE CLINIC | Age: 43
End: 2023-05-10

## 2023-05-10 NOTE — TELEPHONE ENCOUNTER
From: Trell Monday  To: Sheron Pope  Sent: 5/10/2023 7:52 AM EDT  Subject: Physical therapy     Good morning can you please fax a prescription for physical therapy over to [facility] they said I need a new prescription.  Thank You

## 2023-05-12 DIAGNOSIS — M62.838 MUSCLE SPASM: ICD-10-CM

## 2023-05-12 DIAGNOSIS — G81.94 LEFT HEMIPARESIS (HCC): Primary | ICD-10-CM

## 2023-05-13 ENCOUNTER — HOSPITAL ENCOUNTER (OUTPATIENT)
Dept: PHYSICAL THERAPY | Age: 43
Setting detail: THERAPIES SERIES
Discharge: HOME | End: 2023-05-13
Payer: COMMERCIAL

## 2023-05-13 PROCEDURE — 97112 NEUROMUSCULAR REEDUCATION: CPT

## 2023-05-13 PROCEDURE — 97116 GAIT TRAINING THERAPY: CPT

## 2023-05-13 NOTE — PLAN OF CARE
[address] [facility] Physical Therapy  Phone: [phone]   Fax: [phone]    Physical Therapy Re-Certification Plan of Care     Dear Jody Perez MD,     We had the pleasure of treating the following patient for physical therapy services at [facility]. A summary of our findings can be found in the updated assessment below. This includes our plan of care. If you have any questions or concerns regarding these findings, please do not hesitate to contact me at the office phone number checked above. Thank you for the referral.      Physician Signature:________________________________Date:__________________  By signing above (or electronic signature), therapist's plan is approved by physician        Functional Outcome:      Functional Scale/Test Evaluation 1/30/2023 30 day 4/1/23 60 day 5/8/23 Discharge    Tinetti Assessment   13/28 14/28      30\" STS   5x STS   8  18\" 7 (24 secs)  13.5\"     REID 28/56 - 37/56              Overall Response to Treatment:              []Patient is responding well to treatment and improvement is noted with regards  to goals              []Patient should continue to improve in reasonable time if they continue HEP              []Patient has plateaued and is no longer responding to skilled PT intervention                   []Patient is getting worse and would benefit from return to referring MD              []Patient unable to adhere to initial POC              [x]Other:  Patient continues to become distracted during session, reducing task performance. Patient has been able to improve REID score and 5x STS but had difficulty improving 30\" STS, in part due to stopping repetition of transfers to talk.   Patient has had difficult family events going on during episode of care, limiting participation, but, currently, pt can continue to benefit from PT services to further progress strength and recruitment of

## 2023-05-15 ENCOUNTER — HOSPITAL ENCOUNTER (OUTPATIENT)
Dept: PHYSICAL THERAPY | Age: 43
Setting detail: THERAPIES SERIES
Discharge: HOME | End: 2023-05-15
Payer: COMMERCIAL

## 2023-05-15 PROCEDURE — 97116 GAIT TRAINING THERAPY: CPT

## 2023-05-15 NOTE — FLOWSHEET NOTE
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 Jennifer Lim   Ending:       Progress report due (10 Rx/or 30 days whichever is less): visit 8 or 3/77/06     Recertification due (POC duration/ or 90 days whichever is less): visit #8 or 23     Visit # Insurance Allowable Auth required? Date Range    +  64 units ea for TE, neuro, man, TA, gait [x]  Yes  []  No 23-23         Latex Allergy:  [x]NO      []YES  Preferred Language for Healthcare:   [x]English       []Other:        Functional Scale/Test Evaluation 2023 30 day 23 60 day 23 Discharge    Tinetti Assessment       30\" STS   5x STS  8  18\" 7 (24 secs)  13.5\"    REID  -           Pain level:  2/10  Location:   L knee and low back     SUBJECTIVE:  Pt reports she is still sore from apt on Saturday. Left lower back tight and achy.        OBJECTIVE:    5/15: pt fatigued and had difficulty isolating L LE movement    : REID /: pt able to maintain L toe neutral intermittently without cuing during gait with SBQC; 2 seated breaks through session    : gait pattern:  L trunk & pelvic rotation, Circumduction of LLE, step to with RLE, weight shifted over cane and heavily

## 2023-05-17 ENCOUNTER — HOSPITAL ENCOUNTER (OUTPATIENT)
Dept: PHYSICAL THERAPY | Age: 43
Setting detail: THERAPIES SERIES
Discharge: HOME | End: 2023-05-17
Payer: COMMERCIAL

## 2023-05-17 PROCEDURE — 97116 GAIT TRAINING THERAPY: CPT

## 2023-05-17 PROCEDURE — 97112 NEUROMUSCULAR REEDUCATION: CPT

## 2023-05-17 NOTE — FLOWSHEET NOTE
[address] 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 Tavo Ochoa   Ending:       Progress report due (10 Rx/or 30 days whichever is less): visit 8 or 3/73/31     Recertification due (POC duration/ or 90 days whichever is less): visit #8 or 23     Visit # Insurance Allowable Auth required? Date Range    + 3/12 64 units ea for TE, neuro, man, TA, gait [x]  Yes  []  No 23-23         Latex Allergy:  [x]NO      []YES  Preferred Language for Healthcare:   [x]English       []Other:        Functional Scale/Test Evaluation 2023 30 day 23 60 day 23 Discharge    Tinetti Assessment       30\" STS   5x STS  8  18\" 7 (24 secs)  13.5\"    REID  -           Pain level:  2/10  Location:   L knee     SUBJECTIVE:  Pt states to be feeling okay. Reports soreness from last therapy session and currently is experiencing throbbing constant pain in L knee rated 2/10. OBJECTIVE:    : pt. demo'd hyper ext.  L knee & > WB w/ R UE & LE    5/15: pt fatigued and had difficulty isolating L LE movement    : REID 37/56    : pt able to maintain L toe neutral intermittently without cuing during gait with SBQC; 2 seated breaks through session    : gait

## 2023-05-22 ENCOUNTER — HOSPITAL ENCOUNTER (OUTPATIENT)
Dept: PHYSICAL THERAPY | Age: 43
Setting detail: THERAPIES SERIES
Discharge: HOME | End: 2023-05-22
Payer: COMMERCIAL

## 2023-05-22 PROCEDURE — 97112 NEUROMUSCULAR REEDUCATION: CPT

## 2023-05-22 PROCEDURE — 97116 GAIT TRAINING THERAPY: CPT

## 2023-05-22 NOTE — FLOWSHEET NOTE
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 Shauna Leonard   Ending:       Progress report due (10 Rx/or 30 days whichever is less): visit 8 or      Recertification due (POC duration/ or 90 days whichever is less): visit #8 or 23     Visit # Insurance Allowable Auth required? Date Range    +  64 units ea for TE, neuro, man, TA, gait [x]  Yes  []  No 23-23         Latex Allergy:  [x]NO      []YES  Preferred Language for Healthcare:   [x]English       []Other:        Functional Scale/Test Evaluation 2023 30 day 23 60 day 23 Discharge    Tinetti Assessment       30\" STS   5x STS  8  18\" 7 (24 secs)  13.5\"    REID  -           Pain level:  2/10  Location:   L knee     SUBJECTIVE:  Pt states       OBJECTIVE:    : pt. demo'd hyper ext. L knee & > WB w/ R UE & LE    5/15: pt fatigued and had difficulty isolating L LE movement    : REID : pt able to maintain L toe neutral intermittently without cuing during gait with SBQC; 2 seated breaks through session    : gait pattern:  L trunk & pelvic rotation, Circumduction of LLE, step to with RLE, weight shifted over cane and heavily dependent on RUE.    Gait Pt called office and left message on voicemail asking if she can resume cardiac rehab and if so, she needs note sent to [facility] stating she may resume.     Pt had episode on 07/12/19 and went to ER for near syncope while at cardiac rehab.  Pt has loop recorder that showed sinus tachycardia rate of 120 on 07/12/19.  Pt had another episode of near syncope of 07/20/19 and EGM showed Sinus tachycardia of 120 as well.     Will review with Dr. Holman and see if pt can resume cardiac rehab.

## 2023-05-23 ENCOUNTER — PATIENT MESSAGE (OUTPATIENT)
Dept: PRIMARY CARE CLINIC | Age: 43
End: 2023-05-23

## 2023-05-24 ENCOUNTER — HOSPITAL ENCOUNTER (OUTPATIENT)
Dept: PHYSICAL THERAPY | Age: 43
Setting detail: THERAPIES SERIES
Discharge: HOME | End: 2023-05-24
Payer: COMMERCIAL

## 2023-05-24 PROCEDURE — 97530 THERAPEUTIC ACTIVITIES: CPT

## 2023-05-24 PROCEDURE — 97112 NEUROMUSCULAR REEDUCATION: CPT

## 2023-05-24 NOTE — PLAN OF CARE
[address] [facility] Physical Therapy  Phone: [phone]   Fax: [phone]   Physical Therapy Discharge Summary    Dear MD Dr. Hemant Diaz,     We had the pleasure of treating the following patient for physical therapy services at [facility]. A summary of our findings can be found in the discharge summary below. If you have any questions or concerns regarding these findings, please do not hesitate to contact me at the office phone number checked above. Thank you for the referral.     Physician Signature:________________________________Date:__________________  By signing above (or electronic signature), therapists plan is approved by physician      Functional Outcome:     Functional Scale/Test Evaluation 1/30/2023 30 day 4/1/23 60 day 5/8/23 Discharge 5/24/23    Tinetti Assessment  13/28 14/28 20/28    30\" STS   5x STS  8  18\" 7 (24 secs)  13.5\" 8   15\"   REID 28/56 - 37/56 45/56   6MWT    320'       Overall Response to Treatment:   []Patient is responding well to treatment and improvement is noted with regards to goals   []Patient should continue to improve in reasonable time if they continue HEP   []Patient has plateaued and is no longer responding to skilled PT intervention    []Patient is getting worse and would benefit from return to referring MD   []Patient unable to adhere to initial POC   [x]Other: Pt required seated rest breaks after each activity or exc performed. Demo'd improvements in functional transfers , strength, & static/dynamic balance w/ REID, Alyssa, 30\" & 5xSTS from eval on 1/30/23. Pt able to amb 320' w/ cane, but continues LLE circumduction. Pt &  aware to continue to inc activity w/ amb & functional activities to further maintain & progress recruitment of LE strength and balance. At this time the pt is d/c from therapy.     Date range of Visits: 1/30 - 5/24/23  Total Visits:

## 2023-05-30 ENCOUNTER — APPOINTMENT (OUTPATIENT)
Dept: PHYSICAL THERAPY | Age: 43
End: 2023-05-30
Payer: COMMERCIAL

## 2023-06-01 ENCOUNTER — HOSPITAL ENCOUNTER (OUTPATIENT)
Dept: OCCUPATIONAL THERAPY | Age: 43
Setting detail: THERAPIES SERIES
Discharge: HOME | End: 2023-06-01

## 2023-06-03 ENCOUNTER — APPOINTMENT (OUTPATIENT)
Dept: PHYSICAL THERAPY | Age: 43
End: 2023-06-03
Payer: COMMERCIAL

## 2023-06-07 NOTE — FLOWSHEET NOTE
[facility] Physical Therapy  Phone: [phone]   Fax: [phone]    Physical Therapy Daily Treatment Note  Date:  2022    Patient Name:  Errol Olivo    :  1980  MRN: [id number]  Medical/Treatment Diagnosis Information:  Diagnosis: R25.2 (ICD-10-CM) - Cramp and spasm;   Left hemiparesis with spasticity  Treatment Diagnosis: Impaired proprioception of LUE & LE, avoidance of L extremities, Impaired balance & gait  Insurance/Certification information:  PT Insurance Information: [employer]. 30 visits pcy. PA required  Physician Information:   Ant Santa MD  Plan of care signed (Y/N): [x]  Yes []  No     Date of Patient follow up with Physician:      Progress Report: []  Yes  [x]  No     Date Range for reporting period:  Beginning  2022   PN: **  Ending      Progress report due (10 Rx/or 30 days whichever is less): visit #10 or  (date)     Recertification due (POC duration/ or 90 days whichever is less): visit #16 or  (date)     Visit # Insurance Allowable Auth required? Date Range    +    16 (30 pcy) [x]  Yes, [employer]  []  No Until 9/10/22       Units approved Units used Date Range   120 12 Until 9/10/22     Latex Allergy:  [x]NO      []YES  Preferred Language for Healthcare:   [x]English       []Other:      Functional Scale:                                                                                                      Date assessed:  TO physical FS primary measure score = 49; risk adjusted = 50                        Functional Scale/Test Evaluation 2022 30 day NEEDS REASSESS 60 day  Discharge    Tinetti Assessment        30\" STS 3       5x STS 40\"       2MWT w/QC 65'            Pain level:  2/10  Location:  L knee & elbow    SUBJECTIVE:   L knee and wrist pain remain. Doing okay otherwise. Just finished with speech therapy, has OT next.       OBJECTIVE:       RESTRICTIONS/PRECAUTIONS: HTN    Interventions/Exercises:   Therapeutic Exercises (79538) Resistance / level Sets/sec Reps Notes   TM  Seated stepper  7/23: L foot stationary on silver portion  7/23: PT assisted alignment L hip                        Neuromuscular Re-ed (52060)        Total gym  -squats to facilitate use of LLE       Tall kneel- side step across mat table  8/2: PT assist for postural control and L leg assist   Fwd lunge onto step, L foot on top of step, R foot behind 8/2: PT assist at pelvis for forward weight shift   LAQ seated    8/2: PT assist for L quad activation and weight shift to L for upright posture   On mat table:  Bridge  Roll R  Roll L  Hip abd in supine  Heel slide in supine  Prone ham curl  Prone resisted knee ext     X12  X6    8/29: fair+ recruitment of LLE muscle fibers  8/29: fair-  recruitment of LLE muscle fibers      AAROM  PROM  AAROM - cues to push into hand    Stand from mat table with weight shifted over L LE and hips square prior to stand    VCs for pointing head towards L knee to achieve neutral and greater weight into L LE                 Therapeutic Activities (35375) /  Functional Tasks / Gait Training (09625)       Sit to supine  Supine to sit Sup  Min A 1  1  8/29: compensations for each   R foot taps on 8\" block & hold      R foot taps on 2nd step    R toe taps on to black board of Shuttle, L foot on lester       6\" 10\" hold      5        R hand holding 1/2 full cup of water; Man & VC's to improve posture and LE muscle fiber recruitment  R hand on QC    7/28: stability provided by SPT through R hand                 Gait Training:  Amb w/QC & AFO      Step up and overs, L foot on Airex, R foot step over half bolster & return   SBA         85'        8/16: VCs for pulling L hip forward but still keeping space between her feet      7/28: LOB x 1 with max assist x2   Sit to stand, R foot on AirEx to inhibit activation, large bolster between feet to maintain correct alignment for facilitation 7/28: use of mirror improved weight-shifting to L upon standing   Sit to stand w/large SB to facilitate forward flexion and increase activation of LLE   7/28: use of mirror improved weight-shifting to L while seated   Sit to stand from corner of mat #6, RLE on folded gray pad, large bolster behind R ankle to limited knee flex  x4  R hand with walking stick          Manual Intervention (76166)                                                     Modalities:     Pt. Education:  6/24/2022 patient educated on diagnosis, prognosis and expectations for rehab, all patient questions were answered    HEP instruction:  6/24: Shift weight to L to facilitate LLE muscle fiber recruitment with all functional activities. Therapeutic Exercise and NMR EXR  [] (40159) Provided verbal/tactile cueing for activities related to strengthening, flexibility, endurance, ROM for improvements in  [] LE / Core stability: LE, hip, and core control with self care, mobility, lifting, ambulation. [] UE / Shoulder complex: cervical, postural, scapular, scapulothoracic and UE control with self care, reaching, carrying, lifting, house/yardwork, driving, computer work.  [] (39984) Provided verbal/tactile cueing for activities related to improving balance, coordination, kinesthetic sense, posture, motor skill, proprioception to assist with   [] LE / Core stability: LE, hip, and core control in self care, mobility, lifting, ambulation and eccentric single leg control.    [] UE / Shoulder complex: cervical, scapular, scapulothoracic and UE control with self care, reaching, carrying, lifting, house/yardwork, driving, computer work.   [] (54469) Therapist is in constant attendance of 2 or more patients providing skilled therapy interventions, but not providing any significant amount of measurable one-on-one time to either patient, for improvements in  [] LE / Core stability: LE, hip, and core control in self care, mobility, lifting, ambulation and eccentric single leg control. [] UE / Shoulder complex: cervical, scapular, scapulothoracic and UE control with self care, reaching, carrying, lifting, house/yardwork, driving, computer work. NMR and Therapeutic Activities:    [x] (79244 or 84993) Provided verbal/tactile cueing for activities related to improving balance, coordination, kinesthetic sense, posture, motor skill, proprioception and motor activation to allow for proper function of   [x] LE / Core, hip and LE with self care and ADLs  [] UE / Shoulder complex: cervical, postural, scapular, scapulothoracic and UE control with self care, carrying, lifting, driving, computer work.   [] (07448) Gait Re-education- Provided training and instruction to the patient for proper LE, core and hip recruitment, positioning, and eccentric body weight control with ambulation re-education, including ascending & descending stairs     Home Management Training / Self Care:  [] (69300) Provided self-care/home management training related to activities of daily living and compensatory training, and/or use of adaptive equipment for improvement with: ADLs and compensatory training, meal preparation, safety procedures and instruction in use of adaptive equipment, including bathing, grooming, dressing, personal hygiene, basic household cleaning and chores.        Home Exercise Program:    [] (56551) Reviewed/Progressed HEP activities related to strengthening, flexibility, endurance, ROM of   [] LE / Core stability: core, hip and LE for functional self-care, mobility, lifting and ambulation/stair navigation   [] UE / Shoulder complex: cervical, postural, scapular, scapulothoracic and UE control with self care, reaching, carrying, lifting, house/yardwork, driving, computer work  [] (86298)Reviewed/Progressed HEP activities related to improving balance, coordination, kinesthetic sense, posture, motor skill, proprioception of   [] LE: core, hip and LE for self care, mobility, lifting, and ambulation/stair navigation    [] UE / Shoulder complex: cervical, postural,  scapular, scapulothoracic and UE control with self care, reaching, carrying, lifting, house/yardwork, driving, computer work    Manual Treatments:  PROM / STM / Oscillations-Mobs:  G-I, II, III, IV (PA's, Inf., Post.)  [] (70364) Provided manual therapy to mobilize shoulder complex, hip, LE, and/or cervicothoracic/LS spine soft tissue/joints for the purpose of modulating pain, promoting relaxation,  increasing ROM, reducing/eliminating soft tissue swelling/inflammation/restriction, improving soft tissue extensibility and allowing for proper ROM for normal function with   [] LE / Core stability: self care, mobility, lifting and ambulation. [] UE / Shoulder complex: self care, reaching, carrying, lifting, house/yardwork, driving, computer work. Modalities:  [] (07681) Vasopneumatic compression: Utilized vasopneumatic compression to decrease edema / swelling for the purpose of improving mobility and quad tone / recruitment which will allow for increased overall function including but not limited to self-care, transfers, ambulation, and ascending / descending stairs. Charges:  Timed Code Treatment Minutes: 45   Total Treatment Minutes: 45     [] EVAL - LOW (65562)   [] EVAL - MOD (12433)  [] EVAL - HIGH (10466)  [] RE-EVAL (03426)  [] TE (97019) x      [] Ionto  [x] NMR (52776) x  2    [] Vaso  [] Manual (21815) x      [] Ultrasound  [x] TA x 1     [] Mech Traction (50274)  [] Gait Training x     [] ES (un) (11672):   [] Aquatic therapy x   [] Other:   [] Group:     GOALS:   Patient stated goal: improve transfers and gait  [] Progressing: [] Met: [] Not Met: [] Adjusted     Therapist goals for Patient:   Short Term Goals: To be achieved in: 3 weeks  1. Independent in HEP and progression per patient tolerance, in order to prevent re-injury. [] Progressing: [] Met: [] Not Met: [] Adjusted  2.  Patient to demonstrate (4) no limitation

## 2023-06-19 ENCOUNTER — TELEPHONE (OUTPATIENT)
Dept: PRIMARY CARE CLINIC | Age: 43
End: 2023-06-19

## 2023-06-19 ENCOUNTER — HOSPITAL ENCOUNTER (OUTPATIENT)
Dept: OCCUPATIONAL THERAPY | Age: 43
Setting detail: THERAPIES SERIES
Discharge: HOME | End: 2023-06-19
Payer: COMMERCIAL

## 2023-06-19 DIAGNOSIS — G81.94 LEFT HEMIPARESIS (HCC): Primary | ICD-10-CM

## 2023-06-19 PROCEDURE — 97168 OT RE-EVAL EST PLAN CARE: CPT

## 2023-06-19 NOTE — PLAN OF CARE
[facility] Occupational Therapy  [address] [place] [address] Kurt [facility], [address] [place] [address]  Phone: [phone]   Fax: [phone]    Dear Dr. Nikole Chapman ,    We had the pleasure of treating the following patient for occupational therapy services at [address] [place] [address]. A summary of our findings can be found in the updated assessment below. This includes our plan of care. If you have any questions or concerns regarding these findings, please do not hesitate to contact me at the office phone number above. Thank you for the referral.     Physician Signature:________________________________Date:__________________  By signing above (or electronic signature), therapists plan is approved by physician      Functional Outcome: quick dash     Overall Response to Treatment:   []Patient is responding well to treatment and improvement is noted with regards  to goals   []Patient should continue to improve in reasonable time if they continue HEP   []Patient has plateaued and is no longer responding to skilled OT intervention    []Patient is getting worse and would benefit from return to referring MD   []Patient unable to adhere to initial POC   []Other:     Date range of Visits:  to 2023  Total Visits: 5    Recommendation:    [x]Continue OT 2x / wk for 6 weeks.     []Hold OT, pending MD visit       Occupational Therapy Daily Treatment Note  Date:  2023    Patient: Christiano Correa   : 1980   MRN: [id number]  Referring Physician:  Dr. Siobhan Ngo Diagnosis Information: left hemiplegia       Insurance information: care source    Plan of care sent to provider:      [x]Faxed   []Co-signature    (attempts: 1[] 2[] 3[])       Progress Report: []  Yes  [x]  No     Date Range for reporting period:  Beginnin2023  Ending: end of POC    Progress report due (10 Rx/or 30 days whichever is less): visit #12  [id number]    Recertification due (POC

## 2023-06-19 NOTE — TELEPHONE ENCOUNTER
Pardeep Better, call from [facility] to request new scrip for occupational therapy  please fax it to [phone].

## 2023-06-25 DIAGNOSIS — F41.8 DEPRESSION WITH ANXIETY: ICD-10-CM

## 2023-06-26 RX ORDER — DULOXETIN HYDROCHLORIDE 30 MG/1
CAPSULE, DELAYED RELEASE ORAL
Qty: 180 CAPSULE | Refills: 3 | Status: SHIPPED | OUTPATIENT
Start: 2023-06-26

## 2023-06-26 RX ORDER — DOCUSATE SODIUM -SENNOSIDES 50; 8.6 MG/1; MG/1
TABLET, COATED ORAL
Qty: 30 TABLET | Refills: 3 | Status: SHIPPED | OUTPATIENT
Start: 2023-06-26

## 2023-06-26 RX ORDER — BUSPIRONE HYDROCHLORIDE 5 MG/1
TABLET ORAL
Qty: 90 TABLET | Refills: 0 | Status: SHIPPED | OUTPATIENT
Start: 2023-06-26 | End: 2023-08-14

## 2023-06-26 NOTE — TELEPHONE ENCOUNTER
Recent Visits  Date Type Provider Dept   03/13/23 Office Visit HUSSEIN Whiteside CNP [facility]   10/26/22 Office Visit HUSSEIN Whiteside CNP [facility]   06/23/22 Office Visit Stefania Uribe MD [facility]   03/23/22 Office Visit Stefania Uribe MD [facility]   02/21/22 Office Visit Stefania Uribe MD [facility]   02/09/22 Office Visit Stefania Uribe MD [facility] recent visits within past 540 days with a meds authorizing provider and meeting all other requirements  Future Appointments  Date Type Provider Dept   06/27/23 Appointment Stefania Uribe MD [facility]   Showing future appointments within next 150 days with a meds authorizing provider and meeting all other requirements

## 2023-06-27 ENCOUNTER — OFFICE VISIT (OUTPATIENT)
Dept: PRIMARY CARE CLINIC | Age: 43
End: 2023-06-27
Payer: COMMERCIAL

## 2023-06-27 VITALS
OXYGEN SATURATION: 98 % | BODY MASS INDEX: 41.64 KG/M2 | HEART RATE: 78 BPM | WEIGHT: 258 LBS | SYSTOLIC BLOOD PRESSURE: 168 MMHG | DIASTOLIC BLOOD PRESSURE: 100 MMHG

## 2023-06-27 DIAGNOSIS — D64.9 LOW HEMOGLOBIN: ICD-10-CM

## 2023-06-27 DIAGNOSIS — E55.9 VITAMIN D DEFICIENCY: ICD-10-CM

## 2023-06-27 DIAGNOSIS — F41.8 DEPRESSION WITH ANXIETY: ICD-10-CM

## 2023-06-27 DIAGNOSIS — Z00.00 ROUTINE GENERAL MEDICAL EXAMINATION AT A HEALTH CARE FACILITY: Primary | ICD-10-CM

## 2023-06-27 DIAGNOSIS — I10 ESSENTIAL HYPERTENSION, BENIGN: ICD-10-CM

## 2023-06-27 DIAGNOSIS — E78.2 MIXED HYPERLIPIDEMIA: ICD-10-CM

## 2023-06-27 DIAGNOSIS — G81.94 LEFT HEMIPARESIS (HCC): ICD-10-CM

## 2023-06-27 PROCEDURE — 3080F DIAST BP >= 90 MM HG: CPT | Performed by: FAMILY MEDICINE

## 2023-06-27 PROCEDURE — 99396 PREV VISIT EST AGE 40-64: CPT | Performed by: FAMILY MEDICINE

## 2023-06-27 PROCEDURE — 3077F SYST BP >= 140 MM HG: CPT | Performed by: FAMILY MEDICINE

## 2023-06-27 RX ORDER — LOSARTAN POTASSIUM 50 MG/1
50 TABLET ORAL DAILY
Qty: 30 TABLET | Refills: 5 | Status: SHIPPED | OUTPATIENT
Start: 2023-06-27

## 2023-06-27 ASSESSMENT — PATIENT HEALTH QUESTIONNAIRE - PHQ9
1. LITTLE INTEREST OR PLEASURE IN DOING THINGS: 2
SUM OF ALL RESPONSES TO PHQ QUESTIONS 1-9: 10
4. FEELING TIRED OR HAVING LITTLE ENERGY: 1
7. TROUBLE CONCENTRATING ON THINGS, SUCH AS READING THE NEWSPAPER OR WATCHING TELEVISION: 1
SUM OF ALL RESPONSES TO PHQ QUESTIONS 1-9: 10
5. POOR APPETITE OR OVEREATING: 3
9. THOUGHTS THAT YOU WOULD BE BETTER OFF DEAD, OR OF HURTING YOURSELF: 0
SUM OF ALL RESPONSES TO PHQ QUESTIONS 1-9: 10
6. FEELING BAD ABOUT YOURSELF - OR THAT YOU ARE A FAILURE OR HAVE LET YOURSELF OR YOUR FAMILY DOWN: 0
10. IF YOU CHECKED OFF ANY PROBLEMS, HOW DIFFICULT HAVE THESE PROBLEMS MADE IT FOR YOU TO DO YOUR WORK, TAKE CARE OF THINGS AT HOME, OR GET ALONG WITH OTHER PEOPLE: 1
SUM OF ALL RESPONSES TO PHQ9 QUESTIONS 1 & 2: 3
2. FEELING DOWN, DEPRESSED OR HOPELESS: 1
3. TROUBLE FALLING OR STAYING ASLEEP: 2
8. MOVING OR SPEAKING SO SLOWLY THAT OTHER PEOPLE COULD HAVE NOTICED. OR THE OPPOSITE, BEING SO FIGETY OR RESTLESS THAT YOU HAVE BEEN MOVING AROUND A LOT MORE THAN USUAL: 0
SUM OF ALL RESPONSES TO PHQ QUESTIONS 1-9: 10

## 2023-06-30 ENCOUNTER — HOSPITAL ENCOUNTER (OUTPATIENT)
Age: 43
Discharge: HOME | End: 2023-06-30
Payer: COMMERCIAL

## 2023-06-30 DIAGNOSIS — I10 ESSENTIAL HYPERTENSION, BENIGN: ICD-10-CM

## 2023-06-30 DIAGNOSIS — E78.2 MIXED HYPERLIPIDEMIA: ICD-10-CM

## 2023-06-30 DIAGNOSIS — D64.9 LOW HEMOGLOBIN: ICD-10-CM

## 2023-06-30 DIAGNOSIS — E55.9 VITAMIN D DEFICIENCY: ICD-10-CM

## 2023-06-30 DIAGNOSIS — Z00.00 ROUTINE GENERAL MEDICAL EXAMINATION AT A HEALTH CARE FACILITY: ICD-10-CM

## 2023-06-30 LAB
25(OH)D3 SERPL-MCNC: 11.7 NG/ML
ALBUMIN SERPL-MCNC: 4.2 G/DL (ref 3.4–5)
ALBUMIN/GLOB SERPL: 1.4 {RATIO} (ref 1.1–2.2)
ALP SERPL-CCNC: 99 U/L (ref 40–129)
ALT SERPL-CCNC: 7 U/L (ref 10–40)
ANION GAP SERPL CALCULATED.3IONS-SCNC: 9 MMOL/L (ref 3–16)
AST SERPL-CCNC: 16 U/L (ref 15–37)
BASOPHILS # BLD: 0 K/UL (ref 0–0.2)
BASOPHILS NFR BLD: 0.4 %
BILIRUB SERPL-MCNC: <0.2 MG/DL (ref 0–1)
BUN SERPL-MCNC: 8 MG/DL (ref 7–20)
CALCIUM SERPL-MCNC: 9 MG/DL (ref 8.3–10.6)
CHLORIDE SERPL-SCNC: 103 MMOL/L (ref 99–110)
CHOLEST SERPL-MCNC: 236 MG/DL (ref 0–199)
CO2 SERPL-SCNC: 26 MMOL/L (ref 21–32)
CREAT SERPL-MCNC: 0.9 MG/DL (ref 0.6–1.1)
DEPRECATED RDW RBC AUTO: 15.8 % (ref 12.4–15.4)
EOSINOPHIL # BLD: 0.1 K/UL (ref 0–0.6)
EOSINOPHIL NFR BLD: 2.5 %
GFR SERPLBLD CREATININE-BSD FMLA CKD-EPI: >60 ML/MIN/{1.73_M2}
GLUCOSE SERPL-MCNC: 99 MG/DL (ref 70–99)
HCT VFR BLD AUTO: 38.1 % (ref 36–48)
HDLC SERPL-MCNC: 45 MG/DL (ref 40–60)
HGB BLD-MCNC: 12.3 G/DL (ref 12–16)
LDLC SERPL CALC-MCNC: 156 MG/DL
LYMPHOCYTES # BLD: 1.6 K/UL (ref 1–5.1)
LYMPHOCYTES NFR BLD: 28.4 %
MCH RBC QN AUTO: 27.4 PG (ref 26–34)
MCHC RBC AUTO-ENTMCNC: 32.3 G/DL (ref 31–36)
MCV RBC AUTO: 84.9 FL (ref 80–100)
MONOCYTES # BLD: 0.3 K/UL (ref 0–1.3)
MONOCYTES NFR BLD: 5.2 %
NEUTROPHILS # BLD: 3.7 K/UL (ref 1.7–7.7)
NEUTROPHILS NFR BLD: 63.5 %
PLATELET # BLD AUTO: 215 K/UL (ref 135–450)
PMV BLD AUTO: 9.8 FL (ref 5–10.5)
POTASSIUM SERPL-SCNC: 3.8 MMOL/L (ref 3.5–5.1)
PROT SERPL-MCNC: 7.2 G/DL (ref 6.4–8.2)
RBC # BLD AUTO: 4.49 M/UL (ref 4–5.2)
SODIUM SERPL-SCNC: 138 MMOL/L (ref 136–145)
TRIGL SERPL-MCNC: 177 MG/DL (ref 0–150)
VLDLC SERPL CALC-MCNC: 35 MG/DL
WBC # BLD AUTO: 5.8 K/UL (ref 4–11)

## 2023-06-30 PROCEDURE — 80061 LIPID PANEL: CPT

## 2023-06-30 PROCEDURE — 86702 HIV-2 ANTIBODY: CPT

## 2023-06-30 PROCEDURE — 86701 HIV-1ANTIBODY: CPT

## 2023-06-30 PROCEDURE — 36415 COLL VENOUS BLD VENIPUNCTURE: CPT

## 2023-06-30 PROCEDURE — 82306 VITAMIN D 25 HYDROXY: CPT

## 2023-06-30 PROCEDURE — 87390 HIV-1 AG IA: CPT

## 2023-06-30 PROCEDURE — 85025 COMPLETE CBC W/AUTO DIFF WBC: CPT

## 2023-06-30 PROCEDURE — 80053 COMPREHEN METABOLIC PANEL: CPT

## 2023-07-01 LAB
HIV 1+2 AB+HIV1 P24 AG SERPL QL IA: NORMAL
HIV 2 AB SERPL QL IA: NORMAL
HIV1 AB SERPL QL IA: NORMAL
HIV1 P24 AG SERPL QL IA: NORMAL

## 2023-07-05 DIAGNOSIS — E55.9 VITAMIN D DEFICIENCY: ICD-10-CM

## 2023-07-05 DIAGNOSIS — E78.2 MIXED HYPERLIPIDEMIA: Primary | ICD-10-CM

## 2023-07-05 RX ORDER — MELATONIN 10 MG
1 TABLET, SUBLINGUAL SUBLINGUAL
Qty: 48 TABLET | Refills: 1 | Status: SHIPPED | OUTPATIENT
Start: 2023-07-05

## 2023-07-05 RX ORDER — ROSUVASTATIN CALCIUM 10 MG/1
10 TABLET, COATED ORAL NIGHTLY
Qty: 30 TABLET | Refills: 3 | Status: SHIPPED | OUTPATIENT
Start: 2023-07-05

## 2023-07-08 DIAGNOSIS — I10 ESSENTIAL HYPERTENSION, BENIGN: ICD-10-CM

## 2023-07-10 ENCOUNTER — TELEPHONE (OUTPATIENT)
Dept: PRIMARY CARE CLINIC | Age: 43
End: 2023-07-10

## 2023-07-10 RX ORDER — METOPROLOL SUCCINATE 100 MG/1
100 TABLET, EXTENDED RELEASE ORAL DAILY
Qty: 90 TABLET | Refills: 3 | Status: SHIPPED | OUTPATIENT
Start: 2023-07-10

## 2023-07-10 NOTE — TELEPHONE ENCOUNTER
Geoffrey, call to for prescription refill for   olol succinate (TOPROL XL) 100 MG extended release tablet    [facility] #[id number] - [place] - [id number]  [facility], [place] [id number]

## 2023-07-10 NOTE — TELEPHONE ENCOUNTER
metoprolol succinate (TOPROL XL) 100 MG extended release tablet 90 tablet 3 7/10/2023     Sig - Route:  Take 1 tablet by mouth daily - Oral    Sent to pharmacy as: Metoprolol Succinate  MG Oral Tablet Extended Release 24 Hour (TOPROL XL)    E-Prescribing Status: Receipt confirmed by pharmacy (7/10/2023  1:25 PM EDT)

## 2023-07-10 NOTE — TELEPHONE ENCOUNTER
Recent Visits  Date Type Provider Dept   06/27/23 [facility] Visit [facility]   03/13/23 Office Visit HUSSEIN Kidd - CNP [facility]   10/26/22 Office Visit HUSSEIN Kidd - CNP [facility]   06/23/22 Office Visit Phoenix Martins MD [facility]   03/23/22 Office Visit Phoenix Martins MD [facility]   02/21/22 Office Visit Phoenix Martins MD [facility]   02/09/22 Office Visit Phoenix Martins MD [facility] recent visits within past 540 days with a meds authorizing provider and meeting all other requirements  Future Appointments  No visits were found meeting these conditions.   Showing future appointments within next 150 days with a meds authorizing provider and meeting all other requirements
2 = assistive person

## 2023-07-18 ENCOUNTER — OFFICE VISIT (OUTPATIENT)
Dept: PRIMARY CARE CLINIC | Age: 43
End: 2023-07-18
Payer: COMMERCIAL

## 2023-07-18 VITALS — HEART RATE: 77 BPM | DIASTOLIC BLOOD PRESSURE: 70 MMHG | SYSTOLIC BLOOD PRESSURE: 102 MMHG | OXYGEN SATURATION: 99 %

## 2023-07-18 DIAGNOSIS — I10 ESSENTIAL HYPERTENSION, BENIGN: Primary | ICD-10-CM

## 2023-07-18 PROCEDURE — 99212 OFFICE O/P EST SF 10 MIN: CPT | Performed by: FAMILY MEDICINE

## 2023-07-18 PROCEDURE — G8427 DOCREV CUR MEDS BY ELIG CLIN: HCPCS | Performed by: FAMILY MEDICINE

## 2023-07-18 PROCEDURE — G8417 CALC BMI ABV UP PARAM F/U: HCPCS | Performed by: FAMILY MEDICINE

## 2023-07-18 PROCEDURE — 3074F SYST BP LT 130 MM HG: CPT | Performed by: FAMILY MEDICINE

## 2023-07-18 PROCEDURE — 4004F PT TOBACCO SCREEN RCVD TLK: CPT | Performed by: FAMILY MEDICINE

## 2023-07-18 PROCEDURE — 3078F DIAST BP <80 MM HG: CPT | Performed by: FAMILY MEDICINE

## 2023-07-18 ASSESSMENT — PATIENT HEALTH QUESTIONNAIRE - PHQ9
9. THOUGHTS THAT YOU WOULD BE BETTER OFF DEAD, OR OF HURTING YOURSELF: 0
SUM OF ALL RESPONSES TO PHQ QUESTIONS 1-9: 14
3. TROUBLE FALLING OR STAYING ASLEEP: 1
SUM OF ALL RESPONSES TO PHQ9 QUESTIONS 1 & 2: 3
SUM OF ALL RESPONSES TO PHQ QUESTIONS 1-9: 14
1. LITTLE INTEREST OR PLEASURE IN DOING THINGS: 2
SUM OF ALL RESPONSES TO PHQ QUESTIONS 1-9: 14
6. FEELING BAD ABOUT YOURSELF - OR THAT YOU ARE A FAILURE OR HAVE LET YOURSELF OR YOUR FAMILY DOWN: 1
4. FEELING TIRED OR HAVING LITTLE ENERGY: 3
8. MOVING OR SPEAKING SO SLOWLY THAT OTHER PEOPLE COULD HAVE NOTICED. OR THE OPPOSITE, BEING SO FIGETY OR RESTLESS THAT YOU HAVE BEEN MOVING AROUND A LOT MORE THAN USUAL: 0
10. IF YOU CHECKED OFF ANY PROBLEMS, HOW DIFFICULT HAVE THESE PROBLEMS MADE IT FOR YOU TO DO YOUR WORK, TAKE CARE OF THINGS AT HOME, OR GET ALONG WITH OTHER PEOPLE: 2
7. TROUBLE CONCENTRATING ON THINGS, SUCH AS READING THE NEWSPAPER OR WATCHING TELEVISION: 3
2. FEELING DOWN, DEPRESSED OR HOPELESS: 1
5. POOR APPETITE OR OVEREATING: 3
SUM OF ALL RESPONSES TO PHQ QUESTIONS 1-9: 14

## 2023-07-18 NOTE — PROGRESS NOTES
Chief Complaint   Patient presents with    Hypertension     Pt here for a 2 week BP check. Subjective:   Kaila Calderón is a 44 y.o. female here for 2-week follow-up on hypertension, it was 168/100. Added losartan 50 mg daily and so far patient tolerating well. No new symptoms. Patient reports she is emotionally doing fine although the PHQ-9 scored 14 from 10. Currently on BuSpar and Cymbalta 30 mg daily. Patient will continue with her psychotherapy. Current Outpatient Medications   Medication Sig Dispense Refill    diclofenac sodium (VOLTAREN) 1 % GEL APPLY 2 GRAMS TO THE AFFECTED AREA(S) BY TOPICAL ROUTE 4 TIMES PER DAY      metoprolol succinate (TOPROL XL) 100 MG extended release tablet Take 1 tablet by mouth daily 90 tablet 3    rosuvastatin (CRESTOR) 10 MG tablet Take 1 tablet by mouth nightly 30 tablet 3    Cholecalciferol (VITAMIN D3) 250 MCG (77769 UT) TABS Take 1 tablet by mouth three times a week 48 tablet 1    losartan (COZAAR) 50 MG tablet Take 1 tablet by mouth daily 30 tablet 5    busPIRone (BUSPAR) 5 MG tablet TAKE 1 TABLET BY MOUTH EVERY DAY 90 tablet 0    SENEXON-S 8.6-50 MG per tablet TAKE 1 TABLET BY MOUTH EVERY DAY 30 tablet 3    DULoxetine (CYMBALTA) 30 MG extended release capsule TAKE 1 CAPSULE BY MOUTH TWICE A DAY (Patient taking differently: daily) 180 capsule 3    cetirizine (ZYRTEC) 10 MG tablet TAKE 1 TABLET BY MOUTH EVERY DAY 90 tablet 1    hydroCHLOROthiazide (MICROZIDE) 12.5 MG capsule Take 1 capsule by mouth daily 30 capsule 5    diclofenac (VOLTAREN) 75 MG EC tablet TAKE 1 TABLET BY MOUTH TWICE A DAY 60 tablet 5    nystatin (MYCOSTATIN) 070786 UNIT/GM cream Apply topically 2 times daily. 30 g 1    amLODIPine (NORVASC) 10 MG tablet TAKE 1 TABLET BY MOUTH EVERY DAY 90 tablet 3    baclofen (LIORESAL) 20 MG tablet TAKE 1 TABLET BY MOUTH TWICE A DAY 60 tablet 2    Blood Pressure Monitor MISC 1 kit by Does not apply route daily Please dispense 1 extra large BP Cuff.  DX

## 2023-07-27 ENCOUNTER — HOSPITAL ENCOUNTER (OUTPATIENT)
Dept: OCCUPATIONAL THERAPY | Age: 43
Setting detail: THERAPIES SERIES
Discharge: HOME | End: 2023-07-27
Payer: COMMERCIAL

## 2023-07-27 PROCEDURE — 97110 THERAPEUTIC EXERCISES: CPT

## 2023-07-27 PROCEDURE — 97112 NEUROMUSCULAR REEDUCATION: CPT

## 2023-07-27 PROCEDURE — 97535 SELF CARE MNGMENT TRAINING: CPT

## 2023-07-27 NOTE — FLOWSHEET NOTE
[facility] Occupational Therapy  [address] [facility] [address] [place], [address]  Phone: [phone]   Fax: [phone]        Occupational Therapy Daily Treatment Note  Date:  2023    Patient: Elliot An   : 1980   MRN: [id number]  Referring Physician:  Dr. Maddie Lowry Diagnosis Information: left hemiplegia       Insurance information: care source    Plan of care sent to provider:      [x]Faxed   []Co-signature    (attempts: 1[] 2[] 3[])       Progress Report: []  Yes  [x]  No     Date Range for reporting period:  Beginnin2023  Ending: end of POC    Progress report due (10 Rx/or 30 days whichever is less): visit #12      Recertification due (POC duration/ or 90 days whichever is less): visit #12 or      Visit # Insurance Allowable Auth required? Date Range    [x]  Yes  []  No Thru 9-1       Latex Allergy:  [x]No      []Yes  Pacemaker:  [] No       [] Yes     Preferred Language for Healthcare:   [x]English       []other:    Pain level:  0/10     SUBJECTIVE:   Patient reports she is moving in one month and there is a walk in shower. Functional Disability Index:   Quick dash    OBJECTIVE:  Re eval of plan of care on this date.     Bathing :  Patient reports she is washing herself with assist to complete tub transfer with bench and then she completes bathing by herself    Dressing: mod assist      PROM AROM      L R L 2023   Shoulder Flexion  0-100  23: 0-110 prior to discomfort   23: active scapular elevation and scapular retraction; ~10* shoulder flexion with min A Shoulder flexion with min assist to 160 degrees pain free   Elbow Flexion  wnl    23: ~20* active elbow flexion with min A  Wnl flexion, min assist to extend                     Assessment of UE tone/spasticity:      Date: 2023     Shoulder flexors  3  2  2     Internal rotators  3  2  2     External rotators

## 2023-07-31 ENCOUNTER — HOSPITAL ENCOUNTER (OUTPATIENT)
Dept: OCCUPATIONAL THERAPY | Age: 43
Setting detail: THERAPIES SERIES
End: 2023-07-31
Payer: COMMERCIAL

## 2023-08-03 ENCOUNTER — HOSPITAL ENCOUNTER (OUTPATIENT)
Dept: OCCUPATIONAL THERAPY | Age: 43
Setting detail: THERAPIES SERIES
Discharge: HOME | End: 2023-08-03

## 2023-08-03 NOTE — PROGRESS NOTES
Occupational Therapy  Cancellation/No-show Note  Patient Name:  Ramesh Cummings   :  1980   Date:  8/3/2023  Cancelled visits to date: 0  No-shows to date: 0    Patient status for today's appointment patient:  [x]  Cancelled  and 8/3 due to illness  []  Rescheduled appointment  []  No-show     Reason given by patient:  [x]  Patient ill  []  Conflicting appointment  []  No transportation    []  Conflict with work  []  No reason given  []  Other:     Comments:      Phone call information:   []  Phone call made today to patient at _ time at number provided:      []  Patient answered, conversation as follows:    []  Patient did not answer, message left as follows:  []  Phone call not made today    Electronically signed by:  Jan Crooks OT

## 2023-08-07 ENCOUNTER — HOSPITAL ENCOUNTER (OUTPATIENT)
Dept: OCCUPATIONAL THERAPY | Age: 43
Setting detail: THERAPIES SERIES
Discharge: HOME | End: 2023-08-07
Payer: COMMERCIAL

## 2023-08-07 PROCEDURE — 97110 THERAPEUTIC EXERCISES: CPT

## 2023-08-07 PROCEDURE — 97535 SELF CARE MNGMENT TRAINING: CPT

## 2023-08-07 PROCEDURE — 97112 NEUROMUSCULAR REEDUCATION: CPT

## 2023-08-07 NOTE — FLOWSHEET NOTE
[facility] Occupational Therapy  [address] [facility] [address] [place] [address], [facility] [address]  Phone: [phone]   Fax: [phone]        Occupational Therapy Daily Treatment Note  Date:  2023    Patient: Angelica Ricketts   : 1980   MRN: [id number]  Referring Physician:  Dr. Laura Gordillo Diagnosis Information: left hemiplegia       Insurance information: care source    Plan of care sent to provider:      [x]Faxed   []Co-signature    (attempts: 1[] 2[] 3[])       Progress Report: []  Yes  [x]  No     Date Range for reporting period:  Beginnin2023  Ending: end of POC    Progress report due (10 Rx/or 30 days whichever is less): visit #12  2023 this is only 2nd visit since initiation of treatment need to complete next visit    Recertification due (POC duration/ or 90 days whichever is less): visit #12 or      Visit # Insurance Allowable Auth required? Date Range    [x]  Yes  []  No Thru 9-1       Latex Allergy:  [x]No      []Yes  Pacemaker:  [] No       [] Yes     Preferred Language for Healthcare:   [x]English       []other:    Pain level:  0/10     SUBJECTIVE:   Patient reports she is moving in one month and there is a walk in shower.       Functional Disability Index:   Quick dash    OBJECTIVE:      Bathing :  Patient reports she is washing herself with assist to complete tub transfer with bench and then she completes bathing by herself    Dressing: mod assist      PROM AROM      L R L 2023   Shoulder Flexion  0-100  23: 0-110 prior to discomfort   23: active scapular elevation and scapular retraction; ~10* shoulder flexion with min A Shoulder flexion with min assist to 160 degrees pain free   Elbow Flexion  wnl    23: ~20* active elbow flexion with min A  Wnl flexion, min assist to extend                     Assessment of UE tone/spasticity:      Date: 2023     Shoulder flexors  3  2  2     Internal

## 2023-08-10 DIAGNOSIS — R61 HYPERHIDROSIS: Primary | ICD-10-CM

## 2023-08-11 DIAGNOSIS — F41.8 DEPRESSION WITH ANXIETY: ICD-10-CM

## 2023-08-11 NOTE — TELEPHONE ENCOUNTER
Recent Visits  Date Type Provider Dept   07/18/23 [facility]   06/27/23 Office Visit Phoenix Martins MD [facility]   03/13/23 Office Visit HUSSEIN Kidd - CNP [facility]   10/26/22 Office Visit HUSSEIN Kidd - CNP [facility]   06/23/22 Office Visit Phoenix Martins MD [facility]   03/23/22 Office Visit Phoenix Martins MD [facility]   02/21/22 Office Visit Phoenix Martins MD [facility] recent visits within past 540 days with a meds authorizing provider and meeting all other requirements  Future Appointments  Date Type Provider Dept   10/18/23 Appointment Phoenix Martins MD [facility]   Showing future appointments within next 150 days with a meds authorizing provider and meeting all other requirements

## 2023-08-14 ENCOUNTER — APPOINTMENT (OUTPATIENT)
Dept: OCCUPATIONAL THERAPY | Age: 43
End: 2023-08-14
Payer: COMMERCIAL

## 2023-08-14 RX ORDER — BUSPIRONE HYDROCHLORIDE 5 MG/1
TABLET ORAL
Qty: 90 TABLET | Refills: 0 | Status: SHIPPED | OUTPATIENT
Start: 2023-08-14

## 2023-08-17 ENCOUNTER — HOSPITAL ENCOUNTER (OUTPATIENT)
Dept: OCCUPATIONAL THERAPY | Age: 43
Setting detail: THERAPIES SERIES
Discharge: HOME | End: 2023-08-17
Payer: COMMERCIAL

## 2023-08-17 NOTE — PROGRESS NOTES
Occupational Therapy  Cancellation/No-show Note  Patient Name:  Geri Arce   :  1980   Date:  2023  Cancelled visits to date: 0  No-shows to date: 0    Patient status for today's appointment patient:  [x]  Cancelled  []  Rescheduled appointment  []  No-show     Reason given by patient:  []  Patient ill  []  Conflicting appointment  [x]  No transportation    []  Conflict with work  []  No reason given  []  Other:     Comments:      Phone call information:   []  Phone call made today to patient at _ time at number provided:      []  Patient answered, conversation as follows:    []  Patient did not answer, message left as follows:  []  Phone call not made today    Electronically signed by:  Quan Ho, OT

## 2023-08-23 ENCOUNTER — HOSPITAL ENCOUNTER (OUTPATIENT)
Dept: OCCUPATIONAL THERAPY | Age: 43
Setting detail: THERAPIES SERIES
Discharge: HOME | End: 2023-08-23
Payer: COMMERCIAL

## 2023-08-23 NOTE — PROGRESS NOTES
Occupational Therapy  Cancellation/No-show Note  Patient Name:  Geri Arce   :  1980   Date:  2023  Cancelled visits to date: 3  No-shows to date: 0    Patient status for today's appointment patient:  [x]  Cancelled: 8/3/23, 23, 23  []  Rescheduled appointment  []  No-show     Reason given by patient:  []  Patient ill  []  Conflicting appointment  [x]  No transportation    []  Conflict with work  []  No reason given  []  Other:     Comments:      Phone call information:   []  Phone call made today to patient at number provided:      []  Patient answered, conversation as follows:    []  Patient did not answer, message left as follows:  [x]  Phone call not made today    Electronically signed by:  TERRELL Vega/MONIQUE, C/NDT ([id number])

## 2023-08-31 ENCOUNTER — HOSPITAL ENCOUNTER (OUTPATIENT)
Dept: OCCUPATIONAL THERAPY | Age: 43
Setting detail: THERAPIES SERIES
Discharge: HOME | End: 2023-08-31
Payer: COMMERCIAL

## 2023-08-31 PROCEDURE — 97530 THERAPEUTIC ACTIVITIES: CPT

## 2023-08-31 PROCEDURE — 97110 THERAPEUTIC EXERCISES: CPT

## 2023-08-31 PROCEDURE — 97112 NEUROMUSCULAR REEDUCATION: CPT

## 2023-08-31 NOTE — PLAN OF CARE
score of  40 or less indicating increased safety and functional independence in desired occupational pursuits by discharge. [x] Progressing: [] Met: [] Not Met: [] Adjusted    Progression Towards Functional goals:  [x] Patient is progressing as expected towards functional goals listed. [] Progression is slowed due to complexities listed. [] Progression has been slowed due to co-morbidities. [] Plan just implemented, too soon to assess goals progression  [] All goals are met  [] Other:     ASSESSMENT:  Pt has made improvements in triceps activation and scapular retraction, influencing postural control and use of LUE as active support. Continued OT recommended to continue progress in these areas and translate skills to increase ADL independence. Treatment/Activity Tolerance:  [x] Patient tolerated treatment well [] Patient limited by fatigue  [] Patient limited by pain  [] Patient limited by other medical complications  [] Other:     Prognosis: [x] Good [] Fair  [] Poor    Patient Requires Follow-up: [x] Yes  [] No    PLAN: See eval  [] Continue per plan of care [x] Alter current plan (see comments)  [] Plan of care initiated [] Hold pending MD visit [] Discharge    Electronically signed by:        TERRELL Gustafson/MONIQUE, C/NDT ([id number])           Note: If patient does not return for scheduled/ recommended follow up visits, this note will serve as a discharge from care along with most recent update on progress.

## 2023-09-07 ENCOUNTER — HOSPITAL ENCOUNTER (OUTPATIENT)
Dept: OCCUPATIONAL THERAPY | Age: 43
Setting detail: THERAPIES SERIES
Discharge: HOME | End: 2023-09-07

## 2023-09-07 DIAGNOSIS — B37.2 SKIN YEAST INFECTION: ICD-10-CM

## 2023-09-07 RX ORDER — NYSTATIN 100000 U/G
CREAM TOPICAL
Qty: 30 G | Refills: 1 | Status: SHIPPED | OUTPATIENT
Start: 2023-09-07

## 2023-09-07 NOTE — TELEPHONE ENCOUNTER
Recent Visits  Date Type Provider Dept   07/18/23 Office Visit Marty Alvarez MD [facility]   06/27/23 Office Visit [facility]   03/13/23 Office Visit HUSSEIN Mosqueda - CNP [facility]   10/26/22 Office Visit HUSSEIN Mosqueda - CNP [facility]   06/23/22 Office Visit Marty Alvarez MD [facility]   03/23/22 Office Visit Marty Alvarez MD [facility] recent visits within past 540 days with a meds authorizing provider and meeting all other requirements  Future Appointments  Date Type Provider Dept   10/18/23 Appointment Marty Alvarez MD [facility]   Showing future appointments within next 150 days with a meds authorizing provider and meeting all other requirements     7/18/2023

## 2023-09-07 NOTE — PROGRESS NOTES
Occupational Therapy  Cancellation/No-show Note  Patient Name:  Maria Eugenia Castelan   :  1980   Date:  2023  Cancelled visits to date: 1  No-shows to date: 0    Patient status for today's appointment patient:  []  Cancelled  []  Rescheduled appointment  []  No-show     Reason given by patient:  []  Patient ill  []  Conflicting appointment  []  No transportation    []  Conflict with work  []  No reason given  []  Other:     Comments:      Phone call information:   []  Phone call made today to patient at _ time at number provided:      []  Patient answered, conversation as follows:    []  Patient did not answer, message left as follows:  []  Phone call not made today    Electronically signed by:  Gloria Sharif OT

## 2023-09-12 ENCOUNTER — HOSPITAL ENCOUNTER (OUTPATIENT)
Dept: OCCUPATIONAL THERAPY | Age: 43
Setting detail: THERAPIES SERIES
Discharge: HOME | End: 2023-09-12

## 2023-09-12 NOTE — PROGRESS NOTES
Occupational Therapy  Cancellation/No-show Note  Patient Name:  Huma Lynch   :  1980   Date:  2023  Cancelled visits to date: 3  No-shows to date: 0    Patient status for today's appointment patient:  []  Cancelled  []  Rescheduled appointment  []  No-show     Reason given by patient:  [x]  Patient ill  []  Conflicting appointment  []  No transportation    []  Conflict with work  []  No reason given  []  Other:     Comments:      Phone call information:   []  Phone call made today to patient at _ time at number provided:      []  Patient answered, conversation as follows:    []  Patient did not answer, message left as follows:  []  Phone call not made today    Electronically signed by:  Umair Arora OT

## 2023-10-18 ENCOUNTER — OFFICE VISIT (OUTPATIENT)
Dept: PRIMARY CARE CLINIC | Age: 43
End: 2023-10-18
Payer: MEDICARE

## 2023-10-18 VITALS
HEART RATE: 58 BPM | WEIGHT: 261 LBS | OXYGEN SATURATION: 99 % | SYSTOLIC BLOOD PRESSURE: 138 MMHG | BODY MASS INDEX: 42.13 KG/M2 | DIASTOLIC BLOOD PRESSURE: 80 MMHG

## 2023-10-18 DIAGNOSIS — J30.2 SEASONAL ALLERGIES: ICD-10-CM

## 2023-10-18 DIAGNOSIS — F41.8 DEPRESSION WITH ANXIETY: ICD-10-CM

## 2023-10-18 DIAGNOSIS — I10 ESSENTIAL HYPERTENSION, BENIGN: Primary | ICD-10-CM

## 2023-10-18 DIAGNOSIS — G81.94 LEFT HEMIPARESIS (HCC): ICD-10-CM

## 2023-10-18 DIAGNOSIS — E55.9 VITAMIN D DEFICIENCY: ICD-10-CM

## 2023-10-18 DIAGNOSIS — R51.9 INTERMITTENT HEADACHE: ICD-10-CM

## 2023-10-18 DIAGNOSIS — D50.8 OTHER IRON DEFICIENCY ANEMIA: ICD-10-CM

## 2023-10-18 DIAGNOSIS — B37.2 SKIN YEAST INFECTION: ICD-10-CM

## 2023-10-18 DIAGNOSIS — E78.2 MIXED HYPERLIPIDEMIA: ICD-10-CM

## 2023-10-18 PROCEDURE — G8417 CALC BMI ABV UP PARAM F/U: HCPCS | Performed by: FAMILY MEDICINE

## 2023-10-18 PROCEDURE — 4004F PT TOBACCO SCREEN RCVD TLK: CPT | Performed by: FAMILY MEDICINE

## 2023-10-18 PROCEDURE — G8427 DOCREV CUR MEDS BY ELIG CLIN: HCPCS | Performed by: FAMILY MEDICINE

## 2023-10-18 PROCEDURE — 99214 OFFICE O/P EST MOD 30 MIN: CPT | Performed by: FAMILY MEDICINE

## 2023-10-18 PROCEDURE — 3079F DIAST BP 80-89 MM HG: CPT | Performed by: FAMILY MEDICINE

## 2023-10-18 PROCEDURE — 3075F SYST BP GE 130 - 139MM HG: CPT | Performed by: FAMILY MEDICINE

## 2023-10-18 PROCEDURE — G8484 FLU IMMUNIZE NO ADMIN: HCPCS | Performed by: FAMILY MEDICINE

## 2023-10-18 RX ORDER — DICLOFENAC SODIUM 75 MG/1
TABLET, DELAYED RELEASE ORAL
Qty: 180 TABLET | Refills: 3 | Status: SHIPPED | OUTPATIENT
Start: 2023-10-18

## 2023-10-18 RX ORDER — MELATONIN 10 MG
1 TABLET, SUBLINGUAL SUBLINGUAL
Qty: 48 TABLET | Refills: 1 | Status: SHIPPED | OUTPATIENT
Start: 2023-10-18

## 2023-10-18 RX ORDER — CETIRIZINE HYDROCHLORIDE 10 MG/1
10 TABLET ORAL DAILY
Qty: 90 TABLET | Refills: 3 | Status: SHIPPED | OUTPATIENT
Start: 2023-10-18

## 2023-10-18 RX ORDER — AMLODIPINE BESYLATE 10 MG/1
10 TABLET ORAL DAILY
Qty: 90 TABLET | Refills: 3 | Status: SHIPPED | OUTPATIENT
Start: 2023-10-18

## 2023-10-18 RX ORDER — FLUTICASONE PROPIONATE 50 MCG
1 SPRAY, SUSPENSION (ML) NASAL 2 TIMES DAILY
Qty: 16 G | Refills: 3 | Status: SHIPPED | OUTPATIENT
Start: 2023-10-18 | End: 2023-10-18

## 2023-10-18 RX ORDER — ROSUVASTATIN CALCIUM 10 MG/1
10 TABLET, COATED ORAL NIGHTLY
Qty: 90 TABLET | Refills: 3 | Status: SHIPPED | OUTPATIENT
Start: 2023-10-18

## 2023-10-18 RX ORDER — BACLOFEN 20 MG/1
20 TABLET ORAL 2 TIMES DAILY
Qty: 60 TABLET | Refills: 2 | Status: SHIPPED | OUTPATIENT
Start: 2023-10-18

## 2023-10-18 RX ORDER — FLUTICASONE PROPIONATE 50 MCG
SPRAY, SUSPENSION (ML) NASAL
Qty: 3 EACH | Refills: 1 | Status: SHIPPED | OUTPATIENT
Start: 2023-10-18

## 2023-10-18 RX ORDER — LOSARTAN POTASSIUM 50 MG/1
50 TABLET ORAL DAILY
Qty: 90 TABLET | Refills: 3 | Status: SHIPPED | OUTPATIENT
Start: 2023-10-18

## 2023-10-18 RX ORDER — FERROUS SULFATE 325(65) MG
325 TABLET ORAL
Qty: 90 TABLET | Refills: 3 | Status: CANCELLED | OUTPATIENT
Start: 2023-10-18

## 2023-10-18 RX ORDER — BUSPIRONE HYDROCHLORIDE 5 MG/1
5 TABLET ORAL DAILY
Qty: 90 TABLET | Refills: 1 | Status: SHIPPED | OUTPATIENT
Start: 2023-10-18

## 2023-10-18 RX ORDER — NYSTATIN 100000 U/G
CREAM TOPICAL
Qty: 30 G | Refills: 1 | Status: SHIPPED | OUTPATIENT
Start: 2023-10-18

## 2023-10-18 RX ORDER — HYDROCHLOROTHIAZIDE 12.5 MG/1
12.5 CAPSULE, GELATIN COATED ORAL DAILY
Qty: 90 CAPSULE | Refills: 3 | Status: SHIPPED | OUTPATIENT
Start: 2023-10-18

## 2023-10-18 RX ORDER — AMOXICILLIN 250 MG
1 CAPSULE ORAL DAILY
Qty: 30 TABLET | Refills: 3 | Status: SHIPPED | OUTPATIENT
Start: 2023-10-18

## 2023-10-18 RX ORDER — SENNOSIDES 8.6 MG
650 CAPSULE ORAL 2 TIMES DAILY PRN
Qty: 60 TABLET | Refills: 3 | Status: SHIPPED | OUTPATIENT
Start: 2023-10-18

## 2023-10-18 ASSESSMENT — PATIENT HEALTH QUESTIONNAIRE - PHQ9
10. IF YOU CHECKED OFF ANY PROBLEMS, HOW DIFFICULT HAVE THESE PROBLEMS MADE IT FOR YOU TO DO YOUR WORK, TAKE CARE OF THINGS AT HOME, OR GET ALONG WITH OTHER PEOPLE: 0
SUM OF ALL RESPONSES TO PHQ QUESTIONS 1-9: 8
SUM OF ALL RESPONSES TO PHQ9 QUESTIONS 1 & 2: 2
SUM OF ALL RESPONSES TO PHQ QUESTIONS 1-9: 8
6. FEELING BAD ABOUT YOURSELF - OR THAT YOU ARE A FAILURE OR HAVE LET YOURSELF OR YOUR FAMILY DOWN: 1
9. THOUGHTS THAT YOU WOULD BE BETTER OFF DEAD, OR OF HURTING YOURSELF: 0
2. FEELING DOWN, DEPRESSED OR HOPELESS: 2
8. MOVING OR SPEAKING SO SLOWLY THAT OTHER PEOPLE COULD HAVE NOTICED. OR THE OPPOSITE, BEING SO FIGETY OR RESTLESS THAT YOU HAVE BEEN MOVING AROUND A LOT MORE THAN USUAL: 0
SUM OF ALL RESPONSES TO PHQ QUESTIONS 1-9: 8
1. LITTLE INTEREST OR PLEASURE IN DOING THINGS: 0
4. FEELING TIRED OR HAVING LITTLE ENERGY: 1
3. TROUBLE FALLING OR STAYING ASLEEP: 0
7. TROUBLE CONCENTRATING ON THINGS, SUCH AS READING THE NEWSPAPER OR WATCHING TELEVISION: 1
SUM OF ALL RESPONSES TO PHQ QUESTIONS 1-9: 8
5. POOR APPETITE OR OVEREATING: 3

## 2023-10-18 ASSESSMENT — COLUMBIA-SUICIDE SEVERITY RATING SCALE - C-SSRS
3. HAVE YOU BEEN THINKING ABOUT HOW YOU MIGHT KILL YOURSELF?: NO
4. HAVE YOU HAD THESE THOUGHTS AND HAD SOME INTENTION OF ACTING ON THEM?: NO
5. HAVE YOU STARTED TO WORK OUT OR WORKED OUT THE DETAILS OF HOW TO KILL YOURSELF? DO YOU INTEND TO CARRY OUT THIS PLAN?: NO
7. DID THIS OCCUR IN THE LAST THREE MONTHS: NO

## 2023-10-18 NOTE — TELEPHONE ENCOUNTER
Recent Visits  Date Type Provider Dept   07/18/23 Office Visit Pio Jordan MD [facility]   06/27/23 Office Visit MD Charline Benz   03/13/23 Office Visit HUSSEIN Hernandez CNP [facility]   10/26/22 Office Visit HUSSEIN Hernandez - CNP [facility]   06/23/22 Office Visit [facility]io Jordan MD [facility] recent visits within past 540 days with a meds authorizing provider and meeting all other requirements  Today's Visits  Date Type Provider Dept   10/18/23 Office Visit MD Charline Benz today's visits with a meds authorizing provider and meeting all other requirements  Future Appointments  Date Type Provider Dept   01/18/24 Appointment Pio Jordan MD [facility]   Showing future appointments within next 150 days with a meds authorizing provider and meeting all other requirements

## 2023-10-18 NOTE — PROGRESS NOTES
Chief Complaint   Patient presents with    Hypertension       Subjective:   Reji Mayo is a 44 y.o. female here for 3-month follow-up on her hyperlipidemia and hypertension. Patient forgot to get her blood test before today's visit. Tolerating the change from atorvastatin to Crestor. Compliant with current medications and requesting refills. Complaining of intermittent headache at least once a week and requesting Tylenol refill. Emotionally doing okay off Cymbalta and just takes BuSpar as needed. Denies any chest pains, shortness of breath, abdominal pain, melena medication. PHQ-9 scored 8    Reviewed blood test done on June 30, 2023: Total cholesterol 236, triglyceride 177, HDL 45, , vitamin D 11.7, sodium 138, potassium 3.8, glucose 99, creatinine 0.9, calcium 9.0, ALT 7, AST 16, WBC 5.8, hemoglobin 12.3, hematocrit 38.1, HIV nonreactive.     Current Outpatient Medications   Medication Sig Dispense Refill    nystatin (MYCOSTATIN) 550154 UNIT/GM cream APPLY TO AFFECTED AREA TWICE A DAY 30 g 1    busPIRone (BUSPAR) 5 MG tablet Take 1 tablet by mouth daily 90 tablet 1    rosuvastatin (CRESTOR) 10 MG tablet Take 1 tablet by mouth nightly 90 tablet 3    Cholecalciferol (VITAMIN D3) 250 MCG (22142 UT) TABS Take 1 tablet by mouth three times a week 48 tablet 1    losartan (COZAAR) 50 MG tablet Take 1 tablet by mouth daily 90 tablet 3    senna-docusate (SENEXON-S) 8.6-50 MG per tablet Take 1 tablet by mouth daily 30 tablet 3    cetirizine (ZYRTEC) 10 MG tablet Take 1 tablet by mouth daily 90 tablet 3    hydroCHLOROthiazide (MICROZIDE) 12.5 MG capsule Take 1 capsule by mouth daily 90 capsule 3    diclofenac (VOLTAREN) 75 MG EC tablet TAKE 1 TABLET BY MOUTH TWICE A  tablet 3    amLODIPine (NORVASC) 10 MG tablet Take 1 tablet by mouth daily 90 tablet 3    baclofen (LIORESAL) 20 MG tablet Take 1 tablet by mouth 2 times daily 60 tablet 2    fluticasone (FLONASE) 50 MCG/ACT nasal spray 1 spray by

## 2023-10-24 DIAGNOSIS — G81.94 LEFT HEMIPARESIS (HCC): Primary | ICD-10-CM

## 2023-10-30 ENCOUNTER — HOSPITAL ENCOUNTER (OUTPATIENT)
Dept: OCCUPATIONAL THERAPY | Age: 43
Setting detail: THERAPIES SERIES
Discharge: HOME | End: 2023-10-30
Payer: MEDICARE

## 2023-10-30 PROCEDURE — 97112 NEUROMUSCULAR REEDUCATION: CPT

## 2023-10-30 PROCEDURE — 97168 OT RE-EVAL EST PLAN CARE: CPT

## 2023-10-30 PROCEDURE — 97530 THERAPEUTIC ACTIVITIES: CPT

## 2023-10-31 ENCOUNTER — HOSPITAL ENCOUNTER (OUTPATIENT)
Dept: PHYSICAL THERAPY | Age: 43
Setting detail: THERAPIES SERIES
Discharge: HOME | End: 2023-10-31
Payer: MEDICARE

## 2023-10-31 NOTE — FLOWSHEET NOTE
[address] [employer]     Physical Therapy  Cancellation/No-show Note  Patient Name:  Angelica Ricketts  :  1980   Date:  10/31/2023  Cancelled visits to date: 1  No-shows to date: 0    Patient status for today's appointment patient:  [x]  Cancelled eval on 10/31  []  Rescheduled appointment  []  No-show     Reason given by patient:  []  Patient ill  []  Conflicting appointment  []  No transportation    []  Conflict with work  []  No reason given  [x]  Other:  fell, has increased pain   Comments:      Phone call information:   []  Phone call made today to patient at _ time at number provided:      []  Patient answered, conversation as follows:    []  Patient did not answer, message left as follows:  []  Phone call not made today  [x]  Phone call not needed - pt contacted us to cancel and provided reason for cancellation.      Electronically signed by:  Zora Medina PT

## 2023-11-07 ENCOUNTER — HOSPITAL ENCOUNTER (OUTPATIENT)
Dept: PHYSICAL THERAPY | Age: 43
Setting detail: THERAPIES SERIES
Discharge: HOME | End: 2023-11-07

## 2023-11-07 NOTE — FLOWSHEET NOTE
[address] [employer]     Physical Therapy  Cancellation/No-show Note  Patient Name:  Courtney Mercer  :  1980   Date:  2023  Cancelled visits to date: 1  No-shows to date: 0    Patient status for today's appointment patient:  [x]  Cancelled  eval  []  Rescheduled appointment  []  No-show     Reason given by patient:  []  Patient ill  []  Conflicting appointment  []  No transportation    []  Conflict with work  []  No reason given  [x]  Other: family emergency     Comments:      Phone call information:   []  Phone call made today to patient at _ time at number provided:      []  Patient answered, conversation as follows:    []  Patient did not answer, message left as follows:  []  Phone call not made today  [x]  Phone call not needed - pt contacted us to cancel and provided reason for cancellation.      Electronically signed by:  Willis Dance, PT, DPT

## 2023-11-13 ENCOUNTER — HOSPITAL ENCOUNTER (OUTPATIENT)
Dept: PHYSICAL THERAPY | Age: 43
Setting detail: THERAPIES SERIES
Discharge: HOME | End: 2023-11-13
Payer: MEDICARE

## 2023-11-13 DIAGNOSIS — Z74.09 IMPAIRED TRANSFERS: ICD-10-CM

## 2023-11-13 DIAGNOSIS — G81.94 LEFT HEMIPARESIS (HCC): ICD-10-CM

## 2023-11-13 DIAGNOSIS — R53.1 WEAKNESS OF LEFT SIDE OF BODY: ICD-10-CM

## 2023-11-13 DIAGNOSIS — R47.1 DYSARTHRIA: Primary | ICD-10-CM

## 2023-11-13 DIAGNOSIS — M62.89 HYPOTONIA: ICD-10-CM

## 2023-11-13 DIAGNOSIS — Z74.09 IMPAIRED FUNCTIONAL MOBILITY, BALANCE, GAIT, AND ENDURANCE: ICD-10-CM

## 2023-11-13 DIAGNOSIS — G81.94 LEFT HEMIPARESIS (HCC): Primary | ICD-10-CM

## 2023-11-13 PROCEDURE — 97116 GAIT TRAINING THERAPY: CPT

## 2023-11-13 PROCEDURE — 97161 PT EVAL LOW COMPLEX 20 MIN: CPT

## 2023-11-13 PROCEDURE — 97530 THERAPEUTIC ACTIVITIES: CPT

## 2023-11-13 NOTE — FLOWSHEET NOTE
[facility] and Therapy [facility], [address] office: [phone] fax: [phone]      Physical Therapy: TREATMENT/PROGRESS NOTE   Patient: Geri Arce (21 y.o. female)   Treatment Date: 2023   :  1980 MRN: [id number]   Visit #: 1067 [facility] []Yes    [x]No    Insurance: Payor: MEDICARE / Plan: MEDICARE PART A AND B / Product Type: *No Product type* /   Insurance ID: [id number] - (Medicare)  Secondary Insurance (if applicable):    Treatment Diagnosis:     ICD-10-CM    1. Left hemiparesis ([address])  G81.94       2. Hypotonia  M62.89       3. Impaired functional mobility, balance, gait, and endurance  Z74.09       4. Weakness of left side of body  R53.1       5.  Impaired transfers  Z74.09          Medical Diagnosis:    Cramp and spasm [R25.2]  Hemiplegia, unspecified affecting left nondominant side [G81.94]   Referring Physician: Paddy Monge MD  PCP: Paddy Monge MD                             Plan of care signed (Y/N): n, sent     Date of Patient follow up with Physician: ?     Progress Report/POC: EVAL today  POC update due: (10 visits /OR Abigail Oz, whichever is less) visit 10 or  2023     CVA, PE, aneurysm      Preferred Language for Healthcare:   [x]English       []other:    SUBJECTIVE EXAMINATION     Patient Report/Comments: see eval     Test used Initial score  23   Pain Summary VAS 2-3/10    Functional questionnaire 30 sec STS, TUG, 10 m walk 7 reps, 47 sec, 0.22m/s     Other:                OBJECTIVE EXAMINATION     Observation:     Test measurements: see eval    Exercises/Interventions:     Therapeutic Ex (05662)  resistance Sets/time Reps Notes/Cues/Progressions                                                                         Gait (82656)       10 m walk  x1     Walk with [employer] with turns   25 feet x 3, turns to L x 3 to R x 3   More difficulty

## 2023-11-13 NOTE — PLAN OF CARE
contact their primary care physician regarding ROS issues if not already being addressed at this time. [x] Patient history, allergies, meds reviewed. Medical chart reviewed. See intake form.     C-SSRS Triggered by Intake questionnaire:   [x] No, Questionnaire did not trigger screening.   [] Yes, Patient intake triggered further evaluation      [] C-SSRS Screening completed  [] PCP notified via Plan of Care  [] Emergency services notified     Preferred Language for Healthcare: [address] [place] [address]     Co-morbidities/Complexities (which will affect course of rehabilitation):   []None        Arthritic conditions  [] Rheumatoid arthritis (M05.9)  [] Osteoarthritis (M19.91)  [] Gout        Neurological conditions  [x] Prior Stroke (I69.30)  [] Parkinson's (G20)  [] Encephalopathy (G93.40)  [] Multiple Sclerosis (G35)  [] Post-polio (G14)  [] Spinal cord injury (SCI)  [] Traumatic brain injury (TBI)  [x] aneurysm     Gastrointestinal conditions   [] Constipation (K59.00)  [] Chron's/ulcerative colitis    Endocrine conditions   [] Hypothyroid (E03.9)  [] Hyperthyroid [] Hx of COVID    Musculoskeletal conditions  [] Disc pathology   [] Congenital spine pathologies   [] Osteoporosis (M81.8)  [] Osteopenia (M85.8)  [] Scoliosis    Cardio/Pulmonary conditions  [] Asthma (J45)  [] Coughing   [] COPD (J44.9)  [] CHF  [x] PE          Rheumatological conditions  [] Fibromyalgia (M79.7)  [] Lupus  [] Sjogrens  [] Ankylosing spondylitis  [] Other autoimmune       Metabolic conditions  [] Morbid obesity (E66.01)  [] Diabetes type 1(E10.65) or 2 (E11.65)   [] Neuropathy (G60.9)        Cardiovascular conditions  [] Hypertension (I10)  [x] Hyperlipidemia (E78.5)  [] Angina pectoris (I20)  [] Atherosclerosis (I70)  [] Pacemaker/Defib  [] Hx of CABG/stent/cardiac surgeries        Developmental Disorders  [] Autism (F84.0)  [] Cerebral Palsy (G80)  [] Down Syndrome (Q90.9)  [] Developmental delay     Psychological

## 2023-11-14 ENCOUNTER — TELEPHONE (OUTPATIENT)
Dept: PRIMARY CARE CLINIC | Age: 43
End: 2023-11-14

## 2023-11-14 NOTE — TELEPHONE ENCOUNTER
----- Message from Kim Morrow MD sent at 11/13/2023  4:29 PM EST -----  Regarding: FW: SLP referral  Fax the Speech therapy referral to [phone].   ----- Message -----  From: Radha Adler PT  Sent: 11/13/2023   3:05 PM EST  To: Kim Morrow MD  Subject: SLP referral                                     Hi Dr. Kelley Barbosa Mrs. Osorio today for PT services. She mentioned she is also having difficulty with her speech, joe at the end of the day. Not with word finding necessarily, but with slurring and eva. I was wondering if you would be willing to send a SLP referral for her and we can get her into our speech therapist ASAP. Please reach out with any questions or concerns, or if you would like to discuss further. Our fax is [phone]. Thank you!   Martha Vega, PT, DPT  [facility]

## 2023-11-30 ENCOUNTER — HOSPITAL ENCOUNTER (OUTPATIENT)
Dept: OCCUPATIONAL THERAPY | Age: 43
Setting detail: THERAPIES SERIES
Discharge: HOME | End: 2023-11-30
Payer: MEDICARE

## 2023-11-30 ENCOUNTER — HOSPITAL ENCOUNTER (OUTPATIENT)
Dept: PHYSICAL THERAPY | Age: 43
Setting detail: THERAPIES SERIES
Discharge: HOME | End: 2023-11-30
Payer: MEDICARE

## 2023-11-30 ENCOUNTER — HOSPITAL ENCOUNTER (OUTPATIENT)
Dept: SPEECH THERAPY | Age: 43
Setting detail: THERAPIES SERIES
Discharge: HOME | End: 2023-11-30
Attending: FAMILY MEDICINE
Payer: MEDICARE

## 2023-11-30 PROCEDURE — 97116 GAIT TRAINING THERAPY: CPT

## 2023-11-30 PROCEDURE — 97112 NEUROMUSCULAR REEDUCATION: CPT

## 2023-11-30 PROCEDURE — 92523 SPEECH SOUND LANG COMPREHEN: CPT

## 2023-11-30 NOTE — FLOWSHEET NOTE
[address] [facility] and Therapy [facility], [address] [facility] [address] [place] [address] office: [phone] fax: [phone]      Physical Therapy: TREATMENT/PROGRESS NOTE   Patient: Houston Wolff (10 y.o. female)   Treatment Date: 2023   :  1980 MRN: [id number]   Visit #: 1067 [facility] []Yes    [x]No    Insurance: Payor: Sierra Morrow / Plan: MEDICARE PART A AND B / Product Type: *No Product type* /   Insurance ID: [id number] - (Medicare)  Secondary Insurance (if applicable):    Treatment Diagnosis:     ICD-10-CM    1. Left hemiparesis ([address])  G81.94       2. Hypotonia  M62.89       3. Impaired functional mobility, balance, gait, and endurance  Z74.09       4. Weakness of left side of body  R53.1       5.  Impaired transfers  Z74.09          Medical Diagnosis:    Cramp and spasm [R25.2]  Hemiplegia, unspecified affecting left nondominant side [G81.94]   Referring Physician: Connie Francis MD  PCP: Connie Francis MD                             Plan of care signed (Y/N): n, sent     Date of Patient follow up with Physician: ?     Progress Report/POC: NO  POC update due: (10 visits /OR [address], whichever is less) visit 10 or  2023     CVA, PE, aneurysm      Preferred Language for Healthcare:   [x]English       []other:    SUBJECTIVE EXAMINATION     Patient Report/Comments:      Test used Initial score  23   Pain Summary VAS 2-3/10    Functional questionnaire 30 sec STS, TUG, 10 m walk 7 reps, 47 sec, 0.22m/s     Other:                OBJECTIVE EXAMINATION     Observation: \" co tx with Vidal Asher OT    Test measurements: see eval    Exercises/Interventions:     Therapeutic Ex (94681)  resistance Sets/time Reps Notes/Cues/Progressions                                                                         Gait (46238)       10 m walk      Walk with [employer] with turns    More difficulty and less stable turning

## 2023-11-30 NOTE — FLOWSHEET NOTE
[address] [facility] Occupational Therapy  [facility], [address]  Phone: [phone]   Fax: [phone]          Occupational Therapy Daily Treatment Note  Date:  2023    Patient: Evert Lang   : 1980   MRN: [id number]  Referring Physician:  Dr. Kevin Valdez MD        Medical Diagnosis Information: left hemiplegia       Insurance information: [place]- [place], no [place], 80/, no copay    Plan of care sent to provider:      []Faxed   [x]Co-signature    (attempts: 1[] 2[] 3[])       Progress Report: [x]  Yes  []  No     Date Range for reporting period:  Beginnin23  Ending: end of POC    Progress report due (10 Rx/or 30 days whichever is less): visit #19 or      Recertification due (POC duration/ or 90 days whichever is less): visit #22 or 24    Visit # Insurance Allowable Auth required?  Date Range   10/12 + 0/10 [place] []  Yes  [x]  No N/a       Latex Allergy:  [x]No      []Yes  Pacemaker:  [] No       [] Yes     Preferred Language for Healthcare:   [x]English       []other:    Pain level:  0/10     SUBJECTIVE:   Patient presents with son, PT requested co treat to work on higher level balance and eccentric control    Functional Disability Index:  Quick DASH: total score- 40, disability score- 65.91%  10/30/23: Quick DASH: total score-  33, disability score- 50%      OBJECTIVE:      23: Bathing :  Patient reports she is washing herself with assist to complete tub transfer with bench and then she completes bathing by herself    23: Dressing: mod assist     23: pt reports she is still bathing mod I with assist for tub transfer and dressing with mod A (assist for threading 1 arm in shirt, assist for leggings, donning socks with RUE); improved triceps activation noted with use of LUE as active support with pt completing reach forward pushing dowel mahesh anchored at floor with min A    10/30/23: per pt report: mod I bathing with

## 2023-11-30 NOTE — PLAN OF CARE
[address], [address]  Phone: [phone]   Fax: [phone]                                                       Maria Teresa Yosef    Dear Dr. Paddy Monge MD,    We had the pleasure of evaluating the following patient for speech therapy services at [facility]. A summary of our findings can be found in the initial assessment below. This includes our plan of care. If you have any questions or concerns regarding these findings, please do not hesitate to contact me at the office phone number checked above. Thank you for the referral.       Physician Signature:_______________________________Date:__________________  By signing above (or electronic signature), therapist's plan is approved by physician      Patient: Geri Arce   : 1980   MRN: [id number]  Referring Physician: Dr. Paddy Monge MD      Evaluation Date: 2023      Medical Diagnosis Information:  R47.1 (ICD-10-CM) - Dysarthria  G81.94 (ICD-10-CM) - Left hemiparesis ([address])   Treatment Diagnosis: Mild Dysarthria; Mild Cognitive-Linguistic Deficits                                      Insurance information: MEDICARE / Plan: MEDICARE PART A AND B; BMN, no PA    Precautions/ Contra-indications: Fall risk; Hx brain bleed  Latex Allergy:  [x]NO      []YES  Preferred Language for Healthcare:   [x]English       []other:    SUBJECTIVE:   ONSET: 2021     Recent MRI Brain: OSH    History/Prior Level of Function: The patient is a 44year old female presenting to speech therapy for concerns with speech and memory. Medical history significant for [facility] with hydrocephalus s/p R internal carotid aneurysm rupture in .  Seen previously be speech therapy in inpatient rehabilitation at [facility] in  and as an outpatient, most recently 2022-2023 where pt met goals for speech, dysphagia, and cognitive-linguistic

## 2023-12-05 ENCOUNTER — HOSPITAL ENCOUNTER (OUTPATIENT)
Dept: PHYSICAL THERAPY | Age: 43
Setting detail: THERAPIES SERIES
Discharge: HOME | End: 2023-12-05
Attending: FAMILY MEDICINE
Payer: MEDICARE

## 2023-12-05 PROCEDURE — 97530 THERAPEUTIC ACTIVITIES: CPT

## 2023-12-05 PROCEDURE — 97112 NEUROMUSCULAR REEDUCATION: CPT

## 2023-12-05 NOTE — FLOWSHEET NOTE
[facility] [facility] and Therapy [address] [place] [address], [address] [facility], [facility] [place] [address] office: [phone] fax: [phone]      Physical Therapy: TREATMENT/PROGRESS NOTE   Patient: Huma Lynch (14 y.o. female)   Treatment Date: 2023   :  1980 MRN: [id number]   Visit #: 3 / [facility] []Yes    [x]No    Insurance: Payor: Rita Abts / Plan: MEDICARE PART A AND B / Product Type: *No Product type* /   Insurance ID: [id number] - (Medicare)  Secondary Insurance (if applicable):    Treatment Diagnosis:     ICD-10-CM    1. Left hemiparesis ([address])  G81.94       2. Hypotonia  M62.89       3. Impaired functional mobility, balance, gait, and endurance  Z74.09       4. Weakness of left side of body  R53.1       5. Impaired transfers  Z74.09          Medical Diagnosis:    Cramp and spasm [R25.2]  Hemiplegia, unspecified affecting left nondominant side [G81.94]   Referring Physician: Slade Ozuna MD  PCP: Slade Ozuna MD                             Plan of care signed (Y/N): n, sent     Date of Patient follow up with Physician: ?     Progress Report/POC: NO  POC update due: (10 visits /OR [address], whichever is less) visit 10 or  2023     CVA, PE, aneurysm      Preferred Language for Healthcare:   [x]English       []other:    SUBJECTIVE EXAMINATION     Patient Report/Comments:    Having pain in L knee today, takes tylenol and ibuprofen to improve but doesn't really help.        Test used Initial score  23   Pain Summary VAS 2-3/10 310   Functional questionnaire 30 sec STS, TUG, 10 m walk 7 reps, 47 sec, 0.22m/s     Other:                OBJECTIVE EXAMINATION     Observation: \" co tx with Collins Norton OT    Test measurements: see eval    Exercises/Interventions:     Therapeutic Ex (67458)  resistance Sets/time Reps Notes/Cues/Progressions

## 2023-12-07 ENCOUNTER — HOSPITAL ENCOUNTER (OUTPATIENT)
Dept: PHYSICAL THERAPY | Age: 43
Setting detail: THERAPIES SERIES
Discharge: HOME | End: 2023-12-07
Attending: FAMILY MEDICINE
Payer: MEDICARE

## 2023-12-07 NOTE — FLOWSHEET NOTE
[address] [facility]     Physical Therapy  Cancellation/No-show Note  Patient Name:  Joi Sigala  :  1980   Date:  2023  Cancelled visits to date: 2  No-shows to date: 0    Patient status for today's appointment patient:  [x]  Cancelled  eval,   []  Rescheduled appointment  []  No-show     Reason given by patient:  []  Patient ill  []  Conflicting appointment  [x]  No transportation    []  Conflict with work  []  No reason given  []  Other:     Comments:      Phone call information:   []  Phone call made today to patient at _ time at number provided:      []  Patient answered, conversation as follows:    []  Patient did not answer, message left as follows:  []  Phone call not made today  [x]  Phone call not needed - pt contacted us to cancel and provided reason for cancellation.      Electronically signed by:  Angelica Mann, PT, DPT

## 2023-12-08 ENCOUNTER — APPOINTMENT (OUTPATIENT)
Dept: PHYSICAL THERAPY | Age: 43
End: 2023-12-08
Attending: FAMILY MEDICINE
Payer: MEDICARE

## 2023-12-12 ENCOUNTER — HOSPITAL ENCOUNTER (OUTPATIENT)
Dept: SPEECH THERAPY | Age: 43
Setting detail: THERAPIES SERIES
Discharge: HOME | End: 2023-12-12
Attending: FAMILY MEDICINE
Payer: MEDICARE

## 2023-12-12 ENCOUNTER — HOSPITAL ENCOUNTER (OUTPATIENT)
Dept: PHYSICAL THERAPY | Age: 43
Setting detail: THERAPIES SERIES
Discharge: HOME | End: 2023-12-12
Attending: FAMILY MEDICINE
Payer: MEDICARE

## 2023-12-12 ENCOUNTER — HOSPITAL ENCOUNTER (OUTPATIENT)
Dept: OCCUPATIONAL THERAPY | Age: 43
Setting detail: THERAPIES SERIES
Discharge: HOME | End: 2023-12-12
Attending: FAMILY MEDICINE
Payer: MEDICARE

## 2023-12-12 NOTE — FLOWSHEET NOTE
Speech Therapy  Cancellation/No-show Note  Patient Name:  Geri Arce  :  1980   Date:  2023  Cancels to Date: 0  No-shows to Date: 1    Patient status for today's appointment patient:  []  Cancelled  []  Rescheduled appointment  [x]  No-show ()     Reason given by patient:  []  Patient ill  []  Conflicting appointment  []  No transportation    []  Conflict with work  []  No reason given  [x]  Other:     Comments: no show     Phone call information:   [x]  Phone call made today to patient at [phone] time at number provided:      []  Patient answered, conversation as follows:    [x]  Patient did not answer, message left as follows: appointment reminder and call back number provided to schedule additional appointments  []  Phone call not made today, pt called clinic and provided reason for cancellation    Electronically signed by:  Hanna Medina, SLP

## 2023-12-12 NOTE — FLOWSHEET NOTE
[address] [facility]     Physical Therapy  Cancellation/No-show Note  Patient Name:  Elliot An  :  1980   Date:  2023  Cancelled visits to date: 2  No-shows to date: 1    Patient status for today's appointment patient:  []  Cancelled  eval,   []  Rescheduled appointment  [x]  No-show       Reason given by patient:  []  Patient ill  []  Conflicting appointment  []  No transportation    []  Conflict with work  [x]  No reason given  []  Other:     Comments:      Phone call information:   []  Phone call made today to patient at _ time at number provided:      []  Patient answered, conversation as follows:    []  Patient did not answer, message left as follows:  [x]  Phone call not made today  []  Phone call not needed - pt contacted us to cancel and provided reason for cancellation.      Electronically signed by:  Jade Gipson, PT, DPT

## 2023-12-12 NOTE — FLOWSHEET NOTE
Occupational Therapy  Cancellation/No-show Note  Patient Name:  Sudhir Macedo   :  1980   Date:  2023  Cancelled visits to date: 2  No-shows to date: 0    Patient status for today's appointment patient:  []  Cancelled  []  Rescheduled appointment  []  No-show     Reason given by patient:  []  Patient ill  []  Conflicting appointment  []  No transportation    []  Conflict with work  []  No reason given  []  Other:     Comments:      Phone call information:   []  Phone call made today to patient at _ time at number provided:      []  Patient answered, conversation as follows:    []  Patient did not answer, message left as follows:  []  Phone call not made today    Electronically signed by:  Melinda Earl OT

## 2023-12-14 ENCOUNTER — HOSPITAL ENCOUNTER (OUTPATIENT)
Dept: PHYSICAL THERAPY | Age: 43
Setting detail: THERAPIES SERIES
Discharge: HOME | End: 2023-12-14
Attending: FAMILY MEDICINE
Payer: MEDICARE

## 2023-12-14 PROCEDURE — 97112 NEUROMUSCULAR REEDUCATION: CPT

## 2023-12-14 PROCEDURE — 97530 THERAPEUTIC ACTIVITIES: CPT

## 2023-12-14 NOTE — PLAN OF CARE
pattern: ascending with right, descending with right Supervision   [x] Progressing: [] Met: [] Not Met: [] Adjusted  2. Pt to demonstrate improved gait pattern including improved L weight bearing and knee flexion without cues with use of  small base quad cane (SBQC) on right  [x] Progressing: [] Met: [] Not Met: [] Adjusted  3. Pt will improve 10 meter walk test to 0.5 m/s to faster to demonstrate reduced all risk. [x] Progressing: [] Met: [] Not Met: [] Adjusted  4. Pt to report improved confidence with transferring into and out of car and with ambulating in community. [x] Progressing: [] Met: [] Not Met: [] Adjusted    Overall Progression Towards Functional goals/ Treatment Progress Update:  [] Patient is progressing as expected towards functional goals listed. [] Progression is slowed due to complexities/Impairments listed. [x] Progression has been slowed due to co-morbidities. [] Plan just implemented, too soon (<30days) to assess goals progression   [] Goals require adjustment due to lack of progress  [] Patient is not progressing as expected and requires additional follow up with physician  [] Other:     CHARGE CAPTURE     PT CHARGE GRID   CPT Code (TIMED) minutes # CPT Code (UNTIMED) #     [] Therex (10125)     [] EVAL:LOW (07298 - Typically 20 minutes face-to-face)     [x] Neuromusc. Re-ed (80330) 13 1  [] Re-Eval (22751)     [] Manual (77829)    [] Estim Unattended (75527)     [x] Ther. Act (01646) 25 2  [] [facility]. Traction (86935)     [] Gait (95325)    [] Dry Needle 1-2 muscle (53235)     [] Aquatic Therex (24551)    [] Dry Needle 3+ muscle (61950)     [] Iontophoresis (36387)    [] VASO (07878)     [] Ultrasound (87606)    [] Group Therapy (91130)     [] Estim Attended (86692)    [] Other:     Total Timed Code Tx Minutes 38 3       Total Treatment Minutes 38        Charge Justification:  (88658) NEUROMUSCULAR RE-EDUCATION - Therapeutic procedure, 1 or more areas, each 15 minutes; neuromuscular

## 2023-12-19 ENCOUNTER — APPOINTMENT (OUTPATIENT)
Dept: PHYSICAL THERAPY | Age: 43
End: 2023-12-19
Attending: FAMILY MEDICINE
Payer: MEDICARE

## 2023-12-20 ENCOUNTER — APPOINTMENT (OUTPATIENT)
Dept: PHYSICAL THERAPY | Age: 43
End: 2023-12-20
Attending: FAMILY MEDICINE
Payer: MEDICARE

## 2024-01-20 ENCOUNTER — HOSPITAL ENCOUNTER (OUTPATIENT)
Dept: PHYSICAL THERAPY | Age: 44
Setting detail: THERAPIES SERIES
Discharge: HOME | End: 2024-01-20
Attending: FAMILY MEDICINE
Payer: MEDICARE

## 2024-01-20 PROCEDURE — 97112 NEUROMUSCULAR REEDUCATION: CPT

## 2024-01-20 PROCEDURE — 97110 THERAPEUTIC EXERCISES: CPT

## 2024-01-20 PROCEDURE — 97140 MANUAL THERAPY 1/> REGIONS: CPT

## 2024-01-20 NOTE — PLAN OF CARE
services:  The patient has a musculoskeletal condition(s) with a corresponding ICD-10 code that is of complexity and severity that require skilled therapeutic intervention. This has a direct and significant impact on the need for therapy and significantly impacts the rate of recovery.   The patient has a complexity identified by an ICD-10 code that has a direct and significant impact on the need for therapy.  (Significantly impacts the rate of recovery and is associated with a primary condition.)   The patient has generalized musculoskeletal conditions or a condition affecting multiple sites that will have a direct impact on the rate of recovery    Treatment/Activity Tolerance:  [x] Patient tolerated treatment well [] Patient limited by fatique  [] Patient limited by pain  [] Patient limited by other medical complications  [] Other:     Return to Play: NA    Prognosis for POC: [x] Good [] Fair  [] Poor    Patient requires continued skilled intervention: [x] Yes  [] No      GOALS     Patient stated goal: increase independence at home - stairs, turning, bed, transfers  [] Progressing: [] Met: [] Not Met: [] Adjusted    Therapist goals for Patient:   Short Term Goals: To be achieved in: 3 weeks   1.  Independent in HEP and progression per patient tolerance.   [x] Progressing: [] Met: [] Not Met: [] Adjusted   2. Pt will improve TUG to 30 sec or less to improve functional ambulation.   [x] Progressing: [] Met: [] Not Met: [] Adjusted  3. Pt to perform transfers via sit-pivot technique into the car with use of no AD and CGA.  [x] Progressing: [] Met: [] Not Met: [] Adjusted    Long Term Goals: To be achieved in: 12 weeks  1. Pt to negotiate up/down 12 stairs with step to pattern: ascending with right, descending with right Supervision   [x] Progressing: [] Met: [] Not Met: [] Adjusted  2. Pt to demonstrate improved gait pattern including improved L weight bearing and knee flexion without cues with use of  small base quad

## 2024-01-29 DIAGNOSIS — G81.94 LEFT HEMIPARESIS (HCC): ICD-10-CM

## 2024-01-31 ENCOUNTER — HOSPITAL ENCOUNTER (OUTPATIENT)
Dept: PHYSICAL THERAPY | Age: 44
Setting detail: THERAPIES SERIES
Discharge: HOME | End: 2024-01-31
Attending: FAMILY MEDICINE
Payer: MEDICARE

## 2024-01-31 PROCEDURE — 97116 GAIT TRAINING THERAPY: CPT

## 2024-01-31 PROCEDURE — 97112 NEUROMUSCULAR REEDUCATION: CPT

## 2024-01-31 PROCEDURE — 97110 THERAPEUTIC EXERCISES: CPT

## 2024-01-31 PROCEDURE — 97140 MANUAL THERAPY 1/> REGIONS: CPT

## 2024-01-31 NOTE — PLAN OF CARE
RE-EDUCATION - Therapeutic procedure, 1 or more areas, each 15 minutes; neuromuscular reeducation of movement, balance, coordination, kinesthetic sense, posture, and/or proprioception for sitting and/or standing activities  (10755) THERAPEUTIC ACTIVITY - use of dynamic activities to improve functional performance. (Ex include squatting, ascending/descending stairs, walking, bending, lifting, catching, throwing, pushing, pulling, jumping.)  Direct, one on one contact, billed in 15-minute increments.  (22628) MANUAL THERAPY -  Manual therapy techniques, 1 or more regions, each 15 minutes (Mobilization/manipulation, manual lymphatic drainage, manual traction) for the purpose of modulating pain, promoting relaxation,  increasing ROM, reducing/eliminating soft tissue swelling/inflammation/restriction, improving soft tissue extensibility and allowing for proper ROM for normal function with self care, mobility, lifting and ambulation    TREATMENT PLAN   Plan: Cont POC- Continue emphasis/focus on exercise progression, improving proper muscle recruitment and activation/motor control patterns, increasing ROM, static and dynamic balance, kinesthetic sense and proprioception, and improving postural awareness. Next visit plan to progress balance and continue current phase     Electronically Signed by Blanche Santiago, PT  , DPT, OMT-C           Date: 01/31/2024     Note: If patient does not return for scheduled/recommended follow up visits, this note will serve as a discharge from care along with the most recent update on progress.

## 2024-02-02 ENCOUNTER — OFFICE VISIT (OUTPATIENT)
Dept: PRIMARY CARE CLINIC | Age: 44
End: 2024-02-02
Payer: MEDICARE

## 2024-02-02 VITALS
HEIGHT: 66 IN | SYSTOLIC BLOOD PRESSURE: 132 MMHG | WEIGHT: 272 LBS | OXYGEN SATURATION: 98 % | BODY MASS INDEX: 43.71 KG/M2 | HEART RATE: 58 BPM | DIASTOLIC BLOOD PRESSURE: 80 MMHG

## 2024-02-02 DIAGNOSIS — G81.94 LEFT HEMIPARESIS (HCC): ICD-10-CM

## 2024-02-02 DIAGNOSIS — E55.9 VITAMIN D DEFICIENCY: ICD-10-CM

## 2024-02-02 DIAGNOSIS — E78.2 MIXED HYPERLIPIDEMIA: ICD-10-CM

## 2024-02-02 DIAGNOSIS — I10 ESSENTIAL HYPERTENSION, BENIGN: ICD-10-CM

## 2024-02-02 DIAGNOSIS — Z00.00 WELCOME TO MEDICARE PREVENTIVE VISIT: Primary | ICD-10-CM

## 2024-02-02 DIAGNOSIS — E66.01 OBESITY, CLASS III, BMI 40-49.9 (MORBID OBESITY) (HCC): ICD-10-CM

## 2024-02-02 PROCEDURE — 36415 COLL VENOUS BLD VENIPUNCTURE: CPT | Performed by: FAMILY MEDICINE

## 2024-02-02 PROCEDURE — 3075F SYST BP GE 130 - 139MM HG: CPT | Performed by: FAMILY MEDICINE

## 2024-02-02 PROCEDURE — 3079F DIAST BP 80-89 MM HG: CPT | Performed by: FAMILY MEDICINE

## 2024-02-02 PROCEDURE — G0402 INITIAL PREVENTIVE EXAM: HCPCS | Performed by: FAMILY MEDICINE

## 2024-02-02 ASSESSMENT — PATIENT HEALTH QUESTIONNAIRE - PHQ9
SUM OF ALL RESPONSES TO PHQ QUESTIONS 1-9: 4
1. LITTLE INTEREST OR PLEASURE IN DOING THINGS: 1
4. FEELING TIRED OR HAVING LITTLE ENERGY: 1
3. TROUBLE FALLING OR STAYING ASLEEP: 0
5. POOR APPETITE OR OVEREATING: 2
SUM OF ALL RESPONSES TO PHQ9 QUESTIONS 1 & 2: 1
SUM OF ALL RESPONSES TO PHQ QUESTIONS 1-9: 4
SUM OF ALL RESPONSES TO PHQ QUESTIONS 1-9: 4
8. MOVING OR SPEAKING SO SLOWLY THAT OTHER PEOPLE COULD HAVE NOTICED. OR THE OPPOSITE, BEING SO FIGETY OR RESTLESS THAT YOU HAVE BEEN MOVING AROUND A LOT MORE THAN USUAL: 0
10. IF YOU CHECKED OFF ANY PROBLEMS, HOW DIFFICULT HAVE THESE PROBLEMS MADE IT FOR YOU TO DO YOUR WORK, TAKE CARE OF THINGS AT HOME, OR GET ALONG WITH OTHER PEOPLE: 0
7. TROUBLE CONCENTRATING ON THINGS, SUCH AS READING THE NEWSPAPER OR WATCHING TELEVISION: 0
9. THOUGHTS THAT YOU WOULD BE BETTER OFF DEAD, OR OF HURTING YOURSELF: 0
2. FEELING DOWN, DEPRESSED OR HOPELESS: 0
6. FEELING BAD ABOUT YOURSELF - OR THAT YOU ARE A FAILURE OR HAVE LET YOURSELF OR YOUR FAMILY DOWN: 0
SUM OF ALL RESPONSES TO PHQ QUESTIONS 1-9: 4

## 2024-02-02 ASSESSMENT — LIFESTYLE VARIABLES
HOW MANY STANDARD DRINKS CONTAINING ALCOHOL DO YOU HAVE ON A TYPICAL DAY: 1 OR 2
HOW OFTEN DO YOU HAVE A DRINK CONTAINING ALCOHOL: 2-3 TIMES A WEEK

## 2024-02-02 NOTE — PROGRESS NOTES
History   Administered Date(s) Administered    DTaP 08/26/2008    Influenza A ([id number]) Vaccine PF IM 01/06/2010    Influenza Virus Vaccine 09/23/2009, 01/06/2010, 12/14/2011    Influenza, FLUARIX, FLULAVAL, FLUZONE (age 6 mo+) AND AFLURIA, (age 3 y+), PF, 0.5mL 12/20/2019        Health Maintenance   Topic Date Due    Hepatitis B vaccine (1 of 3 - 3-dose series) Never done    COVID-19 Vaccine (1) Never done    Pneumococcal 0-64 years Vaccine (1 - PCV) Never done    DTaP/Tdap/Td vaccine (2 - Tdap) 08/26/2018    Flu vaccine (1) 10/18/2024 (Originally 8/1/2023)    Lipids  06/30/2024    Depression Monitoring  02/02/2025    Annual Wellness Visit (Medicare)  02/02/2025    Hepatitis C screen  Completed    HIV screen  Completed    Hepatitis A vaccine  Aged Out    Hib vaccine  Aged Out    HPV vaccine  Aged Out    Polio vaccine  Aged Out    Meningococcal (ACWY) vaccine  Aged Out    Varicella vaccine  Discontinued    Depression Screen  Discontinued     Recommendations for Preventive Services Due: see orders and patient instructions/AVS.    1. Welcome to Medicare preventive visit  Offered immunizations but declined.  - Comprehensive Metabolic Panel    2. Left hemiparesis (HCC)  Continue PT/OT and speech therapy.  Renewed the physical therapy order.  Wears left leg brace.  Continue baclofen 20 mg twice a day.    3. Essential hypertension, benign  Blood pressure stable at 132/80, goal is to keep it under 140/90 but above 90/60.  Continue amlodipine 10 mg daily, hydrochlorothiazide 12.5 mg daily, losartan 50 mg daily and Toprol- mg daily.  Watch salt intake.  Work on the weight loss.  - Comprehensive Metabolic Panel    4. Mixed hyperlipidemia  Last LDL was 156 and the goal is less than 80.  On Crestor 10 mg at night.  - Comprehensive Metabolic Panel  - Lipid Panel    5. Vitamin D deficiency  On vitamin D 10,000 units 3 times a week.  Check level  - Vitamin D 25 Hydroxy    6. Obesity, Class III, BMI 40-49.9 (morbid obesity)

## 2024-02-02 NOTE — PATIENT INSTRUCTIONS
Wait for an ambulance. Do not try to drive yourself.  Watch closely for changes in your health, and be sure to contact your doctor if you have any problems.  Where can you learn more?  Go to https://www.[employer].net/patientEd and enter F075 to learn more about \"A Healthy Heart: Care Instructions.\"  Current as of: June 25, 2023               Content Version: 13.9  © 5106-8241 [employer].   Care instructions adapted under license by [employer]. If you have questions about a medical condition or this instruction, always ask your healthcare professional. [employer] disclaims any warranty or liability for your use of this information.      Personalized Preventive Plan for Geoffrey Osorio - 2/2/2024  Medicare offers a range of preventive health benefits. Some of the tests and screenings are paid in full while other may be subject to a deductible, co-insurance, and/or copay.    Some of these benefits include a comprehensive review of your medical history including lifestyle, illnesses that may run in your family, and various assessments and screenings as appropriate.    After reviewing your medical record and screening and assessments performed today your provider may have ordered immunizations, labs, imaging, and/or referrals for you.  A list of these orders (if applicable) as well as your Preventive Care list are included within your After Visit Summary for your review.    Other Preventive Recommendations:    A preventive eye exam performed by an eye specialist is recommended every 1-2 years to screen for glaucoma; cataracts, macular degeneration, and other eye disorders.  A preventive dental visit is recommended every 6 months.  Try to get at least 150 minutes of exercise per week or 10,000 steps per day on a pedometer .  Order or download the FREE \"Exercise & Physical Activity: Your Everyday Guide\" from [employer]. Call 1-768.394.4337 or search The National

## 2024-02-03 LAB
25(OH)D3 SERPL-MCNC: 13.4 NG/ML
ALBUMIN SERPL-MCNC: 4.4 G/DL (ref 3.4–5)
ALBUMIN/GLOB SERPL: 1.7 {RATIO} (ref 1.1–2.2)
ALP SERPL-CCNC: 89 U/L (ref 40–129)
ALT SERPL-CCNC: 11 U/L (ref 10–40)
ANION GAP SERPL CALCULATED.3IONS-SCNC: 13 MMOL/L (ref 3–16)
AST SERPL-CCNC: 12 U/L (ref 15–37)
BILIRUB SERPL-MCNC: <0.2 MG/DL (ref 0–1)
BUN SERPL-MCNC: 8 MG/DL (ref 7–20)
CALCIUM SERPL-MCNC: 9.2 MG/DL (ref 8.3–10.6)
CHLORIDE SERPL-SCNC: 105 MMOL/L (ref 99–110)
CHOLEST SERPL-MCNC: 208 MG/DL (ref 0–199)
CO2 SERPL-SCNC: 23 MMOL/L (ref 21–32)
CREAT SERPL-MCNC: 0.8 MG/DL (ref 0.6–1.1)
GFR SERPLBLD CREATININE-BSD FMLA CKD-EPI: >60 ML/MIN/{1.73_M2}
GLUCOSE SERPL-MCNC: 97 MG/DL (ref 70–99)
HDLC SERPL-MCNC: 52 MG/DL (ref 40–60)
LDLC SERPL CALC-MCNC: 122 MG/DL
POTASSIUM SERPL-SCNC: 4 MMOL/L (ref 3.5–5.1)
PROT SERPL-MCNC: 7 G/DL (ref 6.4–8.2)
SODIUM SERPL-SCNC: 141 MMOL/L (ref 136–145)
TRIGL SERPL-MCNC: 172 MG/DL (ref 0–150)
VLDLC SERPL CALC-MCNC: 34 MG/DL

## 2024-02-05 DIAGNOSIS — E55.9 VITAMIN D DEFICIENCY: ICD-10-CM

## 2024-02-05 DIAGNOSIS — E78.2 MIXED HYPERLIPIDEMIA: ICD-10-CM

## 2024-02-05 RX ORDER — MELATONIN 10 MG
1 TABLET, SUBLINGUAL SUBLINGUAL
Qty: 48 TABLET | Refills: 1 | Status: SHIPPED | OUTPATIENT
Start: 2024-02-05

## 2024-02-05 RX ORDER — ROSUVASTATIN CALCIUM 10 MG/1
10 TABLET, COATED ORAL NIGHTLY
Qty: 90 TABLET | Refills: 3 | Status: SHIPPED | OUTPATIENT
Start: 2024-02-05

## 2024-02-06 ENCOUNTER — HOSPITAL ENCOUNTER (OUTPATIENT)
Dept: PHYSICAL THERAPY | Age: 44
Setting detail: THERAPIES SERIES
Discharge: HOME | End: 2024-02-06
Attending: FAMILY MEDICINE
Payer: MEDICARE

## 2024-02-06 PROCEDURE — 97530 THERAPEUTIC ACTIVITIES: CPT

## 2024-02-06 PROCEDURE — 97112 NEUROMUSCULAR REEDUCATION: CPT

## 2024-02-06 NOTE — FLOWSHEET NOTE
[facility] - Outpatient Rehabilitation and Therapy [address], [place] [address] office: [phone] fax: [phone]    Physical Therapy: TREATMENT/PROGRESS NOTE   Patient: Geoffrey Osorio (43 y.o. female)   Treatment Date: 2024   :  1980 MRN: [id number]   Visit #:   Insurance Allowable Auth Needed   Med nec []Yes    [x]No    Insurance: Payor: MEDICARE / Plan: MEDICARE PART A AND B / Product Type: *No Product type* /   Insurance ID: [id number] - (Medicare)  Secondary Insurance (if applicable):    Treatment Diagnosis:     ICD-10-CM    1. Left hemiparesis (HCC)  G81.94       2. Hypotonia  M62.89       3. Impaired functional mobility, balance, gait, and endurance  Z74.09       4. Weakness of left side of body  R53.1       5. Impaired transfers  Z74.09          Medical Diagnosis:    Cramp and spasm [R25.2]  Hemiplegia, unspecified affecting left nondominant side [G81.94]   Referring Physician: Reyes, Eleia J, MD  PCP: Reyes, Eleia J, MD                             Plan of care signed (Y/N): n, sent     Date of Patient follow up with Physician: ?     Progress Report/POC: NO  POC update due: (10 visits /OR AUTH LIMITS, whichever is less) visit 10 or  2023 (will complete next visit; held secondary to time constraints and pt reports of dizziness)    CVA, PE, aneurysm      Preferred Language for Healthcare:   [x]English       []other:    SUBJECTIVE EXAMINATION     Patient Report/Comments:  :pt states she is doing well, with noting to new to really reports.States she has had a sore neck the past couple days     24:  Pt reports she woke with dizziness this am and it got worse as got up and moved around.  States she has taken her medication and doesn't feel symptoms are related to blood pressure.  Pt states she feels like participating in PT this am.       Test used Initial score  23   Pain Summary VAS 2-3/10 210    Functional

## 2024-02-08 ENCOUNTER — APPOINTMENT (OUTPATIENT)
Dept: PHYSICAL THERAPY | Age: 44
End: 2024-02-08
Attending: FAMILY MEDICINE
Payer: MEDICARE

## 2024-02-12 ENCOUNTER — HOSPITAL ENCOUNTER (OUTPATIENT)
Dept: PHYSICAL THERAPY | Age: 44
Setting detail: THERAPIES SERIES
Discharge: HOME | End: 2024-02-12
Attending: FAMILY MEDICINE
Payer: MEDICARE

## 2024-02-12 PROCEDURE — 97530 THERAPEUTIC ACTIVITIES: CPT

## 2024-02-12 PROCEDURE — 97112 NEUROMUSCULAR REEDUCATION: CPT

## 2024-02-12 NOTE — PLAN OF CARE
[facility] - Outpatient Rehabilitation and Therapy [address], [place] [address] office: [phone] fax: [phone]    Physical Therapy Re-Certification Plan of Care    Dear Reyes, Eleia J, MD  ,    We had the pleasure of treating the following patient for physical therapy services at [facility]. A summary of our findings can be found in the updated assessment below.  This includes our plan of care.  If you have any questions or concerns regarding these findings, please do not hesitate to contact me at the office phone number checked above.  Thank you for the referral.     Physician Signature:________________________________Date:__________________  By signing above (or electronic signature), therapist's plan is approved by physician      Functional Outcome: TUG 38 sec     Geoffrey Osorio 1980 presented to therapy in November with a hx of CVA in 2021. She has now been seen for 8 visits since beginning this episode and has made mild progress. She admittedly does not do HEP. She demos slightly increased speed with TUG and 10 m walk compared to eval as well as increased reps with 30 sec STS test. However, pt continues to demo poor gait mechanics and avoidance of L sided weight bearing with all activities. Pt can make corrections with cues, however there is little to no carry over from session to session. Pt also demos poor motor planning for simple and complex tasks, partially due to lack of vision in L eye. Pt would benefit from continued skilled therapy for a few more weeks to motivate pt to complete HEP, further improve speed and safety with gait, and reach remaining goals.       Overall Response to Treatment:   [x]Patient is responding well to treatment and improvement is noted with regards to goals   [x]Patient should continue to improve in reasonable time if they continue HEP   []Patient has plateaued and is no longer responding to skilled PT

## 2024-02-14 ENCOUNTER — APPOINTMENT (OUTPATIENT)
Dept: PHYSICAL THERAPY | Age: 44
End: 2024-02-14
Attending: FAMILY MEDICINE
Payer: MEDICARE

## 2024-02-26 ENCOUNTER — HOSPITAL ENCOUNTER (OUTPATIENT)
Dept: PHYSICAL THERAPY | Age: 44
Setting detail: THERAPIES SERIES
End: 2024-02-26
Attending: FAMILY MEDICINE
Payer: MEDICARE

## 2024-03-19 ENCOUNTER — HOSPITAL ENCOUNTER (OUTPATIENT)
Dept: PHYSICAL THERAPY | Age: 44
Setting detail: THERAPIES SERIES
Discharge: HOME | End: 2024-03-19
Attending: FAMILY MEDICINE
Payer: MEDICARE

## 2024-03-19 PROCEDURE — 97110 THERAPEUTIC EXERCISES: CPT

## 2024-03-19 PROCEDURE — 97530 THERAPEUTIC ACTIVITIES: CPT

## 2024-03-19 PROCEDURE — 97112 NEUROMUSCULAR REEDUCATION: CPT

## 2024-03-19 NOTE — PLAN OF CARE
therapy for a few more weeks to motivate pt to complete HEP, further improve speed and safety with gait, and reach remaining goals.          Medical Necessity Documentation:  I certify that this patient meets the below criteria necessary for medical necessity for care and/or justification of therapy services:  The patient has a musculoskeletal condition(s) with a corresponding ICD-10 code that is of complexity and severity that require skilled therapeutic intervention. This has a direct and significant impact on the need for therapy and significantly impacts the rate of recovery.   The patient has a complexity identified by an ICD-10 code that has a direct and significant impact on the need for therapy.  (Significantly impacts the rate of recovery and is associated with a primary condition.)   The patient has generalized musculoskeletal conditions or a condition affecting multiple sites that will have a direct impact on the rate of recovery    Treatment/Activity Tolerance:  [x] Patient tolerated treatment well [] Patient limited by fatique  [] Patient limited by pain  [] Patient limited by other medical complications  [] Other:     Return to Play: NA    Prognosis for POC: [x] Good [] Fair  [] Poor    Patient requires continued skilled intervention: [x] Yes  [] No      GOALS     Patient stated goal: increase independence at home - stairs, turning, bed, transfers  [] Progressing: [] Met: [] Not Met: [] Adjusted    Therapist goals for Patient:   Short Term Goals: To be achieved in: 3 weeks   1.  Independent in HEP and progression per patient tolerance.   [x] Progressing: [] Met: [] Not Met: [] Adjusted   2. Pt will improve TUG to 30 sec or less to improve functional ambulation.   [x] Progressing: [] Met: [] Not Met: [] Adjusted  3. Pt to perform transfers via sit-pivot technique into the car with use of no AD and CGA.  [x] Progressing: [] Met: [] Not Met: [] Adjusted    Long Term Goals: To be achieved in: 12 weeks  1.

## 2024-03-21 ENCOUNTER — HOSPITAL ENCOUNTER (OUTPATIENT)
Dept: PHYSICAL THERAPY | Age: 44
Setting detail: THERAPIES SERIES
Discharge: HOME | End: 2024-03-21
Attending: FAMILY MEDICINE
Payer: MEDICARE

## 2024-03-21 PROCEDURE — 97112 NEUROMUSCULAR REEDUCATION: CPT

## 2024-03-21 PROCEDURE — 97116 GAIT TRAINING THERAPY: CPT

## 2024-03-21 NOTE — FLOWSHEET NOTE
[facility] - Outpatient Rehabilitation and Therapy [address], [place] [address] office: [phone] fax: [phone]      Physical Therapy: TREATMENT/PROGRESS NOTE   Patient: Geoffrey Osorio (44 y.o. female)   Treatment Date: 2024   :  1980 MRN: [id number]   Visit #: 10 / 12 (+6 pending)  Insurance Allowable Auth Needed   Med nec []Yes    [x]No    Insurance: Payor: MEDICARE / Plan: MEDICARE PART A AND B / Product Type: *No Product type* /   Insurance ID: [id number] - (Medicare)  Secondary Insurance (if applicable):    Treatment Diagnosis:     ICD-10-CM    1. Left hemiparesis (HCC)  G81.94       2. Hypotonia  M62.89       3. Impaired functional mobility, balance, gait, and endurance  Z74.09       4. Weakness of left side of body  R53.1       5. Impaired transfers  Z74.09          Medical Diagnosis:    Cramp and spasm [R25.2]  Hemiplegia, unspecified affecting left nondominant side [G81.94]   Referring Physician: Reyes, Eleia J, MD  PCP: Reyes, Eleia J, MD                             Plan of care signed (Y/N): Y, 23, Y POC    Date of Patient follow up with Physician: ?     Progress Report/POC: NO    POC update due: (10 visits /OR AUTH LIMITS, whichever is less) visit 18 or 24    CVA, PE, aneurysm      Preferred Language for Healthcare:   [x]English       []other:    SUBJECTIVE EXAMINATION     Patient Report/Comments:    Doing okay, not feeling totally motivated, denies any pain.    3/22: mother arrived for session this date       Test used Initial score  11/13/23 2023 2024   3/21/2024     Pain Summary VAS 2-3/10 3/10 L knee, 3-4 R shoulder  3-4/10 R side of neck  0/10   Functional questionnaire 30 sec STS, TUG, 10 m walk 7 reps, 47 sec,   0.22m/s  9 reps, 51 sec ,   0.27m/s 12 reps, 38 sec,   0.26m/s    Other:                    OBJECTIVE EXAMINATION     Observation:   3/19: pt continues to amb with circumduction on L and large lateral lean towards

## 2024-03-26 ENCOUNTER — HOSPITAL ENCOUNTER (OUTPATIENT)
Dept: PHYSICAL THERAPY | Age: 44
Setting detail: THERAPIES SERIES
Discharge: HOME | End: 2024-03-26
Attending: FAMILY MEDICINE
Payer: MEDICARE

## 2024-03-26 PROCEDURE — 97116 GAIT TRAINING THERAPY: CPT

## 2024-03-26 PROCEDURE — 97112 NEUROMUSCULAR REEDUCATION: CPT

## 2024-03-26 NOTE — FLOWSHEET NOTE
Visible muscle fatigue in L LE noted with prolonged stance. Difficulty continues to L hip flexion, ext and overall functional mobility.   Continue to progress LLE mm fiber recruitment to improve transfers, balance and gait.          Medical Necessity Documentation:  I certify that this patient meets the below criteria necessary for medical necessity for care and/or justification of therapy services:  The patient has a musculoskeletal condition(s) with a corresponding ICD-10 code that is of complexity and severity that require skilled therapeutic intervention. This has a direct and significant impact on the need for therapy and significantly impacts the rate of recovery.   The patient has a complexity identified by an ICD-10 code that has a direct and significant impact on the need for therapy.  (Significantly impacts the rate of recovery and is associated with a primary condition.)   The patient has generalized musculoskeletal conditions or a condition affecting multiple sites that will have a direct impact on the rate of recovery    Treatment/Activity Tolerance:  [x] Patient tolerated treatment well [] Patient limited by fatique  [] Patient limited by pain  [] Patient limited by other medical complications  [] Other:     Return to Play: NA    Prognosis for POC: [x] Good [] Fair  [] Poor    Patient requires continued skilled intervention: [x] Yes  [] No      GOALS     Patient stated goal: increase independence at home - stairs, turning, bed, transfers  [] Progressing: [] Met: [] Not Met: [] Adjusted    Therapist goals for Patient:   Short Term Goals: To be achieved in: 3 weeks   1.  Independent in HEP and progression per patient tolerance.   [x] Progressing: [] Met: [] Not Met: [] Adjusted   2. Pt will improve TUG to 30 sec or less to improve functional ambulation.   [x] Progressing: [] Met: [] Not Met: [] Adjusted  3. Pt to perform transfers via sit-pivot technique into the car with use of no AD and CGA.  [x]

## 2024-03-28 ENCOUNTER — HOSPITAL ENCOUNTER (OUTPATIENT)
Dept: PHYSICAL THERAPY | Age: 44
Setting detail: THERAPIES SERIES
End: 2024-03-28
Attending: FAMILY MEDICINE
Payer: MEDICARE

## 2024-04-02 ENCOUNTER — HOSPITAL ENCOUNTER (OUTPATIENT)
Dept: PHYSICAL THERAPY | Age: 44
Setting detail: THERAPIES SERIES
End: 2024-04-02
Attending: FAMILY MEDICINE
Payer: MEDICARE

## 2024-04-04 ENCOUNTER — APPOINTMENT (OUTPATIENT)
Dept: PHYSICAL THERAPY | Age: 44
End: 2024-04-04
Attending: FAMILY MEDICINE
Payer: MEDICARE

## 2024-04-08 DIAGNOSIS — G81.94 LEFT HEMIPARESIS (HCC): ICD-10-CM

## 2024-04-09 ENCOUNTER — HOSPITAL ENCOUNTER (OUTPATIENT)
Dept: PHYSICAL THERAPY | Age: 44
Setting detail: THERAPIES SERIES
Discharge: HOME | End: 2024-04-09
Attending: FAMILY MEDICINE
Payer: MEDICARE

## 2024-04-09 PROCEDURE — 97112 NEUROMUSCULAR REEDUCATION: CPT

## 2024-04-09 PROCEDURE — 97140 MANUAL THERAPY 1/> REGIONS: CPT

## 2024-04-09 RX ORDER — DICLOFENAC SODIUM 75 MG/1
TABLET, DELAYED RELEASE ORAL
Qty: 180 TABLET | Refills: 1 | Status: SHIPPED | OUTPATIENT
Start: 2024-04-09

## 2024-04-09 NOTE — FLOWSHEET NOTE
[facility] - Outpatient Rehabilitation and Therapy [address], [place] [address] office: [phone] fax: [phone]      Physical Therapy: TREATMENT/PROGRESS NOTE   Patient: Geoffrey Osorio (44 y.o. female)   Treatment Date: 2024   :  1980 MRN: [id number]   Visit #:   Insurance Allowable Auth Needed   Med nec []Yes    [x]No    Insurance: Payor: MEDICARE / Plan: MEDICARE PART A AND B / Product Type: *No Product type* /   Insurance ID: [id number] - (Medicare)  Secondary Insurance (if applicable):    Treatment Diagnosis:     ICD-10-CM    1. Left hemiparesis (HCC)  G81.94       2. Hypotonia  M62.89       3. Impaired functional mobility, balance, gait, and endurance  Z74.09       4. Weakness of left side of body  R53.1       5. Impaired transfers  Z74.09          Medical Diagnosis:    Cramp and spasm [R25.2]  Hemiplegia, unspecified affecting left nondominant side [G81.94]   Referring Physician: Reyes, Eleia J, MD  PCP: Reyes, Eleia J, MD                             Plan of care signed (Y/N): Y, 23, Y POC    Date of Patient follow up with Physician: ?     Progress Report/POC: NO    POC update due: (10 visits /OR AUTH LIMITS, whichever is less) visit 18 or 24    CVA, PE, aneurysm      Preferred Language for Healthcare:   [x]English       []other:    SUBJECTIVE EXAMINATION     Patient Report/Comments:   Pt reports she is helping her son get ready for Prom. Hasn't been doing much since in therapy last. Neck has been hurting on the right side for the last few days.        Test used Initial score  11/13/23 2023 2024   3/19/2024   2024     Pain Summary VAS 2-3/10 3/10 L knee, 3-4 R shoulder  3-4/10 R side of neck  0/10    Functional questionnaire 30 sec STS, TUG, 10 m walk 7 reps, 47 sec,   0.22m/s  9 reps, 51 sec ,   0.27m/s 12 reps, 38 sec,   0.26m/s 8 reps, 42 sec,   0.31 m/s    Other:                      OBJECTIVE EXAMINATION

## 2024-04-09 NOTE — TELEPHONE ENCOUNTER
Recent Visits  Date Type Provider Dept   02/02/24 Office Visit Reyes, Eleia J, MD [facility]   10/18/23 Office Visit Reyes, Eleia J, MD [facility]   07/18/23 Office Visit Reyes, Eleia J, MD [facility]   06/27/23 Office Visit Reyes, Eleia J, MD [facility]   03/13/23 Office Visit Ana Jacobson APRN - CNP [facility]   10/26/22 Office Visit Ana Jacobson APRN - CNP [facility]   Showing recent visits within past 540 days with a meds authorizing provider and meeting all other requirements  Future Appointments  Date Type Provider Dept   04/12/24 Appointment Reyes, Eleia J, MD [facility]   Showing future appointments within next 150 days with a meds authorizing provider and meeting all other requirements

## 2024-04-11 ENCOUNTER — APPOINTMENT (OUTPATIENT)
Dept: PHYSICAL THERAPY | Age: 44
End: 2024-04-11
Attending: FAMILY MEDICINE
Payer: MEDICARE

## 2024-04-16 ENCOUNTER — HOSPITAL ENCOUNTER (OUTPATIENT)
Dept: PHYSICAL THERAPY | Age: 44
Setting detail: THERAPIES SERIES
Discharge: HOME | End: 2024-04-16
Attending: FAMILY MEDICINE
Payer: MEDICARE

## 2024-04-16 PROCEDURE — 97112 NEUROMUSCULAR REEDUCATION: CPT

## 2024-04-16 PROCEDURE — 97116 GAIT TRAINING THERAPY: CPT

## 2024-04-16 NOTE — FLOWSHEET NOTE
current phase Progress L sided weight bearing in LE, gait training, stability without UE support       Electronically Signed by Diya Whitley, PT, DPT            Date: 04/16/2024     Note: If patient does not return for scheduled/recommended follow up visits, this note will serve as a discharge from care along with the most recent update on progress.

## 2024-04-18 DIAGNOSIS — G81.94 LEFT HEMIPARESIS (HCC): Primary | ICD-10-CM

## 2024-04-30 ENCOUNTER — APPOINTMENT (OUTPATIENT)
Dept: PHYSICAL THERAPY | Age: 44
End: 2024-04-30
Attending: FAMILY MEDICINE
Payer: MEDICARE

## 2024-05-04 DIAGNOSIS — G81.94 LEFT HEMIPARESIS (HCC): ICD-10-CM

## 2024-05-06 RX ORDER — BACLOFEN 20 MG/1
20 TABLET ORAL 2 TIMES DAILY
Qty: 180 TABLET | Refills: 1 | Status: SHIPPED | OUTPATIENT
Start: 2024-05-06

## 2024-05-06 NOTE — TELEPHONE ENCOUNTER
Recent Visits  Date Type Provider Dept   02/02/24 Office Visit Reyes, Eleia J, MD [facility]   10/18/23 Office Visit Reyes, Eleia J, MD [facility]   07/18/23 Office Visit Reyes, Eleia J, MD [facility]   06/27/23 Office Visit Reyes, Eleia J, MD [facility]   03/13/23 Office Visit Ana Jacobson APRN - [facility]   Showing recent visits within past 540 days with a meds authorizing provider and meeting all other requirements  Future Appointments  Date Type Provider Dept   05/09/24 Appointment Reyes, Eleia J, MD [facility]   06/14/24 Appointment Reyes, Eleia J, MD [facility]   Showing future appointments within next 150 days with a meds authorizing provider and meeting all other requirements

## 2024-05-10 DIAGNOSIS — I10 ESSENTIAL HYPERTENSION, BENIGN: ICD-10-CM

## 2024-05-10 RX ORDER — METOPROLOL SUCCINATE 100 MG/1
100 TABLET, EXTENDED RELEASE ORAL DAILY
Qty: 90 TABLET | Refills: 3 | Status: SHIPPED | OUTPATIENT
Start: 2024-05-10

## 2024-05-10 NOTE — TELEPHONE ENCOUNTER
Patient requested refill on   metoprolol succinate (TOPROL XL) 100 MG extended release tablet   [id number] - [address] -  [phone]

## 2024-05-10 NOTE — TELEPHONE ENCOUNTER
Recent Visits  Date Type Provider Dept   02/02/24 Office Visit Reyes, Eleia J, MD [facility]   10/18/23 Office Visit Reyes, Eleia J, MD [facility]   07/18/23 Office Visit Reyes, Eleia J, MD [facility]   06/27/23 Office Visit Reyes, Eleia J, MD [facility]   03/13/23 Office Visit Ana Jacobson APRN - [facility]   Showing recent visits within past 540 days with a meds authorizing provider and meeting all other requirements  Future Appointments  Date Type Provider Dept   05/22/24 Appointment Reyes, Eleia J, MD [facility]   06/14/24 Appointment Reyes, Eleia J, MD [facility]   Showing future appointments within next 150 days with a meds authorizing provider and meeting all other requirements

## 2024-06-11 ENCOUNTER — OFFICE VISIT (OUTPATIENT)
Dept: PRIMARY CARE CLINIC | Age: 44
End: 2024-06-11
Payer: MEDICARE

## 2024-06-11 VITALS
OXYGEN SATURATION: 99 % | SYSTOLIC BLOOD PRESSURE: 138 MMHG | BODY MASS INDEX: 39.54 KG/M2 | DIASTOLIC BLOOD PRESSURE: 88 MMHG | HEART RATE: 65 BPM | WEIGHT: 245 LBS

## 2024-06-11 DIAGNOSIS — E78.2 MIXED HYPERLIPIDEMIA: ICD-10-CM

## 2024-06-11 DIAGNOSIS — I10 ESSENTIAL HYPERTENSION, BENIGN: Primary | ICD-10-CM

## 2024-06-11 DIAGNOSIS — E55.9 VITAMIN D DEFICIENCY: ICD-10-CM

## 2024-06-11 PROCEDURE — 99214 OFFICE O/P EST MOD 30 MIN: CPT | Performed by: FAMILY MEDICINE

## 2024-06-11 PROCEDURE — 36415 COLL VENOUS BLD VENIPUNCTURE: CPT | Performed by: FAMILY MEDICINE

## 2024-06-11 PROCEDURE — 3075F SYST BP GE 130 - 139MM HG: CPT | Performed by: FAMILY MEDICINE

## 2024-06-11 PROCEDURE — 3079F DIAST BP 80-89 MM HG: CPT | Performed by: FAMILY MEDICINE

## 2024-06-11 PROCEDURE — 4004F PT TOBACCO SCREEN RCVD TLK: CPT | Performed by: FAMILY MEDICINE

## 2024-06-11 PROCEDURE — G8427 DOCREV CUR MEDS BY ELIG CLIN: HCPCS | Performed by: FAMILY MEDICINE

## 2024-06-11 PROCEDURE — G8417 CALC BMI ABV UP PARAM F/U: HCPCS | Performed by: FAMILY MEDICINE

## 2024-06-11 RX ORDER — PREDNISOLONE ACETATE 10 MG/ML
1 SUSPENSION/ DROPS OPHTHALMIC 4 TIMES DAILY
COMMUNITY
Start: 2024-05-08

## 2024-06-11 RX ORDER — KETOROLAC TROMETHAMINE 5 MG/ML
1 SOLUTION OPHTHALMIC 4 TIMES DAILY
COMMUNITY
Start: 2024-05-08

## 2024-06-11 SDOH — ECONOMIC STABILITY: FOOD INSECURITY: WITHIN THE PAST 12 MONTHS, THE FOOD YOU BOUGHT JUST DIDN'T LAST AND YOU DIDN'T HAVE MONEY TO GET MORE.: SOMETIMES TRUE

## 2024-06-11 SDOH — ECONOMIC STABILITY: INCOME INSECURITY: HOW HARD IS IT FOR YOU TO PAY FOR THE VERY BASICS LIKE FOOD, HOUSING, MEDICAL CARE, AND HEATING?: SOMEWHAT HARD

## 2024-06-11 SDOH — ECONOMIC STABILITY: FOOD INSECURITY: WITHIN THE PAST 12 MONTHS, YOU WORRIED THAT YOUR FOOD WOULD RUN OUT BEFORE YOU GOT MONEY TO BUY MORE.: SOMETIMES TRUE

## 2024-06-11 ASSESSMENT — PATIENT HEALTH QUESTIONNAIRE - PHQ9
SUM OF ALL RESPONSES TO PHQ QUESTIONS 1-9: 8
7. TROUBLE CONCENTRATING ON THINGS, SUCH AS READING THE NEWSPAPER OR WATCHING TELEVISION: MORE THAN HALF THE DAYS
10. IF YOU CHECKED OFF ANY PROBLEMS, HOW DIFFICULT HAVE THESE PROBLEMS MADE IT FOR YOU TO DO YOUR WORK, TAKE CARE OF THINGS AT HOME, OR GET ALONG WITH OTHER PEOPLE: SOMEWHAT DIFFICULT
2. FEELING DOWN, DEPRESSED OR HOPELESS: SEVERAL DAYS
5. POOR APPETITE OR OVEREATING: NEARLY EVERY DAY
8. MOVING OR SPEAKING SO SLOWLY THAT OTHER PEOPLE COULD HAVE NOTICED. OR THE OPPOSITE, BEING SO FIGETY OR RESTLESS THAT YOU HAVE BEEN MOVING AROUND A LOT MORE THAN USUAL: NOT AT ALL
SUM OF ALL RESPONSES TO PHQ QUESTIONS 1-9: 8
9. THOUGHTS THAT YOU WOULD BE BETTER OFF DEAD, OR OF HURTING YOURSELF: NOT AT ALL
4. FEELING TIRED OR HAVING LITTLE ENERGY: NOT AT ALL
6. FEELING BAD ABOUT YOURSELF - OR THAT YOU ARE A FAILURE OR HAVE LET YOURSELF OR YOUR FAMILY DOWN: NOT AT ALL
SUM OF ALL RESPONSES TO PHQ9 QUESTIONS 1 & 2: 2
SUM OF ALL RESPONSES TO PHQ QUESTIONS 1-9: 8
SUM OF ALL RESPONSES TO PHQ QUESTIONS 1-9: 8
1. LITTLE INTEREST OR PLEASURE IN DOING THINGS: SEVERAL DAYS
3. TROUBLE FALLING OR STAYING ASLEEP: SEVERAL DAYS

## 2024-06-11 ASSESSMENT — ANXIETY QUESTIONNAIRES
5. BEING SO RESTLESS THAT IT IS HARD TO SIT STILL: NOT AT ALL
7. FEELING AFRAID AS IF SOMETHING AWFUL MIGHT HAPPEN: SEVERAL DAYS
6. BECOMING EASILY ANNOYED OR IRRITABLE: SEVERAL DAYS
4. TROUBLE RELAXING: SEVERAL DAYS
IF YOU CHECKED OFF ANY PROBLEMS ON THIS QUESTIONNAIRE, HOW DIFFICULT HAVE THESE PROBLEMS MADE IT FOR YOU TO DO YOUR WORK, TAKE CARE OF THINGS AT HOME, OR GET ALONG WITH OTHER PEOPLE: NOT DIFFICULT AT ALL
2. NOT BEING ABLE TO STOP OR CONTROL WORRYING: NOT AT ALL
GAD7 TOTAL SCORE: 4
1. FEELING NERVOUS, ANXIOUS, OR ON EDGE: NOT AT ALL
3. WORRYING TOO MUCH ABOUT DIFFERENT THINGS: SEVERAL DAYS

## 2024-06-11 NOTE — PATIENT INSTRUCTIONS
application instructions  Website: [email or web address]  RENT / UTILITIES AND FINANCIAL ASSISTANCE PROGRAMS    [facility]  [employer]  What they offer:  Food, rent and utility assistance     Phone Number: [phone]  Address: [address] [address]   Website: [email or web address]://S[employer]'.[employer]/    [employer]   What they offer:  Rent and utility assistance      Phone Number: [phone]  Address: [address], [address]   Website: https://www.[facility].org     [employer])  What they offer:  Utility assistance with heating bills       Phone Number: [phone]  Address[address]   Website: https://www.[facility]org/homenergy.asp    [facility] [employer]  What they offer:  Rent, utility assistance and financial assistance   Phone Number: 130-217-4394  Address: [address] [place] [address]   Website: https://www.[facility].[employer]/     [employer]  What they offer:  Utility assistance      Phone Number: [phone]  [address] [address]   Website: http://www.[employer].org/      [facility]  [employer]  What they offer:  Food, rent and utility assistance     Phone Number: 449-622-6774  Address: [address], [address]   Website: https://selfhelps.org/     [employer]  What they offer:  Food, rent and utility assistance     Phone Number: 878-680-5605  Address: [address] [address]   Website: http://www.[employer].Per [employer]/     [employer]  What they offer: Rent and utility assistance     Phone Number:   [place]: [phone]  [place]: [phone]  Address:  [place]: [address] [address]  [place]: [address] [facility] [address]    Website: http://www.[employer].org/    [facility]   What they offer:  Food, rent and

## 2024-06-11 NOTE — PROGRESS NOTES
Chief Complaint   Patient presents with    Hypertension    Obesity         Subjective:       Geoffrey Osorio is a 44 y.o. female here for 6-month follow-up on her hypertension.  Compliant with current medications.  Patient is happy she lost 9 pounds and she is working on eating healthy.  Will start occupational therapy next week.  She is not doing overall physical therapy and speech therapy anymore.  Had a follow-up with her neurosurgeon last month and scheduled for CTA sometime this month.  Patient denies any concerns except plans to go for therapy to help with her depression.    5/10/2024: Follow-up with the neuro surgeon, Dr. Jimenez, for her cerebral arterial aneurysm.     Reviewed blood test done on 2/2/2024: Vitamin D 13.4, total cholesterol 208, triglyceride 172, HDL 52, , sodium 141, potassium 4.0, glucose 97, creatinine 0.8, calcium 9.2, ALT 11, AST 12.  Current Outpatient Medications   Medication Sig Dispense Refill    ketorolac (ACULAR) 0.5 % ophthalmic solution Place 1 drop into the left eye 4 times daily      prednisoLONE acetate (PRED FORTE) 1 % ophthalmic suspension Place 1 drop into the left eye 4 times daily      metoprolol succinate (TOPROL XL) 100 MG extended release tablet Take 1 tablet by mouth daily 90 tablet 3    baclofen (LIORESAL) 20 MG tablet TAKE 1 TABLET BY MOUTH TWICE DAILY 180 tablet 1    diclofenac (VOLTAREN) 75 MG EC tablet TAKE 1 TABLET BY MOUTH TWICE A  tablet 1    nystatin (MYCOSTATIN) 778454 UNIT/GM cream APPLY TO AFFECTED AREA TWICE A DAY 30 g 1    busPIRone (BUSPAR) 5 MG tablet Take 1 tablet by mouth daily 90 tablet 1    losartan (COZAAR) 50 MG tablet Take 1 tablet by mouth daily 90 tablet 3    senna-docusate (SENEXON-S) 8.6-50 MG per tablet Take 1 tablet by mouth daily 30 tablet 3    cetirizine (ZYRTEC) 10 MG tablet Take 1 tablet by mouth daily 90 tablet 3    amLODIPine (NORVASC) 10 MG tablet Take 1 tablet by mouth daily 90 tablet 3    acetaminophen (TYLENOL 8

## 2024-06-12 DIAGNOSIS — E55.9 VITAMIN D DEFICIENCY: Primary | ICD-10-CM

## 2024-06-12 DIAGNOSIS — E78.2 MIXED HYPERLIPIDEMIA: ICD-10-CM

## 2024-06-12 LAB
25(OH)D3 SERPL-MCNC: 14.7 NG/ML
ANION GAP SERPL CALCULATED.3IONS-SCNC: 11 MMOL/L (ref 3–16)
BUN SERPL-MCNC: 8 MG/DL (ref 7–20)
CALCIUM SERPL-MCNC: 9.2 MG/DL (ref 8.3–10.6)
CHLORIDE SERPL-SCNC: 106 MMOL/L (ref 99–110)
CHOLEST SERPL-MCNC: 203 MG/DL (ref 0–199)
CO2 SERPL-SCNC: 22 MMOL/L (ref 21–32)
CREAT SERPL-MCNC: 0.8 MG/DL (ref 0.6–1.1)
GFR SERPLBLD CREATININE-BSD FMLA CKD-EPI: >90 ML/MIN/{1.73_M2}
GLUCOSE SERPL-MCNC: 87 MG/DL (ref 70–99)
HDLC SERPL-MCNC: 47 MG/DL (ref 40–60)
LDLC SERPL CALC-MCNC: 120 MG/DL
POTASSIUM SERPL-SCNC: 3.7 MMOL/L (ref 3.5–5.1)
SODIUM SERPL-SCNC: 139 MMOL/L (ref 136–145)
TRIGL SERPL-MCNC: 178 MG/DL (ref 0–150)
VLDLC SERPL CALC-MCNC: 36 MG/DL

## 2024-06-12 RX ORDER — ROSUVASTATIN CALCIUM 10 MG/1
10 TABLET, COATED ORAL NIGHTLY
Qty: 90 TABLET | Refills: 3 | Status: SHIPPED | OUTPATIENT
Start: 2024-06-12

## 2024-06-12 RX ORDER — MELATONIN 10 MG
10000 TABLET, SUBLINGUAL SUBLINGUAL
Qty: 12 TABLET | Refills: 3 | Status: SHIPPED | OUTPATIENT
Start: 2024-06-12

## 2024-07-18 DIAGNOSIS — J30.2 SEASONAL ALLERGIES: ICD-10-CM

## 2024-07-18 RX ORDER — FLUTICASONE PROPIONATE 50 MCG
SPRAY, SUSPENSION (ML) NASAL
Qty: 3 EACH | Refills: 1 | Status: SHIPPED | OUTPATIENT
Start: 2024-07-18

## 2024-07-22 DIAGNOSIS — G81.94 LEFT HEMIPARESIS (HCC): Primary | ICD-10-CM

## 2024-08-06 ENCOUNTER — HOSPITAL ENCOUNTER (OUTPATIENT)
Dept: OCCUPATIONAL THERAPY | Age: 44
Setting detail: THERAPIES SERIES
Discharge: HOME | End: 2024-08-06
Payer: MEDICARE

## 2024-08-06 ENCOUNTER — HOSPITAL ENCOUNTER (OUTPATIENT)
Dept: SPEECH THERAPY | Age: 44
Setting detail: THERAPIES SERIES
Discharge: HOME | End: 2024-08-06
Payer: MEDICARE

## 2024-08-06 ENCOUNTER — HOSPITAL ENCOUNTER (OUTPATIENT)
Dept: PHYSICAL THERAPY | Age: 44
Setting detail: THERAPIES SERIES
Discharge: HOME | End: 2024-08-06
Payer: MEDICARE

## 2024-08-06 DIAGNOSIS — R26.89 BALANCE DISORDER: ICD-10-CM

## 2024-08-06 DIAGNOSIS — R53.1 WEAKNESS: ICD-10-CM

## 2024-08-06 DIAGNOSIS — R26.9 ABNORMALITY OF GAIT AND MOBILITY: ICD-10-CM

## 2024-08-06 DIAGNOSIS — G81.94 LEFT HEMIPARESIS (HCC): Primary | ICD-10-CM

## 2024-08-06 PROCEDURE — 92523 SPEECH SOUND LANG COMPREHEN: CPT

## 2024-08-06 PROCEDURE — 97110 THERAPEUTIC EXERCISES: CPT

## 2024-08-06 PROCEDURE — 97530 THERAPEUTIC ACTIVITIES: CPT

## 2024-08-06 PROCEDURE — 97161 PT EVAL LOW COMPLEX 20 MIN: CPT

## 2024-08-06 PROCEDURE — 97165 OT EVAL LOW COMPLEX 30 MIN: CPT

## 2024-08-06 NOTE — PLAN OF CARE
[facility]- Outpatient Rehabilitation and Therapy [address], [place] [address] office: [phone] fax: [phone]     Physical Therapy Initial Evaluation Certification      Dear Reyes, Eleia J, MD,    We had the pleasure of evaluating the following patient for physical therapy services at [facility] Out[facility].  A summary of our findings can be found in the initial assessment below.  This includes our plan of care.  If you have any questions or concerns regarding these findings, please do not hesitate to contact me at the office phone number listed above.  Thank you for the referral.     Physician Signature:_______________________________Date:__________________  By signing above (or electronic signature), therapist’s plan is approved by physician       Physical Therapy: TREATMENT/PROGRESS NOTE   Patient: Geoffrey Osorio (44 y.o. female)   Examination Date: 2024   :  1980 MRN: [id number]   Visit #: 1   Insurance Allowable Auth Needed   BMN []Yes    []No    Insurance: Payor: MEDICARE / Plan: MEDICARE PART A AND B / Product Type: *No Product type* /   Insurance ID: [id number] - (Medicare)  Secondary Insurance (if applicable): MEDICAID [place]   Treatment Diagnosis:     ICD-10-CM    1. Left hemiparesis (HCC)  G81.94       2. Weakness  R53.1       3. Balance disorder  R26.89       4. Abnormality of gait and mobility  R26.9          Medical Diagnosis:  Left hemiparesis (HCC) [G81.94]   Referring Physician: Reyes, Eleia J, MD  PCP: Reyes, Eleia J, MD       Plan of care signed (Y/N):     Date of Patient follow up with Physician:      Plan of Care Report: EVAL today  POC update due: (10 visits /OR AUTH LIMITS, whichever is less)  2024                                             Medical History:  Comorbidities:  Hypertension  Stroke/TIA  Anxiety  Depression  Other: headaches, vision deficits on left, history of falls  Relevant Medical History:  This is

## 2024-08-06 NOTE — PROGRESS NOTES
Outpatient Speech Therapy  [] [facility]   Phone: [phone]   Fax: [phone]   [x] [facility]  Phone: [phone]              Fax: [phone]  [] [facility]   Phone: [phone]   Fax: [phone]     To: Eleia Reyes, MD    Patient: Geoffrey Osorio  : 1980  MRN: [id number]  Evaluation Date: 2024      Diagnosis Information:  G81.94 (ICD-10-CM) - Left hemiparesis (HCC)     Speech Therapy Certification Form    The following patient has been evaluated for speech therapy services and for therapy to continue, Medicare requires monthly physician review of the treatment plan. Please review the attached evaluation and/or summary of the patient's plan of care, and verify that you agree therapy should continue by signing the attached document and sending it back to our office.    Plan of Care/Treatment to date:  [x] Speech-Language Evaluation/Treatment    [] Dysphagia Evaluation/Treatment        [] Dysphagia Treatment via Neuromuscular Electrical Stimulation (NMES)   [] Modified Barium Swallowing Study   [x] Cognitive-Linguistic Skills Development  [] Voice evaluation and Treatment      [] Evaluation, modification, and Training of Voice Prosthetic     [] Evaluation for Speech-Generating Augmentative and Alternative Communication Device   [] Therapeutic Services for the use of Speech-Generating Device.   [] Other:          Frequency/Duration:  # Days per week: [x] 1 day # Weeks: [] 1 week [] 5 weeks      [] 2 days   [] 2 weeks [] 6 weeks     [] 3 days   [] 3 weeks [] 7 weeks     [] 4 days   [x] 4 weeks [] 8 weeks    Rehab Potential: [] Excellent [] Good [x] Fair  [] Poor       Electronically signed by:    Meenakshi Rowell MA, CCC-SLP #[id number]  Speech-Language Pathologist  On 24 at 1:00 pm      If you have any questions or concerns, please don't hesitate to call.  Thank you for your referral.      Physician Signature:________________________________Date:__________________  By signing above,

## 2024-08-06 NOTE — PROGRESS NOTES
Facility/Department: [facility] SPEECH THERAPY  SLP Speech Language Cognitive Assessment     Patient: Geoffrey Osorio   : 1980   MRN: [id number]      Evaluation Date: 2024      Admitting Dx:   Left hemiparesis (HCC) [G81.94]   has a past medical history of Anemia, History of chicken pox, HLD (hyperlipidemia), Hypertension, Pulmonary embolism (HCC) (2021), Subarachnoid hemorrhage (HCC) (3/1/2021), and Wrist fracture, left (2011).   has a past surgical history that includes Foot surgery; Wrist surgery (2011); Mouth surgery; Hysterectomy, total abdominal (); Brain aneurysm surgery (2021); and laparoscopy (2021).  Allergies: No Known Allergies  Prior Speech History: Inpatient rehab  at [facility], outpatient at [facility] 2022-23 met goals for speech, dysphagia, and cognitive communication.  Patient seen for evaluation at [facility] 23 due to patient reporting of difficulty with memory and people understanding patient, however patient does not appear to have followed up for any treatment missing the next ST session after initial evaluation. Patient scored 23/30 on MOCA (WFL=26) 2023    Onset: 21     Pain: left knee 4/10                    Chart Review  H&P: SAH with hydrocephalus s/p R internal carotid aneurysm rupture in      44 y.o. 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 EVD placed upon arrival to NSICU. She is s/p right sided craniectomy with ICA clipping and R STA-MCA bypass. Underwent placement of VPS on 3/2 without complication (Codmann set to 120). On 2021 she returned for Right cranioplasty. There were no surgical complications. She was admitted to the hospital for recovery. On 2021 her PEG (placed 3/17/21) removed at bedside.     PMH significant for HTN and HLD who was found down at work and was brought to [facility] ED where upon presentation she exhibited L sided hemiplegia and

## 2024-08-06 NOTE — PROGRESS NOTES
Occupational Therapy      [facility]- Outpatient Rehabilitation and Therapy [address], [place] [address] office: [phone] fax: [phone]     Occupational Therapy Certification      Dear Reyes, Eleia J, MD,    We had the pleasure of evaluating the following patient for occupational therapy services at our [facility].  A summary of our findings can be found in the initial assessment below.  This includes our plan of care.  If you have any questions or concerns regarding these findings, please do not hesitate to contact me at the office phone number listed above.  Thank you for the referral.     Physician Signature:_______________________________Date:__________________  By signing above (or electronic signature), therapist’s plan is approved by physician       Initial Evaluation   Patient: Geoffrey Osorio (44 y.o. female)   Examination Date: 2024   :  1980 MRN: [id number]   Visit #: Visit count could not be calculated. Make sure you are using a visit which is associated with an episode.   Insurance Allowable: BMN  Auth Needed: No   Insurance: Payor: MEDICARE / Plan: MEDICARE PART A AND B / Product Type: *No Product type* /   Insurance ID: [id number] - (Medicare)  Secondary Insurance (if applicable): MEDICAID [place]   Treatment Diagnosis:  G81.90 (ICD-10-CM) - Hemiplegia, unspecified affecting unspecified side and Z74.1 (ICD-10-CM) - Need for assistance with personal care   Medical Diagnosis:  Left hemiparesis (HCC) [G81.94]   Referring Provider: Reyes, Eleia J, MD  PCP: Reyes, Eleia J, MD       Plan of care signed: No    Date of Patient follow up with Physician: LETHA    POC update note: EVAL today  POC update due: (OR 10 visits /OR AUTH LIMITS, whichever is less) -  24  Recertification due: 24                                                          Precautions/ Contra-indications:   Latex allergy:   No  Pacemaker:     No  Other: NA    Red

## 2024-08-08 ENCOUNTER — HOSPITAL ENCOUNTER (OUTPATIENT)
Dept: PHYSICAL THERAPY | Age: 44
Setting detail: THERAPIES SERIES
Discharge: HOME | End: 2024-08-08
Payer: MEDICARE

## 2024-08-08 ENCOUNTER — HOSPITAL ENCOUNTER (OUTPATIENT)
Dept: OCCUPATIONAL THERAPY | Age: 44
Setting detail: THERAPIES SERIES
Discharge: HOME | End: 2024-08-08
Payer: MEDICARE

## 2024-08-08 PROCEDURE — 97110 THERAPEUTIC EXERCISES: CPT

## 2024-08-08 NOTE — FLOWSHEET NOTE
(05641)    Estim Unattended (14546)     Ther. Act (14556)     Mech. Traction (87418)     Gait (00979)    Dry Needle 1-2 muscle (20560)     Aquatic Therex (12787)    Dry Needle 3+ muscle (20561)     Iontophoresis (64788)    VASO (43553)     Ultrasound (88709)    Group Therapy (92039)     Estim Attended (94367)    Canalith Repositioning (71001)     Other:    Other:    Total Timed Code Tx Minutes 40 3       Total Treatment Minutes 40        Charge Justification:  (64470) THERAPEUTIC EXERCISE - Provided verbal/tactile cueing for activities related to strengthening, flexibility, endurance, ROM performed to prevent loss of range of motion, maintain or improve muscular strength or increase flexibility, following either an injury or surgery.   (65691) HOME EXERCISE PROGRAM - Reviewed/Progressed HEP activities related to strengthening, flexibility, endurance, ROM performed to prevent loss of range of motion, maintain or improve muscular strength or increase flexibility, following either an injury or surgery.  (49560) NEUROMUSCULAR RE-EDUCATION - Therapeutic procedure, 1 or more areas, each 15 minutes; neuromuscular reeducation of movement, balance, coordination, kinesthetic sense, posture, and/or proprioception for sitting and/or standing activities  (47867) HOME EXERCISE PROGRAM - Reviewed/Progressed HEP activities related to neuromuscular reeducation of movement, balance, coordination, kinesthetic sense, posture, and/or proprioception for sitting and/or standing activities    (53245) THERAPEUTIC ACTIVITY - use of dynamic activities to improve functional performance. (Ex include squatting, ascending/descending stairs, walking, bending, lifting, catching, throwing, pushing, pulling, jumping.)  Direct, one on one contact, billed in 15-minute increments.  (45803) GAIT RE-EDUCATION - Provided training and instruction to the patient for proper joint and muscle recruitment and positioning and eccentric body weight control with

## 2024-08-08 NOTE — PROGRESS NOTES
Occupational Therapy      [facility]- Outpatient Rehabilitation and Therapy [address], [place] [address] office: [phone] fax: [phone]     Occupational Therapy        Initial Evaluation   Patient: Geoffrey Osorio (44 y.o. female)   Examination Date: 2024   :  1980 MRN: [id number]   Visit #: 2  Insurance Allowable: BMN  Auth Needed: No   Insurance: Payor: MEDICARE / Plan: MEDICARE PART A AND B / Product Type: *No Product type* /   Insurance ID: [id number] - (Medicare)  Secondary Insurance (if applicable): MEDICAID [place]   Treatment Diagnosis:  G81.90 (ICD-10-CM) - Hemiplegia, unspecified affecting unspecified side and Z74.1 (ICD-10-CM) - Need for assistance with personal care   Medical Diagnosis:  Left hemiparesis (HCC) [G81.94]   Referring Provider: Reyes, Eleia J, MD  PCP: Reyes, Eleia J, MD       Plan of care signed: No    Date of Patient follow up with Physician: NA    POC update note: EVAL today  POC update due: (OR 10 visits /OR AUTH LIMITS, whichever is less) -  24  Recertification due: 24                                                          Precautions/ Contra-indications:   Latex allergy:   No  Pacemaker:     No  Other: NA    Red Flags:  None    Suicide Screening:   The patient did not verbalize a primary behavioral concern, suicidal ideation, suicidal intent, or demonstrate suicidal behaviors.    Preferred Language for Healthcare: English    Review Of Systems (ROS):  [x] Performed Review of systems (Integumentary, CardioPulmonary, Neurological) by intake and observation. Intake form has been scanned into medical record. Patient has been instructed to contact their primary care physician regarding ROS issues if not already being addressed at this time.    [x] Patient history, allergies, meds reviewed. Medical chart reviewed. See intake form.     Medical History:  Comorbidities:  Stroke/TIA  Relevant Medical History:  has a past surgical

## 2024-08-13 ENCOUNTER — HOSPITAL ENCOUNTER (OUTPATIENT)
Dept: SPEECH THERAPY | Age: 44
Setting detail: THERAPIES SERIES
Discharge: HOME | End: 2024-08-13
Payer: MEDICARE

## 2024-08-13 ENCOUNTER — HOSPITAL ENCOUNTER (OUTPATIENT)
Dept: OCCUPATIONAL THERAPY | Age: 44
Setting detail: THERAPIES SERIES
Discharge: HOME | End: 2024-08-13
Payer: MEDICARE

## 2024-08-13 ENCOUNTER — HOSPITAL ENCOUNTER (OUTPATIENT)
Dept: PHYSICAL THERAPY | Age: 44
Setting detail: THERAPIES SERIES
Discharge: HOME | End: 2024-08-13
Payer: MEDICARE

## 2024-08-13 ENCOUNTER — TELEPHONE (OUTPATIENT)
Dept: PRIMARY CARE CLINIC | Age: 44
End: 2024-08-13

## 2024-08-13 PROCEDURE — 97130 THER IVNTJ EA ADDL 15 MIN: CPT

## 2024-08-13 PROCEDURE — 97129 THER IVNTJ 1ST 15 MIN: CPT

## 2024-08-13 PROCEDURE — 97110 THERAPEUTIC EXERCISES: CPT

## 2024-08-13 PROCEDURE — 97535 SELF CARE MNGMENT TRAINING: CPT

## 2024-08-13 NOTE — PROGRESS NOTES
Occupational Therapy      [facility]- Outpatient Rehabilitation and Therapy [address], [place] [address] office: [phone] fax: [phone]     Occupational Therapy         Patient: Geoffrey Osorio (44 y.o. female)   Examination Date: 2024   :  1980 MRN: [id number]   Visit #: 3  Insurance Allowable: BMN  Auth Needed: No   Insurance: Payor: MEDICARE / Plan: MEDICARE PART A AND B / Product Type: *No Product type* /   Insurance ID: [id number] - (Medicare)  Secondary Insurance (if applicable): MEDICAID [place]   Treatment Diagnosis:  G81.90 (ICD-10-CM) - Hemiplegia, unspecified affecting unspecified side and Z74.1 (ICD-10-CM) - Need for assistance with personal care   Medical Diagnosis:  Left hemiparesis (HCC) [G81.94]   Referring Provider: Reyes, Eleia J, MD  PCP: Reyes, Eleia J, MD       Plan of care signed: No    Date of Patient follow up with Physician: NA    POC update note: EVAL today  POC update due: (OR 10 visits /OR AUTH LIMITS, whichever is less) -  24  Recertification due: 24                                                          Precautions/ Contra-indications:   Latex allergy:   No  Pacemaker:     No  Other: NA    Red Flags:  None    Suicide Screening:   The patient did not verbalize a primary behavioral concern, suicidal ideation, suicidal intent, or demonstrate suicidal behaviors.    Preferred Language for Healthcare: English    Review Of Systems (ROS):  [x] Performed Review of systems (Integumentary, CardioPulmonary, Neurological) by intake and observation. Intake form has been scanned into medical record. Patient has been instructed to contact their primary care physician regarding ROS issues if not already being addressed at this time.    [x] Patient history, allergies, meds reviewed. Medical chart reviewed. See intake form.     Medical History:  Comorbidities:  Stroke/TIA  Relevant Medical History:  has a past surgical history that includes Foot

## 2024-08-13 NOTE — TELEPHONE ENCOUNTER
Spoke with the pt's . PT/OT will still be completed outside the home. Needs in home daily care and requesting [facility]. Please advise

## 2024-08-13 NOTE — PROGRESS NOTES
Speech-Language Pathology  Daily Treatment Note    Date:  2024    Patient Name:  Geoffrey Osorio    :  1980  MRN: [id number]  Restrictions/Precautions:  Left sided weakness  Diagnosis:  G81.94 (ICD-10-CM) - Left hemiparesis (HCC)   Treatment Diagnosis:  R41. 841 Cognitive communication deficit   Insurance/Certification information:  Medicare-no authorization needed, med  Referring Physician:  Reyes, Eleia J, MD  Plan of care signed (Y/N):  N  Visit# / total visits:  2  Pain level: denies pain     Progress Note: []  Yes  [x]  No    Next due by: Visit #10     Subjective:  Patient pleasant and cooperative.     Onset date: 21    Objective:   Patient educated on WRAP memory strategies to utilize in home environment  Patient previously had difficulty naming all her children and ages. Patient was challenged to write all 7 children's names/ages/birthdays/fact about each. Patient recalled all information, 3/7 incorrect ages  Patient introduced to n-back for working memory. Patient completed 1-back 2-factor with 95% accuracy given min verbal cues   Patient utilized association strategy to formulate list of 10 words, each associated with a specific color. Given color patient immediately recalled 10/10 words, without color given patient independently recalled 9/10 (10/10 when given hint \"it's a color you don't like) and recalled 9/10 after 10 min delay    Assessment:   Cognitive-linguistic: Mild cognitive deficits characterized by short term memory and executive function deficits. Patient scored 24/30 on MOCA (WFL=26). Patient scored 2/5 for executive function, 3/3 for naming, immediately recalled 5/5 words, recalled 3/5 after delay (+2 with multiple choice cue), 6/6 for attention, 3/3 for language, 2/2 for abstraction, oriented x5. Patient did demonstrate some difficulty with recalling children's names and birthdays (reporting one was turning 18 next month, but had birthday last month). Patient does

## 2024-08-13 NOTE — FLOWSHEET NOTE
for proper joint and muscle recruitment and positioning and eccentric body weight control with ambulation re-education including up and down stairs. Therapeutic procedure, one or more areas, each 15 minutes;     GOALS     Patient stated goal: \"Become more self sufficient, more use of my left side\"   [x] Progressing: [] Met: [] Not Met: [] Adjusted    Therapist goals for Patient:   Short Term Goals: To be achieved in: 4 weeks   1.  Independent in HEP and progression per patient tolerance.   [x] Progressing: [] Met: [] Not Met: [] Adjusted  2. Pt will improve TUG /10m walk to < 20 seconds improve functional ambulation.   [x] Progressing: [] Met: [] Not Met: [] Adjusted  3. Pt to perform transfers via stepping/ambulating technique with use of Single point cane on right Independent  [x] Progressing: [] Met: [] Not Met: [] Adjusted  4. Pt will improve Sit <-> stand X 5 to < 10 seconds  to demonstrate reduced fall risk.   [x] Progressing: [] Met: [] Not Met: [] Adjusted    Long Term Goals: To be achieved in: 8 weeks  1. Pt will improve SIS-6 score to > 35   [x] Progressing: [] Met: [] Not Met: [] Adjusted  2. Pt to negotiate up/down 12 stairs with step to pattern: ascending with bilateral, descending with right Modified Independent   [x] Progressing: [] Met: [] Not Met: [] Adjusted  3. Pt to demonstrate improved gait pattern including improved knee flexion/ext without cues with use of Single point cane on right  [x] Progressing: [] Met: [] Not Met: [] Adjusted  4. Pt will improve TUG to < 15 seconds  to demonstrate reduced fall risk.   [x] Progressing: [] Met: [] Not Met: [] Adjusted  5. Pt independent with HEP.  [x] Progressing: [] Met: [] Not Met: [] Adjusted  6. Pt to report improved confidence with ambulating in community   [x] Progressing: [] Met: [] Not Met: [] Adjusted    Overall Progression Towards Functional goals/ Treatment Progress Update:  [x] Patient is progressing as expected towards functional goals listed.

## 2024-08-13 NOTE — TELEPHONE ENCOUNTER
----- Message from Isaiah ROJAS sent at 8/13/2024 12:27 PM EDT -----  Regarding: [facility] Referral Request  E[facility] Referral Request    Reason for referral request: Specialty Provider    Specialist/Lab/Test patient is requesting (if known):Home Health Care    Specialist Phone Number (if applicable):N/A    Additional Information: Patient need a referral for Home Health Care  --------------------------------------------------------------------------------------------------------------------------    Relationship to Patient: Self     Call Back Information: OK to leave message on voicemail  Preferred Call Back Number: Phone

## 2024-08-14 NOTE — TELEPHONE ENCOUNTER
Since patient is doing outpatient PT/OT and speech, inquire what services needed for home health care services so I can outline when I send the referral.

## 2024-08-15 ENCOUNTER — APPOINTMENT (OUTPATIENT)
Dept: OCCUPATIONAL THERAPY | Age: 44
End: 2024-08-15
Payer: MEDICARE

## 2024-08-15 ENCOUNTER — APPOINTMENT (OUTPATIENT)
Dept: PHYSICAL THERAPY | Age: 44
End: 2024-08-15
Payer: MEDICARE

## 2024-08-20 ENCOUNTER — HOSPITAL ENCOUNTER (OUTPATIENT)
Dept: OCCUPATIONAL THERAPY | Age: 44
Setting detail: THERAPIES SERIES
Discharge: HOME | End: 2024-08-20
Payer: MEDICARE

## 2024-08-20 PROCEDURE — 97535 SELF CARE MNGMENT TRAINING: CPT

## 2024-08-20 PROCEDURE — 97110 THERAPEUTIC EXERCISES: CPT

## 2024-08-20 NOTE — TELEPHONE ENCOUNTER
Mobile phone is not a working number. I was able to leave a vm this time on [phone]. Asked for a return call back to ask exactly what the pt is needing at home so we can write the order.

## 2024-08-20 NOTE — PROGRESS NOTES
Occupational Therapy      [facility]- Outpatient Rehabilitation and Therapy [address], [place] [address] office: [phone] fax: [phone]     Occupational Therapy         Patient: Geoffrey Osorio (44 y.o. female)   Examination Date: 2024   :  1980 MRN: [id number]   Visit #: 4  Insurance Allowable: BMN  Auth Needed: No   Insurance: Payor: MEDICARE / Plan: MEDICARE PART A AND B / Product Type: *No Product type* /   Insurance ID: [id number] - (Medicare)  Secondary Insurance (if applicable): MEDICAID [place]   Treatment Diagnosis:  G81.90 (ICD-10-CM) - Hemiplegia, unspecified affecting unspecified side and Z74.1 (ICD-10-CM) - Need for assistance with personal care   Medical Diagnosis:  Left hemiparesis (HCC) [G81.94]   Referring Provider: Reyes, Eleia J, MD  PCP: Reyes, Eleia J, MD       Plan of care signed: No    Date of Patient follow up with Physician: NA    POC update note: EVAL today  POC update due: (OR 10 visits /OR AUTH LIMITS, whichever is less) -  24  Recertification due: 24                                                          Precautions/ Contra-indications:   Latex allergy:   No  Pacemaker:     No  Other: NA    Red Flags:  None    Suicide Screening:   The patient did not verbalize a primary behavioral concern, suicidal ideation, suicidal intent, or demonstrate suicidal behaviors.    Preferred Language for Healthcare: English    Review Of Systems (ROS):  [x] Performed Review of systems (Integumentary, CardioPulmonary, Neurological) by intake and observation. Intake form has been scanned into medical record. Patient has been instructed to contact their primary care physician regarding ROS issues if not already being addressed at this time.    [x] Patient history, allergies, meds reviewed. Medical chart reviewed. See intake form.     Medical History:  Comorbidities:  Stroke/TIA  Relevant Medical History:  has a past surgical history that includes Foot

## 2024-08-22 ENCOUNTER — APPOINTMENT (OUTPATIENT)
Dept: SPEECH THERAPY | Age: 44
End: 2024-08-22
Payer: MEDICARE

## 2024-08-22 ENCOUNTER — APPOINTMENT (OUTPATIENT)
Dept: OCCUPATIONAL THERAPY | Age: 44
End: 2024-08-22
Payer: MEDICARE

## 2024-08-22 ENCOUNTER — APPOINTMENT (OUTPATIENT)
Dept: PHYSICAL THERAPY | Age: 44
End: 2024-08-22
Payer: MEDICARE

## 2024-08-23 NOTE — TELEPHONE ENCOUNTER
Third attempt to reach the pt. All 3 numbers are either disconnected or can't accept calls. Message will be closed. Will wait for them to call us back if services are needed.

## 2024-08-27 ENCOUNTER — HOSPITAL ENCOUNTER (OUTPATIENT)
Dept: OCCUPATIONAL THERAPY | Age: 44
Setting detail: THERAPIES SERIES
Discharge: HOME | End: 2024-08-27
Payer: MEDICARE

## 2024-08-27 ENCOUNTER — HOSPITAL ENCOUNTER (OUTPATIENT)
Dept: SPEECH THERAPY | Age: 44
Setting detail: THERAPIES SERIES
Discharge: HOME | End: 2024-08-27
Payer: MEDICARE

## 2024-08-27 ENCOUNTER — HOSPITAL ENCOUNTER (OUTPATIENT)
Dept: PHYSICAL THERAPY | Age: 44
Setting detail: THERAPIES SERIES
Discharge: HOME | End: 2024-08-27
Payer: MEDICARE

## 2024-08-27 PROCEDURE — 97130 THER IVNTJ EA ADDL 15 MIN: CPT

## 2024-08-27 PROCEDURE — 97110 THERAPEUTIC EXERCISES: CPT

## 2024-08-27 PROCEDURE — 97129 THER IVNTJ 1ST 15 MIN: CPT

## 2024-08-27 PROCEDURE — 97535 SELF CARE MNGMENT TRAINING: CPT

## 2024-08-27 NOTE — FLOWSHEET NOTE
Major depressive disorder. PSHx: CRANIOPLASTY right 5/5/21, G-tube 3/7/2021 (removed), hysterectomy 2016, INSERTION SHUNT  LAPAROSCOPIC ASSISTED 3/2/21, ORIF ULNAR / RADIAL SHAFT FRACTURE left side 4/2011.     SUBJECTIVE EXAMINATION     Patient stated complaint/comment: Pt denies changes, reports feeling well overall, no new complaints.  Pt agreeable to PT treatment session      Test used Initial score  8/6/24 08/27/2024   Functional questionnaire Stroke Impact 6 28/45    Other:                OBJECTIVE EXAMINATION     Strength/ROM    [x]All ROM WNL except as marked below    [x]All strength measured on 5 point scale   \"*\" Indicates pain with movement      Right  Left      PROM AROM MMT Notes PROM AROM MMT Notes     HIP Flexion  WNL 5   WFL  4 Ext tone/ synergy especially with fatigue     Abduction  WNL 4+    3+    KNEE Flexion  WNL 5    2-      Extension  WNL 5     2-      ANKLE Dorsiflexion  WNL 5     0     Plantarflexion  WNL 5    1      Functional Mobility/Balance:       Assistance Level Comments   Transitional Movement Rolling to left side Not Tested     Rolling to right side Not Tested     Scooting in bed Modified Independent     Supine to sit Modified Independent and Min A Mod I with pillows towards patient right, min assist with LLE with pillows to left with some fear due to visual field cut on the left. HOB flat without railing     Sit to supine Modified Independent     Sit to stand Modified Independent     Stand to sit Modified Independent     Bed to chair Modified Independent    Gait Device used: Single point cane on right Modified Independent and Supervision intermittent due to left neglect  Gait Deviations (firm surface/linoleum): decreased eva, decreased head and trunk rotation, staggers, 3 point pattern, circumduction on left, neglect noted on left, and AFO donned to LLE   hyperext of L knee    Stairs Device used: no AD Supervision Pt able to negotiate up/down 4 stairs: step to pattern: ascending  Code (UNTIMED) #     Therex (84594)  [phone] 3  EVAL:LOW (72709 - Typically 20 minutes face-to-face)      Neuromusc. Re-ed (99883)    Re-Eval (05488)     Manual (53966)    Estim Unattended (05954)     Ther. Act (78538)     [facility] Traction (11163)     Gait (15665)    Dry Needle 1-2 muscle (92122)     Aquatic Therex (35335)    Dry Needle 3+ muscle (20561)     Iontophoresis (49924)    VASO (29427)     Ultrasound (08562)    Group Therapy (18407)     Estim Attended (41928)    Canalith Repositioning (95673)     Other:    Other:    Total Timed Code Tx Minutes 40 3       Total Treatment Minutes 40        Charge Justification:  (93410) THERAPEUTIC EXERCISE - Provided verbal/tactile cueing for activities related to strengthening, flexibility, endurance, ROM performed to prevent loss of range of motion, maintain or improve muscular strength or increase flexibility, following either an injury or surgery.   (15736) HOME EXERCISE PROGRAM - Reviewed/Progressed HEP activities related to strengthening, flexibility, endurance, ROM performed to prevent loss of range of motion, maintain or improve muscular strength or increase flexibility, following either an injury or surgery.  (37134) NEUROMUSCULAR RE-EDUCATION - Therapeutic procedure, 1 or more areas, each 15 minutes; neuromuscular reeducation of movement, balance, coordination, kinesthetic sense, posture, and/or proprioception for sitting and/or standing activities  (89424) HOME EXERCISE PROGRAM - Reviewed/Progressed HEP activities related to neuromuscular reeducation of movement, balance, coordination, kinesthetic sense, posture, and/or proprioception for sitting and/or standing activities    (12635) THERAPEUTIC ACTIVITY - use of dynamic activities to improve functional performance. (Ex include squatting, ascending/descending stairs, walking, bending, lifting, catching, throwing, pushing, pulling, jumping.)  Direct, one on one contact, billed in 15-minute increments.  (75218)

## 2024-08-27 NOTE — PROGRESS NOTES
Occupational Therapy      [facility]- Outpatient Rehabilitation and Therapy [address], [place] [address] office: [phone] fax: [phone]     Occupational Therapy         Patient: Geoffrey Osorio (44 y.o. female)   Examination Date: 2024   :  1980 MRN: [id number]   Visit #: 5  Insurance Allowable: BMN  Auth Needed: No   Insurance: Payor: MEDICARE / Plan: MEDICARE PART A AND B / Product Type: *No Product type* /   Insurance ID: [id number] - (Medicare)  Secondary Insurance (if applicable): MEDICAID [place]   Treatment Diagnosis:  G81.90 (ICD-10-CM) - Hemiplegia, unspecified affecting unspecified side and Z74.1 (ICD-10-CM) - Need for assistance with personal care   Medical Diagnosis:  Left hemiparesis (HCC) [G81.94]   Referring Provider: Reyes, Eleia J, MD  PCP: Reyes, Eleia J, MD       Plan of care signed: No    Date of Patient follow up with Physician: NA    POC update note: EVAL today  POC update due: (OR 10 visits /OR AUTH LIMITS, whichever is less) -  24  Recertification due: 24                                                          Precautions/ Contra-indications:   Latex allergy:   No  Pacemaker:     No  Other: NA    Red Flags:  None    Suicide Screening:   The patient did not verbalize a primary behavioral concern, suicidal ideation, suicidal intent, or demonstrate suicidal behaviors.    Preferred Language for Healthcare: English    Review Of Systems (ROS):  [x] Performed Review of systems (Integumentary, CardioPulmonary, Neurological) by intake and observation. Intake form has been scanned into medical record. Patient has been instructed to contact their primary care physician regarding ROS issues if not already being addressed at this time.    [x] Patient history, allergies, meds reviewed. Medical chart reviewed. See intake form.     Medical History:  Comorbidities:  Stroke/TIA  Relevant Medical History:  has a past surgical history that includes Foot  R shoulder pain (possibly from using more)  Patient describes pain to be  aching  Pain decreases with:  rest  Pain increases with:  walking w/ cane    Functional Outcome Measure:    Date Assessed 8/6/2024   Measure Used Quick DASH   Disability Score (rawscore/%) 31, 45.45%      Social support/environment:  Lives with: spouse, child(julio cesar), and other: 4 kids (23 daughter, 18 y/o twins)    Family/caregiver support needed prior to current illness: No   If yes, required assistance with: ADLs and IADLs    Home Environment:  2-story (stays on main floor)  Number of ABDULAZIZ:  5-6 (hand rails both sides)  Number of steps to 2nd floor:  flight of to 2nd (does not go)  Number of steps to basement:  laundry     Bathroom Accessibility: Tub shower; Transfer tub bench     Bathroom Environment: Tub    Other Equipment:   bedside commode; has been using for timing     Occupations:  Work/School Status: Unable to work due to injury/condition  Job Duties/Demands:  N/A  Active : Yes    Sport/ Recreation/ Leisure/ Hobbies: shopping, walking mall, team meal, cooking    Hand Dominance: Right    OBJECTIVE EXAMINATION     Observations:  Posture: WNL  Bandages/Dressings/Incisions: no  Edema: no    ADL and IADL Status:  Current ADL Status: Requires assist  Current IADL Status: Requires assist  Additional Comments:     Cognitive Function: Memory: short term    Cardiopulmonary Status   [x]NT  Blood pressure:   Heart Rate:   SpO2:     Tone     Hypertonic  Location: Left    Trunk Control     Good    Vision:   Impaired  Wears glasses: For reading and For distance  Visual Tracking: WFL  Visual Fields: Impaired; noted L side visual field cut      Hearing:   Impaired, Hard of hearing, left    Sensation    Light touch:   Impaired   Numbness in L UE and LE    Upper extremity ROM and Strength Assessment: Items not marked are items NT     Left Upper Extremity    PROM AROM (seated) MMT Notes                               SHOULDER Flexion   (0-180)  0 1

## 2024-08-27 NOTE — PROGRESS NOTES
Speech-Language Pathology  Daily Treatment Note    Date:  2024    Patient Name:  Geoffrey Osorio    :  1980  MRN: [id number]  Restrictions/Precautions:  Left sided weakness  Diagnosis:  G81.94 (ICD-10-CM) - Left hemiparesis (HCC)   Treatment Diagnosis:  R41. 841 Cognitive communication deficit   Insurance/Certification information:  Medicare-no authorization needed, med  Referring Physician:  Reyes, Eleia J, MD  Plan of care signed (Y/N):  N  Visit# / total visits:  3  Pain level: denies pain     Progress Note: []  Yes  [x]  No    Next due by: Visit #10     Subjective:  Patient pleasant and cooperative.     Onset date: 21    Objective:   Patient challenged to recall children's names and ages in order of birth. Patient recalled correct order 7/7 and independently recalled 5/7 correct ages. Recalled 6/7 ages at end of session  Patient utilized association and repetition strategy to code individual's names/faces. Patient immediately recalled 2/2 and 0/3 (recalled 3/3 after additional coding for names), patietn recalled 2/2 and 5/5 after 5 min delay with min cues  Patient completed crossword puzzle to address visuospatial task. Patient completed crossword puzzle with 100% accuracy given min cues, patient with excellent use of head turn to address neglect. 1 cue needed to locate something on left side of crossword puzzle    Assessment:   Cognitive-linguistic: Mild cognitive deficits characterized by short term memory and executive function deficits. Patient scored 24/30 on MOCA (WFL=26). Patient scored 2/5 for executive function, 3/3 for naming, immediately recalled 5/5 words, recalled 3/5 after delay (+2 with multiple choice cue), 6/6 for attention, 3/3 for language, 2/2 for abstraction, oriented x5. Patient did demonstrate some difficulty with recalling children's names and birthdays (reporting one was turning 18 next month, but had birthday last month). Patient does endorse difficulty with STM.

## 2024-08-28 ENCOUNTER — PATIENT MESSAGE (OUTPATIENT)
Dept: PRIMARY CARE CLINIC | Age: 44
End: 2024-08-28

## 2024-08-29 ENCOUNTER — HOSPITAL ENCOUNTER (OUTPATIENT)
Dept: OCCUPATIONAL THERAPY | Age: 44
Setting detail: THERAPIES SERIES
End: 2024-08-29
Payer: MEDICARE

## 2024-08-29 ENCOUNTER — APPOINTMENT (OUTPATIENT)
Dept: PHYSICAL THERAPY | Age: 44
End: 2024-08-29
Payer: MEDICARE

## 2024-08-30 DIAGNOSIS — I10 ESSENTIAL HYPERTENSION, BENIGN: ICD-10-CM

## 2024-08-30 RX ORDER — LOSARTAN POTASSIUM 50 MG/1
50 TABLET ORAL DAILY
Qty: 90 TABLET | Refills: 3 | Status: SHIPPED | OUTPATIENT
Start: 2024-08-30

## 2024-08-30 NOTE — TELEPHONE ENCOUNTER
Recent Visits  Date Type Provider Dept   06/11/24 Office Visit Reyes, Eleia J, MD [facility]   02/02/24 Office Visit Reyes, Eleia J, MD [facility]   10/18/23 Office Visit Reyes, Eleia J, MD [facility]   07/18/23 Office Visit Reyes, Eleia J, MD [facility]   06/27/23 Office Visit Reyes, Eleia J, MD [facility]   03/13/23 Office Visit Aan Jacobson, APRN - [facility]   Showing recent visits within past 540 days with a meds authorizing provider and meeting all other requirements  Future Appointments  Date Type Provider Dept   09/04/24 Appointment Reyes, Eleia J, MD [facility]   12/10/24 Appointment Reyes, Eleia J, MD [facility]   Showing future appointments within next 150 days with a meds authorizing provider and meeting all other requirements

## 2024-09-03 ENCOUNTER — HOSPITAL ENCOUNTER (OUTPATIENT)
Dept: SPEECH THERAPY | Age: 44
Setting detail: THERAPIES SERIES
End: 2024-09-03
Payer: MEDICARE

## 2024-09-03 ENCOUNTER — HOSPITAL ENCOUNTER (OUTPATIENT)
Dept: OCCUPATIONAL THERAPY | Age: 44
Setting detail: THERAPIES SERIES
End: 2024-09-03
Payer: MEDICARE

## 2024-09-03 ENCOUNTER — APPOINTMENT (OUTPATIENT)
Dept: PHYSICAL THERAPY | Age: 44
End: 2024-09-03
Payer: MEDICARE

## 2024-09-04 ENCOUNTER — OFFICE VISIT (OUTPATIENT)
Dept: PRIMARY CARE CLINIC | Age: 44
End: 2024-09-04
Payer: MEDICARE

## 2024-09-04 VITALS
BODY MASS INDEX: 39.54 KG/M2 | WEIGHT: 245 LBS | OXYGEN SATURATION: 99 % | HEART RATE: 75 BPM | RESPIRATION RATE: 16 BRPM | SYSTOLIC BLOOD PRESSURE: 144 MMHG | DIASTOLIC BLOOD PRESSURE: 90 MMHG

## 2024-09-04 DIAGNOSIS — I10 ESSENTIAL HYPERTENSION, BENIGN: ICD-10-CM

## 2024-09-04 DIAGNOSIS — R51.9 INTERMITTENT HEADACHE: ICD-10-CM

## 2024-09-04 DIAGNOSIS — H25.012 CORTICAL AGE-RELATED CATARACT OF LEFT EYE: ICD-10-CM

## 2024-09-04 DIAGNOSIS — R61 HYPERHIDROSIS: ICD-10-CM

## 2024-09-04 DIAGNOSIS — Z01.818 PREOP EXAMINATION: Primary | ICD-10-CM

## 2024-09-04 PROCEDURE — G8427 DOCREV CUR MEDS BY ELIG CLIN: HCPCS | Performed by: FAMILY MEDICINE

## 2024-09-04 PROCEDURE — 4004F PT TOBACCO SCREEN RCVD TLK: CPT | Performed by: FAMILY MEDICINE

## 2024-09-04 PROCEDURE — 3080F DIAST BP >= 90 MM HG: CPT | Performed by: FAMILY MEDICINE

## 2024-09-04 PROCEDURE — 99213 OFFICE O/P EST LOW 20 MIN: CPT | Performed by: FAMILY MEDICINE

## 2024-09-04 PROCEDURE — 3077F SYST BP >= 140 MM HG: CPT | Performed by: FAMILY MEDICINE

## 2024-09-04 PROCEDURE — G8417 CALC BMI ABV UP PARAM F/U: HCPCS | Performed by: FAMILY MEDICINE

## 2024-09-04 RX ORDER — SENNOSIDES 8.6 MG
650 CAPSULE ORAL 2 TIMES DAILY PRN
Qty: 60 TABLET | Refills: 3 | Status: CANCELLED | OUTPATIENT
Start: 2024-09-04

## 2024-09-04 RX ORDER — SENNOSIDES 8.6 MG
650 CAPSULE ORAL 2 TIMES DAILY PRN
Qty: 60 TABLET | Refills: 3 | Status: SHIPPED | OUTPATIENT
Start: 2024-09-04

## 2024-09-04 NOTE — PROGRESS NOTES
Chief Complaint   Patient presents with    Pre-op Exam     Phacoemulsification with intraocular lens implant/Lysis Synechiae Posterior OS, 9/10/24, Dr Britton [facility]     Patient Name: Geoffrey Osorio  YOB: 1980    This patient presents to the office today for a preoperative consultation at the request of surgeon, Dr. Carlos Britton, who plans on performing phacoemulsification with IOL/lysis synechiae posterior left eye on September 10 at [facility].  Has history of hemorrhagic stroke with residual left hemiparesis. Patient does not have any major cardiac contraindications to surgery, no recent acs, no decompensated heart failure, no uncontrolled arrythmia or major valvular heart disease.      Planned anesthesia: Topical anesthesia and MAC   Known anesthesia problems: None   Bleeding risk: Remote history of abnormal bleeding- hemorrhagic stroke  Personal or FH ofDVT/PE: No        Past Surgical History:   Procedure Laterality Date    BRAIN ANEURYSM SURGERY  02/12/2021    s/p right sided craniectomy with ICA clipping and R STA-MCA bypass by Dr. Mcghee    FOOT SURGERY      LEFT    HYSTERECTOMY, TOTAL ABDOMINAL (CERVIX REMOVED)  2013    LAPAROSCOPY  03/17/2021    PEG tube placement, diagnostic laparoscopy    MOUTH SURGERY      WRIST SURGERY  04/14/2011    ORIF LEFT  WRIST       No Known Allergies  Current Outpatient Medications   Medication Sig Dispense Refill    losartan (COZAAR) 50 MG tablet Take 1 tablet by mouth daily 90 tablet 3    fluticasone (FLONASE) 50 MCG/ACT nasal spray SHAKE LIQUID AND USE 1 SPRAY IN EACH NOSTRIL IN THE MORNING AND AT BEDTIME 3 each 1    Cholecalciferol (VITAMIN D3) 250 MCG (07415 UT) TABS Take 10,000 Units by mouth three times a week 12 tablet 3    rosuvastatin (CRESTOR) 10 MG tablet Take 1 tablet by mouth nightly 90 tablet 3    prednisoLONE acetate (PRED FORTE) 1 % ophthalmic suspension Place 1 drop into the left eye 4 times daily      metoprolol succinate (TOPROL

## 2024-09-05 ENCOUNTER — HOSPITAL ENCOUNTER (OUTPATIENT)
Dept: PHYSICAL THERAPY | Age: 44
Setting detail: THERAPIES SERIES
Discharge: HOME | End: 2024-09-05
Payer: MEDICARE

## 2024-09-05 ENCOUNTER — HOSPITAL ENCOUNTER (OUTPATIENT)
Dept: OCCUPATIONAL THERAPY | Age: 44
Setting detail: THERAPIES SERIES
Discharge: HOME | End: 2024-09-05
Payer: MEDICARE

## 2024-09-05 PROCEDURE — 97110 THERAPEUTIC EXERCISES: CPT

## 2024-09-05 NOTE — FLOWSHEET NOTE
Strength/ROM    [x]All ROM WNL except as marked below    [x]All strength measured on 5 point scale   \"*\" Indicates pain with movement  Updated 9/5/24     Right  Left      PROM AROM MMT Notes PROM AROM MMT Notes     HIP Flexion  WNL 5   WFL  4 Ext tone/ synergy especially with fatigue     Abduction  WNL 4+    3+    KNEE Flexion  WNL 5    3-     Extension  WNL 5     3-      ANKLE Dorsiflexion  WNL 5     0     Plantarflexion  WNL 5    1      Functional Mobility/Balance:       Assistance Level Comments   Transitional Movement Rolling to left side Not Tested     Rolling to right side Not Tested     Scooting in bed Modified Independent     Supine to sit Modified Independent and Min A Mod I with pillows towards patient right, min assist with LLE with pillows to left with some fear due to visual field cut on the left. HOB flat without railing     Sit to supine Modified Independent     Sit to stand Modified Independent     Stand to sit Modified Independent     Bed to chair Modified Independent    Gait Device used: Single point cane on right Modified Independent and Supervision intermittent due to left neglect  Gait Deviations (firm surface/linoleum): decreased eva, decreased head and trunk rotation, staggers, 3 point pattern, circumduction on left, neglect noted on left, and KAFO donned to LLE for increased control to decrease   hyperext of L knee    Stairs Device used: no AD Supervision Pt able to negotiate up/down 4 stairs: step to pattern: ascending with right, descending with right and handrail on right  Deficits noted: Cirumducts L leg ER maintain ext at knee throughout, hyperext of L knee in stance           Score Comments   Timed Up and Go (TUG)  27.15 seconds  20-29  = variable mobility  9/5/24: 31.29 seconds  Device used: Single point cane on right   Sit <-> stand  Time to complete 5 reps 13.61 seconds  9/5/24 15.93  seconds       Falls Risk Assessment (30 days): Falls Risk assessed and patient requires

## 2024-09-05 NOTE — PROGRESS NOTES
Occupational Therapy      [facility]- Outpatient Rehabilitation and Therapy [address], [place] [address] office: [phone] fax: [phone]     Occupational Therapy Progress Note         Patient: Geoffrey Osorio (44 y.o. female)   Examination Date: 2024   :  1980 MRN: [id number]   Visit #: 6  Insurance Allowable: BMN  Auth Needed: No   Insurance: Payor: MEDICARE / Plan: MEDICARE PART A AND B / Product Type: *No Product type* /   Insurance ID: [id number] - (Medicare)  Secondary Insurance (if applicable): MEDICAID [place]   Treatment Diagnosis:  G81.90 (ICD-10-CM) - Hemiplegia, unspecified affecting unspecified side and Z74.1 (ICD-10-CM) - Need for assistance with personal care   Medical Diagnosis:  Left hemiparesis (HCC) [G81.94]   Referring Provider: Reyes, Eleia J, MD  PCP: Reyes, Eleia J, MD       Plan of care signed: No    Date of Patient follow up with Physician: NA    POC update note: EVAL today  POC update due: (OR 10 visits /OR AUTH LIMITS, whichever is less) -  24  Recertification due: 24                                                          Precautions/ Contra-indications:   Latex allergy:   No  Pacemaker:     No  Other: NA    Red Flags:  None    Suicide Screening:   The patient did not verbalize a primary behavioral concern, suicidal ideation, suicidal intent, or demonstrate suicidal behaviors.    Preferred Language for Healthcare: English    Review Of Systems (ROS):  [x] Performed Review of systems (Integumentary, CardioPulmonary, Neurological) by intake and observation. Intake form has been scanned into medical record. Patient has been instructed to contact their primary care physician regarding ROS issues if not already being addressed at this time.    [x] Patient history, allergies, meds reviewed. Medical chart reviewed. See intake form.     Medical History:  Comorbidities:  Stroke/TIA  Relevant Medical History:  has a past surgical history that

## 2024-09-10 ENCOUNTER — APPOINTMENT (OUTPATIENT)
Dept: OCCUPATIONAL THERAPY | Age: 44
End: 2024-09-10
Payer: MEDICARE

## 2024-09-10 ENCOUNTER — APPOINTMENT (OUTPATIENT)
Dept: PHYSICAL THERAPY | Age: 44
End: 2024-09-10
Payer: MEDICARE

## 2024-09-10 ENCOUNTER — HOSPITAL ENCOUNTER (OUTPATIENT)
Dept: SPEECH THERAPY | Age: 44
Setting detail: THERAPIES SERIES
End: 2024-09-10
Payer: MEDICARE

## 2024-09-19 ENCOUNTER — HOSPITAL ENCOUNTER (OUTPATIENT)
Dept: SPEECH THERAPY | Age: 44
Setting detail: THERAPIES SERIES
Discharge: HOME | End: 2024-09-19
Payer: MEDICARE

## 2024-09-19 ENCOUNTER — HOSPITAL ENCOUNTER (OUTPATIENT)
Dept: OCCUPATIONAL THERAPY | Age: 44
Setting detail: THERAPIES SERIES
Discharge: HOME | End: 2024-09-19
Payer: MEDICARE

## 2024-09-19 ENCOUNTER — HOSPITAL ENCOUNTER (OUTPATIENT)
Dept: PHYSICAL THERAPY | Age: 44
Setting detail: THERAPIES SERIES
Discharge: HOME | End: 2024-09-19
Payer: MEDICARE

## 2024-09-19 PROCEDURE — 97110 THERAPEUTIC EXERCISES: CPT

## 2024-09-19 PROCEDURE — 97535 SELF CARE MNGMENT TRAINING: CPT

## 2024-09-19 PROCEDURE — 97130 THER IVNTJ EA ADDL 15 MIN: CPT

## 2024-09-19 PROCEDURE — 97129 THER IVNTJ 1ST 15 MIN: CPT

## 2024-09-26 ENCOUNTER — APPOINTMENT (OUTPATIENT)
Dept: PHYSICAL THERAPY | Age: 44
End: 2024-09-26
Payer: MEDICARE

## 2024-09-26 ENCOUNTER — APPOINTMENT (OUTPATIENT)
Dept: OCCUPATIONAL THERAPY | Age: 44
End: 2024-09-26
Payer: MEDICARE

## 2024-09-26 ENCOUNTER — HOSPITAL ENCOUNTER (OUTPATIENT)
Dept: SPEECH THERAPY | Age: 44
Setting detail: THERAPIES SERIES
End: 2024-09-26
Payer: MEDICARE

## 2024-09-30 ENCOUNTER — OFFICE VISIT (OUTPATIENT)
Dept: ORTHOPEDIC SURGERY | Age: 44
End: 2024-09-30
Payer: MEDICARE

## 2024-09-30 VITALS — WEIGHT: 230 LBS | BODY MASS INDEX: 36.1 KG/M2 | HEIGHT: 67 IN

## 2024-09-30 DIAGNOSIS — M25.562 LEFT KNEE PAIN, UNSPECIFIED CHRONICITY: Primary | ICD-10-CM

## 2024-09-30 DIAGNOSIS — M17.12 ARTHRITIS OF LEFT KNEE: ICD-10-CM

## 2024-09-30 DIAGNOSIS — R29.898 LEFT LEG WEAKNESS: ICD-10-CM

## 2024-09-30 PROCEDURE — 20610 DRAIN/INJ JOINT/BURSA W/O US: CPT | Performed by: PHYSICIAN ASSISTANT

## 2024-09-30 PROCEDURE — 4004F PT TOBACCO SCREEN RCVD TLK: CPT | Performed by: PHYSICIAN ASSISTANT

## 2024-09-30 PROCEDURE — 99203 OFFICE O/P NEW LOW 30 MIN: CPT | Performed by: PHYSICIAN ASSISTANT

## 2024-09-30 PROCEDURE — G8427 DOCREV CUR MEDS BY ELIG CLIN: HCPCS | Performed by: PHYSICIAN ASSISTANT

## 2024-09-30 PROCEDURE — G8417 CALC BMI ABV UP PARAM F/U: HCPCS | Performed by: PHYSICIAN ASSISTANT

## 2024-09-30 RX ORDER — TRIAMCINOLONE ACETONIDE 40 MG/ML
40 INJECTION, SUSPENSION INTRA-ARTICULAR; INTRAMUSCULAR ONCE
Status: COMPLETED | OUTPATIENT
Start: 2024-09-30 | End: 2024-09-30

## 2024-09-30 RX ORDER — BUPIVACAINE HYDROCHLORIDE 2.5 MG/ML
2 INJECTION, SOLUTION INFILTRATION; PERINEURAL ONCE
Status: COMPLETED | OUTPATIENT
Start: 2024-09-30 | End: 2024-09-30

## 2024-09-30 RX ADMIN — BUPIVACAINE HYDROCHLORIDE 5 MG: 2.5 INJECTION, SOLUTION INFILTRATION; PERINEURAL at 09:04

## 2024-09-30 RX ADMIN — TRIAMCINOLONE ACETONIDE 40 MG: 40 INJECTION, SUSPENSION INTRA-ARTICULAR; INTRAMUSCULAR at 09:04

## 2024-09-30 NOTE — PROGRESS NOTES
Subjective:      Patient ID: Geoffrey Osorio is a 44 y.o.  female who presents with a new complaint of  left knee pain.  She has a history of CVA 2021 with left side hemiplegia.  She has undergone extensive rehab.  She states over the past 2 months she has been experiencing pain in the left knee.  This pain is similar to the pain she had about a year ago before undergoing a cortisone injection at [facility].  She does have a long brace to help with her left leg weakness.  She is currently attending physical therapy at [facility].  Pain is 8/10 VAS.   Location of pain- global left knee.   Pain is worse with weight bearing activity.   Pain improves with rest and elevation.   Length of ambulation is typically affected by the knee pain.    Previous treatments have included-ice, Tylenol, prior cortisone injection.       Review of Systems:  I have reviewed the clinically relevant past medical history, medications, allergies, family history, social history, and 13 point Review of Systems from the patient's recent history form & documented any details relevant to today's presenting complaints in the history above. The patient's self-reported past medical history, medications, allergies, family history, social history, and Review of Systems form from today's date have been scanned into the chart under the \"Media\" tab.        Past Medical History:   Diagnosis Date    Anemia     during pregnancy    History of chicken pox     HLD (hyperlipidemia)     Hypertension     Pulmonary embolism (HCC) 03/13/2021    Partial visualization of pulmonary emboli in R and L pulmonary artery bifurcations    Subarachnoid hemorrhage (HCC) 3/1/2021    Wrist fracture, left 4/14/2011       Family History   Problem Relation Age of Onset    High Blood Pressure Mother     High Blood Pressure Father     High Blood Pressure Sister        Past Surgical History:   Procedure Laterality Date    BRAIN ANEURYSM SURGERY  02/12/2021    s/p right sided

## 2024-10-03 ENCOUNTER — APPOINTMENT (OUTPATIENT)
Dept: PHYSICAL THERAPY | Age: 44
End: 2024-10-03
Payer: MEDICARE

## 2024-10-03 ENCOUNTER — APPOINTMENT (OUTPATIENT)
Dept: OCCUPATIONAL THERAPY | Age: 44
End: 2024-10-03
Payer: MEDICARE

## 2024-10-03 ENCOUNTER — APPOINTMENT (OUTPATIENT)
Dept: SPEECH THERAPY | Age: 44
End: 2024-10-03
Payer: MEDICARE

## 2024-10-08 ENCOUNTER — HOSPITAL ENCOUNTER (OUTPATIENT)
Dept: OCCUPATIONAL THERAPY | Age: 44
Setting detail: THERAPIES SERIES
Discharge: HOME | End: 2024-10-08
Payer: MEDICARE

## 2024-10-08 ENCOUNTER — HOSPITAL ENCOUNTER (OUTPATIENT)
Dept: SPEECH THERAPY | Age: 44
Setting detail: THERAPIES SERIES
Discharge: HOME | End: 2024-10-08
Payer: MEDICARE

## 2024-10-08 DIAGNOSIS — I10 ESSENTIAL HYPERTENSION, BENIGN: ICD-10-CM

## 2024-10-08 PROCEDURE — 97130 THER IVNTJ EA ADDL 15 MIN: CPT

## 2024-10-08 PROCEDURE — 97129 THER IVNTJ 1ST 15 MIN: CPT

## 2024-10-08 PROCEDURE — 97110 THERAPEUTIC EXERCISES: CPT

## 2024-10-08 NOTE — TELEPHONE ENCOUNTER
Recent Visits  Date Type Provider Dept   09/04/24 Office Visit Reyes, Eleia J, MD [facility]   06/11/24 Office Visit Reyes, Eleia J, MD [facility]   02/02/24 Office Visit Reyes, Eleia J, MD [facility]   10/18/23 Office Visit Reyes, Eleia J, MD [facility]   07/18/23 Office Visit Reyes, Eleia J, MD [facility]   06/27/23 Office Visit Reyes, Eleia J, MD [facility]   Showing recent visits within past 540 days with a meds authorizing provider and meeting all other requirements  Future Appointments  Date Type Provider Dept   12/10/24 Appointment Reyes, Eleia J, MD [facility]   Showing future appointments within next 150 days with a meds authorizing provider and meeting all other requirements

## 2024-10-08 NOTE — PROGRESS NOTES
Occupational Therapy      [facility]- Outpatient Rehabilitation and Therapy [address], [place] [address] office: [phone] fax: [phone]     Occupational Therapy Discharge Note           Patient: Geoffrey Osorio (44 y.o. female)   Examination Date: 2024   :  1980 MRN: [id number]   Visit #: 8  Insurance Allowable: BMN  Auth Needed: No   Insurance: Payor: MEDICARE / Plan: MEDICARE PART A AND B / Product Type: *No Product type* /   Insurance ID: [id number] - (Medicare)  Secondary Insurance (if applicable):    Treatment Diagnosis:  G81.90 (ICD-10-CM) - Hemiplegia, unspecified affecting unspecified side and Z74.1 (ICD-10-CM) - Need for assistance with personal care   Medical Diagnosis:  Left hemiparesis (HCC) [G81.94]   Referring Provider: Reyes, Eleia J, MD  PCP: Reyes, Eleia J, MD       Plan of care signed: No    Date of Patient follow up with Physician: NA    POC update note: EVAL today  POC update due: (OR 10 visits /OR AUTH LIMITS, whichever is less) -  24  Recertification due: 24                                                          Precautions/ Contra-indications:   Latex allergy:   No  Pacemaker:     No  Other: NA    Red Flags:  None    Suicide Screening:   The patient did not verbalize a primary behavioral concern, suicidal ideation, suicidal intent, or demonstrate suicidal behaviors.    Preferred Language for Healthcare: English    Review Of Systems (ROS):  [x] Performed Review of systems (Integumentary, CardioPulmonary, Neurological) by intake and observation. Intake form has been scanned into medical record. Patient has been instructed to contact their primary care physician regarding ROS issues if not already being addressed at this time.    [x] Patient history, allergies, meds reviewed. Medical chart reviewed. See intake form.     Medical History:  Comorbidities:  Stroke/TIA  Relevant Medical History:  has a past surgical history that

## 2024-10-08 NOTE — PROGRESS NOTES
Speech-Language Pathology  Daily Treatment Note    Date:  10/8/2024    Patient Name:  Geoffrey Osorio    :  1980  MRN: [id number]  Restrictions/Precautions:  Left sided weakness  Diagnosis:  G81.94 (ICD-10-CM) - Left hemiparesis (HCC)   Treatment Diagnosis:  R41. 841 Cognitive communication deficit   Insurance/Certification information:  Medicare-no authorization needed, med  Referring Physician:  Reyes, Eleia J, MD  Plan of care signed (Y/N):  N  Visit# / total visits:  5  Pain level: 1/10 right shoulder    Progress Note: []  Yes  [x]  No    Next due by: Visit #10     Subjective:  Patient pleasant and cooperative.     Onset date: 21    Objective:   Patient challenged to recall children's names and ages in order of birth. Patient recalled correct order 7/7 and independently recalled 5/7 correct ages.   Patient completed acrostic puzzle requiring the pt to complete naming from descriptions and transfer letters to a puzzle to decipher a message. Patient required min cues to recall to bring letters down to bottom and required 1 cues out of 7 words to attend to left side. Significant improvement with initiation with this task and ability to complete  Patient completed visual scanning task for eliminating specific letters in field of 60. Patient completed with 95% accuracy, min cues for left side    Assessment:   Cognitive-linguistic: Mild cognitive deficits characterized by short term memory and executive function deficits. Patient scored 24/30 on MOCA (WFL=26). Patient scored 2/5 for executive function, 3/3 for naming, immediately recalled 5/5 words, recalled 3/5 after delay (+2 with multiple choice cue), 6/6 for attention, 3/3 for language, 2/2 for abstraction, oriented x5. Patient did demonstrate some difficulty with recalling children's names and birthdays (reporting one was turning 18 next month, but had birthday last month). Patient does endorse difficulty with STM. Left neglect noted with min

## 2024-10-09 RX ORDER — AMLODIPINE BESYLATE 10 MG/1
10 TABLET ORAL DAILY
Qty: 90 TABLET | Refills: 3 | Status: SHIPPED | OUTPATIENT
Start: 2024-10-09

## 2024-10-10 ENCOUNTER — HOSPITAL ENCOUNTER (OUTPATIENT)
Dept: SPEECH THERAPY | Age: 44
Setting detail: THERAPIES SERIES
Discharge: HOME | End: 2024-10-10
Payer: MEDICARE

## 2024-10-10 ENCOUNTER — HOSPITAL ENCOUNTER (OUTPATIENT)
Dept: PHYSICAL THERAPY | Age: 44
Setting detail: THERAPIES SERIES
Discharge: HOME | End: 2024-10-10
Payer: MEDICARE

## 2024-10-10 ENCOUNTER — APPOINTMENT (OUTPATIENT)
Dept: OCCUPATIONAL THERAPY | Age: 44
End: 2024-10-10
Payer: MEDICARE

## 2024-10-10 PROCEDURE — 97130 THER IVNTJ EA ADDL 15 MIN: CPT

## 2024-10-10 PROCEDURE — 97110 THERAPEUTIC EXERCISES: CPT

## 2024-10-10 PROCEDURE — 97129 THER IVNTJ 1ST 15 MIN: CPT

## 2024-10-10 NOTE — PROGRESS NOTES
Outpatient Speech Therapy  [] [place]   Phone: [phone]   Fax: [phone]   [x] [facility]  Phone: [phone]   Fax: [phone]  [] [place]   Phone: [phone]   Fax: [phone]     Speech Therapy Discharge Note  Date: 10/10/2024        Patient Name:  Geoffrey Osorio    :  1980  MRN: [id number]  Restrictions/Precautions:  Left sided weakness  Diagnosis:  G81.94 (ICD-10-CM) - Left hemiparesis (HCC)   Treatment Diagnosis:  R41. 841 Cognitive communication deficit   Insurance/Certification information:  Medicare-no authorization needed, med   Referring Physician:  Reyes, Eleia J, MD   Plan of care signed (Y/N):  Y  Visit# / total visits:  6  Pain level: 1/10 right shoulder     Time Period for Report:  24-10/10/24    Plan of Care/Treatment to date:  [x] Speech-Language Evaluation/Treatment    [] Dysphagia Evaluation/Treatment        [] Dysphagia Treatment via Neuromuscular Electrical Stimulation (NMES)   [] Modified Barium Swallowing Study   [x] Cognitive-Linguistic Skills Development  [] Voice evaluation and Treatment      [] Evaluation, modification, and Training of Voice Prosthetic     [] Evaluation for Speech-Generating Augmentative and Alternative Communication Device   [] Therapeutic Services for the use of Speech-Generating Device.   [] Other:     Significant Findings At Last Visit/Comments:    Subjective:  Patient pleasant, cooperative, motivated, with strong family support     Objective:  Patient completed re-assessment with MOCA 8.1. Patient scored 24/30 (WFL=26), which is the same score she completed at initial evaluation. Patient scored 3/5 for executive function, 3/3 for naming, immediately recalled 4/5 words on first trial, 5/5 on second trial, recalled 4/5 words after delay (+1 with categorical cue), 6/6 for attention, 2/3 for language, 2/2 for abstraction, oriented x4. Disoriented by 1 for date and day.        Assessment:  Plan:   Short Term

## 2024-10-10 NOTE — FLOWSHEET NOTE
[facility]- Outpatient Rehabilitation and Therapy [address], [place] [address] office: [phone] fax: [phone]      Physical Therapy Re-Certification Plan of Care/MD UPDATE      Dear Reyes, Eleia J, MD,    We had the pleasure of treating the following patient for physical therapy services at [facility].  A summary of our findings can be found in the updated assessment below.  This includes our plan of care.  If you have any questions or concerns regarding these findings, please do not hesitate to contact me at the office phone number checked above.  Thank you for the referral.     Physician Signature:________________________________Date:__________________  By signing above (or electronic signature), therapist’s plan is approved by physician    Date Range Of Visits: 24  Total Visits to Date: 7  Overall Response to Treatment:   []Patient is responding well to treatment and improvement is noted with regards  to goals   []Patient should continue to improve in reasonable time if they continue HEP   []Patient has plateaued and is no longer responding to skilled PT intervention    []Patient is getting worse and would benefit from return to referring MD   []Patient unable to adhere to initial POC   [x]Other: Limited ability to participate with skilled PT due to impairments but demonstrated increased activity tolerance and speed this date.      Recommendation:    [x]Continue PT 1 x / wk for 6-8 weeks.    []Hold PT, pending MD visit            Physical Therapy: TREATMENT/PROGRESS NOTE   Patient: Geoffrey Osorio (44 y.o. female)   Examination Date: 10/10/2024   :  1980 MRN: [id number]   Visit #: 7   Insurance Allowable Auth Needed   BMN []Yes    [x]No    Insurance: Payor: MEDICARE / Plan: MEDICARE PART A AND B / Product Type: *No Product type* /   Insurance ID: [id number] - (Medicare)  Secondary Insurance (if applicable):    Treatment

## 2024-10-10 NOTE — PROGRESS NOTES
Speech-Language Pathology  Daily Treatment Note    Date:  10/10/2024    Patient Name:  Geoffrey Osorio    :  1980  MRN: [id number]  Restrictions/Precautions:  Left sided weakness  Diagnosis:  G81.94 (ICD-10-CM) - Left hemiparesis (HCC)   Treatment Diagnosis:  R41. 841 Cognitive communication deficit   Insurance/Certification information:  Medicare-no authorization needed, med  Referring Physician:  Reyes, Eleia J, MD  Plan of care signed (Y/N):  N  Visit# / total visits:  6  Pain level: 1/10 right shoulder    Progress Note: []  Yes  [x]  No    Next due by: Visit #10     Subjective:  Patient pleasant and cooperative.     Onset date: 21    Objective:   Patient completed re-assessment with MOCA 8.1. Patient scored 24/30 (WFL=26), which is the same score she completed at initial evaluation. Patient scored 3/5 for executive function, 3/3 for naming, immediately recalled 4/5 words on first trial, 5/5 on second trial, recalled 4/5 words after delay (+1 with categorical cue), 6/6 for attention, 2/3 for language, 2/2 for abstraction, oriented x4. Disoriented by 1 for date and day. Patient appears to be at new baseline since CVA. Patient with ongoing executive function and short term memory deficits. Patient has support at home from  for assistance with appointment and medication management.   Patient completed 1-back 2-factor with 85% accuracy given min cues  Patient completed visual scanning for letter elimination with min cues for attention to left  Reviewed discharge instructions of utilizing reminders, alarms and whiteboards to recall information. Educated on continuing to complete preferred tasks such as word searches and Sodoku to address cognition      Assessment:   Cognitive-linguistic: Mild cognitive deficits characterized by short term memory and executive function deficits. Patient scored 24/30 on MOCA (WFL=26). Patient scored 2/5 for executive function, 3/3 for naming, immediately

## 2024-10-15 ENCOUNTER — APPOINTMENT (OUTPATIENT)
Dept: PHYSICAL THERAPY | Age: 44
End: 2024-10-15
Payer: MEDICARE

## 2024-10-21 DIAGNOSIS — J30.2 SEASONAL ALLERGIES: ICD-10-CM

## 2024-10-22 ENCOUNTER — APPOINTMENT (OUTPATIENT)
Dept: PHYSICAL THERAPY | Age: 44
End: 2024-10-22
Payer: MEDICARE

## 2024-10-22 RX ORDER — CETIRIZINE HYDROCHLORIDE 10 MG/1
10 TABLET ORAL DAILY
Qty: 90 TABLET | Refills: 3 | Status: SHIPPED | OUTPATIENT
Start: 2024-10-22

## 2024-10-22 NOTE — TELEPHONE ENCOUNTER
Recent Visits  Date Type Provider Dept   09/04/24 Office Visit Reyes, Eleia J, MD [facility]   06/11/24 Office Visit Reyes, Eleia J, MD [facility]   02/02/24 Office Visit Reyes, Eleia J, MD [facility]   10/18/23 Office Visit Reyes, Eleia J, MD [facility]   07/18/23 Office Visit Reyes, Eleia J, MD [facility]   06/27/23 Office Visit Reyes, Eleia J, MD [facility]   Showing recent visits within past 540 days with a meds authorizing provider and meeting all other requirements  Future Appointments  Date Type Provider Dept   12/10/24 Appointment Reyes, Eleia J, MD [facility]   Showing future appointments within next 150 days with a meds authorizing provider and meeting all other requirements

## 2024-10-29 ENCOUNTER — HOSPITAL ENCOUNTER (OUTPATIENT)
Dept: PHYSICAL THERAPY | Age: 44
Setting detail: THERAPIES SERIES
Discharge: HOME | End: 2024-10-29
Payer: MEDICARE

## 2024-10-29 PROCEDURE — 97110 THERAPEUTIC EXERCISES: CPT

## 2024-10-29 NOTE — FLOWSHEET NOTE
[facility]- Outpatient Rehabilitation and Therapy [address], [address], [place] [address] office: [phone] fax: [phone]      Physical Therapy: TREATMENT/PROGRESS NOTE   Patient: Geoffrey Osorio (44 y.o. female)   Examination Date: 10/29/2024   :  1980 MRN: [id number]   Visit #: 8   Insurance Allowable Auth Needed   BMN []Yes    [x]No    Insurance: Payor: MEDICARE / Plan: MEDICARE PART A AND B / Product Type: *No Product type* /   Insurance ID: [id number] - (Medicare)  Secondary Insurance (if applicable):    Treatment Diagnosis:       ICD-10-CM     1. Left hemiparesis (HCC)  G81.94         2. Weakness  R53.1         3. Balance disorder  R26.89         4. Abnormality of gait and mobility  R26.9         Medical Diagnosis:  Left hemiparesis (HCC) [G81.94]   Referring Physician: Reyes, Eleia J, MD  PCP: Reyes, Eleia J, MD       Plan of care signed (Y/N):     Date of Patient follow up with Physician:      Plan of Care Report: NO  POC update due: (10 visits /OR AUTH LIMITS, whichever is less) 24                                            Medical History:  Relevant Medical History:  This is a 44 y.o. 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 EVD placed upon arrival to NSICU. She is s/p right sided craniectomy with ICA clipping and R STA-MCA bypass. Underwent placement of VPS on 3/2 without complication (Codmann set to 120). On 2021 she returned for Right cranioplasty. There were no surgical complications. She was admitted to the hospital for recovery. On 2021 her PEG (placed 3/17/21) removed at bedside. Tolerated well, minimal drainage. She presents today for three year follow up evaluation and transfer of care from Dr. Mcghee. She has not yet obtained her CTA.  PMhX: HTN, anxiety disorder, ROMEO (iron deficiency anemia), Allergic rhinitis, Atypical chest pain, Menarche, Mixed incontinence urge and stress, Major

## 2024-11-01 DIAGNOSIS — G81.94 LEFT HEMIPARESIS (HCC): ICD-10-CM

## 2024-11-04 DIAGNOSIS — B37.2 SKIN YEAST INFECTION: ICD-10-CM

## 2024-11-04 DIAGNOSIS — G81.94 LEFT HEMIPARESIS (HCC): ICD-10-CM

## 2024-11-04 DIAGNOSIS — F41.8 DEPRESSION WITH ANXIETY: ICD-10-CM

## 2024-11-04 NOTE — TELEPHONE ENCOUNTER
Recent Visits  Date Type Provider Dept   09/04/24 Office Visit Reyes, Eleia J, MD [facility]   06/11/24 Office Visit Reyes, Eleia J, MD [facility]   02/02/24 Office Visit Reyes, Eleia J, MD [facility]   10/18/23 Office Visit Reyes, Eleia J, MD [facility]   07/18/23 Office Visit Reyes, Eleia J, MD [facility]   06/27/23 Office Visit Reyes, Eleia J, MD [facility]   Showing recent visits within past 540 days with a meds authorizing provider and meeting all other requirements  Future Appointments  Date Type Provider Dept   12/10/24 Appointment Reyes, Eleia J, MD [facility]   Showing future appointments within next 150 days with a meds authorizing provider and meeting all other requirements     9/4/2024

## 2024-11-04 NOTE — TELEPHONE ENCOUNTER
Recent Visits  Date Type Provider Dept   09/04/24 Office Visit Reyes, Eleia J, MD [facility]   06/11/24 Office Visit Reyes, Eleia J, MD [facility]   02/02/24 Office Visit Reyes, Eleia J, MD [facility]   10/18/23 Office Visit Reyes, Eleia J, MD [facility]   07/18/23 Office Visit Reyes, Eleia J, MD [facility]   06/27/23 Office Visit Reyes, Eleia J, MD [facility]   Showing recent visits within past 540 days with a meds authorizing provider and meeting all other requirements  Future Appointments  Date Type Provider Dept   12/10/24 Appointment Reyes, Eleia J, MD [facility]   Showing future appointments within next 150 days with a meds authorizing provider and meeting all other requirements     9/4/2024

## 2024-11-04 NOTE — TELEPHONE ENCOUNTER
Recent Visits  Date Type Provider Dept   09/04/24 Office Visit Reyes, Eleia J, MD [facility]   06/11/24 Office Visit Reyes, Eleia J, MD [facility]   02/02/24 Office Visit Reyes, Eleia J, MD [facility]   10/18/23 Office Visit Reyes, Eleia J, MD [facility]   07/18/23 Office Visit Reyes, Eleia J, MD [facility]   06/27/23 Office Visit Reyes, Eleia J, MD [facility]   Showing recent visits within past 540 days with a meds authorizing provider and meeting all other requirements  Future Appointments  Date Type Provider Dept   12/10/24 Appointment Reyes, Eleia J, MD [facility]   Showing future appointments within next 150 days with a meds authorizing provider and meeting all other requirements     9/4/2024

## 2024-11-05 ENCOUNTER — HOSPITAL ENCOUNTER (OUTPATIENT)
Dept: PHYSICAL THERAPY | Age: 44
Setting detail: THERAPIES SERIES
Discharge: HOME | End: 2024-11-05
Payer: MEDICARE

## 2024-11-05 PROCEDURE — 97110 THERAPEUTIC EXERCISES: CPT

## 2024-11-05 PROCEDURE — 97530 THERAPEUTIC ACTIVITIES: CPT

## 2024-11-05 RX ORDER — DICLOFENAC SODIUM 75 MG/1
TABLET, DELAYED RELEASE ORAL
Qty: 180 TABLET | Refills: 1 | OUTPATIENT
Start: 2024-11-05

## 2024-11-05 RX ORDER — DICLOFENAC SODIUM 75 MG/1
75 TABLET, DELAYED RELEASE ORAL 2 TIMES DAILY PRN
Qty: 60 TABLET | Refills: 1 | Status: SHIPPED | OUTPATIENT
Start: 2024-11-05

## 2024-11-05 RX ORDER — BUSPIRONE HYDROCHLORIDE 5 MG/1
5 TABLET ORAL DAILY
Qty: 90 TABLET | Refills: 1 | Status: SHIPPED | OUTPATIENT
Start: 2024-11-05

## 2024-11-05 RX ORDER — NYSTATIN 100000 U/G
CREAM TOPICAL
Qty: 30 G | Refills: 1 | Status: SHIPPED | OUTPATIENT
Start: 2024-11-05

## 2024-11-05 NOTE — FLOWSHEET NOTE
listed.    [] Progression is slowed due to complexities/Impairments listed.  [] Progression has been slowed due to co-morbidities.  [] Plan just implemented, too soon (<30days) to assess goals progression   [] Goals require adjustment due to lack of progress  [] Patient is not progressing as expected and requires additional follow up with physician  [] Other:     TREATMENT PLAN     Frequency/Duration: 1-2x/week for 8 weeks for the following treatment interventions:    Interventions:  Therapeutic Exercise (75562) including: strength training, ROM, and functional mobility  Therapeutic Activities (01357) including: functional mobility training and education.  Neuromuscular Re-education (85117) activation and proprioception, including postural re-education.    Gait Training (08631) for normalization of ambulation patterns and AD training.     Plan: Cont POC- Continue emphasis/focus on exercise progression and improving proper muscle recruitment and activation/motor control patterns. Next visit plan to progress reps, add new exercises, and progress balance     Electronically Signed by Marichuy Granda, PT  Date: 11/05/2024    Marichuy Granda PT, DPT, CNS [id number] 11/05/24 1600  Note: Portions of this note have been templated and/or copied from initial evaluation, reassessments and prior notes for documentation efficiency.      Note: If patient does not return for scheduled/recommended follow up visits, this note will serve as a discharge from care along with the most recent update on progress.    Neuro Evaluation

## 2024-11-07 ENCOUNTER — OFFICE VISIT (OUTPATIENT)
Dept: ORTHOPEDIC SURGERY | Age: 44
End: 2024-11-07
Payer: MEDICARE

## 2024-11-07 VITALS — BODY MASS INDEX: 36.57 KG/M2 | HEIGHT: 67 IN

## 2024-11-07 DIAGNOSIS — M17.12 ARTHRITIS OF LEFT KNEE: Primary | ICD-10-CM

## 2024-11-07 PROCEDURE — G8417 CALC BMI ABV UP PARAM F/U: HCPCS | Performed by: PHYSICIAN ASSISTANT

## 2024-11-07 PROCEDURE — G8427 DOCREV CUR MEDS BY ELIG CLIN: HCPCS | Performed by: PHYSICIAN ASSISTANT

## 2024-11-07 PROCEDURE — 20610 DRAIN/INJ JOINT/BURSA W/O US: CPT | Performed by: PHYSICIAN ASSISTANT

## 2024-11-07 PROCEDURE — G8484 FLU IMMUNIZE NO ADMIN: HCPCS | Performed by: PHYSICIAN ASSISTANT

## 2024-11-07 PROCEDURE — 4004F PT TOBACCO SCREEN RCVD TLK: CPT | Performed by: PHYSICIAN ASSISTANT

## 2024-11-07 PROCEDURE — 99213 OFFICE O/P EST LOW 20 MIN: CPT | Performed by: PHYSICIAN ASSISTANT

## 2024-11-07 RX ORDER — HYALURONATE SODIUM 10 MG/ML
20 SYRINGE (ML) INTRAARTICULAR ONCE
Status: COMPLETED | OUTPATIENT
Start: 2024-11-07 | End: 2024-11-07

## 2024-11-07 RX ADMIN — Medication 20 MG: at 08:43

## 2024-11-08 ENCOUNTER — PATIENT MESSAGE (OUTPATIENT)
Dept: PRIMARY CARE CLINIC | Age: 44
End: 2024-11-08

## 2024-11-08 NOTE — PROGRESS NOTES
months as needed.     Risks and benefits of viscosupplementation injections were also discussed today.  Risks include but not limited to increased pain, bleeding, infection, failure to provide improvement, neurovascular injury. She had ample time for discussion and questions were answered to her satisfaction. She verbally consented to proceed with intra-articular injection today.    Proceed with injection # 1 in the series of 3 injections for the  left knee.    PROCEDURE NOTE:  PRE-PROCEDURE DIAGNOSIS: DJD knee  POST-PROCEDURE DIAGNOSIS: DJD knee  With Geoffrey Osorio permission, her  left knee was prepped in standard sterile fashion with  Alcohol. The prefilled injection of the visco supplementation ( Euflexxa 20 mg/ 2 ML ) was injected into the  anterior-lateral joint space compartment without difficulty.  she tolerated the procedure well without difficulty.  A band-aid was applied.     POST-PROCEDURE INSTRUCTIONS GIVEN TO PATIENT:   She was advised to ice the knee for 15-20 minutes to relieve any injection site related pain. Decrease activity for the next 24 to 48 hours. May use prescription or OTC pain relievers as needed    FOLLOW-UP: in 1 and 2 weeks.  As directed or call or return to clinic if these symptoms worsen or fail to improve as anticipated. If at any time you are concerned you may contact the office for further instructions or care.                                         Sorin Flores PA-C  Senior Physician Assistant  [facility]                                         Disclaimer:  This note was generated with use of a verbal recognition program (DRAGON) and an attempt was made to check for errors.  It is possible that there are still dictated errors within this office note.  If so, please bring any significant errors to my attention for an addendum.  All efforts were made to ensure that this office note is accurate.

## 2024-11-14 ENCOUNTER — OFFICE VISIT (OUTPATIENT)
Dept: ORTHOPEDIC SURGERY | Age: 44
End: 2024-11-14
Payer: MEDICARE

## 2024-11-14 VITALS — BODY MASS INDEX: 36.1 KG/M2 | WEIGHT: 230 LBS | HEIGHT: 67 IN

## 2024-11-14 DIAGNOSIS — M17.12 ARTHRITIS OF LEFT KNEE: Primary | ICD-10-CM

## 2024-11-14 PROCEDURE — 20610 DRAIN/INJ JOINT/BURSA W/O US: CPT | Performed by: PHYSICIAN ASSISTANT

## 2024-11-14 RX ORDER — HYALURONATE SODIUM 10 MG/ML
20 SYRINGE (ML) INTRAARTICULAR ONCE
Status: COMPLETED | OUTPATIENT
Start: 2024-11-14 | End: 2024-11-14

## 2024-11-14 RX ADMIN — Medication 20 MG: at 08:56

## 2024-11-14 NOTE — PROGRESS NOTES
Subjective:    She  is here for visco supplementation injection # 2 for the left knee.      Objective:   Height 1.689 m (5' 6.5\"), weight 104.3 kg (230 lb), last menstrual period 03/03/2016, not currently breastfeeding.    Examination of the left knee:   Inspection of the skin reveals no unusual erythema.  There is mild intra-articular effusion.  There is mild pain associated with ROM testing.     Diagnosis:    ICD-10-CM    1. Arthritis of left knee  M17.12 DRAIN/INJECT LARGE JOINT/BURSA     sodium hyaluronate (EUFLEXXA, HYALGAN) injection 20 mg          Assessment/ Plan:  Knee pain related to knee arthritis.     Discussed at length conservative treatment of knee symptoms/arthritis, according to [employer]OS Clinical Practice Guidelines:  Evidence for intra-articular hyaluronic acid injections (viscosupplementation) is not as strong, but may benefit a subset of patients who have failed other options.  Informed patient viscosupplementation can be performed every 6 months as needed.     Risks and benefits of viscosupplementation injections were also discussed today.  Risks include but not limited to increased pain, bleeding, infection, failure to provide improvement, neurovascular injury. She had ample time for discussion and questions were answered to her satisfaction. She verbally consented to proceed with intra-articular injection today.    Proceed with injection # 2 in the series of 3 injections for the  left knee.    PROCEDURE NOTE:  PRE-PROCEDURE DIAGNOSIS: DJD knee  POST-PROCEDURE DIAGNOSIS: DJD knee  With Geoffrey Osorio permission, her  left knee was prepped in standard sterile fashion with  Alcohol. The prefilled injection of the visco supplementation ( Euflexxa 20 mg/ 2 ML ) was injected into the  anterior-lateral joint space compartment without difficulty.  she tolerated the procedure well without difficulty.  A band-aid was applied.     POST-PROCEDURE INSTRUCTIONS GIVEN TO PATIENT:   She was advised to ice the

## 2024-11-21 ENCOUNTER — OFFICE VISIT (OUTPATIENT)
Dept: ORTHOPEDIC SURGERY | Age: 44
End: 2024-11-21
Payer: MEDICARE

## 2024-11-21 VITALS — HEIGHT: 67 IN | BODY MASS INDEX: 36.1 KG/M2 | WEIGHT: 230 LBS

## 2024-11-21 DIAGNOSIS — M17.12 ARTHRITIS OF LEFT KNEE: Primary | ICD-10-CM

## 2024-11-21 PROCEDURE — 20610 DRAIN/INJ JOINT/BURSA W/O US: CPT | Performed by: PHYSICIAN ASSISTANT

## 2024-11-21 RX ORDER — HYALURONATE SODIUM 10 MG/ML
20 SYRINGE (ML) INTRAARTICULAR ONCE
Status: COMPLETED | OUTPATIENT
Start: 2024-11-21 | End: 2024-11-21

## 2024-11-21 RX ADMIN — Medication 20 MG: at 08:28

## 2024-11-21 NOTE — PROGRESS NOTES
Subjective:    She  is here for visco supplementation injection # 3 for the left knee.      Objective:   Height 1.689 m (5' 6.5\"), weight 104.3 kg (230 lb), last menstrual period 03/03/2016, not currently breastfeeding.    Examination of the left knee:   Inspection of the skin reveals no unusual erythema.  There is mild intra-articular effusion.  There is mild pain associated with ROM testing.   Extensor Mechanism is intact.        Diagnosis:    ICD-10-CM    1. Arthritis of left knee  M17.12 DRAIN/INJECT LARGE JOINT/BURSA     sodium hyaluronate (EUFLEXXA, HYALGAN) injection 20 mg          Assessment/ Plan:  Knee pain related to knee arthritis.     Discussed at length conservative treatment of knee symptoms/arthritis, according to [facility]OS Clinical Practice Guidelines:  Evidence for intra-articular hyaluronic acid injections (viscosupplementation) is not as strong, but may benefit a subset of patients who have failed other options.  Informed patient viscosupplementation can be performed every 6 months as needed.     Risks and benefits of viscosupplementation injections were also discussed today.  Risks include but not limited to increased pain, bleeding, infection, failure to provide improvement, neurovascular injury. She had ample time for discussion and questions were answered to her satisfaction. She verbally consented to proceed with intra-articular injection today.    Proceed with injection # 3 in the series of 3 injections for the  left knee.    PROCEDURE NOTE:  PRE-PROCEDURE DIAGNOSIS: DJD knee  POST-PROCEDURE DIAGNOSIS: DJD knee  With Geoffrey Osorio permission, her  left knee was prepped in standard sterile fashion with  Alcohol. The prefilled injection of the visco supplementation ( Euflexxa 20 mg/ 2 ML ) was injected into the  anterior-lateral joint space compartment without difficulty.  she tolerated the procedure well without difficulty.  A band-aid was applied.     POST-PROCEDURE INSTRUCTIONS GIVEN TO

## 2024-12-12 ENCOUNTER — TELEPHONE (OUTPATIENT)
Dept: PRIMARY CARE CLINIC | Age: 44
End: 2024-12-12

## 2024-12-12 NOTE — TELEPHONE ENCOUNTER
Physician verification form asking when it would be sent back to them [facility] agency on aging     Fax [phone]

## 2024-12-27 ENCOUNTER — OFFICE VISIT (OUTPATIENT)
Dept: PRIMARY CARE CLINIC | Age: 44
End: 2024-12-27
Payer: MEDICARE

## 2024-12-27 VITALS
OXYGEN SATURATION: 99 % | DIASTOLIC BLOOD PRESSURE: 88 MMHG | SYSTOLIC BLOOD PRESSURE: 138 MMHG | BODY MASS INDEX: 38.32 KG/M2 | HEART RATE: 83 BPM | WEIGHT: 241 LBS

## 2024-12-27 DIAGNOSIS — E78.2 MIXED HYPERLIPIDEMIA: Primary | ICD-10-CM

## 2024-12-27 DIAGNOSIS — G81.94 LEFT HEMIPARESIS (HCC): ICD-10-CM

## 2024-12-27 DIAGNOSIS — I10 ESSENTIAL HYPERTENSION, BENIGN: ICD-10-CM

## 2024-12-27 DIAGNOSIS — E55.9 VITAMIN D DEFICIENCY: ICD-10-CM

## 2024-12-27 DIAGNOSIS — F39 MOOD DISORDER (HCC): ICD-10-CM

## 2024-12-27 LAB
25(OH)D3 SERPL-MCNC: 11.2 NG/ML
ALBUMIN SERPL-MCNC: 4.1 G/DL (ref 3.4–5)
ALBUMIN/GLOB SERPL: 1.9 {RATIO} (ref 1.1–2.2)
ALP SERPL-CCNC: 94 U/L (ref 40–129)
ALT SERPL-CCNC: 16 U/L (ref 10–40)
ANION GAP SERPL CALCULATED.3IONS-SCNC: 11 MMOL/L (ref 3–16)
AST SERPL-CCNC: 11 U/L (ref 15–37)
BASOPHILS # BLD: 0 K/UL (ref 0–0.2)
BASOPHILS NFR BLD: 0.6 %
BILIRUB SERPL-MCNC: <0.2 MG/DL (ref 0–1)
BUN SERPL-MCNC: 10 MG/DL (ref 7–20)
CALCIUM SERPL-MCNC: 9.3 MG/DL (ref 8.3–10.6)
CHLORIDE SERPL-SCNC: 108 MMOL/L (ref 99–110)
CHOLEST SERPL-MCNC: 228 MG/DL (ref 0–199)
CO2 SERPL-SCNC: 23 MMOL/L (ref 21–32)
CREAT SERPL-MCNC: 0.7 MG/DL (ref 0.6–1.1)
DEPRECATED RDW RBC AUTO: 14.3 % (ref 12.4–15.4)
EOSINOPHIL # BLD: 0.1 K/UL (ref 0–0.6)
EOSINOPHIL NFR BLD: 1.3 %
GFR SERPLBLD CREATININE-BSD FMLA CKD-EPI: >90 ML/MIN/{1.73_M2}
GLUCOSE SERPL-MCNC: 85 MG/DL (ref 70–99)
HCT VFR BLD AUTO: 39.4 % (ref 36–48)
HDLC SERPL-MCNC: 49 MG/DL (ref 40–60)
HGB BLD-MCNC: 12.6 G/DL (ref 12–16)
LDLC SERPL CALC-MCNC: 150 MG/DL
LYMPHOCYTES # BLD: 2.1 K/UL (ref 1–5.1)
LYMPHOCYTES NFR BLD: 29.7 %
MCH RBC QN AUTO: 28.3 PG (ref 26–34)
MCHC RBC AUTO-ENTMCNC: 32.1 G/DL (ref 31–36)
MCV RBC AUTO: 88.1 FL (ref 80–100)
MONOCYTES # BLD: 0.3 K/UL (ref 0–1.3)
MONOCYTES NFR BLD: 4.7 %
NEUTROPHILS # BLD: 4.6 K/UL (ref 1.7–7.7)
NEUTROPHILS NFR BLD: 63.7 %
PLATELET # BLD AUTO: 207 K/UL (ref 135–450)
PMV BLD AUTO: 10 FL (ref 5–10.5)
POTASSIUM SERPL-SCNC: 3.8 MMOL/L (ref 3.5–5.1)
PROT SERPL-MCNC: 6.3 G/DL (ref 6.4–8.2)
RBC # BLD AUTO: 4.47 M/UL (ref 4–5.2)
SODIUM SERPL-SCNC: 142 MMOL/L (ref 136–145)
TRIGL SERPL-MCNC: 145 MG/DL (ref 0–150)
TSH SERPL DL<=0.005 MIU/L-ACNC: 0.81 UIU/ML (ref 0.27–4.2)
VLDLC SERPL CALC-MCNC: 29 MG/DL
WBC # BLD AUTO: 7.1 K/UL (ref 4–11)

## 2024-12-27 PROCEDURE — G8417 CALC BMI ABV UP PARAM F/U: HCPCS | Performed by: FAMILY MEDICINE

## 2024-12-27 PROCEDURE — 3075F SYST BP GE 130 - 139MM HG: CPT | Performed by: FAMILY MEDICINE

## 2024-12-27 PROCEDURE — G8484 FLU IMMUNIZE NO ADMIN: HCPCS | Performed by: FAMILY MEDICINE

## 2024-12-27 PROCEDURE — 4004F PT TOBACCO SCREEN RCVD TLK: CPT | Performed by: FAMILY MEDICINE

## 2024-12-27 PROCEDURE — 99214 OFFICE O/P EST MOD 30 MIN: CPT | Performed by: FAMILY MEDICINE

## 2024-12-27 PROCEDURE — 3079F DIAST BP 80-89 MM HG: CPT | Performed by: FAMILY MEDICINE

## 2024-12-27 PROCEDURE — 36415 COLL VENOUS BLD VENIPUNCTURE: CPT | Performed by: FAMILY MEDICINE

## 2024-12-27 PROCEDURE — G8427 DOCREV CUR MEDS BY ELIG CLIN: HCPCS | Performed by: FAMILY MEDICINE

## 2024-12-27 RX ORDER — GABAPENTIN 100 MG/1
100-300 CAPSULE ORAL NIGHTLY
Qty: 90 CAPSULE | Refills: 2 | Status: SHIPPED | OUTPATIENT
Start: 2024-12-27 | End: 2025-06-25

## 2024-12-27 RX ORDER — DIFLUPREDNATE OPHTHALMIC 0.5 MG/ML
EMULSION OPHTHALMIC
COMMUNITY
Start: 2024-11-25

## 2024-12-27 ASSESSMENT — PATIENT HEALTH QUESTIONNAIRE - PHQ9
2. FEELING DOWN, DEPRESSED OR HOPELESS: SEVERAL DAYS
5. POOR APPETITE OR OVEREATING: NEARLY EVERY DAY
SUM OF ALL RESPONSES TO PHQ QUESTIONS 1-9: 11
SUM OF ALL RESPONSES TO PHQ QUESTIONS 1-9: 11
3. TROUBLE FALLING OR STAYING ASLEEP: NEARLY EVERY DAY
SUM OF ALL RESPONSES TO PHQ QUESTIONS 1-9: 11
9. THOUGHTS THAT YOU WOULD BE BETTER OFF DEAD, OR OF HURTING YOURSELF: NOT AT ALL
8. MOVING OR SPEAKING SO SLOWLY THAT OTHER PEOPLE COULD HAVE NOTICED. OR THE OPPOSITE, BEING SO FIGETY OR RESTLESS THAT YOU HAVE BEEN MOVING AROUND A LOT MORE THAN USUAL: NOT AT ALL
7. TROUBLE CONCENTRATING ON THINGS, SUCH AS READING THE NEWSPAPER OR WATCHING TELEVISION: SEVERAL DAYS
SUM OF ALL RESPONSES TO PHQ QUESTIONS 1-9: 11
4. FEELING TIRED OR HAVING LITTLE ENERGY: SEVERAL DAYS
SUM OF ALL RESPONSES TO PHQ9 QUESTIONS 1 & 2: 2
6. FEELING BAD ABOUT YOURSELF - OR THAT YOU ARE A FAILURE OR HAVE LET YOURSELF OR YOUR FAMILY DOWN: SEVERAL DAYS
10. IF YOU CHECKED OFF ANY PROBLEMS, HOW DIFFICULT HAVE THESE PROBLEMS MADE IT FOR YOU TO DO YOUR WORK, TAKE CARE OF THINGS AT HOME, OR GET ALONG WITH OTHER PEOPLE: SOMEWHAT DIFFICULT
1. LITTLE INTEREST OR PLEASURE IN DOING THINGS: SEVERAL DAYS

## 2024-12-27 ASSESSMENT — ANXIETY QUESTIONNAIRES
6. BECOMING EASILY ANNOYED OR IRRITABLE: NEARLY EVERY DAY
2. NOT BEING ABLE TO STOP OR CONTROL WORRYING: NOT AT ALL
5. BEING SO RESTLESS THAT IT IS HARD TO SIT STILL: SEVERAL DAYS
3. WORRYING TOO MUCH ABOUT DIFFERENT THINGS: SEVERAL DAYS
IF YOU CHECKED OFF ANY PROBLEMS ON THIS QUESTIONNAIRE, HOW DIFFICULT HAVE THESE PROBLEMS MADE IT FOR YOU TO DO YOUR WORK, TAKE CARE OF THINGS AT HOME, OR GET ALONG WITH OTHER PEOPLE: VERY DIFFICULT
4. TROUBLE RELAXING: MORE THAN HALF THE DAYS
7. FEELING AFRAID AS IF SOMETHING AWFUL MIGHT HAPPEN: NOT AT ALL
1. FEELING NERVOUS, ANXIOUS, OR ON EDGE: NEARLY EVERY DAY
GAD7 TOTAL SCORE: 10

## 2024-12-27 NOTE — PROGRESS NOTES
Chief Complaint   Patient presents with    Hypertension     C/o neck/shoulder area on the right side x1 month and getting worse. Pt would also like to talk about Gabapentin and mood stabilizer     Hyperlipidemia       Subjective:       Geoffrey Osorio is a 44 y.o. female here for 6 months hypertension and hyperlipidemia follow-up. Patient has history of hemorrhagic stroke with residual left hemiparesis.  Completed PT/OT and speech therapy.  Patient reported to me that she was diagnosed with bipolar disorder when she was 39 years old and interested to try gabapentin as a mood stabilizer.  She also has fibromyalgia and was tried on Cymbalta did not help.  Has strong family history of bipolar disorder in father side.  Noted 11 pound weight gain from last visit and patient has not been watching her diet.  Due to left hemiparesis not much exercise.    11/21/2024: Orthopedic follow-up for her arthritis of her left knee, seen PA Crow Flores, received Visco left knee injection.    5/10/2024:  neurology follow-up with Dr. Jimenez for her SAH and right supraclinoid blister aneurysm.  Status post right-sided craniectomy with ICA clipping.  Underwent placement of VPS on March 2, 2021 without complication.  On 5/7/2021 had right cranioplasty.  CTA on 6/20/2024 showed unchanged aneurysmal or pseudoaneurysm dilatation of the right STA MCA anastomosis approximately 4 x 5 mm dimension.  Aneurysm clipping on the right supraclinoid ICA with occlusion of the ICA proximal and distal to the clip.  Subtle interval decreased caliber of the right M1 segment with mild luminal irregularity with otherwise similar right MCA branches.  No stenosis or occlusive changes.    Reviewed blood test done on June 11, 2024: Total cholesterol 203, triglyceride 178, HDL 47, , sodium 139, potassium 3.7, creatinine 0.8, calcium 9.2, vitamin D14.7    Current Outpatient Medications   Medication Sig Dispense Refill    difluprednate (DUREZOL) 0.05 %

## 2025-02-06 ENCOUNTER — OFFICE VISIT (OUTPATIENT)
Dept: ORTHOPEDIC SURGERY | Age: 45
End: 2025-02-06
Payer: MEDICARE

## 2025-02-06 VITALS — HEIGHT: 67 IN | BODY MASS INDEX: 37.67 KG/M2 | WEIGHT: 240 LBS

## 2025-02-06 DIAGNOSIS — M54.16 LUMBAR RADICULAR PAIN: ICD-10-CM

## 2025-02-06 DIAGNOSIS — M54.50 LOW BACK PAIN, UNSPECIFIED BACK PAIN LATERALITY, UNSPECIFIED CHRONICITY, UNSPECIFIED WHETHER SCIATICA PRESENT: Primary | ICD-10-CM

## 2025-02-06 DIAGNOSIS — R29.898 LEFT LEG WEAKNESS: ICD-10-CM

## 2025-02-06 PROCEDURE — G8417 CALC BMI ABV UP PARAM F/U: HCPCS | Performed by: PHYSICIAN ASSISTANT

## 2025-02-06 PROCEDURE — G8427 DOCREV CUR MEDS BY ELIG CLIN: HCPCS | Performed by: PHYSICIAN ASSISTANT

## 2025-02-06 PROCEDURE — 4004F PT TOBACCO SCREEN RCVD TLK: CPT | Performed by: PHYSICIAN ASSISTANT

## 2025-02-06 PROCEDURE — 99214 OFFICE O/P EST MOD 30 MIN: CPT | Performed by: PHYSICIAN ASSISTANT

## 2025-02-06 NOTE — PROGRESS NOTES
History of present illness:   Ms. Geoffrey Osorio is a pleasant 44 y.o. female kindly referred by self for consultation regarding her lower back pain and left leg pain with weakness.  She has a history of CVA with left hemiparesis 2021.  She states over the past month she has had increasing low back pain on the right side with left leg radicular pain.  She states she has been trying to walk more recently.  She is in a long leg AFO on the left to assist with left lower extremity weakness.  She denies any injury.  Back pain 4/10 VAS, right and left buttock pain 0/10 VAS, left leg pain 3/10 VAS.   Pain is describes as ache.   She denies new numbness and tingling.    She reports continued weakness of her left leg s/p CVA.  She denies bowel or bladder dysfunction and saddle anesthesia.   She can sit for a maximum of unlimited minutes and stand for a maximum of a few minutes.   Pain does disrupt her sleep.   Prior medications and treatment tried include diclofenac.      Past medical history:  Her past medical history has been reviewed.  Past Medical History:   Diagnosis Date    Anemia     during pregnancy    Bipolar disorder (HCC)     diagneosed at 40 y/o    History of chicken pox     HLD (hyperlipidemia)     Hypertension     Pulmonary embolism ([facility]) 03/13/2021    Partial visualization of pulmonary emboli in R and L pulmonary artery bifurcations    Subarachnoid hemorrhage (HCC) 03/01/2021    Wrist fracture, left 04/14/2011       Her past surgical history has been reviewed.  Past Surgical History:   Procedure Laterality Date    BRAIN ANEURYSM SURGERY  02/12/2021    s/p right sided craniectomy with ICA clipping and R STA-MCA bypass by Dr. Mcghee    FOOT SURGERY      LEFT    HYSTERECTOMY, TOTAL ABDOMINAL (CERVIX REMOVED)  2013    LAPAROSCOPY  03/17/2021    PEG tube placement, diagnostic laparoscopy    MOUTH SURGERY      WRIST SURGERY  04/14/2011    ORIF LEFT  WRIST         Her medications and allergies were

## 2025-02-10 ENCOUNTER — TELEPHONE (OUTPATIENT)
Dept: ORTHOPEDIC SURGERY | Age: 45
End: 2025-02-10

## 2025-02-10 NOTE — TELEPHONE ENCOUNTER
General Question     Subject: REFERRAL FOR PHYSICAL THERAPY LT KNEE/ BACK   Patient and /or Facility Request: Geoffrey Osorio   Contact Number: [phone]     PATIENT CALLED IN TO SEE IF SHE CAN GET AN GET AN REFERRAL FOR PHYSICAL THERAPY LT KNEE/ BACK.. SHE WOULD LIKE TO GO TO [facility]..    REQ A CALL BACK..    PLEASE ADVISE

## 2025-02-27 ENCOUNTER — TELEPHONE (OUTPATIENT)
Dept: PRIMARY CARE CLINIC | Age: 45
End: 2025-02-27

## 2025-02-27 DIAGNOSIS — G81.94 LEFT HEMIPARESIS (HCC): ICD-10-CM

## 2025-02-27 RX ORDER — DICLOFENAC SODIUM 75 MG/1
75 TABLET, DELAYED RELEASE ORAL 2 TIMES DAILY
Qty: 60 TABLET | Refills: 3 | Status: SHIPPED | OUTPATIENT
Start: 2025-02-27

## 2025-02-27 NOTE — TELEPHONE ENCOUNTER
Recent Visits  Date Type Provider Dept   12/27/24 Office Visit Reyes, Eleia J, MD [facility]   09/04/24 Office Visit Reyes, Eleia J, MD [facility]   06/11/24 Office Visit Reyes, Eleia J, MD [facility]   02/02/24 Office Visit Reyes, Eleia J, MD [facility]   10/18/23 Office Visit Reyes, Eleia J, MD [facility]   Showing recent visits within past 540 days with a meds authorizing provider and meeting all other requirements  Future Appointments  No visits were found meeting these conditions.  Showing future appointments within next 150 days with a meds authorizing provider and meeting all other requirements

## 2025-03-04 ENCOUNTER — HOSPITAL ENCOUNTER (OUTPATIENT)
Dept: PHYSICAL THERAPY | Age: 45
Setting detail: THERAPIES SERIES
Discharge: HOME | End: 2025-03-04
Payer: MEDICARE

## 2025-03-04 DIAGNOSIS — R29.898 IMPAIRED STRENGTH OF HIP MUSCLES: ICD-10-CM

## 2025-03-04 DIAGNOSIS — M53.2X7 LUMBOSACRAL SPINE INSTABILITY: Primary | ICD-10-CM

## 2025-03-04 DIAGNOSIS — R19.8 ABDOMINAL WEAKNESS: ICD-10-CM

## 2025-03-04 DIAGNOSIS — R29.898 POOR BODY MECHANICS: ICD-10-CM

## 2025-03-04 DIAGNOSIS — Z74.09 IMPAIRED FUNCTIONAL MOBILITY, BALANCE, GAIT, AND ENDURANCE: ICD-10-CM

## 2025-03-04 PROCEDURE — 97161 PT EVAL LOW COMPLEX 20 MIN: CPT

## 2025-03-04 PROCEDURE — 97530 THERAPEUTIC ACTIVITIES: CPT

## 2025-03-04 NOTE — PLAN OF CARE
Adjusted  Patient will have a decrease in pain to <4/10 to facilitate improvement in movement, function, and ADLs as indicated by Functional Deficits.  [] Progressing: [] Met: [] Not Met: [] Adjusted    Long Term Goals: To be achieved in: 4 weeks  Disability index score of 40% or less for the Modified Oswestry to assist with reaching prior level of function with activities such as sitting >1 hour.  [] Progressing: [] Met: [] Not Met: [] Adjusted  Patient will improve core strength to reduce dependency on lumbar spine soft tissues to allow pt to complete meal prep >30 mins with pain <3/10.  [] Progressing: [] Met: [] Not Met: [] Adjusted  Patient will demonstrate increased Strength of R hip abductors and extensors to at least 4+/5 throughout without pain and increased compensation of lumbar structures to enable patient to return to improve gait tolerance to >30 mins.   [] Progressing: [] Met: [] Not Met: [] Adjusted  Patient will return to sleeping >6 hours without increased symptoms or restriction.   [] Progressing: [] Met: [] Not Met: [] Adjusted      Overall Progression Towards Functional goals/ Treatment Progress Update:  [] Patient is progressing as expected towards functional goals listed.    [] Progression is slowed due to complexities/Impairments listed.  [] Progression has been slowed due to co-morbidities.  [x] Plan just implemented, too soon (<30days) to assess goals progression   [] Goals require adjustment due to lack of progress  [] Patient is not progressing as expected and requires additional follow up with physician  [] Other:     TREATMENT PLAN     Frequency/Duration: 2x/week for 4 weeks for the following treatment interventions:    Interventions:  Therapeutic Exercise (56092) including: strength training, ROM, and functional mobility  Therapeutic Activities (68440) including: functional mobility training and education.  Neuromuscular Re-education (22458) activation and proprioception, including

## 2025-03-11 ENCOUNTER — APPOINTMENT (OUTPATIENT)
Dept: PHYSICAL THERAPY | Age: 45
End: 2025-03-11
Payer: MEDICARE

## 2025-03-14 ENCOUNTER — HOSPITAL ENCOUNTER (OUTPATIENT)
Dept: PHYSICAL THERAPY | Age: 45
Setting detail: THERAPIES SERIES
End: 2025-03-14
Payer: MEDICARE

## 2025-03-15 ENCOUNTER — HOSPITAL ENCOUNTER (OUTPATIENT)
Dept: PHYSICAL THERAPY | Age: 45
Setting detail: THERAPIES SERIES
Discharge: HOME | End: 2025-03-15
Payer: MEDICARE

## 2025-03-15 PROCEDURE — 97110 THERAPEUTIC EXERCISES: CPT

## 2025-03-15 PROCEDURE — 97112 NEUROMUSCULAR REEDUCATION: CPT

## 2025-03-15 PROCEDURE — 97140 MANUAL THERAPY 1/> REGIONS: CPT

## 2025-03-15 NOTE — PLAN OF CARE
hip  -DKTC                                                 Manual Intervention (80360)  TIME     STM       IASTM       Stretches                     NMR re-education (11263) resistance Sets/time Reps CUES NEEDED   Seated pelvic tilts/stabs on SB  -A/P, M/L, CW & CCW blue    10 ea    AirEx       Mat table  -core ex's                     Therapeutic Activity (86756)  Sets/time     Step ups                                     Modalities:    No modalities applied this session    Education/Home Exercise Program: Patient HEP program created electronically.  Refer to [employer] access code:    Access Code: [email or web address]  URL: [email or web address]  Date: 03/04/2025  Prepared by: Cyrus Argueta    Exercises  - Supine Quadratus Lumborum Stretch  - 3 x daily - 7 x weekly - 3 sets - 30\" hold  - Bridge  - 2 x daily - 7 x weekly - 1 sets - 10 reps  - Clamshell  - 2 x daily - 7 x weekly - 1 sets - 10 reps  - Prone Knee Flexion Extension AROM  - 2 x daily - 7 x weekly - 1 sets - 10 reps  - Prone Quadriceps Set  - 2 x daily - 7 x weekly - 1 sets - 10 reps      ASSESSMENT       Today's Assessment: See above    Medical Necessity Documentation:  I certify that this patient meets the below criteria necessary for medical necessity for care and/or justification of therapy services:  The patient has functional impairments and/or activity limitations and would benefit from continued outpatient therapy services to address the deficits outlined in the patients goals  The patient has a musculoskeletal condition(s) with a corresponding ICD-10 code that is of complexity and severity that require skilled therapeutic intervention. This has a direct and significant impact on the need for therapy and significantly impacts the rate of recovery.   The patient has a complexity identified by an ICD-10 code that has a direct and significant impact on the need for therapy.  (Significantly impacts the rate of recovery and is associated with a primary

## 2025-03-17 ENCOUNTER — APPOINTMENT (OUTPATIENT)
Dept: PHYSICAL THERAPY | Age: 45
End: 2025-03-17
Payer: MEDICARE

## 2025-03-20 ENCOUNTER — HOSPITAL ENCOUNTER (OUTPATIENT)
Dept: PHYSICAL THERAPY | Age: 45
Setting detail: THERAPIES SERIES
Discharge: HOME | End: 2025-03-20
Payer: MEDICARE

## 2025-03-20 PROCEDURE — 97140 MANUAL THERAPY 1/> REGIONS: CPT

## 2025-03-20 PROCEDURE — 97112 NEUROMUSCULAR REEDUCATION: CPT

## 2025-03-20 NOTE — FLOWSHEET NOTE
[facility] - Outpatient Rehabilitation and Therapy: [facility], [place] [address] office: [phone] fax: [phone]         Physical Therapy: TREATMENT/PROGRESS NOTE   Patient: Geoffrey Osorio (45 y.o. female)   Examination Date: 2025   :  1980 MRN: [id number]   Visit #: 3 /8  Insurance Allowable Auth Needed   mn []Yes    [x]No    Insurance: Payor: MEDICARE / Plan: MEDICARE PART A AND B / Product Type: *No Product type* /   Insurance ID: [id number] - (Medicare)  Secondary Insurance (if applicable): [facility] MEDICAID   Treatment Diagnosis:     ICD-10-CM    1. Lumbosacral spine instability  M53.2X7       2. Abdominal weakness  R19.8       3. Poor body mechanics  R29.898       4. Impaired functional mobility, balance, gait, and endurance  Z74.09       5. Impaired strength of hip muscles  R29.898          Medical Diagnosis:  Radiculopathy, lumbar region [M54.16]   Referring Physician: Sorin Flores PA  PCP: Reyes, Eleia J, MD     Plan of care signed (Y/N):     Date of Patient follow up with Physician:      Plan of Care Report: NO  POC update due: (10 visits /OR AUTH LIMITS, whichever is less)  4/3/2025                                             Medical History:  Relevant Medical History: HTN, brain aneurysm repaired w/craniectomy 21, subarachnoid hemorrhage 3/1/21, PE 3/13/21                                         Precautions/ Contra-indications:           Latex allergy:  NO  Pacemaker:    NO  Contraindications for Manipulation: unhealthy/ multiple comorbidities   Date of Surgery: -  Other:    Red Flags:  None    Suicide Screening:   The patient did not verbalize a primary behavioral concern, suicidal ideation, suicidal intent, or demonstrate suicidal behaviors.    Preferred Language for Healthcare:   [x] English       [] other:    SUBJECTIVE EXAMINATION     Patient stated complaint:    Doing okay today, having some soreness on L lower back.  Was sore in her

## 2025-03-24 ENCOUNTER — APPOINTMENT (OUTPATIENT)
Dept: PHYSICAL THERAPY | Age: 45
End: 2025-03-24
Payer: MEDICARE

## 2025-03-24 DIAGNOSIS — B37.2 SKIN YEAST INFECTION: ICD-10-CM

## 2025-03-24 NOTE — TELEPHONE ENCOUNTER
Recent Visits  Date Type Provider Dept   12/27/24 Office Visit Reyes, Eleia J, MD [facility]   09/04/24 Office Visit Reyes, Eleia J, MD [facility]   06/11/24 Office Visit Reyes, Eleia J, MD [facility]   02/02/24 Office Visit Reyes, Eleia J, MD [facility]   10/18/23 Office Visit Reyes, Eleia J, MD [facility]   Showing recent visits within past 540 days with a meds authorizing provider and meeting all other requirements  Future Appointments  No visits were found meeting these conditions.  Showing future appointments within next 150 days with a meds authorizing provider and meeting all other requirements     12/27/2024

## 2025-03-25 RX ORDER — NYSTATIN 100000 U/G
CREAM TOPICAL
Qty: 30 G | Refills: 1 | Status: SHIPPED | OUTPATIENT
Start: 2025-03-25

## 2025-03-27 ENCOUNTER — HOSPITAL ENCOUNTER (OUTPATIENT)
Dept: PHYSICAL THERAPY | Age: 45
Setting detail: THERAPIES SERIES
Discharge: HOME | End: 2025-03-27
Payer: MEDICARE

## 2025-03-27 PROCEDURE — 97112 NEUROMUSCULAR REEDUCATION: CPT

## 2025-03-27 PROCEDURE — 97530 THERAPEUTIC ACTIVITIES: CPT

## 2025-03-27 PROCEDURE — 97110 THERAPEUTIC EXERCISES: CPT

## 2025-03-27 NOTE — FLOWSHEET NOTE
Code (UNTIMED) #     Therex (86006)  10 1  EVAL:LOW (12961 - Typically 20 minutes face-to-face)     Neuromusc. Re-ed (70581) 10 1  Re-Eval (72064)     Manual (70890) 4 0  Estim Unattended (16303)     Ther. Act (06563) 16 1  Mech. Traction (96212)     Gait (66878)    Dry Needle 1-2 muscle (67835)     Aquatic Therex (95671)    Dry Needle 3+ muscle (82928)     Iontophoresis (29916)    VASO (51037)     Ultrasound (53437)    Group Therapy (79235)     Estim Attended (00124)    Canalith Repositioning (37253)     Physical Performance Test (77771)    Custom orthotic ()     Other:    Other:    Total Timed Code Tx Minutes 40 3       Total Treatment Minutes 40        Charge Justification:  (05785) THERAPEUTIC EXERCISE - Provided verbal/tactile cueing for HEP and/or activities related to strengthening, flexibility, endurance, ROM performed to prevent loss of range of motion, maintain or improve muscular strength or increase flexibility, following either an injury or surgery.   (52685) NEUROMUSCULAR RE-EDUCATION - Provided therapeutic procedure on activities related to neuromuscular reeducation of movement, balance, coordination, kinesthetic sense, posture, and/or proprioception for sitting and/or standing activities. Provided HEP review and/or progression.  (91749) THERAPEUTIC ACTIVITY - use of dynamic activities to improve functional performance. (Ex include squatting, ascending/descending stairs, walking, bending, lifting, catching, throwing, pushing, pulling, jumping.)  Direct, one on one contact, billed in 15-minute increments.    GOALS     Patient stated goal: alleviate lower back pain  [] Progressing: [] Met: [] Not Met: [] Adjusted    Therapist goals for Patient:   Short Term Goals: To be achieved in: 2 weeks  1Independent in HEP and progression per patient tolerance, in order to prevent re-injury.   [] Progressing: [] Met: [] Not Met: [] Adjusted  Patient will have a decrease in pain to <4/10 to facilitate

## 2025-03-31 ENCOUNTER — HOSPITAL ENCOUNTER (OUTPATIENT)
Dept: PHYSICAL THERAPY | Age: 45
Setting detail: THERAPIES SERIES
Discharge: HOME | End: 2025-03-31
Payer: MEDICARE

## 2025-03-31 PROCEDURE — 97110 THERAPEUTIC EXERCISES: CPT

## 2025-03-31 PROCEDURE — 97530 THERAPEUTIC ACTIVITIES: CPT

## 2025-03-31 NOTE — PLAN OF CARE
[facility] - Outpatient Rehabilitation and Therapy: [address], [place] [address] office: [phone] fax: [phone]     Physical Therapy Re-Certification Plan of Care    Dear DIPIKA Miranda  ,    We had the pleasure of treating the following patient for physical therapy services at [facility] Outpatient Physical Therapy. A summary of our findings can be found in the updated assessment below.  This includes our plan of care.  If you have any questions or concerns regarding these findings, please do not hesitate to contact me at the office phone number checked above.  Thank you for the referral.     Physician Signature:________________________________Date:__________________  By signing above (or electronic signature), therapist's plan is approved by physician      Total Visits: 5 /8     Test used Initial score  3/4/25 3/31/25   Pain Summary VAS 1-6/10 0/10   Functional questionnaire Modified Oswestry 31 / 62% 19 / 38%       Core strength:  80 deg ecc hip flex from 90 deg    MMT R hip  -flex   5/5  -abd  4+/5  -ext   4/5 -AROM to neutral only      Overall Response to Treatment:  Patient is responding well to treatment and improvement is noted with regards to goals  Introduced pt and  to C-brace KAFO, family is determining benefits and may request order/evaluation for orthotist to determine if pt is a good candidate.    Patient's pain level has improved, however overall functionality and gait remain about the same.  Pain is likely to return or increase as pt maintains functionality or further increases.  Plan to expand pt's HEP w/expectant discharge in 3 visits.    Recommendation:    [x] Continue PT 2x / wk for 2 weeks to complete remaining 3 visits, will determine appropriateness of continued PT services at that time.  [] Hold PT, pending MD visit   [] Discharge to HEP. Follow up with PT or MD PRN.       Physical Therapy: TREATMENT/PROGRESS NOTE   Patient: Geoffrey WILSON

## 2025-04-02 ENCOUNTER — HOSPITAL ENCOUNTER (OUTPATIENT)
Dept: PHYSICAL THERAPY | Age: 45
Setting detail: THERAPIES SERIES
Discharge: HOME | End: 2025-04-02
Payer: MEDICARE

## 2025-04-02 PROCEDURE — 97112 NEUROMUSCULAR REEDUCATION: CPT

## 2025-04-02 PROCEDURE — 97530 THERAPEUTIC ACTIVITIES: CPT

## 2025-04-02 NOTE — FLOWSHEET NOTE
[facility] - Outpatient Rehabilitation and Therapy: [facility], [place] [address] office: [phone] fax: [phone]           Physical Therapy: TREATMENT/PROGRESS NOTE   Patient: Geoffrey Osorio (45 y.o. female)   Examination Date: 2025   :  1980 MRN: [id number]   Visit #:   Insurance Allowable Auth Needed   mn []Yes    [x]No    Insurance: Payor: MEDICARE / Plan: MEDICARE PART A AND B / Product Type: *No Product type* /   Insurance ID: [id number] - (Medicare)  Secondary Insurance (if applicable): [employer] MEDICAID   Treatment Diagnosis:     ICD-10-CM    1. Lumbosacral spine instability  M53.2X7       2. Abdominal weakness  R19.8       3. Poor body mechanics  R29.898       4. Impaired functional mobility, balance, gait, and endurance  Z74.09       5. Impaired strength of hip muscles  R29.898          Medical Diagnosis:  Radiculopathy, lumbar region [M54.16]   Referring Physician: Sorin Flores PA  PCP: Reyes, Eleia J, MD     Plan of care signed (Y/N):     Date of Patient follow up with Physician:      Plan of Care Report: NO  POC update due: (10 visits /OR AUTH LIMITS, whichever is less)  2025                                             Medical History:  Relevant Medical History: HTN, brain aneurysm repaired w/craniectomy 21, subarachnoid hemorrhage 3/1/21, PE 3/13/21                                         Precautions/ Contra-indications:           Latex allergy:  NO  Pacemaker:    NO  Contraindications for Manipulation: unhealthy/ multiple comorbidities   Date of Surgery: -  Other:    Red Flags:  None    Suicide Screening:   The patient did not verbalize a primary behavioral concern, suicidal ideation, suicidal intent, or demonstrate suicidal behaviors.    Preferred Language for Healthcare:   [x] English       [] other:    SUBJECTIVE EXAMINATION     Patient stated complaint:     Today is okay so far, is having more R sided neck pain, R lower back

## 2025-04-12 ENCOUNTER — HOSPITAL ENCOUNTER (OUTPATIENT)
Dept: PHYSICAL THERAPY | Age: 45
Setting detail: THERAPIES SERIES
Discharge: HOME | End: 2025-04-12
Payer: MEDICARE

## 2025-04-12 PROCEDURE — 97116 GAIT TRAINING THERAPY: CPT

## 2025-04-12 PROCEDURE — 97110 THERAPEUTIC EXERCISES: CPT

## 2025-04-15 ENCOUNTER — HOSPITAL ENCOUNTER (OUTPATIENT)
Dept: PHYSICAL THERAPY | Age: 45
Setting detail: THERAPIES SERIES
Discharge: HOME | End: 2025-04-15
Payer: MEDICARE

## 2025-04-15 ENCOUNTER — TELEPHONE (OUTPATIENT)
Dept: PRIMARY CARE CLINIC | Age: 45
End: 2025-04-15

## 2025-04-15 PROCEDURE — 97110 THERAPEUTIC EXERCISES: CPT

## 2025-04-15 PROCEDURE — 97530 THERAPEUTIC ACTIVITIES: CPT

## 2025-04-15 NOTE — PLAN OF CARE
[facility] - Outpatient Rehabilitation and Therapy: [address], [place] [address] office: [phone] fax: [phone]     Physical Therapy Discharge Note    Dear DIPIKA Miranda  ,    We had the pleasure of treating the following patient for physical therapy services at [facility] Outpatient Physical Therapy. A summary of our findings can be found in the updated assessment below.  This includes our plan of care.  If you have any questions or concerns regarding these findings, please do not hesitate to contact me at the office phone number checked above.  Thank you for the referral.     Physician Signature:________________________________Date:__________________  By signing above (or electronic signature), therapist's plan is approved by physician      Total Visits: 8 /8       Test used Initial score  3/4/25 3/31/25 04/15/2025   Pain Summary VAS 1-6/10 0/10 0/10   Functional questionnaire Modified Oswestry 31 / 62% 19 / 38% 26 / 52%       Core strength:  70 deg ecc hip flex from 90 deg  MMT R hip  -flex   5/5  -abd  4+/5  -ext   4+/5 -sidelying    Overall Response to Treatment:  Patient should continue to improve in reasonable time if they continue HEP  Patient has been able to progress core and hip strength since initiating PT services.  Pt continues to report pain with prolonged standing and walking, but has only recently returned to prolonged activities primarily for children's events and remains largely sedentary when at home.  Patient has been more compliant with HEP than in the past, possibly contributing to progressions.  Patient and  are interested in C-brace assessment, requested order from PCP this date be sent to  Clinic which may assist to improve gait pattern and reduce overall back pain.  Patient has extensive HEP to address core and hip weakness and is IND w/performing HEP, at this time, patient is discharged from PT services.  May be appropriate for PT

## 2025-04-15 NOTE — TELEPHONE ENCOUNTER
Deangelo with [facility] physical therapy calling for a order for new orthotic for left leg a micro processor kafo please fax to [facility]     [phone]      Pt is interested in OT therapy

## 2025-04-16 DIAGNOSIS — R29.898 LEFT LEG WEAKNESS: Primary | ICD-10-CM

## 2025-04-16 NOTE — TELEPHONE ENCOUNTER
Inform patient that OT therapy order in the chart and print the order for the orthotic to be faxed to [facility] ([facility]): [phone].

## 2025-04-17 ENCOUNTER — APPOINTMENT (OUTPATIENT)
Dept: PHYSICAL THERAPY | Age: 45
End: 2025-04-17
Payer: MEDICARE

## 2025-04-17 ENCOUNTER — TELEPHONE (OUTPATIENT)
Dept: PRIMARY CARE CLINIC | Age: 45
End: 2025-04-17

## 2025-04-17 DIAGNOSIS — G81.94 LEFT HEMIPARESIS (HCC): Primary | ICD-10-CM

## 2025-04-17 DIAGNOSIS — R29.898 LEFT ARM WEAKNESS: ICD-10-CM

## 2025-04-17 NOTE — TELEPHONE ENCOUNTER
Pt is calling stating that her order that was placed yesterday for ortho was missed typed pt states on the order it states left leg but is supposed to be left arm please notify pt when new order is placed so she can make a appt

## 2025-04-22 ENCOUNTER — APPOINTMENT (OUTPATIENT)
Dept: PHYSICAL THERAPY | Age: 45
End: 2025-04-22
Payer: MEDICARE

## 2025-04-22 ENCOUNTER — HOSPITAL ENCOUNTER (OUTPATIENT)
Dept: OCCUPATIONAL THERAPY | Age: 45
Setting detail: THERAPIES SERIES
Discharge: HOME | End: 2025-04-22
Payer: MEDICARE

## 2025-04-22 PROCEDURE — 97110 THERAPEUTIC EXERCISES: CPT

## 2025-04-22 PROCEDURE — 97165 OT EVAL LOW COMPLEX 30 MIN: CPT

## 2025-04-22 PROCEDURE — 97530 THERAPEUTIC ACTIVITIES: CPT

## 2025-04-22 NOTE — PLAN OF CARE
[facility] - Outpatient Rehabilitation and Therapy: [address], [place] [address] office: [phone] fax: [phone]     Occupational Therapy Initial Evaluation Certification      Dear Sorin Flores PA,    We had the pleasure of evaluating the following patient for occupational therapy services at [facility].  A summary of our findings can be found in the initial assessment below.  This includes our plan of care.  If you have any questions or concerns regarding these findings, please do not hesitate to contact me at the office phone number listed above.  Thank you for the referral.     Physician Signature:_______________________________Date:__________________  By signing above (or electronic signature), therapist’s plan is approved by physician       Occupational Therapy: TREATMENT/PROGRESS NOTE   Patient: Geoffrey Osorio (45 y.o. female)   Examination Date: 2025   :  1980 MRN: [id number]   Visit #: 1  Insurance Allowable Auth Needed   BMN []Yes    []No    Insurance: Payor: MEDICARE / Plan: MEDICARE PART A AND B / Product Type: *No Product type* /   Insurance ID: [id number] - (Medicare)  Secondary Insurance (if applicable): [facility] MEDICAID   Treatment Diagnosis: :left hemiplegia, decreased independence in ADL and IADL                                         No diagnosis found.   Medical Diagnosis:  Left arm weakness [R29.898]  Left hemiparesis (HCC) [G81.94]   Referring Physician: Sorin Flores PA  PCP: Reyes, Eleia J, MD     Plan of care signed (Y/N): sent on evaluation     Date of Patient follow up with Physician:      Plan of Care Report: EVAL today and NO  POC update due: (10 visits /OR AUTH LIMITS, whichever is less) 2025                                             Medical History: HTN, brain aneurysm repaired w/craniectomy 21, subarachnoid hemorrhage 3/1/21, PE 3/13/21  Date of Onset:  Date of

## 2025-04-23 ENCOUNTER — TELEPHONE (OUTPATIENT)
Dept: PRIMARY CARE CLINIC | Age: 45
End: 2025-04-23

## 2025-04-23 NOTE — TELEPHONE ENCOUNTER
I need specific provider for hearing evaluation at [facility], name, address and phone number/fax #

## 2025-04-24 ENCOUNTER — APPOINTMENT (OUTPATIENT)
Dept: PHYSICAL THERAPY | Age: 45
End: 2025-04-24
Payer: MEDICARE

## 2025-04-24 NOTE — TELEPHONE ENCOUNTER
Spoke with the pt. Pt will call us back with the referral information. Dx is decreased hearing left ear

## 2025-04-28 ENCOUNTER — OFFICE VISIT (OUTPATIENT)
Dept: ORTHOPEDIC SURGERY | Age: 45
End: 2025-04-28
Payer: MEDICARE

## 2025-04-28 VITALS — BODY MASS INDEX: 39.21 KG/M2 | WEIGHT: 249.8 LBS | HEIGHT: 67 IN

## 2025-04-28 DIAGNOSIS — M17.12 ARTHRITIS OF LEFT KNEE: Primary | ICD-10-CM

## 2025-04-28 PROCEDURE — 20610 DRAIN/INJ JOINT/BURSA W/O US: CPT | Performed by: PHYSICIAN ASSISTANT

## 2025-04-28 RX ORDER — TRIAMCINOLONE ACETONIDE 40 MG/ML
40 INJECTION, SUSPENSION INTRA-ARTICULAR; INTRAMUSCULAR ONCE
Status: COMPLETED | OUTPATIENT
Start: 2025-04-28 | End: 2025-04-28

## 2025-04-28 RX ORDER — BUPIVACAINE HYDROCHLORIDE 2.5 MG/ML
2 INJECTION, SOLUTION INFILTRATION; PERINEURAL ONCE
Status: COMPLETED | OUTPATIENT
Start: 2025-04-28 | End: 2025-04-28

## 2025-04-28 RX ADMIN — TRIAMCINOLONE ACETONIDE 40 MG: 40 INJECTION, SUSPENSION INTRA-ARTICULAR; INTRAMUSCULAR at 08:58

## 2025-04-28 RX ADMIN — BUPIVACAINE HYDROCHLORIDE 5 MG: 2.5 INJECTION, SOLUTION INFILTRATION; PERINEURAL at 08:58

## 2025-04-28 NOTE — PROGRESS NOTES
Subjective:      Patient ID: Geoffrey Osorio is a 45 y.o.  female who is here for evaluation and treatment of left knee pain related to arthritis.   The most recent cortisone injection performed 9/30/24 and visco performed 11/21/24 helped relieve the knee symptoms.    Pain has gradually returned.   Denies recent injury.   Pain is 1-2/10.  She states she has been ambulating more and using her wheelchair less.  Pain is worse with weightbearing activities.   Pain improves somewhat with rest and elevation.    Review of Systems:   Denies fever or chills.  Denies numbness or tingling around the knee.    Past Medical History:   Diagnosis Date    Anemia     during pregnancy    Bipolar disorder (HCC)     diagneosed at 40 y/o    History of chicken pox     HLD (hyperlipidemia)     Hypertension     Pulmonary embolism ([facility]) 03/13/2021    Partial visualization of pulmonary emboli in R and L pulmonary artery bifurcations    Subarachnoid hemorrhage ([facility]) 03/01/2021    Wrist fracture, left 04/14/2011       Family History   Problem Relation Age of Onset    High Blood Pressure Mother     High Blood Pressure Father     High Blood Pressure Sister        Past Surgical History:   Procedure Laterality Date    BRAIN ANEURYSM SURGERY  02/12/2021    s/p right sided craniectomy with ICA clipping and R STA-MCA bypass by Dr. Mcghee    FOOT SURGERY      LEFT    HYSTERECTOMY, TOTAL ABDOMINAL (CERVIX REMOVED)  2013    LAPAROSCOPY  03/17/2021    PEG tube placement, diagnostic laparoscopy    MOUTH SURGERY      WRIST SURGERY  04/14/2011    ORIF LEFT  WRIST       Social History     Occupational History    Not on file   Tobacco Use    Smoking status: Every Day     Types: Cigars    Smokeless tobacco: Never   Substance and Sexual Activity    Alcohol use: Yes     Comment: Social    Drug use: No    Sexual activity: Yes     Partners: Male       Current Outpatient Medications   Medication Sig Dispense Refill    nystatin (MYCOSTATIN) 909290 UNIT/GM cream

## 2025-04-29 ENCOUNTER — APPOINTMENT (OUTPATIENT)
Dept: OCCUPATIONAL THERAPY | Age: 45
End: 2025-04-29
Payer: MEDICARE

## 2025-04-29 ENCOUNTER — APPOINTMENT (OUTPATIENT)
Dept: PHYSICAL THERAPY | Age: 45
End: 2025-04-29
Payer: MEDICARE

## 2025-05-06 ENCOUNTER — APPOINTMENT (OUTPATIENT)
Dept: OCCUPATIONAL THERAPY | Age: 45
End: 2025-05-06
Payer: MEDICARE

## 2025-05-08 ENCOUNTER — HOSPITAL ENCOUNTER (OUTPATIENT)
Dept: OCCUPATIONAL THERAPY | Age: 45
Setting detail: THERAPIES SERIES
Discharge: HOME | End: 2025-05-08
Payer: MEDICARE

## 2025-05-08 PROCEDURE — 97110 THERAPEUTIC EXERCISES: CPT

## 2025-05-08 PROCEDURE — 97112 NEUROMUSCULAR REEDUCATION: CPT

## 2025-05-08 PROCEDURE — 97032 APPL MODALITY 1+ESTIM EA 15: CPT

## 2025-05-08 NOTE — FLOWSHEET NOTE
[facility] - Outpatient Rehabilitation and Therapy: [address], [place] [address] office: [phone] fax: [phone]           Occupational Therapy: TREATMENT/PROGRESS NOTE   Patient: Geoffrey Osorio (45 y.o. female)   Examination Date: 2025   :  1980 MRN: [id number]   Visit #: 2  Insurance Allowable Auth Needed   BMN []Yes    []No    Insurance: Payor: MEDICARE / Plan: MEDICARE PART A AND B / Product Type: *No Product type* /   Insurance ID: [id number] - (Medicare)  Secondary Insurance (if applicable): [facility] MEDICAID   Treatment Diagnosis: :left hemiplegia, decreased independence in ADL and IADL                                         No diagnosis found.   Medical Diagnosis:  Left arm weakness [R29.898]  Left hemiparesis (HCC) [G81.94]   Referring Physician: Sorin Flores PA  PCP: Reyes, Eleia J, MD     Plan of care signed (Y/N): sent on evaluation     Date of Patient follow up with Physician:      Plan of Care Report: NO  POC update due: (10 visits /OR AUTH LIMITS, whichever is less) 2025                                             Medical History: HTN, brain aneurysm repaired w/craniectomy 21, subarachnoid hemorrhage 3/1/21, PE 3/13/21  Date of Onset:  Date of Surgery:  Comorbidities:  Diabetes (Type I or II)  Hypertension  Other Musculoskeletal Conditions: lumbar radiculopathy   Relevant Medical History: see above                                         Precautions/ Contra-indications:           Latex allergy:  NO  Pacemaker:    NO  Contraindications for Manipulation: None  Other:    Red Flags:  None    Suicide Screening:   The patient did not verbalize a primary behavioral concern, suicidal ideation, suicidal intent, or demonstrate suicidal behaviors.    Preferred Language for Healthcare:   [x] English       [] other:    SUBJECTIVE EXAMINATION     Patient stated complaint: Pt seeking OT services to address left hemiplegia        Test used Initial

## 2025-05-13 ENCOUNTER — APPOINTMENT (OUTPATIENT)
Dept: OCCUPATIONAL THERAPY | Age: 45
End: 2025-05-13
Payer: MEDICARE

## 2025-05-15 ENCOUNTER — APPOINTMENT (OUTPATIENT)
Dept: OCCUPATIONAL THERAPY | Age: 45
End: 2025-05-15
Payer: MEDICARE

## 2025-05-21 DIAGNOSIS — I10 ESSENTIAL HYPERTENSION, BENIGN: ICD-10-CM

## 2025-05-21 RX ORDER — METOPROLOL SUCCINATE 100 MG/1
100 TABLET, EXTENDED RELEASE ORAL DAILY
Qty: 90 TABLET | Refills: 3 | Status: SHIPPED | OUTPATIENT
Start: 2025-05-21

## 2025-05-21 NOTE — TELEPHONE ENCOUNTER
Recent Visits  Date Type Provider Dept   12/27/24 Office Visit Reyes, Eleia J, MD [facility]   09/04/24 Office Visit Reyes, Eleia J, MD [facility]   06/11/24 Office Visit Reyes, Eleia J, MD [facility]   02/02/24 Office Visit Reyes, Eleia J, MD [facility]   Showing recent visits within past 540 days with a meds authorizing provider and meeting all other requirements  Future Appointments  No visits were found meeting these conditions.  Showing future appointments within next 150 days with a meds authorizing provider and meeting all other requirements

## 2025-05-21 NOTE — TELEPHONE ENCOUNTER
Catrinajunito requested a refill on the below medication because her order  and she also want the refill sent to the [facility]  pharmacy on [address]   metoprolol succinate (TOPROL XL) 100 MG extended release tablet

## 2025-05-22 ENCOUNTER — HOSPITAL ENCOUNTER (OUTPATIENT)
Dept: OCCUPATIONAL THERAPY | Age: 45
Setting detail: THERAPIES SERIES
Discharge: HOME | End: 2025-05-22
Payer: MEDICARE

## 2025-05-22 PROCEDURE — 97112 NEUROMUSCULAR REEDUCATION: CPT

## 2025-05-22 PROCEDURE — 97110 THERAPEUTIC EXERCISES: CPT

## 2025-05-22 PROCEDURE — 97032 APPL MODALITY 1+ESTIM EA 15: CPT

## 2025-05-22 NOTE — PLAN OF CARE
[facility] - Outpatient Rehabilitation and Therapy: [address], [place] [address] office: [phone] fax: [phone]     Occupational Therapy Re-Certification Plan of Care    Dear DIPIKA Miranda   ,    We had the pleasure of treating the following patient for occupational therapy services at [facility]. A summary of our findings can be found in the updated assessment below.  This includes our plan of care.  If you have any questions or concerns regarding these findings, please do not hesitate to contact me at the office phone number checked above.  Thank you for the referral.     Physician Signature:________________________________Date:__________________  By signing above (or electronic signature), therapist's plan is approved by physician      Functional Outcome: elizabeth Osorio 1980 continues to present with functional deficits in ROM, flexibility, eccentric control, muscle activation, and Proprioceptive Control  limiting ability with walking on even ground, walking on uneven ground, managing community ambulation, reaching overhead, carrying items, lifting items, pushing or pulling activity, ADLs, light home activity, and heavy home activity .  During therapy this date, patient required tactile cueing, muscle facilitation, modification of technique, and manual interventions for exercise progression, improving proper muscle recruitment and activation/motor control patterns, increasing ROM, static and dynamic balance, and kinesthetic sense and proprioception. Patient will continue to benefit from ongoing evaluation and advanced clinical decision from a Occupational Therapist to improve neuromuscular control, normalization of gait, balance and proprioception, ADL status, coordination, and sensation to safely return to ADLs/IADLs without symptoms or restrictions.    Overall Response to Treatment:  Patient is responding well to

## 2025-05-27 ENCOUNTER — OFFICE VISIT (OUTPATIENT)
Dept: ORTHOPEDIC SURGERY | Age: 45
End: 2025-05-27
Payer: MEDICARE

## 2025-05-27 VITALS — BODY MASS INDEX: 39.71 KG/M2 | HEIGHT: 67 IN

## 2025-05-27 DIAGNOSIS — M17.12 ARTHRITIS OF LEFT KNEE: Primary | ICD-10-CM

## 2025-05-27 PROCEDURE — 20610 DRAIN/INJ JOINT/BURSA W/O US: CPT | Performed by: PHYSICIAN ASSISTANT

## 2025-05-27 NOTE — PROGRESS NOTES
Subjective:    She  is here for visco supplementation injection # 1 for the left knee.      Objective:       Examination of the left knee:   Inspection of the skin reveals no unusual erythema.  There is mild intra-articular effusion.  There is mild pain associated with ROM testing.   Extensor Mechanism is intact.        Diagnosis:    ICD-10-CM    1. Arthritis of left knee  M17.12 DRAIN/INJECT LARGE JOINT/BURSA     sodium hyaluronate (EUFLEXXA, HYALGAN) injection 20 mg          Assessment/ Plan:  Knee pain related to knee arthritis.     Discussed at length conservative treatment of knee symptoms/arthritis, according to AAOS Clinical Practice Guidelines:  Evidence for intra-articular hyaluronic acid injections (viscosupplementation) is not as strong, but may benefit a subset of patients who have failed other options.  Informed patient viscosupplementation can be performed every 6 months as needed.     Risks and benefits of viscosupplementation injections were also discussed today.  Risks include but not limited to increased pain, bleeding, infection, failure to provide improvement, neurovascular injury. She had ample time for discussion and questions were answered to her satisfaction. She verbally consented to proceed with intra-articular injection today.    Proceed with injection # 1 in the series of 3 injections for the  left knee.    PROCEDURE NOTE:  PRE-PROCEDURE DIAGNOSIS: DJD knee  POST-PROCEDURE DIAGNOSIS: DJD knee  With Geoffrey Osorio permission, her  left knee was prepped in standard sterile fashion with  Alcohol. The prefilled injection of the visco supplementation ( Euflexxa 20 mg/ 2 ML ) was injected into the  anterior-lateral joint space compartment without difficulty.  she tolerated the procedure well without difficulty.  A band-aid was applied.     POST-PROCEDURE INSTRUCTIONS GIVEN TO PATIENT:   She was advised to ice the knee for 15-20 minutes to relieve any injection site related pain. Decrease

## 2025-06-03 ENCOUNTER — OFFICE VISIT (OUTPATIENT)
Dept: ORTHOPEDIC SURGERY | Age: 45
End: 2025-06-03
Payer: MEDICARE

## 2025-06-03 DIAGNOSIS — M17.12 ARTHRITIS OF LEFT KNEE: Primary | ICD-10-CM

## 2025-06-03 PROCEDURE — 20610 DRAIN/INJ JOINT/BURSA W/O US: CPT | Performed by: PHYSICIAN ASSISTANT

## 2025-06-03 NOTE — PROGRESS NOTES
Subjective:    She  is here for visco supplementation injection # 2 for the left knee.      Objective:     Examination of the right and left knee:   Inspection of the skin reveals no unusual erythema.  There is mild intra-articular effusion.  There is mild pain associated with ROM testing.   Extensor Mechanism is intact.        Diagnosis:    ICD-10-CM    1. Arthritis of left knee  M17.12 DRAIN/INJECT LARGE JOINT/BURSA     sodium hyaluronate (EUFLEXXA, HYALGAN) injection 20 mg          Assessment/ Plan:  Knee pain related to knee arthritis.     Discussed at length conservative treatment of knee symptoms/arthritis, according to AAOS Clinical Practice Guidelines:  Evidence for intra-articular hyaluronic acid injections (viscosupplementation) is not as strong, but may benefit a subset of patients who have failed other options.  Informed patient viscosupplementation can be performed every 6 months as needed.     Risks and benefits of viscosupplementation injections were also discussed today.  Risks include but not limited to increased pain, bleeding, infection, failure to provide improvement, neurovascular injury. She had ample time for discussion and questions were answered to her satisfaction. She verbally consented to proceed with intra-articular injection today.    Proceed with injection # 2 in the series of 3 injections for the  left knee.    PROCEDURE NOTE:  PRE-PROCEDURE DIAGNOSIS: DJD knee  POST-PROCEDURE DIAGNOSIS: DJD knee  With Geoffrey Osorio permission, her  left knee was prepped in standard sterile fashion with  Alcohol. The prefilled injection of the visco supplementation ( Euflexxa 20 mg/ 2 ML ) was injected into the  anterior-lateral joint space compartment without difficulty.  she tolerated the procedure well without difficulty.  A band-aid was applied.     POST-PROCEDURE INSTRUCTIONS GIVEN TO PATIENT:   She was advised to ice the knee for 15-20 minutes to relieve any injection site related pain.

## 2025-06-10 ENCOUNTER — OFFICE VISIT (OUTPATIENT)
Dept: ORTHOPEDIC SURGERY | Age: 45
End: 2025-06-10
Payer: MEDICARE

## 2025-06-10 VITALS — WEIGHT: 249 LBS | HEIGHT: 66 IN | BODY MASS INDEX: 40.02 KG/M2

## 2025-06-10 DIAGNOSIS — M17.12 ARTHRITIS OF LEFT KNEE: Primary | ICD-10-CM

## 2025-06-10 PROCEDURE — 20610 DRAIN/INJ JOINT/BURSA W/O US: CPT | Performed by: PHYSICIAN ASSISTANT

## 2025-06-10 NOTE — PROGRESS NOTES
Subjective:    She  is here for visco supplementation injection # 3 for the left knee.      Objective:   Height 1.676 m (5' 6\"), weight 112.9 kg (249 lb), last menstrual period 03/03/2016, not currently breastfeeding.    Examination of the left knee:   Inspection of the skin reveals no unusual erythema.  There is mild intra-articular effusion.  There is mild pain associated with ROM testing.   Extensor Mechanism is intact.        Diagnosis:    ICD-10-CM    1. Arthritis of left knee  M17.12 DRAIN/INJECT LARGE JOINT/BURSA     sodium hyaluronate (EUFLEXXA, HYALGAN) injection 20 mg          Assessment/ Plan:  Knee pain related to knee arthritis.     Discussed at length conservative treatment of knee symptoms/arthritis, according to AAOS Clinical Practice Guidelines:  Evidence for intra-articular hyaluronic acid injections (viscosupplementation) is not as strong, but may benefit a subset of patients who have failed other options.  Informed patient viscosupplementation can be performed every 6 months as needed.     Risks and benefits of viscosupplementation injections were also discussed today.  Risks include but not limited to increased pain, bleeding, infection, failure to provide improvement, neurovascular injury. She had ample time for discussion and questions were answered to her satisfaction. She verbally consented to proceed with intra-articular injection today.    Proceed with injection # 3 in the series of 3 injections for the  left knee.    PROCEDURE NOTE:  PRE-PROCEDURE DIAGNOSIS: DJD knee  POST-PROCEDURE DIAGNOSIS: DJD knee  With Geoffrey Osorio permission, her  left knee was prepped in standard sterile fashion with  Alcohol. The prefilled injection of the visco supplementation ( Euflexxa 20 mg/ 2 ML ) was injected into the  anterior-medial joint space compartment without difficulty.  she tolerated the procedure well without difficulty.  A band-aid was applied.     POST-PROCEDURE INSTRUCTIONS GIVEN TO

## 2025-06-18 ENCOUNTER — OFFICE VISIT (OUTPATIENT)
Dept: PRIMARY CARE CLINIC | Age: 45
End: 2025-06-18

## 2025-06-18 VITALS
OXYGEN SATURATION: 99 % | DIASTOLIC BLOOD PRESSURE: 104 MMHG | BODY MASS INDEX: 40.05 KG/M2 | SYSTOLIC BLOOD PRESSURE: 140 MMHG | HEART RATE: 78 BPM | WEIGHT: 249.2 LBS | HEIGHT: 66 IN

## 2025-06-18 DIAGNOSIS — G81.94 LEFT HEMIPARESIS (HCC): ICD-10-CM

## 2025-06-18 DIAGNOSIS — E55.9 VITAMIN D DEFICIENCY: ICD-10-CM

## 2025-06-18 DIAGNOSIS — I10 ESSENTIAL HYPERTENSION, BENIGN: Primary | ICD-10-CM

## 2025-06-18 DIAGNOSIS — E78.2 MIXED HYPERLIPIDEMIA: ICD-10-CM

## 2025-06-18 DIAGNOSIS — Z12.31 BREAST CANCER SCREENING BY MAMMOGRAM: ICD-10-CM

## 2025-06-18 LAB
25(OH)D3 SERPL-MCNC: 20.3 NG/ML
ALBUMIN SERPL-MCNC: 4.1 G/DL (ref 3.4–5)
ALBUMIN/GLOB SERPL: 1.8 {RATIO} (ref 1.1–2.2)
ALP SERPL-CCNC: 87 U/L (ref 40–129)
ALT SERPL-CCNC: 12 U/L (ref 10–40)
ANION GAP SERPL CALCULATED.3IONS-SCNC: 9 MMOL/L (ref 3–16)
AST SERPL-CCNC: 12 U/L (ref 15–37)
BILIRUB SERPL-MCNC: 0.3 MG/DL (ref 0–1)
BUN SERPL-MCNC: 11 MG/DL (ref 7–20)
CALCIUM SERPL-MCNC: 9.3 MG/DL (ref 8.3–10.6)
CHLORIDE SERPL-SCNC: 107 MMOL/L (ref 99–110)
CHOLEST SERPL-MCNC: 240 MG/DL (ref 0–199)
CO2 SERPL-SCNC: 24 MMOL/L (ref 21–32)
CREAT SERPL-MCNC: 0.8 MG/DL (ref 0.6–1.1)
GFR SERPLBLD CREATININE-BSD FMLA CKD-EPI: >90 ML/MIN/{1.73_M2}
GLUCOSE SERPL-MCNC: 90 MG/DL (ref 70–99)
HDLC SERPL-MCNC: 51 MG/DL (ref 40–60)
LDLC SERPL CALC-MCNC: 155 MG/DL
POTASSIUM SERPL-SCNC: 4 MMOL/L (ref 3.5–5.1)
PROT SERPL-MCNC: 6.4 G/DL (ref 6.4–8.2)
SODIUM SERPL-SCNC: 140 MMOL/L (ref 136–145)
TRIGL SERPL-MCNC: 172 MG/DL (ref 0–150)
VLDLC SERPL CALC-MCNC: 34 MG/DL

## 2025-06-18 RX ORDER — LOSARTAN POTASSIUM 50 MG/1
50 TABLET ORAL 2 TIMES DAILY
Qty: 180 TABLET | Refills: 3 | Status: SHIPPED | OUTPATIENT
Start: 2025-06-18

## 2025-06-18 SDOH — ECONOMIC STABILITY: FOOD INSECURITY: WITHIN THE PAST 12 MONTHS, THE FOOD YOU BOUGHT JUST DIDN'T LAST AND YOU DIDN'T HAVE MONEY TO GET MORE.: OFTEN TRUE

## 2025-06-18 SDOH — ECONOMIC STABILITY: FOOD INSECURITY: WITHIN THE PAST 12 MONTHS, YOU WORRIED THAT YOUR FOOD WOULD RUN OUT BEFORE YOU GOT MONEY TO BUY MORE.: OFTEN TRUE

## 2025-06-18 ASSESSMENT — PATIENT HEALTH QUESTIONNAIRE - PHQ9
9. THOUGHTS THAT YOU WOULD BE BETTER OFF DEAD, OR OF HURTING YOURSELF: NOT AT ALL
SUM OF ALL RESPONSES TO PHQ QUESTIONS 1-9: 6
10. IF YOU CHECKED OFF ANY PROBLEMS, HOW DIFFICULT HAVE THESE PROBLEMS MADE IT FOR YOU TO DO YOUR WORK, TAKE CARE OF THINGS AT HOME, OR GET ALONG WITH OTHER PEOPLE: SOMEWHAT DIFFICULT
SUM OF ALL RESPONSES TO PHQ QUESTIONS 1-9: 6
3. TROUBLE FALLING OR STAYING ASLEEP: SEVERAL DAYS
2. FEELING DOWN, DEPRESSED OR HOPELESS: NOT AT ALL
4. FEELING TIRED OR HAVING LITTLE ENERGY: SEVERAL DAYS
1. LITTLE INTEREST OR PLEASURE IN DOING THINGS: MORE THAN HALF THE DAYS
6. FEELING BAD ABOUT YOURSELF - OR THAT YOU ARE A FAILURE OR HAVE LET YOURSELF OR YOUR FAMILY DOWN: NOT AT ALL
SUM OF ALL RESPONSES TO PHQ QUESTIONS 1-9: 6
8. MOVING OR SPEAKING SO SLOWLY THAT OTHER PEOPLE COULD HAVE NOTICED. OR THE OPPOSITE, BEING SO FIGETY OR RESTLESS THAT YOU HAVE BEEN MOVING AROUND A LOT MORE THAN USUAL: NOT AT ALL
7. TROUBLE CONCENTRATING ON THINGS, SUCH AS READING THE NEWSPAPER OR WATCHING TELEVISION: SEVERAL DAYS
5. POOR APPETITE OR OVEREATING: SEVERAL DAYS
SUM OF ALL RESPONSES TO PHQ QUESTIONS 1-9: 6

## 2025-06-18 NOTE — PROGRESS NOTES
Chief Complaint   Patient presents with    Hypertension    Cholesterol Problem       Subjective:       Geoffrey Osorio is a 45 y.o. female here for 6 months follow-up on her hypertension and hyperlipidemia.  Been compliant with her medications, especially her blood pressure medication and Crestor.  Patient has left hemiparesis after hemorrhagic stroke.  Still undergoing PT/OT.  Been walking with a walker and a brace on the left leg.  Patient denies any headache or blurred vision.  No neck pain, chest pains or shortness of breath.    Review of blood test done on 12/27/2024: Total cholesterol 228, triglyceride 145, HDL 49, , vitamin D 11.2, sodium 142, potassium of 3.8, glucose 85, creatinine 0.7, calcium 9.3, ALT 16, AST 11, CBC within normal limits, TSH 0.81    Current Outpatient Medications   Medication Sig Dispense Refill    losartan (COZAAR) 50 MG tablet Take 1 tablet by mouth in the morning and at bedtime 180 tablet 3    metoprolol succinate (TOPROL XL) 100 MG extended release tablet TAKE 1 TABLET BY MOUTH DAILY 90 tablet 3    nystatin (MYCOSTATIN) 927269 UNIT/GM cream APPLY TOPICALLY TO THE AFFECTED AREA TWICE DAILY 30 g 1    diclofenac (VOLTAREN) 75 MG EC tablet TAKE 1 TABLET BY MOUTH TWICE DAILY 60 tablet 3    difluprednate (DUREZOL) 0.05 % EMUL INSTILL 1 DROP IN LEFT EYE THREE TIMES DAILY. DO NOT TAPER      gabapentin (NEURONTIN) 100 MG capsule Take 1-3 capsules by mouth at bedtime for 180 days. Intended supply: 90 days 90 capsule 2    diclofenac sodium (VOLTAREN) 1 % GEL Apply 4 g topically 4 times daily as needed for Pain 350 g 2    busPIRone (BUSPAR) 5 MG tablet TAKE 1 TABLET BY MOUTH DAILY 90 tablet 1    cetirizine (ZYRTEC) 10 MG tablet TAKE 1 TABLET BY MOUTH DAILY 90 tablet 3    amLODIPine (NORVASC) 10 MG tablet Take 1 tablet by mouth daily 90 tablet 3    acetaminophen (TYLENOL 8 HOUR) 650 MG extended release tablet Take 1 tablet by mouth 2 times daily as needed for Pain (headache) 60 tablet 3

## 2025-06-19 ENCOUNTER — RESULTS FOLLOW-UP (OUTPATIENT)
Dept: PRIMARY CARE CLINIC | Age: 45
End: 2025-06-19

## 2025-06-19 DIAGNOSIS — E78.2 MIXED HYPERLIPIDEMIA: ICD-10-CM

## 2025-06-19 RX ORDER — ROSUVASTATIN CALCIUM 20 MG/1
20 TABLET, COATED ORAL NIGHTLY
Qty: 90 TABLET | Refills: 3 | Status: SHIPPED | OUTPATIENT
Start: 2025-06-19

## 2025-06-20 ENCOUNTER — PATIENT MESSAGE (OUTPATIENT)
Dept: PRIMARY CARE CLINIC | Age: 45
End: 2025-06-20

## 2025-06-20 DIAGNOSIS — E78.2 MIXED HYPERLIPIDEMIA: ICD-10-CM

## 2025-06-24 RX ORDER — AMOXICILLIN 250 MG
1 CAPSULE ORAL DAILY
Qty: 30 TABLET | Refills: 3 | Status: SHIPPED | OUTPATIENT
Start: 2025-06-24

## 2025-06-24 NOTE — TELEPHONE ENCOUNTER
Recent Visits  Date Type Provider Dept   06/18/25 Office Visit Reyes, Eleia J, MD [facility]   12/27/24 Office Visit Reyes, Eleia J, MD [facility]   09/04/24 Office Visit Reyes, Eleia J, MD [facility]   06/11/24 Office Visit Reyes, Eleia J, MD [facility]   02/02/24 Office Visit Reyes, Eleia J, MD [facility]   Showing recent visits within past 540 days with a meds authorizing provider and meeting all other requirements  Future Appointments  No visits were found meeting these conditions.  Showing future appointments within next 150 days with a meds authorizing provider and meeting all other requirements     6/18/2025

## 2025-07-01 ENCOUNTER — HOSPITAL ENCOUNTER (OUTPATIENT)
Dept: WOMENS IMAGING | Age: 45
Discharge: HOME | End: 2025-07-01
Attending: FAMILY MEDICINE
Payer: MEDICARE

## 2025-07-01 VITALS — BODY MASS INDEX: 38.57 KG/M2 | WEIGHT: 240 LBS | HEIGHT: 66 IN

## 2025-07-01 DIAGNOSIS — Z12.31 BREAST CANCER SCREENING BY MAMMOGRAM: ICD-10-CM

## 2025-07-01 PROCEDURE — 77063 BREAST TOMOSYNTHESIS BI: CPT

## 2025-07-05 DIAGNOSIS — E78.2 MIXED HYPERLIPIDEMIA: ICD-10-CM

## 2025-07-07 RX ORDER — ROSUVASTATIN CALCIUM 10 MG/1
10 TABLET, COATED ORAL NIGHTLY
Qty: 90 TABLET | Refills: 3 | OUTPATIENT
Start: 2025-07-07

## 2025-07-08 DIAGNOSIS — R51.9 INTERMITTENT HEADACHE: ICD-10-CM

## 2025-07-08 RX ORDER — SENNOSIDES 8.6 MG
CAPSULE ORAL
Qty: 60 TABLET | Refills: 3 | Status: SHIPPED | OUTPATIENT
Start: 2025-07-08

## 2025-07-08 NOTE — TELEPHONE ENCOUNTER
LastVisit 6/18/2025   LastLabs 6/18/2025   NextVisit Visit date not found   LastRefilled 9/4/2025  Pharmacy:    [id number] - [place], [place] - [address] - P [phone] - F [phone] [address]  [facility] [address]  Phone: [phone] Fax: [phone]     pharmacy confirmed in EPIC

## 2025-07-16 ENCOUNTER — TELEPHONE (OUTPATIENT)
Dept: ORTHOPEDIC SURGERY | Age: 45
End: 2025-07-16

## 2025-07-16 NOTE — TELEPHONE ENCOUNTER
General Question     Subject: UPDATED PHYSICAL THERAPY REFERRAL   Patient and /or Facility Request: Geoffrey Osorio   Contact Number: [phone]     PATIENT CALLING TO GET A UPDATED PHYSICAL THERAPY REFERRAL

## 2025-07-18 DIAGNOSIS — R29.898 LEFT ARM WEAKNESS: Primary | ICD-10-CM

## 2025-07-24 ENCOUNTER — HOSPITAL ENCOUNTER (OUTPATIENT)
Dept: PHYSICAL THERAPY | Age: 45
Setting detail: THERAPIES SERIES
End: 2025-07-24
Payer: MEDICARE

## 2025-07-28 ENCOUNTER — HOSPITAL ENCOUNTER (OUTPATIENT)
Dept: OCCUPATIONAL THERAPY | Age: 45
Setting detail: THERAPIES SERIES
Discharge: HOME | End: 2025-07-28
Payer: MEDICARE

## 2025-07-28 PROCEDURE — 97165 OT EVAL LOW COMPLEX 30 MIN: CPT

## 2025-07-28 PROCEDURE — 97110 THERAPEUTIC EXERCISES: CPT

## 2025-07-28 NOTE — PLAN OF CARE
[facility] - Outpatient Rehabilitation and Therapy: [address], [place] [address] office: [phone] fax: [phone]     Occupational Therapy Initial Evaluation Certification      Dear Reyes, Eleia J, MD,    We had the pleasure of evaluating the following patient for occupational therapy services at [facility].  A summary of our findings can be found in the initial assessment below.  This includes our plan of care.  If you have any questions or concerns regarding these findings, please do not hesitate to contact me at the office phone number listed above.  Thank you for the referral.     Physician Signature:_______________________________Date:__________________  By signing above (or electronic signature), therapist’s plan is approved by physician       Occupational Therapy: TREATMENT/PROGRESS NOTE   Patient: Geoffrey Osorio (45 y.o. female)   Examination Date: 2025   :  1980 MRN: [id number]   Visit #: 1  Insurance Allowable Auth Needed   BMN []Yes    [x]No    Insurance: Payor: MEDICARE / Plan: MEDICARE PART A AND B / Product Type: *No Product type* /   Insurance ID: [id number] - (Medicare)  Secondary Insurance (if applicable):    Treatment Diagnosis: left hemiplegia I69.354   No diagnosis found.   Medical Diagnosis:  Left arm weakness [R29.898]   Referring Physician: Reyes, Eleia J, MD  PCP: Reyes, Eleia J, MD     Plan of care signed (Y/N): sent on evaluation     Date of Patient follow up with Physician:      Plan of Care Report: EVAL today  POC update due: (10 visits /OR AUTH LIMITS, whichever is less) 2025                                              Medical History:PMHx of HTN, HLD, prior CVA with residual left-sided upper and lower extremity weaknes HTN, brain aneurysm repaired w/craniectomy 21, subarachnoid hemorrhage 3/1/21, PE 3/13/21  Date of Onset:  Date of Surgery:  Comorbidities:  Diabetes (Type I or

## 2025-07-31 ENCOUNTER — CARE COORDINATION (OUTPATIENT)
Dept: CARE COORDINATION | Age: 45
End: 2025-07-31

## 2025-07-31 NOTE — CARE COORDINATION
ACM outreach to reach pt in an attempt to discuss Care Coordination. Pt was UTR, left VM message with ACM's contact information and primary PCP's ACM contact information

## 2025-08-04 ENCOUNTER — HOSPITAL ENCOUNTER (OUTPATIENT)
Dept: OCCUPATIONAL THERAPY | Age: 45
Setting detail: THERAPIES SERIES
Discharge: HOME | End: 2025-08-04
Payer: MEDICARE

## 2025-08-04 PROCEDURE — 97112 NEUROMUSCULAR REEDUCATION: CPT

## 2025-08-06 ENCOUNTER — APPOINTMENT (OUTPATIENT)
Dept: OCCUPATIONAL THERAPY | Age: 45
End: 2025-08-06
Payer: MEDICARE

## 2025-08-13 ENCOUNTER — HOSPITAL ENCOUNTER (OUTPATIENT)
Dept: OCCUPATIONAL THERAPY | Age: 45
Setting detail: THERAPIES SERIES
Discharge: HOME | End: 2025-08-13
Payer: MEDICARE

## 2025-08-13 PROCEDURE — 97112 NEUROMUSCULAR REEDUCATION: CPT

## 2025-08-15 ENCOUNTER — APPOINTMENT (OUTPATIENT)
Dept: OCCUPATIONAL THERAPY | Age: 45
End: 2025-08-15
Payer: MEDICARE

## 2025-08-19 ENCOUNTER — APPOINTMENT (OUTPATIENT)
Dept: OCCUPATIONAL THERAPY | Age: 45
End: 2025-08-19
Payer: MEDICARE

## 2025-08-22 ENCOUNTER — APPOINTMENT (OUTPATIENT)
Dept: OCCUPATIONAL THERAPY | Age: 45
End: 2025-08-22
Payer: MEDICARE